# Patient Record
Sex: MALE | Race: WHITE | NOT HISPANIC OR LATINO | Employment: FULL TIME | URBAN - METROPOLITAN AREA
[De-identification: names, ages, dates, MRNs, and addresses within clinical notes are randomized per-mention and may not be internally consistent; named-entity substitution may affect disease eponyms.]

---

## 2017-03-28 ENCOUNTER — TELEPHONE (OUTPATIENT)
Dept: GASTROENTEROLOGY | Facility: CLINIC | Age: 47
End: 2017-03-28

## 2017-03-28 NOTE — TELEPHONE ENCOUNTER
----- Message from Dilma Espinoza sent at 3/28/2017 12:26 PM CDT -----  Contact: Wife- [phone]  Malissa- pt called to reschedule his upcoming appt with Dr. Leonardo originally for 6/22/17- pt will be working that day- please call pt back at [phone]

## 2017-05-17 ENCOUNTER — DOCUMENTATION ONLY (OUTPATIENT)
Dept: FAMILY MEDICINE | Facility: CLINIC | Age: 47
End: 2017-05-17

## 2017-05-17 NOTE — PROGRESS NOTES
Pre-Visit Chart Review  For Appointment Scheduled on 05/18/2017    Health Maintenance Due   Topic Date Due    Pneumococcal PCV13 (High Risk) (1 - PCV13 Required) 05/05/1972    Pneumococcal PPSV23 (High Risk) (1) 05/05/1989

## 2017-05-18 ENCOUNTER — OFFICE VISIT (OUTPATIENT)
Dept: FAMILY MEDICINE | Facility: CLINIC | Age: 47
End: 2017-05-18
Payer: COMMERCIAL

## 2017-05-18 ENCOUNTER — PATIENT MESSAGE (OUTPATIENT)
Dept: HEMATOLOGY/ONCOLOGY | Facility: CLINIC | Age: 47
End: 2017-05-18

## 2017-05-18 VITALS
BODY MASS INDEX: 34.23 KG/M2 | SYSTOLIC BLOOD PRESSURE: 124 MMHG | WEIGHT: 266.75 LBS | HEIGHT: 74 IN | TEMPERATURE: 98 F | HEART RATE: 76 BPM | DIASTOLIC BLOOD PRESSURE: 74 MMHG

## 2017-05-18 DIAGNOSIS — E46 UNSPECIFIED PROTEIN-CALORIE MALNUTRITION: ICD-10-CM

## 2017-05-18 DIAGNOSIS — M43.16 SPONDYLOLISTHESIS OF LUMBAR REGION: Primary | ICD-10-CM

## 2017-05-18 DIAGNOSIS — M51.36 DDD (DEGENERATIVE DISC DISEASE), LUMBAR: ICD-10-CM

## 2017-05-18 DIAGNOSIS — R63.5 ABNORMAL WEIGHT GAIN: ICD-10-CM

## 2017-05-18 DIAGNOSIS — K76.0 NONALCOHOLIC FATTY LIVER DISEASE: ICD-10-CM

## 2017-05-18 DIAGNOSIS — Z23 NEED FOR HEPATITIS A AND B VACCINATION: ICD-10-CM

## 2017-05-18 DIAGNOSIS — R16.1 SPLENOMEGALY: Chronic | ICD-10-CM

## 2017-05-18 DIAGNOSIS — R73.9 HYPERGLYCEMIA: ICD-10-CM

## 2017-05-18 PROCEDURE — 90471 IMMUNIZATION ADMIN: CPT | Mod: S$GLB,,, | Performed by: FAMILY MEDICINE

## 2017-05-18 PROCEDURE — 99999 PR PBB SHADOW E&M-EST. PATIENT-LVL III: CPT | Mod: PBBFAC,,, | Performed by: FAMILY MEDICINE

## 2017-05-18 PROCEDURE — 90636 HEP A/HEP B VACC ADULT IM: CPT | Mod: S$GLB,,, | Performed by: FAMILY MEDICINE

## 2017-05-18 PROCEDURE — 1160F RVW MEDS BY RX/DR IN RCRD: CPT | Mod: S$GLB,,, | Performed by: FAMILY MEDICINE

## 2017-05-18 PROCEDURE — 99214 OFFICE O/P EST MOD 30 MIN: CPT | Mod: 25,S$GLB,, | Performed by: FAMILY MEDICINE

## 2017-05-18 RX ORDER — PHENTERMINE HYDROCHLORIDE 37.5 MG/1
37.5 TABLET ORAL DAILY
Qty: 30 TABLET | Refills: 4 | Status: SHIPPED | OUTPATIENT
Start: 2017-05-18 | End: 2017-06-17

## 2017-05-18 RX ORDER — TOPIRAMATE 25 MG/1
25 TABLET ORAL 2 TIMES DAILY
Qty: 60 TABLET | Refills: 11 | Status: SHIPPED | OUTPATIENT
Start: 2017-05-18 | End: 2017-10-04

## 2017-05-18 NOTE — PROGRESS NOTES
Subjective:       Patient ID: Lyndon Lion Jr. is a 47 y.o. male.    Chief Complaint: Establish Care    HPI Comments: Well Adult Physical: Patient here for a comprehensive physical exam.The patient reports problems - Patient Active Problem List:     Radiculopathy, lumbosacral region     Herniated lumbar intervertebral disc     Lumbar spondylosis     DDD (degenerative disc disease), lumbosacral     Greater trochanteric bursitis of left hip     Acute medial meniscal tear     Obesity, unspecified     Hx of colon cancer, stage I     Parada syndrome     Thoracic or lumbosacral neuritis or radiculitis, unspecified     Arthritis of shoulder region, right     Neural foraminal stenosis of lumbar spine     MSH2-related Parada syndrome (HNPCC1)     LFT elevation     Family hx of prostate cancer     Spondylosis of lumbar region without myelopathy or radiculopathy     Spondylolisthesis of lumbar region     Nonalcoholic fatty liver disease     Splenomegaly      Do you take any herbs or supplements that were not prescribed by a doctor? no Are you taking calcium supplements? no Are you taking aspirin daily? no   History:  Patient receives prostate care outside our clinic  Date last prostate exam: Tuesday  Date last PSA: Tuesday  Any STD's in the past? none      Review of Systems   Constitutional: Negative.  Negative for activity change, appetite change, chills, diaphoresis, fatigue, fever and unexpected weight change.   HENT: Negative.  Negative for congestion, drooling, ear discharge, ear pain, hearing loss, mouth sores, nosebleeds, postnasal drip, rhinorrhea, sinus pressure, sore throat, tinnitus, trouble swallowing and voice change.    Eyes: Negative.  Negative for pain, discharge, redness, itching and visual disturbance.   Respiratory: Negative.  Negative for apnea, cough, choking, chest tightness and shortness of breath.    Cardiovascular: Negative.  Negative for chest pain, palpitations and leg swelling.    Gastrointestinal: Negative.  Negative for abdominal distention, abdominal pain and anal bleeding.        Loose bowels   Endocrine: Negative.  Negative for cold intolerance, heat intolerance, polydipsia, polyphagia and polyuria.   Genitourinary: Negative.  Negative for difficulty urinating, dysuria, enuresis, flank pain, frequency, hematuria, scrotal swelling, testicular pain and urgency.   Musculoskeletal: Positive for arthralgias and back pain. Negative for gait problem, neck pain and neck stiffness.   Skin: Negative.  Negative for color change, pallor and rash.   Allergic/Immunologic: Negative.  Negative for environmental allergies and food allergies.   Neurological: Negative.  Negative for dizziness, tremors, syncope, facial asymmetry, speech difficulty, light-headedness, numbness and headaches.   Hematological: Negative for adenopathy. Does not bruise/bleed easily.   Psychiatric/Behavioral: Negative.  Negative for agitation, behavioral problems, confusion, decreased concentration, dysphoric mood and hallucinations. The patient is not hyperactive.        Objective:      Physical Exam   Constitutional: He is oriented to person, place, and time. He appears well-developed and well-nourished. No distress.   HENT:   Head: Normocephalic and atraumatic.   Right Ear: External ear normal.   Left Ear: External ear normal.   Nose: Nose normal.   Mouth/Throat: Oropharynx is clear and moist. No oropharyngeal exudate.   Eyes: Conjunctivae and EOM are normal. Pupils are equal, round, and reactive to light. Right eye exhibits no discharge. Left eye exhibits no discharge. No scleral icterus.   Neck: Normal range of motion. Neck supple. No JVD present. No tracheal deviation present. No thyromegaly present.   Cardiovascular: Normal rate, normal heart sounds and intact distal pulses.    No murmur heard.  Pulmonary/Chest: Effort normal and breath sounds normal. No respiratory distress. He has no wheezes. He has no rales.    Abdominal: Soft. Bowel sounds are normal. He exhibits no distension and no mass. There is no tenderness. There is no rebound and no guarding.   Musculoskeletal: He exhibits no edema or tenderness.          Lymphadenopathy:     He has no cervical adenopathy.   Neurological: He is alert and oriented to person, place, and time. No cranial nerve deficit. Coordination normal.   Skin: Skin is warm and dry. No rash noted. He is not diaphoretic. No erythema. No pallor.   Psychiatric: He has a normal mood and affect. His behavior is normal. Judgment and thought content normal.   Vitals reviewed.      Assessment:       1. Spondylolisthesis of lumbar region    2. Nonalcoholic fatty liver disease    3. Splenomegaly    4. Need for hepatitis A and B vaccination    5. Hyperglycemia    6. Unspecified protein-calorie malnutrition    7. Abnormal weight gain    8. DDD (degenerative disc disease), lumbar        Plan:       Spondylolisthesis of lumbar region    Nonalcoholic fatty liver disease    Splenomegaly    Need for hepatitis A and B vaccination  -     Hepatitis A / Hepatitis B Combined Vaccine (IM)  -     HEPATITIS B SURFACE ANTIBODY; Future; Expected date: 5/18/17    Hyperglycemia  -     Basic metabolic panel; Future; Expected date: 5/18/17  -     Hemoglobin A1c; Future; Expected date: 5/18/17    Unspecified protein-calorie malnutrition  -     Vitamin D; Future; Expected date: 5/18/17    Abnormal weight gain  -     phentermine (ADIPEX-P) 37.5 mg tablet; Take 1 tablet (37.5 mg total) by mouth once daily.  Dispense: 30 tablet; Refill: 4    DDD (degenerative disc disease), lumbar  -     topiramate (TOPAMAX) 25 MG tablet; Take 1 tablet (25 mg total) by mouth 2 (two) times daily.  Dispense: 60 tablet; Refill: 11      Patient readiness: acceptance and barriers:readiness and social stressors    During the course of the visit the patient was educated and counseled about the following:     Obesity:   General weight loss/lifestyle  modification strategies discussed (elicit support from others; identify saboteurs; non-food rewards, etc).    Goals: Obesity: Reduce calorie intake and BMI    Did patient meet goals/outcomes: No    The following self management tools provided: excercise log    Patient Instructions (the written plan) was given to the patient/family.     Time spent with patient: 45 minutes

## 2017-05-18 NOTE — PROGRESS NOTES
2 patient identifiers used (name and ). Administered Hep A/B (TwinRx) vaccine IM. Patient tolerated well, no bleeding at insertion site noted. Pain scale 1/10. Aseptic technique maintained. Immunization information given to patient.

## 2017-05-18 NOTE — MR AVS SNAPSHOT
[facility]  [address]  [place] [address]  Phone: [phone]  Fax: [phone]                  Lyndon Lion Jr.   2017 3:40 PM   Office Visit    Description:  Male : 1970   Provider:  Evan Judd MD   Department:  [facility] Medicine           Reason for Visit     Establish Care           Diagnoses this Visit        Comments    Spondylolisthesis of lumbar region    -  Primary     Nonalcoholic fatty liver disease         Splenomegaly         Need for hepatitis A and B vaccination         Hyperglycemia         Unspecified protein-calorie malnutrition         Abnormal weight gain         DDD (degenerative disc disease), lumbar                To Do List           Future Appointments        Provider Department Dept Phone    2017 9:30 AM Dom Leonardo MD [facility] - Gastroenterology [phone]    2017 10:15 AM [facility] - Lab [phone]    2017 4:20 PM Evan Judd MD [facility] [phone]    8/15/2017 9:40 AM LAB, [facility] [phone]    8/15/2017 10:00 AM [facility] XRFL1 [facility] [phone]      Goals (5 Years of Data)     None      Follow-Up and Disposition     Return in about 3 months (around 2017).       These Medications        Disp Refills Start End    phentermine (ADIPEX-P) 37.5 mg tablet 30 tablet 4 2017    Take 1 tablet (37.5 mg total) by mouth once daily. - Oral    Pharmacy: [facility] [address] [place] [address] & [facility] Ph #: [phone]       topiramate (TOPAMAX) 25 MG tablet 60 tablet 11 2017    Take 1 tablet (25 mg total) by mouth 2 (two) times daily. - Oral    Pharmacy: [facility] [address] [place] [address] [address] [facility] & [facility] Ph #: [phone]         [facility]     [facility] -   Assistance  Unless otherwise directed by your provider, please contact [facility], our nurse care line that is available for 24/7 assistance.     Registered nurses in the [facility] provide: appointment scheduling, clinical advisement, health education, and other advisory services.  Call: [phone] (toll free)               Medications           Message regarding Medications     Verify the changes and/or additions to your medication regime listed below are the same as discussed with your clinician today.  If any of these changes or additions are incorrect, please notify your healthcare provider.        START taking these NEW medications        Refills    phentermine (ADIPEX-P) 37.5 mg tablet 4    Sig: Take 1 tablet (37.5 mg total) by mouth once daily.    Class: Normal    Route: Oral    topiramate (TOPAMAX) 25 MG tablet 11    Sig: Take 1 tablet (25 mg total) by mouth 2 (two) times daily.    Class: Normal    Route: Oral      STOP taking these medications     ciclopirox (PENLAC) 8 % Soln APPLY EXTERNALLY TO THE AFFECTED AREA EVERY DAY           Verify that the below list of medications is an accurate representation of the medications you are currently taking.  If none reported, the list may be blank. If incorrect, please contact your healthcare provider. Carry this list with you in case of emergency.           Current Medications     cyclobenzaprine (FLEXERIL) 10 MG tablet Take 1 tablet (10 mg total) by mouth 2 (two) times daily as needed for Muscle spasms.    diclofenac sodium 1 % Gel Apply 2 g topically 4 (four) times daily.    oxycodone-acetaminophen (PERCOCET)  mg per tablet Take 1 tablet by mouth every 6 to 8 hours as needed for Pain.    phentermine (ADIPEX-P) 37.5 mg tablet Take 1 tablet (37.5 mg total) by mouth once daily.    topiramate (TOPAMAX) 25 MG tablet Take 1 tablet (25 mg total) by mouth 2 (two) times daily.           Clinical Reference Information           Your Vitals Were   "   BP Pulse Temp Height Weight BMI    124/74 (BP Location: Right arm, Patient Position: Sitting, BP Method: Automatic) 76 98.4 °F (36.9 °C) (Oral) 6' 1.5" (1.867 m) 121 kg (266 lb 12.1 oz) 34.72 kg/m2      Blood Pressure          Most Recent Value    BP  124/74      Allergies as of 5/18/2017     No Known Allergies      Immunizations Administered on Date of Encounter - 5/18/2017     Name Date Dose VIS Date Route    Hepatitis A / Hepatitis B 5/18/2017 1 mL 7/20/2016 Intramuscular      Orders Placed During Today's Visit      Normal Orders This Visit    Hepatitis A / Hepatitis B Combined Vaccine (IM)     Future Labs/Procedures Expected by Expires    Basic metabolic panel  5/18/2017 7/17/2018    Hemoglobin A1c  5/18/2017 7/17/2018    HEPATITIS B SURFACE ANTIBODY  5/18/2017 7/17/2018    Vitamin D  5/18/2017 7/17/2018      Language Assistance Services     ATTENTION: Language assistance services are available, free of charge. Please call [phone].      ATENCIÓN: Si habla español, tiene a lopez disposición servicios gratuitos de asistencia lingüística. Llame al [phone].     ELFEGO Ý: N?u b?n nói Ti?ng Vi?t, có các d?ch v? h? tr? ngôn ng? mi?n phí dành cho b?n. G?i s? [phone].         [facility] complies with applicable Federal civil rights laws and does not discriminate on the basis of race, color, national origin, age, disability, or sex.        "

## 2017-05-19 ENCOUNTER — TELEPHONE (OUTPATIENT)
Dept: HEMATOLOGY/ONCOLOGY | Facility: CLINIC | Age: 47
End: 2017-05-19

## 2017-05-19 NOTE — TELEPHONE ENCOUNTER
----- Message from Tara Lozano sent at 5/19/2017  3:25 PM CDT -----  Contact:  call [phone]//// hugh    Calling to  reschedule  Jessica // please call  For  details

## 2017-06-15 ENCOUNTER — ANESTHESIA EVENT (OUTPATIENT)
Dept: SURGERY | Facility: AMBULARY SURGERY CENTER | Age: 47
End: 2017-06-15
Payer: COMMERCIAL

## 2017-06-16 ENCOUNTER — HOSPITAL ENCOUNTER (OUTPATIENT)
Facility: AMBULARY SURGERY CENTER | Age: 47
Discharge: HOME | End: 2017-06-16
Attending: SPECIALIST | Admitting: SPECIALIST
Payer: COMMERCIAL

## 2017-06-16 ENCOUNTER — ANESTHESIA (OUTPATIENT)
Dept: SURGERY | Facility: AMBULARY SURGERY CENTER | Age: 47
End: 2017-06-16
Payer: COMMERCIAL

## 2017-06-16 ENCOUNTER — SURGERY (OUTPATIENT)
Age: 47
End: 2017-06-16

## 2017-06-16 DIAGNOSIS — M43.16 SPONDYLOLISTHESIS OF LUMBAR REGION: ICD-10-CM

## 2017-06-16 DIAGNOSIS — M54.17 RADICULOPATHY, LUMBOSACRAL REGION: Primary | ICD-10-CM

## 2017-06-16 DIAGNOSIS — M51.37 DDD (DEGENERATIVE DISC DISEASE), LUMBOSACRAL: ICD-10-CM

## 2017-06-16 DIAGNOSIS — M51.26 HERNIATED LUMBAR INTERVERTEBRAL DISC: ICD-10-CM

## 2017-06-16 DIAGNOSIS — M47.26 OSTEOARTHRITIS OF SPINE WITH RADICULOPATHY, LUMBAR REGION: ICD-10-CM

## 2017-06-16 DIAGNOSIS — M47.816 SPONDYLOSIS OF LUMBAR REGION WITHOUT MYELOPATHY OR RADICULOPATHY: ICD-10-CM

## 2017-06-16 PROCEDURE — D9220A PRA ANESTHESIA: Mod: CRNA,,, | Performed by: NURSE ANESTHETIST, CERTIFIED REGISTERED

## 2017-06-16 PROCEDURE — 64493 INJ PARAVERT F JNT L/S 1 LEV: CPT | Mod: LT | Performed by: SPECIALIST

## 2017-06-16 PROCEDURE — D9220A PRA ANESTHESIA: Mod: ANES,,, | Performed by: ANESTHESIOLOGY

## 2017-06-16 PROCEDURE — 64495 INJ PARAVERT F JNT L/S 3 LEV: CPT | Mod: RT | Performed by: SPECIALIST

## 2017-06-16 PROCEDURE — 64494 INJ PARAVERT F JNT L/S 2 LEV: CPT | Mod: RT | Performed by: SPECIALIST

## 2017-06-16 RX ORDER — BUPIVACAINE HYDROCHLORIDE 2.5 MG/ML
INJECTION, SOLUTION EPIDURAL; INFILTRATION; INTRACAUDAL
Status: DISPENSED
Start: 2017-06-16 | End: 2017-06-17

## 2017-06-16 RX ORDER — PROPOFOL 10 MG/ML
INJECTION, EMULSION INTRAVENOUS
Status: DISCONTINUED
Start: 2017-06-16 | End: 2017-06-16 | Stop reason: HOSPADM

## 2017-06-16 RX ORDER — PROPOFOL 10 MG/ML
VIAL (ML) INTRAVENOUS
Status: DISCONTINUED | OUTPATIENT
Start: 2017-06-16 | End: 2017-06-16

## 2017-06-16 RX ORDER — LIDOCAINE HCL/PF 100 MG/5ML
SYRINGE (ML) INTRAVENOUS
Status: DISCONTINUED | OUTPATIENT
Start: 2017-06-16 | End: 2017-06-16

## 2017-06-16 RX ORDER — SODIUM CHLORIDE, SODIUM LACTATE, POTASSIUM CHLORIDE, CALCIUM CHLORIDE 600; 310; 30; 20 MG/100ML; MG/100ML; MG/100ML; MG/100ML
INJECTION, SOLUTION INTRAVENOUS CONTINUOUS
Status: DISCONTINUED | OUTPATIENT
Start: 2017-06-16 | End: 2017-06-16 | Stop reason: HOSPADM

## 2017-06-16 RX ORDER — DEXAMETHASONE SODIUM PHOSPHATE 10 MG/ML
INJECTION INTRAMUSCULAR; INTRAVENOUS
Status: DISCONTINUED | OUTPATIENT
Start: 2017-06-16 | End: 2017-06-16 | Stop reason: HOSPADM

## 2017-06-16 RX ORDER — LIDOCAINE HYDROCHLORIDE 20 MG/ML
INJECTION, SOLUTION EPIDURAL; INFILTRATION; INTRACAUDAL; PERINEURAL
Status: DISCONTINUED
Start: 2017-06-16 | End: 2017-06-16 | Stop reason: HOSPADM

## 2017-06-16 RX ORDER — BUPIVACAINE HYDROCHLORIDE 2.5 MG/ML
INJECTION, SOLUTION EPIDURAL; INFILTRATION; INTRACAUDAL
Status: DISCONTINUED | OUTPATIENT
Start: 2017-06-16 | End: 2017-06-16 | Stop reason: HOSPADM

## 2017-06-16 RX ORDER — LIDOCAINE HYDROCHLORIDE 10 MG/ML
1 INJECTION, SOLUTION EPIDURAL; INFILTRATION; INTRACAUDAL; PERINEURAL ONCE
Status: COMPLETED | OUTPATIENT
Start: 2017-06-16 | End: 2017-06-16

## 2017-06-16 RX ORDER — DEXAMETHASONE SODIUM PHOSPHATE 10 MG/ML
INJECTION INTRAMUSCULAR; INTRAVENOUS
Status: DISPENSED
Start: 2017-06-16 | End: 2017-06-17

## 2017-06-16 RX ADMIN — BUPIVACAINE HYDROCHLORIDE 9 ML: 2.5 INJECTION, SOLUTION EPIDURAL; INFILTRATION; INTRACAUDAL at 10:06

## 2017-06-16 RX ADMIN — SODIUM CHLORIDE, SODIUM LACTATE, POTASSIUM CHLORIDE, CALCIUM CHLORIDE: 600; 310; 30; 20 INJECTION, SOLUTION INTRAVENOUS at 09:06

## 2017-06-16 RX ADMIN — Medication 100 MG: at 10:06

## 2017-06-16 RX ADMIN — DEXAMETHASONE SODIUM PHOSPHATE 10 MG: 10 INJECTION INTRAMUSCULAR; INTRAVENOUS at 10:06

## 2017-06-16 RX ADMIN — BUPIVACAINE HYDROCHLORIDE 10 ML: 2.5 INJECTION, SOLUTION EPIDURAL; INFILTRATION; INTRACAUDAL at 10:06

## 2017-06-16 RX ADMIN — LIDOCAINE HYDROCHLORIDE 10 MG: 10 INJECTION, SOLUTION EPIDURAL; INFILTRATION; INTRACAUDAL; PERINEURAL at 09:06

## 2017-06-16 RX ADMIN — Medication 50 MG: at 10:06

## 2017-06-16 NOTE — OP NOTE
DATE OF PROCEDURE:  06/16/2017    SURGEON NAME: DR. RENETTA DAVIS    PREOPERATIVE DIAGNOSIS  Lumbar spondylosis    POSTOPERATIVE DIAGNOSIS  Lumbar spondylosis    PROCEDURE  Left Lumbar Steroid and Marcaine facet injections at levels L3/4, L4/5, L5/S1  Right Lumbar Steroid and Marcaine facet injections at levels L3/4, L4/5, L5/S1  Intraoperative fluoroscopy.    ANESTHESIA  Monitored anesthesia care.    ESTIMATED BLOOD LOSS  Less than 1 mL.    INDICATION FOR SURGERY    Patient presents with complaints of back mechanical pain. The patients pain continued to persist, interfering with their quality of life and activities of daily living. A surgical procedure was planned with steroid and Marcaine facet injections. The patient was informed that the surgical procedure provides no guarantee of improvement or worsening of present condition but only an attempt to improve overall condition and function. The patient expressed understanding, and all questions were answered and still desired to proceed with operative procedure.      PROCEDURE IN DETAIL    The patient was taken to the operating room. Anesthesia achieved with monitored anesthesia care.  After adequate anesthesia was achieved, the patient was placed in prone position. All bony prominences were well padded and protected. The lower back prepped and draped in the usual sterile fashion. Approximately 2ml per level 0.25% Marcaine utilized local into skin and subcutaneous tissue at each level. With assistance of intraoperative fluoroscopy, a 22-gauge spinal needle was advanced into the facet joints. A mixture of ml of 0.25% Marcaine, and 1ml of 10mg Decadron was made where approximately 1 to 1.5 ml advanced into each facet joint. At the completion of steroid injections, the needle was slowly withdrawn. The surgical sites were cleaned with normal saline with application of sterile occlusive Band-Aids. Anesthesia reversed. The patient transferred to recovery room in stable  condition without immediate apparent surgical complication.

## 2017-06-16 NOTE — ANESTHESIA POSTPROCEDURE EVALUATION
"Anesthesia Post Evaluation    Patient: Lyndon Lion Jr.    Procedure(s) Performed: Procedure(s) (LRB):  BLOCK-FACET-LUMBAR- Ye L3/4 4/5 5/S1 (Bilateral)    Final Anesthesia Type: general  Patient location during evaluation: PACU  Patient participation: Yes- Able to Participate  Level of consciousness: awake and alert  Post-procedure vital signs: reviewed and stable  Pain management: adequate  Airway patency: patent  PONV status at discharge: No PONV  Anesthetic complications: no      Cardiovascular status: blood pressure returned to baseline and hemodynamically stable  Respiratory status: unassisted  Hydration status: euvolemic  Follow-up not needed.        Visit Vitals  /73   Pulse (!) 57   Temp 36.8 °C (98.2 °F) (Oral)   Resp 17   Ht 6' 1" (1.854 m)   Wt 120.7 kg (266 lb)   SpO2 97%   BMI 35.09 kg/m²       Pain/Karthikeyan Score: Pain Assessment Performed: Yes (6/16/2017 11:10 AM)  Presence of Pain: denies (6/16/2017 11:10 AM)  Karthikeyan Score: 5 (6/16/2017 11:06 AM)      "

## 2017-06-16 NOTE — DISCHARGE SUMMARY
[facility]  Discharge Note  Short Stay    Admit Date: 6/16/2017    Discharge Date and Time: No discharge date for patient encounter.     Attending Physician: Lyndon Brooke Jr., MD     Discharge Provider: Lyndon Brooke Jr    Diagnoses:  Active Hospital Problems    Diagnosis  POA    Spondylosis of lumbar region without myelopathy or radiculopathy [M47.816]  Yes      Resolved Hospital Problems    Diagnosis Date Resolved POA   No resolved problems to display.       Discharged Condition: good    Hospital Course: Patient was admitted for an outpatient procedure and tolerated the procedure well with no complications.    Final Diagnoses: Same as principal problem.    Disposition: Home or Self Care    Follow up/Patient Instructions:    Medications:  Reconciled Home Medications:   Current Discharge Medication List      CONTINUE these medications which have NOT CHANGED    Details   cyclobenzaprine (FLEXERIL) 10 MG tablet Take 1 tablet (10 mg total) by mouth 2 (two) times daily as needed for Muscle spasms.  Qty: 60 tablet, Refills: 2      diclofenac sodium 1 % Gel Apply 2 g topically 4 (four) times daily.  Qty: 1 Tube, Refills: 2      oxycodone-acetaminophen (PERCOCET)  mg per tablet Take 1 tablet by mouth every 6 to 8 hours as needed for Pain.  Qty: 60 tablet, Refills: 0      phentermine (ADIPEX-P) 37.5 mg tablet Take 1 tablet (37.5 mg total) by mouth once daily.  Qty: 30 tablet, Refills: 4    Associated Diagnoses: Abnormal weight gain      topiramate (TOPAMAX) 25 MG tablet Take 1 tablet (25 mg total) by mouth 2 (two) times daily.  Qty: 60 tablet, Refills: 11    Associated Diagnoses: DDD (degenerative disc disease), lumbar             Discharge Procedure Orders  Diet general     Activity as tolerated     Remove dressing in 24 hours       Follow-up Information     Lyndon Brooke Jr, MD In 3 weeks.    Specialty:  Orthopedic Surgery  Contact information:  [address]  [address]  [place]  [address]  [phone]                   Discharge Procedure Orders (must include Diet, Follow-up, Activity):    Discharge Procedure Orders (must include Diet, Follow-up, Activity)  Diet general     Activity as tolerated     Remove dressing in 24 hours

## 2017-06-16 NOTE — ANESTHESIA PREPROCEDURE EVALUATION
06/16/2017  Lyndon Lion Jr. is a 47 y.o., male.    Anesthesia Evaluation    I have reviewed the Patient Summary Reports.    I have reviewed the Nursing Notes.      Review of Systems  Anesthesia Hx:  No problems with previous Anesthesia    Pulmonary:   Asthma asymptomatic        Physical Exam  General:  Well nourished                 Anesthesia Plan  Type of Anesthesia, risks & benefits discussed:  Anesthesia Type:  MAC  Patient's Preference:   Intra-op Monitoring Plan:   Intra-op Monitoring Plan Comments:   Post Op Pain Control Plan:   Post Op Pain Control Plan Comments:   Induction:   IV  Beta Blocker:  Patient is not currently on a Beta-Blocker (No further documentation required).       Informed Consent: Patient understands risks and agrees with Anesthesia plan.  Questions answered. Anesthesia consent signed with patient.  ASA Score: 2     Day of Surgery Review of History & Physical:    H&P update referred to the surgeon.         Ready For Surgery From Anesthesia Perspective.

## 2017-06-16 NOTE — PLAN OF CARE
Patient awake, alert, and oriented.  No complaints of pain or discomfort at present time. VSS, tolerating fluids without C/O N/V. Stable for discharge home with daughter.

## 2017-06-16 NOTE — TRANSFER OF CARE
"Anesthesia Transfer of Care Note    Patient: Lyndon Lion Jr.    Procedure(s) Performed: Procedure(s) (LRB):  BLOCK-FACET-LUMBAR- Ye L3/4 4/5 5/S1 (Bilateral)    Patient location: PACU    Anesthesia Type: MAC    Transport from OR: Transported from OR on room air with adequate spontaneous ventilation    Post pain: adequate analgesia    Post assessment: no apparent anesthetic complications and tolerated procedure well    Post vital signs: stable    Level of consciousness: awake, alert and oriented    Nausea/Vomiting: no nausea/vomiting    Complications: none    Transfer of care protocol was followed      Last vitals:   Visit Vitals  /75 (BP Location: Right arm, Patient Position: Lying)   Pulse (!) 55   Temp 36.4 °C (97.6 °F) (Oral)   Resp 18   Ht 6' 1" (1.854 m)   Wt 120.7 kg (266 lb)   SpO2 97%   BMI 35.09 kg/m²     "

## 2017-06-20 VITALS
RESPIRATION RATE: 17 BRPM | OXYGEN SATURATION: 97 % | SYSTOLIC BLOOD PRESSURE: 114 MMHG | HEART RATE: 57 BPM | BODY MASS INDEX: 35.25 KG/M2 | TEMPERATURE: 98 F | DIASTOLIC BLOOD PRESSURE: 73 MMHG | HEIGHT: 73 IN | WEIGHT: 266 LBS

## 2017-07-05 DIAGNOSIS — M79.642 LEFT HAND PAIN: Primary | ICD-10-CM

## 2017-07-07 ENCOUNTER — OFFICE VISIT (OUTPATIENT)
Dept: ORTHOPEDICS | Facility: CLINIC | Age: 47
End: 2017-07-07
Payer: COMMERCIAL

## 2017-07-07 ENCOUNTER — HOSPITAL ENCOUNTER (OUTPATIENT)
Dept: RADIOLOGY | Facility: HOSPITAL | Age: 47
Discharge: HOME | End: 2017-07-07
Attending: ORTHOPAEDIC SURGERY
Payer: COMMERCIAL

## 2017-07-07 VITALS
SYSTOLIC BLOOD PRESSURE: 129 MMHG | WEIGHT: 266.13 LBS | BODY MASS INDEX: 35.27 KG/M2 | HEART RATE: 66 BPM | DIASTOLIC BLOOD PRESSURE: 76 MMHG | HEIGHT: 73 IN

## 2017-07-07 DIAGNOSIS — M79.642 LEFT HAND PAIN: ICD-10-CM

## 2017-07-07 DIAGNOSIS — S66.519A: Primary | ICD-10-CM

## 2017-07-07 PROCEDURE — 73130 X-RAY EXAM OF HAND: CPT | Mod: TC,PN,LT

## 2017-07-07 PROCEDURE — 99203 OFFICE O/P NEW LOW 30 MIN: CPT | Mod: 25,S$GLB,, | Performed by: ORTHOPAEDIC SURGERY

## 2017-07-07 PROCEDURE — 20600 DRAIN/INJ JOINT/BURSA W/O US: CPT | Mod: LT,S$GLB,, | Performed by: ORTHOPAEDIC SURGERY

## 2017-07-07 PROCEDURE — 99999 PR PBB SHADOW E&M-EST. PATIENT-LVL II: CPT | Mod: PBBFAC,,, | Performed by: ORTHOPAEDIC SURGERY

## 2017-07-07 PROCEDURE — 73130 X-RAY EXAM OF HAND: CPT | Mod: 26,LT,, | Performed by: RADIOLOGY

## 2017-07-07 RX ADMIN — TRIAMCINOLONE ACETONIDE 40 MG: 40 INJECTION, SUSPENSION INTRA-ARTICULAR; INTRAMUSCULAR at 04:07

## 2017-07-13 RX ORDER — TRIAMCINOLONE ACETONIDE 40 MG/ML
40 INJECTION, SUSPENSION INTRA-ARTICULAR; INTRAMUSCULAR
Status: DISCONTINUED | OUTPATIENT
Start: 2017-07-07 | End: 2017-07-13 | Stop reason: HOSPADM

## 2017-07-13 NOTE — PROCEDURES
Small Joint Aspiration/Injection  Date/Time: 7/7/2017 4:37 PM  Performed by: DEBORAH GUTIERRES  Authorized by: DEBORAH GUTIERRES     Consent Done?:  Yes (Verbal)  Indications:  Pain  Timeout: Prior to procedure the correct patient, procedure, and site was verified      Location:  Index finger  Site:  L index MCP  Prep: Patient was prepped and draped in usual sterile fashion    Approach:  Ulnar  Medications:  40 mg triamcinolone acetonide 40 mg/mL

## 2017-07-13 NOTE — PROGRESS NOTES
Past Medical History:   Diagnosis Date    Arthritis     Asthma     AS A CHILD    Colon cancer     Parada syndrome        Past Surgical History:   Procedure Laterality Date    APPENDECTOMY      COLON SURGERY  2008    PARTIAL COLECTOMY    COLONOSCOPY Bilateral 2012    COLONOSCOPY N/A 8/1/2016    Procedure: COLONOSCOPY;  Surgeon: Blaze Marr MD;  Location: [facility];  Service: Endoscopy;  Laterality: N/A;    Epidural Steroid Injection  10/23/15    Lumbar    GASTRIC BYPASS  2010    SLEEVE    KNEE ARTHROSCOPY Right        Current Outpatient Prescriptions   Medication Sig    cyclobenzaprine (FLEXERIL) 10 MG tablet Take 1 tablet (10 mg total) by mouth 2 (two) times daily as needed for Muscle spasms.    oxycodone-acetaminophen (PERCOCET)  mg per tablet Take 1 tablet by mouth every 6 to 8 hours as needed for Pain.    topiramate (TOPAMAX) 25 MG tablet Take 1 tablet (25 mg total) by mouth 2 (two) times daily.    diclofenac sodium 1 % Gel Apply 2 g topically 4 (four) times daily.     No current facility-administered medications for this visit.        Review of patient's allergies indicates:  No Known Allergies    Family History   Problem Relation Age of Onset    Melanoma Mother     Heart disease Father     Diabetes Father     Cancer Maternal Uncle      colon    Cancer Maternal Grandmother      colon    Psoriasis Neg Hx     Lupus Neg Hx     Eczema Neg Hx        Social History     Social History    Marital status:      Spouse name: N/A    Number of children: N/A    Years of education: N/A     Occupational History     [employer]     Social History Main Topics    Smoking status: Never Smoker    Smokeless tobacco: Not on file    Alcohol use Yes      Comment: RARELY    Drug use: No    Sexual activity: Not on file     Other Topics Concern    Not on file     Social History Narrative    No narrative on file       Chief Complaint:   Chief Complaint   Patient presents with    Left  "Hand - Pain       Consulting Physician: Self, Aaareferral    History of present illness:    This is a 47 y.o. year old male who complains of complains of left index finger pain. Strained at work twisting valves. Pain 4/10 and worse with activities. No specific injury. Pain for months.    Review of Systems:    Constitution: Denies chills, fever, and sweats.  HENT: Denies headaches or blurry vision.  Cardiovascular: Denies chest pain or irregular heart beat.  Respiratory: Denies cough or shortness of breath.  Gastrointestinal: Denies abdominal pain, nausea, or vomiting.  Musculoskeletal:  Denies muscle cramps.  Neurological: Denies dizziness or focal weakness.  Psychiatric/Behavioral: Normal mental status.  Hematologic/Lymphatic: Denies bleeding problem or easy bruising/bleeding.  Skin: Denies rash or suspicious lesions.    Examination:    Vital Signs:    Vitals:    07/07/17 1356   BP: 129/76   Pulse: 66   Weight: 120.7 kg (266 lb 1.5 oz)   Height: 6' 1" (1.854 m)   PainSc:   4   PainLoc: Hand       Body mass index is 35.11 kg/m².    This a well-developed, well nourished patient in no acute distress.    Alert and oriented and cooperative to examination.       Physical Exam: Left Hand Exam    Skin  Scars:   None  Rash:   None    Inspection  Erythema:  None  Bruising:  None  Swelling:  None  Masses:  None  Lymphadenopathy: None    Coordination:  Normal  Instability:  None    Range of Motion  Finger ROM:  Full    Strength:  Normal   Tenderness:  Ulnar side index    Pulse:   2+ radial  Capillary Refill: Normal    Sensation:  Intact          Imaging: X-rays ordered and reviewed today of the left hand are normal        Assessment: Strain intrns musc/fasc/tend unsp finger at wrs/hnd lv, init  -     Small Joint Aspiration/Injection        Plan:  He seems to have strained the intrinsic muscles of the index finger ulnar side. We discussed options and he would like to try an injection. Declined OT. Follow up as " needed.      DISCLAIMER: This note may have been dictated using voice recognition software and may contain grammatical errors.     NOTE: Consult report sent to referring provider via [employer] EMR.

## 2017-08-04 ENCOUNTER — HOSPITAL ENCOUNTER (OUTPATIENT)
Dept: RADIOLOGY | Facility: CLINIC | Age: 47
Discharge: HOME | End: 2017-08-04
Attending: NURSE PRACTITIONER
Payer: COMMERCIAL

## 2017-08-04 DIAGNOSIS — C18.9 COLON CANCER: ICD-10-CM

## 2017-08-04 PROCEDURE — 71020 XR CHEST PA AND LATERAL: CPT | Mod: 26,,, | Performed by: RADIOLOGY

## 2017-08-04 PROCEDURE — 71020 XR CHEST PA AND LATERAL: CPT | Mod: TC,PO

## 2017-08-11 ENCOUNTER — DOCUMENTATION ONLY (OUTPATIENT)
Dept: FAMILY MEDICINE | Facility: CLINIC | Age: 47
End: 2017-08-11

## 2017-08-11 NOTE — PROGRESS NOTES
Pre-Visit Chart Review  For Appointment Scheduled on 08/14/2017    Health Maintenance Due   Topic Date Due    Pneumococcal PCV13 (High Risk) (1 - PCV13 Required) 05/05/1972    Pneumococcal PPSV23 (High Risk) (1) 05/05/1989    Influenza Vaccine  08/01/2017

## 2017-08-14 ENCOUNTER — OFFICE VISIT (OUTPATIENT)
Dept: HEMATOLOGY/ONCOLOGY | Facility: CLINIC | Age: 47
End: 2017-08-14
Payer: COMMERCIAL

## 2017-08-14 ENCOUNTER — OFFICE VISIT (OUTPATIENT)
Dept: FAMILY MEDICINE | Facility: CLINIC | Age: 47
End: 2017-08-14
Payer: COMMERCIAL

## 2017-08-14 VITALS
DIASTOLIC BLOOD PRESSURE: 76 MMHG | WEIGHT: 268.94 LBS | HEART RATE: 76 BPM | BODY MASS INDEX: 35.64 KG/M2 | SYSTOLIC BLOOD PRESSURE: 117 MMHG | HEIGHT: 73 IN | TEMPERATURE: 98 F

## 2017-08-14 VITALS
WEIGHT: 269.38 LBS | SYSTOLIC BLOOD PRESSURE: 115 MMHG | TEMPERATURE: 98 F | BODY MASS INDEX: 34.57 KG/M2 | DIASTOLIC BLOOD PRESSURE: 78 MMHG | RESPIRATION RATE: 16 BRPM | HEART RATE: 61 BPM | HEIGHT: 74 IN

## 2017-08-14 DIAGNOSIS — E66.9 OBESITY, UNSPECIFIED: ICD-10-CM

## 2017-08-14 DIAGNOSIS — E55.9 MILD VITAMIN D DEFICIENCY: Chronic | ICD-10-CM

## 2017-08-14 DIAGNOSIS — E66.9 OBESITY, UNSPECIFIED: Primary | ICD-10-CM

## 2017-08-14 DIAGNOSIS — Z98.84 S/P LAPAROSCOPIC SLEEVE GASTRECTOMY: ICD-10-CM

## 2017-08-14 DIAGNOSIS — Z85.038 HX OF COLON CANCER, STAGE I: Primary | ICD-10-CM

## 2017-08-14 DIAGNOSIS — Z15.09 MSH2-RELATED LYNCH SYNDROME (HNPCC1): ICD-10-CM

## 2017-08-14 PROCEDURE — 99999 PR PBB SHADOW E&M-EST. PATIENT-LVL III: CPT | Mod: PBBFAC,,, | Performed by: FAMILY MEDICINE

## 2017-08-14 PROCEDURE — 99999 PR PBB SHADOW E&M-EST. PATIENT-LVL IV: CPT | Mod: PBBFAC,,, | Performed by: NURSE PRACTITIONER

## 2017-08-14 PROCEDURE — 3008F BODY MASS INDEX DOCD: CPT | Mod: S$GLB,,, | Performed by: FAMILY MEDICINE

## 2017-08-14 PROCEDURE — 99213 OFFICE O/P EST LOW 20 MIN: CPT | Mod: S$GLB,,, | Performed by: NURSE PRACTITIONER

## 2017-08-14 PROCEDURE — 99213 OFFICE O/P EST LOW 20 MIN: CPT | Mod: S$GLB,,, | Performed by: FAMILY MEDICINE

## 2017-08-14 PROCEDURE — 3008F BODY MASS INDEX DOCD: CPT | Mod: S$GLB,,, | Performed by: NURSE PRACTITIONER

## 2017-08-14 RX ORDER — ACETAMINOPHEN 500 MG
5000 TABLET ORAL DAILY
Qty: 90 TABLET | Refills: 3 | Status: SHIPPED | OUTPATIENT
Start: 2017-08-14 | End: 2018-02-07

## 2017-08-14 NOTE — PROGRESS NOTES
HISTORY OF PRESENT ILLNESS:  This is a 47-year-old white gentleman known to   Dr. Cárdenas for stage II adenocarcinoma of the colon for which he is status post   resection (2009) and 12 cycles of adjuvant FOLFOX.  The patient presents to the   clinic today with his wife for his annual evaluation. He denies any discomfort with fevers, chills,   drenching night sweats, changes in the character or caliber of his stool, abdominal discomfort/bloating,   nausea, vomiting, constipation, diarrhea, unexplained weight loss, irregular heartbeat, chest   pain, rectal bleeding, etc.  No other new complaints or pertinent findings on a 14-point review of systems.    PHYSICAL EXAMINATION:  GENERAL:  Well-developed, well-nourished white gentleman in no acute distress.    Alert and oriented x 4.  VITAL SIGNS:  Weight 269.4 pounds (gain of 1.3 pounds/8 months), /78, pulse 61, respirations 16, temperature 98.0.   HEENT:  Normocephalic, atraumatic.  Oral mucosa pink and moist.  Lips without   lesions.  Tongue midline.  Oropharynx clear.  Nonicteric sclerae.   NECK:  Supple, no adenopathy.  No carotid bruits, thyromegaly or thyroid nodule.  HEART:  Regular rate and rhythm without murmur, gallop or rub.                LUNGS:  Clear to auscultation bilaterally.  Normal respiratory effort.       ABDOMEN:  Soft, nontender, nondistended with positive normoactive bowel sounds,   no hepatosplenomegaly.    EXTREMITIES:  No cyanosis, clubbing or edema.  Distal pulses are intact.          AXILLAE AND GROIN:  No palpable pathologic lymphadenopathy is appreciated.        SKIN:  Intact/turgor normal/noted tatoos                                                    NEUROLOGIC:  Cranial nerves II-XII grossly intact.  Motor:  Good muscle bulk and   tone.  Strength/sensory 5/5 throughout.  Gait stable.     LABORATORY:    Lab Results   Component Value Date    WBC 5.58 08/04/2017    HGB 16.1 08/04/2017    HCT 46.1 08/04/2017    MCV 86 08/04/2017    PLT  254 08/04/2017     Unremarkable differential    CMP  Sodium   Date Value Ref Range Status   08/04/2017 138 136 - 145 mmol/L Final   08/04/2017 138 136 - 145 mmol/L Final     Potassium   Date Value Ref Range Status   08/04/2017 4.1 3.5 - 5.1 mmol/L Final   08/04/2017 4.1 3.5 - 5.1 mmol/L Final     Chloride   Date Value Ref Range Status   08/04/2017 107 95 - 110 mmol/L Final   08/04/2017 107 95 - 110 mmol/L Final     CO2   Date Value Ref Range Status   08/04/2017 25 23 - 29 mmol/L Final   08/04/2017 25 23 - 29 mmol/L Final     Glucose   Date Value Ref Range Status   08/04/2017 111 (H) 70 - 110 mg/dL Final   08/04/2017 111 (H) 70 - 110 mg/dL Final     BUN, Bld   Date Value Ref Range Status   08/04/2017 18 6 - 20 mg/dL Final   08/04/2017 18 6 - 20 mg/dL Final     Creatinine   Date Value Ref Range Status   08/04/2017 1.2 0.5 - 1.4 mg/dL Final   08/04/2017 1.2 0.5 - 1.4 mg/dL Final   05/14/2013 1.3 0.5 - 1.4 mg/dL Final     Calcium   Date Value Ref Range Status   08/04/2017 9.1 8.7 - 10.5 mg/dL Final   08/04/2017 9.1 8.7 - 10.5 mg/dL Final   05/14/2013 9.8 8.7 - 10.5 mg/dL Final     Total Protein   Date Value Ref Range Status   08/04/2017 7.0 6.0 - 8.4 g/dL Final     Albumin   Date Value Ref Range Status   08/04/2017 3.7 3.5 - 5.2 g/dL Final     Total Bilirubin   Date Value Ref Range Status   08/04/2017 0.9 0.1 - 1.0 mg/dL Final     Comment:     For infants and newborns, interpretation of results should be based  on gestational age, weight and in agreement with clinical  observations.  Premature Infant recommended reference ranges:  Up to 24 hours.............<8.0 mg/dL  Up to 48 hours............<12.0 mg/dL  3-5 days..................<15.0 mg/dL  6-29 days.................<15.0 mg/dL       Alkaline Phosphatase   Date Value Ref Range Status   08/04/2017 56 55 - 135 U/L Final     AST   Date Value Ref Range Status   08/04/2017 16 10 - 40 U/L Final     ALT   Date Value Ref Range Status   08/04/2017 22 10 - 44 U/L Final      Anion Gap   Date Value Ref Range Status   08/04/2017 6 (L) 8 - 16 mmol/L Final   08/04/2017 6 (L) 8 - 16 mmol/L Final   05/14/2013 10 5 - 15 meq/L Final     eGFR if    Date Value Ref Range Status   08/04/2017 >60.0 >60 mL/min/1.73 m^2 Final   08/04/2017 >60.0 >60 mL/min/1.73 m^2 Final     eGFR if non    Date Value Ref Range Status   08/04/2017 >60.0 >60 mL/min/1.73 m^2 Final     Comment:     Calculation used to obtain the estimated glomerular filtration  rate (eGFR) is the CKD-EPI equation. Since race is unknown   in our information system, the eGFR values for   -American and Non--American patients are given   for each creatinine result.     08/04/2017 >60.0 >60 mL/min/1.73 m^2 Final     Comment:     Calculation used to obtain the estimated glomerular filtration  rate (eGFR) is the CKD-EPI equation. Since race is unknown   in our information system, the eGFR values for   -American and Non--American patients are given   for each creatinine result.           RADIOLOGY:  CXR dated 08/04/2017:  Impression:  Normal radiographic evaluation of the chest.    IMPRESSION:  1.  Stage II adenocarcinoma of the colon - TRINY.  2.  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  3.  Diabetes mellitus.  4.  Status post gastric sleeve.  5.  Shift work and sleep disorder.  6.  Splenomegaly.  7.  Vitamin D deficiency.    PLAN:  1.  Follow up in 1 year with interval CBC, CMP, LDH & CXR.  2.  Colonoscopy tbd in 09/2017.  3.  Follow up with Dr. Judd on 08/14/17 - will address low Vitamin D level.  4.  FMLA papers to be completed in November/2017; patient to drop off.    Assessment/plan reviewed and approved by Dr. Cárdenas.

## 2017-08-15 ENCOUNTER — OFFICE VISIT (OUTPATIENT)
Dept: UROLOGY | Facility: CLINIC | Age: 47
End: 2017-08-15
Payer: COMMERCIAL

## 2017-08-15 VITALS
HEART RATE: 69 BPM | BODY MASS INDEX: 35.65 KG/M2 | DIASTOLIC BLOOD PRESSURE: 78 MMHG | SYSTOLIC BLOOD PRESSURE: 119 MMHG | WEIGHT: 269 LBS | HEIGHT: 73 IN

## 2017-08-15 DIAGNOSIS — Z12.5 PROSTATE CANCER SCREENING: ICD-10-CM

## 2017-08-15 DIAGNOSIS — M51.26 HERNIATED LUMBAR INTERVERTEBRAL DISC: ICD-10-CM

## 2017-08-15 DIAGNOSIS — R35.1 NOCTURIA: ICD-10-CM

## 2017-08-15 DIAGNOSIS — Z15.09 MSH2-RELATED LYNCH SYNDROME (HNPCC1): Primary | ICD-10-CM

## 2017-08-15 DIAGNOSIS — M43.16 SPONDYLOLISTHESIS OF LUMBAR REGION: ICD-10-CM

## 2017-08-15 LAB
BILIRUB SERPL-MCNC: NORMAL MG/DL
BLOOD URINE, POC: NORMAL
COLOR, POC UA: NORMAL
GLUCOSE UR QL STRIP: NORMAL
KETONES UR QL STRIP: NORMAL
LEUKOCYTE ESTERASE URINE, POC: NORMAL
NITRITE, POC UA: NORMAL
PH, POC UA: 7
PROTEIN, POC: NORMAL
SPECIFIC GRAVITY, POC UA: 1.01
UROBILINOGEN, POC UA: NORMAL

## 2017-08-15 PROCEDURE — 99999 PR PBB SHADOW E&M-EST. PATIENT-LVL III: CPT | Mod: PBBFAC,,, | Performed by: UROLOGY

## 2017-08-15 PROCEDURE — 3008F BODY MASS INDEX DOCD: CPT | Mod: S$GLB,,, | Performed by: UROLOGY

## 2017-08-15 PROCEDURE — 81001 URINALYSIS AUTO W/SCOPE: CPT | Mod: S$GLB,,, | Performed by: UROLOGY

## 2017-08-15 PROCEDURE — 99213 OFFICE O/P EST LOW 20 MIN: CPT | Mod: 25,S$GLB,, | Performed by: UROLOGY

## 2017-08-15 PROCEDURE — 51798 US URINE CAPACITY MEASURE: CPT | Mod: S$GLB,,, | Performed by: UROLOGY

## 2017-08-15 NOTE — PROGRESS NOTES
Subjective:       Patient ID: Lyndon Lion Jr. is a 47 y.o. male.    Chief Complaint: Nocturia (Nocturia has increased from 0 to 2. No other urinary symptoms.)    Lyndon Lion Jr. is a 46 y.o. Male here for further evaluation of Parada Syndrome. Patient referred from Dom Leonardo MD    Family hx of prostate cancer.   . No LUTS. Nocturia 1-2 times. Previously 0-1 times. Drinks caffeine.   No lower extremity edema. Snores. No sleep apnea. Not sleeping well b/c of back.   No daytime frequency. No urgency. Good stream.   No gross hematuria. No intermittency. No hesitancy.     H/o colon cancer. Has frequent bowels.   8/2008 was the colon surgery.   He is on a surveillance protocol with Dr. Leonardo.     Mother had breast cancer and skin cancer. Mom is 61.     Has back pain. He got an MRI. He had an ultrasound as well.         Past Surgical History:   Procedure Laterality Date    APPENDECTOMY      COLON SURGERY  2008    PARTIAL COLECTOMY    COLONOSCOPY Bilateral 2012    COLONOSCOPY N/A 8/1/2016    Procedure: COLONOSCOPY;  Surgeon: Blaze Marr MD;  Location: [facility];  Service: Endoscopy;  Laterality: N/A;    Epidural Steroid Injection  10/23/15    Lumbar    GASTRIC BYPASS  2010    SLEEVE    KNEE ARTHROSCOPY Right        Past Medical History:   Diagnosis Date    Arthritis     Asthma     AS A CHILD    Colon cancer     Parada syndrome        Social History     Social History    Marital status:      Spouse name: N/A    Number of children: N/A    Years of education: N/A     Occupational History     [employer]     Social History Main Topics    Smoking status: Never Smoker    Smokeless tobacco: Not on file    Alcohol use Yes      Comment: RARELY    Drug use: No    Sexual activity: Not on file     Other Topics Concern    Not on file     Social History Narrative    No narrative on file       Family History   Problem Relation Age of Onset    Melanoma Mother     Heart disease  Father     Diabetes Father     Cancer Maternal Uncle      colon    Cancer Maternal Grandmother      colon    Psoriasis Neg Hx     Lupus Neg Hx     Eczema Neg Hx        Current Outpatient Prescriptions   Medication Sig Dispense Refill    cholecalciferol, vitamin D3, (VITAMIN D3) 5,000 unit Tab Take 5,000 Units by mouth once daily. 90 tablet 3    cyclobenzaprine (FLEXERIL) 10 MG tablet Take 1 tablet (10 mg total) by mouth 2 (two) times daily as needed for Muscle spasms. 60 tablet 2    oxycodone-acetaminophen (PERCOCET)  mg per tablet Take 1 tablet by mouth every 6 to 8 hours as needed for Pain. 60 tablet 0    topiramate (TOPAMAX) 25 MG tablet Take 1 tablet (25 mg total) by mouth 2 (two) times daily. 60 tablet 11    diclofenac sodium 1 % Gel Apply 2 g topically 4 (four) times daily. 1 Tube 2     No current facility-administered medications for this visit.        No Known Allergies    Review of Systems   Constitutional: Negative for chills, fever and unexpected weight change.   HENT: Negative for hearing loss and nosebleeds.    Eyes: Negative for visual disturbance.   Respiratory: Negative for chest tightness.    Cardiovascular: Negative for chest pain.   Gastrointestinal: Negative for diarrhea.   Genitourinary: Positive for nocturia. Negative for dysuria, frequency, hematuria and urgency.   Musculoskeletal: Negative for joint swelling.   Skin: Negative for rash.   Neurological: Negative for seizures.   Hematological: Does not bruise/bleed easily.   Psychiatric/Behavioral: Negative for behavioral problems.       Objective:      Physical Exam   Constitutional: He is oriented to person, place, and time. He appears well-developed and well-nourished.   HENT:   Head: Normocephalic and atraumatic.   Eyes: No scleral icterus.   Neck: Neck supple.   Cardiovascular: Normal rate and regular rhythm.    Pulmonary/Chest: Effort normal. No respiratory distress.   Abdominal: He exhibits no mass. Hernia confirmed  negative in the right inguinal area and confirmed negative in the left inguinal area.   Genitourinary: Testes normal and penis normal. Circumcised.   Genitourinary Comments: Prostate was smooth without nodularity. No rectal masses.  25 grams.  No external hemorrhoids.   Normal perineum.        Musculoskeletal: He exhibits no tenderness.   Lymphadenopathy:     He has no cervical adenopathy. No inguinal adenopathy noted on the right or left side.   Neurological: He is alert and oriented to person, place, and time.   Skin: Skin is warm. No rash noted.     Psychiatric: He has a normal mood and affect.     urine dip pH 5, urine clear.   A post void residual bladder scan was performed immediately after voiding. The patient's PVR was noted to be 30cc.     Assessment:       1. MSH2-related Parada syndrome (HNPCC1)    2. Herniated lumbar intervertebral disc    3. Spondylolisthesis of lumbar region    4. Nocturia    5. Prostate cancer screening        Plan:   psa to January labs.   F/u 1 year with psa and ua.   Limit fluids 2 hours before bedtime. Limit caffeine after 3.

## 2017-08-16 NOTE — PROGRESS NOTES
Subjective:       Patient ID: Lyndon Lion Jr. is a 47 y.o. male.    Chief Complaint: review labs    Well Adult Physical: Patient here for a comprehensive physical exam.The patient reports problems - Patient Active Problem List:     Radiculopathy, lumbosacral region     Herniated lumbar intervertebral disc     Lumbar spondylosis     DDD (degenerative disc disease), lumbosacral     Greater trochanteric bursitis of left hip     Acute medial meniscal tear     Obesity, unspecified     Hx of colon cancer, stage I     Parada syndrome     Thoracic or lumbosacral neuritis or radiculitis, unspecified     Arthritis of shoulder region, right     Neural foraminal stenosis of lumbar spine     MSH2-related Parada syndrome (HNPCC1)     LFT elevation     Family hx of prostate cancer     Spondylosis of lumbar region without myelopathy or radiculopathy     Spondylolisthesis of lumbar region     Nonalcoholic fatty liver disease     Splenomegaly      Do you take any herbs or supplements that were not prescribed by a doctor? no Are you taking calcium supplements? no Are you taking aspirin daily? no   History:  Patient receives prostate care outside our clinic  Date last prostate exam: Tuesday  Date last PSA: Tuesday  Any STD's in the past? none        Review of Systems   Constitutional: Negative.  Negative for activity change, appetite change, chills, diaphoresis, fatigue, fever and unexpected weight change.   HENT: Negative.  Negative for congestion, drooling, ear discharge, ear pain, hearing loss, mouth sores, nosebleeds, postnasal drip, rhinorrhea, sinus pressure, sore throat, tinnitus, trouble swallowing and voice change.    Eyes: Negative.  Negative for pain, discharge, redness, itching and visual disturbance.   Respiratory: Negative.  Negative for apnea, cough, choking, chest tightness and shortness of breath.    Cardiovascular: Negative.  Negative for chest pain, palpitations and leg swelling.   Gastrointestinal: Negative.   Negative for abdominal distention, abdominal pain and anal bleeding.        Loose bowels   Endocrine: Negative.  Negative for cold intolerance, heat intolerance, polydipsia, polyphagia and polyuria.   Genitourinary: Negative.  Negative for difficulty urinating, dysuria, enuresis, flank pain, frequency, hematuria, scrotal swelling, testicular pain and urgency.   Musculoskeletal: Positive for arthralgias and back pain. Negative for gait problem, neck pain and neck stiffness.   Skin: Negative.  Negative for color change, pallor and rash.   Allergic/Immunologic: Negative.  Negative for environmental allergies and food allergies.   Neurological: Negative.  Negative for dizziness, tremors, syncope, facial asymmetry, speech difficulty, light-headedness, numbness and headaches.   Hematological: Negative for adenopathy. Does not bruise/bleed easily.   Psychiatric/Behavioral: Negative.  Negative for agitation, behavioral problems, confusion, decreased concentration, dysphoric mood and hallucinations. The patient is not hyperactive.        Objective:      Physical Exam   Constitutional: He is oriented to person, place, and time. He appears well-developed and well-nourished. No distress.   HENT:   Head: Normocephalic and atraumatic.   Right Ear: External ear normal.   Left Ear: External ear normal.   Nose: Nose normal.   Mouth/Throat: Oropharynx is clear and moist. No oropharyngeal exudate.   Eyes: Conjunctivae and EOM are normal. Pupils are equal, round, and reactive to light. Right eye exhibits no discharge. Left eye exhibits no discharge. No scleral icterus.   Neck: Normal range of motion. Neck supple. No JVD present. No tracheal deviation present. No thyromegaly present.   Cardiovascular: Normal rate, normal heart sounds and intact distal pulses.    No murmur heard.  Pulmonary/Chest: Effort normal and breath sounds normal. No respiratory distress. He has no wheezes. He has no rales.   Abdominal: Soft. Bowel sounds are  normal. He exhibits no distension and no mass. There is no tenderness. There is no rebound and no guarding.   Musculoskeletal: He exhibits no edema or tenderness.          Lymphadenopathy:     He has no cervical adenopathy.   Neurological: He is alert and oriented to person, place, and time. No cranial nerve deficit. Coordination normal.   Skin: Skin is warm and dry. No rash noted. He is not diaphoretic. No erythema. No pallor.   Psychiatric: He has a normal mood and affect. His behavior is normal. Judgment and thought content normal.   Vitals reviewed.      Assessment:       1. Obesity, unspecified    2. Mild vitamin D deficiency    3. S/P laparoscopic sleeve gastrectomy        Plan:       Obesity, unspecified    Mild vitamin D deficiency    S/P laparoscopic sleeve gastrectomy  -     CBC auto differential; Future; Expected date: 08/14/2017  -     Comprehensive metabolic panel; Future; Expected date: 08/14/2017  -     Vitamin B12; Future; Expected date: 08/14/2017  -     Ferritin; Future; Expected date: 08/14/2017  -     Iron; Future; Expected date: 08/14/2017  -     Iron and TIBC; Future; Expected date: 08/14/2017  -     Lipid panel; Future; Expected date: 08/14/2017  -     Calcitriol; Future; Expected date: 08/14/2017  -     PTH, intact; Future; Expected date: 08/14/2017  -     Vitamin B1; Future; Expected date: 08/14/2017  -     Folate; Future; Expected date: 08/14/2017    Other orders  -     cholecalciferol, vitamin D3, (VITAMIN D3) 5,000 unit Tab; Take 5,000 Units by mouth once daily.  Dispense: 90 tablet; Refill: 3      Patient readiness: acceptance and barriers:readiness and social stressors    During the course of the visit the patient was educated and counseled about the following:     Obesity:   General weight loss/lifestyle modification strategies discussed (elicit support from others; identify saboteurs; non-food rewards, etc).    Goals: Obesity: Reduce calorie intake and BMI    Did patient meet  goals/outcomes: No    The following self management tools provided: excercise log    Patient Instructions (the written plan) was given to the patient/family.     Time spent with patient: 45 minutes

## 2017-08-31 ENCOUNTER — OFFICE VISIT (OUTPATIENT)
Dept: GASTROENTEROLOGY | Facility: CLINIC | Age: 47
End: 2017-08-31
Payer: COMMERCIAL

## 2017-08-31 ENCOUNTER — TELEPHONE (OUTPATIENT)
Dept: ENDOSCOPY | Facility: HOSPITAL | Age: 47
End: 2017-08-31

## 2017-08-31 VITALS
BODY MASS INDEX: 34.72 KG/M2 | DIASTOLIC BLOOD PRESSURE: 86 MMHG | SYSTOLIC BLOOD PRESSURE: 124 MMHG | HEIGHT: 74 IN | HEART RATE: 61 BPM | WEIGHT: 270.5 LBS

## 2017-08-31 DIAGNOSIS — Z12.11 SPECIAL SCREENING FOR MALIGNANT NEOPLASMS, COLON: Primary | ICD-10-CM

## 2017-08-31 DIAGNOSIS — Z15.09 MSH2-RELATED LYNCH SYNDROME (HNPCC1): Primary | ICD-10-CM

## 2017-08-31 PROCEDURE — 99999 PR PBB SHADOW E&M-EST. PATIENT-LVL III: CPT | Mod: PBBFAC,,, | Performed by: INTERNAL MEDICINE

## 2017-08-31 PROCEDURE — 3008F BODY MASS INDEX DOCD: CPT | Mod: S$GLB,,, | Performed by: INTERNAL MEDICINE

## 2017-08-31 PROCEDURE — 99213 OFFICE O/P EST LOW 20 MIN: CPT | Mod: S$GLB,,, | Performed by: INTERNAL MEDICINE

## 2017-08-31 RX ORDER — SODIUM, POTASSIUM,MAG SULFATES 17.5-3.13G
1 SOLUTION, RECONSTITUTED, ORAL ORAL ONCE
Qty: 1 BOTTLE | Refills: 0 | Status: SHIPPED | OUTPATIENT
Start: 2017-08-31 | End: 2017-08-31

## 2017-08-31 NOTE — PROGRESS NOTES
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     HISTORY OF PRESENT ILLNESS:  This is a 47-year-old male who was having some   abdominal pain, underwent colonoscopy.  He was discovered to have colon cancer   and had right hemicolectomy for stage II.  He had no colon cancer on 08/08/2008.    He has been in a surveillance program.  Since then, he is followed by   Heme/Onc.  He ended up getting chemotherapy every two weeks for six months and   he is in remission.  He is followed by Dr. Alfonso Cárdenas in Heme/Onc.  The   patient states his mom did not want to do the genetic screen, but she has got   MSH2 as well.  Likely, mom's dad with colorectal cancer, mom's dad's brother   with colorectal cancer, mom's dad's dad with colorectal cancer and mom's   brothers, two of them with colorectal cancer.  The youngest family member with   colon cancer is at age 37.  The patient was 39 with colon cancer.  He has two   biological children, a daughter 24.  She was screened, she is negative.  Son 20   is positive for MSH2.  He will start screening at age 20, every one   year.  He had an EGD and colonoscopy in August 2016.  It was   complete, no polyps, no adenomatous found.  He has no family history of any   other GI malignancies other than breast cancer in the mom.  No other Parada   cancers.  There is no ovarian or uterine cancer.  There is no kidney or bladder   or ureter cancer.  No renal pelvis cancer.  No small bowel or stomach cancer.    No esophageal cancer.  No brain cancers, no pancreas and no hepatobiliary   cancers in the family.  We went over the NCCN guidelines Parada   guidelines for MSH2.      REVIEW OF SYSTEMS:  CONSTITUTIONAL:  No fever, fatigue or weight loss.  ENT:  No difficulty swallowing or sore throat.  CARDIOVASCULAR:  No chest pain or palpitations.  RESPIRATORY:  No shortness of breath or cough.  GENITOURINARY:  No dysuria, urgency, frequency or hematuria.  MUSCULOSKELETAL:  No  "arthritis.  SKIN:  No itching or rash.  NEUROLOGIC:  No headache, syncope or stroke.  PSYCHIATRIC:  No uncontrolled depression or anxiety.  ENDOCRINE:  No cold or heat intolerance.  LYMPHATICS:  No lymphadenopathy.  ALLERGIES:  No known drug allergies.  GASTROINTESTINAL:  No nausea or vomiting, no heartburn, no abdominal pain, no   diarrhea, no constipation, no blood in his stool.  Averages about three bowel   movements a day since his right hemicolectomy.     RISK FACTORS FOR LIVER DISEASE:  No blood transfusion.  No IV drugs.  No nasal   drug use.  He does have some professional tattoos done after the age of 41.     PAST MEDICAL HISTORY:  Only positive for colon cancer stage II, right colon.     PAST SURGICAL HISTORY:  He has had right hemicolectomy.  He had right knee   scope.  He had a gastric sleeve in 2012.  It gained 100 pounds after   chemotherapy.     SOCIAL HISTORY:  Nonsmoker.  Rarely, rarely drinks alcohol.  He is on his first   marriage, this one for 27 years.  His wife is disabled.  He has two healthy   children.  A daughter who is 24 does not have Parada syndrome and a son 20 who   does have MSH2 Parada syndrome, who has started screening.  He knows that all his   first-degree relatives should be screened for Parada syndrome and undergo   appropriate guidelines screening.  He works in a refinery. He has two sister's one who has  Parada and the other is going to get testing when approved by her insurance.     FAMILY HISTORY:  As above.     PHYSICAL EXAMINATION:  /86   Pulse 61   Ht 6' 1.5" (1.867 m)   Wt 122.7 kg (270 lb 8.1 oz)   BMI 35.20 kg/m²   GENERAL:  He is alert and oriented x4, not in any acute distress.  ABDOMEN:  Soft, no guarding, no rebound.  No tenderness.  No palpable   organomegaly.  No bruits.  No pulsatile masses.  No stigmata of chronic liver   disease.  No appreciative ascites or hernias.  Normoactive bowel sounds.  CARDIOVASCULAR:  S1 and S2 without murmurs, gallops or " rubs.  RESPIRATORY:  Clear to auscultation bilaterally without wheezes, rhonchi or   rales.  SKIN:  No petechiae or rash on exposed skin areas.  NEUROLOGIC:  Alert and oriented x4.  PSYCHIATRIC:  Normal speech, mentation and affect.  LYMPHATICS:  No cervical or supraclavicular lymphadenopathy.  No appreciative   thyromegaly.  EYES:  Conjunctivae are nonicteric.  MUSCULOSKELETAL:  No major joint deformities.     MEDICAL DECISION MAKING:  As above.  Parada guidelines for screening and   surveillance have been reviewed with the patient.      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          IMPRESSION AND PLAN:  1.  Parada syndrome, MSH2.  Recommend colonoscopy every year, next one now in August 2017.  No family history of small bowel or stomach cancers.  No high-risk.  All   first-degree relatives need to be screened for Parada syndrome and undergo   appropriate screening therapy.  The patient's EGD biopsy of the stomach has been   negative for H. pylori.  2.  He is getting his  screening yearly with Dr. Stovall in Urology.    3. Recommend the patient return to GI Clinic once yearly.

## 2017-09-20 ENCOUNTER — HOSPITAL ENCOUNTER (OUTPATIENT)
Facility: HOSPITAL | Age: 47
Discharge: HOME | End: 2017-09-20
Attending: INTERNAL MEDICINE | Admitting: INTERNAL MEDICINE
Payer: COMMERCIAL

## 2017-09-20 ENCOUNTER — ANESTHESIA EVENT (OUTPATIENT)
Dept: ENDOSCOPY | Facility: HOSPITAL | Age: 47
End: 2017-09-20
Payer: COMMERCIAL

## 2017-09-20 ENCOUNTER — ANESTHESIA (OUTPATIENT)
Dept: ENDOSCOPY | Facility: HOSPITAL | Age: 47
End: 2017-09-20
Payer: COMMERCIAL

## 2017-09-20 ENCOUNTER — SURGERY (OUTPATIENT)
Age: 47
End: 2017-09-20

## 2017-09-20 VITALS
BODY MASS INDEX: 32.73 KG/M2 | TEMPERATURE: 98 F | WEIGHT: 255 LBS | HEIGHT: 74 IN | HEART RATE: 60 BPM | DIASTOLIC BLOOD PRESSURE: 78 MMHG | RESPIRATION RATE: 13 BRPM | RESPIRATION RATE: 18 BRPM | OXYGEN SATURATION: 96 % | SYSTOLIC BLOOD PRESSURE: 112 MMHG

## 2017-09-20 DIAGNOSIS — Z15.09 MSH2-RELATED LYNCH SYNDROME (HNPCC1): ICD-10-CM

## 2017-09-20 PROCEDURE — 45385 COLONOSCOPY W/LESION REMOVAL: CPT | Mod: 33,,, | Performed by: INTERNAL MEDICINE

## 2017-09-20 PROCEDURE — 27201089 HC SNARE, DISP (ANY): Performed by: INTERNAL MEDICINE

## 2017-09-20 PROCEDURE — D9220A PRA ANESTHESIA: Mod: ANES,,, | Performed by: ANESTHESIOLOGY

## 2017-09-20 PROCEDURE — D9220A PRA ANESTHESIA: Mod: CRNA,,, | Performed by: NURSE ANESTHETIST, CERTIFIED REGISTERED

## 2017-09-20 PROCEDURE — 45385 COLONOSCOPY W/LESION REMOVAL: CPT | Performed by: INTERNAL MEDICINE

## 2017-09-20 PROCEDURE — 63600175 PHARM REV CODE 636 W HCPCS: Performed by: NURSE ANESTHETIST, CERTIFIED REGISTERED

## 2017-09-20 PROCEDURE — 88305 TISSUE EXAM BY PATHOLOGIST: CPT | Mod: 26,,, | Performed by: PATHOLOGY

## 2017-09-20 PROCEDURE — 25000003 PHARM REV CODE 250: Performed by: INTERNAL MEDICINE

## 2017-09-20 PROCEDURE — 37000009 HC ANESTHESIA EA ADD 15 MINS: Performed by: INTERNAL MEDICINE

## 2017-09-20 PROCEDURE — 88305 TISSUE EXAM BY PATHOLOGIST: CPT | Performed by: PATHOLOGY

## 2017-09-20 PROCEDURE — 45380 COLONOSCOPY AND BIOPSY: CPT | Performed by: INTERNAL MEDICINE

## 2017-09-20 PROCEDURE — 45380 COLONOSCOPY AND BIOPSY: CPT | Mod: 59,,, | Performed by: INTERNAL MEDICINE

## 2017-09-20 PROCEDURE — 27201012 HC FORCEPS, HOT/COLD, DISP: Performed by: INTERNAL MEDICINE

## 2017-09-20 PROCEDURE — 37000008 HC ANESTHESIA 1ST 15 MINUTES: Performed by: INTERNAL MEDICINE

## 2017-09-20 RX ORDER — PROPOFOL 10 MG/ML
VIAL (ML) INTRAVENOUS
Status: DISCONTINUED | OUTPATIENT
Start: 2017-09-20 | End: 2017-09-20

## 2017-09-20 RX ORDER — LIDOCAINE HCL/PF 100 MG/5ML
SYRINGE (ML) INTRAVENOUS
Status: DISCONTINUED | OUTPATIENT
Start: 2017-09-20 | End: 2017-09-20

## 2017-09-20 RX ORDER — PROPOFOL 10 MG/ML
VIAL (ML) INTRAVENOUS CONTINUOUS PRN
Status: DISCONTINUED | OUTPATIENT
Start: 2017-09-20 | End: 2017-09-20

## 2017-09-20 RX ORDER — SODIUM CHLORIDE 9 MG/ML
INJECTION, SOLUTION INTRAVENOUS CONTINUOUS
Status: DISCONTINUED | OUTPATIENT
Start: 2017-09-20 | End: 2017-09-20 | Stop reason: HOSPADM

## 2017-09-20 RX ADMIN — PROPOFOL 175 MCG/KG/MIN: 10 INJECTION, EMULSION INTRAVENOUS at 02:09

## 2017-09-20 RX ADMIN — PROPOFOL 50 MG: 10 INJECTION, EMULSION INTRAVENOUS at 02:09

## 2017-09-20 RX ADMIN — PROPOFOL 100 MG: 10 INJECTION, EMULSION INTRAVENOUS at 02:09

## 2017-09-20 RX ADMIN — LIDOCAINE HYDROCHLORIDE 75 MG: 20 INJECTION, SOLUTION INTRAVENOUS at 02:09

## 2017-09-20 RX ADMIN — SODIUM CHLORIDE: 900 INJECTION, SOLUTION INTRAVENOUS at 01:09

## 2017-09-20 NOTE — ANESTHESIA POSTPROCEDURE EVALUATION
"Anesthesia Post Evaluation    Patient: Lyndon Lion Jr.    Procedure(s) Performed: Procedure(s) (LRB):  COLONOSCOPY (N/A)    Final Anesthesia Type: general  Patient location during evaluation: GI PACU  Patient participation: Yes- Able to Participate  Level of consciousness: awake and alert  Post-procedure vital signs: reviewed and stable  Pain management: adequate  Airway patency: patent  PONV status at discharge: No PONV  Anesthetic complications: no      Cardiovascular status: blood pressure returned to baseline  Respiratory status: unassisted  Hydration status: euvolemic  Follow-up not needed.        Visit Vitals  /78 (BP Location: Left arm, Patient Position: Lying)   Pulse 60   Temp 36.6 °C (97.9 °F) (Temporal)   Resp 18   Ht 6' 2" (1.88 m)   Wt 115.7 kg (255 lb)   SpO2 96%   BMI 32.74 kg/m²       Pain/Karthikeyan Score: Pain Assessment Performed: Yes (9/20/2017  3:28 PM)  Presence of Pain: denies (9/20/2017  3:28 PM)  Karthikeyan Score: 10 (9/20/2017  3:28 PM)      "

## 2017-09-20 NOTE — H&P
[facility]  History & Physical    Subjective:      Chief Complaint/Reason for Admission:      Parada syndrome, MSH2.    Lyndon Lion Jr. is a 47 y.o. male.    Past Medical History:   Diagnosis Date    Arthritis     Asthma     AS A CHILD    Colon cancer     Parada syndrome      Past Surgical History:   Procedure Laterality Date    APPENDECTOMY      COLON SURGERY  2008    PARTIAL COLECTOMY    COLONOSCOPY Bilateral 2012    COLONOSCOPY N/A 8/1/2016    Procedure: COLONOSCOPY;  Surgeon: Blaze Marr MD;  Location: [facility];  Service: Endoscopy;  Laterality: N/A;    Epidural Steroid Injection  10/23/15    Lumbar    GASTRIC BYPASS  2010    SLEEVE    KNEE ARTHROSCOPY Right      Family History   Problem Relation Age of Onset    Melanoma Mother     Heart disease Father     Diabetes Father     Cancer Maternal Uncle      colon    Cancer Maternal Grandmother      colon    Psoriasis Neg Hx     Lupus Neg Hx     Eczema Neg Hx      Social History   Substance Use Topics    Smoking status: Never Smoker    Smokeless tobacco: Never Used    Alcohol use Yes      Comment: RARELY       PTA Medications   Medication Sig    cholecalciferol, vitamin D3, (VITAMIN D3) 5,000 unit Tab Take 5,000 Units by mouth once daily.    oxycodone-acetaminophen (PERCOCET)  mg per tablet Take 1 tablet by mouth every 6 to 8 hours as needed for Pain.    topiramate (TOPAMAX) 25 MG tablet Take 1 tablet (25 mg total) by mouth 2 (two) times daily.    cyclobenzaprine (FLEXERIL) 10 MG tablet Take 1 tablet (10 mg total) by mouth 2 (two) times daily as needed for Muscle spasms.    diclofenac sodium 1 % Gel Apply 2 g topically 4 (four) times daily.     Review of patient's allergies indicates:  No Known Allergies     Review of Systems   Constitutional: Negative for chills, fever and weight loss.   Respiratory: Negative for shortness of breath and wheezing.    Cardiovascular: Negative for chest pain.    Gastrointestinal: Negative for abdominal pain, blood in stool, constipation, diarrhea and melena.       Objective:      Vital Signs (Most Recent)  Temp: 98.2 °F (36.8 °C) (09/20/17 1334)  Pulse: 75 (09/20/17 1334)  Resp: 16 (09/20/17 1334)  BP: 117/74 (09/20/17 1334)  SpO2: 95 % (09/20/17 1334)    Vital Signs Range (Last 24H):  Temp:  [98.2 °F (36.8 °C)]   Pulse:  [75]   Resp:  [16]   BP: (117)/(74)   SpO2:  [95 %]     Physical Exam   Constitutional: He appears well-developed and well-nourished.   Cardiovascular: Normal rate.    Pulmonary/Chest: Effort normal and breath sounds normal.   Abdominal: Soft. Bowel sounds are normal.   Skin: Skin is warm and dry.   Psychiatric: He has a normal mood and affect. His behavior is normal. Judgment and thought content normal.           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       Plan:    Colonoscopy.   Parada syndrome, MSH2.

## 2017-09-20 NOTE — TRANSFER OF CARE
"Anesthesia Transfer of Care Note    Patient: Lyndon Lion Jr.    Procedure(s) Performed: Procedure(s) (LRB):  COLONOSCOPY (N/A)    Patient location: OPS    Anesthesia Type: general    Transport from OR: Transported from OR on room air with adequate spontaneous ventilation    Post pain: adequate analgesia    Post assessment: no apparent anesthetic complications    Post vital signs: stable    Level of consciousness: awake    Nausea/Vomiting: no nausea/vomiting    Complications: none    Transfer of care protocol was followed      Last vitals:   Visit Vitals  /74 (Patient Position: Lying)   Pulse 75   Temp 36.8 °C (98.2 °F) (Oral)   Resp 16   Ht 6' 2" (1.88 m)   Wt 115.7 kg (255 lb)   SpO2 95%   BMI 32.74 kg/m²     "

## 2017-09-20 NOTE — PATIENT INSTRUCTIONS
Discharge Summary/Instructions after an Endoscopic Procedure  Patient Name: Lyndon Lion  Patient MRN: [id number]  Patient YOB: 1970 Wednesday, September 20, 2017  Dom Leonardo MD  RESTRICTIONS:  During your procedure today, you received medications for sedation.  These   medications may affect your judgment, balance and coordination.  Therefore,   for 24 hours, you have the following restrictions:   - DO NOT drive a car, operate machinery, make legal/financial decisions,   sign important papers or drink alcohol.    ACTIVITY:  The following day: return to full activity including work, except no heavy   lifting, straining or running for 3 days if polyps were removed.  DIET:  Eat and drink normally unless instructed otherwise.  TREATMENT FOR COMMON SIDE EFFECTS:  - Mild abdominal pain, belching, bloating or excessive gas: rest, eat   lightly and use a heating pad.  - Sore Throat: treat with throat lozenges and/or gargle with warm salt   water.  SYMPTOMS TO WATCH FOR AND REPORT TO YOUR PHYSICIAN:  1. Abdominal pain or bloating, other than gas cramps.  2. Chest pain.  3. Back pain.  4. Chills or fever occurring within 24 hours after the procedure.  5. Rectal bleeding, which would show as bright red, maroon, or black stools.   (A tablespoon of blood from the rectum is not serious, especially if   hemorrhoids are present.)  6. Vomiting.  7. Weakness or dizziness.  8. Because air was used during the procedure, expelling large amounts of air   from your rectum or belching is normal.  9. If a bowel prep was taken, you may not have a bowel movement for 1-3   days.  This is normal.  GO DIRECTLY TO THE EMERGENCY ROOM IF YOU HAVE ANY OF THE FOLLOWING:   Difficulty breathing   Chills and/or fever over 101 F   Persistent vomiting and/or vomiting blood   Severe abdominal pain   Severe chest pain   Black, tarry stools   Bleeding- more than one tablespoon  Your doctor recommends these additional instructions:  If  any biopsies were taken, your doctors clinic will call you in 1 to 2   weeks with any results.  You are being discharged to home.   We are waiting for your pathology results.   Telephone your endoscopist for pathology results in one week.   Your physician has recommended a repeat colonoscopy in one year for   surveillance based on pathology results.   The findings and recommendations have been discussed with you.   For the next 2 weeks only - Consider avoiding all non-steroidal   anti-inflammatory drugs (aspirin, ibuprofen, naproxen, etc.), unless needed   for cardiovascular protection.  Recommend you discuss with your prescribing   doctor (of your aspirin) to see if cardiovascular benefits of your aspirin   outweigh the risks of GI bleeding.  Return to your GI clinic.  For questions, problems or results please call your physician - Dom Leonardo MD at Work:  [phone].  [facility], EMERGENCY ROOM PHONE NUMBER: [phone]  IF A COMPLICATION OR EMERGENCY SITUATION ARISES AND YOU ARE UNABLE TO REACH   YOUR PHYSICIAN - GO DIRECTLY TO THE EMERGENCY ROOM.  Dom Leonardo MD  9/20/2017 3:23:44 PM  This report has been verified and signed electronically.

## 2017-09-20 NOTE — ANESTHESIA PREPROCEDURE EVALUATION
09/20/2017  Lyndon Lion Jr. is a 47 y.o., male.    Anesthesia Evaluation         Review of Systems  Anesthesia Hx:  No problems with previous Anesthesia   Social:  Non-Smoker    Cardiovascular:   Exercise tolerance: good Denies Hypertension.  Denies CAD.     Denies Angina.  Functional Capacity Can you climb two flights of stairs? ==> Yes    Pulmonary:   Denies Asthma.  Denies Recent URI.  Denies Sleep Apnea.    Renal/:  Renal/ Normal     Hepatic/GI:   Denies PUD. Denies Hiatal Hernia.  Denies GERD. Denies Liver Disease.  Denies Hepatitis.    Neurological:   Denies CVA. Denies Seizures.    Endocrine:   Denies Diabetes. Denies Hypothyroidism.        Physical Exam  General:  Well nourished    Airway/Jaw/Neck:  Airway Findings: Mouth Opening: Normal Tongue: Normal  General Airway Assessment: Adult  Mallampati: I  TM Distance: Normal, at least 6 cm  Jaw/Neck Findings:  Neck ROM: Normal ROM  Neck Findings:     Eyes/Ears/Nose:  EYES/EARS/NOSE FINDINGS: Normal   Dental:  Dental Findings: In tact   Chest/Lungs:  Chest/Lungs Findings: Clear to auscultation     Heart/Vascular:  Heart Findings: Rate: Normal  Rhythm: Regular Rhythm  Sounds: Normal        Mental Status:  Mental Status Findings:  Alert and Oriented         Anesthesia Plan  Type of Anesthesia, risks & benefits discussed:  Anesthesia Type:  general  Patient's Preference: Proceed with anesthesia understanding that the risks are very small but could be serious or life threatening.  Intra-op Monitoring Plan: standard ASA monitors  Intra-op Monitoring Plan Comments:   Post Op Pain Control Plan:   Post Op Pain Control Plan Comments:   Induction:   IV  Beta Blocker:  Patient is not currently on a Beta-Blocker (No further documentation required).       Informed Consent: Patient understands risks and agrees with Anesthesia plan.  Questions answered.  Anesthesia consent signed with patient.  ASA Score: 1     Day of Surgery Review of History & Physical: I have interviewed and examined the patient. I have reviewed the patient's H&P dated:            Ready For Surgery From Anesthesia Perspective.

## 2017-09-27 ENCOUNTER — TELEPHONE (OUTPATIENT)
Dept: ENDOSCOPY | Facility: HOSPITAL | Age: 47
End: 2017-09-27

## 2017-09-27 ENCOUNTER — PATIENT MESSAGE (OUTPATIENT)
Dept: GASTROENTEROLOGY | Facility: CLINIC | Age: 47
End: 2017-09-27

## 2017-09-28 ENCOUNTER — PATIENT MESSAGE (OUTPATIENT)
Dept: HEMATOLOGY/ONCOLOGY | Facility: CLINIC | Age: 47
End: 2017-09-28

## 2017-09-28 DIAGNOSIS — Z15.09 MSH2-RELATED LYNCH SYNDROME (HNPCC1): Primary | ICD-10-CM

## 2017-09-28 DIAGNOSIS — E56.9 VITAMIN DEFICIENCY: Primary | ICD-10-CM

## 2017-09-28 RX ORDER — ERGOCALCIFEROL 1.25 MG/1
50000 CAPSULE ORAL
Qty: 12 CAPSULE | Refills: 3 | Status: SHIPPED | OUTPATIENT
Start: 2017-09-28 | End: 2018-08-24 | Stop reason: SDUPTHER

## 2017-09-29 ENCOUNTER — TELEPHONE (OUTPATIENT)
Dept: GASTROENTEROLOGY | Facility: CLINIC | Age: 47
End: 2017-09-29

## 2017-09-29 NOTE — TELEPHONE ENCOUNTER
----- Message from Dom Leonardo MD sent at 9/28/2017  6:06 PM CDT -----  Tiffanie  - Please refer Lyndon to Dr. Callahan in Colorectal surgery clinic for evaluation -Patient with history of Parada syndrome MHS2 dx with CRC stage II and treated with right hemicolectomy with a dysplastic lesion at the anastomosis site. Endoscopic resection vs surgical resection in patient with Parada syndrome and prior CRC.    SPECIMEN  1) Ileocolonic anastomosis, granular mucosa, rule out worrisome for dysplasia. History of colon cancer.  History of Parada syndrome.    2) Descending colon 8 mm polyp.  3) Mid rectal 2 mm polyp.  4) Distal rectum 2 mm polyp.    FINAL PATHOLOGIC DIAGNOSIS    1. Ileocolonic anastomosis, biopsy:  - Small bowel mucosa with low-grade dysplasia.  - Acute inflammation and reactive change.  - See comment.    2. Colon, descending, 8 mm polyp, biopsy:  - Tubular adenoma.    3. Rectum, mid, 2 mm polyp, biopsy:  - Hyperplastic polyp.    4. Rectum, distal, 2 mm polyp, biopsy:  - Hyperplastic polyp.    COMMENT: The ileocolonic anastomosis biopsy (specimen 1) shows small bowel mucosa with low-grade  dysplasia. Multiple deeper levels are examined. Part 1 this case is reviewed by Dr. XUAN Toscano who agrees with the  above diagnosis.  Diagnosed by: Madeleine Trejo M.D.  (Electronically Signed: 2017-09-28 17:16:50)

## 2017-09-29 NOTE — TELEPHONE ENCOUNTER
----- Message from Anna Abrahamkert sent at 9/29/2017 12:47 PM CDT -----  Contact: hugh shawanda - wife - [phone]  erick - returning your call - please call hugh shawanda - wife - [phone]

## 2017-10-04 ENCOUNTER — OFFICE VISIT (OUTPATIENT)
Dept: SURGERY | Facility: CLINIC | Age: 47
End: 2017-10-04
Payer: COMMERCIAL

## 2017-10-04 ENCOUNTER — TELEPHONE (OUTPATIENT)
Dept: ENDOSCOPY | Facility: HOSPITAL | Age: 47
End: 2017-10-04

## 2017-10-04 VITALS
SYSTOLIC BLOOD PRESSURE: 123 MMHG | WEIGHT: 263.44 LBS | BODY MASS INDEX: 33.81 KG/M2 | HEIGHT: 74 IN | HEART RATE: 74 BPM | DIASTOLIC BLOOD PRESSURE: 84 MMHG

## 2017-10-04 DIAGNOSIS — Z86.010 HX OF ADENOMATOUS COLONIC POLYPS: Primary | ICD-10-CM

## 2017-10-04 DIAGNOSIS — Z85.038 ENCOUNTER FOR FOLLOW-UP SURVEILLANCE OF COLON CANCER: ICD-10-CM

## 2017-10-04 DIAGNOSIS — Z12.11 SPECIAL SCREENING FOR MALIGNANT NEOPLASMS, COLON: Primary | ICD-10-CM

## 2017-10-04 DIAGNOSIS — Z08 ENCOUNTER FOR FOLLOW-UP SURVEILLANCE OF COLON CANCER: ICD-10-CM

## 2017-10-04 DIAGNOSIS — D12.2 ADENOMATOUS POLYP OF ASCENDING COLON: ICD-10-CM

## 2017-10-04 PROCEDURE — 99204 OFFICE O/P NEW MOD 45 MIN: CPT | Mod: S$GLB,,, | Performed by: COLON & RECTAL SURGERY

## 2017-10-04 PROCEDURE — 99999 PR PBB SHADOW E&M-EST. PATIENT-LVL III: CPT | Mod: PBBFAC,,, | Performed by: COLON & RECTAL SURGERY

## 2017-10-04 RX ORDER — POLYETHYLENE GLYCOL 3350, SODIUM SULFATE, SODIUM CHLORIDE, POTASSIUM CHLORIDE, SODIUM ASCORBATE, AND ASCORBIC ACID 7.5-2.691G
1 KIT ORAL ONCE AS NEEDED
Qty: 1 BOTTLE | Refills: 0 | Status: SHIPPED | OUTPATIENT
Start: 2017-10-04 | End: 2017-10-04

## 2017-10-04 NOTE — PROGRESS NOTES
H/o Parada syndrome s/p right colectomy 2008 by Dr Gilmore.  Received postoperative chemoRX.  Doing well had surveillance colonoscopy    Colonoscopy:                  Impression:           - The examined portion of the ileum was normal.                        - Nodular mucosa at the colonic anastomosis. This                         could be prolapsed neoterminal ileum. Multiple                         Jumbo Cold Forceps biopsies taken.                        - One 8 mm polyp in the descending colon, removed                         with a hot snare. Resected and retrieved.                        - One 2 mm polyp in the rectum, removed with a cold                         snare. Resected and retrieved.                        - One 2 mm polyp in the rectum, removed with a cold                         snare. Resected and retrieved.                        - Non-bleeding internal hemorrhoids.                        - Diverticulosis in the sigmoid colon and in the                         descending colon. There was no evidence of                         diverticular bleeding.                        - The examination was otherwise normal.                        - Multiple biopsies were obtained at the                         anastomosis.        FINAL PATHOLOGIC DIAGNOSIS  1. Ileocolonic anastomosis, biopsy:  - Small bowel mucosa with low-grade dysplasia.  - Acute inflammation and reactive change.  - See comment.  2. Colon, descending, 8 mm polyp, biopsy:  - Tubular adenoma.  3. Rectum, mid, 2 mm polyp, biopsy:  - Hyperplastic polyp.  4. Rectum, distal, 2 mm polyp, biopsy:  - Hyperplastic polyp.  COMMENT: The ileocolonic anastomosis biopsy (specimen 1) shows small bowel mucosa with low-grade  dysplasia. Multiple deeper levels are examined. Part 1 this case is reviewed by Dr. XUAN Toscano who agrees with the  above diagnosis.  Diagnosed by: Madeleine Trejo M.D.  (Electronically Signed: 2017-09-28 17:16:50)    Past Medical History:    Diagnosis Date    Arthritis     Asthma     AS A CHILD    Colon cancer     Parada syndrome      Past Surgical History:   Procedure Laterality Date    APPENDECTOMY      COLON SURGERY  2008    PARTIAL COLECTOMY    COLONOSCOPY Bilateral 2012    COLONOSCOPY N/A 8/1/2016    Procedure: COLONOSCOPY;  Surgeon: Blaze Marr MD;  Location: [facility] ENDO;  Service: Endoscopy;  Laterality: N/A;    COLONOSCOPY N/A 9/20/2017    Procedure: COLONOSCOPY;  Surgeon: Dom Leonardo MD;  Location: [facility] ENDO (4TH FLR);  Service: Endoscopy;  Laterality: N/A;  not given PM prep    Epidural Steroid Injection  10/23/15    Lumbar    GASTRIC BYPASS  2010    SLEEVE    KNEE ARTHROSCOPY Right      Review of patient's allergies indicates:  No Known Allergies    Current Outpatient Prescriptions on File Prior to Visit   Medication Sig Dispense Refill    cholecalciferol, vitamin D3, (VITAMIN D3) 5,000 unit Tab Take 5,000 Units by mouth once daily. 90 tablet 3    cyclobenzaprine (FLEXERIL) 10 MG tablet Take 1 tablet (10 mg total) by mouth 2 (two) times daily as needed for Muscle spasms. 60 tablet 2    ergocalciferol (ERGOCALCIFEROL) 50,000 unit Cap Take 1 capsule (50,000 Units total) by mouth every 7 days. 12 capsule 3    oxycodone-acetaminophen (PERCOCET)  mg per tablet Take 1 tablet by mouth every 6 to 8 hours as needed for Pain. 60 tablet 0    diclofenac sodium 1 % Gel Apply 2 g topically 4 (four) times daily. 1 Tube 2    [DISCONTINUED] topiramate (TOPAMAX) 25 MG tablet Take 1 tablet (25 mg total) by mouth 2 (two) times daily. 60 tablet 11     No current facility-administered medications on file prior to visit.      Family History   Problem Relation Age of Onset    Melanoma Mother     Heart disease Father     Diabetes Father     Cancer Maternal Uncle      colon    Cancer Maternal Grandmother      colon    Psoriasis Neg Hx     Lupus Neg Hx     Eczema Neg Hx      Social History     Social History    Marital  "status:      Spouse name: N/A    Number of children: N/A    Years of education: N/A     Occupational History     [employer]     Social History Main Topics    Smoking status: Never Smoker    Smokeless tobacco: Never Used    Alcohol use Yes      Comment: RARELY    Drug use: No    Sexual activity: Not on file     Other Topics Concern    Not on file     Social History Narrative    No narrative on file     ROS:  GENERAL: No fever, chills, fatigability or weight loss.  Integument:  No rashes, redness, icterus  CHEST: Denies MENDEZ, cyanosis, wheezing, cough and sputum production.  CARDIOVASCULAR: Denies chest pain, PND, orthopnea or reduced exercise tolerance.  GI:  Denies abd pain, dysphagia, nausea, vomiting, no hematemesis, no rectal pain  : Denies burning on urination, no hematuria, no bacteriuria  MSK:  No deformities, swelling, joint pain swelling  Neurologic:  No HAs, seizures, weakness, paresthesias, gait problems    PE  Healthy male in NAD  /84 (BP Location: Left arm, Patient Position: Sitting, BP Method: Large (Automatic))   Pulse 74   Ht 6' 1.5" (1.867 m)   Wt 119.5 kg (263 lb 7.2 oz)   BMI 34.29 kg/m²   Skin anicteric  AT NC EOMI  Neck supple  Chest symmetric nl excursions    Assessment  Dysplasia at anastomosis.  Visible lesion seen    Recommend  Repeat colonoscopy with polypectomy, snare of lesions at anastomosis to rule out adenomatous change.    May need resection.    Discussed with pt and wife at length.  He favors aggressive treatment as necessary.    Will review most recent path at MDT conference  "

## 2017-10-04 NOTE — LETTER
October 4, 2017      Dom Leonardo MD  [address]  [facility] [address]           [facility]  [address]  [facility] [address]  Phone: [phone]          Patient: Lyndon Lion Jr.   MR Number: [id number]   YOB: 1970   Date of Visit: 10/4/2017       Dear Dr. Dom Leonardo:    Thank you for referring Lyndon Lion to me for evaluation. Attached you will find relevant portions of my assessment and plan of care.    If you have questions, please do not hesitate to call me. I look forward to following Lyndon Lion along with you.    Sincerely,    RAMON Callahan MD    Enclosure  CC:  No Recipients    If you would like to receive this communication electronically, please contact [email or web address] or [phone] to request more information on [employer] Link access.    For providers and/or their staff who would like to refer a patient to [facility], please contact us through our one-stop-shop provider referral line, [facility] [place], at [phone].    If you feel you have received this communication in error or would no longer like to receive these types of communications, please e-mail [email or web address]

## 2017-10-09 ENCOUNTER — TELEPHONE (OUTPATIENT)
Dept: HEMATOLOGY/ONCOLOGY | Facility: CLINIC | Age: 47
End: 2017-10-09

## 2017-10-09 ENCOUNTER — PATIENT MESSAGE (OUTPATIENT)
Dept: HEMATOLOGY/ONCOLOGY | Facility: CLINIC | Age: 47
End: 2017-10-09

## 2017-10-09 ENCOUNTER — ANESTHESIA EVENT (OUTPATIENT)
Dept: ENDOSCOPY | Facility: HOSPITAL | Age: 47
End: 2017-10-09
Payer: COMMERCIAL

## 2017-10-09 ENCOUNTER — HOSPITAL ENCOUNTER (OUTPATIENT)
Facility: HOSPITAL | Age: 47
Discharge: HOME | End: 2017-10-09
Attending: COLON & RECTAL SURGERY | Admitting: COLON & RECTAL SURGERY
Payer: COMMERCIAL

## 2017-10-09 ENCOUNTER — ANESTHESIA (OUTPATIENT)
Dept: ENDOSCOPY | Facility: HOSPITAL | Age: 47
End: 2017-10-09
Payer: COMMERCIAL

## 2017-10-09 VITALS
HEART RATE: 55 BPM | TEMPERATURE: 98 F | WEIGHT: 255 LBS | DIASTOLIC BLOOD PRESSURE: 78 MMHG | RESPIRATION RATE: 14 BRPM | SYSTOLIC BLOOD PRESSURE: 131 MMHG | BODY MASS INDEX: 32.73 KG/M2 | OXYGEN SATURATION: 97 % | HEIGHT: 74 IN

## 2017-10-09 VITALS — RESPIRATION RATE: 13 BRPM

## 2017-10-09 DIAGNOSIS — Z15.09 LYNCH SYNDROME: ICD-10-CM

## 2017-10-09 DIAGNOSIS — D12.6 COLON DYSPLASIA: Primary | ICD-10-CM

## 2017-10-09 DIAGNOSIS — Z85.038 HX OF COLON CANCER, STAGE I: ICD-10-CM

## 2017-10-09 PROCEDURE — 37000008 HC ANESTHESIA 1ST 15 MINUTES: Performed by: COLON & RECTAL SURGERY

## 2017-10-09 PROCEDURE — 27201012 HC FORCEPS, HOT/COLD, DISP: Performed by: COLON & RECTAL SURGERY

## 2017-10-09 PROCEDURE — 88305 TISSUE EXAM BY PATHOLOGIST: CPT | Mod: 26,,, | Performed by: PATHOLOGY

## 2017-10-09 PROCEDURE — 45380 COLONOSCOPY AND BIOPSY: CPT | Performed by: COLON & RECTAL SURGERY

## 2017-10-09 PROCEDURE — 45380 COLONOSCOPY AND BIOPSY: CPT | Mod: ,,, | Performed by: COLON & RECTAL SURGERY

## 2017-10-09 PROCEDURE — D9220A PRA ANESTHESIA: Mod: CRNA,,, | Performed by: NURSE ANESTHETIST, CERTIFIED REGISTERED

## 2017-10-09 PROCEDURE — 88305 TISSUE EXAM BY PATHOLOGIST: CPT | Performed by: PATHOLOGY

## 2017-10-09 PROCEDURE — 63600175 PHARM REV CODE 636 W HCPCS: Performed by: NURSE ANESTHETIST, CERTIFIED REGISTERED

## 2017-10-09 PROCEDURE — D9220A PRA ANESTHESIA: Mod: ANES,,, | Performed by: ANESTHESIOLOGY

## 2017-10-09 PROCEDURE — 37000009 HC ANESTHESIA EA ADD 15 MINS: Performed by: COLON & RECTAL SURGERY

## 2017-10-09 PROCEDURE — 25000003 PHARM REV CODE 250: Performed by: COLON & RECTAL SURGERY

## 2017-10-09 RX ORDER — LIDOCAINE HCL/PF 100 MG/5ML
SYRINGE (ML) INTRAVENOUS
Status: DISCONTINUED | OUTPATIENT
Start: 2017-10-09 | End: 2017-10-09

## 2017-10-09 RX ORDER — PROPOFOL 10 MG/ML
INJECTION, EMULSION INTRAVENOUS CONTINUOUS PRN
Status: DISCONTINUED | OUTPATIENT
Start: 2017-10-09 | End: 2017-10-09

## 2017-10-09 RX ORDER — SODIUM CHLORIDE 9 MG/ML
INJECTION, SOLUTION INTRAVENOUS CONTINUOUS
Status: DISCONTINUED | OUTPATIENT
Start: 2017-10-09 | End: 2017-10-09 | Stop reason: HOSPADM

## 2017-10-09 RX ORDER — PROPOFOL 10 MG/ML
INJECTION, EMULSION INTRAVENOUS
Status: DISCONTINUED | OUTPATIENT
Start: 2017-10-09 | End: 2017-10-09

## 2017-10-09 RX ADMIN — PROPOFOL 100 MG: 10 INJECTION, EMULSION INTRAVENOUS at 02:10

## 2017-10-09 RX ADMIN — PROPOFOL 200 MCG/KG/MIN: 10 INJECTION, EMULSION INTRAVENOUS at 02:10

## 2017-10-09 RX ADMIN — LIDOCAINE HYDROCHLORIDE 100 MG: 20 INJECTION, SOLUTION INTRAVENOUS at 02:10

## 2017-10-09 RX ADMIN — SODIUM CHLORIDE: 0.9 INJECTION, SOLUTION INTRAVENOUS at 01:10

## 2017-10-09 RX ADMIN — PROPOFOL 50 MG: 10 INJECTION, EMULSION INTRAVENOUS at 02:10

## 2017-10-09 NOTE — H&P
Endoscopy H&P    Procedure : Colonoscopy    History of colon cancer, reese syndrome, s/p colo on 9/20/17 with some dysplasia and nodularity at the prev ileocolonic anastomosis.      Past Medical History:   Diagnosis Date    Arthritis     Asthma     AS A CHILD    Colon cancer     Reese syndrome      Review of Systems -ROS:  GENERAL: No fever, chills, fatigability or weight loss.  CHEST: Denies MENDZE, cyanosis, wheezing, cough and sputum production.  CARDIOVASCULAR: Denies chest pain, PND, orthopnea or reduced exercise tolerance.   Musculoskeletal ROS: negative for - gait disturbance or joint pain  Neurological ROS: negative for - confusion or memory loss        Physical Exam:  General: well developed, well nourished, no distress  Head: normocephalic  Neck: supple, symmetrical, trachea midline  Lungs:  clear to auscultation bilaterally and normal respiratory effort  Heart: regular rate and rhythm, S1, S2 normal, no murmur, rub or gallop and regular rate and rhythm  Abdomen: soft, non-tender non-distented; bowel sounds normal; no masses,  no organomegaly  Extremities: no cyanosis or edema, or clubbing       Moderate Sedation (choice): Mallampati Score 1    ASA : II    IMP: As above    Plan: Colonoscopy with Moderate sedation.  I have explained the procedure including indications, alternatives, expected outcomes and potential complications. The patient appears to understand and gives informed consent. The patient is medically ready for surgery.

## 2017-10-09 NOTE — DISCHARGE INSTRUCTIONS
Colonoscopy     A camera attached to a flexible tube with a viewing lens is used to take video pictures.     Colonoscopy is a test to view the inside of your lower digestive tract (colon and rectum). Sometimes it can show the last part of the small intestine (ileum). During the test, small pieces of tissue may be removed for testing. This is called a biopsy. Small growths, such as polyps, may also be removed.   Why is colonoscopy done?  The test is done to help look for colon cancer. And it can help find the source of abdominal pain, bleeding, and changes in bowel habits. It may be needed once a year, depending on factors such as your:  · Age  · Health history  · Family health history  · Symptoms  · Results from any prior colonoscopy  Risks and possible complications  These include:  · Bleeding               · A puncture or tear in the colon   · Risks of anesthesia  · A cancer lesion not being seen  Getting ready   To prepare for the test:  · Talk with your healthcare provider about the risks of the test (see below). Also ask your healthcare provider about alternatives to the test.  · Tell your healthcare provider about any medicines you take. Also tell him or her about any health conditions you may have.  · Make sure your rectum and colon are empty for the test. Follow the diet and bowel prep instructions exactly. If you dont, the test may need to be rescheduled.  · Plan for a friend or family member to drive you home after the test.     Colonoscopy provides an inside view of the entire colon.     You may discuss the results with your doctor right away or at a future visit.  During the test   The test is usually done in the hospital on an outpatient basis. This means you go home the same day. The procedure takes about 30 minutes. During that time:  · You are given relaxing (sedating) medicine through an IV line. You may be drowsy, or fully asleep.  · The healthcare provider will first give you a physical exam to  check for anal and rectal problems.  · Then the anus is lubricated and the scope inserted.  · If you are awake, you may have a feeling similar to needing to have a bowel movement. You may also feel pressure as air is pumped into the colon. Its OK to pass gas during the procedure.  · Biopsy, polyp removal, or other treatments may be done during the test.  After the test   You may have gas right after the test. It can help to try to pass it to help prevent later bloating. Your healthcare provider may discuss the results with you right away. Or you may need to schedule a follow-up visit to talk about the results. After the test, you can go back to your normal eating and other activities. You may be tired from the sedation and need to rest for a few hours.  Date Last Reviewed: 11/1/2016 © 2000-2017 [employer]. [address] [place] [address], [place] [address]. All rights reserved. This information is not intended as a substitute for professional medical care. Always follow your healthcare professional's instructions.

## 2017-10-09 NOTE — ANESTHESIA POSTPROCEDURE EVALUATION
"Anesthesia Post Evaluation    Patient: Lyndon Lion Jr.    Procedure(s) Performed: Procedure(s) (LRB):  COLONOSCOPY (N/A)    Final Anesthesia Type: general  Patient location during evaluation: PACU  Patient participation: Yes- Able to Participate  Level of consciousness: awake and alert and oriented  Post-procedure vital signs: reviewed and stable  Pain management: adequate  Airway patency: patent  PONV status at discharge: No PONV  Anesthetic complications: no      Cardiovascular status: blood pressure returned to baseline  Respiratory status: unassisted and room air  Hydration status: euvolemic  Follow-up not needed.        Visit Vitals  BP (!) 124/55 (BP Location: Left arm, Patient Position: Lying)   Pulse 60   Temp 36.6 °C (97.9 °F) (Temporal)   Resp 18   Ht 6' 2" (1.88 m)   Wt 115.7 kg (255 lb)   SpO2 96%   BMI 32.74 kg/m²       Pain/Karthikeyan Score: Pain Assessment Performed: Yes (10/9/2017  2:49 PM)  Presence of Pain: denies (10/9/2017  2:49 PM)  Karthikeyan Score: 9 (10/9/2017  2:49 PM)      "

## 2017-10-09 NOTE — TRANSFER OF CARE
"Anesthesia Transfer of Care Note    Patient: Lyndon Lion Jr.    Procedure(s) Performed: Procedure(s) (LRB):  COLONOSCOPY (N/A)    Patient location: GI    Anesthesia Type: general    Transport from OR: Transported from OR on room air with adequate spontaneous ventilation    Post pain: adequate analgesia    Post assessment: no apparent anesthetic complications and tolerated procedure well    Post vital signs: stable    Level of consciousness: awake and alert    Nausea/Vomiting: no nausea/vomiting    Complications: none    Transfer of care protocol was followed      Last vitals:   Visit Vitals  BP (!) 124/55 (BP Location: Left arm, Patient Position: Lying)   Pulse 60   Temp 36.6 °C (97.9 °F) (Temporal)   Resp 18   Ht 6' 2" (1.88 m)   Wt 115.7 kg (255 lb)   SpO2 96%   BMI 32.74 kg/m²     "

## 2017-10-09 NOTE — ANESTHESIA PREPROCEDURE EVALUATION
10/09/2017  Lyndon Lion Jr. is a 47 y.o., male.    Anesthesia Evaluation    I have reviewed the Patient Summary Reports.    I have reviewed the Nursing Notes.   I have reviewed the Medications.     Review of Systems  Anesthesia Hx:  No problems with previous Anesthesia  History of prior surgery of interest to airway management or planning: Denies Family Hx of Anesthesia complications.   Denies Personal Hx of Anesthesia complications.   Hematology/Oncology:  Hematology Normal   Oncology Normal     EENT/Dental:EENT/Dental Normal   Cardiovascular:  Cardiovascular Normal Exercise tolerance: good     Pulmonary:   Asthma mild    Renal/:  Renal/ Normal     Hepatic/GI:  Hepatic/GI Normal    Musculoskeletal:   Arthritis     Neurological:   Peripheral Neuropathy    Endocrine:  Endocrine Normal    Dermatological:  Skin Normal    Psych:  Psychiatric Normal           Physical Exam  General:  Well nourished    Airway/Jaw/Neck:  Airway Findings: Mouth Opening: Normal Tongue: Normal  General Airway Assessment: Adult  Mallampati: II  TM Distance: Normal, at least 6 cm        Eyes/Ears/Nose:  EYES/EARS/NOSE FINDINGS: Normal   Dental:  DENTAL FINDINGS: Normal   Chest/Lungs:  Chest/Lungs Clear    Heart/Vascular:  Heart Findings: Normal Heart murmur: negative Vascular Findings: Normal    Abdomen:  Abdomen Findings: Normal    Musculoskeletal:  Musculoskeletal Findings: Normal   Skin:  Skin Findings: Normal    Mental Status:  Mental Status Findings: Normal        Anesthesia Plan  Type of Anesthesia, risks & benefits discussed:  Anesthesia Type:  general  Patient's Preference:   Intra-op Monitoring Plan: standard ASA monitors  Intra-op Monitoring Plan Comments:   Post Op Pain Control Plan:   Post Op Pain Control Plan Comments:   Induction:   IV  Beta Blocker:  Patient is not currently on a Beta-Blocker (No further  documentation required).       Informed Consent: Patient understands risks and agrees with Anesthesia plan.  Questions answered.   ASA Score: 2     Day of Surgery Review of History & Physical:    H&P update referred to the provider.         Ready For Surgery From Anesthesia Perspective.

## 2017-10-16 ENCOUNTER — TELEPHONE (OUTPATIENT)
Dept: ENDOSCOPY | Facility: HOSPITAL | Age: 47
End: 2017-10-16

## 2017-10-23 ENCOUNTER — TELEPHONE (OUTPATIENT)
Dept: GASTROENTEROLOGY | Facility: CLINIC | Age: 47
End: 2017-10-23

## 2017-10-23 ENCOUNTER — OFFICE VISIT (OUTPATIENT)
Dept: DERMATOLOGY | Facility: CLINIC | Age: 47
End: 2017-10-23
Payer: COMMERCIAL

## 2017-10-23 VITALS — WEIGHT: 255 LBS | HEIGHT: 74 IN | BODY MASS INDEX: 32.73 KG/M2

## 2017-10-23 DIAGNOSIS — D48.9 NEOPLASM OF UNCERTAIN BEHAVIOR: Primary | ICD-10-CM

## 2017-10-23 DIAGNOSIS — Z15.09 LYNCH SYNDROME: ICD-10-CM

## 2017-10-23 DIAGNOSIS — L81.4 SOLAR LENTIGO: ICD-10-CM

## 2017-10-23 DIAGNOSIS — L73.8 SEBACEOUS HYPERPLASIA: ICD-10-CM

## 2017-10-23 DIAGNOSIS — D22.9 MULTIPLE BENIGN NEVI: ICD-10-CM

## 2017-10-23 DIAGNOSIS — Z15.09 LYNCH SYNDROME: Primary | ICD-10-CM

## 2017-10-23 DIAGNOSIS — L71.9 ROSACEA: ICD-10-CM

## 2017-10-23 PROCEDURE — 11100 PR BIOPSY OF SKIN LESION: CPT | Mod: S$GLB,,, | Performed by: DERMATOLOGY

## 2017-10-23 PROCEDURE — 99999 PR PBB SHADOW E&M-EST. PATIENT-LVL III: CPT | Mod: PBBFAC,,, | Performed by: DERMATOLOGY

## 2017-10-23 PROCEDURE — 88305 TISSUE EXAM BY PATHOLOGIST: CPT | Performed by: PATHOLOGY

## 2017-10-23 PROCEDURE — 99213 OFFICE O/P EST LOW 20 MIN: CPT | Mod: 25,S$GLB,, | Performed by: DERMATOLOGY

## 2017-10-23 PROCEDURE — 88312 SPECIAL STAINS GROUP 1: CPT | Mod: 26,,, | Performed by: PATHOLOGY

## 2017-10-23 RX ORDER — METRONIDAZOLE 7.5 MG/G
GEL TOPICAL
Qty: 45 G | Refills: 1 | Status: SHIPPED | OUTPATIENT
Start: 2017-10-23 | End: 2018-08-24 | Stop reason: SDUPTHER

## 2017-10-23 RX ORDER — DICLOFENAC SODIUM 10 MG/G
GEL TOPICAL
COMMUNITY
Start: 2017-08-22 | End: 2018-03-24 | Stop reason: SDUPTHER

## 2017-10-23 RX ORDER — DOXYCYCLINE 100 MG/1
100 CAPSULE ORAL DAILY
Qty: 30 CAPSULE | Refills: 1 | Status: SHIPPED | OUTPATIENT
Start: 2017-10-23 | End: 2017-12-08 | Stop reason: ALTCHOICE

## 2017-10-23 NOTE — PROGRESS NOTES
Subjective:       Patient ID:  Lyndon Lion Jr. is a 47 y.o. male who presents for   Chief Complaint   Patient presents with    Skin Check     TBSE    Spot     L hand     Patient last seen 10/2016 with multiple nevi, no concerning lesion   in [employer], shift work,   Patient with Parada syndrome; MSH2 mutation, dx 2013  H/o colon cancer s/p partial colectomy; annual coloscopy and EGD  Advised to have annual skin checks for cancer screening;    Mother with h/o melanoma (dx age 45) and breast CA        Spot  - Initial  Affected locations: left hand  Signs / symptoms: redness and dryness  Severity: mild  Timing: constant  Aggravated by: nothing  Treatments tried: Silver gel.        Review of Systems   Constitutional: Negative for fever, chills, weight loss, weight gain and night sweats.   Skin: Positive for activity-related sunscreen use and wears hat. Negative for daily sunscreen use.   Hematologic/Lymphatic: Does not bruise/bleed easily.        Objective:    Physical Exam   Constitutional: He appears well-developed and well-nourished. No distress.   Neurological: He is alert and oriented to person, place, and time. He is not disoriented.   Psychiatric: He has a normal mood and affect.   Skin:   Areas Examined (abnormalities noted in diagram):   Scalp / Hair Palpated and Inspected  Head / Face Inspection Performed  Neck Inspection Performed  Chest / Axilla Inspection Performed  Abdomen Inspection Performed  Genitals / Buttocks / Groin Inspection Performed  Back Inspection Performed  RUE Inspected  LUE Inspection Performed  RLE Inspected  LLE Inspection Performed  Nails and Digits Inspection Performed                           Diagram Legend     Erythematous scaling macule/papule c/w actinic keratosis       Vascular papule c/w angioma      Pigmented verrucoid papule/plaque c/w seborrheic keratosis      Yellow umbilicated papule c/w sebaceous hyperplasia      Irregularly shaped tan  macule c/w lentigo     1-2 mm smooth white papules consistent with Milia      Movable subcutaneous cyst with punctum c/w epidermal inclusion cyst      Subcutaneous movable cyst c/w pilar cyst      Firm pink to brown papule c/w dermatofibroma      Pedunculated fleshy papule(s) c/w skin tag(s)      Evenly pigmented macule c/w junctional nevus     Mildly variegated pigmented, slightly irregular-bordered macule c/w mildly atypical nevus      Flesh colored to evenly pigmented papule c/w intradermal nevus       Pink pearly papule/plaque c/w basal cell carcinoma      Erythematous hyperkeratotic cursted plaque c/w SCC      Surgical scar with no sign of skin cancer recurrence      Open and closed comedones      Inflammatory papules and pustules      Verrucoid papule consistent consistent with wart     Erythematous eczematous patches and plaques     Dystrophic onycholytic nail with subungual debris c/w onychomycosis     Umbilicated papule    Erythematous-base heme-crusted tan verrucoid plaque consistent with inflamed seborrheic keratosis     Erythematous Silvery Scaling Plaque c/w Psoriasis     See annotation              Assessment / Plan:      Pathology Orders:      Normal Orders This Visit    Tissue Specimen To Pathology, Dermatology     Questions:    Directional Terms:  Other(comment)    Clinical information:  Pink scaly hyperkeratotic plaque, SCC vs inflammatory    Specific Site:  left dorsal hand        Neoplasm of uncertain behavior  -     Tissue Specimen To Pathology, Dermatology  Shave biopsy procedure note:    Shave biopsy performed after verbal consent including risk of infection, scar, recurrence, need for additional treatment of site. Area prepped with alcohol, anesthetized with approximately 1.0cc of 1% lidocaine with epinephrine. Lesional tissue shaved with razor blade. Hemostasis achieved with application of aluminum chloride followed by hyfrecation. No complications. Dressing applied. Wound care  explained.    Rosacea, erythemato telangiectatic, pustular component on nose  Gentle skin care with unscented hypoallergenic products and strict sun precautions  Avoid mechanical trauma,  Daily SPF    -     doxycycline (VIBRAMYCIN) 100 MG Cap; Take 1 capsule (100 mg total) by mouth once daily.  Dispense: 30 capsule; Refill: 1  -     metronidazole (METROGEL) 0.75 % gel; AAA nose BID  Dispense: 45 g; Refill: 1    Multiple benign nevi  Monitor for new mole or moles that are becoming bigger, darker, irritated, or developing irregular borders. Brochure provided.    Sebaceous hyperplasia  This is a common condition representing benign enlargement of the sebaceous lobule. It typically occurs in adulthood. Reassurance given to patient.     Solar lentigo  This is a benign hyperpigmented sun induced lesion. Daily sun protection will reduce the number of new lesions. Treatment of these benign lesions are considered cosmetic.    Parada syndrome  MSH 2 mutation, increased risk of skin cancer (sebaceous neoplasms), regular skin checks  Closely monitored    Patient instructed in importance in daily sun protection of at least spf 30. Sun avoidance and topical protection discussed.   Recommend Elta MD (physician office) COTZ sensitive (available online) for daily use on face and neck.  Patient encouraged to wear hat for all outdoor exposure.   Also discussed sun protective clothing.           No Follow-up on file.

## 2017-10-24 ENCOUNTER — TELEPHONE (OUTPATIENT)
Dept: GASTROENTEROLOGY | Facility: CLINIC | Age: 47
End: 2017-10-24

## 2017-10-24 DIAGNOSIS — K63.5 POLYP OF COLON, UNSPECIFIED PART OF COLON, UNSPECIFIED TYPE: Primary | ICD-10-CM

## 2017-10-25 NOTE — TELEPHONE ENCOUNTER
Message   Received: Today   Message Contents   MD Dom Rodriguez MD; Marcela Puckett MA   Caller: Unspecified (Yesterday,  6:11 PM)             Fabricio I spoke to Dr. Leonardo. Please go ahead an schedule pt for colonoscopy with EMR with me. Not urgent, but pt would like to do it this year.      Please sign order

## 2017-10-25 NOTE — PATIENT INSTRUCTIONS
Shave Biopsy Wound Care    Your doctor has performed a shave biopsy today.  A band aid and vaseline ointment has been placed over the site.  This should remain in place for 24 hours.  It is recommended that you keep the area dry for the first 24 hours.  After 24 hours, you may remove the band aid and wash the area with warm soap and water and apply Vaseline jelly.  Many patients prefer to use Neosporin or Bacitracin ointment.  This is acceptable; however, know that you can develop an allergy to this medication even if you have used it safely for years.  It is important to keep the area moist.  Letting it dry out and get air slows healing time, and will worsen the scar.  Band aid is optional after first 24 hours.      If you notice increasing redness, tenderness, pain, or yellow drainage at the biopsy site, please notify your doctor.  These are signs of an infection.    If your biopsy site is bleeding, apply firm pressure for 15 minutes straight.  Repeat for another 15 minutes, if it is still bleeding.   If the surgical site continues to bleed, then please contact your doctor.       [facility]  [facility]  [address] [place] [id number]  Dept: [phone]

## 2017-11-01 ENCOUNTER — PATIENT MESSAGE (OUTPATIENT)
Dept: GASTROENTEROLOGY | Facility: CLINIC | Age: 47
End: 2017-11-01

## 2017-11-01 ENCOUNTER — TELEPHONE (OUTPATIENT)
Dept: GASTROENTEROLOGY | Facility: CLINIC | Age: 47
End: 2017-11-01

## 2017-11-02 ENCOUNTER — TELEPHONE (OUTPATIENT)
Dept: GASTROENTEROLOGY | Facility: CLINIC | Age: 47
End: 2017-11-02

## 2017-11-02 ENCOUNTER — TELEPHONE (OUTPATIENT)
Dept: ENDOSCOPY | Facility: HOSPITAL | Age: 47
End: 2017-11-02

## 2017-11-02 NOTE — TELEPHONE ENCOUNTER
Spoke with patient's wife about instructions for Colonoscopy/LEMR scheduled 11/20/17 at 1300.  Also mailed instructions.

## 2017-11-06 DIAGNOSIS — C18.9 MALIGNANT NEOPLASM OF COLON, UNSPECIFIED PART OF COLON: Primary | ICD-10-CM

## 2017-11-07 ENCOUNTER — HOSPITAL ENCOUNTER (OUTPATIENT)
Dept: RADIOLOGY | Facility: HOSPITAL | Age: 47
Discharge: HOME | End: 2017-11-07
Attending: COLON & RECTAL SURGERY
Payer: COMMERCIAL

## 2017-11-07 DIAGNOSIS — D12.6 COLON DYSPLASIA: ICD-10-CM

## 2017-11-07 DIAGNOSIS — Z15.09 LYNCH SYNDROME: ICD-10-CM

## 2017-11-07 PROCEDURE — 74177 CT ABD & PELVIS W/CONTRAST: CPT | Mod: 26,,, | Performed by: RADIOLOGY

## 2017-11-07 PROCEDURE — 25500020 PHARM REV CODE 255

## 2017-11-07 PROCEDURE — 71260 CT THORAX DX C+: CPT | Mod: 26,,, | Performed by: RADIOLOGY

## 2017-11-07 PROCEDURE — 71260 CT THORAX DX C+: CPT | Mod: TC

## 2017-11-07 PROCEDURE — 74177 CT ABD & PELVIS W/CONTRAST: CPT | Mod: TC

## 2017-11-07 RX ADMIN — IOHEXOL 100 ML: 350 INJECTION, SOLUTION INTRAVENOUS at 05:11

## 2017-11-07 RX ADMIN — IOHEXOL 30 ML: 350 INJECTION, SOLUTION INTRAVENOUS at 05:11

## 2017-11-20 ENCOUNTER — HOSPITAL ENCOUNTER (OUTPATIENT)
Facility: HOSPITAL | Age: 47
Discharge: HOME | End: 2017-11-20
Attending: INTERNAL MEDICINE | Admitting: INTERNAL MEDICINE
Payer: COMMERCIAL

## 2017-11-20 ENCOUNTER — ANESTHESIA EVENT (OUTPATIENT)
Dept: ENDOSCOPY | Facility: HOSPITAL | Age: 47
End: 2017-11-20
Payer: COMMERCIAL

## 2017-11-20 ENCOUNTER — ANESTHESIA (OUTPATIENT)
Dept: ENDOSCOPY | Facility: HOSPITAL | Age: 47
End: 2017-11-20
Payer: COMMERCIAL

## 2017-11-20 ENCOUNTER — HOSPITAL ENCOUNTER (EMERGENCY)
Facility: HOSPITAL | Age: 47
Discharge: OTHER INSTITUTION | End: 2017-11-21
Attending: EMERGENCY MEDICINE
Payer: COMMERCIAL

## 2017-11-20 ENCOUNTER — SURGERY (OUTPATIENT)
Age: 47
End: 2017-11-20

## 2017-11-20 VITALS
HEIGHT: 74 IN | OXYGEN SATURATION: 100 % | BODY MASS INDEX: 33.37 KG/M2 | TEMPERATURE: 98 F | WEIGHT: 260 LBS | DIASTOLIC BLOOD PRESSURE: 63 MMHG | HEART RATE: 68 BPM | SYSTOLIC BLOOD PRESSURE: 110 MMHG | RESPIRATION RATE: 18 BRPM

## 2017-11-20 DIAGNOSIS — K66.8 PNEUMOPERITONEUM: Primary | ICD-10-CM

## 2017-11-20 DIAGNOSIS — D12.6 COLON DYSPLASIA: Primary | ICD-10-CM

## 2017-11-20 DIAGNOSIS — D12.6 COLON ADENOMA: ICD-10-CM

## 2017-11-20 LAB
ALBUMIN SERPL BCP-MCNC: 3.8 G/DL
ALP SERPL-CCNC: 48 U/L
ALT SERPL W/O P-5'-P-CCNC: 22 U/L
ANION GAP SERPL CALC-SCNC: 10 MMOL/L
AST SERPL-CCNC: 16 U/L
BASOPHILS # BLD AUTO: 0.2 K/UL
BASOPHILS NFR BLD: 1.5 %
BILIRUB SERPL-MCNC: 1 MG/DL
BUN SERPL-MCNC: 13 MG/DL
CALCIUM SERPL-MCNC: 8.8 MG/DL
CHLORIDE SERPL-SCNC: 105 MMOL/L
CO2 SERPL-SCNC: 22 MMOL/L
CREAT SERPL-MCNC: 1.1 MG/DL
DIFFERENTIAL METHOD: ABNORMAL
EOSINOPHIL # BLD AUTO: 0 K/UL
EOSINOPHIL NFR BLD: 0 %
ERYTHROCYTE [DISTWIDTH] IN BLOOD BY AUTOMATED COUNT: 12.6 %
EST. GFR  (AFRICAN AMERICAN): >60 ML/MIN/1.73 M^2
EST. GFR  (NON AFRICAN AMERICAN): >60 ML/MIN/1.73 M^2
GLUCOSE SERPL-MCNC: 156 MG/DL
HCT VFR BLD AUTO: 47 %
HGB BLD-MCNC: 16.2 G/DL
LIPASE SERPL-CCNC: 12 U/L
LYMPHOCYTES # BLD AUTO: 0.5 K/UL
LYMPHOCYTES NFR BLD: 4.4 %
MCH RBC QN AUTO: 30.2 PG
MCHC RBC AUTO-ENTMCNC: 34.3 G/DL
MCV RBC AUTO: 88 FL
MONOCYTES # BLD AUTO: 0.5 K/UL
MONOCYTES NFR BLD: 5 %
NEUTROPHILS # BLD AUTO: 9.6 K/UL
NEUTROPHILS NFR BLD: 89.1 %
PLATELET # BLD AUTO: 269 K/UL
PMV BLD AUTO: 7.2 FL
POTASSIUM SERPL-SCNC: 4.2 MMOL/L
PROT SERPL-MCNC: 6.7 G/DL
RBC # BLD AUTO: 5.35 M/UL
SODIUM SERPL-SCNC: 137 MMOL/L
WBC # BLD AUTO: 10.7 K/UL

## 2017-11-20 PROCEDURE — 25500020 PHARM REV CODE 255: Performed by: EMERGENCY MEDICINE

## 2017-11-20 PROCEDURE — 63600175 PHARM REV CODE 636 W HCPCS: Performed by: EMERGENCY MEDICINE

## 2017-11-20 PROCEDURE — 83690 ASSAY OF LIPASE: CPT

## 2017-11-20 PROCEDURE — D9220A PRA ANESTHESIA: Mod: ANES,,, | Performed by: ANESTHESIOLOGY

## 2017-11-20 PROCEDURE — 45390 COLONOSCOPY W/RESECTION: CPT | Performed by: INTERNAL MEDICINE

## 2017-11-20 PROCEDURE — 25000003 PHARM REV CODE 250: Performed by: NURSE ANESTHETIST, CERTIFIED REGISTERED

## 2017-11-20 PROCEDURE — 85025 COMPLETE CBC W/AUTO DIFF WBC: CPT

## 2017-11-20 PROCEDURE — 96361 HYDRATE IV INFUSION ADD-ON: CPT

## 2017-11-20 PROCEDURE — 37000008 HC ANESTHESIA 1ST 15 MINUTES: Performed by: INTERNAL MEDICINE

## 2017-11-20 PROCEDURE — D9220A PRA ANESTHESIA: Mod: CRNA,,, | Performed by: NURSE ANESTHETIST, CERTIFIED REGISTERED

## 2017-11-20 PROCEDURE — 27201089 HC SNARE, DISP (ANY): Performed by: INTERNAL MEDICINE

## 2017-11-20 PROCEDURE — 63600175 PHARM REV CODE 636 W HCPCS: Performed by: NURSE ANESTHETIST, CERTIFIED REGISTERED

## 2017-11-20 PROCEDURE — 36415 COLL VENOUS BLD VENIPUNCTURE: CPT

## 2017-11-20 PROCEDURE — 88305 TISSUE EXAM BY PATHOLOGIST: CPT

## 2017-11-20 PROCEDURE — 80053 COMPREHEN METABOLIC PANEL: CPT

## 2017-11-20 PROCEDURE — 99285 EMERGENCY DEPT VISIT HI MDM: CPT | Mod: 25

## 2017-11-20 PROCEDURE — 25000003 PHARM REV CODE 250: Performed by: INTERNAL MEDICINE

## 2017-11-20 PROCEDURE — 88305 TISSUE EXAM BY PATHOLOGIST: CPT | Mod: 26,,,

## 2017-11-20 PROCEDURE — 96375 TX/PRO/DX INJ NEW DRUG ADDON: CPT

## 2017-11-20 PROCEDURE — 37000009 HC ANESTHESIA EA ADD 15 MINS: Performed by: INTERNAL MEDICINE

## 2017-11-20 PROCEDURE — 45385 COLONOSCOPY W/LESION REMOVAL: CPT

## 2017-11-20 PROCEDURE — 45390 COLONOSCOPY W/RESECTION: CPT | Mod: ,,, | Performed by: INTERNAL MEDICINE

## 2017-11-20 PROCEDURE — 45381 COLONOSCOPY SUBMUCOUS NJX: CPT | Performed by: INTERNAL MEDICINE

## 2017-11-20 PROCEDURE — 25000003 PHARM REV CODE 250: Performed by: EMERGENCY MEDICINE

## 2017-11-20 RX ORDER — FENTANYL CITRATE 50 UG/ML
INJECTION, SOLUTION INTRAMUSCULAR; INTRAVENOUS
Status: DISCONTINUED | OUTPATIENT
Start: 2017-11-20 | End: 2017-11-20

## 2017-11-20 RX ORDER — SODIUM CHLORIDE 9 MG/ML
INJECTION, SOLUTION INTRAVENOUS CONTINUOUS
Status: DISCONTINUED | OUTPATIENT
Start: 2017-11-20 | End: 2017-11-20 | Stop reason: HOSPADM

## 2017-11-20 RX ORDER — PROPOFOL 10 MG/ML
VIAL (ML) INTRAVENOUS
Status: DISCONTINUED | OUTPATIENT
Start: 2017-11-20 | End: 2017-11-20

## 2017-11-20 RX ORDER — PROPOFOL 10 MG/ML
VIAL (ML) INTRAVENOUS CONTINUOUS PRN
Status: DISCONTINUED | OUTPATIENT
Start: 2017-11-20 | End: 2017-11-20

## 2017-11-20 RX ORDER — SODIUM CHLORIDE 9 MG/ML
INJECTION, SOLUTION INTRAVENOUS CONTINUOUS PRN
Status: DISCONTINUED | OUTPATIENT
Start: 2017-11-20 | End: 2017-11-20

## 2017-11-20 RX ORDER — ONDANSETRON 2 MG/ML
8 INJECTION INTRAMUSCULAR; INTRAVENOUS
Status: COMPLETED | OUTPATIENT
Start: 2017-11-20 | End: 2017-11-20

## 2017-11-20 RX ORDER — SODIUM CHLORIDE 0.9 % (FLUSH) 0.9 %
3 SYRINGE (ML) INJECTION
Status: DISCONTINUED | OUTPATIENT
Start: 2017-11-20 | End: 2017-11-20 | Stop reason: HOSPADM

## 2017-11-20 RX ORDER — LIDOCAINE HCL/PF 100 MG/5ML
SYRINGE (ML) INTRAVENOUS
Status: DISCONTINUED | OUTPATIENT
Start: 2017-11-20 | End: 2017-11-20

## 2017-11-20 RX ORDER — HYDROMORPHONE HYDROCHLORIDE 1 MG/ML
1 INJECTION, SOLUTION INTRAMUSCULAR; INTRAVENOUS; SUBCUTANEOUS
Status: COMPLETED | OUTPATIENT
Start: 2017-11-20 | End: 2017-11-20

## 2017-11-20 RX ADMIN — PROPOFOL 150 MCG/KG/MIN: 10 INJECTION, EMULSION INTRAVENOUS at 12:11

## 2017-11-20 RX ADMIN — HYDROMORPHONE HYDROCHLORIDE 1 MG: 1 INJECTION, SOLUTION INTRAMUSCULAR; INTRAVENOUS; SUBCUTANEOUS at 09:11

## 2017-11-20 RX ADMIN — LIDOCAINE HYDROCHLORIDE 50 MG: 20 INJECTION, SOLUTION INTRAVENOUS at 12:11

## 2017-11-20 RX ADMIN — EPHEDRINE SULFATE 10 MG: 50 INJECTION, SOLUTION INTRAMUSCULAR; INTRAVENOUS; SUBCUTANEOUS at 01:11

## 2017-11-20 RX ADMIN — SODIUM CHLORIDE 1000 ML: 0.9 INJECTION, SOLUTION INTRAVENOUS at 09:11

## 2017-11-20 RX ADMIN — PROPOFOL 100 MG: 10 INJECTION, EMULSION INTRAVENOUS at 12:11

## 2017-11-20 RX ADMIN — FENTANYL CITRATE 50 MCG: 50 INJECTION, SOLUTION INTRAMUSCULAR; INTRAVENOUS at 12:11

## 2017-11-20 RX ADMIN — IOHEXOL 100 ML: 350 INJECTION, SOLUTION INTRAVENOUS at 10:11

## 2017-11-20 RX ADMIN — EPHEDRINE SULFATE 5 MG: 50 INJECTION, SOLUTION INTRAMUSCULAR; INTRAVENOUS; SUBCUTANEOUS at 01:11

## 2017-11-20 RX ADMIN — SODIUM CHLORIDE: 0.9 INJECTION, SOLUTION INTRAVENOUS at 12:11

## 2017-11-20 RX ADMIN — ONDANSETRON 8 MG: 2 INJECTION INTRAMUSCULAR; INTRAVENOUS at 09:11

## 2017-11-20 NOTE — ANESTHESIA PREPROCEDURE EVALUATION
11/20/2017  Lyndon Lion Jr. is a 47 y.o., male.    Anesthesia Evaluation    I have reviewed the Patient Summary Reports.     I have reviewed the Medications.     Review of Systems  Anesthesia Hx:  History of prior surgery of interest to airway management or planning:  Denies Personal Hx of Anesthesia complications.   Social:  Non-Smoker    Pulmonary:   Asthma    Hepatic/GI:   Liver Disease, S/P gastric bypass   Musculoskeletal:   Arthritis   Spine Disorders: lumbar    Endocrine:   Obesity       Physical Exam  General:  Well nourished    Airway/Jaw/Neck:  Airway Findings: Mouth Opening: Normal Tongue: Normal  General Airway Assessment: Adult  Mallampati: III  Improves to II with phonation.  TM Distance: Normal, at least 6 cm  Jaw/Neck Findings:  Neck ROM: Normal ROM       Chest/Lungs:  Chest/Lungs Findings: Normal Respiratory Rate     Heart/Vascular:  Heart Findings: Rate: Normal        Mental Status:  Mental Status Findings:  Alert and Oriented         Anesthesia Plan  Type of Anesthesia, risks & benefits discussed:  Anesthesia Type:  general  Patient's Preference: General   Intra-op Monitoring Plan: standard ASA monitors  Intra-op Monitoring Plan Comments:   Post Op Pain Control Plan: IV/PO Opioids PRN  Post Op Pain Control Plan Comments:   Induction:   IV  Beta Blocker:  Patient is not currently on a Beta-Blocker (No further documentation required).       Informed Consent: Patient understands risks and agrees with Anesthesia plan.  Questions answered. Anesthesia consent signed with patient.  ASA Score: 3     Day of Surgery Review of History & Physical:    H&P update referred to the surgeon.     Anesthesia Plan Notes: NPO confirmed (drank prep)  S/p gastric bypass noted.  Back/spine pain noted.          Ready For Surgery From Anesthesia Perspective.

## 2017-11-20 NOTE — PLAN OF CARE
Problem: Patient Care Overview  Goal: Plan of Care Review  Outcome: Outcome(s) achieved Date Met: 11/20/17  Discharge instructions given and explained to patient and family with verbalization of understanding all instructions.. Patients v/s stable, denies n/v and tolerating po, rates pain level tolerable, IV removed, and family at bedside for patient discharge home.

## 2017-11-20 NOTE — PATIENT INSTRUCTIONS
Discharge Summary/Instructions after an Endoscopic Procedure  Patient Name: Lyndon Lion  Patient MRN: [id number]  Patient YOB: 1970 Monday, November 20, 2017  Joseluis Choe MD  RESTRICTIONS:  During your procedure today, you received medications for sedation.  These   medications may affect your judgment, balance and coordination.  Therefore,   for 24 hours, you have the following restrictions:   - DO NOT drive a car, operate machinery, make legal/financial decisions,   sign important papers or drink alcohol.    ACTIVITY:  The following day: return to full activity including work, except no heavy   lifting, straining or running for 3 days if polyps were removed.  DIET:  Eat and drink normally unless instructed otherwise.     TREATMENT FOR COMMON SIDE EFFECTS:  - Mild abdominal pain, belching, bloating or excessive gas: rest, eat   lightly and use a heating pad.  - Sore Throat: treat with throat lozenges and/or gargle with warm salt   water.  SYMPTOMS TO WATCH FOR AND REPORT TO YOUR PHYSICIAN:  1. Abdominal pain or bloating, other than gas cramps.  2. Chest pain.  3. Back pain.  4. Chills or fever occurring within 24 hours after the procedure.  5. Rectal bleeding, which would show as bright red, maroon, or black stools.   (A tablespoon of blood from the rectum is not serious, especially if   hemorrhoids are present.)  6. Vomiting.  7. Weakness or dizziness.  8. Because air was used during the procedure, expelling large amounts of air   from your rectum or belching is normal.  9. If a bowel prep was taken, you may not have a bowel movement for 1-3   days.  This is normal.  GO DIRECTLY TO THE NEAREST EMERGENCY ROOM IF YOU HAVE ANY OF THE FOLLOWING:      Difficulty breathing  Chills and/or fever over 101 F   Persistent vomiting and/or vomiting blood   Severe abdominal pain   Severe chest pain   Black, tarry stools   Bleeding- more than one tablespoon   Any other symptom or condition that you may feel  needs urgent attention  Your doctor recommends these additional instructions:  If any biopsies were taken, your doctor s clinic will contact you in 1 to 2   weeks with any results.  None  For questions, problems or results please call your physician - Joseluis Choe MD at Work:  [phone].  [facility], EMERGENCY ROOM PHONE NUMBER: [phone]  IF A COMPLICATION OR EMERGENCY SITUATION ARISES AND YOU ARE UNABLE TO REACH   YOUR PHYSICIAN - GO DIRECTLY TO THE EMERGENCY ROOM.  Joseluis Choe MD  11/20/2017 1:46:44 PM  This report has been verified and signed electronically.

## 2017-11-20 NOTE — PLAN OF CARE
Plan of care discussed with pt and his wife. Understanding verbalized. Pt states can discuss results with his wife

## 2017-11-20 NOTE — ANESTHESIA POSTPROCEDURE EVALUATION
"Anesthesia Post Evaluation    Patient: Lyndon Lion Jr.    Procedure(s) Performed: Procedure(s) (LRB):  COLONOSCOPY/EMR (N/A)    Final Anesthesia Type: general  Patient location during evaluation: PACU  Patient participation: Yes- Able to Participate  Level of consciousness: awake and alert  Post-procedure vital signs: reviewed and stable  Pain management: adequate  Airway patency: patent  PONV status at discharge: No PONV  Anesthetic complications: no      Cardiovascular status: blood pressure returned to baseline  Respiratory status: unassisted  Hydration status: euvolemic  Follow-up not needed.        Visit Vitals  BP (P) 110/63 (BP Location: Left arm, Patient Position: Lying)   Pulse 68   Temp (P) 36.5 °C (97.7 °F) (Temporal)   Resp (P) 16   Ht 6' 1.5" (1.867 m)   Wt 117.9 kg (260 lb)   SpO2 100%   BMI 33.84 kg/m²       Pain/Karthikeyan Score: Pain Assessment Performed: Yes (11/20/2017  2:20 PM)  Presence of Pain: denies (11/20/2017  2:20 PM)  Karthikeyan Score: 10 (11/20/2017  2:20 PM)  Modified Karthikeyan Score: 19 (11/20/2017  2:20 PM)      "

## 2017-11-20 NOTE — DISCHARGE INSTRUCTIONS
Colonoscopy     A camera attached to a flexible tube with a viewing lens is used to take video pictures.     Colonoscopy is a test to view the inside of your lower digestive tract (colon and rectum). Sometimes it can show the last part of the small intestine (ileum). During the test, small pieces of tissue may be removed for testing. This is called a biopsy. Small growths, such as polyps, may also be removed.   Why is colonoscopy done?  The test is done to help look for colon cancer. And it can help find the source of abdominal pain, bleeding, and changes in bowel habits. It may be needed once a year, depending on factors such as your:  · Age  · Health history  · Family health history  · Symptoms  · Results from any prior colonoscopy  Risks and possible complications  These include:  · Bleeding               · A puncture or tear in the colon   · Risks of anesthesia  · A cancer lesion not being seen  Getting ready   To prepare for the test:  · Talk with your healthcare provider about the risks of the test (see below). Also ask your healthcare provider about alternatives to the test.  · Tell your healthcare provider about any medicines you take. Also tell him or her about any health conditions you may have.  · Make sure your rectum and colon are empty for the test. Follow the diet and bowel prep instructions exactly. If you dont, the test may need to be rescheduled.  · Plan for a friend or family member to drive you home after the test.     Colonoscopy provides an inside view of the entire colon.     You may discuss the results with your doctor right away or at a future visit.  During the test   The test is usually done in the hospital on an outpatient basis. This means you go home the same day. The procedure takes about 30 minutes. During that time:  · You are given relaxing (sedating) medicine through an IV line. You may be drowsy, or fully asleep.  · The healthcare provider will first give you a physical exam to  check for anal and rectal problems.  · Then the anus is lubricated and the scope inserted.  · If you are awake, you may have a feeling similar to needing to have a bowel movement. You may also feel pressure as air is pumped into the colon. Its OK to pass gas during the procedure.  · Biopsy, polyp removal, or other treatments may be done during the test.  After the test   You may have gas right after the test. It can help to try to pass it to help prevent later bloating. Your healthcare provider may discuss the results with you right away. Or you may need to schedule a follow-up visit to talk about the results. After the test, you can go back to your normal eating and other activities. You may be tired from the sedation and need to rest for a few hours.  Date Last Reviewed: 11/1/2016 © 2000-2017 [employer]. [address] [place] [address], [place] [address]. All rights reserved. This information is not intended as a substitute for professional medical care. Always follow your healthcare professional's instructions.

## 2017-11-20 NOTE — H&P
Short Stay Endoscopy History and Physical    PCP - Evan Judd MD  Referring Physician - Dom Leonardo MD  [address] [place] [address]    Procedure - colonoscopy with EMR  ASA - per anesthesia  Mallampati - per anesthesia  History of Anesthesia problems - no  Family history Anesthesia problems -  no   Plan of anesthesia - General    HPI:  This is a 47 y.o. male here for evaluation of: dysplastic lesion at IC anastomosis    Reflux - no  Dysphagia - no  Abdominal pain - no  Diarrhea - no    ROS:  Constitutional: No fevers, chills, No weight loss  CV: No chest pain  Pulm: No cough, No shortness of breath  Ophtho: No vision changes  GI: see HPI  Derm: No rash    Medical History:  has a past medical history of Arthritis; Asthma; Colon cancer; Parada syndrome; and Vitamin D deficiency.    Surgical History:  has a past surgical history that includes Gastric bypass (2010); Colon surgery (2008); Knee arthroscopy (Right); Appendectomy; Colonoscopy (Bilateral, 2012); Epidural Steroid Injection (10/23/15); Colonoscopy (N/A, 8/1/2016); Colonoscopy (N/A, 9/20/2017); Colonoscopy (N/A, 10/9/2017); and Esophagogastroduodenoscopy.    Family History: family history includes Cancer in his maternal grandfather and maternal uncle; Diabetes in his father; Heart disease in his father; Melanoma in his mother..    Social History:  reports that he has never smoked. He has never used smokeless tobacco. He reports that he drinks alcohol. He reports that he does not use drugs.    Review of patient's allergies indicates:  No Known Allergies    Medications:   Prescriptions Prior to Admission   Medication Sig Dispense Refill Last Dose    cholecalciferol, vitamin D3, (VITAMIN D3) 5,000 unit Tab Take 5,000 Units by mouth once daily. 90 tablet 3 Past Week at Unknown time    doxycycline (VIBRAMYCIN) 100 MG Cap Take 1 capsule (100 mg total) by mouth once daily. 30 capsule 1 Past Week at Unknown time    ergocalciferol  (ERGOCALCIFEROL) 50,000 unit Cap Take 1 capsule (50,000 Units total) by mouth every 7 days. 12 capsule 3 Past Week at Unknown time    metronidazole (METROGEL) 0.75 % gel AAA nose BID 45 g 1 Past Week at Unknown time    oxycodone-acetaminophen (PERCOCET)  mg per tablet Take 1 tablet by mouth every 6 to 8 hours as needed for Pain. 60 tablet 0 Past Week at Unknown time    cyclobenzaprine (FLEXERIL) 10 MG tablet Take 1 tablet (10 mg total) by mouth 2 (two) times daily as needed for Muscle spasms. 60 tablet 2 More than a month at Unknown time    diclofenac sodium 1 % Gel    More than a month at Unknown time    diclofenac sodium 1 % Gel APPLY 2 GRAMS EXTERNALLY TO THE AFFECTED AREA FOUR TIMES DAILY 100 g 0 More than a month at Unknown time       Physical Exam:    Vital Signs:   Vitals:    11/20/17 1200   BP: 125/68   Pulse:    Resp:    Temp:        General Appearance: Well appearing in no acute distress  Eyes:    No scleral icterus  ENT: Neck supple  Lungs: CTA anteriorly  Heart:  Regular rate  Abdomen: Soft, non tender, non distended with normal bowel sounds.  Extremities: No edema  Skin: No rash    Labs:  Lab Results   Component Value Date    WBC 5.58 08/04/2017    HGB 16.1 08/04/2017    HCT 46.1 08/04/2017     08/04/2017    CHOL 177 11/16/2016    TRIG 107 11/16/2016    HDL 40 11/16/2016    ALT 22 08/04/2017    AST 16 08/04/2017     08/04/2017     08/04/2017    K 4.1 08/04/2017    K 4.1 08/04/2017     08/04/2017     08/04/2017    CREATININE 1.2 08/04/2017    CREATININE 1.2 08/04/2017    BUN 18 08/04/2017    BUN 18 08/04/2017    CO2 25 08/04/2017    CO2 25 08/04/2017    TSH 1.012 11/16/2016    PSA 0.49 11/22/2016    INR 1.03 05/14/2013    HGBA1C 5.8 (H) 08/04/2017       I have explained the risks and benefits of this endoscopic procedure to the patient including but not limited to bleeding, inflammation, infection, perforation, and death.      Joseluis Choe MD

## 2017-11-20 NOTE — TRANSFER OF CARE
"Anesthesia Transfer of Care Note    Patient: Lyndon Lion Jr.    Procedure(s) Performed: Procedure(s) (LRB):  COLONOSCOPY/EMR (N/A)    Patient location: PACU    Anesthesia Type: general    Transport from OR: Transported from OR on room air with adequate spontaneous ventilation    Post pain: adequate analgesia    Post assessment: no apparent anesthetic complications    Post vital signs: stable    Level of consciousness: awake    Nausea/Vomiting: no nausea/vomiting    Complications: none    Transfer of care protocol was followed      Last vitals:   Visit Vitals  /68   Pulse 67   Temp 36.7 °C (98.1 °F) (Temporal)   Resp 18   Ht 6' 1.5" (1.867 m)   Wt 117.9 kg (260 lb)   SpO2 96%   BMI 33.84 kg/m²     "

## 2017-11-21 ENCOUNTER — HOSPITAL ENCOUNTER (INPATIENT)
Facility: HOSPITAL | Age: 47
LOS: 3 days | Discharge: HOME | DRG: 395 | End: 2017-11-24
Attending: COLON & RECTAL SURGERY | Admitting: COLON & RECTAL SURGERY
Payer: COMMERCIAL

## 2017-11-21 VITALS
HEIGHT: 73 IN | HEART RATE: 72 BPM | SYSTOLIC BLOOD PRESSURE: 121 MMHG | OXYGEN SATURATION: 98 % | RESPIRATION RATE: 18 BRPM | BODY MASS INDEX: 33.92 KG/M2 | WEIGHT: 255.94 LBS | DIASTOLIC BLOOD PRESSURE: 64 MMHG | TEMPERATURE: 99 F

## 2017-11-21 DIAGNOSIS — K66.8 PNEUMOPERITONEUM: Primary | ICD-10-CM

## 2017-11-21 PROBLEM — K91.89: Status: ACTIVE | Noted: 2017-11-21

## 2017-11-21 LAB
ALBUMIN SERPL BCP-MCNC: 3 G/DL
ALBUMIN SERPL BCP-MCNC: 3.4 G/DL
ALP SERPL-CCNC: 42 U/L
ALP SERPL-CCNC: 46 U/L
ALT SERPL W/O P-5'-P-CCNC: 13 U/L
ALT SERPL W/O P-5'-P-CCNC: 15 U/L
ANION GAP SERPL CALC-SCNC: 6 MMOL/L
ANION GAP SERPL CALC-SCNC: 7 MMOL/L
APTT BLDCRRT: 25.3 SEC
AST SERPL-CCNC: 11 U/L
AST SERPL-CCNC: 12 U/L
BASOPHILS # BLD AUTO: 0 K/UL
BASOPHILS # BLD AUTO: 0.02 K/UL
BASOPHILS NFR BLD: 0 %
BASOPHILS NFR BLD: 0.2 %
BILIRUB SERPL-MCNC: 1.5 MG/DL
BILIRUB SERPL-MCNC: 1.9 MG/DL
BUN SERPL-MCNC: 13 MG/DL
BUN SERPL-MCNC: 15 MG/DL
CALCIUM SERPL-MCNC: 8.5 MG/DL
CALCIUM SERPL-MCNC: 8.5 MG/DL
CHLORIDE SERPL-SCNC: 105 MMOL/L
CHLORIDE SERPL-SCNC: 106 MMOL/L
CO2 SERPL-SCNC: 23 MMOL/L
CO2 SERPL-SCNC: 25 MMOL/L
CREAT SERPL-MCNC: 1.3 MG/DL
CREAT SERPL-MCNC: 1.3 MG/DL
DIFFERENTIAL METHOD: ABNORMAL
DIFFERENTIAL METHOD: ABNORMAL
EOSINOPHIL # BLD AUTO: 0 K/UL
EOSINOPHIL # BLD AUTO: 0 K/UL
EOSINOPHIL NFR BLD: 0 %
EOSINOPHIL NFR BLD: 0 %
ERYTHROCYTE [DISTWIDTH] IN BLOOD BY AUTOMATED COUNT: 12.3 %
ERYTHROCYTE [DISTWIDTH] IN BLOOD BY AUTOMATED COUNT: 12.4 %
EST. GFR  (AFRICAN AMERICAN): >60 ML/MIN/1.73 M^2
EST. GFR  (AFRICAN AMERICAN): >60 ML/MIN/1.73 M^2
EST. GFR  (NON AFRICAN AMERICAN): >60 ML/MIN/1.73 M^2
EST. GFR  (NON AFRICAN AMERICAN): >60 ML/MIN/1.73 M^2
GLUCOSE SERPL-MCNC: 122 MG/DL
GLUCOSE SERPL-MCNC: 152 MG/DL
HCT VFR BLD AUTO: 40.2 %
HCT VFR BLD AUTO: 42.7 %
HGB BLD-MCNC: 14 G/DL
HGB BLD-MCNC: 14.8 G/DL
IMM GRANULOCYTES # BLD AUTO: 0.05 K/UL
IMM GRANULOCYTES # BLD AUTO: 0.06 K/UL
IMM GRANULOCYTES NFR BLD AUTO: 0.4 %
IMM GRANULOCYTES NFR BLD AUTO: 0.5 %
INR PPP: 1.1
LYMPHOCYTES # BLD AUTO: 0.5 K/UL
LYMPHOCYTES # BLD AUTO: 0.8 K/UL
LYMPHOCYTES NFR BLD: 4.1 %
LYMPHOCYTES NFR BLD: 6.9 %
MCH RBC QN AUTO: 29.6 PG
MCH RBC QN AUTO: 29.7 PG
MCHC RBC AUTO-ENTMCNC: 34.7 G/DL
MCHC RBC AUTO-ENTMCNC: 34.8 G/DL
MCV RBC AUTO: 85 FL
MCV RBC AUTO: 85 FL
MONOCYTES # BLD AUTO: 0.6 K/UL
MONOCYTES # BLD AUTO: 0.8 K/UL
MONOCYTES NFR BLD: 5.3 %
MONOCYTES NFR BLD: 6.7 %
NEUTROPHILS # BLD AUTO: 10.3 K/UL
NEUTROPHILS # BLD AUTO: 10.8 K/UL
NEUTROPHILS NFR BLD: 85.8 %
NEUTROPHILS NFR BLD: 90.1 %
NRBC BLD-RTO: 0 /100 WBC
NRBC BLD-RTO: 0 /100 WBC
PLATELET # BLD AUTO: 204 K/UL
PLATELET # BLD AUTO: 216 K/UL
PMV BLD AUTO: 9 FL
PMV BLD AUTO: 9.1 FL
POTASSIUM SERPL-SCNC: 4.3 MMOL/L
POTASSIUM SERPL-SCNC: 4.5 MMOL/L
PROT SERPL-MCNC: 6 G/DL
PROT SERPL-MCNC: 6.3 G/DL
PROTHROMBIN TIME: 11.3 SEC
RBC # BLD AUTO: 4.71 M/UL
RBC # BLD AUTO: 5 M/UL
SODIUM SERPL-SCNC: 135 MMOL/L
SODIUM SERPL-SCNC: 137 MMOL/L
WBC # BLD AUTO: 11.97 K/UL
WBC # BLD AUTO: 12 K/UL

## 2017-11-21 PROCEDURE — 85610 PROTHROMBIN TIME: CPT

## 2017-11-21 PROCEDURE — 80053 COMPREHEN METABOLIC PANEL: CPT

## 2017-11-21 PROCEDURE — 99223 1ST HOSP IP/OBS HIGH 75: CPT | Mod: AI,,, | Performed by: COLON & RECTAL SURGERY

## 2017-11-21 PROCEDURE — 85025 COMPLETE CBC W/AUTO DIFF WBC: CPT

## 2017-11-21 PROCEDURE — S0030 INJECTION, METRONIDAZOLE: HCPCS | Performed by: STUDENT IN AN ORGANIZED HEALTH CARE EDUCATION/TRAINING PROGRAM

## 2017-11-21 PROCEDURE — 63600175 PHARM REV CODE 636 W HCPCS: Performed by: EMERGENCY MEDICINE

## 2017-11-21 PROCEDURE — 11000001 HC ACUTE MED/SURG PRIVATE ROOM

## 2017-11-21 PROCEDURE — 36415 COLL VENOUS BLD VENIPUNCTURE: CPT

## 2017-11-21 PROCEDURE — 63600175 PHARM REV CODE 636 W HCPCS: Performed by: STUDENT IN AN ORGANIZED HEALTH CARE EDUCATION/TRAINING PROGRAM

## 2017-11-21 PROCEDURE — 96365 THER/PROPH/DIAG IV INF INIT: CPT

## 2017-11-21 PROCEDURE — 80053 COMPREHEN METABOLIC PANEL: CPT | Mod: 91

## 2017-11-21 PROCEDURE — 85730 THROMBOPLASTIN TIME PARTIAL: CPT

## 2017-11-21 PROCEDURE — 25000003 PHARM REV CODE 250: Performed by: COLON & RECTAL SURGERY

## 2017-11-21 PROCEDURE — 25000003 PHARM REV CODE 250: Performed by: EMERGENCY MEDICINE

## 2017-11-21 PROCEDURE — 25000003 PHARM REV CODE 250: Performed by: STUDENT IN AN ORGANIZED HEALTH CARE EDUCATION/TRAINING PROGRAM

## 2017-11-21 PROCEDURE — 99223 1ST HOSP IP/OBS HIGH 75: CPT | Mod: ,,, | Performed by: INTERNAL MEDICINE

## 2017-11-21 RX ORDER — HYDROMORPHONE HYDROCHLORIDE 1 MG/ML
1 INJECTION, SOLUTION INTRAMUSCULAR; INTRAVENOUS; SUBCUTANEOUS EVERY 4 HOURS PRN
Status: DISCONTINUED | OUTPATIENT
Start: 2017-11-21 | End: 2017-11-24 | Stop reason: HOSPADM

## 2017-11-21 RX ORDER — ONDANSETRON 2 MG/ML
4 INJECTION INTRAMUSCULAR; INTRAVENOUS EVERY 6 HOURS PRN
Status: DISCONTINUED | OUTPATIENT
Start: 2017-11-21 | End: 2017-11-24 | Stop reason: HOSPADM

## 2017-11-21 RX ORDER — METRONIDAZOLE 500 MG/100ML
500 INJECTION, SOLUTION INTRAVENOUS
Status: DISCONTINUED | OUTPATIENT
Start: 2017-11-21 | End: 2017-11-23

## 2017-11-21 RX ORDER — HYDROMORPHONE HYDROCHLORIDE 1 MG/ML
0.5 INJECTION, SOLUTION INTRAMUSCULAR; INTRAVENOUS; SUBCUTANEOUS EVERY 4 HOURS PRN
Status: DISCONTINUED | OUTPATIENT
Start: 2017-11-21 | End: 2017-11-24 | Stop reason: HOSPADM

## 2017-11-21 RX ORDER — SODIUM CHLORIDE 0.9 % (FLUSH) 0.9 %
3 SYRINGE (ML) INJECTION
Status: DISCONTINUED | OUTPATIENT
Start: 2017-11-21 | End: 2017-11-24 | Stop reason: HOSPADM

## 2017-11-21 RX ORDER — DIPHENHYDRAMINE HYDROCHLORIDE 50 MG/ML
25 INJECTION INTRAMUSCULAR; INTRAVENOUS EVERY 4 HOURS PRN
Status: DISCONTINUED | OUTPATIENT
Start: 2017-11-21 | End: 2017-11-24 | Stop reason: HOSPADM

## 2017-11-21 RX ORDER — SODIUM CHLORIDE, SODIUM LACTATE, POTASSIUM CHLORIDE, CALCIUM CHLORIDE 600; 310; 30; 20 MG/100ML; MG/100ML; MG/100ML; MG/100ML
INJECTION, SOLUTION INTRAVENOUS CONTINUOUS
Status: DISCONTINUED | OUTPATIENT
Start: 2017-11-21 | End: 2017-11-24

## 2017-11-21 RX ORDER — CIPROFLOXACIN 2 MG/ML
400 INJECTION, SOLUTION INTRAVENOUS
Status: DISCONTINUED | OUTPATIENT
Start: 2017-11-21 | End: 2017-11-23

## 2017-11-21 RX ADMIN — HYDROMORPHONE HYDROCHLORIDE 1 MG: 1 INJECTION, SOLUTION INTRAMUSCULAR; INTRAVENOUS; SUBCUTANEOUS at 08:11

## 2017-11-21 RX ADMIN — PIPERACILLIN AND TAZOBACTAM 4.5 G: 4; .5 INJECTION, POWDER, LYOPHILIZED, FOR SOLUTION INTRAVENOUS; PARENTERAL at 12:11

## 2017-11-21 RX ADMIN — METRONIDAZOLE 500 MG: 500 INJECTION, SOLUTION INTRAVENOUS at 05:11

## 2017-11-21 RX ADMIN — METRONIDAZOLE 500 MG: 500 INJECTION, SOLUTION INTRAVENOUS at 06:11

## 2017-11-21 RX ADMIN — HYDROMORPHONE HYDROCHLORIDE 1 MG: 1 INJECTION, SOLUTION INTRAMUSCULAR; INTRAVENOUS; SUBCUTANEOUS at 03:11

## 2017-11-21 RX ADMIN — HYDROMORPHONE HYDROCHLORIDE 1 MG: 1 INJECTION, SOLUTION INTRAMUSCULAR; INTRAVENOUS; SUBCUTANEOUS at 01:11

## 2017-11-21 RX ADMIN — SODIUM CHLORIDE, SODIUM LACTATE, POTASSIUM CHLORIDE, AND CALCIUM CHLORIDE 1000 ML: .6; .31; .03; .02 INJECTION, SOLUTION INTRAVENOUS at 05:11

## 2017-11-21 RX ADMIN — CIPROFLOXACIN 400 MG: 2 INJECTION, SOLUTION INTRAVENOUS at 03:11

## 2017-11-21 RX ADMIN — HYDROMORPHONE HYDROCHLORIDE 1 MG: 1 INJECTION, SOLUTION INTRAMUSCULAR; INTRAVENOUS; SUBCUTANEOUS at 06:11

## 2017-11-21 RX ADMIN — SODIUM CHLORIDE, SODIUM LACTATE, POTASSIUM CHLORIDE, AND CALCIUM CHLORIDE: .6; .31; .03; .02 INJECTION, SOLUTION INTRAVENOUS at 03:11

## 2017-11-21 RX ADMIN — CIPROFLOXACIN 400 MG: 2 INJECTION, SOLUTION INTRAVENOUS at 05:11

## 2017-11-21 RX ADMIN — METRONIDAZOLE 500 MG: 500 INJECTION, SOLUTION INTRAVENOUS at 11:11

## 2017-11-21 NOTE — PLAN OF CARE
Problem: Patient Care Overview  Goal: Plan of Care Review  Outcome: Ongoing (interventions implemented as appropriate)  AAO x 4, VSS, no falls noted, fall precautions remain, skin intact, pain assessed, no pain noted, call light within reach, bed wheels locked, bed in lowest position, side rails x 2, safety maintained, NADN, will continue to monitor.

## 2017-11-21 NOTE — HPI
Mr. Lion is a 47 year old male with Parada syndrome and colon cancer s/p right hemicolectomy with ileocolonic anastomosis in 2008 who was transferred to [facility] after presenting to his local ED with abdominal pain following a colonoscopy.    The patient underwent a recent colonoscopy on 9/20/17 showed an area of nodular mucosa at the ileocolonic anastomosis. Biopsies revealed low-grade dysplasia. The patient underwent a repeat colonoscopy yesterday with EMR of a 15mm section at the ileocolonic anastomosis.    The patient reports having increasing abdominal pain since he got home yesterday at 6pm. The pain prompted him to go to his local ED in [place] where CT abdomen was obtained with concerns for pneumoperitoneum.     His abdominal pain is now improved. No nausea or vomiting. No fevers, chills, rigors.

## 2017-11-21 NOTE — PLAN OF CARE
Problem: Fall Risk (Adult)  Intervention: Patient Rounds  Pt resting in bed, up ad victor manuel. NPO. Patient states adequate pain control with current pain med reg. States understanding of plan of care. Denies needs at present. Safety and fall precautions maintained. Will monitor.

## 2017-11-21 NOTE — H&P
[facility]  General Surgery  History & Physical    Patient Name: Lyndon Lion Jr.  MRN: [id number]  Admission Date: 11/21/2017  Attending Physician: Arvin Sofia MD   Primary Care Provider: Evan Judd MD    Patient information was obtained from patient, spouse/SO and ER records.     Subjective:     Chief Complaint/Reason for Admission: Abdominal pain    History of Present Illness:  Mr. 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 He has been receiving surveillance colonoscopies. Recent colonoscopy on 9/20/17 showed an area of nodular mucosa at the ileocolonic anastomosis. Biopsies revealed low-grade dysplasia. On 11/20/17, he had a colonoscopy and snare mucosal resection of the lesion. The patient reports having increasing abdominal pain since he got home at 6pm. The pain prompted him to go to his local ED in [place] where CT abdomen was obtained with concerns for pneumoperitoneum.   His abdominal pain is now improved. No nausea or vomiting.     Current Facility-Administered Medications on File Prior to Encounter   Medication    [COMPLETED] HYDROmorphone injection 1 mg    [COMPLETED] HYDROmorphone injection 1 mg    [COMPLETED] omnipaque 350 iohexol 100 mL    [COMPLETED] ondansetron injection 8 mg    [COMPLETED] piperacillin-tazobactam 4.5 g in dextrose 5 % 100 mL IVPB (ready to mix system)    [COMPLETED] sodium chloride 0.9% bolus 1,000 mL    [DISCONTINUED] 0.9%  NaCl infusion    [DISCONTINUED] 0.9%  NaCl infusion    [DISCONTINUED] 0.9%  NaCl infusion    [DISCONTINUED] ephedrine injection    [DISCONTINUED] fentaNYL injection    [DISCONTINUED] lidocaine (cardiac) injection    [DISCONTINUED] omnipaque 350 iohexol 350 mg iodine/mL    [DISCONTINUED] propofol (DIPRIVAN) 10 mg/mL infusion    [DISCONTINUED] propofol (DIPRIVAN) 10 mg/mL infusion    [DISCONTINUED] sodium chloride 0.9% flush 3 mL      Current Outpatient Prescriptions on File Prior to Encounter   Medication Sig    cholecalciferol, vitamin D3, (VITAMIN D3) 5,000 unit Tab Take 5,000 Units by mouth once daily.    cyclobenzaprine (FLEXERIL) 10 MG tablet Take 1 tablet (10 mg total) by mouth 2 (two) times daily as needed for Muscle spasms.    diclofenac sodium 1 % Gel     diclofenac sodium 1 % Gel APPLY 2 GRAMS EXTERNALLY TO THE AFFECTED AREA FOUR TIMES DAILY    doxycycline (VIBRAMYCIN) 100 MG Cap Take 1 capsule (100 mg total) by mouth once daily.    ergocalciferol (ERGOCALCIFEROL) 50,000 unit Cap Take 1 capsule (50,000 Units total) by mouth every 7 days.    metronidazole (METROGEL) 0.75 % gel AAA nose BID    oxycodone-acetaminophen (PERCOCET)  mg per tablet Take 1 tablet by mouth every 6 to 8 hours as needed for Pain.       Review of patient's allergies indicates:  No Known Allergies    Past Medical History:   Diagnosis Date    Arthritis     Asthma     AS A CHILD    Colon cancer     Parada syndrome     Vitamin D deficiency      Past Surgical History:   Procedure Laterality Date    APPENDECTOMY      COLON SURGERY  2008    PARTIAL COLECTOMY    COLONOSCOPY Bilateral 2012    COLONOSCOPY N/A 8/1/2016    Procedure: COLONOSCOPY;  Surgeon: Blaze Marr MD;  Location: [facility];  Service: Endoscopy;  Laterality: N/A;    COLONOSCOPY N/A 9/20/2017    Procedure: COLONOSCOPY;  Surgeon: Dom Leonardo MD;  Location: [address]);  Service: Endoscopy;  Laterality: N/A;  not given PM prep    COLONOSCOPY N/A 10/9/2017    Procedure: COLONOSCOPY;  Surgeon: RAMON Callahan MD;  Location: [address]);  Service: Endoscopy;  Laterality: N/A;  ok for Prepopik per Dr Callahan    Epidural Steroid Injection  10/23/15    Lumbar    ESOPHAGOGASTRODUODENOSCOPY      GASTRIC BYPASS  2010    SLEEVE    KNEE ARTHROSCOPY Right      Family History     Problem Relation (Age of Onset)    Cancer Maternal Uncle, Maternal Grandfather     Diabetes Father    Heart disease Father    Melanoma Mother        Social History Main Topics    Smoking status: Never Smoker    Smokeless tobacco: Never Used    Alcohol use Yes      Comment: RARELY    Drug use: No    Sexual activity: Not on file     Review of Systems  Objective:     Vital Signs (Most Recent):  Temp: 99.6 °F (37.6 °C) (11/21/17 0208)  Pulse: 88 (11/21/17 0208)  Resp: 18 (11/21/17 0208)  BP: 132/66 (11/21/17 0208)  SpO2: (!) 93 % (11/21/17 0208) Vital Signs (24h Range):  Temp:  [97.7 °F (36.5 °C)-99.6 °F (37.6 °C)] 99.6 °F (37.6 °C)  Pulse:  [61-88] 88  Resp:  [16-19] 18  SpO2:  [93 %-100 %] 93 %  BP: (109-132)/(55-73) 132/66     Weight: 98 kg (216 lb)  Body mass index is 28.5 kg/m².    Physical Exam    Significant Labs:  CBC:   Recent Labs  Lab 11/20/17 2125   WBC 10.70   RBC 5.35   HGB 16.2   HCT 47.0      MCV 88   MCH 30.2   MCHC 34.3     BMP:   Recent Labs  Lab 11/20/17 2125   *      K 4.2      CO2 22*   BUN 13   CREATININE 1.1   CALCIUM 8.8     LFTs:   Recent Labs  Lab 11/20/17 2125   ALT 22   AST 16   ALKPHOS 48*   BILITOT 1.0   PROT 6.7   ALBUMIN 3.8       Significant Diagnostics:  I have reviewed all pertinent imaging results/findings within the past 24 hours.    Assessment/Plan:     47 year old male with Parada syndrome s/p right hemicolectomy in 2008 and colonoscopy with mucosal resection for mucosal dysplasia at the ileocolonic anastomosis on 11/20/17    - CT abd/pelvis with small amount of pneumoperitoneum concerning for perforation. Clinically, no peritonitis on exam at this time  - Admit to CRS  - Will obtain labs now  - NPO  - LR @ 125  - Cipro, Flagyl   - Discussed with CRS Fellow    Pineda Chowdary MD  General Surgery  [facility]    CRS Fellow Addendum  Agree with note as above with exceptions as noted below:    Lyndon Lion Jr. is a 47 y.o. male with LS s/p right colectomy in 2008. Now presented for snare mucosal resection for  dysplasia at anastomosis. Noted pain after arriving home and eating; started in back and localized to RLQ. Took a percocet with minimal relief and came to ED. Denies fevers/chills or blood in stool. CT at [facility] revealed localized free air and some fat stranding and patient was transferred to [facility] for escalation of care. Pain now controlled with dilaudid.    Admit to CRS  No plans for emergent operation at this time  Pain control as needed  Serial exams  NPO/IVF  Antibiotics    Discussed with Dr. Kimberlyn Sweet

## 2017-11-21 NOTE — PLAN OF CARE
CM met with patient and his wife, obtained discharge planning assessment information.    Pt receiving IVF's and IV antibiotics.  NPO.    CM will continue to follow    PCP  - Evan Judd MD     [facility] [address] [place] [address] AT [facility] [address] & [facility]  [address] [facility] [address]  Phone: [phone] Fax: [phone]    Payor - Payor: [employer]ET[employer] / Plan: [employer] CHOICE POS / Product Type: Commercial /         11/21/17 0846   Discharge Assessment   Assessment Type Discharge Planning Assessment   Confirmed/corrected address and phone number on facesheet? Yes   Assessment information obtained from? Patient;Caregiver;Medical Record   Prior to hospitilization cognitive status: Alert/Oriented   Prior to hospitalization functional status: Independent   Current cognitive status: Alert/Oriented   Current Functional Status: Independent   Lives With spouse   Able to Return to Prior Arrangements yes   Is patient able to care for self after discharge? Yes   Who are your caregiver(s) and their phone number(s)? Wife, Katheryn Lion,  [phone]     Patient's perception of discharge disposition home or selfcare   Readmission Within The Last 30 Days no previous admission in last 30 days   Patient currently being followed by outpatient case management? No   Patient currently receives any other outside agency services? No   Equipment Currently Used at Home none   Do you have any problems affording any of your prescribed medications? No   Is the patient taking medications as prescribed? yes   Does the patient have transportation home? Yes   Transportation Available family or friend will provide   Does the patient receive services at the [facility]mad[facility] Clinic? No   Discharge Plan A Home with family   Discharge Plan B Home Health;Home with family   Patient/Family In Agreement With Plan yes

## 2017-11-21 NOTE — CONSULTS
[facility]  Advanced Endoscopy Service  Consult Note    Patient Name: Lyndon Lion Jr.  MRN: [id number]  Admission Date: 11/21/2017  Hospital Length of Stay: 0 days  Code Status: Full Code   Attending Provider: Arvin Sofia MD   Consulting Provider: Charles Salgado MD  Primary Care Physician: Evan Judd MD  Principal Problem:<principal problem not specified>    Inpatient consult to Advanced Endoscopy Service ([facility])  Consult performed by: CHARLSE SALGADO  Consult ordered by: CHARLES SALGADO        Subjective:     HPI:  Mr. Lion is a 47 year old male with Parada syndrome and colon cancer s/p right hemicolectomy with ileocolonic anastomosis in 2008 who was transferred to [facility] after presenting to his local ED with abdominal pain following a colonoscopy.    The patient underwent a recent colonoscopy on 9/20/17 showed an area of nodular mucosa at the ileocolonic anastomosis. Biopsies revealed low-grade dysplasia. The patient underwent a repeat colonoscopy yesterday with EMR of a 15mm section at the ileocolonic anastomosis.    The patient reports having increasing abdominal pain since he got home yesterday at 6pm. The pain prompted him to go to his local ED in [place] where CT abdomen was obtained with concerns for pneumoperitoneum.     His abdominal pain is now improved. No nausea or vomiting. No fevers, chills, rigors.     Past Medical History:   Diagnosis Date    Arthritis     Asthma     AS A CHILD    Colon cancer     Parada syndrome     Vitamin D deficiency        Past Surgical History:   Procedure Laterality Date    APPENDECTOMY      COLON SURGERY  2008    PARTIAL COLECTOMY    COLONOSCOPY Bilateral 2012    COLONOSCOPY N/A 8/1/2016    Procedure: COLONOSCOPY;  Surgeon: Blaze Marr MD;  Location: [facility];  Service: Endoscopy;  Laterality: N/A;    COLONOSCOPY N/A 9/20/2017    Procedure: COLONOSCOPY;  Surgeon: Dom Leonardo MD;  Location: [facility] ([address] [place] [address]);  Service:  Endoscopy;  Laterality: N/A;  not given PM prep    COLONOSCOPY N/A 10/9/2017    Procedure: COLONOSCOPY;  Surgeon: RAMON Callahan MD;  Location: [facility] ([address] [place] [address]);  Service: Endoscopy;  Laterality: N/A;  ok for Prepopik per Dr Callahan    Epidural Steroid Injection  10/23/15    Lumbar    ESOPHAGOGASTRODUODENOSCOPY      GASTRIC BYPASS  2010    SLEEVE    KNEE ARTHROSCOPY Right        Review of patient's allergies indicates:  No Known Allergies  Family History     Problem Relation (Age of Onset)    Cancer Maternal Uncle, Maternal Grandfather    Diabetes Father    Heart disease Father    Melanoma Mother        Social History Main Topics    Smoking status: Never Smoker    Smokeless tobacco: Never Used    Alcohol use Yes      Comment: RARELY    Drug use: No    Sexual activity: Not on file     Review of Systems   Constitutional: Negative for chills, diaphoresis, fatigue, fever and unexpected weight change.   HENT: Negative for trouble swallowing and voice change.    Respiratory: Negative for cough, choking and shortness of breath.    Cardiovascular: Negative for chest pain and leg swelling.   Gastrointestinal: Positive for abdominal pain. Negative for abdominal distention, anal bleeding, blood in stool, constipation, diarrhea, nausea, rectal pain and vomiting.   Genitourinary: Negative for dysuria and flank pain.   Musculoskeletal: Negative.    Skin: Negative for color change and pallor.   Neurological: Negative for dizziness and tremors.   Psychiatric/Behavioral: Negative for confusion and hallucinations.     Objective:     Vital Signs (Most Recent):  Temp: 99.6 °F (37.6 °C) (11/21/17 0208)  Pulse: 88 (11/21/17 0208)  Resp: 18 (11/21/17 0208)  BP: 132/66 (11/21/17 0208)  SpO2: (!) 93 % (11/21/17 0208) Vital Signs (24h Range):  Temp:  [97.7 °F (36.5 °C)-99.6 °F (37.6 °C)] 99.6 °F (37.6 °C)  Pulse:  [61-88] 88  Resp:  [16-19] 18  SpO2:  [93 %-100 %] 93 %  BP: (109-132)/(55-73) 132/66     Weight: 98 kg (216 lb)  (11/21/17 0157)  Body mass index is 28.5 kg/m².    No intake or output data in the 24 hours ending 11/21/17 0839    Lines/Drains/Airways     Peripheral Intravenous Line                 Peripheral IV - Single Lumen 11/20/17 2127 Right Hand less than 1 day                Physical Exam   Constitutional: He is oriented to person, place, and time. He appears well-developed and well-nourished. No distress.   HENT:   Mouth/Throat: Oropharynx is clear and moist. No oropharyngeal exudate.   Eyes: Conjunctivae are normal. No scleral icterus.   Neck: Neck supple. No tracheal deviation present.   Cardiovascular: Normal rate, regular rhythm and normal heart sounds.  Exam reveals no gallop and no friction rub.    No murmur heard.  Pulmonary/Chest: Effort normal and breath sounds normal. No respiratory distress. He has no wheezes. He has no rales.   Abdominal: Soft. Bowel sounds are normal. He exhibits no distension and no mass. There is no tenderness. There is no rebound and no guarding. No hernia.   Neurological: He is alert and oriented to person, place, and time.   Skin: Skin is warm and dry. He is not diaphoretic.       Significant Labs:  CBC:   Recent Labs  Lab 11/20/17 2125 11/21/17 0333   WBC 10.70 12.00   HGB 16.2 14.8   HCT 47.0 42.7    216     CMP:   Recent Labs  Lab 11/21/17 0333   *   CALCIUM 8.5*   ALBUMIN 3.4*   PROT 6.3   *   K 4.5   CO2 23      BUN 15   CREATININE 1.3   ALKPHOS 46*   ALT 15   AST 12   BILITOT 1.5*     Coagulation:   Recent Labs  Lab 11/21/17 0333   INR 1.1   APTT 25.3       Significant Imaging:  Imaging results within the past 24 hours have been reviewed.    Assessment/Plan:     Postpolypectomy electrocoagulation syndrome    Clinical scenario most likely consistent with postpolypectomy syndrome.  Unlikely to have a large underlying mucosal defect given relative clinical stability.    Continue supportive care  Continue Abx  Continue IVF  Continue analgesia                 Thank you for your consult. I will follow-up with patient. Please contact us if you have any additional questions.    Gucci Malagon   Gastroenterology Fellow PGY VI  [phone]

## 2017-11-21 NOTE — NURSING
Report received ,care assumed, patient arrived via stretcher AAO x4. Pt lying supine in bed, Pt denies pain or any other concerns at this time. See assessment. Patient oriented to room. Bed in lowest position, side rails up x 2, bed wheels locked and call light within reach, NADK, will continue to monitor.

## 2017-11-21 NOTE — ASSESSMENT & PLAN NOTE
Clinical scenario most likely consistent with postpolypectomy syndrome.  Unlikely to have a large underlying mucosal defect given relative clinical stability.    Continue supportive care  Continue Abx  Continue IVF  Continue analgesia

## 2017-11-21 NOTE — SUBJECTIVE & OBJECTIVE
Past Medical History:   Diagnosis Date    Arthritis     Asthma     AS A CHILD    Colon cancer     Parada syndrome     Vitamin D deficiency        Past Surgical History:   Procedure Laterality Date    APPENDECTOMY      COLON SURGERY  2008    PARTIAL COLECTOMY    COLONOSCOPY Bilateral 2012    COLONOSCOPY N/A 8/1/2016    Procedure: COLONOSCOPY;  Surgeon: Blaze Marr MD;  Location: [facility] ENDO;  Service: Endoscopy;  Laterality: N/A;    COLONOSCOPY N/A 9/20/2017    Procedure: COLONOSCOPY;  Surgeon: Dom Leonardo MD;  Location: [facility] ENDO (4TH FLR);  Service: Endoscopy;  Laterality: N/A;  not given PM prep    COLONOSCOPY N/A 10/9/2017    Procedure: COLONOSCOPY;  Surgeon: RAMON Callahan MD;  Location: [facility] ENDO (4TH FLR);  Service: Endoscopy;  Laterality: N/A;  ok for Prepopik per Dr Callahan    Epidural Steroid Injection  10/23/15    Lumbar    ESOPHAGOGASTRODUODENOSCOPY      GASTRIC BYPASS  2010    SLEEVE    KNEE ARTHROSCOPY Right        Review of patient's allergies indicates:  No Known Allergies  Family History     Problem Relation (Age of Onset)    Cancer Maternal Uncle, Maternal Grandfather    Diabetes Father    Heart disease Father    Melanoma Mother        Social History Main Topics    Smoking status: Never Smoker    Smokeless tobacco: Never Used    Alcohol use Yes      Comment: RARELY    Drug use: No    Sexual activity: Not on file     Review of Systems   Constitutional: Negative for chills, diaphoresis, fatigue, fever and unexpected weight change.   HENT: Negative for trouble swallowing and voice change.    Respiratory: Negative for cough, choking and shortness of breath.    Cardiovascular: Negative for chest pain and leg swelling.   Gastrointestinal: Positive for abdominal pain. Negative for abdominal distention, anal bleeding, blood in stool, constipation, diarrhea, nausea, rectal pain and vomiting.   Genitourinary: Negative for dysuria and flank pain.   Musculoskeletal: Negative.     Skin: Negative for color change and pallor.   Neurological: Negative for dizziness and tremors.   Psychiatric/Behavioral: Negative for confusion and hallucinations.     Objective:     Vital Signs (Most Recent):  Temp: 99.6 °F (37.6 °C) (11/21/17 0208)  Pulse: 88 (11/21/17 0208)  Resp: 18 (11/21/17 0208)  BP: 132/66 (11/21/17 0208)  SpO2: (!) 93 % (11/21/17 0208) Vital Signs (24h Range):  Temp:  [97.7 °F (36.5 °C)-99.6 °F (37.6 °C)] 99.6 °F (37.6 °C)  Pulse:  [61-88] 88  Resp:  [16-19] 18  SpO2:  [93 %-100 %] 93 %  BP: (109-132)/(55-73) 132/66     Weight: 98 kg (216 lb) (11/21/17 0157)  Body mass index is 28.5 kg/m².    No intake or output data in the 24 hours ending 11/21/17 0839    Lines/Drains/Airways     Peripheral Intravenous Line                 Peripheral IV - Single Lumen 11/20/17 2127 Right Hand less than 1 day                Physical Exam   Constitutional: He is oriented to person, place, and time. He appears well-developed and well-nourished. No distress.   HENT:   Mouth/Throat: Oropharynx is clear and moist. No oropharyngeal exudate.   Eyes: Conjunctivae are normal. No scleral icterus.   Neck: Neck supple. No tracheal deviation present.   Cardiovascular: Normal rate, regular rhythm and normal heart sounds.  Exam reveals no gallop and no friction rub.    No murmur heard.  Pulmonary/Chest: Effort normal and breath sounds normal. No respiratory distress. He has no wheezes. He has no rales.   Abdominal: Soft. Bowel sounds are normal. He exhibits no distension and no mass. There is no tenderness. There is no rebound and no guarding. No hernia.   Neurological: He is alert and oriented to person, place, and time.   Skin: Skin is warm and dry. He is not diaphoretic.       Significant Labs:  CBC:   Recent Labs  Lab 11/20/17 2125 11/21/17  0333   WBC 10.70 12.00   HGB 16.2 14.8   HCT 47.0 42.7    216     CMP:   Recent Labs  Lab 11/21/17  0333   *   CALCIUM 8.5*   ALBUMIN 3.4*   PROT 6.3   *    K 4.5   CO2 23      BUN 15   CREATININE 1.3   ALKPHOS 46*   ALT 15   AST 12   BILITOT 1.5*     Coagulation:   Recent Labs  Lab 11/21/17  0333   INR 1.1   APTT 25.3       Significant Imaging:  Imaging results within the past 24 hours have been reviewed.

## 2017-11-21 NOTE — LETTER
November 24, 2017    Lyndon Lion Jr.  [address] [facility] [address]            [address] [facility] [address]  Phone: [phone]  Fax: [phone] November 24, 2017     Patient: Lyndon Lion Jr.   YOB: 1970   Date of Visit: 11/20/2017       To Whom It May Concern:    It is my medical opinion that Lyndon Lion should remain out of work until ______.    If you have any questions or concerns, please don't hesitate to call.    Sincerely,        Reji Epstein MD

## 2017-11-21 NOTE — ED PROVIDER NOTES
"Encounter Date: 11/20/2017    SCRIBE #1 NOTE: I, Stefani Fernandes , am scribing for, and in the presence of,  Dr. Pretty . I have scribed the entire note.       History     Chief Complaint   Patient presents with    Abdominal Pain     Colonoscopy with mucosal resection done and pt now having pain at site that wraps around to the back     11/20/2017  9:13 PM     Chief Complaint: Abdominal pain       The patient is a 47 y.o. male who is presenting with the acute onset of R sided abdominal pain that began x 6 hours ago s/p colonoscopy with mucosal resection. The pt reports that the pain has worsened since its onset, is constant, sever, and "radiates around to his back". Exacerbating factors including palpation and movement. He denies any mitigating factors or relief with Percocet 10 mg. The pt denies recent fevers, emesis, diarrhea, or urinary sx. Pertinent PMHx includes colon CA, Pardaa syndrome. Pertinent past surgical hx includes gastric bypass, colon surgery, appendectomy.             The history is provided by the patient and medical records.     Review of patient's allergies indicates:  No Known Allergies  Past Medical History:   Diagnosis Date    Arthritis     Asthma     AS A CHILD    Colon cancer     Parada syndrome     Vitamin D deficiency      Past Surgical History:   Procedure Laterality Date    APPENDECTOMY      COLON SURGERY  2008    PARTIAL COLECTOMY    COLONOSCOPY Bilateral 2012    COLONOSCOPY N/A 8/1/2016    Procedure: COLONOSCOPY;  Surgeon: Blaze Marr MD;  [facility];  Service: Endoscopy;  Laterality: N/A;    COLONOSCOPY N/A 9/20/2017    Procedure: COLONOSCOPY;  Surgeon: Dom Leonardo MD;  Location: [address]);  Service: Endoscopy;  Laterality: N/A;  not given PM prep    COLONOSCOPY N/A 10/9/2017    Procedure: COLONOSCOPY;  Surgeon: RAMON Callahan MD;  [facility] ([address] [place] [address]);  Service: Endoscopy;  Laterality: N/A;  ok for Prepopik per Dr Callahan    " Epidural Steroid Injection  10/23/15    Lumbar    ESOPHAGOGASTRODUODENOSCOPY      GASTRIC BYPASS  2010    SLEEVE    KNEE ARTHROSCOPY Right      Family History   Problem Relation Age of Onset    Melanoma Mother     Heart disease Father     Diabetes Father     Cancer Maternal Uncle      colon    Cancer Maternal Grandfather      colon ca    Psoriasis Neg Hx     Lupus Neg Hx     Eczema Neg Hx      Social History   Substance Use Topics    Smoking status: Never Smoker    Smokeless tobacco: Never Used    Alcohol use Yes      Comment: RARELY     Review of Systems   Constitutional: Positive for appetite change. Negative for fever.   Respiratory: Negative for shortness of breath.    Cardiovascular: Negative for chest pain.   Gastrointestinal: Positive for abdominal pain.   Musculoskeletal: Positive for back pain.   All other systems reviewed and are negative.      Physical Exam     Initial Vitals [11/20/17 2039]   BP Pulse Resp Temp SpO2   (!) 115/56 68 16 98.7 °F (37.1 °C) 97 %      MAP       75.67         Physical Exam    Nursing note and vitals reviewed.  Constitutional: He appears well-developed and well-nourished. He is not diaphoretic.  Non-toxic appearance. He does not have a sickly appearance. He does not appear ill. No distress.   HENT:   Head: Normocephalic and atraumatic.   Eyes: EOM are normal.   Neck: Normal range of motion. Neck supple. Normal range of motion present. No neck rigidity.   Cardiovascular: Normal rate, regular rhythm and normal heart sounds. Exam reveals no gallop and no friction rub.    No murmur heard.  Pulmonary/Chest: Breath sounds normal. No respiratory distress. He has no wheezes. He has no rhonchi. He has no rales.   Abdominal: Soft. There is tenderness. There is rebound. There is no guarding.   Significant R sided abdominal TTP   Musculoskeletal: Normal range of motion.   Neurological: He is alert and oriented to person, place, and time.   Skin: Skin is warm and dry. No rash  noted.   Psychiatric: He has a normal mood and affect. His behavior is normal. Judgment and thought content normal.         ED Course   Critical Care  Date/Time: 11/22/2017 12:50 PM  Performed by: NAVARRO ROWLAND  Authorized by: NAVARRO ROWLAND   Direct patient critical care time: 13 minutes  Additional history critical care time: 12 minutes  Ordering / reviewing critical care time: 8 minutes  Documentation critical care time: 8 minutes  Consulting other physicians critical care time: 5 minutes  Consult with family critical care time: 5 minutes  Total critical care time (exclusive of procedural time) : 51 minutes  Critical care was necessary to treat or prevent imminent or life-threatening deterioration of the following conditions: pneumoperitoneum.  Critical care was time spent personally by me on the following activities: review of old charts, re-evaluation of patient's condition, pulse oximetry, ordering and review of radiographic studies, ordering and review of laboratory studies, ordering and performing treatments and interventions, obtaining history from patient or surrogate, examination of patient and evaluation of patient's response to treatment.        Labs Reviewed   CBC W/ AUTO DIFFERENTIAL - Abnormal; Notable for the following:        Result Value    MPV 7.2 (*)     Gran # 9.6 (*)     Lymph # 0.5 (*)     Gran% 89.1 (*)     Lymph% 4.4 (*)     All other components within normal limits   COMPREHENSIVE METABOLIC PANEL - Abnormal; Notable for the following:     CO2 22 (*)     Glucose 156 (*)     Alkaline Phosphatase 48 (*)     All other components within normal limits             Medical Decision Making:   History:   I obtained history from: someone other than patient.  Old Medical Records: I decided to obtain old medical records.  Clinical Tests:   Lab Tests: Reviewed and Ordered  Radiological Study: Reviewed and Ordered            Scribe Attestation:   Scribe #1: I performed the above scribed service  and the documentation accurately describes the services I performed. I attest to the accuracy of the note.    I, Dr. Pretty, personally performed the services described in this documentation. All medical record entries made by the scribe were at my direction and in my presence.  I have reviewed the chart and agree that the record reflects my personal performance and is accurate and complete.9:45 PM 11/20/2017            ED Course as of Nov 20 2320   Mon Nov 20, 2017 2221 [facility] called to speak with dr olson, no answer yet, will order CT with contrast to r/o surgical complication given amount of pain patient is having.  [EF]   2229 Case d/w dr aric he, agrees with abdominal ct  [EF]   2234 Patient reports improvement in abdominal pain at this time  [EF]   2317 Radiology calls with preliminary read of free air and likely perforation, dictation should be out soon.  [EF]   2318 [facility] called to speak with GI  [EF]   2318 IMPRESSION:  Small amount of pneumoperitoneum near the enterocolic anastomosis suggesting intestinal  perforation. Mild mesenteric fat stranding is seen in this region.  Jose Pretty was informed of exam results at 11/20/2017 11:16 PM CST.  Thank you for allowing us to participate in the care of your patient.  Dictated and Authenticated by: Jr. Carrillo, MD Ismael  11/20/2017 11:17 PM Central Time ([place] & [place])  [EF]      ED Course User Index  [EF] Jose Pretty MD     Clinical Impression:   There were no encounter diagnoses.          47-year-old male several hours out from a colonoscopy presents to the ER for severe right-sided abdominal pain.  CT demonstrates pneumoperitoneum.  Transferred to [facility], IV antibiotics given in the ER.  Significant improvement in pain on transfer.  No sign of sepsis.  No distress on transfer. Aric he accepts                 Jose Pretty MD  11/22/17 5850

## 2017-11-22 LAB
ALBUMIN SERPL BCP-MCNC: 3 G/DL
ALP SERPL-CCNC: 41 U/L
ALT SERPL W/O P-5'-P-CCNC: 11 U/L
ANION GAP SERPL CALC-SCNC: 6 MMOL/L
AST SERPL-CCNC: 10 U/L
BASOPHILS # BLD AUTO: 0.02 K/UL
BASOPHILS NFR BLD: 0.2 %
BILIRUB SERPL-MCNC: 1.9 MG/DL
BUN SERPL-MCNC: 12 MG/DL
CALCIUM SERPL-MCNC: 8.7 MG/DL
CHLORIDE SERPL-SCNC: 107 MMOL/L
CO2 SERPL-SCNC: 22 MMOL/L
CREAT SERPL-MCNC: 1.1 MG/DL
DIFFERENTIAL METHOD: ABNORMAL
EOSINOPHIL # BLD AUTO: 0 K/UL
EOSINOPHIL NFR BLD: 0.3 %
ERYTHROCYTE [DISTWIDTH] IN BLOOD BY AUTOMATED COUNT: 12.1 %
EST. GFR  (AFRICAN AMERICAN): >60 ML/MIN/1.73 M^2
EST. GFR  (NON AFRICAN AMERICAN): >60 ML/MIN/1.73 M^2
GLUCOSE SERPL-MCNC: 126 MG/DL
HCT VFR BLD AUTO: 38.8 %
HGB BLD-MCNC: 13.9 G/DL
IMM GRANULOCYTES # BLD AUTO: 0.05 K/UL
IMM GRANULOCYTES NFR BLD AUTO: 0.5 %
LYMPHOCYTES # BLD AUTO: 0.9 K/UL
LYMPHOCYTES NFR BLD: 9 %
MCH RBC QN AUTO: 31 PG
MCHC RBC AUTO-ENTMCNC: 35.8 G/DL
MCV RBC AUTO: 86 FL
MONOCYTES # BLD AUTO: 0.6 K/UL
MONOCYTES NFR BLD: 5.6 %
NEUTROPHILS # BLD AUTO: 8.7 K/UL
NEUTROPHILS NFR BLD: 84.4 %
NRBC BLD-RTO: 0 /100 WBC
PLATELET # BLD AUTO: 189 K/UL
PMV BLD AUTO: 9.5 FL
POTASSIUM SERPL-SCNC: 3.9 MMOL/L
PROT SERPL-MCNC: 6.2 G/DL
RBC # BLD AUTO: 4.49 M/UL
SODIUM SERPL-SCNC: 135 MMOL/L
WBC # BLD AUTO: 10.3 K/UL

## 2017-11-22 PROCEDURE — S0030 INJECTION, METRONIDAZOLE: HCPCS | Performed by: STUDENT IN AN ORGANIZED HEALTH CARE EDUCATION/TRAINING PROGRAM

## 2017-11-22 PROCEDURE — 63600175 PHARM REV CODE 636 W HCPCS: Performed by: STUDENT IN AN ORGANIZED HEALTH CARE EDUCATION/TRAINING PROGRAM

## 2017-11-22 PROCEDURE — 85025 COMPLETE CBC W/AUTO DIFF WBC: CPT

## 2017-11-22 PROCEDURE — 11000001 HC ACUTE MED/SURG PRIVATE ROOM

## 2017-11-22 PROCEDURE — 36415 COLL VENOUS BLD VENIPUNCTURE: CPT

## 2017-11-22 PROCEDURE — 25000003 PHARM REV CODE 250: Performed by: STUDENT IN AN ORGANIZED HEALTH CARE EDUCATION/TRAINING PROGRAM

## 2017-11-22 PROCEDURE — 80053 COMPREHEN METABOLIC PANEL: CPT

## 2017-11-22 RX ADMIN — METRONIDAZOLE 500 MG: 500 INJECTION, SOLUTION INTRAVENOUS at 02:11

## 2017-11-22 RX ADMIN — CIPROFLOXACIN 400 MG: 2 INJECTION, SOLUTION INTRAVENOUS at 04:11

## 2017-11-22 RX ADMIN — METRONIDAZOLE 500 MG: 500 INJECTION, SOLUTION INTRAVENOUS at 11:11

## 2017-11-22 RX ADMIN — HYDROMORPHONE HYDROCHLORIDE 1 MG: 1 INJECTION, SOLUTION INTRAMUSCULAR; INTRAVENOUS; SUBCUTANEOUS at 07:11

## 2017-11-22 RX ADMIN — HYDROMORPHONE HYDROCHLORIDE 1 MG: 1 INJECTION, SOLUTION INTRAMUSCULAR; INTRAVENOUS; SUBCUTANEOUS at 11:11

## 2017-11-22 RX ADMIN — HYDROMORPHONE HYDROCHLORIDE 1 MG: 1 INJECTION, SOLUTION INTRAMUSCULAR; INTRAVENOUS; SUBCUTANEOUS at 03:11

## 2017-11-22 RX ADMIN — SODIUM CHLORIDE, SODIUM LACTATE, POTASSIUM CHLORIDE, AND CALCIUM CHLORIDE: .6; .31; .03; .02 INJECTION, SOLUTION INTRAVENOUS at 04:11

## 2017-11-22 RX ADMIN — METRONIDAZOLE 500 MG: 500 INJECTION, SOLUTION INTRAVENOUS at 06:11

## 2017-11-22 NOTE — NURSING
"Pt and wife concerned about decreased urine output and color of urine. Pt has urinated appx 600ml orf melinda urine from 1391-5150. Pt wife concerned stating he "should be pumped with more fluids". Notified fellow and Dr. Sofia. Per MD give 1L bolus of LR.   "

## 2017-11-23 PROCEDURE — 11000001 HC ACUTE MED/SURG PRIVATE ROOM

## 2017-11-23 PROCEDURE — S0030 INJECTION, METRONIDAZOLE: HCPCS | Performed by: STUDENT IN AN ORGANIZED HEALTH CARE EDUCATION/TRAINING PROGRAM

## 2017-11-23 PROCEDURE — 25000003 PHARM REV CODE 250: Performed by: STUDENT IN AN ORGANIZED HEALTH CARE EDUCATION/TRAINING PROGRAM

## 2017-11-23 PROCEDURE — 63600175 PHARM REV CODE 636 W HCPCS: Performed by: STUDENT IN AN ORGANIZED HEALTH CARE EDUCATION/TRAINING PROGRAM

## 2017-11-23 RX ORDER — METRONIDAZOLE 500 MG/1
500 TABLET ORAL EVERY 8 HOURS
Status: DISCONTINUED | OUTPATIENT
Start: 2017-11-23 | End: 2017-11-24 | Stop reason: HOSPADM

## 2017-11-23 RX ORDER — CIPROFLOXACIN 500 MG/1
500 TABLET ORAL EVERY 12 HOURS
Status: DISCONTINUED | OUTPATIENT
Start: 2017-11-23 | End: 2017-11-24 | Stop reason: HOSPADM

## 2017-11-23 RX ADMIN — HYDROMORPHONE HYDROCHLORIDE 1 MG: 1 INJECTION, SOLUTION INTRAMUSCULAR; INTRAVENOUS; SUBCUTANEOUS at 09:11

## 2017-11-23 RX ADMIN — METRONIDAZOLE 500 MG: 500 INJECTION, SOLUTION INTRAVENOUS at 03:11

## 2017-11-23 RX ADMIN — METRONIDAZOLE 500 MG: 500 TABLET ORAL at 09:11

## 2017-11-23 RX ADMIN — METRONIDAZOLE 500 MG: 500 INJECTION, SOLUTION INTRAVENOUS at 11:11

## 2017-11-23 RX ADMIN — CIPROFLOXACIN HYDROCHLORIDE 500 MG: 500 TABLET, FILM COATED ORAL at 09:11

## 2017-11-23 RX ADMIN — SODIUM CHLORIDE, SODIUM LACTATE, POTASSIUM CHLORIDE, AND CALCIUM CHLORIDE: .6; .31; .03; .02 INJECTION, SOLUTION INTRAVENOUS at 03:11

## 2017-11-23 RX ADMIN — HYDROMORPHONE HYDROCHLORIDE 1 MG: 1 INJECTION, SOLUTION INTRAMUSCULAR; INTRAVENOUS; SUBCUTANEOUS at 08:11

## 2017-11-23 RX ADMIN — CIPROFLOXACIN 400 MG: 2 INJECTION, SOLUTION INTRAVENOUS at 05:11

## 2017-11-23 RX ADMIN — CIPROFLOXACIN 400 MG: 2 INJECTION, SOLUTION INTRAVENOUS at 04:11

## 2017-11-23 NOTE — ASSESSMENT & PLAN NOTE
S/p colonoscopic resection of lesion at ileocolic anastomosis (done for R colon cancer and known Parada Syndrome). Appears to be improving with conservative management.     - Continue NPO  - Continue IVF, IV antibiotics  - Continue to trend CBC     If pain and WBC improved tomorrow, will trial diet

## 2017-11-23 NOTE — PLAN OF CARE
Problem: Pain, Acute (Adult)  Intervention: Mutually Develop/Implement Acute Pain Management Plan   11/22/17 8443   Pain/Comfort/Sleep Interventions   Pain Management Interventions pain management plan reviewed with patient/caregiver;breathing exercises;relaxation promoted   Cognitive Interventions   Sensory Stimulation Regulation quiet environment promoted;care clustered

## 2017-11-23 NOTE — SUBJECTIVE & OBJECTIVE
Subjective:     Interval History: BAKARI overnight. Pain improved. Not requiring pain medication. WBC continues to trend down.      Post-Op Info:  * No surgery found *          Medications:  Continuous Infusions:   lactated ringers 125 mL/hr at 11/23/17 0338     Scheduled Meds:   ciprofloxacin  400 mg Intravenous Q12H    metronidazole  500 mg Intravenous Q8H     PRN Meds:   diphenhydrAMINE    HYDROmorphone    HYDROmorphone    ondansetron    promethazine (PHENERGAN) IVPB    sodium chloride 0.9%        Objective:     Vital Signs (Most Recent):  Temp: 97.1 °F (36.2 °C) (11/23/17 0741)  Pulse: 65 (11/23/17 0741)  Resp: 18 (11/23/17 0741)  BP: 128/75 (11/23/17 0741)  SpO2: (!) 94 % (11/23/17 0741) Vital Signs (24h Range):  Temp:  [96.3 °F (35.7 °C)-98.9 °F (37.2 °C)] 97.1 °F (36.2 °C)  Pulse:  [65-75] 65  Resp:  [16-20] 18  SpO2:  [93 %-97 %] 94 %  BP: (120-150)/(58-82) 128/75     Intake/Output - Last 3 Shifts       11/21 0700 - 11/22 0659 11/22 0700 - 11/23 0659 11/23 0700 - 11/24 0659    P.O. 0 0     I.V. (mL/kg) 3804 (38.8) 1200 (12.2)     IV Piggyback 300      Total Intake(mL/kg) 4104 (41.9) 1200 (12.2)     Urine (mL/kg/hr) 1500 (0.6)      Stool 0 (0)      Total Output 1500        Net +2604 +1200             Urine Occurrence 1 x 5 x     Stool Occurrence 0 x 0 x     Emesis Occurrence  0 x           Physical Exam   Constitutional: He is oriented to person, place, and time. He appears well-developed and well-nourished.   HENT:   Head: Normocephalic and atraumatic.   Eyes: EOM are normal.   Cardiovascular: Normal rate.    Pulmonary/Chest: Effort normal.   Abdominal: Soft. He exhibits no distension and no mass. There is tenderness (focally in RUQ- much improved). There is no rebound and no guarding. No hernia.   Previous transverse incision in RUQ   Musculoskeletal: Normal range of motion.   Neurological: He is alert and oriented to person, place, and time.   Skin: Skin is warm. Capillary refill takes less than 2  seconds.   Psychiatric: He has a normal mood and affect. His behavior is normal.   Nursing note and vitals reviewed.    Significant Labs:  BMP (Last 3 Results):     Recent Labs  Lab 11/21/17 0333 11/21/17  1314 11/22/17  0705   * 122* 126*   * 137 135*   K 4.5 4.3 3.9    106 107   CO2 23 25 22*   BUN 15 13 12   CREATININE 1.3 1.3 1.1   CALCIUM 8.5* 8.5* 8.7     CBC (Last 3 Results):     Recent Labs  Lab 11/21/17 0333 11/21/17  1314 11/22/17  0705   WBC 12.00 11.97 10.30   RBC 5.00 4.71 4.49*   HGB 14.8 14.0 13.9*   HCT 42.7 40.2 38.8*    204 189   MCV 85 85 86   MCH 29.6 29.7 31.0   MCHC 34.7 34.8 35.8       Significant Diagnostics:  None

## 2017-11-23 NOTE — PROGRESS NOTES
[facility]  Colorectal Surgery  Progress Note    Patient Name: Lyndon Lion Jr.  MRN: [id number]  Admission Date: 11/21/2017  Hospital Length of Stay: 2 days  Attending Physician: Arvin Sofia MD    Subjective:     Interval History: BAKARI overnight. Pain improved. Not requiring pain medication. WBC continues to trend down.      Post-Op Info:  * No surgery found *          Medications:  Continuous Infusions:   lactated ringers 125 mL/hr at 11/23/17 0338     Scheduled Meds:   ciprofloxacin  400 mg Intravenous Q12H    metronidazole  500 mg Intravenous Q8H     PRN Meds:   diphenhydrAMINE    HYDROmorphone    HYDROmorphone    ondansetron    promethazine (PHENERGAN) IVPB    sodium chloride 0.9%        Objective:     Vital Signs (Most Recent):  Temp: 97.1 °F (36.2 °C) (11/23/17 0741)  Pulse: 65 (11/23/17 0741)  Resp: 18 (11/23/17 0741)  BP: 128/75 (11/23/17 0741)  SpO2: (!) 94 % (11/23/17 0741) Vital Signs (24h Range):  Temp:  [96.3 °F (35.7 °C)-98.9 °F (37.2 °C)] 97.1 °F (36.2 °C)  Pulse:  [65-75] 65  Resp:  [16-20] 18  SpO2:  [93 %-97 %] 94 %  BP: (120-150)/(58-82) 128/75     Intake/Output - Last 3 Shifts       11/21 0700 - 11/22 0659 11/22 0700 - 11/23 0659 11/23 0700 - 11/24 0659    P.O. 0 0     I.V. (mL/kg) 3804 (38.8) 1200 (12.2)     IV Piggyback 300      Total Intake(mL/kg) 4104 (41.9) 1200 (12.2)     Urine (mL/kg/hr) 1500 (0.6)      Stool 0 (0)      Total Output 1500        Net +2604 +1200             Urine Occurrence 1 x 5 x     Stool Occurrence 0 x 0 x     Emesis Occurrence  0 x           Physical Exam   Constitutional: He is oriented to person, place, and time. He appears well-developed and well-nourished.   HENT:   Head: Normocephalic and atraumatic.   Eyes: EOM are normal.   Cardiovascular: Normal rate.    Pulmonary/Chest: Effort normal.   Abdominal: Soft. He exhibits no distension and no mass. There is tenderness (focally in RUQ- much improved). There is no rebound and no  guarding. No hernia.   Previous transverse incision in RUQ   Musculoskeletal: Normal range of motion.   Neurological: He is alert and oriented to person, place, and time.   Skin: Skin is warm. Capillary refill takes less than 2 seconds.   Psychiatric: He has a normal mood and affect. His behavior is normal.   Nursing note and vitals reviewed.    Significant Labs:  BMP (Last 3 Results):     Recent Labs  Lab 11/21/17 0333 11/21/17  1314 11/22/17  0705   * 122* 126*   * 137 135*   K 4.5 4.3 3.9    106 107   CO2 23 25 22*   BUN 15 13 12   CREATININE 1.3 1.3 1.1   CALCIUM 8.5* 8.5* 8.7     CBC (Last 3 Results):     Recent Labs  Lab 11/21/17 0333 11/21/17  1314 11/22/17  0705   WBC 12.00 11.97 10.30   RBC 5.00 4.71 4.49*   HGB 14.8 14.0 13.9*   HCT 42.7 40.2 38.8*    204 189   MCV 85 85 86   MCH 29.6 29.7 31.0   MCHC 34.7 34.8 35.8       Significant Diagnostics:  None    Assessment/Plan:     Postpolypectomy electrocoagulation syndrome    S/p colonoscopic resection of lesion at ileocolic anastomosis (done for R colon cancer and known Parada Syndrome). Appears to be improving with conservative management.     - Clear liquid diet this a.m.  - Continue antibiotics  - CBC tomorrow                   Di Jay MD  Colorectal Surgery  [facility]

## 2017-11-23 NOTE — ASSESSMENT & PLAN NOTE
S/p colonoscopic resection of lesion at ileocolic anastomosis (done for R colon cancer and known Parada Syndrome). Appears to be improving with conservative management.     - Clear liquid diet this a.m.  - Continue antibiotics  - CBC tomorrow        Spoke with patient in regards to scheduling surgical consult. He is going to follow up with his referring MD for a referral to a General Surgeon closer to home.

## 2017-11-23 NOTE — PROGRESS NOTES
[facility]  Colorectal Surgery  Progress Note    Patient Name: Lyndon Lion Jr.  MRN: [id number]  Admission Date: 11/21/2017  Hospital Length of Stay: 1 days  Attending Physician: Arvin Sofia MD    Subjective:     Interval History: BAKARI overnight. Reports pain is improved - requiring less pain medications. No nausea or vomiting. Afebrile. WBC trending down today.     Post-Op Info:  * No surgery found *          Medications:  Continuous Infusions:   lactated ringers 125 mL/hr at 11/22/17 1640     Scheduled Meds:   ciprofloxacin  400 mg Intravenous Q12H    metronidazole  500 mg Intravenous Q8H     PRN Meds:   diphenhydrAMINE    HYDROmorphone    HYDROmorphone    ondansetron    promethazine (PHENERGAN) IVPB    sodium chloride 0.9%        Objective:     Vital Signs (Most Recent):  Temp: 98.9 °F (37.2 °C) (11/22/17 1603)  Pulse: 75 (11/22/17 1603)  Resp: 18 (11/22/17 1603)  BP: (!) 142/70 (11/22/17 1603)  SpO2: (!) 93 % (11/22/17 1603) Vital Signs (24h Range):  Temp:  [98 °F (36.7 °C)-98.9 °F (37.2 °C)] 98.9 °F (37.2 °C)  Pulse:  [71-78] 75  Resp:  [16-18] 18  SpO2:  [93 %-97 %] 93 %  BP: (111-142)/(56-70) 142/70     Intake/Output - Last 3 Shifts       11/20 0700 - 11/21 0659 11/21 0700 - 11/22 0659 11/22 0700 - 11/23 0659    P.O.  0     I.V. (mL/kg)  3804 (38.8)     IV Piggyback  300     Total Intake(mL/kg)  4104 (41.9)     Urine (mL/kg/hr)  1500 (0.6)     Stool  0 (0)     Total Output   1500      Net   +2604             Urine Occurrence  1 x     Stool Occurrence  0 x           Physical Exam   Constitutional: He is oriented to person, place, and time. He appears well-developed and well-nourished.   HENT:   Head: Normocephalic and atraumatic.   Eyes: EOM are normal.   Cardiovascular: Normal rate.    Pulmonary/Chest: Effort normal.   Abdominal: Soft. He exhibits no distension and no mass. There is tenderness (focally in RUQ). There is no rebound and no guarding. No hernia.   Previous  transverse incision in RUQ   Musculoskeletal: Normal range of motion.   Neurological: He is alert and oriented to person, place, and time.   Skin: Skin is warm. Capillary refill takes less than 2 seconds.   Psychiatric: He has a normal mood and affect. His behavior is normal.   Nursing note and vitals reviewed.    Significant Labs:  BMP (Last 3 Results):   Recent Labs  Lab 11/21/17 0333 11/21/17  1314 11/22/17  0705   * 122* 126*   * 137 135*   K 4.5 4.3 3.9    106 107   CO2 23 25 22*   BUN 15 13 12   CREATININE 1.3 1.3 1.1   CALCIUM 8.5* 8.5* 8.7     CBC (Last 3 Results):   Recent Labs  Lab 11/21/17 0333 11/21/17  1314 11/22/17  0705   WBC 12.00 11.97 10.30   RBC 5.00 4.71 4.49*   HGB 14.8 14.0 13.9*   HCT 42.7 40.2 38.8*    204 189   MCV 85 85 86   MCH 29.6 29.7 31.0   MCHC 34.7 34.8 35.8       Significant Diagnostics:  None    Assessment/Plan:     Postpolypectomy electrocoagulation syndrome    S/p colonoscopic resection of lesion at ileocolic anastomosis (done for R colon cancer and known Parada Syndrome). Appears to be improving with conservative management.     - Continue NPO  - Continue IVF, IV antibiotics  - Continue to trend CBC     If pain and WBC improved tomorrow, will trial diet               Di Jay MD  Colorectal Surgery  [facility]

## 2017-11-23 NOTE — SUBJECTIVE & OBJECTIVE
Subjective:     Interval History: BAKARI overnight. Reports pain is improved - requiring less pain medications. No nausea or vomiting. Afebrile. WBC trending down today.     Post-Op Info:  * No surgery found *          Medications:  Continuous Infusions:   lactated ringers 125 mL/hr at 11/22/17 1640     Scheduled Meds:   ciprofloxacin  400 mg Intravenous Q12H    metronidazole  500 mg Intravenous Q8H     PRN Meds:   diphenhydrAMINE    HYDROmorphone    HYDROmorphone    ondansetron    promethazine (PHENERGAN) IVPB    sodium chloride 0.9%        Objective:     Vital Signs (Most Recent):  Temp: 98.9 °F (37.2 °C) (11/22/17 1603)  Pulse: 75 (11/22/17 1603)  Resp: 18 (11/22/17 1603)  BP: (!) 142/70 (11/22/17 1603)  SpO2: (!) 93 % (11/22/17 1603) Vital Signs (24h Range):  Temp:  [98 °F (36.7 °C)-98.9 °F (37.2 °C)] 98.9 °F (37.2 °C)  Pulse:  [71-78] 75  Resp:  [16-18] 18  SpO2:  [93 %-97 %] 93 %  BP: (111-142)/(56-70) 142/70     Intake/Output - Last 3 Shifts       11/20 0700 - 11/21 0659 11/21 0700 - 11/22 0659 11/22 0700 - 11/23 0659    P.O.  0     I.V. (mL/kg)  3804 (38.8)     IV Piggyback  300     Total Intake(mL/kg)  4104 (41.9)     Urine (mL/kg/hr)  1500 (0.6)     Stool  0 (0)     Total Output   1500      Net   +2604             Urine Occurrence  1 x     Stool Occurrence  0 x           Physical Exam   Constitutional: He is oriented to person, place, and time. He appears well-developed and well-nourished.   HENT:   Head: Normocephalic and atraumatic.   Eyes: EOM are normal.   Cardiovascular: Normal rate.    Pulmonary/Chest: Effort normal.   Abdominal: Soft. He exhibits no distension and no mass. There is tenderness (focally in RUQ). There is no rebound and no guarding. No hernia.   Previous transverse incision in RUQ   Musculoskeletal: Normal range of motion.   Neurological: He is alert and oriented to person, place, and time.   Skin: Skin is warm. Capillary refill takes less than 2 seconds.   Psychiatric: He has  a normal mood and affect. His behavior is normal.   Nursing note and vitals reviewed.    Significant Labs:  BMP (Last 3 Results):   Recent Labs  Lab 11/21/17  0333 11/21/17  1314 11/22/17  0705   * 122* 126*   * 137 135*   K 4.5 4.3 3.9    106 107   CO2 23 25 22*   BUN 15 13 12   CREATININE 1.3 1.3 1.1   CALCIUM 8.5* 8.5* 8.7     CBC (Last 3 Results):   Recent Labs  Lab 11/21/17  0333 11/21/17  1314 11/22/17  0705   WBC 12.00 11.97 10.30   RBC 5.00 4.71 4.49*   HGB 14.8 14.0 13.9*   HCT 42.7 40.2 38.8*    204 189   MCV 85 85 86   MCH 29.6 29.7 31.0   MCHC 34.7 34.8 35.8       Significant Diagnostics:  None

## 2017-11-23 NOTE — PLAN OF CARE
Problem: Patient Care Overview  Goal: Plan of Care Review  Outcome: Ongoing (interventions implemented as appropriate)  Patient progressing with POC. Pain denied throughout the shift. Vital signs stable. Afebrile. Safety and fall precautions active. Call light in reach. Will continue to monitor per frequent rounding.

## 2017-11-24 VITALS
TEMPERATURE: 97 F | DIASTOLIC BLOOD PRESSURE: 74 MMHG | HEART RATE: 74 BPM | SYSTOLIC BLOOD PRESSURE: 127 MMHG | OXYGEN SATURATION: 95 % | RESPIRATION RATE: 15 BRPM | BODY MASS INDEX: 28.63 KG/M2 | WEIGHT: 216 LBS | HEIGHT: 73 IN

## 2017-11-24 LAB
ALBUMIN SERPL BCP-MCNC: 2.7 G/DL
ALP SERPL-CCNC: 52 U/L
ALT SERPL W/O P-5'-P-CCNC: 6 U/L
ANION GAP SERPL CALC-SCNC: 6 MMOL/L
AST SERPL-CCNC: 11 U/L
BASOPHILS # BLD AUTO: 0.03 K/UL
BASOPHILS NFR BLD: 0.5 %
BILIRUB SERPL-MCNC: 0.9 MG/DL
BUN SERPL-MCNC: 11 MG/DL
CALCIUM SERPL-MCNC: 8.7 MG/DL
CHLORIDE SERPL-SCNC: 107 MMOL/L
CO2 SERPL-SCNC: 24 MMOL/L
CREAT SERPL-MCNC: 1 MG/DL
DIFFERENTIAL METHOD: ABNORMAL
EOSINOPHIL # BLD AUTO: 0.1 K/UL
EOSINOPHIL NFR BLD: 2.4 %
ERYTHROCYTE [DISTWIDTH] IN BLOOD BY AUTOMATED COUNT: 11.9 %
EST. GFR  (AFRICAN AMERICAN): >60 ML/MIN/1.73 M^2
EST. GFR  (NON AFRICAN AMERICAN): >60 ML/MIN/1.73 M^2
GLUCOSE SERPL-MCNC: 91 MG/DL
HCT VFR BLD AUTO: 38.5 %
HGB BLD-MCNC: 13.9 G/DL
IMM GRANULOCYTES # BLD AUTO: 0.02 K/UL
IMM GRANULOCYTES NFR BLD AUTO: 0.4 %
LYMPHOCYTES # BLD AUTO: 1 K/UL
LYMPHOCYTES NFR BLD: 18 %
MCH RBC QN AUTO: 30.5 PG
MCHC RBC AUTO-ENTMCNC: 36.1 G/DL
MCV RBC AUTO: 84 FL
MONOCYTES # BLD AUTO: 0.6 K/UL
MONOCYTES NFR BLD: 10.2 %
NEUTROPHILS # BLD AUTO: 3.8 K/UL
NEUTROPHILS NFR BLD: 68.5 %
NRBC BLD-RTO: 0 /100 WBC
PLATELET # BLD AUTO: 232 K/UL
PMV BLD AUTO: 9.4 FL
POTASSIUM SERPL-SCNC: 3.9 MMOL/L
PROT SERPL-MCNC: 6 G/DL
RBC # BLD AUTO: 4.56 M/UL
SODIUM SERPL-SCNC: 137 MMOL/L
WBC # BLD AUTO: 5.49 K/UL

## 2017-11-24 PROCEDURE — 36415 COLL VENOUS BLD VENIPUNCTURE: CPT

## 2017-11-24 PROCEDURE — 80053 COMPREHEN METABOLIC PANEL: CPT

## 2017-11-24 PROCEDURE — 25000003 PHARM REV CODE 250: Performed by: STUDENT IN AN ORGANIZED HEALTH CARE EDUCATION/TRAINING PROGRAM

## 2017-11-24 PROCEDURE — 63600175 PHARM REV CODE 636 W HCPCS: Performed by: STUDENT IN AN ORGANIZED HEALTH CARE EDUCATION/TRAINING PROGRAM

## 2017-11-24 PROCEDURE — 85025 COMPLETE CBC W/AUTO DIFF WBC: CPT

## 2017-11-24 RX ORDER — CIPROFLOXACIN 500 MG/1
500 TABLET ORAL EVERY 12 HOURS
Qty: 14 TABLET | Refills: 0 | Status: SHIPPED | OUTPATIENT
Start: 2017-11-24 | End: 2017-12-01

## 2017-11-24 RX ORDER — OXYCODONE AND ACETAMINOPHEN 10; 325 MG/1; MG/1
1 TABLET ORAL
Qty: 21 TABLET | Refills: 0 | Status: SHIPPED | OUTPATIENT
Start: 2017-11-24 | End: 2018-06-28 | Stop reason: SDUPTHER

## 2017-11-24 RX ORDER — METRONIDAZOLE 500 MG/1
500 TABLET ORAL EVERY 8 HOURS
Qty: 21 TABLET | Refills: 0 | Status: SHIPPED | OUTPATIENT
Start: 2017-11-24 | End: 2017-12-01

## 2017-11-24 RX ADMIN — CIPROFLOXACIN HYDROCHLORIDE 500 MG: 500 TABLET, FILM COATED ORAL at 08:11

## 2017-11-24 RX ADMIN — HYDROMORPHONE HYDROCHLORIDE 1 MG: 1 INJECTION, SOLUTION INTRAMUSCULAR; INTRAVENOUS; SUBCUTANEOUS at 05:11

## 2017-11-24 RX ADMIN — METRONIDAZOLE 500 MG: 500 TABLET ORAL at 05:11

## 2017-11-24 NOTE — PLAN OF CARE
Future Appointments  Date Time Provider Department Center   12/4/2017 10:30 AM Arvin Sofia MD [facility]          11/24/17 0907   Final Note   Assessment Type Final Discharge Note   Discharge Disposition Home   Hospital Follow Up  Appt(s) scheduled? Yes   Discharge plans and expectations educations in teach back method with documentation complete? Yes

## 2017-11-24 NOTE — PLAN OF CARE
Problem: Patient Care Overview  Goal: Plan of Care Review  Outcome: Ongoing (interventions implemented as appropriate)  No acute events throughout night, VS and assessment per flow sheet, patient progressing towards goals as tolerated, plan of care reviewed with Lyndon Lion Jr., all concerns addressed, will continue to monitor.

## 2017-11-24 NOTE — HOSPITAL COURSE
Patient was treated conservatively: given IV antibiotics, IVF, and kept NPO. His WBC count continued to trend down and his pain continually improved. His diet was advanced and he was tolerating PO antibiotics. Deemed stable for discharge on 11/24/2017

## 2017-11-24 NOTE — DISCHARGE SUMMARY
[facility]  Colorectal Surgery  Discharge Summary      Patient Name: Lyndon Lion Jr.  MRN: [id number]  Admission Date: 11/21/2017  Hospital Length of Stay: 3 days  Discharge Date and Time:  11/24/2017 8:06 AM  Attending Physician: Arvin Sofia MD   Discharging Provider: Reji Epstein MD  Primary Care Provider: Evan Judd MD     HPI:  Mr. 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 He has been receiving surveillance colonoscopies. Recent colonoscopy on 9/20/17 showed an area of nodular mucosa at the ileocolonic anastomosis. Biopsies revealed low-grade dysplasia. On 11/20/17, he had a colonoscopy and snare mucosal resection of the lesion. The patient reports having increasing abdominal pain since he got home at 6pm. The pain prompted him to go to his local ED in [place] where CT abdomen was obtained with concerns for pneumoperitoneum.   His abdominal pain is now improved. No nausea or vomiting.     * No surgery found *     Hospital Course:  Patient was treated conservatively: given IV antibiotics, IVF, and kept NPO. His WBC count continued to trend down and his pain continually improved. His diet was advanced and he was tolerating PO antibiotics. Deemed stable for discharge on 11/24/2017    Physical Exam  Constitutional: He is oriented to person, place, and time. He appears well-developed and well-nourished.   HENT:   Head: Normocephalic and atraumatic.   Eyes: EOM are normal.   Cardiovascular: Normal rate.    Pulmonary/Chest: Effort normal.   Abdominal: Soft. He exhibits no distension and no mass. There is tenderness (much improved). There is no rebound and no guarding. No hernia.   Previous transverse incision in RUQ   Musculoskeletal: Normal range of motion.   Neurological: He is alert and oriented to person, place, and time.   Skin: Skin is warm. Capillary refill takes less than 2 seconds.    Psychiatric: He has a normal mood and affect. His behavior is normal.   Nursing note and vitals reviewed.    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    Specialty:  Colon and Rectal Surgery  Why:  Dr. Sofia's office will call to schedule an appointment  Contact information:  [address]  [facility] [address]  [phone]                 Patient Instructions:     Diet general   Order Comments: Low fiber diet for 2 weeks.     Activity as tolerated     Call MD for:  temperature >100.4     Call MD for:  persistent nausea and vomiting or diarrhea     Call MD for:  severe uncontrolled pain     Call MD for:  difficulty breathing or increased cough     Call MD for:  severe persistent headache     Call MD for:  worsening rash     Call MD for:  persistent dizziness, light-headedness, or visual disturbances     Call MD for:  increased confusion or weakness       Medications:  Reconciled Home Medications:   Current Discharge Medication List      START taking these medications    Details   ciprofloxacin HCl (CIPRO) 500 MG tablet Take 1 tablet (500 mg total) by mouth every 12 (twelve) hours.  Qty: 14 tablet, Refills: 0      metroNIDAZOLE (FLAGYL) 500 MG tablet Take 1 tablet (500 mg total) by mouth every 8 (eight) hours.  Qty: 21 tablet, Refills: 0          CONTINUE these medications which have CHANGED    Details   oxyCODONE-acetaminophen (PERCOCET)  mg per tablet Take 1 tablet by mouth every 6 to 8 hours as needed for Pain.  Qty: 21 tablet, Refills: 0         CONTINUE these medications which have NOT CHANGED    Details   cholecalciferol, vitamin D3, (VITAMIN D3) 5,000 unit Tab Take 5,000 Units by mouth once daily.  Qty: 90 tablet, Refills: 3      cyclobenzaprine (FLEXERIL) 10 MG tablet Take 1 tablet (10 mg total) by mouth 2 (two) times daily as needed for Muscle spasms.  Qty: 60 tablet, Refills: 2      !! diclofenac sodium 1 % Gel       !! diclofenac sodium 1 % Gel APPLY 2 GRAMS EXTERNALLY TO THE AFFECTED AREA FOUR TIMES DAILY  Qty: 100 g, Refills: 0      doxycycline (VIBRAMYCIN) 100 MG Cap Take 1 capsule (100 mg total) by mouth once daily.  Qty: 30 capsule, Refills: 1    Associated Diagnoses: Rosacea      ergocalciferol (ERGOCALCIFEROL) 50,000 unit Cap Take 1 capsule (50,000 Units total) by mouth every 7 days.  Qty: 12 capsule, Refills: 3    Associated Diagnoses: Vitamin deficiency      metronidazole (METROGEL) 0.75 % gel AAA nose BID  Qty: 45 g, Refills: 1    Associated Diagnoses: Rosacea       !! - Potential duplicate medications found. Please discuss with provider.          Reji Epstein MD  Colorectal Surgery  [facility]

## 2017-11-28 ENCOUNTER — PATIENT OUTREACH (OUTPATIENT)
Dept: ADMINISTRATIVE | Facility: CLINIC | Age: 47
End: 2017-11-28

## 2017-11-28 ENCOUNTER — TELEPHONE (OUTPATIENT)
Dept: FAMILY MEDICINE | Facility: CLINIC | Age: 47
End: 2017-11-28

## 2017-11-28 NOTE — PATIENT INSTRUCTIONS
Abdominal Pain    Abdominal pain is pain in the stomach or belly area. Everyone has this pain from time to time. In many cases it goes away on its own. But abdominal pain can sometimes be due to a serious problem, such as appendicitis. So its important to know when to seek help.  Causes of abdominal pain  There are many possible causes of abdominal pain. Common causes in adults include:  · Constipation, diarrhea, or gas  · Stomach acid flowing back up into the esophagus (acid reflux or heartburn)  · Severe acid reflux, called GERD (gastroesophageal reflux disease)  · A sore in the lining of the stomach or small intestine (peptic ulcer)  · Inflammation of the gallbladder, liver, or pancreas  · Gallstones or kidney stones  · Appendicitis   · Intestinal blockage   · An internal organ pushing through a muscle or other tissue (hernia)  · Urinary tract infections  · In women, menstrual cramps, fibroids, or endometriosis  · Inflammation or infection of the intestines  Diagnosing the cause of abdominal pain  Your healthcare provider will do a physical exam help find the cause of your pain. If needed, tests will be ordered. Belly pain has many possible causes. So it can be hard to find the reason for your pain. Giving details about your pain can help. Tell your provider where and when you feel the pain, and what makes it better or worse. Also let your provider know if you have other symptoms such as:  · Fever  · Tiredness  · Upset stomach (nausea)  · Vomiting  · Changes in bathroom habits  Treating abdominal pain  Some causes of pain need emergency medical treatment right away. These include appendicitis or a bowel blockage. Other problems can be treated with rest, fluids, or medicines. Your healthcare provider can give you specific instructions for treatment or self-care based on what is causing your pain.  If you have vomiting or diarrhea, sip water or other clear fluids. When you are ready to eat solid foods again,  start with small amounts of easy-to-digest, low-fat foods. These include apple sauce, toast, or crackers.   When to seek medical care  Call 911 or go to the hospital right away if you:  · Cant pass stool and are vomiting  · Are vomiting blood or have bloody diarrhea or black, tarry diarrhea  · Have chest, neck, or shoulder pain  · Feel like you might pass out  · Have pain in your shoulder blades with nausea  · Have sudden, severe belly pain  · Have new, severe pain unlike any you have felt before  · Have a belly that is rigid, hard, and tender to touch  Call your healthcare provider if you have:  · Pain for more than 5 days  · Bloating for more than 2 days  · Diarrhea for more than 5 days  · A fever of 100.4°F (38.0°C) or higher, or as directed by your provider  · Pain that gets worse  · Weight loss for no reason  · Continued lack of appetite  · Blood in your stool  How to prevent abdominal pain  Here are some tips to help prevent abdominal pain:  · Eat smaller amounts of food at one time.  · Avoid greasy, fried, or other high-fat foods.  · Avoid foods that give you gas.  · Exercise regularly.  · Drink plenty of fluids.  To help prevent GERD symptoms:  · Quit smoking.  · Reduce alcohol and certain foods that increase stomach acid.  · Avoid aspirin and over-the-counter pain and fever medicines (NSAIDS or nonsteroidal anti-inflammatory drugs), if possible  · Lose extra weight.  · Finish eating at least 2 hours before you go to bed or lie down.  · Raise the head of your bed.  Date Last Reviewed: 7/1/2016  © 9925-2121 [employer]. [address] [place] [address], [place] [address]. All rights reserved. This information is not intended as a substitute for professional medical care. Always follow your healthcare professional's instructions.

## 2017-11-28 NOTE — TELEPHONE ENCOUNTER
Patient needs to be scheduled for [facility] hosp follow up appt; he is currently scheduled for a routine follow up appt. Please reschedule for appropriate time frame.

## 2017-11-29 NOTE — PATIENT INSTRUCTIONS
Discharge Summary/Instructions after an Endoscopic Procedure  Patient Name: Lyndon Lion  Patient MRN: [id number]  Patient YOB: 1970 Monday, October 09, 2017  Bryce Callahan MD  RESTRICTIONS:  During your procedure today, you received medications for sedation.  These   medications may affect your judgment, balance and coordination.  Therefore,   for 24 hours, you have the following restrictions:   - DO NOT drive a car, operate machinery, make legal/financial decisions,   sign important papers or drink alcohol.    ACTIVITY:  The following day: return to full activity including work, except no heavy   lifting, straining or running for 3 days if polyps were removed.  DIET:  Eat and drink normally unless instructed otherwise.  TREATMENT FOR COMMON SIDE EFFECTS:  - Mild abdominal pain, belching, bloating or excessive gas: rest, eat   lightly and use a heating pad.  - Sore Throat: treat with throat lozenges and/or gargle with warm salt   water.  SYMPTOMS TO WATCH FOR AND REPORT TO YOUR PHYSICIAN:  1. Abdominal pain or bloating, other than gas cramps.  2. Chest pain.  3. Back pain.  4. Chills or fever occurring within 24 hours after the procedure.  5. Rectal bleeding, which would show as bright red, maroon, or black stools.   (A tablespoon of blood from the rectum is not serious, especially if   hemorrhoids are present.)  6. Vomiting.  7. Weakness or dizziness.  8. Because air was used during the procedure, expelling large amounts of air   from your rectum or belching is normal.  9. If a bowel prep was taken, you may not have a bowel movement for 1-3   days.  This is normal.  GO DIRECTLY TO THE EMERGENCY ROOM IF YOU HAVE ANY OF THE FOLLOWING:   Difficulty breathing   Chills and/or fever over 101 F   Persistent vomiting and/or vomiting blood   Severe abdominal pain   Severe chest pain   Black, tarry stools   Bleeding- more than one tablespoon  Your doctor recommends these additional instructions:  If any  biopsies were taken, your doctors clinic will call you in 1 to 2   weeks with any results.  You are being discharged to home.   You have a contact number available for emergencies.  The signs and symptoms   of potential delayed complications were discussed with you.  You may return   to normal activities tomorrow.  Written discharge instructions were   provided to you.   Eat a high fiber diet for the rest of your life.   Continue your present medications.   We are waiting for your pathology results.   Your physician has recommended a repeat colonoscopy in one year for   surveillance based on pathology results.   Return to my office at appointment to be scheduled.  For questions, problems or results please call your physician - Bryce Callahan MD at Work:  [phone].  [facility], EMERGENCY ROOM PHONE NUMBER: [phone]  IF A COMPLICATION OR EMERGENCY SITUATION ARISES AND YOU ARE UNABLE TO REACH   YOUR PHYSICIAN - GO DIRECTLY TO THE EMERGENCY ROOM.  Bryce Callahan MD  10/9/2017 2:51:48 PM  This report has been verified and signed electronically.   No

## 2017-11-30 ENCOUNTER — OFFICE VISIT (OUTPATIENT)
Dept: DERMATOLOGY | Facility: CLINIC | Age: 47
End: 2017-11-30
Payer: COMMERCIAL

## 2017-11-30 VITALS — HEIGHT: 73 IN | WEIGHT: 216 LBS | BODY MASS INDEX: 28.63 KG/M2

## 2017-11-30 DIAGNOSIS — L71.9 ROSACEA: ICD-10-CM

## 2017-11-30 DIAGNOSIS — L98.9 DISEASE OF SKIN AND SUBCUTANEOUS TISSUE: Primary | ICD-10-CM

## 2017-11-30 PROCEDURE — 87101 SKIN FUNGI CULTURE: CPT

## 2017-11-30 PROCEDURE — 99999 PR PBB SHADOW E&M-EST. PATIENT-LVL III: CPT | Mod: PBBFAC,,, | Performed by: DERMATOLOGY

## 2017-11-30 PROCEDURE — 99212 OFFICE O/P EST SF 10 MIN: CPT | Mod: 25,S$GLB,, | Performed by: DERMATOLOGY

## 2017-11-30 PROCEDURE — 87116 MYCOBACTERIA CULTURE: CPT

## 2017-11-30 PROCEDURE — 87070 CULTURE OTHR SPECIMN AEROBIC: CPT

## 2017-11-30 PROCEDURE — 11100 PR BIOPSY OF SKIN LESION: CPT | Mod: S$GLB,,, | Performed by: DERMATOLOGY

## 2017-11-30 NOTE — PATIENT INSTRUCTIONS
Punch Biopsy Wound Care    Your doctor has performed a punch biopsy today.  A band aid and antibiotic ointment has been placed over the site.  This should remain in place for 24 hours.  It is recommended that you keep the area dry for the first 24 hours.  After 24 hours, you may remove the band aid and wash the area with warm soap and water and apply Vaseline jelly.  Many patients prefer to use Neosporin or Bacitracin ointment.  This is acceptable; however know that you can develop an allergy to this medication even if you have used it safely for years.  It is important to keep the area moist.  Letting it dry out and get air slows healing time, will worsen the scar, and make it more difficult to remove the stitches if they were placed.  Band aid is optional after first 24 hours.      If you notice increasing redness, tenderness, pain, or yellow drainage at the biopsy or surgical site, please notify your doctor.  These are signs of an infection.    If your biopsy/surgical site is bleeding, apply firm pressure for 15 minutes straight.  Repeat for another 15 minutes, if it is still bleeding.   If the surgical site continues to bleed, then please contact your doctor.     [facility]  [facility] DERMATOLOGY  [address] [place] [id number]  Dept: [phone]

## 2017-11-30 NOTE — PROGRESS NOTES
Subjective:       Patient ID:  Lyndon Lion Jr. is a 47 y.o. male who presents for   Chief Complaint   Patient presents with    Follow-up     2 week with results     patient last seen 10/23/2017 with rosacea and concerning lesion on hand, biopsied below  Interval history: hostpitalized for possible colon perforation, on flagyl and cipro at this time  Holding doxy and metro  Had noted improvement of nose with doxy/metro therapy  Strict sun protection    Recent CT   FINDINGS:    CHEST: The heart size is normal. There is no pericardial effusion. There is no hilar or mediastinal lymphadenopathy. Mild bilateral gynecomastia is present. The lungs demonstrate no suspicious nodules or masses. No consolidation or pleural effusion.    ABDOMEN and PELVIS: The liver, gallbladder, pancreas, spleen, adrenal glands, and kidneys are unremarkable.    There are postoperative changes of gastric sleeve. A small hernia is present. The small bowel is normal in caliber. There has been a partial right hemicolectomy with ileocolic reanastomosis.    There is no abdominopelvic lymphadenopathy.    BONES: There are no suspicious osseous lesions.    Surprisingly, hand lesion was read as granulomatous, possibly sarcoidosis. In this setting, the changes on your nose could represent sarcoidosis as well (versus granulomatous rosacea). Sarcoidosis can involve any organ, including the lung (common), I see that a Chest CT has been ordered by Dr Hopson and the results will tell us whether your lungs are involved. Occasionally, granulomas can be seen under the skin in response to an atypical infectious agent (mycobaterium or fungus), none of which were seen under the microscope. I would like to see you again in clinic after your CT is done for reexamination of your skin.  My nurse will call you on Thursday.  Dr Rubio.    FINAL PATHOLOGIC DIAGNOSIS  1. Skin, left dorsal hand, shave biopsy:  - GRANULOMATOUS DERMATITIS (SEE  COMMENT).  MICROSCOPIC DESCRIPTION: Sections show a shave biopsy of skin extending to the level of the mid-reticular  dermis. The epidermis exhibits atypia (and overlying hyper-and parakeratosis. Within the underlying superficial to  mid dermis, there is multifocal granulomatous inflammation, occasionally naked granulomata, and some with a  more significant peripheral lymphocytic infiltrate. Multiple foreign body type giant cells are noted. GMS, AFB stains  are negative for the presence of fungi and mycobacteria, respectively. Appropriately reactive controls were  reviewed.  COMMENT: Differential diagnosis includes sarcoidosis versus a foreign body reaction . An infectious process  cannot be excluded, despite the negative special stains. If clinical concern for an infectious process exists,  correlation with tissue culture is advised.  Diagnosed by: John Shook M.D.     in [place], shift work,   Patient with Parada syndrome; MSH2 mutation, dx 2013  H/o colon cancer s/p partial colectomy; annual coloscopy and EGD  Advised to have annual skin checks for cancer screening;    Mother with h/o melanoma (dx age 45) and breast CA            Review of Systems   Constitutional: Negative for fever, chills, weight loss, weight gain, fatigue, night sweats and malaise.   Respiratory: Negative for cough and shortness of breath.    Skin: Positive for activity-related sunscreen use and wears hat. Negative for itching and daily sunscreen use.   Hematologic/Lymphatic: Does not bruise/bleed easily.        Objective:    Physical Exam   Constitutional: He appears well-developed and well-nourished. No distress.   Neurological: He is alert and oriented to person, place, and time. He is not disoriented.   Psychiatric: He has a normal mood and affect.   Skin:   Areas Examined (abnormalities noted in diagram):   Head / Face Inspection Performed  Nails and Digits Inspection Performed                  Diagram  Legend     Erythematous scaling macule/papule c/w actinic keratosis       Vascular papule c/w angioma      Pigmented verrucoid papule/plaque c/w seborrheic keratosis      Yellow umbilicated papule c/w sebaceous hyperplasia      Irregularly shaped tan macule c/w lentigo     1-2 mm smooth white papules consistent with Milia      Movable subcutaneous cyst with punctum c/w epidermal inclusion cyst      Subcutaneous movable cyst c/w pilar cyst      Firm pink to brown papule c/w dermatofibroma      Pedunculated fleshy papule(s) c/w skin tag(s)      Evenly pigmented macule c/w junctional nevus     Mildly variegated pigmented, slightly irregular-bordered macule c/w mildly atypical nevus      Flesh colored to evenly pigmented papule c/w intradermal nevus       Pink pearly papule/plaque c/w basal cell carcinoma      Erythematous hyperkeratotic cursted plaque c/w SCC      Surgical scar with no sign of skin cancer recurrence      Open and closed comedones      Inflammatory papules and pustules      Verrucoid papule consistent consistent with wart     Erythematous eczematous patches and plaques     Dystrophic onycholytic nail with subungual debris c/w onychomycosis     Umbilicated papule    Erythematous-base heme-crusted tan verrucoid plaque consistent with inflamed seborrheic keratosis     Erythematous Silvery Scaling Plaque c/w Psoriasis     See annotation      Assessment / Plan:        Disease of skin and subcutaneous tissue  -     Aerobic culture  -     Fungal culture , skin, hair, or nails  -     AFB Culture & Smear  Punch biopsy procedure note: for tissue culture  Punch biopsy performed after verbal consent obtained. Area marked and prepped with alcohol. Approximately 1cc of 1% lidocaine with epinephrine injected. 3 mm disposable punch used to remove lesion. Hemostasis obtained and biopsy site closed with 1 - 2 Prolene sutures. Wound care instructions reviewed with patient and handout given.    Rosacea    granulomatous  dermatitis, sarcoidosis vs foreign body vs infectious  Punch biopsy for tissue culture  CT chest with no LAD  ROS reassuring  Discussed nose biopsy, defers for now  Lacrimal glands, nasal mucosa wnl    Rosacea, nose  Not clinically suggestive of llupus pernio, discussed biopsy, defers at this time  Improved with doxy and metro, hodling while on cipro/flagyl for GI issue             Return in about 3 months (around 2/28/2018).

## 2017-12-02 ENCOUNTER — DOCUMENTATION ONLY (OUTPATIENT)
Dept: ENDOSCOPY | Facility: HOSPITAL | Age: 47
End: 2017-12-02

## 2017-12-02 ENCOUNTER — PATIENT MESSAGE (OUTPATIENT)
Dept: ENDOSCOPY | Facility: HOSPITAL | Age: 47
End: 2017-12-02

## 2017-12-02 DIAGNOSIS — Z15.09 LYNCH SYNDROME: Primary | ICD-10-CM

## 2017-12-02 NOTE — PROGRESS NOTES
Thanks - orders placed for 5/2018.  ===View-only below this line===    ----- Message -----  From: Joseluis Choe MD  Sent: 12/1/2017  12:50 PM  To: Dom Leonardo MD, Yolande Plascencia MD    Thanks Yolande. Definitely possible. I suspect Dom has a very sharp eye for this.    Dom- It may be best to repeat a colonoscopy in 6 months.    ----- Message -----  From: Yolande Plascencia MD  Sent: 12/1/2017   9:50 AM  To: Dom Leonardo MD, Joseluis Choe MD    Ileocolonic mucosa sample dated 09/20/2017  - does indeed  have Low grade dysplasia (tubular adenoma)    The EMR dated 11/20/2017 - does not have dysplasia . I leveled the tissue in addition to the initial slides    Is it possible that it was taken out the first time?     gg  ----- Message -----  From: Joseluis Choe MD  Sent: 11/27/2017   2:02 PM  To: Dom Leonardo MD, Yolande Plascencia MD    Thanks Yolande!    ----- Message -----  From: Yolande Plascencia MD  Sent: 11/27/2017   1:09 PM  To: Dom Leonardo MD, Joseluis Choe MD    Sure no problem     I dont know why this went to ndall      may take a few days if I get extra studies on it       ----- Message -----  From: Joseluis Choe MD  Sent: 11/27/2017   8:52 AM  To: Dom Leonardo MD, MD Dom Patterson- I spoke to pt and let him know of the results. He's home now (was DC'd) over the weekend- had postpolypectomy syndrome. I did EMR of the same nodular area that you had bx'd. No dysplasia on EMR specimen. I'm going to have Yolande review.    Yolande- Can you review this case? Dom Leonardo took some forceps bx which came back as dysplasia (read by Christelle). I did EMR of that area, and there was no dysplasia. Just want to make sure a GI pathologist has reviewed both path specimens.

## 2017-12-02 NOTE — PROGRESS NOTES
Message   Received: Yesterday   Message Contents   MD Joseluis Patterson MD; Dom Leonardo MD             Ileocolonic mucosa sample dated 09/20/2017  - does indeed  have Low grade dysplasia (tubular adenoma)     The EMR dated 11/20/2017 - does not have dysplasia . I leveled the tissue in addition to the initial slides     Is it possible that it was taken out the first time?     gg

## 2017-12-04 LAB — BACTERIA SPEC AEROBE CULT: NO GROWTH

## 2017-12-05 ENCOUNTER — OFFICE VISIT (OUTPATIENT)
Dept: SURGERY | Facility: CLINIC | Age: 47
End: 2017-12-05
Payer: COMMERCIAL

## 2017-12-05 VITALS
HEIGHT: 73 IN | WEIGHT: 248.88 LBS | BODY MASS INDEX: 32.98 KG/M2 | SYSTOLIC BLOOD PRESSURE: 158 MMHG | HEART RATE: 79 BPM | DIASTOLIC BLOOD PRESSURE: 98 MMHG

## 2017-12-05 DIAGNOSIS — K63.1 PERFORATION OF COLON AS COLONOSCOPY COMPLICATION: Primary | ICD-10-CM

## 2017-12-05 DIAGNOSIS — Z15.09 LYNCH SYNDROME: ICD-10-CM

## 2017-12-05 DIAGNOSIS — K91.71 PERFORATION OF COLON AS COLONOSCOPY COMPLICATION: Primary | ICD-10-CM

## 2017-12-05 PROCEDURE — 99213 OFFICE O/P EST LOW 20 MIN: CPT | Mod: S$GLB,,, | Performed by: COLON & RECTAL SURGERY

## 2017-12-05 PROCEDURE — 99999 PR PBB SHADOW E&M-EST. PATIENT-LVL III: CPT | Mod: PBBFAC,,, | Performed by: COLON & RECTAL SURGERY

## 2017-12-05 NOTE — LETTER
December 7, 2017        Joseluis Choe MD  [address] [facility] [address]             [facility]  [address]  [facility] [address]  Phone: [phone]   Patient: Lyndon Lion Jr.   MR Number: [id number]   YOB: 1970   Date of Visit: 12/5/2017       Dear Dr. Choe:    Thank you for referring Lyndon Lion to me for evaluation. Attached you will find relevant portions of my assessment and plan of care.    If you have questions, please do not hesitate to call me. I look forward to following Lyndon Lion along with you.    Sincerely,      Arvin Sofia MD            CC  MD Evan Hopkins MD    Enclosure

## 2017-12-05 NOTE — PROGRESS NOTES
Subjective:       Patient ID: Lyndon Lion Jr. is a 47 y.o. male.    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 He has been receiving surveillance colonoscopies. Recent colonoscopy on 9/20/17 showed an area of nodular mucosa at the ileocolonic anastomosis. Biopsies revealed low-grade dysplasia. On 11/20/17, he had a colonoscopy and snare mucosal resection of the lesion. The patient had worsening abdominal pain post procedures - went to ED at [facility] in [place].    CT A/P 11/20/17:  1.  Small volume of pneumoperitoneum at the ileocolic anastomosis in the right upper quadrant in this patient with findings of right hemicolectomy. Small volume of adjacent fluid and stranding is noted along with mild reactive ileus in the terminal ileum.  2. Additional findings:  -Sleeve gastrostomy and small hiatal hernia, similar to previous.    He was transferred to [facility] - managed conservatively with bowel rest and abx and pain resolved.    PATHOLOGY:  BENIGN INTESTINAL MUCOSA WITH REACTIVE HYPERPLASIA.  NO DYSPLASIA IDENTIFIED.    He returns today for post-discharge follow-up.  Doing well, no pain, no N/V, no F/C, BM's normal.    Review of patient's allergies indicates:  No Known Allergies    Past Medical History:   Diagnosis Date    Arthritis     Asthma     AS A CHILD    Colon cancer     Parada syndrome     Vitamin D deficiency        Past Surgical History:   Procedure Laterality Date    APPENDECTOMY      COLON SURGERY  2008    PARTIAL COLECTOMY    COLONOSCOPY Bilateral 2012    COLONOSCOPY N/A 8/1/2016    Procedure: COLONOSCOPY;  Surgeon: Blaze Marr MD;  Location: [facility];  Service: Endoscopy;  Laterality: N/A;    COLONOSCOPY N/A 9/20/2017    Procedure: COLONOSCOPY;  Surgeon: Dom Leonardo MD;  Location: [facility] ([address] [place] [address]);  Service: Endoscopy;  Laterality: N/A;  not given PM prep     COLONOSCOPY N/A 10/9/2017    Procedure: COLONOSCOPY;  Surgeon: RAMON Callahan MD;  Location: [facility] ENDO (4TH FLR);  Service: Endoscopy;  Laterality: N/A;  ok for Prepopik per Dr Callahan    COLONOSCOPY N/A 11/20/2017    Procedure: COLONOSCOPY/EMR;  Surgeon: Joseluis Choe MD;  Location: [facility] ENDO (2ND FLR);  Service: Endoscopy;  Laterality: N/A;  EMR    no pm prep    Epidural Steroid Injection  10/23/15    Lumbar    ESOPHAGOGASTRODUODENOSCOPY      GASTRIC BYPASS  2010    SLEEVE    KNEE ARTHROSCOPY Right        Current Outpatient Prescriptions   Medication Sig Dispense Refill    cholecalciferol, vitamin D3, (VITAMIN D3) 5,000 unit Tab Take 5,000 Units by mouth once daily. 90 tablet 3    cyclobenzaprine (FLEXERIL) 10 MG tablet Take 1 tablet (10 mg total) by mouth 2 (two) times daily as needed for Muscle spasms. 60 tablet 2    diclofenac sodium 1 % Gel       diclofenac sodium 1 % Gel APPLY 2 GRAMS EXTERNALLY TO THE AFFECTED AREA FOUR TIMES DAILY 100 g 0    doxycycline (VIBRAMYCIN) 100 MG Cap Take 1 capsule (100 mg total) by mouth once daily. 30 capsule 1    ergocalciferol (ERGOCALCIFEROL) 50,000 unit Cap Take 1 capsule (50,000 Units total) by mouth every 7 days. 12 capsule 3    metronidazole (METROGEL) 0.75 % gel AAA nose BID 45 g 1    oxyCODONE-acetaminophen (PERCOCET)  mg per tablet Take 1 tablet by mouth every 6 to 8 hours as needed for Pain. 21 tablet 0     No current facility-administered medications for this visit.        Family History   Problem Relation Age of Onset    Melanoma Mother     Heart disease Father     Diabetes Father     Cancer Maternal Uncle      colon    Cancer Maternal Grandfather      colon ca    Psoriasis Neg Hx     Lupus Neg Hx     Eczema Neg Hx        Social History     Social History    Marital status:      Spouse name: N/A    Number of children: N/A    Years of education: N/A     Occupational History     [employer]     Social History Main Topics     Smoking status: Never Smoker    Smokeless tobacco: Never Used    Alcohol use Yes      Comment: RARELY    Drug use: No    Sexual activity: Not Asked     Other Topics Concern    None     Social History Narrative    None       Review of Systems   Constitutional: Negative for chills and fever.   HENT: Negative for congestion and sinus pressure.    Eyes: Negative for visual disturbance.   Respiratory: Negative for cough and shortness of breath.    Cardiovascular: Negative for chest pain and palpitations.   Gastrointestinal: Negative for abdominal distention, abdominal pain, anal bleeding, blood in stool, constipation, diarrhea, nausea, rectal pain and vomiting.   Endocrine: Negative for cold intolerance and heat intolerance.   Genitourinary: Negative for dysuria and frequency.   Musculoskeletal: Negative for arthralgias and back pain.   Skin: Negative for rash.   Allergic/Immunologic: Negative for immunocompromised state.   Neurological: Negative for dizziness, light-headedness and headaches.   Psychiatric/Behavioral: Negative for confusion. The patient is not nervous/anxious.        Objective:      Physical Exam   Constitutional: He is oriented to person, place, and time. He appears well-developed and well-nourished.   HENT:   Head: Normocephalic and atraumatic.   Eyes: Conjunctivae are normal.   Pulmonary/Chest: Effort normal. No respiratory distress.   Abdominal: Soft. Bowel sounds are normal. He exhibits no distension and no mass. There is no tenderness. There is no rebound and no guarding.   Genitourinary:   Genitourinary Comments:      Neurological: He is alert and oriented to person, place, and time.   Skin: Skin is warm and dry. No erythema.         Lab Results   Component Value Date    WBC 5.49 11/24/2017    HGB 13.9 (L) 11/24/2017    HCT 38.5 (L) 11/24/2017    MCV 84 11/24/2017     11/24/2017     BMP  Lab Results   Component Value Date     11/24/2017    K 3.9 11/24/2017     11/24/2017     CO2 24 11/24/2017    BUN 11 11/24/2017    CREATININE 1.0 11/24/2017    CALCIUM 8.7 11/24/2017    ANIONGAP 6 (L) 11/24/2017    ESTGFRAFRICA >60.0 11/24/2017    EGFRNONAA >60.0 11/24/2017     CMP  Sodium   Date Value Ref Range Status   11/24/2017 137 136 - 145 mmol/L Final     Potassium   Date Value Ref Range Status   11/24/2017 3.9 3.5 - 5.1 mmol/L Final     Chloride   Date Value Ref Range Status   11/24/2017 107 95 - 110 mmol/L Final     CO2   Date Value Ref Range Status   11/24/2017 24 23 - 29 mmol/L Final     Glucose   Date Value Ref Range Status   11/24/2017 91 70 - 110 mg/dL Final     BUN, Bld   Date Value Ref Range Status   11/24/2017 11 6 - 20 mg/dL Final     Creatinine   Date Value Ref Range Status   11/24/2017 1.0 0.5 - 1.4 mg/dL Final   05/14/2013 1.3 0.5 - 1.4 mg/dL Final     Calcium   Date Value Ref Range Status   11/24/2017 8.7 8.7 - 10.5 mg/dL Final   05/14/2013 9.8 8.7 - 10.5 mg/dL Final     Total Protein   Date Value Ref Range Status   11/24/2017 6.0 6.0 - 8.4 g/dL Final     Albumin   Date Value Ref Range Status   11/24/2017 2.7 (L) 3.5 - 5.2 g/dL Final     Total Bilirubin   Date Value Ref Range Status   11/24/2017 0.9 0.1 - 1.0 mg/dL Final     Comment:     For infants and newborns, interpretation of results should be based  on gestational age, weight and in agreement with clinical  observations.  Premature Infant recommended reference ranges:  Up to 24 hours.............<8.0 mg/dL  Up to 48 hours............<12.0 mg/dL  3-5 days..................<15.0 mg/dL  6-29 days.................<15.0 mg/dL       Alkaline Phosphatase   Date Value Ref Range Status   11/24/2017 52 (L) 55 - 135 U/L Final     AST   Date Value Ref Range Status   11/24/2017 11 10 - 40 U/L Final     ALT   Date Value Ref Range Status   11/24/2017 6 (L) 10 - 44 U/L Final     Anion Gap   Date Value Ref Range Status   11/24/2017 6 (L) 8 - 16 mmol/L Final   05/14/2013 10 5 - 15 meq/L Final     eGFR if    Date Value Ref  Range Status   11/24/2017 >60.0 >60 mL/min/1.73 m^2 Final     eGFR if non    Date Value Ref Range Status   11/24/2017 >60.0 >60 mL/min/1.73 m^2 Final     Comment:     Calculation used to obtain the estimated glomerular filtration  rate (eGFR) is the CKD-EPI equation.        Lab Results   Component Value Date    CEA 4.0 08/01/2008       Assessment:       1. Perforation of colon as colonoscopy complication    2. Parada syndrome      Contained perforation after EMR - resolved with non-operative measures    Plan:   Diet & activity as tolerated  Surveillance colonoscopy per GI  RTO prn    Arvin Sofia MD, FACS, FASCRS  Staff Surgeon  Department of Colon & Rectal Surgery

## 2017-12-06 ENCOUNTER — DOCUMENTATION ONLY (OUTPATIENT)
Dept: FAMILY MEDICINE | Facility: CLINIC | Age: 47
End: 2017-12-06

## 2017-12-06 NOTE — PROGRESS NOTES
Pre-Visit Chart Review  For Appointment Scheduled on 12/8/17    Health Maintenance Due   Topic Date Due    Pneumococcal PCV13 (High Risk) (1 - PCV13 Required) 05/05/1972    Pneumococcal PPSV23 (High Risk) (1) 05/05/1989    Influenza Vaccine  08/01/2017

## 2017-12-08 ENCOUNTER — OFFICE VISIT (OUTPATIENT)
Dept: FAMILY MEDICINE | Facility: CLINIC | Age: 47
End: 2017-12-08
Payer: COMMERCIAL

## 2017-12-08 ENCOUNTER — LAB VISIT (OUTPATIENT)
Dept: LAB | Facility: HOSPITAL | Age: 47
End: 2017-12-08
Attending: PHYSICIAN ASSISTANT
Payer: COMMERCIAL

## 2017-12-08 VITALS
RESPIRATION RATE: 14 BRPM | HEIGHT: 72 IN | WEIGHT: 252.63 LBS | TEMPERATURE: 98 F | HEART RATE: 71 BPM | BODY MASS INDEX: 34.22 KG/M2 | DIASTOLIC BLOOD PRESSURE: 83 MMHG | SYSTOLIC BLOOD PRESSURE: 128 MMHG

## 2017-12-08 DIAGNOSIS — Z15.09 LYNCH SYNDROME: ICD-10-CM

## 2017-12-08 DIAGNOSIS — Z23 NEED FOR INFLUENZA VACCINATION: ICD-10-CM

## 2017-12-08 DIAGNOSIS — N62 GYNECOMASTIA: ICD-10-CM

## 2017-12-08 DIAGNOSIS — K66.8 PNEUMOPERITONEUM: ICD-10-CM

## 2017-12-08 DIAGNOSIS — K66.8 PNEUMOPERITONEUM: Primary | ICD-10-CM

## 2017-12-08 DIAGNOSIS — Z48.02 VISIT FOR SUTURE REMOVAL: ICD-10-CM

## 2017-12-08 LAB
ALBUMIN SERPL BCP-MCNC: 3.5 G/DL
ALP SERPL-CCNC: 50 U/L
ALT SERPL W/O P-5'-P-CCNC: 19 U/L
ANION GAP SERPL CALC-SCNC: 5 MMOL/L
AST SERPL-CCNC: 15 U/L
BASOPHILS # BLD AUTO: 0.04 K/UL
BASOPHILS NFR BLD: 1 %
BILIRUB SERPL-MCNC: 0.9 MG/DL
BUN SERPL-MCNC: 13 MG/DL
CALCIUM SERPL-MCNC: 9.2 MG/DL
CHLORIDE SERPL-SCNC: 108 MMOL/L
CO2 SERPL-SCNC: 28 MMOL/L
CREAT SERPL-MCNC: 1.1 MG/DL
DIFFERENTIAL METHOD: ABNORMAL
EOSINOPHIL # BLD AUTO: 0.1 K/UL
EOSINOPHIL NFR BLD: 1.5 %
ERYTHROCYTE [DISTWIDTH] IN BLOOD BY AUTOMATED COUNT: 12.1 %
EST. GFR  (AFRICAN AMERICAN): >60 ML/MIN/1.73 M^2
EST. GFR  (NON AFRICAN AMERICAN): >60 ML/MIN/1.73 M^2
GLUCOSE SERPL-MCNC: 103 MG/DL
HCT VFR BLD AUTO: 44.4 %
HGB BLD-MCNC: 15.2 G/DL
IMM GRANULOCYTES # BLD AUTO: 0.01 K/UL
IMM GRANULOCYTES NFR BLD AUTO: 0.2 %
LYMPHOCYTES # BLD AUTO: 1.1 K/UL
LYMPHOCYTES NFR BLD: 27.5 %
MCH RBC QN AUTO: 29.4 PG
MCHC RBC AUTO-ENTMCNC: 34.2 G/DL
MCV RBC AUTO: 86 FL
MONOCYTES # BLD AUTO: 0.5 K/UL
MONOCYTES NFR BLD: 10.9 %
NEUTROPHILS # BLD AUTO: 2.4 K/UL
NEUTROPHILS NFR BLD: 58.9 %
NRBC BLD-RTO: 0 /100 WBC
PLATELET # BLD AUTO: 378 K/UL
PMV BLD AUTO: 9.4 FL
POTASSIUM SERPL-SCNC: 4.4 MMOL/L
PROLACTIN SERPL IA-MCNC: 6.3 NG/ML
PROT SERPL-MCNC: 6.9 G/DL
RBC # BLD AUTO: 5.17 M/UL
SODIUM SERPL-SCNC: 141 MMOL/L
WBC # BLD AUTO: 4.11 K/UL

## 2017-12-08 PROCEDURE — 82672 ASSAY OF ESTROGEN: CPT

## 2017-12-08 PROCEDURE — 90471 IMMUNIZATION ADMIN: CPT | Mod: S$GLB,,, | Performed by: FAMILY MEDICINE

## 2017-12-08 PROCEDURE — 80053 COMPREHEN METABOLIC PANEL: CPT

## 2017-12-08 PROCEDURE — 36415 COLL VENOUS BLD VENIPUNCTURE: CPT | Mod: PO

## 2017-12-08 PROCEDURE — 99999 PR PBB SHADOW E&M-EST. PATIENT-LVL IV: CPT | Mod: PBBFAC,,, | Performed by: PHYSICIAN ASSISTANT

## 2017-12-08 PROCEDURE — 99495 TRANSJ CARE MGMT MOD F2F 14D: CPT | Mod: S$GLB,,, | Performed by: PHYSICIAN ASSISTANT

## 2017-12-08 PROCEDURE — 90686 IIV4 VACC NO PRSV 0.5 ML IM: CPT | Mod: S$GLB,,, | Performed by: FAMILY MEDICINE

## 2017-12-08 PROCEDURE — 84146 ASSAY OF PROLACTIN: CPT

## 2017-12-08 PROCEDURE — 85025 COMPLETE CBC W/AUTO DIFF WBC: CPT

## 2017-12-08 PROCEDURE — 84402 ASSAY OF FREE TESTOSTERONE: CPT

## 2017-12-08 NOTE — PROGRESS NOTES
Subjective:       Patient ID: Lyndon Lion Jr. is a 47 y.o. male.    Chief Complaint: Hospital F/U [facility]    Mr. Lion comes to clinic today for follow up. He was recently hospitalized for suspected pneumoperitoneum. The patient had a colonoscopy and snare mucosal resection of the lesion. The paient had increased abdominal pain after this procedure. He went to the ED and the CT revealed pneumoperitoneum. The patient was admitted to [facility] on 11/21/17 The patient was treated with IV antibiotics, IVF, and was kept NPO. The patient's diet was advanced and he tolerated the oral antibiotics. The patient was discharged on 11/24/17. He has completed outpatient antibiotics and he is feeling well at this time.  The patient is concerned about the incidental finding of gynecomastia seen on CT. The patient does have a suture in the dorsum of his left hand since biopsy with dermatology. Patient requests for us to remove this today.    Transitional Care Note  Admit date: 11/21/17  Discharge date: 11/24/17  Date of interactive contact (2 business days post D/C): 11/28/17  Hospitalized at: [facility]  Discharge diagnoses: pneumoperitoneum    Family and/or Caretaker present at visit?  No.  Diagnostic tests reviewed/disposition: I have reviewed all completed as well as pending diagnostic tests at the time of discharge.  Disease/illness education: continue follow up with colorectal specialist  Medication changes: the patient was prescribed oral antibiotics  Home health/community services discussion/referrals: Patient does not have home health established from hospital visit.  They do not need home health.  If needed, we will set up home health for the patient.   Establishment or re-establishment of referral orders for community resources: No other necessary community resources.   Discussion with other health care providers: No discussion with other health care providers necessary.           Review of  Systems   Constitutional: Negative for activity change, appetite change and fever.   HENT: Negative for postnasal drip, rhinorrhea and sinus pressure.    Eyes: Negative for visual disturbance.   Respiratory: Negative for cough and shortness of breath.    Cardiovascular: Negative for chest pain.   Gastrointestinal: Negative for abdominal distention and abdominal pain.   Genitourinary: Negative for difficulty urinating and dysuria.   Musculoskeletal: Positive for arthralgias and back pain. Negative for myalgias.   Neurological: Negative for headaches.   Hematological: Negative for adenopathy.   Psychiatric/Behavioral: The patient is not nervous/anxious.        Objective:      Physical Exam   Constitutional: He is oriented to person, place, and time.   HENT:   Mouth/Throat: Oropharynx is clear and moist. No oropharyngeal exudate.   Eyes: Conjunctivae are normal. Pupils are equal, round, and reactive to light.   Cardiovascular: Normal rate and regular rhythm.    Pulmonary/Chest: Effort normal and breath sounds normal. He has no wheezes.   Abdominal: Soft. Bowel sounds are normal. He exhibits no distension. There is no tenderness.   Musculoskeletal: He exhibits no edema.   Lymphadenopathy:     He has no cervical adenopathy.   Neurological: He is alert and oriented to person, place, and time.   Skin: No erythema.   Single suture present on dorsum if the left near fifth digit.    Psychiatric: His behavior is normal.       Assessment:       1. Pneumoperitoneum    2. Gynecomastia    3. Need for influenza vaccination    4. Parada syndrome    5. Visit for suture removal        Plan:       Lyndon was seen today for [facility] f/u [facility].    Diagnoses and all orders for this visit:    Pneumoperitoneum  -     Comprehensive metabolic panel; Future  -     CBC auto differential; Future    Gynecomastia  -     Prolactin; Future  -     ESTROGENS, TOTAL; Future  -     TESTOSTERONE, FREE, TOTAL; Future    Need for influenza  vaccination  Flu shot given  Parada syndrome  Follow up with colorectal specialist.   Follow up with Gi  Visit for suture removal  Single suture remove. Patient tolerated well.   Other orders  -     Influenza - Quadrivalent (3 years & older) (PF)

## 2017-12-11 LAB — ESTROGEN SERPL-MCNC: 165 PG/ML

## 2017-12-12 LAB — TESTOST FREE SERPL-MCNC: 13.9 PG/ML

## 2017-12-13 DIAGNOSIS — D75.839 THROMBOCYTOSIS: Primary | ICD-10-CM

## 2017-12-19 ENCOUNTER — TELEPHONE (OUTPATIENT)
Dept: HEMATOLOGY/ONCOLOGY | Facility: CLINIC | Age: 47
End: 2017-12-19

## 2017-12-19 NOTE — TELEPHONE ENCOUNTER
----- Message from Cici Hill sent at 12/19/2017 10:17 AM CST -----  Wife (Katheryn)is calling back to confirm if [facility] paperwork for patient is ready/please call back at [phone] to advise.

## 2017-12-20 NOTE — TELEPHONE ENCOUNTER
Spoke with wife in regards to [facility] paperwork and she is to  in [place] office today per her request.

## 2017-12-28 ENCOUNTER — PATIENT MESSAGE (OUTPATIENT)
Dept: FAMILY MEDICINE | Facility: CLINIC | Age: 47
End: 2017-12-28

## 2018-01-02 ENCOUNTER — PATIENT MESSAGE (OUTPATIENT)
Dept: FAMILY MEDICINE | Facility: CLINIC | Age: 48
End: 2018-01-02

## 2018-01-04 LAB — FUNGUS BLD CULT: NORMAL

## 2018-01-06 ENCOUNTER — LAB VISIT (OUTPATIENT)
Dept: LAB | Facility: HOSPITAL | Age: 48
End: 2018-01-06
Attending: FAMILY MEDICINE
Payer: COMMERCIAL

## 2018-01-06 DIAGNOSIS — Z98.84 S/P LAPAROSCOPIC SLEEVE GASTRECTOMY: ICD-10-CM

## 2018-01-06 LAB
ALBUMIN SERPL BCP-MCNC: 3.6 G/DL
ALP SERPL-CCNC: 52 U/L
ALT SERPL W/O P-5'-P-CCNC: 65 U/L
ANION GAP SERPL CALC-SCNC: 6 MMOL/L
AST SERPL-CCNC: 43 U/L
BASOPHILS # BLD AUTO: 0.03 K/UL
BASOPHILS NFR BLD: 0.6 %
BILIRUB SERPL-MCNC: 1.2 MG/DL
BUN SERPL-MCNC: 15 MG/DL
CALCIUM SERPL-MCNC: 9.2 MG/DL
CHLORIDE SERPL-SCNC: 107 MMOL/L
CHOLEST SERPL-MCNC: 164 MG/DL
CHOLEST/HDLC SERPL: 3.8 {RATIO}
CO2 SERPL-SCNC: 27 MMOL/L
CREAT SERPL-MCNC: 1.2 MG/DL
DIFFERENTIAL METHOD: NORMAL
EOSINOPHIL # BLD AUTO: 0.1 K/UL
EOSINOPHIL NFR BLD: 3 %
ERYTHROCYTE [DISTWIDTH] IN BLOOD BY AUTOMATED COUNT: 13.1 %
EST. GFR  (AFRICAN AMERICAN): >60 ML/MIN/1.73 M^2
EST. GFR  (NON AFRICAN AMERICAN): >60 ML/MIN/1.73 M^2
FERRITIN SERPL-MCNC: 130 NG/ML
FOLATE SERPL-MCNC: 13.1 NG/ML
GLUCOSE SERPL-MCNC: 94 MG/DL
HCT VFR BLD AUTO: 43.4 %
HDLC SERPL-MCNC: 43 MG/DL
HDLC SERPL: 26.2 %
HGB BLD-MCNC: 14.9 G/DL
IMM GRANULOCYTES # BLD AUTO: 0.02 K/UL
IMM GRANULOCYTES NFR BLD AUTO: 0.4 %
IRON SERPL-MCNC: 151 UG/DL
IRON SERPL-MCNC: 151 UG/DL
LDLC SERPL CALC-MCNC: 106 MG/DL
LYMPHOCYTES # BLD AUTO: 1.6 K/UL
LYMPHOCYTES NFR BLD: 33.5 %
MCH RBC QN AUTO: 29.6 PG
MCHC RBC AUTO-ENTMCNC: 34.3 G/DL
MCV RBC AUTO: 86 FL
MONOCYTES # BLD AUTO: 0.4 K/UL
MONOCYTES NFR BLD: 9.1 %
NEUTROPHILS # BLD AUTO: 2.5 K/UL
NEUTROPHILS NFR BLD: 53.4 %
NONHDLC SERPL-MCNC: 121 MG/DL
NRBC BLD-RTO: 0 /100 WBC
PLATELET # BLD AUTO: 253 K/UL
PMV BLD AUTO: 9.8 FL
POTASSIUM SERPL-SCNC: 4 MMOL/L
PROT SERPL-MCNC: 6.6 G/DL
PTH-INTACT SERPL-MCNC: 75 PG/ML
RBC # BLD AUTO: 5.03 M/UL
SATURATED IRON: 41 %
SODIUM SERPL-SCNC: 140 MMOL/L
TOTAL IRON BINDING CAPACITY: 369 UG/DL
TRANSFERRIN SERPL-MCNC: 249 MG/DL
TRIGL SERPL-MCNC: 75 MG/DL
VIT B12 SERPL-MCNC: 354 PG/ML
WBC # BLD AUTO: 4.63 K/UL

## 2018-01-06 PROCEDURE — 82728 ASSAY OF FERRITIN: CPT

## 2018-01-06 PROCEDURE — 83970 ASSAY OF PARATHORMONE: CPT

## 2018-01-06 PROCEDURE — 80053 COMPREHEN METABOLIC PANEL: CPT

## 2018-01-06 PROCEDURE — 84425 ASSAY OF VITAMIN B-1: CPT

## 2018-01-06 PROCEDURE — 83540 ASSAY OF IRON: CPT

## 2018-01-06 PROCEDURE — 82652 VIT D 1 25-DIHYDROXY: CPT

## 2018-01-06 PROCEDURE — 80061 LIPID PANEL: CPT

## 2018-01-06 PROCEDURE — 82607 VITAMIN B-12: CPT

## 2018-01-06 PROCEDURE — 85025 COMPLETE CBC W/AUTO DIFF WBC: CPT

## 2018-01-06 PROCEDURE — 82746 ASSAY OF FOLIC ACID SERUM: CPT

## 2018-01-10 LAB
1,25(OH)2D3 SERPL-MCNC: 64 PG/ML
VIT B1 SERPL-MCNC: 58 UG/L (ref 38–122)

## 2018-01-11 ENCOUNTER — PATIENT MESSAGE (OUTPATIENT)
Dept: FAMILY MEDICINE | Facility: CLINIC | Age: 48
End: 2018-01-11

## 2018-01-11 ENCOUNTER — TELEPHONE (OUTPATIENT)
Dept: FAMILY MEDICINE | Facility: CLINIC | Age: 48
End: 2018-01-11

## 2018-01-11 RX ORDER — OSELTAMIVIR PHOSPHATE 75 MG/1
75 CAPSULE ORAL DAILY
Qty: 5 CAPSULE | Refills: 0 | Status: SHIPPED | OUTPATIENT
Start: 2018-01-11 | End: 2018-01-16

## 2018-02-02 LAB
ACID FAST MOD KINY STN SPEC: NORMAL
MYCOBACTERIUM SPEC QL CULT: NORMAL

## 2018-02-07 ENCOUNTER — OFFICE VISIT (OUTPATIENT)
Dept: UROLOGY | Facility: CLINIC | Age: 48
End: 2018-02-07
Payer: COMMERCIAL

## 2018-02-07 VITALS
WEIGHT: 262.38 LBS | HEART RATE: 59 BPM | BODY MASS INDEX: 33.67 KG/M2 | DIASTOLIC BLOOD PRESSURE: 84 MMHG | SYSTOLIC BLOOD PRESSURE: 130 MMHG | HEIGHT: 74 IN

## 2018-02-07 DIAGNOSIS — N62 GYNECOMASTIA: Primary | ICD-10-CM

## 2018-02-07 PROCEDURE — 3008F BODY MASS INDEX DOCD: CPT | Mod: S$GLB,,, | Performed by: UROLOGY

## 2018-02-07 PROCEDURE — 99999 PR PBB SHADOW E&M-EST. PATIENT-LVL III: CPT | Mod: PBBFAC,,, | Performed by: UROLOGY

## 2018-02-07 PROCEDURE — 99214 OFFICE O/P EST MOD 30 MIN: CPT | Mod: S$GLB,,, | Performed by: UROLOGY

## 2018-02-07 NOTE — PROGRESS NOTES
CHIEF COMPLAINT:    Mr. Lion is a 47 y.o. male presenting with gynecomastia.    PRESENTING ILLNESS:    Lyndon Lion Jr. is a 47 y.o. male who c/o gynecomastia.  No breast mass.  It is not painful.  He had a total estrogens level measured and a free T checked.  He denies ED.  He has a good libido.  No feelings of fatigue.      He has nocturia x 1.  Good FOS.  No hematuria.  No dysuria.  He's pleased with how he voids.    REVIEW OF SYSTEMS:    Lyndon Lion Jr. denies headache, blurred vision, fever, nausea, vomiting, chills, abdominal pain, bleeding per rectum, cough, SOB, recent loss of consciousness, recent mental status changes, seizures, dizziness, or upper or lower extremity weakness.    KATHY  1. 3  2. 3  3. 3  4. 3  5. 4      PATIENT HISTORY:    Past Medical History:   Diagnosis Date    Arthritis     Asthma     AS A CHILD    Colon cancer     Family hx of prostate cancer 11/22/2016    Hx of colon cancer, stage I 7/3/2014    Parada syndrome     Vitamin D deficiency        Past Surgical History:   Procedure Laterality Date    APPENDECTOMY      COLON SURGERY  2008    PARTIAL COLECTOMY    COLONOSCOPY Bilateral 2012    COLONOSCOPY N/A 8/1/2016    Procedure: COLONOSCOPY;  Surgeon: Blaze Marr MD;  Location[facility];  Service: Endoscopy;  Laterality: N/A;    COLONOSCOPY N/A 9/20/2017    Procedure: COLONOSCOPY;  Surgeon: Dom Leonardo MD;  [facility]  Service: Endoscopy;  Laterality: N/A;  not given PM prep    COLONOSCOPY N/A 10/9/2017    Procedure: COLONOSCOPY;  Surgeon: RAMON Callahan MD;  Location: [facility] (4TH FLR);  Service: Endoscopy;  Laterality: N/A;  ok for Prepopik per Dr Callahan    COLONOSCOPY N/A 11/20/2017    Procedure: COLONOSCOPY/EMR;  Surgeon: Joseluis Choe MD;  Location: [facility] (2ND FLR);  Service: Endoscopy;  Laterality: N/A;  EMR    no pm prep    Epidural Steroid Injection  10/23/15    Lumbar    ESOPHAGOGASTRODUODENOSCOPY       GASTRIC BYPASS  2010    SLEEVE    KNEE ARTHROSCOPY Right        Family History   Problem Relation Age of Onset    Melanoma Mother     Heart disease Father     Diabetes Father     Cancer Maternal Uncle      colon    Cancer Maternal Grandfather      colon ca    Psoriasis Neg Hx     Lupus Neg Hx     Eczema Neg Hx        Social History     Social History    Marital status:      Spouse name: N/A    Number of children: N/A    Years of education: N/A     Occupational History     [employer]     Social History Main Topics    Smoking status: Never Smoker    Smokeless tobacco: Never Used    Alcohol use Yes      Comment: RARELY    Drug use: No    Sexual activity: Not on file     Other Topics Concern    Not on file     Social History Narrative    No narrative on file       Allergies:  Patient has no known allergies.    Medications:    Current Outpatient Prescriptions:     cyclobenzaprine (FLEXERIL) 10 MG tablet, Take 1 tablet (10 mg total) by mouth 2 (two) times daily as needed for Muscle spasms., Disp: 60 tablet, Rfl: 2    diclofenac sodium 1 % Gel, , Disp: , Rfl:     diclofenac sodium 1 % Gel, APPLY 2 GRAMS EXTERNALLY TO THE AFFECTED AREA FOUR TIMES DAILY, Disp: 100 g, Rfl: 0    ergocalciferol (ERGOCALCIFEROL) 50,000 unit Cap, Take 1 capsule (50,000 Units total) by mouth every 7 days., Disp: 12 capsule, Rfl: 3    metronidazole (METROGEL) 0.75 % gel, AAA nose BID, Disp: 45 g, Rfl: 1    oxyCODONE-acetaminophen (PERCOCET)  mg per tablet, Take 1 tablet by mouth every 6 to 8 hours as needed for Pain., Disp: 21 tablet, Rfl: 0    PHYSICAL EXAMINATION:    The patient generally appears in good health, is appropriately interactive, and is in no apparent distress.     Eyes: anicteric sclerae, moist conjunctivae; no lid-lag; PERRLA     HENT: Atraumatic; oropharynx clear with moist mucous membranes and no mucosal ulcerations;normal hard and soft palate.  No evidence of lymphadenopathy.    Neck:  Trachea midline.  No thyromegaly.    Musculoskeletal: No abnormal gait.    Skin: No lesions.    Mental: Cooperative with normal affect.  Is oriented to time, place, and person.    Neuro: Grossly intact.    Chest: Normal inspiratory effort.   No accessory muscles.  No audible wheezes.  Respirations symmetric on inspiration and expiration.    Heart: Regular rhythm.      Abdomen:  Soft, non-tender. No masses or organomegaly. Bladder is not palpable. No evidence of flank discomfort. No evidence of inguinal hernia.    Genitourinary: The penis is circumcised with no evidence of plaques or induration. The urethral meatus is normal. The testes, epididymides, and cord structures are normal in size and contour bilaterally. The scrotum is normal in size and contour.    Extremities: No clubbing, cyanosis, or edema      LABS:      Lab Results   Component Value Date    PSA 0.49 11/22/2016       IMPRESSION:    Encounter Diagnoses   Name Primary?    Gynecomastia Yes         PLAN:    1. Will check T and E to establish T:E ratio.  2. Will base his follow up off the above.    Copy to:

## 2018-02-08 ENCOUNTER — LAB VISIT (OUTPATIENT)
Dept: LAB | Facility: HOSPITAL | Age: 48
End: 2018-02-08
Attending: UROLOGY
Payer: COMMERCIAL

## 2018-02-08 DIAGNOSIS — N62 GYNECOMASTIA: ICD-10-CM

## 2018-02-08 LAB
ESTRADIOL SERPL-MCNC: 33 PG/ML
TESTOST SERPL-MCNC: 769 NG/DL

## 2018-02-08 PROCEDURE — 84403 ASSAY OF TOTAL TESTOSTERONE: CPT

## 2018-02-08 PROCEDURE — 36415 COLL VENOUS BLD VENIPUNCTURE: CPT | Mod: PO

## 2018-02-08 PROCEDURE — 82670 ASSAY OF TOTAL ESTRADIOL: CPT

## 2018-02-12 ENCOUNTER — TELEPHONE (OUTPATIENT)
Dept: UROLOGY | Facility: CLINIC | Age: 48
End: 2018-02-12

## 2018-02-12 ENCOUNTER — OFFICE VISIT (OUTPATIENT)
Dept: FAMILY MEDICINE | Facility: CLINIC | Age: 48
End: 2018-02-12
Payer: COMMERCIAL

## 2018-02-12 VITALS
HEIGHT: 74 IN | BODY MASS INDEX: 32.82 KG/M2 | WEIGHT: 255.75 LBS | HEART RATE: 62 BPM | DIASTOLIC BLOOD PRESSURE: 82 MMHG | SYSTOLIC BLOOD PRESSURE: 128 MMHG | TEMPERATURE: 98 F

## 2018-02-12 DIAGNOSIS — R74.01 ELEVATED TRANSAMINASE LEVEL: Primary | ICD-10-CM

## 2018-02-12 DIAGNOSIS — R79.89 LFT ELEVATION: ICD-10-CM

## 2018-02-12 DIAGNOSIS — K76.0 NONALCOHOLIC FATTY LIVER DISEASE: ICD-10-CM

## 2018-02-12 PROCEDURE — 99214 OFFICE O/P EST MOD 30 MIN: CPT | Mod: S$GLB,,, | Performed by: FAMILY MEDICINE

## 2018-02-12 PROCEDURE — 3008F BODY MASS INDEX DOCD: CPT | Mod: S$GLB,,, | Performed by: FAMILY MEDICINE

## 2018-02-12 PROCEDURE — 99999 PR PBB SHADOW E&M-EST. PATIENT-LVL III: CPT | Mod: PBBFAC,,, | Performed by: FAMILY MEDICINE

## 2018-02-12 NOTE — TELEPHONE ENCOUNTER
Please notify that his T is normal as is his estradiol.  I don't think it's contributing to any gynecomastia.  RTC prn.

## 2018-02-12 NOTE — TELEPHONE ENCOUNTER
Spoke with pts wife, informed of normal labs, not causing pts symptoms. rtc as needed. pts wife verbalized understanding.

## 2018-02-19 NOTE — PROGRESS NOTES
Subjective:       Patient ID: Lyndon Lion Jr. is a 47 y.o. male.    Chief Complaint: Lab results    Well Adult Physical: Patient here for a comprehensive physical exam.The patient reports problems - Patient Active Problem List:     Radiculopathy, lumbosacral region     Herniated lumbar intervertebral disc     Lumbar spondylosis     DDD (degenerative disc disease), lumbosacral     Greater trochanteric bursitis of left hip     Acute medial meniscal tear     Obesity, unspecified     Hx of colon cancer, stage I     Parada syndrome     Thoracic or lumbosacral neuritis or radiculitis, unspecified     Arthritis of shoulder region, right     Neural foraminal stenosis of lumbar spine     MSH2-related Parada syndrome (HNPCC1)     LFT elevation     Family hx of prostate cancer     Spondylosis of lumbar region without myelopathy or radiculopathy     Spondylolisthesis of lumbar region     Nonalcoholic fatty liver disease     Splenomegaly      Do you take any herbs or supplements that were not prescribed by a doctor? no Are you taking calcium supplements? no Are you taking aspirin daily? no   History:  Patient elevated liver enzymes Patient complains of a positive Hepatitis posible Patient tested positive 20 weeks. Test was performed as part of an evaluation of abnormal liver function test:(elevated ALT, elevated AST). Hepatitis C risk factors present are history of blood transfusion (). Patient denies accidental needle stick, acupuncture, history of clotting factor transfusion. Patient does not have had other studies performed. Results: none. Patient does not have had prior treatment for Hepatitis C. Patient does not have a past history of liver disease. Patient does not have a family history of liver disease.  Patient's current symptoms include fatigue. Patient denies abdominal pain, anorexia, dark urine and diarrhea. Symptoms have been present for approximately 2 months. The symptoms are unchanged.        Review of  Systems   Constitutional: Negative.  Negative for activity change, appetite change, chills, diaphoresis, fatigue, fever and unexpected weight change.   HENT: Negative.  Negative for congestion, drooling, ear discharge, ear pain, hearing loss, mouth sores, nosebleeds, postnasal drip, rhinorrhea, sinus pressure, sore throat, tinnitus, trouble swallowing and voice change.    Eyes: Negative.  Negative for pain, discharge, redness, itching and visual disturbance.   Respiratory: Negative.  Negative for apnea, cough, choking, chest tightness and shortness of breath.    Cardiovascular: Negative.  Negative for chest pain, palpitations and leg swelling.   Gastrointestinal: Negative.  Negative for abdominal distention, abdominal pain and anal bleeding.        Loose bowels   Endocrine: Negative.  Negative for cold intolerance, heat intolerance, polydipsia, polyphagia and polyuria.   Genitourinary: Negative.  Negative for difficulty urinating, dysuria, enuresis, flank pain, frequency, hematuria, scrotal swelling, testicular pain and urgency.   Musculoskeletal: Positive for arthralgias and back pain. Negative for gait problem, neck pain and neck stiffness.   Skin: Negative.  Negative for color change, pallor and rash.   Allergic/Immunologic: Negative.  Negative for environmental allergies and food allergies.   Neurological: Negative.  Negative for dizziness, tremors, syncope, facial asymmetry, speech difficulty, light-headedness, numbness and headaches.   Hematological: Negative for adenopathy. Does not bruise/bleed easily.   Psychiatric/Behavioral: Negative.  Negative for agitation, behavioral problems, confusion, decreased concentration, dysphoric mood and hallucinations. The patient is not hyperactive.        Objective:      Physical Exam   Constitutional: He is oriented to person, place, and time. He appears well-developed and well-nourished. No distress.   HENT:   Head: Normocephalic and atraumatic.   Right Ear: External ear  normal.   Left Ear: External ear normal.   Nose: Nose normal.   Mouth/Throat: Oropharynx is clear and moist. No oropharyngeal exudate.   Eyes: Conjunctivae and EOM are normal. Pupils are equal, round, and reactive to light. Right eye exhibits no discharge. Left eye exhibits no discharge. No scleral icterus.   Neck: Normal range of motion. Neck supple. No JVD present. No tracheal deviation present. No thyromegaly present.   Cardiovascular: Normal rate, normal heart sounds and intact distal pulses.    No murmur heard.  Pulmonary/Chest: Effort normal and breath sounds normal. No respiratory distress. He has no wheezes. He has no rales.   Abdominal: Soft. Bowel sounds are normal. He exhibits no distension and no mass. There is no tenderness. There is no rebound and no guarding.   Musculoskeletal: He exhibits no edema or tenderness.          Lymphadenopathy:     He has no cervical adenopathy.   Neurological: He is alert and oriented to person, place, and time. No cranial nerve deficit. Coordination normal.   Skin: Skin is warm and dry. No rash noted. He is not diaphoretic. No erythema. No pallor.   Psychiatric: He has a normal mood and affect. His behavior is normal. Judgment and thought content normal.   Vitals reviewed.      Assessment:       1. Elevated transaminase level    2. Nonalcoholic fatty liver disease    3. LFT elevation        Plan:       Elevated transaminase level  -     CK; Future; Expected date: 02/12/2018  -     CBC auto differential; Future; Expected date: 02/12/2018  -     Comprehensive metabolic panel; Future; Expected date: 02/12/2018  -     Bilirubin, direct; Future; Expected date: 02/12/2018  -     Hepatitis panel, acute; Future; Expected date: 02/12/2018  -     Lipid panel; Future; Expected date: 02/12/2018  -     AFP tumor marker; Future; Expected date: 02/12/2018  -     Parvovirus B19 antibody, IgG and IgM; Future; Expected date: 02/12/2018  -     FANTA; Future; Expected date: 02/12/2018  -      IgA; Future; Expected date: 02/12/2018  -     Amylase; Future; Expected date: 02/12/2018  -     Lipase; Future; Expected date: 02/12/2018  -     Acetaminophen level; Future; Expected date: 02/12/2018  -     C-reactive protein; Future; Expected date: 02/12/2018  -     TSH; Future; Expected date: 02/12/2018  -     US Abdomen Complete; Future; Expected date: 02/12/2018    Nonalcoholic fatty liver disease  -     US Abdomen Complete; Future; Expected date: 02/12/2018    LFT elevation  -     US Abdomen Complete; Future; Expected date: 02/12/2018      Patient readiness: acceptance and barriers:readiness and social stressors    During the course of the visit the patient was educated and counseled about the following:     Obesity:   General weight loss/lifestyle modification strategies discussed (elicit support from others; identify saboteurs; non-food rewards, etc).    Goals: Obesity: Reduce calorie intake and BMI    Did patient meet goals/outcomes: No    The following self management tools provided: excercise log    Patient Instructions (the written plan) was given to the patient/family.     Time spent with patient: 45 minutes

## 2018-02-20 ENCOUNTER — HOSPITAL ENCOUNTER (OUTPATIENT)
Dept: RADIOLOGY | Facility: CLINIC | Age: 48
Discharge: HOME | End: 2018-02-20
Attending: FAMILY MEDICINE
Payer: COMMERCIAL

## 2018-02-20 DIAGNOSIS — K76.0 NONALCOHOLIC FATTY LIVER DISEASE: ICD-10-CM

## 2018-02-20 DIAGNOSIS — R79.89 LFT ELEVATION: ICD-10-CM

## 2018-02-20 DIAGNOSIS — R74.01 ELEVATED TRANSAMINASE LEVEL: ICD-10-CM

## 2018-02-20 PROCEDURE — 76700 US EXAM ABDOM COMPLETE: CPT | Mod: 26,,, | Performed by: RADIOLOGY

## 2018-02-20 PROCEDURE — 76700 US EXAM ABDOM COMPLETE: CPT | Mod: TC,PO

## 2018-02-21 ENCOUNTER — PATIENT MESSAGE (OUTPATIENT)
Dept: FAMILY MEDICINE | Facility: CLINIC | Age: 48
End: 2018-02-21

## 2018-03-09 DIAGNOSIS — Z15.09 LYNCH SYNDROME: Primary | ICD-10-CM

## 2018-05-30 ENCOUNTER — SURGERY (OUTPATIENT)
Age: 48
End: 2018-05-30

## 2018-05-30 ENCOUNTER — ANESTHESIA EVENT (OUTPATIENT)
Dept: ENDOSCOPY | Facility: HOSPITAL | Age: 48
End: 2018-05-30
Payer: COMMERCIAL

## 2018-05-30 ENCOUNTER — HOSPITAL ENCOUNTER (OUTPATIENT)
Facility: HOSPITAL | Age: 48
Discharge: HOME | End: 2018-05-30
Attending: INTERNAL MEDICINE | Admitting: INTERNAL MEDICINE
Payer: COMMERCIAL

## 2018-05-30 ENCOUNTER — ANESTHESIA (OUTPATIENT)
Dept: ENDOSCOPY | Facility: HOSPITAL | Age: 48
End: 2018-05-30
Payer: COMMERCIAL

## 2018-05-30 VITALS
WEIGHT: 250 LBS | TEMPERATURE: 98 F | DIASTOLIC BLOOD PRESSURE: 75 MMHG | BODY MASS INDEX: 32.08 KG/M2 | HEART RATE: 51 BPM | HEIGHT: 74 IN | RESPIRATION RATE: 16 BRPM | SYSTOLIC BLOOD PRESSURE: 124 MMHG | OXYGEN SATURATION: 97 %

## 2018-05-30 DIAGNOSIS — Z15.09 LYNCH SYNDROME: ICD-10-CM

## 2018-05-30 PROCEDURE — 37000008 HC ANESTHESIA 1ST 15 MINUTES: Performed by: INTERNAL MEDICINE

## 2018-05-30 PROCEDURE — E9220 PRA ENDO ANESTHESIA: HCPCS | Mod: 33,,, | Performed by: NURSE ANESTHETIST, CERTIFIED REGISTERED

## 2018-05-30 PROCEDURE — 27201012 HC FORCEPS, HOT/COLD, DISP: Performed by: INTERNAL MEDICINE

## 2018-05-30 PROCEDURE — 37000009 HC ANESTHESIA EA ADD 15 MINS: Performed by: INTERNAL MEDICINE

## 2018-05-30 PROCEDURE — 88305 TISSUE EXAM BY PATHOLOGIST: CPT | Mod: 26,,, | Performed by: PATHOLOGY

## 2018-05-30 PROCEDURE — 63600175 PHARM REV CODE 636 W HCPCS: Performed by: NURSE ANESTHETIST, CERTIFIED REGISTERED

## 2018-05-30 PROCEDURE — 88305 TISSUE EXAM BY PATHOLOGIST: CPT | Performed by: PATHOLOGY

## 2018-05-30 PROCEDURE — 45380 COLONOSCOPY AND BIOPSY: CPT | Performed by: INTERNAL MEDICINE

## 2018-05-30 PROCEDURE — 25000003 PHARM REV CODE 250: Performed by: INTERNAL MEDICINE

## 2018-05-30 PROCEDURE — 45380 COLONOSCOPY AND BIOPSY: CPT | Mod: 33,,, | Performed by: INTERNAL MEDICINE

## 2018-05-30 RX ORDER — LIDOCAINE HCL/PF 100 MG/5ML
SYRINGE (ML) INTRAVENOUS
Status: DISCONTINUED | OUTPATIENT
Start: 2018-05-30 | End: 2018-05-30

## 2018-05-30 RX ORDER — PROPOFOL 10 MG/ML
INJECTION, EMULSION INTRAVENOUS
Status: DISCONTINUED | OUTPATIENT
Start: 2018-05-30 | End: 2018-05-30

## 2018-05-30 RX ORDER — SODIUM CHLORIDE 9 MG/ML
INJECTION, SOLUTION INTRAVENOUS CONTINUOUS
Status: DISCONTINUED | OUTPATIENT
Start: 2018-05-30 | End: 2018-05-30 | Stop reason: HOSPADM

## 2018-05-30 RX ORDER — PROPOFOL 10 MG/ML
INJECTION, EMULSION INTRAVENOUS CONTINUOUS PRN
Status: DISCONTINUED | OUTPATIENT
Start: 2018-05-30 | End: 2018-05-30

## 2018-05-30 RX ORDER — LIDOCAINE HYDROCHLORIDE 10 MG/ML
1 INJECTION INFILTRATION; PERINEURAL ONCE
Status: DISCONTINUED | OUTPATIENT
Start: 2018-05-30 | End: 2018-05-30 | Stop reason: HOSPADM

## 2018-05-30 RX ADMIN — LIDOCAINE HYDROCHLORIDE 100 MG: 20 INJECTION, SOLUTION INTRAVENOUS at 07:05

## 2018-05-30 RX ADMIN — PROPOFOL 100 MG: 10 INJECTION, EMULSION INTRAVENOUS at 07:05

## 2018-05-30 RX ADMIN — PROPOFOL 200 MCG/KG/MIN: 10 INJECTION, EMULSION INTRAVENOUS at 07:05

## 2018-05-30 RX ADMIN — SODIUM CHLORIDE: 9 INJECTION, SOLUTION INTRAVENOUS at 07:05

## 2018-05-30 NOTE — H&P
[facility]  History & Physical    Subjective:      Chief Complaint/Reason for Admission:    colonoscopy    Lyndon Lion Jr. is a 48 y.o. male.    Past Medical History:   Diagnosis Date    Arthritis     Asthma     AS A CHILD    Colon cancer     Family hx of prostate cancer 11/22/2016    Hx of colon cancer, stage I 7/3/2014    Parada syndrome     Vitamin D deficiency      Past Surgical History:   Procedure Laterality Date    APPENDECTOMY      COLON SURGERY  2008    PARTIAL COLECTOMY    COLONOSCOPY Bilateral 2012    COLONOSCOPY N/A 8/1/2016    Procedure: COLONOSCOPY;  Surgeon: Blaze Marr MD;  Location: [facility] ENDO;  Service: Endoscopy;  Laterality: N/A;    COLONOSCOPY N/A 9/20/2017    Procedure: COLONOSCOPY;  Surgeon: Dom Leonardo MD;  [facility]  Service: Endoscopy;  Laterality: N/A;  not given PM prep    COLONOSCOPY N/A 10/9/2017    Procedure: COLONOSCOPY;  Surgeon: RAMON Callahan MD;  [facility]  Service: Endoscopy;  Laterality: N/A;  ok for Prepopik per Dr Callahan    COLONOSCOPY N/A 11/20/2017    Procedure: COLONOSCOPY/EMR;  Surgeon: Joseluis Choe MD;  [facility]  Service: Endoscopy;  Laterality: N/A;  EMR    no pm prep    Epidural Steroid Injection  10/23/15    Lumbar    ESOPHAGOGASTRODUODENOSCOPY      GASTRIC BYPASS  2010    SLEEVE    KNEE ARTHROSCOPY Right      Family History   Problem Relation Age of Onset    Melanoma Mother     Heart disease Father     Diabetes Father     Cancer Maternal Uncle         colon    Cancer Maternal Grandfather         colon ca    Psoriasis Neg Hx     Lupus Neg Hx     Eczema Neg Hx      Social History   Substance Use Topics    Smoking status: Never Smoker    Smokeless tobacco: Never Used    Alcohol use Yes      Comment: RARELY       PTA Medications   Medication Sig    cyclobenzaprine (FLEXERIL) 10 MG tablet TAKE 1 TABLET(10 MG) BY MOUTH TWICE DAILY AS NEEDED  FOR MUSCLE SPASMS    oxyCODONE-acetaminophen (PERCOCET)  mg per tablet Take 1 tablet by mouth every 6 to 8 hours as needed for Pain.    diclofenac sodium 1 % Gel APPLY 2 GRAMS TO AFFECTED AREA FOUR TIMES DAILY    ergocalciferol (ERGOCALCIFEROL) 50,000 unit Cap Take 1 capsule (50,000 Units total) by mouth every 7 days.    metronidazole (METROGEL) 0.75 % gel AAA nose BID     Review of patient's allergies indicates:  No Known Allergies     Review of Systems   Constitutional: Negative for chills and fever.   Respiratory: Negative for shortness of breath and wheezing.    Cardiovascular: Negative for chest pain.   Gastrointestinal: Negative for abdominal pain.       Objective:      Vital Signs (Most Recent)  Temp: 98.7 °F (37.1 °C) (05/30/18 0729)  Pulse: (!) 58 (05/30/18 0729)  Resp: 17 (05/30/18 0729)  BP: 139/80 (05/30/18 0729)  SpO2: 96 % (05/30/18 0729)    Vital Signs Range (Last 24H):  Temp:  [98.7 °F (37.1 °C)]   Pulse:  [58]   Resp:  [17]   BP: (139)/(80)   SpO2:  [96 %]     Physical Exam   Constitutional: He appears well-developed and well-nourished.   Cardiovascular: Normal rate.    Pulmonary/Chest: Effort normal.   Abdominal: Soft. Bowel sounds are normal.   Skin: Skin is dry.   Psychiatric: He has a normal mood and affect. His behavior is normal. Judgment and thought content normal.       .     Assessment:      Active Hospital Problems    Diagnosis  POA    Parada syndrome [Z15.09]  Yes      Resolved Hospital Problems    Diagnosis Date Resolved POA   No resolved problems to display.       Plan:    Surveillance colonoscopy parada

## 2018-05-30 NOTE — TRANSFER OF CARE
"Anesthesia Transfer of Care Note    Patient: Lyndon Lion Jr.    Procedure(s) Performed: Procedure(s) (LRB):  COLONOSCOPY (N/A)    Patient location: GI    Anesthesia Type: general    Transport from OR: Transported from OR on room air with adequate spontaneous ventilation    Post pain: adequate analgesia    Post assessment: no apparent anesthetic complications and tolerated procedure well    Post vital signs: stable    Level of consciousness: awake    Nausea/Vomiting: no nausea/vomiting    Complications: none    Transfer of care protocol was followed      Last vitals:   Visit Vitals  BP (!) 125/59 (BP Location: Left arm, Patient Position: Lying)   Pulse 65   Temp 36.7 °C (98.1 °F) (Temporal)   Resp 16   Ht 6' 2" (1.88 m)   Wt 113.4 kg (250 lb)   SpO2 96%   BMI 32.10 kg/m²     "

## 2018-05-30 NOTE — ANESTHESIA POSTPROCEDURE EVALUATION
"Anesthesia Post Evaluation    Patient: Lyndon Lion Jr.    Procedure(s) Performed: Procedure(s) (LRB):  COLONOSCOPY (N/A)    Final Anesthesia Type: general  Patient location during evaluation: PACU  Patient participation: Yes- Able to Participate  Level of consciousness: awake and alert  Post-procedure vital signs: reviewed and stable  Pain management: adequate  Airway patency: patent  PONV status at discharge: No PONV  Anesthetic complications: no      Cardiovascular status: hemodynamically stable  Respiratory status: unassisted  Hydration status: euvolemic  Follow-up not needed.        Visit Vitals  BP (!) 117/59 (BP Location: Left arm)   Pulse (!) 56   Temp 36.7 °C (98.1 °F) (Temporal)   Resp 18   Ht 6' 2" (1.88 m)   Wt 113.4 kg (250 lb)   SpO2 97%   BMI 32.10 kg/m²       Pain/Karthikeyan Score: Pain Assessment Performed: Yes (5/30/2018  7:27 AM)  Presence of Pain: denies (5/30/2018  8:33 AM)  Pain Rating Prior to Med Admin: 0 (5/30/2018  7:27 AM)  Karthikeyan Score: 10 (5/30/2018  8:33 AM)      "

## 2018-05-30 NOTE — ANESTHESIA PREPROCEDURE EVALUATION
05/30/2018  Lyndon Lion Jr. is a 48 y.o., male.    Anesthesia Evaluation    I have reviewed the Patient Summary Reports.    I have reviewed the Nursing Notes.   I have reviewed the Medications.     Review of Systems  Anesthesia Hx:  No problems with previous Anesthesia   Denies Personal Hx of Anesthesia complications.   Hematology/Oncology:  Hematology Normal      Current/Recent Cancer. Other (see Oncology comments) Oncology Comments: Colon cancer    EENT/Dental:EENT/Dental Normal   Cardiovascular:  Cardiovascular Normal Exercise tolerance: good     Pulmonary:   Asthma    Renal/:  Renal/ Normal     Hepatic/GI:   Liver Disease, ([place]) Parada Syndrome  H/o gastric bypass   Musculoskeletal:   Arthritis   Spine Disorders: lumbar Disc disease    Endocrine:  Endocrine Normal    Dermatological:  Skin Normal    Psych:  Psychiatric Normal           Physical Exam  General:  Well nourished    Airway/Jaw/Neck:  Airway Findings: Mouth Opening: Normal Mallampati: I  TM Distance: Normal, at least 6 cm  Jaw/Neck Findings:  Neck ROM: Normal ROM      Dental:  Dental Findings: In tact   Chest/Lungs:  Chest/Lungs Findings: Clear to auscultation, Normal Respiratory Rate     Heart/Vascular:  Heart Findings: Rate: Normal  Rhythm: Regular Rhythm  Sounds: Normal     Abdomen:  Abdomen Findings:  Normal, Soft, Nontender       Mental Status:  Mental Status Findings:  Alert and Oriented, Cooperative         Anesthesia Plan  Type of Anesthesia, risks & benefits discussed:  Anesthesia Type:  general  Patient's Preference:   Intra-op Monitoring Plan: standard ASA monitors  Intra-op Monitoring Plan Comments:   Post Op Pain Control Plan: per primary service following discharge from PACU  Post Op Pain Control Plan Comments:   Induction:   IV  Beta Blocker:  Patient is not currently on a Beta-Blocker (No further documentation  required).       Informed Consent: Patient understands risks and agrees with Anesthesia plan.  Questions answered. Anesthesia consent signed with patient.  ASA Score: 2     Day of Surgery Review of History & Physical: I have interviewed and examined the patient. I have reviewed the patient's H&P dated: 05/30/18. There are no significant changes.          Ready For Surgery From Anesthesia Perspective.

## 2018-05-30 NOTE — DISCHARGE INSTRUCTIONS

## 2018-05-30 NOTE — [employer] PATIENT INSTRUCTIONS
Discharge Summary/Instructions after an Endoscopic Procedure  Patient Name: Lyndon Lion  Patient MRN: [id number]  Patient YOB: 1970  Wednesday, May 30, 2018  Dom Leonardo MD  RESTRICTIONS:  During your procedure today, you received medications for sedation.  These   medications may affect your judgment, balance and coordination.  Therefore,   for 24 hours, you have the following restrictions:   - DO NOT drive a car, operate machinery, make legal/financial decisions,   sign important papers or drink alcohol.    ACTIVITY:  The following day: return to full activity including work, except no heavy   lifting, straining or running for 3 days if polyps were removed.  DIET:  Eat and drink normally unless instructed otherwise.     TREATMENT FOR COMMON SIDE EFFECTS:  - Mild abdominal pain, nausea, belching, bloating or excessive gas:  rest,   eat lightly and use a heating pad.  - Sore Throat: treat with throat lozenges and/or gargle with warm salt   water.  - Because air was used during the procedure, expelling large amounts of air   from your rectum or belching is normal.  - If a bowel prep was taken, you may not have a bowel movement for 1-3 days.    This is normal.  SYMPTOMS TO WATCH FOR AND REPORT TO YOUR PHYSICIAN:  1. Abdominal pain or bloating, other than gas cramps.  2. Chest pain.  3. Back pain.  4. Signs of infection such as: chills or fever occurring within 24 hours   after the procedure.  5. Rectal bleeding, which would show as bright red, maroon, or black stools.   (A tablespoon of blood from the rectum is not serious, especially if   hemorrhoids are present.)  6. Vomiting.  7. Weakness or dizziness.  GO DIRECTLY TO THE NEAREST EMERGENCY ROOM IF YOU HAVE ANY OF THE FOLLOWING:      Difficulty breathing              Chills and/or fever over 101 F   Persistent vomiting and/or vomiting blood   Severe abdominal pain   Severe chest pain   Black, tarry stools   Bleeding- more than one tablespoon   Any  other symptom or condition that you feel may need urgent attention  Your doctor recommends these additional instructions:  If any biopsies were taken, your doctors clinic will contact you in 1 to 2   weeks with any results.  - Discharge patient to home.   - Await pathology results.   - Telephone endoscopist for pathology results in 2 weeks.   - Repeat colonoscopy in 1 year for surveillance.   - Sooner for any dysplasia.  If any dysplasia recommend referral to CR[facility].  - The findings and recommendations were discussed with the patient.   - Return to GI clinic at the next available appointment.   - For the next 2 weeks only - Consider avoiding all non-steroidal   anti-inflammatory drugs (aspirin, ibuprofen, naproxen, etc.), unless needed   for cardiovascular protection.  Recommend you discuss with your prescribing   doctor (of your aspirin) to see if cardiovascular benefits of your aspirin   outweigh the risks of GI bleeding.  For questions, problems or results please call your physician - Dom Leonardo MD at Work:  [phone].  [facility], EMERGENCY ROOM PHONE NUMBER: [phone]  IF A COMPLICATION OR EMERGENCY SITUATION ARISES AND YOU ARE UNABLE TO REACH   YOUR PHYSICIAN - GO DIRECTLY TO THE EMERGENCY ROOM.  Dom Leonardo MD  5/30/2018 8:21:10 AM  This report has been verified and signed electronically.  [employer]

## 2018-06-06 ENCOUNTER — TELEPHONE (OUTPATIENT)
Dept: ENDOSCOPY | Facility: HOSPITAL | Age: 48
End: 2018-06-06

## 2018-07-26 ENCOUNTER — ANESTHESIA EVENT (OUTPATIENT)
Dept: SURGERY | Facility: AMBULARY SURGERY CENTER | Age: 48
End: 2018-07-26
Payer: COMMERCIAL

## 2018-07-27 ENCOUNTER — SURGERY (OUTPATIENT)
Age: 48
End: 2018-07-27

## 2018-07-27 ENCOUNTER — HOSPITAL ENCOUNTER (OUTPATIENT)
Facility: AMBULARY SURGERY CENTER | Age: 48
Discharge: HOME | End: 2018-07-27
Attending: SPECIALIST | Admitting: SPECIALIST
Payer: COMMERCIAL

## 2018-07-27 ENCOUNTER — ANESTHESIA (OUTPATIENT)
Dept: SURGERY | Facility: AMBULARY SURGERY CENTER | Age: 48
End: 2018-07-27
Payer: COMMERCIAL

## 2018-07-27 VITALS
HEIGHT: 74 IN | SYSTOLIC BLOOD PRESSURE: 133 MMHG | WEIGHT: 250 LBS | RESPIRATION RATE: 20 BRPM | HEART RATE: 64 BPM | BODY MASS INDEX: 32.08 KG/M2 | OXYGEN SATURATION: 95 % | DIASTOLIC BLOOD PRESSURE: 78 MMHG | TEMPERATURE: 98 F

## 2018-07-27 DIAGNOSIS — M47.816 SPONDYLOSIS OF LUMBAR REGION WITHOUT MYELOPATHY OR RADICULOPATHY: ICD-10-CM

## 2018-07-27 DIAGNOSIS — M43.16 SPONDYLOLISTHESIS OF LUMBAR REGION: ICD-10-CM

## 2018-07-27 DIAGNOSIS — M47.816 LUMBAR SPONDYLOSIS: ICD-10-CM

## 2018-07-27 DIAGNOSIS — M51.26 HERNIATED LUMBAR INTERVERTEBRAL DISC: Primary | ICD-10-CM

## 2018-07-27 DIAGNOSIS — M51.37 DDD (DEGENERATIVE DISC DISEASE), LUMBOSACRAL: ICD-10-CM

## 2018-07-27 DIAGNOSIS — M54.16 LUMBAR RADICULOPATHY: ICD-10-CM

## 2018-07-27 PROCEDURE — D9220A PRA ANESTHESIA: Mod: ANES,,, | Performed by: ANESTHESIOLOGY

## 2018-07-27 PROCEDURE — D9220A PRA ANESTHESIA: Mod: CRNA,,, | Performed by: NURSE ANESTHETIST, CERTIFIED REGISTERED

## 2018-07-27 PROCEDURE — 62323 NJX INTERLAMINAR LMBR/SAC: CPT | Performed by: SPECIALIST

## 2018-07-27 RX ORDER — BUPIVACAINE HYDROCHLORIDE 2.5 MG/ML
INJECTION, SOLUTION EPIDURAL; INFILTRATION; INTRACAUDAL
Status: DISPENSED
Start: 2018-07-27 | End: 2018-07-28

## 2018-07-27 RX ORDER — PROPOFOL 10 MG/ML
INJECTION, EMULSION INTRAVENOUS
Status: COMPLETED
Start: 2018-07-27 | End: 2018-07-27

## 2018-07-27 RX ORDER — SODIUM CHLORIDE 9 MG/ML
INJECTION, SOLUTION INTRAMUSCULAR; INTRAVENOUS; SUBCUTANEOUS
Status: DISCONTINUED | OUTPATIENT
Start: 2018-07-27 | End: 2018-07-27 | Stop reason: HOSPADM

## 2018-07-27 RX ORDER — PROPOFOL 10 MG/ML
VIAL (ML) INTRAVENOUS
Status: DISCONTINUED | OUTPATIENT
Start: 2018-07-27 | End: 2018-07-27

## 2018-07-27 RX ORDER — LIDOCAINE HYDROCHLORIDE 10 MG/ML
0.5 INJECTION, SOLUTION EPIDURAL; INFILTRATION; INTRACAUDAL; PERINEURAL ONCE
Status: DISCONTINUED | OUTPATIENT
Start: 2018-07-27 | End: 2018-07-27 | Stop reason: HOSPADM

## 2018-07-27 RX ORDER — DEXAMETHASONE SODIUM PHOSPHATE 100 MG/10ML
INJECTION INTRAMUSCULAR; INTRAVENOUS
Status: DISCONTINUED | OUTPATIENT
Start: 2018-07-27 | End: 2018-07-27 | Stop reason: HOSPADM

## 2018-07-27 RX ORDER — BUPIVACAINE HYDROCHLORIDE 2.5 MG/ML
INJECTION, SOLUTION EPIDURAL; INFILTRATION; INTRACAUDAL
Status: DISCONTINUED | OUTPATIENT
Start: 2018-07-27 | End: 2018-07-27 | Stop reason: HOSPADM

## 2018-07-27 RX ORDER — DEXAMETHASONE SODIUM PHOSPHATE 10 MG/ML
INJECTION INTRAMUSCULAR; INTRAVENOUS
Status: DISPENSED
Start: 2018-07-27 | End: 2018-07-28

## 2018-07-27 RX ORDER — SODIUM CHLORIDE, SODIUM LACTATE, POTASSIUM CHLORIDE, CALCIUM CHLORIDE 600; 310; 30; 20 MG/100ML; MG/100ML; MG/100ML; MG/100ML
INJECTION, SOLUTION INTRAVENOUS CONTINUOUS
Status: DISCONTINUED | OUTPATIENT
Start: 2018-07-27 | End: 2018-07-27 | Stop reason: HOSPADM

## 2018-07-27 RX ORDER — LIDOCAINE HCL/PF 100 MG/5ML
SYRINGE (ML) INTRAVENOUS
Status: DISCONTINUED | OUTPATIENT
Start: 2018-07-27 | End: 2018-07-27

## 2018-07-27 RX ADMIN — Medication 50 MG: at 10:07

## 2018-07-27 RX ADMIN — SODIUM CHLORIDE 4.5 ML: 9 INJECTION, SOLUTION INTRAMUSCULAR; INTRAVENOUS; SUBCUTANEOUS at 10:07

## 2018-07-27 RX ADMIN — Medication 150 MG: at 10:07

## 2018-07-27 RX ADMIN — DEXAMETHASONE SODIUM PHOSPHATE 20 MG: 100 INJECTION INTRAMUSCULAR; INTRAVENOUS at 10:07

## 2018-07-27 RX ADMIN — SODIUM CHLORIDE, SODIUM LACTATE, POTASSIUM CHLORIDE, CALCIUM CHLORIDE: 600; 310; 30; 20 INJECTION, SOLUTION INTRAVENOUS at 09:07

## 2018-07-27 RX ADMIN — BUPIVACAINE HYDROCHLORIDE 4.5 ML: 2.5 INJECTION, SOLUTION EPIDURAL; INFILTRATION; INTRACAUDAL at 10:07

## 2018-07-27 RX ADMIN — BUPIVACAINE HYDROCHLORIDE 5 ML: 2.5 INJECTION, SOLUTION EPIDURAL; INFILTRATION; INTRACAUDAL at 10:07

## 2018-07-27 NOTE — ANESTHESIA PREPROCEDURE EVALUATION
07/27/2018  Lyndon Lion Jr. is a 48 y.o., male.    Pre-op Assessment    I have reviewed the Patient Summary Reports.     I have reviewed the Nursing Notes.   I have reviewed the Medications.     Review of Systems  Anesthesia Hx:  No problems with previous Anesthesia Denies Hx of Anesthetic complications  Denies Family Hx of Anesthesia complications.   Denies Personal Hx of Anesthesia complications.   Pulmonary:   Asthma mild    Hepatic/GI:   Liver Disease,    Musculoskeletal:   Arthritis   Spine Disorders: lumbar    Neurological:   Chronic Pain Syndrome       Physical Exam  General:  Well nourished    Airway/Jaw/Neck:  Airway Findings: Mouth Opening: Normal Tongue: Normal  General Airway Assessment: Adult       Chest/Lungs:  Chest/Lungs Clear    Heart/Vascular:  Heart Findings: Normal            Anesthesia Plan  Type of Anesthesia, risks & benefits discussed:  Anesthesia Type:  MAC  Patient's Preference:   Intra-op Monitoring Plan:   Intra-op Monitoring Plan Comments:   Post Op Pain Control Plan:   Post Op Pain Control Plan Comments:   Induction:   IV  Beta Blocker:  Patient is not currently on a Beta-Blocker (No further documentation required).       Informed Consent: Patient understands risks and agrees with Anesthesia plan.  Questions answered. Anesthesia consent signed with patient.  ASA Score: 2     Day of Surgery Review of History & Physical:    H&P update referred to the provider.         Ready For Surgery From Anesthesia Perspective.

## 2018-07-27 NOTE — DISCHARGE INSTRUCTIONS
Recovery After Procedural Sedation (Adult)  You have been given medicine by vein to make you sleep during your surgery. This may have included both a pain medicine and sleeping medicine. Most of the effects have worn off. But you may still have some drowsiness for the next 6 to 8 hours.  Home care  Follow these guidelines when you get home:  · For the next 8 hours, you should be watched by a responsible adult. This person should make sure your condition is not getting worse.  · Don't drink any alcohol for the next 24 hours.  · Don't drive, operate dangerous machinery, or make important business or personal decisions during the next 24 hours.  Note: Your healthcare provider may tell you not to take any medicine by mouth for pain or sleep in the next 4 hours. These medicines may react with the medicines you were given in the hospital. This could cause a much stronger response than usual.  Follow-up care  Follow up with your healthcare provider if you are not alert and back to your usual level of activity within 12 hours.  When to seek medical advice  Call your healthcare provider right away if any of these occur:  · Drowsiness gets worse  · Weakness or dizziness gets worse  · Repeated vomiting  · You can't be awakened   Date Last Reviewed: 10/18/2016  © 7110[employer]643 [employer]. [address] [place] [address], [place] [address]. All rights reserved. This information is not intended as a substitute for professional medical care. Always follow your healthcare professional's instructions.      Pain injection instructions:     Steroids take about a 2 weeks to relieve pain.  Initially you may get pain relief from the local anesthetic but this may wear off before the steroid works.    No driving for 24 hrs.   Activity as tolerated- gradually increase activities.  Dont lift over 10 lbs for 24 hrs   No heat at injection sites x 2 days. No heating pads, hot tubs, saunas, or swimming in any body of water or pool for 2  days.  Use ice pack for mild swelling and for comfort , apply for 20 minutes, remove for 20 minute intervals. No direct contact of ice itself  to skin.  May shower today. No tub baths for two days.      Resume Aspirin, Plavix, or Coumadin the day after the procedure unless otherwise instructed.   If diabetic,monitor your glucose carefully as steroids can increase your glucose level    Seek immediate medical help for:   Severe increase in your usual pain or appearance of new pain.  Prolonged (mor than 8 hours) or increasing weakness or numbness in the legs or arms.    - Numbing medicine was injected that affects nerves that carry information from       muscles to the  brain and the brain to the muscles.  This numbness can last 6-8 hrs so be very careful and get assistance when standing or walking.    Fever above 101 ,Drainage,redness,active bleeding, or increased swelling at the injection site.  Headache, shortness of breath, chest pain, or breathing problems.

## 2018-07-27 NOTE — DISCHARGE SUMMARY
[facility]  Discharge Note  Short Stay    Admit Date: 7/27/2018    Discharge Date and Time: 7/27/2018       Attending Physician: Lyndon Brooke Jr., MD     Discharge Provider: Lyndon Brooke Jr    Diagnoses:  Active Hospital Problems    Diagnosis  POA    *Lumbar radiculopathy [M54.16]  Yes      Resolved Hospital Problems    Diagnosis Date Resolved POA   No resolved problems to display.       Discharged Condition: good    Hospital Course: Patient was admitted for an outpatient procedure and tolerated the procedure well with no complications.    Final Diagnoses: Same as principal problem.    Disposition: Home or Self Care    Follow up/Patient Instructions:    Medications:  Reconciled Home Medications:      Medication List      CONTINUE taking these medications    cyclobenzaprine 10 MG tablet  Commonly known as:  FLEXERIL  TAKE 1 TABLET(10 MG) BY MOUTH TWICE DAILY AS NEEDED FOR MUSCLE SPASMS     diclofenac sodium 1 % Gel  APPLY 2 GRAMS TO AFFECTED AREA FOUR TIMES DAILY     ergocalciferol 50,000 unit Cap  Commonly known as:  ERGOCALCIFEROL  Take 1 capsule (50,000 Units total) by mouth every 7 days.     metroNIDAZOLE 0.75 % gel  Commonly known as:  METROGEL  AAA nose BID     oxyCODONE-acetaminophen  mg per tablet  Commonly known as:  PERCOCET  Take 1 tablet by mouth every 6 to 8 hours as needed for Pain.          No discharge procedures on file.  Follow-up Information     Lyndon Brooke Jr, MD In 2 weeks.    Specialty:  Orthopedic Surgery  Contact information:  [address]  [address]  [facility] [address]  [phone]                   Discharge Procedure Orders (must include Diet, Follow-up, Activity):  No discharge procedures on file.

## 2018-07-27 NOTE — TRANSFER OF CARE
"Anesthesia Transfer of Care Note    Patient: Lyndon Lion Jr.    Procedure(s) Performed: Procedure(s) (LRB):  Injection-steroid-epidural-lumbar (N/A)    Anesthesia Type: MAC    Transport from OR: Transported from OR on room air with adequate spontaneous ventilation    Post pain: adequate analgesia    Post assessment: no apparent anesthetic complications    Post vital signs: stable    Level of consciousness: awake    Nausea/Vomiting: no nausea/vomiting    Complications: none    Transfer of care protocol was followed      Last vitals:   Visit Vitals  /64 (BP Location: Right arm, Patient Position: Sitting)   Pulse 65   Temp 37.1 °C (98.8 °F) (Skin)   Resp 18   Ht 6' 2" (1.88 m)   Wt 113.4 kg (250 lb)   SpO2 96%   BMI 32.10 kg/m²     "

## 2018-07-27 NOTE — OP NOTE
DATE OF PROCEDURE: 7/27/2018     PREOPERATIVE DIAGNOSIS  Lumbar Radiculopathy    POSTOPERATIVE DIAGNOSIS  Lumbar Radiculopathy    PROCEDURE  Caudal sacrolumbar epidural steroid Marcaine injection  Intraoperative fluoroscopy  Epidurogram      SURGEON  Lyndon Brooke MD    ANESTHESIA  Monitored anesthesia care    ESTIMATED BLOOD LOSS  Less than 1 mL    INDICATION FOR SURGERY    Patient presents with complaints of back and associated leg radicular pain. The patients pain continues to persist, interfering in the patients quality of life and activities of daily living. Surgical procedure planned with steroid and Marcaine injection into the sacrolumbar epidural region in an attempt to improve leg radicular pain. The patient was informed that the surgical procedure provides no guarantee of improvement or worsening of present condition, but only an attempt to improve overall condition and function. The patient expressed understanding, and all questions were answered and still desired to proceed with operative procedure.      PROCEDURE IN DETAIL    The patient was taken to the operating room, and placed on operating table in prone position. All bony prominences were well padded and protected. Anesthesia achieved with monitored anesthesia care.  After adequate anesthesia was achieved, the lower back and sacral region was prepped and draped in the usual sterile fashion.  Approximately 3 ml of 0.25% Marcaine was utilized locally within skin and subcutaneous tissue. With the assistance of intraoperative fluoroscopy, an 18-gauge spinal needle was introduced and advanced into the epidural space via the sacral hiatus.  Confirmation was obtained with Omnipaque placed into the epidural space.  Mixture of approximately 4.5ml of 0.25% Marcaine preservative free, 4.5 ml of normal saline preservative free, and 2ml of 20mg Decadron was made and advanced into the epidural space via the sacral hiatus. At the completion of epidural  injection, the needle was slowly withdrawn. The surgical site was cleaned with normal saline with application of sterile occlusive Band-Aid. Anesthesia was reversed and the patient was transferred to recovery room in stable condition without immediate apparent surgical complications.

## 2018-07-27 NOTE — PLAN OF CARE
Stable, states ready  To go home, sue po fluids, able to hold legs off bed and stand, denies pain, ambulated to car with RN and wife

## 2018-07-27 NOTE — ANESTHESIA POSTPROCEDURE EVALUATION
"Anesthesia Post Evaluation    Patient: Lyndon Lion Jr.    Procedure(s) Performed: Procedure(s) (LRB):  Injection-steroid-epidural-lumbar (N/A)    Final Anesthesia Type: MAC  Patient location during evaluation: PACU  Patient participation: Yes- Able to Participate  Level of consciousness: awake and alert  Post-procedure vital signs: reviewed and stable  Pain management: adequate  Airway patency: patent  PONV status at discharge: No PONV  Anesthetic complications: no      Cardiovascular status: blood pressure returned to baseline  Respiratory status: unassisted  Hydration status: euvolemic  Follow-up not needed.        Visit Vitals  /72   Pulse 73   Temp 37.1 °C (98.8 °F) (Skin)   Resp 18   Ht 6' 2" (1.88 m)   Wt 113.4 kg (250 lb)   SpO2 (!) 94%   BMI 32.10 kg/m²       Pain/Karthikeyan Score: Pain Assessment Performed: Yes (7/27/2018  9:13 AM)  Presence of Pain: complains of pain/discomfort (7/27/2018  9:13 AM)  Pain Rating Prior to Med Admin: 4 (7/27/2018  9:13 AM)      "

## 2018-08-09 ENCOUNTER — HOSPITAL ENCOUNTER (OUTPATIENT)
Dept: RADIOLOGY | Facility: CLINIC | Age: 48
Discharge: HOME | End: 2018-08-09
Attending: NURSE PRACTITIONER
Payer: COMMERCIAL

## 2018-08-09 DIAGNOSIS — Z85.038 HX OF COLON CANCER, STAGE I: ICD-10-CM

## 2018-08-09 PROCEDURE — 71046 X-RAY EXAM CHEST 2 VIEWS: CPT | Mod: TC,FY,PO

## 2018-08-09 PROCEDURE — 71046 X-RAY EXAM CHEST 2 VIEWS: CPT | Mod: 26,,, | Performed by: RADIOLOGY

## 2018-08-24 DIAGNOSIS — E56.9 VITAMIN DEFICIENCY: ICD-10-CM

## 2018-08-24 DIAGNOSIS — L71.9 ROSACEA: ICD-10-CM

## 2018-08-24 RX ORDER — ERGOCALCIFEROL 1.25 MG/1
CAPSULE ORAL
Qty: 12 CAPSULE | Refills: 11 | Status: SHIPPED | OUTPATIENT
Start: 2018-08-24 | End: 2019-10-03 | Stop reason: SDUPTHER

## 2018-08-27 RX ORDER — METRONIDAZOLE 7.5 MG/G
GEL TOPICAL
Qty: 45 G | Refills: 0 | Status: ON HOLD | OUTPATIENT
Start: 2018-08-27 | End: 2019-02-13

## 2018-09-10 ENCOUNTER — OFFICE VISIT (OUTPATIENT)
Dept: HEMATOLOGY/ONCOLOGY | Facility: CLINIC | Age: 48
End: 2018-09-10
Payer: COMMERCIAL

## 2018-09-10 VITALS
DIASTOLIC BLOOD PRESSURE: 88 MMHG | TEMPERATURE: 98 F | SYSTOLIC BLOOD PRESSURE: 145 MMHG | WEIGHT: 268.94 LBS | HEART RATE: 75 BPM | HEIGHT: 74 IN | RESPIRATION RATE: 18 BRPM | BODY MASS INDEX: 34.52 KG/M2

## 2018-09-10 DIAGNOSIS — Z15.09 LYNCH SYNDROME: ICD-10-CM

## 2018-09-10 DIAGNOSIS — Z15.09 MSH2-RELATED LYNCH SYNDROME (HNPCC1): ICD-10-CM

## 2018-09-10 DIAGNOSIS — Z85.038 HISTORY OF COLON CANCER, STAGE II: Primary | ICD-10-CM

## 2018-09-10 PROCEDURE — 99213 OFFICE O/P EST LOW 20 MIN: CPT | Mod: S$GLB,,, | Performed by: NURSE PRACTITIONER

## 2018-09-10 PROCEDURE — 99999 PR PBB SHADOW E&M-EST. PATIENT-LVL III: CPT | Mod: PBBFAC,,, | Performed by: NURSE PRACTITIONER

## 2018-09-10 NOTE — PROGRESS NOTES
HISTORY OF PRESENT ILLNESS:  This is a 48-year-old white gentleman known to   Dr. Cárdenas for stage II adenocarcinoma of the colon for which he is status post   resection (2008) and 12 cycles of adjuvant FOLFOX.  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  He presents to the clinic today with his wife for   his annual evaluation. He denies any discomfort with fevers, chills, drenching night   sweats, changes in the character or caliber of his stool, abdominal discomfort/bloating,   nausea, vomiting, constipation, diarrhea, unexplained weight loss, irregular heartbeat,   chest pain, rectal bleeding, etc.  He reports difficulties with a Colonoscopy in 10/2017   that had fluid leaking into his peritoneum and necessitated a repeat colonoscopy in   November/2017 and 05/18.  No other new complaints or pertinent findings on a 14-point   review of systems.    PHYSICAL EXAMINATION:  GENERAL:  Well-developed, well-nourished white gentleman in no acute distress.    Alert and oriented x 4.  VITAL SIGNS:  Weight:  Loss of 1/2 pound in 1 year  Wt Readings from Last 3 Encounters:   09/10/18 122 kg (268 lb 15.4 oz)   07/24/18 113.4 kg (250 lb)   07/09/18 113.4 kg (250 lb)     Temp Readings from Last 3 Encounters:   09/10/18 98 °F (36.7 °C)   07/27/18 98 °F (36.7 °C) (Skin)   05/30/18 98.1 °F (36.7 °C) (Temporal)     BP Readings from Last 3 Encounters:   09/10/18 (!) 145/88   07/27/18 133/78   07/09/18 (!) 142/89     Pulse Readings from Last 3 Encounters:   09/10/18 75   07/27/18 64   07/09/18 (!) 59     HEENT:  Normocephalic, atraumatic.  Oral mucosa pink and moist.  Lips without   lesions.  Tongue midline.  Oropharynx clear.  Nonicteric sclerae.   NECK:  Supple, no adenopathy.  No carotid bruits, thyromegaly or thyroid nodule.  HEART:  Regular rate and rhythm without murmur, gallop or rub.                LUNGS:  Clear to auscultation bilaterally.  Normal respiratory effort.       ABDOMEN:  Soft, nontender,  nondistended with positive normoactive bowel sounds,   no hepatosplenomegaly.    EXTREMITIES:  No cyanosis, clubbing or edema.  Distal pulses are intact.          AXILLAE AND GROIN:  No palpable pathologic lymphadenopathy is appreciated.        SKIN:  Intact/turgor normal/noted tatoos                                                    NEUROLOGIC:  Cranial nerves II-XII grossly intact.  Motor:  Good muscle bulk and   tone.  Strength/sensory 5/5 throughout.  Gait stable.     LABORATORY:    Lab Results   Component Value Date    WBC 4.91 08/09/2018    HGB 16.4 08/09/2018    HCT 46.1 08/09/2018    MCV 85 08/09/2018     08/09/2018     Unremarkable differential    CMP  Sodium   Date Value Ref Range Status   08/09/2018 136 136 - 145 mmol/L Final     Potassium   Date Value Ref Range Status   08/09/2018 3.9 3.5 - 5.1 mmol/L Final     Chloride   Date Value Ref Range Status   08/09/2018 105 95 - 110 mmol/L Final     CO2   Date Value Ref Range Status   08/09/2018 23 23 - 29 mmol/L Final     Glucose   Date Value Ref Range Status   08/09/2018 177 (H) 70 - 110 mg/dL Final     BUN, Bld   Date Value Ref Range Status   08/09/2018 17 6 - 20 mg/dL Final     Creatinine   Date Value Ref Range Status   08/09/2018 1.2 0.5 - 1.4 mg/dL Final   05/14/2013 1.3 0.5 - 1.4 mg/dL Final     Calcium   Date Value Ref Range Status   08/09/2018 9.3 8.7 - 10.5 mg/dL Final   05/14/2013 9.8 8.7 - 10.5 mg/dL Final     Total Protein   Date Value Ref Range Status   08/09/2018 7.0 6.0 - 8.4 g/dL Final     Albumin   Date Value Ref Range Status   08/09/2018 3.8 3.5 - 5.2 g/dL Final     Total Bilirubin   Date Value Ref Range Status   08/09/2018 1.6 (H) 0.1 - 1.0 mg/dL Final     Comment:     For infants and newborns, interpretation of results should be based  on gestational age, weight and in agreement with clinical  observations.  Premature Infant recommended reference ranges:  Up to 24 hours.............<8.0 mg/dL  Up to 48 hours............<12.0  mg/dL  3-5 days..................<15.0 mg/dL  6-29 days.................<15.0 mg/dL       Alkaline Phosphatase   Date Value Ref Range Status   08/09/2018 56 55 - 135 U/L Final     AST   Date Value Ref Range Status   08/09/2018 17 10 - 40 U/L Final     ALT   Date Value Ref Range Status   08/09/2018 23 10 - 44 U/L Final     Anion Gap   Date Value Ref Range Status   08/09/2018 8 8 - 16 mmol/L Final   05/14/2013 10 5 - 15 meq/L Final     eGFR if    Date Value Ref Range Status   08/09/2018 >60.0 >60 mL/min/1.73 m^2 Final     eGFR if non    Date Value Ref Range Status   08/09/2018 >60.0 >60 mL/min/1.73 m^2 Final     Comment:     Calculation used to obtain the estimated glomerular filtration  rate (eGFR) is the CKD-EPI equation.            RADIOLOGY:  CXR dated 08/09/18:  Impression:  No evidence of active chest disease.    Colonoscopy dated 05/30/18:  Impression:   The examined portion of the ileum was normal.                        - Granularity at the colonic anastomosis. Biopsied.                        - Diverticulosis in the recto-sigmoid colon, in the                         sigmoid colon and in the descending colon.                        - The examination was otherwise normal.  Recommendation:       - Discharge patient to home.                        - Await pathology results.                        - Telephone endoscopist for pathology results in 2                         weeks.                        - Repeat colonoscopy in 1 year for surveillance.                        - Sooner for any dysplasia. If any dysplasia                         recommend referral to [facility].         PATHOLOGIC DIAGNOSIS ANASTOMOSIS (BIOPSY):   No dysplasia, no malignancy   Small intestinal mucosa with focal active inflammation, reactive changes and prominent lymphoid aggregates   Colonic type mucosa with no significant histopathologic changes     IMPRESSION:  1.  Stage II adenocarcinoma of the colon  - TRINY; continue surveillance.  2.  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  3.  Diabetes mellitus.  4.  Status post gastric sleeve.  5.  Shift work and sleep disorder.  6.  Splenomegaly.  7.  Vitamin D deficiency - followed by Dr. Judd    PLAN:  1.  Follow up in 1 year with interval CBC, CMP, LDH & CXR.  2.  Colonoscopy tbd in 05/2019  3.  Follow up with Dr. Judd on 08/14/17 - will address low Vitamin D level.  4.  FMLA papers to be completed in November/2018; patient to drop off.    Assessment/plan reviewed and approved by Dr. Cárdenas.

## 2018-11-06 ENCOUNTER — ANESTHESIA (OUTPATIENT)
Dept: SURGERY | Facility: AMBULARY SURGERY CENTER | Age: 48
End: 2018-11-06
Payer: COMMERCIAL

## 2018-11-06 ENCOUNTER — HOSPITAL ENCOUNTER (OUTPATIENT)
Facility: AMBULARY SURGERY CENTER | Age: 48
Discharge: HOME | End: 2018-11-06
Attending: SPECIALIST | Admitting: SPECIALIST
Payer: COMMERCIAL

## 2018-11-06 ENCOUNTER — ANESTHESIA EVENT (OUTPATIENT)
Dept: SURGERY | Facility: AMBULARY SURGERY CENTER | Age: 48
End: 2018-11-06
Payer: COMMERCIAL

## 2018-11-06 VITALS
BODY MASS INDEX: 33.37 KG/M2 | RESPIRATION RATE: 20 BRPM | HEIGHT: 74 IN | SYSTOLIC BLOOD PRESSURE: 112 MMHG | WEIGHT: 260 LBS | TEMPERATURE: 98 F | DIASTOLIC BLOOD PRESSURE: 71 MMHG | HEART RATE: 55 BPM | OXYGEN SATURATION: 95 %

## 2018-11-06 DIAGNOSIS — M54.17 RADICULOPATHY, LUMBOSACRAL REGION: Primary | ICD-10-CM

## 2018-11-06 DIAGNOSIS — M54.16 LUMBAR RADICULOPATHY: ICD-10-CM

## 2018-11-06 PROCEDURE — D9220A PRA ANESTHESIA: Mod: CRNA,,, | Performed by: NURSE ANESTHETIST, CERTIFIED REGISTERED

## 2018-11-06 PROCEDURE — D9220A PRA ANESTHESIA: Mod: ANES,,, | Performed by: ANESTHESIOLOGY

## 2018-11-06 PROCEDURE — 62323 NJX INTERLAMINAR LMBR/SAC: CPT | Performed by: SPECIALIST

## 2018-11-06 RX ORDER — HYDROMORPHONE HYDROCHLORIDE 2 MG/ML
0.2 INJECTION, SOLUTION INTRAMUSCULAR; INTRAVENOUS; SUBCUTANEOUS EVERY 5 MIN PRN
Status: DISCONTINUED | OUTPATIENT
Start: 2018-11-06 | End: 2018-11-06 | Stop reason: HOSPADM

## 2018-11-06 RX ORDER — SODIUM CHLORIDE 0.9 % (FLUSH) 0.9 %
3 SYRINGE (ML) INJECTION
Status: DISCONTINUED | OUTPATIENT
Start: 2018-11-06 | End: 2018-11-06 | Stop reason: HOSPADM

## 2018-11-06 RX ORDER — BUPIVACAINE HYDROCHLORIDE 2.5 MG/ML
INJECTION, SOLUTION EPIDURAL; INFILTRATION; INTRACAUDAL
Status: DISCONTINUED | OUTPATIENT
Start: 2018-11-06 | End: 2018-11-06 | Stop reason: HOSPADM

## 2018-11-06 RX ORDER — PROPOFOL 10 MG/ML
INJECTION, EMULSION INTRAVENOUS
Status: COMPLETED
Start: 2018-11-06 | End: 2018-11-06

## 2018-11-06 RX ORDER — OXYCODONE HYDROCHLORIDE 5 MG/1
5 TABLET ORAL
Status: DISCONTINUED | OUTPATIENT
Start: 2018-11-06 | End: 2018-11-06 | Stop reason: HOSPADM

## 2018-11-06 RX ORDER — SODIUM CHLORIDE, SODIUM LACTATE, POTASSIUM CHLORIDE, CALCIUM CHLORIDE 600; 310; 30; 20 MG/100ML; MG/100ML; MG/100ML; MG/100ML
75 INJECTION, SOLUTION INTRAVENOUS CONTINUOUS
Status: DISCONTINUED | OUTPATIENT
Start: 2018-11-06 | End: 2018-11-06 | Stop reason: HOSPADM

## 2018-11-06 RX ORDER — MEPERIDINE HYDROCHLORIDE 25 MG/ML
12.5 INJECTION INTRAMUSCULAR; INTRAVENOUS; SUBCUTANEOUS ONCE AS NEEDED
Status: DISCONTINUED | OUTPATIENT
Start: 2018-11-06 | End: 2018-11-06 | Stop reason: HOSPADM

## 2018-11-06 RX ORDER — LIDOCAINE HYDROCHLORIDE 20 MG/ML
INJECTION, SOLUTION EPIDURAL; INFILTRATION; INTRACAUDAL; PERINEURAL
Status: DISCONTINUED
Start: 2018-11-06 | End: 2018-11-06 | Stop reason: HOSPADM

## 2018-11-06 RX ORDER — PROPOFOL 10 MG/ML
VIAL (ML) INTRAVENOUS
Status: DISCONTINUED | OUTPATIENT
Start: 2018-11-06 | End: 2018-11-06

## 2018-11-06 RX ORDER — BUPIVACAINE HYDROCHLORIDE 2.5 MG/ML
INJECTION, SOLUTION EPIDURAL; INFILTRATION; INTRACAUDAL
Status: DISCONTINUED
Start: 2018-11-06 | End: 2018-11-06 | Stop reason: HOSPADM

## 2018-11-06 RX ORDER — FENTANYL CITRATE 50 UG/ML
25 INJECTION, SOLUTION INTRAMUSCULAR; INTRAVENOUS EVERY 5 MIN PRN
Status: DISCONTINUED | OUTPATIENT
Start: 2018-11-06 | End: 2018-11-06 | Stop reason: HOSPADM

## 2018-11-06 RX ORDER — ONDANSETRON 2 MG/ML
4 INJECTION INTRAMUSCULAR; INTRAVENOUS ONCE
Status: DISCONTINUED | OUTPATIENT
Start: 2018-11-06 | End: 2018-11-06 | Stop reason: HOSPADM

## 2018-11-06 RX ORDER — SODIUM CHLORIDE, SODIUM LACTATE, POTASSIUM CHLORIDE, CALCIUM CHLORIDE 600; 310; 30; 20 MG/100ML; MG/100ML; MG/100ML; MG/100ML
INJECTION, SOLUTION INTRAVENOUS CONTINUOUS
Status: DISCONTINUED | OUTPATIENT
Start: 2018-11-06 | End: 2018-11-06 | Stop reason: HOSPADM

## 2018-11-06 RX ORDER — DIPHENHYDRAMINE HYDROCHLORIDE 50 MG/ML
25 INJECTION INTRAMUSCULAR; INTRAVENOUS EVERY 6 HOURS PRN
Status: DISCONTINUED | OUTPATIENT
Start: 2018-11-06 | End: 2018-11-06 | Stop reason: HOSPADM

## 2018-11-06 RX ORDER — DEXAMETHASONE SODIUM PHOSPHATE 10 MG/ML
INJECTION INTRAMUSCULAR; INTRAVENOUS
Status: DISCONTINUED
Start: 2018-11-06 | End: 2018-11-06 | Stop reason: HOSPADM

## 2018-11-06 RX ORDER — LIDOCAINE HCL/PF 100 MG/5ML
SYRINGE (ML) INTRAVENOUS
Status: DISCONTINUED | OUTPATIENT
Start: 2018-11-06 | End: 2018-11-06

## 2018-11-06 RX ADMIN — Medication 50 MG: at 09:11

## 2018-11-06 RX ADMIN — Medication 150 MG: at 09:11

## 2018-11-06 RX ADMIN — SODIUM CHLORIDE, SODIUM LACTATE, POTASSIUM CHLORIDE, CALCIUM CHLORIDE: 600; 310; 30; 20 INJECTION, SOLUTION INTRAVENOUS at 09:11

## 2018-11-06 RX ADMIN — SODIUM CHLORIDE, SODIUM LACTATE, POTASSIUM CHLORIDE, CALCIUM CHLORIDE: 600; 310; 30; 20 INJECTION, SOLUTION INTRAVENOUS at 06:11

## 2018-11-06 NOTE — DISCHARGE INSTRUCTIONS
Anesthesia information    Anesthesia Safety      You have been given medicine  to sedate you during your procedure today. This may have included both a pain medicine and sleeping medicine. Most of the effects have worn off; however, you may continue to have some drowsiness for the next  24 hours. Anesthesia and pain medicines can cause nausea, sleepiness, dizziness and  constipation.    HOME CARE:  1) For the next EIGHT HOURS, you should be watched by a responsible adult to look for any worsening of your condition.  2) DO NOT DRINK any ALCOHOL for the next 24 HOURS.  3) DO NOT DRIVE or operate dangerous machinery during the next 24 HOURS.  FOLLOW UP with your doctor or this facility if you are not alert and back to your usual level of activity within 24 hrs.  GET PROMPT MEDICAL ATTENTION if any of the following occur:  -- Increased drowsiness  -- Increased weakness or dizziness  -- Repeated vomiting  -- If you cannot be awakened    Gradually increase activity as tolerated; Do not drive, operate hazardous machinery, or make legal decisions for 24 hours. If diabetic, monitor blood sugar closely. Followup with Dr. Brooke in 3-4 weeks.    Pain injection instructions:     Steroids take about a 2 weeks to relieve pain.  Initially you may get pain relief from the local anesthetic but this may wear off before the steroid works.    No driving for 24 hrs.   Activity as tolerated- gradually increase activities.  Dont lift over 10 lbs for 24 hrs   No heat at injection sites x 2 days. No heating pads, hot tubs, saunas, or swimming in any body of water or pool for 2 days.  Use ice pack for mild swelling and for comfort , apply for 20 minutes, remove for 20 minute intervals. No direct contact of ice itself  to skin.  May shower today. No tub baths for two days.      Resume Aspirin, Plavix, or Coumadin the day after the procedure unless otherwise instructed.   If diabetic,monitor your glucose carefully as steroids can increase  your glucose level    Seek immediate medical help for:   Severe increase in your usual pain or appearance of new pain.  Prolonged (mor than 8 hours) or increasing weakness or numbness in the legs or arms.    - Numbing medicine was injected that affects nerves that carry information from       muscles to the  brain and the brain to the muscles.  This numbness can last 6-8 hrs so be very careful and get assistance when standing or walking.    Fever above 101 ,Drainage,redness,active bleeding, or increased swelling at the injection site.  Headache, shortness of breath, chest pain, or breathing problems.

## 2018-11-06 NOTE — TRANSFER OF CARE
"Anesthesia Transfer of Care Note    Patient: Lyndon Lion Jr.    Procedure(s) Performed: Procedure(s) (LRB):  Injection-steroid-epidural-caudal (N/A)    Patient location: PACU    Anesthesia Type: MAC    Transport from OR: Transported from OR on 2-3 L/min O2 by NC with adequate spontaneous ventilation    Post pain: adequate analgesia    Post assessment: no apparent anesthetic complications and tolerated procedure well    Post vital signs: stable    Level of consciousness: awake, alert and oriented    Nausea/Vomiting: no nausea/vomiting    Complications: none    Transfer of care protocol was followed      Last vitals:   Visit Vitals  /78   Pulse (!) 50   Temp 36.6 °C (97.9 °F) (Skin)   Resp 18   Ht 6' 2" (1.88 m)   Wt 117.9 kg (260 lb)   SpO2 (!) 93%   BMI 33.38 kg/m²     "

## 2018-11-06 NOTE — BRIEF OP NOTE
DATE OF PROCEDURE:      PREOPERATIVE DIAGNOSIS  Lumbar Radiculopathy    POSTOPERATIVE DIAGNOSIS  Lumbar Radiculopathy    PROCEDURE  Caudal sacrolumbar epidural steroid Marcaine injection  Intraoperative fluoroscopy  Epidurogram      SURGEON  Lyndon Brooke MD    ANESTHESIA  Monitored anesthesia care    ESTIMATED BLOOD LOSS  Less than 1 mL    INDICATION FOR SURGERY    Patient presents with complaints of back and associated leg radicular pain. The patients pain continues to persist, interfering in the patients quality of life and activities of daily living. Surgical procedure planned with steroid and Marcaine injection into the sacrolumbar epidural region in an attempt to improve leg radicular pain. The patient was informed that the surgical procedure provides no guarantee of improvement or worsening of present condition, but only an attempt to improve overall condition and function. The patient expressed understanding, and all questions were answered and still desired to proceed with operative procedure.      PROCEDURE IN DETAIL    The patient was taken to the operating room, and placed on operating table in prone position. All bony prominences were well padded and protected. Anesthesia achieved with monitored anesthesia care.  After adequate anesthesia was achieved, the lower back and sacral region was prepped and draped in the usual sterile fashion.  Approximately 3 ml of 0.25% Marcaine was utilized locally within skin and subcutaneous tissue. With the assistance of intraoperative fluoroscopy, an 18-gauge spinal needle was introduced and advanced into the epidural space via the sacral hiatus.  Confirmation was obtained with Omnipaque placed into the epidural space.  Mixture of approximately 4.5ml of 0.25% Marcaine preservative free, 4.5 ml of normal saline preservative free, and 2ml of 20mg Decadron was made and advanced into the epidural space via the sacral hiatus. At the completion of epidural injection, the  needle was slowly withdrawn. The surgical site was cleaned with normal saline with application of sterile occlusive Band-Aid. Anesthesia was reversed and the patient was transferred to recovery room in stable condition without immediate apparent surgical complications.

## 2018-11-06 NOTE — ANESTHESIA POSTPROCEDURE EVALUATION
"Anesthesia Post Evaluation    Patient: Lyndon Lion Jr.    Procedure(s) Performed: Procedure(s) (LRB):  Injection-steroid-epidural-caudal (N/A)    Final Anesthesia Type: MAC  Patient location during evaluation: PACU  Patient participation: Yes- Able to Participate  Level of consciousness: awake and alert and oriented  Post-procedure vital signs: reviewed and stable  Pain management: adequate  Airway patency: patent  PONV status at discharge: No PONV  Anesthetic complications: no      Cardiovascular status: blood pressure returned to baseline  Respiratory status: unassisted, spontaneous ventilation and room air  Hydration status: euvolemic  Follow-up not needed.        Visit Vitals  /63   Pulse 62   Temp 36.8 °C (98.2 °F) (Skin)   Resp 18   Ht 6' 2" (1.88 m)   Wt 117.9 kg (260 lb)   SpO2 (!) 94%   BMI 33.38 kg/m²       Pain/Karthikeyan Score: Pain Assessment Performed: Yes (11/6/2018  9:45 AM)  Presence of Pain: non-verbal indicators absent (11/6/2018  9:45 AM)  Karthikeyan Score: 9 (11/6/2018  9:45 AM)        "

## 2018-11-06 NOTE — ANESTHESIA PREPROCEDURE EVALUATION
11/06/2018  Lyndon Lion Jr. is a 48 y.o., male.    Pre-op Assessment    I have reviewed the Patient Summary Reports.     I have reviewed the Nursing Notes.   I have reviewed the Medications.     Review of Systems  Anesthesia Hx:  No problems with previous Anesthesia Denies Hx of Anesthetic complications  Denies Family Hx of Anesthesia complications.   Denies Personal Hx of Anesthesia complications.   Pulmonary:   Asthma mild    Hepatic/GI:   Liver Disease,    Musculoskeletal:   Arthritis   Spine Disorders: lumbar    Neurological:   Neuromuscular Disease,   Chronic Pain Syndrome       Physical Exam  General:  Obesity, Well nourished    Airway/Jaw/Neck:  Airway Findings: Mouth Opening: Normal Tongue: Normal  General Airway Assessment: Adult       Chest/Lungs:  Chest/Lungs Clear    Heart/Vascular:  Heart Findings: Normal            Anesthesia Plan  Type of Anesthesia, risks & benefits discussed:  Anesthesia Type:  MAC  Patient's Preference:   Intra-op Monitoring Plan: standard ASA monitors  Intra-op Monitoring Plan Comments:   Post Op Pain Control Plan:   Post Op Pain Control Plan Comments:   Induction:   IV  Beta Blocker:  Patient is not currently on a Beta-Blocker (No further documentation required).       Informed Consent: Patient understands risks and agrees with Anesthesia plan.  Questions answered. Anesthesia consent signed with patient.  ASA Score: 2     Day of Surgery Review of History & Physical: I have interviewed and examined the patient. I have reviewed the patient's H&P dated:  There are no significant changes.  H&P update referred to the provider.         Ready For Surgery From Anesthesia Perspective.

## 2018-11-14 ENCOUNTER — HOSPITAL ENCOUNTER (OUTPATIENT)
Dept: RADIOLOGY | Facility: HOSPITAL | Age: 48
Discharge: HOME | End: 2018-11-14
Attending: SPECIALIST
Payer: COMMERCIAL

## 2018-11-14 DIAGNOSIS — M47.816 LUMBAR SPONDYLOSIS: ICD-10-CM

## 2018-11-14 DIAGNOSIS — M48.9 SPONDYLOPATHY: ICD-10-CM

## 2018-11-14 PROCEDURE — 72148 MRI LUMBAR SPINE W/O DYE: CPT | Mod: 26,,, | Performed by: RADIOLOGY

## 2018-11-14 PROCEDURE — 72148 MRI LUMBAR SPINE W/O DYE: CPT | Mod: TC

## 2018-11-29 NOTE — DISCHARGE SUMMARY
[facility]  Discharge Note  Short Stay    Admit Date: 11/6/2018    Discharge Date and Time: 11/6/2018 10:20 AM     Attending Physician: No att. providers found     Discharge Provider: Lyndon Brooke Jr    Diagnoses:  Active Hospital Problems    Diagnosis  POA    Lumbar radiculopathy [M54.16]  Yes      Resolved Hospital Problems   No resolved problems to display.       Discharged Condition: good    Hospital Course: Patient was admitted for an outpatient procedure and tolerated the procedure well with no complications.    Final Diagnoses: Same as principal problem.    Disposition: Home or Self Care    Follow up/Patient Instructions:    Medications:  Reconciled Home Medications:      Medication List      CONTINUE taking these medications    cyclobenzaprine 10 MG tablet  Commonly known as:  FLEXERIL  TAKE 1 TABLET(10 MG) BY MOUTH TWICE DAILY AS NEEDED FOR MUSCLE SPASMS     diclofenac sodium 1 % Gel  Commonly known as:  VOLTAREN  APPLY 2 GRAMS TO AFFECTED AREA FOUR TIMES DAILY     ergocalciferol 50,000 unit Cap  Commonly known as:  ERGOCALCIFEROL  TAKE 1 CAPSULE BY MOUTH EVERY 7 DAYS     metroNIDAZOLE 0.75 % gel  Commonly known as:  METROGEL  APPLY TO NOSE TWICE DAILY     oxyCODONE-acetaminophen  mg per tablet  Commonly known as:  PERCOCET  Take 1 tablet by mouth every 6 to 8 hours as needed for Pain.          Discharge Procedure Orders   Remove dressing in 24 hours     Follow-up Information     Follow up In 2 weeks.                 Discharge Procedure Orders (must include Diet, Follow-up, Activity):   Discharge Procedure Orders (must include Diet, Follow-up, Activity)   Remove dressing in 24 hours

## 2019-01-07 ENCOUNTER — PATIENT MESSAGE (OUTPATIENT)
Dept: HEMATOLOGY/ONCOLOGY | Facility: CLINIC | Age: 49
End: 2019-01-07

## 2019-01-07 ENCOUNTER — TELEPHONE (OUTPATIENT)
Dept: HEMATOLOGY/ONCOLOGY | Facility: CLINIC | Age: 49
End: 2019-01-07

## 2019-01-07 NOTE — TELEPHONE ENCOUNTER
----- Message from Tiana Grace sent at 1/7/2019 10:00 AM CST -----  Contact: wife, Katheryn Lion  Patient will be dropping off [facility] paperwork either today or tomorrow. Please call patient's wife, Katheryn at [phone]. Thanks!

## 2019-01-07 NOTE — TELEPHONE ENCOUNTER
Returned call to patient's wife, no answer, left detailed message on voice mail that the [facility] paperwork was received today and given to Donna Wilder RN, NN to fill out, Donna will contact her when ready to

## 2019-02-04 RX ORDER — CEFAZOLIN SODIUM 2 G/50ML
2 SOLUTION INTRAVENOUS
Status: CANCELLED | OUTPATIENT
Start: 2019-02-04 | End: 2019-02-04

## 2019-02-06 ENCOUNTER — HOSPITAL ENCOUNTER (OUTPATIENT)
Dept: RADIOLOGY | Facility: HOSPITAL | Age: 49
Discharge: HOME | End: 2019-02-06
Attending: SPECIALIST
Payer: COMMERCIAL

## 2019-02-06 ENCOUNTER — HOSPITAL ENCOUNTER (OUTPATIENT)
Dept: PREADMISSION TESTING | Facility: HOSPITAL | Age: 49
Discharge: HOME | End: 2019-02-06
Attending: SPECIALIST
Payer: COMMERCIAL

## 2019-02-06 VITALS — BODY MASS INDEX: 33.37 KG/M2 | HEIGHT: 74 IN | WEIGHT: 260 LBS

## 2019-02-06 DIAGNOSIS — Z01.818 PRE-OP EXAM: ICD-10-CM

## 2019-02-06 DIAGNOSIS — M54.16 LUMBAR RADICULOPATHY: ICD-10-CM

## 2019-02-06 DIAGNOSIS — M51.26 HERNIATED LUMBAR INTERVERTEBRAL DISC: ICD-10-CM

## 2019-02-06 DIAGNOSIS — M51.37 DDD (DEGENERATIVE DISC DISEASE), LUMBOSACRAL: Primary | ICD-10-CM

## 2019-02-06 DIAGNOSIS — M43.16 SPONDYLOLISTHESIS OF LUMBAR REGION: ICD-10-CM

## 2019-02-06 DIAGNOSIS — M47.816 LUMBAR SPONDYLOSIS: ICD-10-CM

## 2019-02-06 DIAGNOSIS — M48.061 NEURAL FORAMINAL STENOSIS OF LUMBAR SPINE: ICD-10-CM

## 2019-02-06 LAB
ABO + RH BLD: NORMAL
ANION GAP SERPL CALC-SCNC: 8 MMOL/L
APTT BLDCRRT: 29.2 SEC
BACTERIA #/AREA URNS HPF: ABNORMAL /HPF
BASOPHILS # BLD AUTO: 0 K/UL
BASOPHILS NFR BLD: 0.5 %
BILIRUB UR QL STRIP: NEGATIVE
BLD GP AB SCN CELLS X3 SERPL QL: NORMAL
BUN SERPL-MCNC: 17 MG/DL
CALCIUM SERPL-MCNC: 9.2 MG/DL
CHLORIDE SERPL-SCNC: 106 MMOL/L
CLARITY UR: CLEAR
CO2 SERPL-SCNC: 23 MMOL/L
COLOR UR: YELLOW
CREAT SERPL-MCNC: 1.3 MG/DL
DIFFERENTIAL METHOD: ABNORMAL
EOSINOPHIL # BLD AUTO: 0.2 K/UL
EOSINOPHIL NFR BLD: 3.6 %
ERYTHROCYTE [DISTWIDTH] IN BLOOD BY AUTOMATED COUNT: 13.2 %
EST. GFR  (AFRICAN AMERICAN): >60 ML/MIN/1.73 M^2
EST. GFR  (NON AFRICAN AMERICAN): >60 ML/MIN/1.73 M^2
GLUCOSE SERPL-MCNC: 119 MG/DL
GLUCOSE UR QL STRIP: NEGATIVE
HCT VFR BLD AUTO: 48.2 %
HGB BLD-MCNC: 16.2 G/DL
HGB UR QL STRIP: ABNORMAL
HYALINE CASTS #/AREA URNS LPF: 0 /LPF
INR PPP: 1
KETONES UR QL STRIP: ABNORMAL
LEUKOCYTE ESTERASE UR QL STRIP: NEGATIVE
LYMPHOCYTES # BLD AUTO: 1.4 K/UL
LYMPHOCYTES NFR BLD: 27.2 %
MCH RBC QN AUTO: 29.6 PG
MCHC RBC AUTO-ENTMCNC: 33.6 G/DL
MCV RBC AUTO: 88 FL
MICROSCOPIC COMMENT: ABNORMAL
MONOCYTES # BLD AUTO: 0.4 K/UL
MONOCYTES NFR BLD: 8.4 %
NEUTROPHILS # BLD AUTO: 3.1 K/UL
NEUTROPHILS NFR BLD: 60.3 %
NITRITE UR QL STRIP: NEGATIVE
OTHER ELEMENTS URNS MICRO: ABNORMAL
PH UR STRIP: 6 [PH] (ref 5–8)
PLATELET # BLD AUTO: 305 K/UL
PMV BLD AUTO: 7.5 FL
POTASSIUM SERPL-SCNC: 3.8 MMOL/L
PROT UR QL STRIP: ABNORMAL
PROTHROMBIN TIME: 10 SEC
RBC # BLD AUTO: 5.47 M/UL
RBC #/AREA URNS HPF: 4 /HPF (ref 0–4)
SODIUM SERPL-SCNC: 137 MMOL/L
SP GR UR STRIP: >=1.03 (ref 1–1.03)
SQUAMOUS #/AREA URNS HPF: 1 /HPF
URN SPEC COLLECT METH UR: ABNORMAL
UROBILINOGEN UR STRIP-ACNC: NEGATIVE EU/DL
WBC # BLD AUTO: 5.2 K/UL
WBC #/AREA URNS HPF: 2 /HPF (ref 0–5)

## 2019-02-06 PROCEDURE — 85025 COMPLETE CBC W/AUTO DIFF WBC: CPT

## 2019-02-06 PROCEDURE — 80048 BASIC METABOLIC PNL TOTAL CA: CPT

## 2019-02-06 PROCEDURE — 87081 CULTURE SCREEN ONLY: CPT

## 2019-02-06 PROCEDURE — 86901 BLOOD TYPING SEROLOGIC RH(D): CPT

## 2019-02-06 PROCEDURE — 36415 COLL VENOUS BLD VENIPUNCTURE: CPT

## 2019-02-06 PROCEDURE — 71046 XR CHEST PA AND LATERAL: ICD-10-PCS | Mod: 26,,, | Performed by: RADIOLOGY

## 2019-02-06 PROCEDURE — 99900104 DSU ONLY-NO CHARGE-EA ADD'L HR (STAT)

## 2019-02-06 PROCEDURE — 93005 ELECTROCARDIOGRAM TRACING: CPT

## 2019-02-06 PROCEDURE — 85610 PROTHROMBIN TIME: CPT

## 2019-02-06 PROCEDURE — 93010 ELECTROCARDIOGRAM REPORT: CPT | Mod: ,,, | Performed by: INTERNAL MEDICINE

## 2019-02-06 PROCEDURE — 99900103 DSU ONLY-NO CHARGE-INITIAL HR (STAT)

## 2019-02-06 PROCEDURE — 85730 THROMBOPLASTIN TIME PARTIAL: CPT

## 2019-02-06 PROCEDURE — 71046 X-RAY EXAM CHEST 2 VIEWS: CPT | Mod: 26,,, | Performed by: RADIOLOGY

## 2019-02-06 PROCEDURE — 81000 URINALYSIS NONAUTO W/SCOPE: CPT

## 2019-02-06 PROCEDURE — 93010 EKG 12-LEAD: ICD-10-PCS | Mod: ,,, | Performed by: INTERNAL MEDICINE

## 2019-02-06 PROCEDURE — 71046 X-RAY EXAM CHEST 2 VIEWS: CPT | Mod: TC,FY

## 2019-02-06 RX ORDER — IBUPROFEN 800 MG/1
800 TABLET ORAL
Status: ON HOLD | COMMUNITY
End: 2019-02-13

## 2019-02-06 RX ORDER — ASPIRIN 81 MG/1
81 TABLET ORAL DAILY
Status: ON HOLD | COMMUNITY
End: 2019-02-13

## 2019-02-06 NOTE — DISCHARGE INSTRUCTIONS
To confirm, Your doctor has instructed you that surgery is scheduled for:     Please report to [facility], Registration the morning of surgery. You must check-in and receive a wristband before going to your procedure.    Pre-Op will call the afternoon prior to surgery between 1:00 and 6:00 PM with the final arrival time.  Phone number: [phone]    PLEASE NOTE:  The surgery schedule has many variables which may affect the time of your surgery case.  Family members should be available if your surgery time changes.  Plan to be here the day of your procedure between 4-6 hours.    MEDICATIONS:  TAKE ONLY THESE MEDICATIONS WITH A SMALL SIP OF WATER THE MORNING OF YOUR PROCEDURE:  OXYCODONE IF NEEDED    DO NOT TAKE THESE MEDICATIONS 5-7 DAYS PRIOR to your procedure or per your surgeon's request: ASPIRIN, ALEVE, ADVIL, IBUPROFEN, FISH OIL VITAMIN E, HERBALS  (May take Tylenol)  ASPIRIN, IBUPROFEN    ONLY if you are prescribed any types of blood thinners such as:  Aspirin, Coumadin, Plavix, Pradaxa, Xarelto, Aggrenox, Effient, Eliquis, Savasya, Brilinta, or any other, ask your surgeon whether you should stop taking them and how long before surgery you should stop.  You may also need to verify with the prescribing physician if it is ok to stop your medication.      INSTRUCTIONS IMPORTANT!!  · Do not eat or drink anything between midnight and the time of your procedure- this includes gum, mints, and candy.  · Do not smoke or drink alcoholic beverages 24 hours prior to your procedure.  · Shower the night before AND the morning of your procedure with a Chlorhexidine wash such as Hibiclens or Dial antibacterial soap from the neck down.  Do not get it on your face or in your eyes.  You may use your own shampoo and face wash. This helps your skin to be as bacteria free as possible.    · If you wear contact lenses, dentures, hearing aids or glasses, bring a container to put them in during surgery and give  to a family member for safe keeping.  Please leave all jewelry, piercing's and valuables at home.   · DO NOT remove hair from the surgery site.  Do not shave the incision site unless you are given specific instructions to do so.    · ONLY if you have been diagnosed with sleep apnea please bring your C-PAP machine.  · ONLY if you wear home oxygen please bring your portable oxygen tank the day of your procedure.  · ONLY if you have a history of OPEN HEART SURGERY you will need a clearance from your Cardiologist per Anesthesia.      · ONLY for patients requiring bowel prep, written instructions will be given by your doctor's office.  · ONLY if you have a neuro stimulator, please bring the controller with you the morning of surgery  · ONLY if a type and screen test is needed before surgery, please return:  · If your doctor has scheduled you for an overnight stay, bring a small overnight bag with any personal items you need.  · Make arrangements in advance for transportation home by a responsible adult.  It is not safe to drive a vehicle during the 24 hours after anesthesia.      · Visiting hours are 10:00AM to 8:30PM.  For the safety of all patients, visitors under the age of 12 are not allowed above the first floor of the hospital.    · All [facility] facilities and properties are tobacco free.  Smoking is NOT allowed.       If you have any questions about these instructions, call Pre-Op Admit  Nursing at [phone] or the Pre-Op Day Surgery Unit at [phone].

## 2019-02-08 LAB — MRSA SPEC QL CULT: NORMAL

## 2019-02-12 ENCOUNTER — ANESTHESIA EVENT (OUTPATIENT)
Dept: SURGERY | Facility: HOSPITAL | Age: 49
DRG: 520 | End: 2019-02-12
Payer: COMMERCIAL

## 2019-02-13 ENCOUNTER — ANESTHESIA (OUTPATIENT)
Dept: SURGERY | Facility: HOSPITAL | Age: 49
DRG: 520 | End: 2019-02-13
Payer: COMMERCIAL

## 2019-02-13 ENCOUNTER — HOSPITAL ENCOUNTER (OUTPATIENT)
Facility: HOSPITAL | Age: 49
Discharge: HOME HEALTH | DRG: 520 | End: 2019-02-15
Attending: SPECIALIST | Admitting: SPECIALIST
Payer: COMMERCIAL

## 2019-02-13 DIAGNOSIS — M47.816 LUMBAR SPONDYLOSIS: ICD-10-CM

## 2019-02-13 DIAGNOSIS — M43.16 SPONDYLOLISTHESIS OF LUMBAR REGION: ICD-10-CM

## 2019-02-13 DIAGNOSIS — M48.061 NEURAL FORAMINAL STENOSIS OF LUMBAR SPINE: ICD-10-CM

## 2019-02-13 DIAGNOSIS — M51.37 DDD (DEGENERATIVE DISC DISEASE), LUMBOSACRAL: ICD-10-CM

## 2019-02-13 DIAGNOSIS — M54.16 LUMBAR RADICULOPATHY: ICD-10-CM

## 2019-02-13 DIAGNOSIS — M47.816 SPONDYLOSIS OF LUMBAR REGION WITHOUT MYELOPATHY OR RADICULOPATHY: ICD-10-CM

## 2019-02-13 DIAGNOSIS — M54.17 RADICULOPATHY, LUMBOSACRAL REGION: Primary | ICD-10-CM

## 2019-02-13 DIAGNOSIS — M51.26 HERNIATED LUMBAR INTERVERTEBRAL DISC: ICD-10-CM

## 2019-02-13 DIAGNOSIS — Z15.09 LYNCH SYNDROME: ICD-10-CM

## 2019-02-13 LAB
ANION GAP SERPL CALC-SCNC: 10 MMOL/L
BUN SERPL-MCNC: 18 MG/DL
CALCIUM SERPL-MCNC: 8.7 MG/DL
CHLORIDE SERPL-SCNC: 106 MMOL/L
CO2 SERPL-SCNC: 22 MMOL/L
CREAT SERPL-MCNC: 1.3 MG/DL
ERYTHROCYTE [DISTWIDTH] IN BLOOD BY AUTOMATED COUNT: 12.7 %
EST. GFR  (AFRICAN AMERICAN): >60 ML/MIN/1.73 M^2
EST. GFR  (NON AFRICAN AMERICAN): >60 ML/MIN/1.73 M^2
GLUCOSE SERPL-MCNC: 216 MG/DL
HCT VFR BLD AUTO: 42.2 %
HGB BLD-MCNC: 14.4 G/DL
MCH RBC QN AUTO: 29.9 PG
MCHC RBC AUTO-ENTMCNC: 34.2 G/DL
MCV RBC AUTO: 88 FL
PLATELET # BLD AUTO: 269 K/UL
PMV BLD AUTO: 7 FL
POTASSIUM SERPL-SCNC: 4.3 MMOL/L
RBC # BLD AUTO: 4.83 M/UL
SODIUM SERPL-SCNC: 138 MMOL/L
WBC # BLD AUTO: 5.7 K/UL

## 2019-02-13 PROCEDURE — 63600175 PHARM REV CODE 636 W HCPCS: Performed by: ANESTHESIOLOGY

## 2019-02-13 PROCEDURE — 99900035 HC TECH TIME PER 15 MIN (STAT)

## 2019-02-13 PROCEDURE — 80048 BASIC METABOLIC PNL TOTAL CA: CPT

## 2019-02-13 PROCEDURE — 63600175 PHARM REV CODE 636 W HCPCS

## 2019-02-13 PROCEDURE — 96361 HYDRATE IV INFUSION ADD-ON: CPT | Performed by: SPECIALIST

## 2019-02-13 PROCEDURE — 25000003 PHARM REV CODE 250: Performed by: INTERNAL MEDICINE

## 2019-02-13 PROCEDURE — 85027 COMPLETE CBC AUTOMATED: CPT

## 2019-02-13 PROCEDURE — 37000008 HC ANESTHESIA 1ST 15 MINUTES: Performed by: SPECIALIST

## 2019-02-13 PROCEDURE — 63600175 PHARM REV CODE 636 W HCPCS: Performed by: NURSE ANESTHETIST, CERTIFIED REGISTERED

## 2019-02-13 PROCEDURE — G0378 HOSPITAL OBSERVATION PER HR: HCPCS

## 2019-02-13 PROCEDURE — 94760 N-INVAS EAR/PLS OXIMETRY 1: CPT

## 2019-02-13 PROCEDURE — 25000003 PHARM REV CODE 250: Performed by: ANESTHESIOLOGY

## 2019-02-13 PROCEDURE — 27000221 HC OXYGEN, UP TO 24 HOURS

## 2019-02-13 PROCEDURE — 36000710: Performed by: SPECIALIST

## 2019-02-13 PROCEDURE — 99900103 DSU ONLY-NO CHARGE-INITIAL HR (STAT): Performed by: SPECIALIST

## 2019-02-13 PROCEDURE — 63600175 PHARM REV CODE 636 W HCPCS: Performed by: SPECIALIST

## 2019-02-13 PROCEDURE — 96365 THER/PROPH/DIAG IV INF INIT: CPT | Performed by: SPECIALIST

## 2019-02-13 PROCEDURE — 25000003 PHARM REV CODE 250: Performed by: NURSE ANESTHETIST, CERTIFIED REGISTERED

## 2019-02-13 PROCEDURE — D9220A PRA ANESTHESIA: ICD-10-PCS | Mod: CRNA,,, | Performed by: NURSE ANESTHETIST, CERTIFIED REGISTERED

## 2019-02-13 PROCEDURE — D9220A PRA ANESTHESIA: Mod: CRNA,,, | Performed by: NURSE ANESTHETIST, CERTIFIED REGISTERED

## 2019-02-13 PROCEDURE — 12000002 HC ACUTE/MED SURGE SEMI-PRIVATE ROOM

## 2019-02-13 PROCEDURE — 36000711: Performed by: SPECIALIST

## 2019-02-13 PROCEDURE — 99900104 DSU ONLY-NO CHARGE-EA ADD'L HR (STAT): Performed by: SPECIALIST

## 2019-02-13 PROCEDURE — 88304 TISSUE EXAM BY PATHOLOGIST: CPT | Performed by: PATHOLOGY

## 2019-02-13 PROCEDURE — 71000039 HC RECOVERY, EACH ADD'L HOUR: Performed by: SPECIALIST

## 2019-02-13 PROCEDURE — 88304 TISSUE SPECIMEN TO PATHOLOGY - SURGERY: ICD-10-PCS | Mod: 26,,, | Performed by: PATHOLOGY

## 2019-02-13 PROCEDURE — 25000003 PHARM REV CODE 250: Performed by: SPECIALIST

## 2019-02-13 PROCEDURE — D9220A PRA ANESTHESIA: ICD-10-PCS | Mod: ANES,,, | Performed by: ANESTHESIOLOGY

## 2019-02-13 PROCEDURE — 71000033 HC RECOVERY, INTIAL HOUR: Performed by: SPECIALIST

## 2019-02-13 PROCEDURE — 94770 HC EXHALED C02 TEST: CPT

## 2019-02-13 PROCEDURE — 36415 COLL VENOUS BLD VENIPUNCTURE: CPT

## 2019-02-13 PROCEDURE — C1729 CATH, DRAINAGE: HCPCS | Performed by: SPECIALIST

## 2019-02-13 PROCEDURE — 88311 DECALCIFY TISSUE: CPT | Mod: 26,,, | Performed by: PATHOLOGY

## 2019-02-13 PROCEDURE — 94799 UNLISTED PULMONARY SVC/PX: CPT

## 2019-02-13 PROCEDURE — 27201423 OPTIME MED/SURG SUP & DEVICES STERILE SUPPLY: Performed by: SPECIALIST

## 2019-02-13 PROCEDURE — 37000009 HC ANESTHESIA EA ADD 15 MINS: Performed by: SPECIALIST

## 2019-02-13 PROCEDURE — D9220A PRA ANESTHESIA: Mod: ANES,,, | Performed by: ANESTHESIOLOGY

## 2019-02-13 PROCEDURE — 96375 TX/PRO/DX INJ NEW DRUG ADDON: CPT | Mod: 59 | Performed by: SPECIALIST

## 2019-02-13 PROCEDURE — 96366 THER/PROPH/DIAG IV INF ADDON: CPT | Mod: 59 | Performed by: SPECIALIST

## 2019-02-13 PROCEDURE — 88311 TISSUE SPECIMEN TO PATHOLOGY - SURGERY: ICD-10-PCS | Mod: 26,,, | Performed by: PATHOLOGY

## 2019-02-13 RX ORDER — FENTANYL CITRATE 50 UG/ML
25 INJECTION, SOLUTION INTRAMUSCULAR; INTRAVENOUS EVERY 5 MIN PRN
Status: DISCONTINUED | OUTPATIENT
Start: 2019-02-13 | End: 2019-02-13 | Stop reason: HOSPADM

## 2019-02-13 RX ORDER — SODIUM CHLORIDE 0.9 % (FLUSH) 0.9 %
3 SYRINGE (ML) INJECTION
Status: DISCONTINUED | OUTPATIENT
Start: 2019-02-13 | End: 2019-02-13 | Stop reason: HOSPADM

## 2019-02-13 RX ORDER — MUPIROCIN 20 MG/G
OINTMENT TOPICAL
Status: DISCONTINUED | OUTPATIENT
Start: 2019-02-13 | End: 2019-02-13 | Stop reason: HOSPADM

## 2019-02-13 RX ORDER — NALOXONE HCL 0.4 MG/ML
0.02 VIAL (ML) INJECTION
Status: DISCONTINUED | OUTPATIENT
Start: 2019-02-13 | End: 2019-02-15 | Stop reason: HOSPADM

## 2019-02-13 RX ORDER — KETAMINE HYDROCHLORIDE 100 MG/ML
INJECTION, SOLUTION INTRAMUSCULAR; INTRAVENOUS
Status: DISCONTINUED | OUTPATIENT
Start: 2019-02-13 | End: 2019-02-13

## 2019-02-13 RX ORDER — HYDROMORPHONE HYDROCHLORIDE 2 MG/ML
INJECTION, SOLUTION INTRAMUSCULAR; INTRAVENOUS; SUBCUTANEOUS
Status: DISCONTINUED | OUTPATIENT
Start: 2019-02-13 | End: 2019-02-13

## 2019-02-13 RX ORDER — HYDROMORPHONE HCL IN 0.9% NACL 6 MG/30 ML
PATIENT CONTROLLED ANALGESIA SYRINGE INTRAVENOUS CONTINUOUS
Status: DISCONTINUED | OUTPATIENT
Start: 2019-02-13 | End: 2019-02-14

## 2019-02-13 RX ORDER — VECURONIUM BROMIDE FOR INJECTION 1 MG/ML
INJECTION, POWDER, LYOPHILIZED, FOR SOLUTION INTRAVENOUS
Status: DISCONTINUED | OUTPATIENT
Start: 2019-02-13 | End: 2019-02-13

## 2019-02-13 RX ORDER — AMOXICILLIN 250 MG
2 CAPSULE ORAL NIGHTLY PRN
Status: DISCONTINUED | OUTPATIENT
Start: 2019-02-13 | End: 2019-02-15 | Stop reason: HOSPADM

## 2019-02-13 RX ORDER — KETOROLAC TROMETHAMINE 30 MG/ML
INJECTION, SOLUTION INTRAMUSCULAR; INTRAVENOUS
Status: DISCONTINUED | OUTPATIENT
Start: 2019-02-13 | End: 2019-02-13

## 2019-02-13 RX ORDER — HYDROMORPHONE HYDROCHLORIDE 2 MG/ML
0.2 INJECTION, SOLUTION INTRAMUSCULAR; INTRAVENOUS; SUBCUTANEOUS EVERY 5 MIN PRN
Status: DISCONTINUED | OUTPATIENT
Start: 2019-02-13 | End: 2019-02-13 | Stop reason: HOSPADM

## 2019-02-13 RX ORDER — PHENYLEPHRINE HYDROCHLORIDE 10 MG/ML
INJECTION INTRAVENOUS
Status: DISCONTINUED | OUTPATIENT
Start: 2019-02-13 | End: 2019-02-13

## 2019-02-13 RX ORDER — CEFAZOLIN SODIUM 2 G/50ML
2 SOLUTION INTRAVENOUS
Status: COMPLETED | OUTPATIENT
Start: 2019-02-13 | End: 2019-02-14

## 2019-02-13 RX ORDER — DEXAMETHASONE SODIUM PHOSPHATE 100 MG/10ML
INJECTION INTRAMUSCULAR; INTRAVENOUS
Status: DISCONTINUED | OUTPATIENT
Start: 2019-02-13 | End: 2019-02-13 | Stop reason: HOSPADM

## 2019-02-13 RX ORDER — SODIUM CHLORIDE, SODIUM LACTATE, POTASSIUM CHLORIDE, CALCIUM CHLORIDE 600; 310; 30; 20 MG/100ML; MG/100ML; MG/100ML; MG/100ML
INJECTION, SOLUTION INTRAVENOUS CONTINUOUS
Status: DISCONTINUED | OUTPATIENT
Start: 2019-02-13 | End: 2019-02-13

## 2019-02-13 RX ORDER — ACETAMINOPHEN 10 MG/ML
INJECTION, SOLUTION INTRAVENOUS
Status: DISCONTINUED | OUTPATIENT
Start: 2019-02-13 | End: 2019-02-13

## 2019-02-13 RX ORDER — PROPOFOL 10 MG/ML
VIAL (ML) INTRAVENOUS
Status: DISCONTINUED | OUTPATIENT
Start: 2019-02-13 | End: 2019-02-13

## 2019-02-13 RX ORDER — DIPHENHYDRAMINE HCL 25 MG
25 CAPSULE ORAL EVERY 6 HOURS PRN
Status: DISCONTINUED | OUTPATIENT
Start: 2019-02-13 | End: 2019-02-15 | Stop reason: HOSPADM

## 2019-02-13 RX ORDER — DEXAMETHASONE SODIUM PHOSPHATE 4 MG/ML
INJECTION, SOLUTION INTRA-ARTICULAR; INTRALESIONAL; INTRAMUSCULAR; INTRAVENOUS; SOFT TISSUE
Status: DISCONTINUED | OUTPATIENT
Start: 2019-02-13 | End: 2019-02-13

## 2019-02-13 RX ORDER — SUCCINYLCHOLINE CHLORIDE 20 MG/ML
INJECTION INTRAMUSCULAR; INTRAVENOUS
Status: DISCONTINUED | OUTPATIENT
Start: 2019-02-13 | End: 2019-02-13

## 2019-02-13 RX ORDER — PANTOPRAZOLE SODIUM 40 MG/1
40 TABLET, DELAYED RELEASE ORAL DAILY
Status: DISCONTINUED | OUTPATIENT
Start: 2019-02-14 | End: 2019-02-15 | Stop reason: HOSPADM

## 2019-02-13 RX ORDER — OXYCODONE HYDROCHLORIDE 5 MG/1
5 TABLET ORAL ONCE AS NEEDED
Status: COMPLETED | OUTPATIENT
Start: 2019-02-13 | End: 2019-02-13

## 2019-02-13 RX ORDER — EPHEDRINE SULFATE 50 MG/ML
INJECTION, SOLUTION INTRAVENOUS
Status: DISCONTINUED | OUTPATIENT
Start: 2019-02-13 | End: 2019-02-13

## 2019-02-13 RX ORDER — FENTANYL CITRATE 50 UG/ML
INJECTION, SOLUTION INTRAMUSCULAR; INTRAVENOUS
Status: DISCONTINUED | OUTPATIENT
Start: 2019-02-13 | End: 2019-02-13

## 2019-02-13 RX ORDER — LIDOCAINE HYDROCHLORIDE 10 MG/ML
1 INJECTION, SOLUTION EPIDURAL; INFILTRATION; INTRACAUDAL; PERINEURAL ONCE
Status: COMPLETED | OUTPATIENT
Start: 2019-02-13 | End: 2019-02-13

## 2019-02-13 RX ORDER — BUPIVACAINE HYDROCHLORIDE AND EPINEPHRINE 2.5; 5 MG/ML; UG/ML
INJECTION, SOLUTION EPIDURAL; INFILTRATION; INTRACAUDAL; PERINEURAL
Status: DISCONTINUED | OUTPATIENT
Start: 2019-02-13 | End: 2019-02-13 | Stop reason: HOSPADM

## 2019-02-13 RX ORDER — ROCURONIUM BROMIDE 10 MG/ML
INJECTION, SOLUTION INTRAVENOUS
Status: DISCONTINUED | OUTPATIENT
Start: 2019-02-13 | End: 2019-02-13

## 2019-02-13 RX ORDER — LIDOCAINE HCL/PF 100 MG/5ML
SYRINGE (ML) INTRAVENOUS
Status: DISCONTINUED | OUTPATIENT
Start: 2019-02-13 | End: 2019-02-13

## 2019-02-13 RX ORDER — CEFAZOLIN SODIUM 2 G/50ML
2 SOLUTION INTRAVENOUS
Status: COMPLETED | OUTPATIENT
Start: 2019-02-13 | End: 2019-02-13

## 2019-02-13 RX ORDER — BACITRACIN 50000 [IU]/1
INJECTION, POWDER, FOR SOLUTION INTRAMUSCULAR
Status: DISCONTINUED | OUTPATIENT
Start: 2019-02-13 | End: 2019-02-13 | Stop reason: HOSPADM

## 2019-02-13 RX ORDER — ONDANSETRON 2 MG/ML
4 INJECTION INTRAMUSCULAR; INTRAVENOUS EVERY 12 HOURS PRN
Status: DISCONTINUED | OUTPATIENT
Start: 2019-02-13 | End: 2019-02-15 | Stop reason: HOSPADM

## 2019-02-13 RX ORDER — SODIUM CHLORIDE 9 MG/ML
INJECTION, SOLUTION INTRAVENOUS CONTINUOUS
Status: DISCONTINUED | OUTPATIENT
Start: 2019-02-13 | End: 2019-02-15 | Stop reason: HOSPADM

## 2019-02-13 RX ORDER — MIDAZOLAM HYDROCHLORIDE 1 MG/ML
INJECTION, SOLUTION INTRAMUSCULAR; INTRAVENOUS
Status: DISCONTINUED | OUTPATIENT
Start: 2019-02-13 | End: 2019-02-13

## 2019-02-13 RX ORDER — ONDANSETRON HYDROCHLORIDE 2 MG/ML
INJECTION, SOLUTION INTRAMUSCULAR; INTRAVENOUS
Status: DISCONTINUED | OUTPATIENT
Start: 2019-02-13 | End: 2019-02-13

## 2019-02-13 RX ADMIN — HYDROMORPHONE HYDROCHLORIDE 0.2 MG: 2 INJECTION INTRAMUSCULAR; INTRAVENOUS; SUBCUTANEOUS at 03:02

## 2019-02-13 RX ADMIN — SODIUM CHLORIDE, SODIUM LACTATE, POTASSIUM CHLORIDE, AND CALCIUM CHLORIDE: .6; .31; .03; .02 INJECTION, SOLUTION INTRAVENOUS at 10:02

## 2019-02-13 RX ADMIN — SODIUM CHLORIDE: 0.9 INJECTION, SOLUTION INTRAVENOUS at 04:02

## 2019-02-13 RX ADMIN — ONDANSETRON 4 MG: 2 INJECTION, SOLUTION INTRAMUSCULAR; INTRAVENOUS at 09:02

## 2019-02-13 RX ADMIN — ROCURONIUM BROMIDE 5 MG: 10 INJECTION, SOLUTION INTRAVENOUS at 09:02

## 2019-02-13 RX ADMIN — KETAMINE HYDROCHLORIDE 30 MG: 100 INJECTION, SOLUTION, CONCENTRATE INTRAMUSCULAR; INTRAVENOUS at 09:02

## 2019-02-13 RX ADMIN — PROPOFOL 150 MG: 10 INJECTION, EMULSION INTRAVENOUS at 09:02

## 2019-02-13 RX ADMIN — LIDOCAINE HYDROCHLORIDE 75 MG: 20 INJECTION, SOLUTION INTRAVENOUS at 09:02

## 2019-02-13 RX ADMIN — HYDROMORPHONE HYDROCHLORIDE 0.5 MG: 2 INJECTION INTRAMUSCULAR; INTRAVENOUS; SUBCUTANEOUS at 01:02

## 2019-02-13 RX ADMIN — HYDROMORPHONE HYDROCHLORIDE 0.5 MG: 2 INJECTION INTRAMUSCULAR; INTRAVENOUS; SUBCUTANEOUS at 02:02

## 2019-02-13 RX ADMIN — Medication: at 04:02

## 2019-02-13 RX ADMIN — OXYCODONE HYDROCHLORIDE 5 MG: 5 TABLET ORAL at 03:02

## 2019-02-13 RX ADMIN — SODIUM CHLORIDE, SODIUM LACTATE, POTASSIUM CHLORIDE, AND CALCIUM CHLORIDE: .6; .31; .03; .02 INJECTION, SOLUTION INTRAVENOUS at 08:02

## 2019-02-13 RX ADMIN — MIDAZOLAM 2 MG: 1 INJECTION INTRAMUSCULAR; INTRAVENOUS at 09:02

## 2019-02-13 RX ADMIN — VECURONIUM BROMIDE FOR INJECTION 2 MG: 1 INJECTION, POWDER, LYOPHILIZED, FOR SOLUTION INTRAVENOUS at 10:02

## 2019-02-13 RX ADMIN — KETOROLAC TROMETHAMINE 30 MG: 30 INJECTION, SOLUTION INTRAMUSCULAR; INTRAVENOUS at 02:02

## 2019-02-13 RX ADMIN — ACETAMINOPHEN 1000 MG: 10 INJECTION, SOLUTION INTRAVENOUS at 09:02

## 2019-02-13 RX ADMIN — HYDROMORPHONE HYDROCHLORIDE 0.2 MG: 2 INJECTION INTRAMUSCULAR; INTRAVENOUS; SUBCUTANEOUS at 04:02

## 2019-02-13 RX ADMIN — DIPHENHYDRAMINE HYDROCHLORIDE 25 MG: 25 CAPSULE ORAL at 06:02

## 2019-02-13 RX ADMIN — EPHEDRINE SULFATE 10 MG: 50 INJECTION, SOLUTION INTRAMUSCULAR; INTRAVENOUS; SUBCUTANEOUS at 10:02

## 2019-02-13 RX ADMIN — Medication: at 08:02

## 2019-02-13 RX ADMIN — PHENYLEPHRINE HYDROCHLORIDE 100 MCG: 10 INJECTION INTRAVENOUS at 10:02

## 2019-02-13 RX ADMIN — SUCCINYLCHOLINE CHLORIDE 140 MG: 20 INJECTION, SOLUTION INTRAMUSCULAR; INTRAVENOUS at 09:02

## 2019-02-13 RX ADMIN — LIDOCAINE HYDROCHLORIDE: 10 INJECTION, SOLUTION EPIDURAL; INFILTRATION; INTRACAUDAL; PERINEURAL at 08:02

## 2019-02-13 RX ADMIN — DEXAMETHASONE SODIUM PHOSPHATE 4 MG: 4 INJECTION, SOLUTION INTRAMUSCULAR; INTRAVENOUS at 09:02

## 2019-02-13 RX ADMIN — HYDROMORPHONE HYDROCHLORIDE 0.5 MG: 2 INJECTION INTRAMUSCULAR; INTRAVENOUS; SUBCUTANEOUS at 11:02

## 2019-02-13 RX ADMIN — CEFAZOLIN SODIUM 2 G: 2 SOLUTION INTRAVENOUS at 10:02

## 2019-02-13 RX ADMIN — CEFAZOLIN SODIUM 2 G: 2 SOLUTION INTRAVENOUS at 06:02

## 2019-02-13 RX ADMIN — FENTANYL CITRATE 100 MCG: 50 INJECTION, SOLUTION INTRAMUSCULAR; INTRAVENOUS at 09:02

## 2019-02-13 NOTE — PLAN OF CARE
Problem: Adult Inpatient Plan of Care  Goal: Plan of Care Review  Outcome: Ongoing (interventions implemented as appropriate)  Nc 2 lpm and ETCO2 monitor in use and IS instructed to use Q1 hrs

## 2019-02-13 NOTE — OP NOTE
[facility]  Brief Operative Note    SUMMARY     Surgery Date: 2/13/2019     Surgeon(s) and Role:     * Lyndon Brooke Jr., MD - Primary    Assisting Surgeon: RADHA Lagunas    Pre-op Diagnosis:  Lumbar Extruded Disc Herniation at L5-S1, Lumbar Radiculopathy, Lumbar Spinal Stenosis, Lumbar spondylosis, Lumbar DDD    Post-op Diagnosis:  Post-Op Diagnosis Codes:     * Same as above with osteophyte disc complex L5-S1    Procedure(s) (LRB):  LAMINECTOMY, SPINE, LUMBAR, FOR DECOMPRESSION L5-S1 (N/A)  DISCECTOMY, SPINE, LUMBAR L5-S1  MEDIAL FACETECTOMY L5-S1    Anesthesia: General Endotracheal     Description of Procedure: Left L5-S1 extruded disc osteophyte complex with lateral recess stenosis    Description of the findings of the procedure: As above     Estimated Blood Loss: 50 mL    Drain: TAMMY drain at surgical site L5-S1         Specimens:   Specimen (12h ago, onward)    Start     Ordered    02/13/19 1252  Specimen to Pathology - Surgery  Once     Comments:  Pre-op Diagnosis: Degeneration of lumbar intervertebral disc [M51.36]Post-op Diagnosis: sameProcedure(s):LAMINECTOMY, SPINE, LUMBAR, FOR DECOMPRESSION Number of specimens: 1Name of specimens: 1. disc     Start Status   02/13/19 1252 Collected (02/13/19 1345)       02/13/19 1348

## 2019-02-13 NOTE — PLAN OF CARE
Pt AAO x 4, vss, pain tolerable 3-4/10, dressing to back cdi, drain intact to back, PCA in use, tolerated clear liquids without N/V, IV fluids infusing, bonilla intact and flowing freely, emanuel SCDs on. Ok per Dr. Ashley to transfer the pt to the floor. Pt transported via bed to room 417. Pt's wife at the bedside. Call light within reach. Report given to RONNELL Carvalho.

## 2019-02-13 NOTE — TRANSFER OF CARE
"Anesthesia Transfer of Care Note    Patient: Lyndon Lion Jr.    Procedure(s) Performed: Procedure(s) (LRB):  LAMINECTOMY, SPINE, LUMBAR, FOR DECOMPRESSION L5-S1 (N/A)  DISCECTOMY, SPINE, LUMBAR L5-S1  MEDIAL FACETECTOMY L5-S1    Patient location: PACU    Anesthesia Type: general    Transport from OR: Transported from OR on 2-3 L/min O2 by NC with adequate spontaneous ventilation    Post pain: adequate analgesia    Post assessment: no apparent anesthetic complications and tolerated procedure well    Post vital signs: stable    Level of consciousness: awake, alert and oriented    Nausea/Vomiting: no nausea/vomiting    Complications: none    Transfer of care protocol was followed      Last vitals:   Visit Vitals  /80 (BP Location: Left arm, Patient Position: Sitting)   Pulse 66   Temp 36.9 °C (98.5 °F) (Oral)   Resp 18   Ht 6' 1.5" (1.867 m)   Wt 117.9 kg (260 lb)   SpO2 97%   BMI 33.84 kg/m²     "

## 2019-02-13 NOTE — NURSING
Pt was transferred from PACU via bed by nurse, pt is aaox3, pt IV fluids infusing, bonilla catheter to gravity, TAMMY drain intact, PCA Pump for pain, SCDS intact, 2L-o2 PER NC, wife at bedside, VSS and noted will cont to monitor

## 2019-02-13 NOTE — H&P
PCP: Evan Judd MD    History & Physical    Chief Complaint: s/p L5-S1 lumbar laminectomy    History of Present Illness:  Patient is a 48 y.o. male admitted to Hospitalist Service from Operation Room s/p L5-S1 lumbar laminectomy performed by Dr. Brooke. 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 Post-operatively, patient denied chest pain, shortness of breath, abdominal pain, nausea, vomiting, headache, vision changes, focal neuro-deficits, cough or fever.    Past Medical History:   Diagnosis Date    Arthritis     Asthma     AS A CHILD    Colon cancer     Family hx of prostate cancer 11/22/2016    Hx of colon cancer, stage I 7/3/2014    Parada syndrome     Vitamin D deficiency     Wears glasses      Past Surgical History:   Procedure Laterality Date    APPENDECTOMY      ARTHROSCOPY, KNEE Right 5/21/2013    [facility]    BLOCK-FACET-LUMBAR  RT L3/4,L4/5,L5,S1 Right 3/18/2016    Per[facility] OR    BLOCK-FACET-LUMBAR- Ye L3/4 4/5 5/S1 Bilateral 6/16/2017    Per[facility] by [facility] Jr[facility] at [facility] OR    BLOCK-FACET-LUMBAR- L3/4, 4/5, 5/S1 Left 12/16/2016    Per[facility] by [facility] Jr[facility] at [facility] OR    COLON SURGERY  2008    PARTIAL COLECTOMY    COLONOSCOPY Bilateral 2012    COLONOSCOPY N/A 5/30/2018    Performed by Dom Leonardo [facility] at [facility] ENDO (4TH FLR)    COLONOSCOPY N/A 10/9/2017    Performed by RAMON Callahan MD at [facility] ENDO (4TH FLR)    COLONOSCOPY N/A 9/20/2017    Performed by Dom [facility]ardo [facility] at [facility] ENDO (4TH FLR)    COLONOSCOPY N/A 8/1/2016    Performed by Blaze Marr MD at [facility] ENDO    COLONOSCOPY N/A 9/4/2015    Performed by Blaze [facility]r [facility] at [facility] ENDO    COLONOSCOPY N/A 7/3/2014    [facility]    COLONOSCOPY/EMR N/A 11/20/2017    Performed by Joseluis JAIMES  MD Дмитрий at [facility] ENDO (2ND FLR)    Epidural Steroid Injection  10/23/15    Lumbar    ESOPHAGOGASTRODUODENOSCOPY      ESOPHAGOGASTRODUODENOSCOPY (EGD) N/A 7/19/2016    Performed by Bl[facility]r [facility] at [facility] ENDO    ESOPHAGOGASTRODUODENOSCOPY (EGD) N/A 9/4/2015    Performed by Blaze Marr MD at [facility] ENDO    GASTRIC BYPASS  2010    SLEEVE    INJECTION, STEROID, SPINE, LUMBAR, EPIDURAL N/A 3/19/2013    Performed by [facility]don [facility]oke Jr., MD at [facility] OR    Injection-steroid-epidural-caudal N/A 11/6/2018    Performed by [facility]don Brooke Jr., MD at [facility] OR    Injection-steroid-epidural-lumbar N/A 7/27/2018    Performed by [facility]don Brooke Jr., MD at [facility] OR    INJECTION-STEROID-EPIDURAL-LUMBAR N/A 9/19/2014    Performed by Lyndon Boroke Jr., MD at [facility] OR    KNEE ARTHROSCOPY Right      Family History   Problem Relation Age of Onset    Melanoma Mother     Heart disease Father     Diabetes Father     Cancer Maternal Uncle         colon    Cancer Maternal Grandfather         colon ca    Psoriasis Neg Hx     Lupus Neg Hx     Eczema Neg Hx      Social History     Tobacco Use    Smoking status: Never Smoker    Smokeless tobacco: Never Used   Substance Use Topics    Alcohol use: Yes     Comment: RARELY    Drug use: No      Review of patient's allergies indicates:  No Known Allergies  PTA Medications   Medication Sig    ergocalciferol (ERGOCALCIFEROL) 50,000 unit Cap TAKE 1 CAPSULE BY MOUTH EVERY 7 DAYS    oxyCODONE-acetaminophen (PERCOCET)  mg per tablet Take 1 tablet by mouth every 6 to 8 hours as needed for Pain.    [DISCONTINUED] diclofenac sodium 1 % Gel APPLY 2 GRAMS TO AFFECTED AREA FOUR TIMES DAILY    [DISCONTINUED] ibuprofen (ADVIL,MOTRIN) 800 MG tablet Take 800 mg by mouth as needed for Pain.    cyclobenzaprine (FLEXERIL) 10 MG tablet TAKE 1 TABLET(10 MG) BY MOUTH TWICE DAILY AS NEEDED FOR MUSCLE SPASMS    [DISCONTINUED] aspirin (ECOTRIN) 81 MG EC tablet Take 81 mg by  mouth once daily.    [DISCONTINUED] metroNIDAZOLE (METROGEL) 0.75 % gel APPLY TO NOSE TWICE DAILY     Review of Systems:  Constitutional: no fever or chills  Eyes: no visual changes  Ears, nose, mouth, throat, and face: no nasal congestion or sore throat  Respiratory: no cough or shorness of breath  Cardiovascular: no chest pain or palpitations  Gastrointestinal: no nausea or vomiting, no abdominal pain or change in bowel habits  Genitourinary: no hematuria or dysuria  Integument/breast: no rash. + Itching related to IV narcotic infusion  Hematologic/lymphatic: no easy bruising or lymphadenopathy  Musculoskeletal: see HPI  Neurological: no seizures or tremors.  Behavioral/Psych: no auditory or visual hallucinations  Endocrine: no heat or cold intolerance     OBJECTIVE:     Vital Signs (Most Recent)  Temp: 98.6 °F (37 °C) (02/13/19 1630)  Pulse: 84 (02/13/19 1653)  Resp: 18 (02/13/19 1653)  BP: 127/63 (02/13/19 1630)  SpO2: 95 % (02/13/19 1653)    Physical Exam:  General appearance: well developed, appears stated age  Head: normocephalic, atraumatic  Eyes:  conjunctivae/corneas clear. PERRL.  Nose: Nares normal. Septum midline.  Throat: lips, mucosa, and tongue normal; teeth and gums normal, no throat erythema.  Neck: supple, symmetrical, trachea midline, no JVD and thyroid not enlarged, symmetric, no tenderness/mass/nodules  Lungs:  clear to auscultation bilaterally and normal respiratory effort  Chest wall: no tenderness  Heart: regular rate and rhythm, S1, S2 normal, no murmur, click, rub or gallop  Abdomen: soft, non-tender non-distented; bowel sounds normal; no masses,  no organomegaly  Extremities: no cyanosis, clubbing or edema.   Pulses: 2+ and symmetric  Skin: Skin color, texture, turgor normal. No rashes or lesions.  Lymph nodes: Cervical, supraclavicular, and axillary nodes normal.  Neurologic: Normal strength and tone. No focal numbness or weakness. CNII-XII intact.      Laboratory:   CBC:   Recent Labs    Lab 02/13/19  1536   WBC 5.70   RBC 4.83   HGB 14.4   HCT 42.2      MCV 88   MCH 29.9   MCHC 34.2     CMP:   Recent Labs   Lab 02/13/19  1536   *   CALCIUM 8.7      K 4.3   CO2 22*      BUN 18   CREATININE 1.3       Hemoglobin A1C   Date Value Ref Range Status   08/04/2017 5.8 (H) 4.0 - 5.6 % Final     Comment:     According to ADA guidelines, hemoglobin A1c <7.0% represents  optimal control in non-pregnant diabetic patients. Different  metrics may apply to specific patient populations.   Standards of Medical Care in Diabetes-2016.  For the purpose of screening for the presence of diabetes:  <5.7%     Consistent with the absence of diabetes  5.7-6.4%  Consistent with increasing risk for diabetes   (prediabetes)  >or=6.5%  Consistent with diabetes  Currently, no consensus exists for use of hemoglobin A1c  for diagnosis of diabetes for children.  This Hemoglobin A1c assay has significant interference with fetal   hemoglobin   (HbF). The results are invalid for patients with abnormal amounts of   HbF,   including those with known Hereditary Persistence   of Fetal Hemoglobin. Heterozygous hemoglobin variants (HbAS, HbAC,   HbAD, HbAE, HbA2) do not significantly interfere with this assay;   however, presence of multiple variants in a sample may impact the %   interference.     11/16/2016 5.9 4.5 - 6.2 % Final     Comment:     According to ADA guidelines, hemoglobin A1C <7.0% represents  optimal control in non-pregnant diabetic patients.  Different  metrics may apply to specific populations.   Standards of Medical Care in Diabetes - 2016.  For the purpose of screening for the presence of diabetes:  <5.7%     Consistent with the absence of diabetes  5.7-6.4%  Consistent with increasing risk for diabetes   (prediabetes)  >or=6.5%  Consistent with diabetes  Currently no consensus exists for use of hemoglobin A1C  for diagnosis of diabetes for children.     02/19/2014 5.6 4.5 - 6.2 % Final        Diagnostic Results: None    Assessment/Plan:     Active Hospital Problems    Diagnosis  POA    *Herniated lumbar intervertebral disc [M51.26] s/p L5-S1 lumbar laminectomy  DDD (degenerative disc disease), lumbosacral [M51.37]  Continue to follow Orthopedic recommendations.  Needs aggressive incentive spirometry.  Follow hemoglobin and hematocrit closely.  Pain control with IV narcotics and antiemetics as needed.  Physical therapy as per Orthopedics protocol with fall precautions.    Yes    Mild vitamin D deficiency [E55.9]  Yes     Continue Vit. D supplementation.     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    Yes         DVT prophylaxis: Use SCD and TEDs. No anticoagulation as per Dr. Brooke.     Discussed with patient's wife.    Joel Bailey MD  Department of Hospital Medicine   [facility]

## 2019-02-13 NOTE — ANESTHESIA POSTPROCEDURE EVALUATION
"Anesthesia Post Evaluation    Patient: Lyndon Lion Jr.    Procedure(s) Performed: Procedure(s) (LRB):  LAMINECTOMY, SPINE, LUMBAR, FOR DECOMPRESSION L5-S1 (N/A)  DISCECTOMY, SPINE, LUMBAR L5-S1  MEDIAL FACETECTOMY L5-S1    Final Anesthesia Type: general  Patient location during evaluation: PACU  Patient participation: Yes- Able to Participate  Level of consciousness: awake and alert  Post-procedure vital signs: reviewed and stable  Pain management: adequate  Airway patency: patent  PONV status at discharge: No PONV  Anesthetic complications: no      Cardiovascular status: hemodynamically stable and blood pressure returned to baseline  Respiratory status: unassisted, spontaneous ventilation and nasal cannula  Hydration status: euvolemic  Follow-up not needed.        Visit Vitals  /63 (BP Location: Right arm)   Pulse 87   Temp 37 °C (98.6 °F) (Oral)   Resp 14   Ht 6' 1.5" (1.867 m)   Wt 117.9 kg (260 lb)   SpO2 95%   BMI 33.84 kg/m²       Pain/Karthikeyan Score: Pain Rating Prior to Med Admin: 3 (2/13/2019  4:39 PM)  Karthikeyan Score: 9 (2/13/2019  3:45 PM)        "

## 2019-02-14 LAB
ANION GAP SERPL CALC-SCNC: 8 MMOL/L
BASOPHILS # BLD AUTO: 0 K/UL
BASOPHILS NFR BLD: 0.2 %
BUN SERPL-MCNC: 17 MG/DL
CALCIUM SERPL-MCNC: 8.4 MG/DL
CHLORIDE SERPL-SCNC: 103 MMOL/L
CO2 SERPL-SCNC: 26 MMOL/L
CREAT SERPL-MCNC: 1 MG/DL
DIFFERENTIAL METHOD: ABNORMAL
EOSINOPHIL # BLD AUTO: 0.1 K/UL
EOSINOPHIL NFR BLD: 0.9 %
ERYTHROCYTE [DISTWIDTH] IN BLOOD BY AUTOMATED COUNT: 12.8 %
EST. GFR  (AFRICAN AMERICAN): >60 ML/MIN/1.73 M^2
EST. GFR  (NON AFRICAN AMERICAN): >60 ML/MIN/1.73 M^2
GLUCOSE SERPL-MCNC: 118 MG/DL
HCT VFR BLD AUTO: 37.4 %
HGB BLD-MCNC: 12.9 G/DL
LYMPHOCYTES # BLD AUTO: 1.6 K/UL
LYMPHOCYTES NFR BLD: 22.2 %
MCH RBC QN AUTO: 30.1 PG
MCHC RBC AUTO-ENTMCNC: 34.5 G/DL
MCV RBC AUTO: 87 FL
MONOCYTES # BLD AUTO: 0.6 K/UL
MONOCYTES NFR BLD: 8.9 %
NEUTROPHILS # BLD AUTO: 4.8 K/UL
NEUTROPHILS NFR BLD: 67.8 %
PLATELET # BLD AUTO: 240 K/UL
PMV BLD AUTO: 6.9 FL
POTASSIUM SERPL-SCNC: 3.6 MMOL/L
RBC # BLD AUTO: 4.29 M/UL
SODIUM SERPL-SCNC: 137 MMOL/L
WBC # BLD AUTO: 7.1 K/UL

## 2019-02-14 PROCEDURE — 25000003 PHARM REV CODE 250: Performed by: INTERNAL MEDICINE

## 2019-02-14 PROCEDURE — 94799 UNLISTED PULMONARY SVC/PX: CPT

## 2019-02-14 PROCEDURE — 96366 THER/PROPH/DIAG IV INF ADDON: CPT | Performed by: SPECIALIST

## 2019-02-14 PROCEDURE — 99900035 HC TECH TIME PER 15 MIN (STAT)

## 2019-02-14 PROCEDURE — 63600175 PHARM REV CODE 636 W HCPCS: Performed by: SPECIALIST

## 2019-02-14 PROCEDURE — 94761 N-INVAS EAR/PLS OXIMETRY MLT: CPT

## 2019-02-14 PROCEDURE — 96361 HYDRATE IV INFUSION ADD-ON: CPT | Performed by: SPECIALIST

## 2019-02-14 PROCEDURE — 96376 TX/PRO/DX INJ SAME DRUG ADON: CPT | Performed by: SPECIALIST

## 2019-02-14 PROCEDURE — 85025 COMPLETE CBC W/AUTO DIFF WBC: CPT

## 2019-02-14 PROCEDURE — 97161 PT EVAL LOW COMPLEX 20 MIN: CPT | Performed by: PHYSICAL THERAPIST

## 2019-02-14 PROCEDURE — 36415 COLL VENOUS BLD VENIPUNCTURE: CPT

## 2019-02-14 PROCEDURE — 80048 BASIC METABOLIC PNL TOTAL CA: CPT

## 2019-02-14 PROCEDURE — 25000003 PHARM REV CODE 250: Performed by: SPECIALIST

## 2019-02-14 PROCEDURE — G0378 HOSPITAL OBSERVATION PER HR: HCPCS

## 2019-02-14 PROCEDURE — 94770 HC EXHALED C02 TEST: CPT

## 2019-02-14 PROCEDURE — 12000002 HC ACUTE/MED SURGE SEMI-PRIVATE ROOM

## 2019-02-14 PROCEDURE — G8979 MOBILITY GOAL STATUS: HCPCS | Mod: CH | Performed by: PHYSICAL THERAPIST

## 2019-02-14 PROCEDURE — 25000003 PHARM REV CODE 250: Performed by: ANESTHESIOLOGY

## 2019-02-14 PROCEDURE — G8978 MOBILITY CURRENT STATUS: HCPCS | Mod: CH | Performed by: PHYSICAL THERAPIST

## 2019-02-14 RX ORDER — OXYCODONE AND ACETAMINOPHEN 10; 325 MG/1; MG/1
1 TABLET ORAL EVERY 4 HOURS PRN
Status: DISCONTINUED | OUTPATIENT
Start: 2019-02-14 | End: 2019-02-15 | Stop reason: HOSPADM

## 2019-02-14 RX ORDER — MEPERIDINE HYDROCHLORIDE 50 MG/ML
50 INJECTION INTRAMUSCULAR; INTRAVENOUS; SUBCUTANEOUS EVERY 6 HOURS PRN
Status: DISCONTINUED | OUTPATIENT
Start: 2019-02-14 | End: 2019-02-15 | Stop reason: HOSPADM

## 2019-02-14 RX ORDER — PROMETHAZINE HYDROCHLORIDE 25 MG/ML
12.5 INJECTION, SOLUTION INTRAMUSCULAR; INTRAVENOUS EVERY 6 HOURS PRN
Status: DISCONTINUED | OUTPATIENT
Start: 2019-02-14 | End: 2019-02-15 | Stop reason: HOSPADM

## 2019-02-14 RX ORDER — OXYCODONE AND ACETAMINOPHEN 10; 325 MG/1; MG/1
1 TABLET ORAL EVERY 6 HOURS PRN
Status: DISCONTINUED | OUTPATIENT
Start: 2019-02-14 | End: 2019-02-14

## 2019-02-14 RX ORDER — OXYCODONE AND ACETAMINOPHEN 10; 325 MG/1; MG/1
2 TABLET ORAL EVERY 4 HOURS PRN
Status: DISCONTINUED | OUTPATIENT
Start: 2019-02-14 | End: 2019-02-15 | Stop reason: HOSPADM

## 2019-02-14 RX ADMIN — CEFAZOLIN SODIUM 2 G: 2 SOLUTION INTRAVENOUS at 12:02

## 2019-02-14 RX ADMIN — OXYCODONE AND ACETAMINOPHEN 2 TABLET: 10; 325 TABLET ORAL at 10:02

## 2019-02-14 RX ADMIN — DIPHENHYDRAMINE HYDROCHLORIDE 25 MG: 25 CAPSULE ORAL at 06:02

## 2019-02-14 RX ADMIN — SODIUM CHLORIDE: 0.9 INJECTION, SOLUTION INTRAVENOUS at 02:02

## 2019-02-14 RX ADMIN — STANDARDIZED SENNA CONCENTRATE AND DOCUSATE SODIUM 2 TABLET: 8.6; 5 TABLET, FILM COATED ORAL at 08:02

## 2019-02-14 RX ADMIN — OXYCODONE AND ACETAMINOPHEN 2 TABLET: 10; 325 TABLET ORAL at 06:02

## 2019-02-14 RX ADMIN — DIPHENHYDRAMINE HYDROCHLORIDE 25 MG: 25 CAPSULE ORAL at 12:02

## 2019-02-14 RX ADMIN — DIPHENHYDRAMINE HYDROCHLORIDE 25 MG: 25 CAPSULE ORAL at 08:02

## 2019-02-14 RX ADMIN — CEFAZOLIN SODIUM 2 G: 2 SOLUTION INTRAVENOUS at 02:02

## 2019-02-14 RX ADMIN — OXYCODONE AND ACETAMINOPHEN 1 TABLET: 10; 325 TABLET ORAL at 03:02

## 2019-02-14 RX ADMIN — SODIUM CHLORIDE: 0.9 INJECTION, SOLUTION INTRAVENOUS at 03:02

## 2019-02-14 RX ADMIN — PANTOPRAZOLE SODIUM 40 MG: 40 TABLET, DELAYED RELEASE ORAL at 08:02

## 2019-02-14 RX ADMIN — OXYCODONE AND ACETAMINOPHEN 1 TABLET: 10; 325 TABLET ORAL at 09:02

## 2019-02-14 NOTE — PROGRESS NOTES
Progress Note  Hospital Medicine  Patient Name:Lyndon Lion Jr.  MRN:  [id number]  Patient Class: IP- Inpatient  Admit Date: 2/13/2019  Length of Stay: 1 days  Expected Discharge Date:   Attending Physician: Joel Bailey MD  Primary Care Provider:  Evan Judd MD    SUBJECTIVE:     Principal Problem: Herniated lumbar intervertebral disc  Initial history of present illness: Patient is a 48 y.o. male admitted to Hospitalist Service from Operation Room s/p L5-S1 lumbar laminectomy performed by Dr. Brooke. 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 Post-operatively, patient denied chest pain, shortness of breath, abdominal pain, nausea, vomiting, headache, vision changes, focal neuro-deficits, cough or fever.    PMH/PSH/SH/FH/Meds: reviewed.    Symptoms/Review of Systems: Looking and feeling better. Itching controlled with PO Benadryl while on IV narcotic PCA infusion. RLE radiculopathy symptoms are better.  No shortness of breath, cough, chest pain or headache, fever or abdominal pain.     Diet:  Adequate intake.    Activity level: Normal.    Pain:  Patient reports no pain.       OBJECTIVE:   Vital Signs (Most Recent):      Temp: 98.5 °F (36.9 °C) (02/14/19 0801)  Pulse: 74 (02/14/19 0801)  Resp: 16 (02/14/19 0801)  BP: (!) 120/59 (02/14/19 0801)  SpO2: 98 % (02/14/19 0801)       Vital Signs Range (Last 24H):  Temp:  [98.2 °F (36.8 °C)-99 °F (37.2 °C)]   Pulse:  [72-87]   Resp:  [12-20]   BP: (112-145)/(57-74)   SpO2:  [87 %-98 %]     Weight: 117.9 kg (260 lb)  Body mass index is 33.84 kg/m².    Intake/Output Summary (Last 24 hours) at 2/14/2019 0914  Last data filed at 2/14/2019 0700  Gross per 24 hour   Intake 3940.17 ml   Output 1120 ml   Net 2820.17 ml     Physical Examination:  General appearance: well developed, appears stated age  Head: normocephalic, atraumatic  Eyes:  conjunctivae/corneas clear. PERRL.  Nose: Nares normal.  Septum midline.  Throat: lips, mucosa, and tongue normal; teeth and gums normal, no throat erythema Neck: supple, symmetrical, trachea midline, no JVD and thyroid not enlarged, symmetric, no tenderness/mass/nodules  Lungs:  clear to auscultation bilaterally and normal respiratory effort  Chest wall: no tenderness  Heart: regular rate and rhythm, S1, S2 normal, no murmur, click, rub or gallop  Abdomen: soft, non-tender non-distented; bowel sounds normal; no masses,  no organomegaly  Extremities: no cyanosis or edema, or clubbing  Pulses: 2+ and symmetric  Skin: Skin color, texture, turgor normal. No rashes or lesions.  Lymph nodes: Cervical, supraclavicular, and axillary nodes normal.  Neurologic: Normal strength and tone. No focal numbness or weakness. CNII-XII intact.     CBC:  Recent Labs   Lab 02/13/19  1536 02/14/19  0656   WBC 5.70 7.10   RBC 4.83 4.29*   HGB 14.4 12.9*   HCT 42.2 37.4*    240   MCV 88 87   MCH 29.9 30.1   MCHC 34.2 34.5   BMP  Recent Labs   Lab 02/13/19  1536 02/14/19  0656   * 118*    137   K 4.3 3.6    103   CO2 22* 26   BUN 18 17   CREATININE 1.3 1.0   CALCIUM 8.7 8.4*      Diagnostic Results:  Microbiology Results (last 7 days)     ** No results found for the last 168 hours. **         Assessment/Plan:      *Herniated lumbar intervertebral disc [M51.26] s/p L5-S1 lumbar laminectomy  DDD (degenerative disc disease), lumbosacral [M51.37]  Continue to follow Orthopedic recommendations.  Needs aggressive incentive spirometry.  Follow hemoglobin and hematocrit closely.  Pain control with PO narcotics and antiemetics as needed.  Physical therapy as per Orthopedics protocol with fall precautions.      Yes    Mild vitamin D deficiency [E55.9]   Yes       Continue Vit. D supplementation.      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      Yes             DVT prophylaxis: Use SCD and TEDs. No anticoagulation as per Dr. Brooke.     Joel Bailey,  MD  Department of Hospital Medicine   [facility]

## 2019-02-14 NOTE — PLAN OF CARE
Problem: Adult Inpatient Plan of Care  Goal: Plan of Care Review  Alert, oriented, appetite is good. Moves all extremeities well. Pain management in progress. Wife at bedside. Med teaching ongoing. Ambulates with therapy. Dressing to lumbar area dry and intact. bowen drain intact draining mod amount of bloody drainage.

## 2019-02-14 NOTE — PROGRESS NOTES
Patient laying in bed comfortable with minimal pain to incision site. Patient reports he is without leg radicular pain. Patient reports right upper arm numbness and tingling, more pronounced at to ring, and pinky fingers since after surgery. Patient is without neck pain.     Dressing clean, dry, and intact. TAMMY drain intacted with minimal bloody drainage. Active flexion and extension bilateral hip, knees, and ankles. Sensory is intact to light touch bilateral lower extremities. Sensory is diminished in light touch right C6, C7, C8 dermatome region. Motor strength right wrist 4/5 strength with flexion and extension, and 4/5 strength right elbow extension and flexion. All other muscle groups in the upper extremity 5/5 strength. Absent tinel's right wrist median nerve distrubution, Absent tinel's elbow ulnar never distrubution. 2+ radial pulse bilateral, 2+ pedal pulse bilateral. Negative calf tenderness, negative bernabe's sign bilateral lower extremity.    POD #1 S/P Lumbar Decompression at L5/S1    Plan: Maintain TAMMY drain           Perioperative IV antibiotics           Pain management as directed by anesthesia           Medical management per hospitalist           Will continue to monitor right upper extremity numbness and tingling           Physical therapy for progressive mobilization           DVT prophylaxes with SCDS and ISHA hose

## 2019-02-14 NOTE — PLAN OF CARE
Problem: Adult Inpatient Plan of Care  Goal: Plan of Care Review  Outcome: Ongoing (interventions implemented as appropriate)  Patient averaging 3500ml on IS.  02 saturation 97% on room air.

## 2019-02-14 NOTE — PT/OT/SLP EVAL
"Physical Therapy Evaluation    Patient Name:  Lyndon Lion Jr.   MRN:  [id number]    Recommendations:     Discharge Recommendations:  other (see comments)(OPPT)   Discharge Equipment Recommendations:     Barriers to discharge: None    Assessment:     Lyndon Lion Jr. is a 48 y.o. male admitted with a medical diagnosis of Herniated lumbar intervertebral disc.  He presents with the following impairments/functional limitations:  gait instability, impaired endurance, pain.    Rehab Prognosis: Good; patient would benefit from acute skilled PT services to address these deficits and reach maximum level of function.    Recent Surgery: Procedure(s) (LRB):  LAMINECTOMY, SPINE, LUMBAR, FOR DECOMPRESSION L5-S1 (N/A)  DISCECTOMY, SPINE, LUMBAR L5-S1  MEDIAL FACETECTOMY L5-S1 1 Day Post-Op    Plan:     During this hospitalization, patient to be seen 6 x/week to address the identified rehab impairments via gait training, therapeutic activities, therapeutic exercises and progress toward the following goals:    · Plan of Care Expires:  02/28/19    Subjective     Chief Complaint:  Patient/Family Comments/goals:   Pain/Comfort:  · Pain Rating 1: 2/10  · Location - Orientation 1: midline  · Location 1: back  · Pain Addressed 1: Reposition, Distraction    Patients cultural, spiritual, Jehovah's witness conflicts given the current situation: no    Living Environment:  Pt lives in one story home with wife that has one step/threshhold to enter the home.  Prior to admission, patients level of function was independent.  Equipment used at home: bedside commode, cane, quad, shower chair, walker, rolling, wheelchair.  DME owned (not currently used): rolling walker, bedside commode, shower chair, wheelchair and quad cane.  Upon discharge, patient will have assistance from wife.    Pt stated that he has felt the medial side of his hand start to go numb since the surgery. "It feels like I fell asleep on it"    Objective:     Communicated with " nurse Madden prior to session.  Patient found all lines intact, call button in reach and supine in bed TAMMY drain, peripheral IV  upon PT entry to room.    General Precautions: Standard, fall   Orthopedic Precautions:N/A   Braces: N/A     Exams:  · Gross UE and LE Motor Function screening, all WFL  · Cognitive Exam:  Patient is oriented to Person, Place and Situation    Functional Mobility:  · Bed Mobility:     · Rolling Left:  supervision  · Supine to Sit: supervision  · Transfers:     · Sit to Stand:  supervision with no AD  · Bed to Chair: supervision with  no AD  using  Step Transfer  · Gait: Pt able to ambulate 500 ft using step through pattern with supervision      AM-PAC 6 CLICK MOBILITY  Total Score:24     Patient left up in chair with all lines intact and call button in reach.    GOALS:   Multidisciplinary Problems     Physical Therapy Goals        Problem: Physical Therapy Goal    Goal Priority Disciplines Outcome Goal Variances Interventions   Physical Therapy Goal     PT, PT/OT      Description:  Goals to be met by: 19     Patient will increase functional independence with mobility by performin. Sit to stand transfer with [place]  2. Bed to chair transfer with [place]  3. Gait  x 500 feet with [place]  4. Ascend/descend 1 step with [place]                      History:     Past Medical History:   Diagnosis Date    Arthritis     Asthma     AS A CHILD    Colon cancer     Family hx of prostate cancer 2016    Hx of colon cancer, stage I 7/3/2014    Parada syndrome     Vitamin D deficiency     Wears glasses        Past Surgical History:   Procedure Laterality Date    APPENDECTOMY      ARTHROSCOPY, KNEE Right 2013    [facility]    BLOCK-FACET-LUMBAR  RT L3/4,L4/5,L5,S1 Right 3/18/2016    Per[facility] by [facility]don [facility]oke Jr[facility] at [facility] OR    BLOCK-FACET-LUMBAR- Ye L3/4 4/5 5/S1 Bilateral 2017    Performed by Lyndon MARTELL  Edwardo Moscoso MD at [facility] OR    BLOCK-FACET-LUMBAR- L3/4, 4/5, 5/S1 Left 12/16/2016    Per[facility] by [facility] Jr., [facility] at [facility] OR    COLON SURGERY  2008    PARTIAL COLECTOMY    COLONOSCOPY Bilateral 2012    COLONOSCOPY N/A 5/30/2018    Per[facility] by [facility] at [facility] ENDO (4TH FLR)    COLONOSCOPY N/A 10/9/2017    Performed by RAMON Callahan MD at [facility] ENDO (4TH FLR)    COLONOSCOPY N/A 9/20/2017    Performed by Dom [facility]ardo [facility] at [facility] ENDO (4TH FLR)    COLONOSCOPY N/A 8/1/2016    Performed by Blaze Marr [facility] at [facility] ENDO    COLONOSCOPY N/A 9/4/2015    Performed by Blaze Marr MD at [facility] ENDO    COLONOSCOPY N/A 7/3/2014    [facility]    COLONOSCOPY/EMR N/A 11/20/2017    Performed by Joseluis Choe MD at [facility] ENDO (2ND FLR)    Epidural Steroid Injection  10/23/15    Lumbar    ESOPHAGOGASTRODUODENOSCOPY      ESOPHAGOGASTRODUODENOSCOPY (EGD) N/A 7/19/2016    Performed by Blaze Marr MD at [facility] ENDO    ESOPHAGOGASTRODUODENOSCOPY (EGD) N/A 9/4/2015    [facility] at [facility]    GASTRIC BYPASS  2010    SLEEVE    INJECTION, STEROID, SPINE, LUMBAR, EPIDURAL N/A 3/19/2013    Performed by Lyn[facility] Jr., [facility] at [facility] OR    Injection-steroid-epidural-caudal N/A 11/6/2018    Performed by [facility] Jr[facility] at [facility] OR    Injection-steroid-epidural-lumbar N/A 7/27/2018    Performed by Lyn[facility] Bro[facility] Jr., MD at [facility] OR    INJECTION-STEROID-EPIDURAL-LUMBAR N/A 9/19/2014    Performed by [facility] Jr[facility] at [facility] OR    KNEE ARTHROSCOPY Right        Time Tracking:     PT Received On: 02/14/19  PT Start Time: 0942     PT Stop Time: 0954  PT Total Time (min): 12 min     Billable Minutes: Evaluation 12      Arvin Dobbins, SPT  02/14/2019

## 2019-02-14 NOTE — NURSING
Dr Sasson called for orders to stop pca, for patient complaining of med making him itch. Patient requested benadryl and his home med percocet 10/325 mg po every 6 hours ordered. Orders received and carried out

## 2019-02-14 NOTE — PLAN OF CARE
Problem: Adult Inpatient Plan of Care  Goal: Plan of Care Review  Outcome: Ongoing (interventions implemented as appropriate)  Patient AAO, VSS. IVF and abx infusing as ordered. Pain well managed with PCA.  Pt slept well through shift.  No complaints of nausea this shift.  Diet advanced this am.  Montalvo removed at 0615.  Due to void @ 1215.  Dressing to back CDI.  TAMMY drain with serosanguineous outputPt verbalized understanding of POC. Purposeful hourly/q2hr rounding done during shift to promote patient safety. Patient free from falls and injury during shift.  Bed in lowest position, brakes locked, and call light within reach.  Will continue to monitor.

## 2019-02-14 NOTE — PLAN OF CARE
02/13/19 2028   PRE-TX-O2   O2 Device (Oxygen Therapy) nasal cannula   Flow (L/min) 2   SpO2 97 %   Pulse Oximetry Type Intermittent   ETCO2   $ ETCO2 Charge Exhaled CO2 Monitoring   $ ETCO2 Usage Currently wearing   ETCO2 (mmHg) 35 mmHg   ETCO2 Device Type Nasal Cannula   Incentive Spirometer   $ Incentive Spirometer Charges done with encouragement   Incentive Spirometer Predicted Level (mL) 3500   Administration (IS) instruction provided, follow-up   Number of Repetitions (IS) 5   Level Incentive Spirometer (mL) 3750   Patient Tolerance (IS) good

## 2019-02-15 VITALS
SYSTOLIC BLOOD PRESSURE: 135 MMHG | HEIGHT: 74 IN | HEART RATE: 71 BPM | DIASTOLIC BLOOD PRESSURE: 71 MMHG | RESPIRATION RATE: 18 BRPM | TEMPERATURE: 98 F | WEIGHT: 260 LBS | OXYGEN SATURATION: 96 % | BODY MASS INDEX: 33.37 KG/M2

## 2019-02-15 LAB
ANION GAP SERPL CALC-SCNC: 7 MMOL/L
BASOPHILS # BLD AUTO: 0 K/UL
BASOPHILS NFR BLD: 0.2 %
BUN SERPL-MCNC: 10 MG/DL
CALCIUM SERPL-MCNC: 8.3 MG/DL
CHLORIDE SERPL-SCNC: 106 MMOL/L
CO2 SERPL-SCNC: 26 MMOL/L
CREAT SERPL-MCNC: 1.1 MG/DL
DIFFERENTIAL METHOD: ABNORMAL
EOSINOPHIL # BLD AUTO: 0.1 K/UL
EOSINOPHIL NFR BLD: 2.2 %
ERYTHROCYTE [DISTWIDTH] IN BLOOD BY AUTOMATED COUNT: 13.1 %
EST. GFR  (AFRICAN AMERICAN): >60 ML/MIN/1.73 M^2
EST. GFR  (NON AFRICAN AMERICAN): >60 ML/MIN/1.73 M^2
GLUCOSE SERPL-MCNC: 110 MG/DL
HCT VFR BLD AUTO: 36.2 %
HGB BLD-MCNC: 12.5 G/DL
LYMPHOCYTES # BLD AUTO: 1.1 K/UL
LYMPHOCYTES NFR BLD: 19 %
MCH RBC QN AUTO: 30.5 PG
MCHC RBC AUTO-ENTMCNC: 34.6 G/DL
MCV RBC AUTO: 88 FL
MONOCYTES # BLD AUTO: 0.6 K/UL
MONOCYTES NFR BLD: 10.7 %
NEUTROPHILS # BLD AUTO: 3.8 K/UL
NEUTROPHILS NFR BLD: 67.9 %
PLATELET # BLD AUTO: 208 K/UL
PMV BLD AUTO: 7 FL
POTASSIUM SERPL-SCNC: 4 MMOL/L
RBC # BLD AUTO: 4.11 M/UL
SODIUM SERPL-SCNC: 139 MMOL/L
WBC # BLD AUTO: 5.6 K/UL

## 2019-02-15 PROCEDURE — 99900035 HC TECH TIME PER 15 MIN (STAT)

## 2019-02-15 PROCEDURE — 63600175 PHARM REV CODE 636 W HCPCS: Performed by: SPECIALIST

## 2019-02-15 PROCEDURE — G0378 HOSPITAL OBSERVATION PER HR: HCPCS

## 2019-02-15 PROCEDURE — 25000003 PHARM REV CODE 250: Performed by: INTERNAL MEDICINE

## 2019-02-15 PROCEDURE — 85025 COMPLETE CBC W/AUTO DIFF WBC: CPT

## 2019-02-15 PROCEDURE — 36415 COLL VENOUS BLD VENIPUNCTURE: CPT

## 2019-02-15 PROCEDURE — 25000003 PHARM REV CODE 250: Performed by: SPECIALIST

## 2019-02-15 PROCEDURE — 80048 BASIC METABOLIC PNL TOTAL CA: CPT

## 2019-02-15 PROCEDURE — 96375 TX/PRO/DX INJ NEW DRUG ADDON: CPT | Performed by: SPECIALIST

## 2019-02-15 PROCEDURE — 94760 N-INVAS EAR/PLS OXIMETRY 1: CPT

## 2019-02-15 PROCEDURE — 94761 N-INVAS EAR/PLS OXIMETRY MLT: CPT

## 2019-02-15 RX ADMIN — OXYCODONE AND ACETAMINOPHEN 2 TABLET: 10; 325 TABLET ORAL at 10:02

## 2019-02-15 RX ADMIN — OXYCODONE AND ACETAMINOPHEN 2 TABLET: 10; 325 TABLET ORAL at 06:02

## 2019-02-15 RX ADMIN — MEPERIDINE HYDROCHLORIDE 50 MG: 50 INJECTION, SOLUTION INTRAMUSCULAR; INTRAVENOUS; SUBCUTANEOUS at 02:02

## 2019-02-15 RX ADMIN — PANTOPRAZOLE SODIUM 40 MG: 40 TABLET, DELAYED RELEASE ORAL at 08:02

## 2019-02-15 RX ADMIN — PROMETHAZINE HYDROCHLORIDE 12.5 MG: 25 INJECTION INTRAMUSCULAR; INTRAVENOUS at 02:02

## 2019-02-15 NOTE — PROGRESS NOTES
Patient laying in bed comfortable with minimal pain to incision site. Leg radicular pain remains resolved.  Patient reports right upper arm numbness and tingling with significant improvement although unresolved.      Dressing and TAMMY drain removed. Incision clean, dry, and intact with staples. The neurovascular exam unchanged. Negative calf tenderness, negative bernabe's sign bilateral lower extremity.     POD #2 S/P Lumbar Decompression at L5/S1     Plan: Local wound care performed           Continue with progressive mobilization            Pain management           Patient discharge home today with home health for daily local wound care           Patient to follow up with Dr. Brooke in 2 weeks

## 2019-02-15 NOTE — PLAN OF CARE
Problem: Adult Inpatient Plan of Care  Goal: Plan of Care Review  Outcome: Ongoing (interventions implemented as appropriate)  Vitals stable. Slightly febrile-see flow sheet. Wife at bedside throughout shift. Turns/reposistions self. Surgical dressing still intact with scant drainage noted. x1 TAMMY drain noted. States has slight tingling in R hand-states MD is aware and feels its from positioning in sx. PRN oxycodone given x1, demerol and phenergan x1 as well for complaints of pain. Urinal within reach. IV fluids infusing per order. SCDs on. Hourly/Q2hr rounding performed, safety maintained. Bed in lowest position, wheels locked, SR up x2, call light in easy reach. Will continue to monitor

## 2019-02-15 NOTE — PLAN OF CARE
CM met patient at bedside to complete discharge assessment. Patient reports living with spouse, denies POA, living will or home health services. PCP is Dr. Judd, [facility] Patient reports prior independence, drives, and does not use any assist devices.      02/14/19 1175   Discharge Assessment   Assessment Type Discharge Planning Assessment   Confirmed/corrected address and phone number on facesheet? Yes   Assessment information obtained from? Patient   Communicated expected length of stay with patient/caregiver yes   Prior to hospitilization cognitive status: Alert/Oriented   Prior to hospitalization functional status: Independent   Current cognitive status: Alert/Oriented   Current Functional Status: Independent   Lives With spouse   Able to Return to Prior Arrangements yes   Is patient able to care for self after discharge? Yes   Readmission Within the Last 30 Days no previous admission in last 30 days   Patient currently being followed by outpatient case management? No   Patient currently receives any other outside agency services? No   Equipment Currently Used at Home none   Do you have any problems affording any of your prescribed medications? No   Is the patient taking medications as prescribed? yes   Does the patient have transportation home? Yes   Transportation Anticipated family or friend will provide   Does the patient receive services at the [facility]? No   Discharge Plan A Home   DME Needed Upon Discharge  none   Patient/Family in Agreement with Plan yes

## 2019-02-15 NOTE — DISCHARGE SUMMARY
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 male admitted to Hospitalist Service from Operation Room s/p L5-S1 lumbar laminectomy performed by Dr. Brooke. Patient reportedly has past medical history significant for chronic lower back pain, Parada syndrome - history of colon CA. Post-operatively, patient denied chest pain, shortness of breath, abdominal pain, nausea, vomiting, headache, vision changes, focal neuro-deficits, cough or fever. Patient was admitted to Hospitalist medicine service. Patient was followed by Dr. Brooke. Post-operative, patient did well. Pain adequately controlled. Patient participated with physical therapy. Home health and home physical therapy has been arranged. Fall precautions discussed with the patient. Patient to follow up with primary care physician next week and orthopedic doctor in 2 weeks. In case of chest pain, shortness of breath, stroke or stroke like symptoms, high grade fever or any signs or symptoms of surgical site wound infection symptoms, patient to return to nearest emergency room as soon as possible. Patient was discharged home in stable condition with following discharge plan of care.     Consults: Dr. Brooke    Significant Diagnostic Studies:     Microbiology Results (last 7 days)     ** No results found for the last 168 hours. **        Special Treatments/Procedures: as above  Disposition: Home or Self Care    Medications:  Reconciled Home Medications:    Current Discharge Medication List      CONTINUE these medications which have NOT CHANGED    Details   ergocalciferol (ERGOCALCIFEROL) 50,000 unit Cap TAKE 1 CAPSULE BY MOUTH EVERY 7 DAYS  Qty: 12 capsule, Refills: 11    Associated Diagnoses: Vitamin deficiency      oxyCODONE-acetaminophen (PERCOCET)  mg per tablet Take 1 tablet by mouth every 6 to 8 hours as needed for Pain.  Qty: 80 tablet, Refills: 0      cyclobenzaprine (FLEXERIL) 10 MG tablet TAKE 1 TABLET(10 MG) BY MOUTH TWICE DAILY AS NEEDED FOR MUSCLE SPASMS  Qty: 60 tablet, Refills: 0         STOP taking these medications       diclofenac sodium 1 % Gel Comments:   Reason for Stopping:         ibuprofen (ADVIL,MOTRIN) 800 MG tablet Comments:   Reason for Stopping:         aspirin (ECOTRIN) 81 MG EC tablet Comments:   Reason for Stopping:         metroNIDAZOLE (METROGEL) 0.75 % gel Comments:   Reason for Stopping:             Discharge Procedure Orders   Ambulatory referral to Home Health   Referral Priority: Routine Referral Type: Home Health   Referral Reason: Specialty Services Required   Requested Specialty: Home Health Services   Number of Visits Requested: 1     Diet Adult Regular     Other restrictions (specify):   Order Comments: PLEASE OBSERVE FALL PRECAUTIONS     Call MD for:   Order Comments: For worsening symptoms, chest pain, shortness of breath, increased abdominal pain, high grade fever, stroke or stroke like symptoms, immediately go to the nearest Emergency Room or call 911 as soon as possible.     Follow-up Information     Lyndon Brooke Jr, MD On 2/28/2019.    Specialty:  Orthopedic Surgery  Why:  @9:40am   Contact information:  [address]  [address]  [place] [address]  [phone]             Evan Jdud MD.    Specialty:  Family Medicine  Contact information:  [address] [facility] [address]  [phone]

## 2019-02-15 NOTE — PLAN OF CARE
02/15/19 0919   Patient Assessment/Suction   Level of Consciousness (AVPU) alert   Respiratory Effort Normal;Unlabored   PRE-TX-O2   O2 Device (Oxygen Therapy) room air   SpO2 96 %   Pulse Oximetry Type Intermittent   $ Pulse Oximetry - Multiple Charge Pulse Oximetry - Multiple   Incentive Spirometer   $ Incentive Spirometer Charges done independently per patient

## 2019-02-15 NOTE — PROGRESS NOTES
Discharge instructions given. Pt verbalized understanding. New scripts given. Peripheral IV removed. Afebrile and VS stable. Pt transferred to vehicle via wheelchair by staff. All belongings sent.

## 2019-02-15 NOTE — PROGRESS NOTES
Progress Note  Hospital Medicine  Patient Name:Lyndon Lion Jr.  MRN:  [id number]  Patient Class: IP- Inpatient  Admit Date: 2/13/2019  Length of Stay: 2 days  Expected Discharge Date:   Attending Physician: Joel Bailey MD  Primary Care Provider:  Evan Judd MD    SUBJECTIVE:     Principal Problem: Herniated lumbar intervertebral disc  Initial history of present illness: Patient is a 48 y.o. male admitted to Hospitalist Service from Operation Room s/p L5-S1 lumbar laminectomy performed by Dr. Brooke. 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 Post-operatively, patient denied chest pain, shortness of breath, abdominal pain, nausea, vomiting, headache, vision changes, focal neuro-deficits, cough or fever.    PMH/PSH/SH/FH/Meds: reviewed.    Symptoms/Review of Systems: Looking and feeling better. RLE radiculopathy symptoms are better.  No shortness of breath, cough, chest pain or headache, fever or abdominal pain.     Diet:  Adequate intake.    Activity level: Normal.    Pain:  Patient reports no pain.       OBJECTIVE:   Vital Signs (Most Recent):      Temp: 99.6 °F (37.6 °C) (02/15/19 0210)  Pulse: 75 (02/15/19 0210)  Resp: 16 (02/15/19 0210)  BP: (!) 130/58 (02/15/19 0210)  SpO2: 98 % (02/15/19 0210)       Vital Signs Range (Last 24H):  Temp:  [98.9 °F (37.2 °C)-99.6 °F (37.6 °C)]   Pulse:  [61-75]   Resp:  [16-18]   BP: ()/(52-58)   SpO2:  [93 %-98 %]     Weight: 117.9 kg (260 lb)  Body mass index is 33.84 kg/m².    Intake/Output Summary (Last 24 hours) at 2/15/2019 0820  Last data filed at 2/15/2019 0600  Gross per 24 hour   Intake 1101 ml   Output 715 ml   Net 386 ml     Physical Examination:  General appearance: well developed, appears stated age  Head: normocephalic, atraumatic  Eyes:  conjunctivae/corneas clear. PERRL.  Nose: Nares normal. Septum midline.  Throat: lips, mucosa, and tongue normal; teeth and gums normal,  no throat erythema Neck: supple, symmetrical, trachea midline, no JVD and thyroid not enlarged, symmetric, no tenderness/mass/nodules  Lungs:  clear to auscultation bilaterally and normal respiratory effort  Chest wall: no tenderness  Heart: regular rate and rhythm, S1, S2 normal, no murmur, click, rub or gallop  Abdomen: soft, non-tender non-distented; bowel sounds normal; no masses,  no organomegaly  Extremities: no cyanosis or edema, or clubbing  Pulses: 2+ and symmetric  Skin: Skin color, texture, turgor normal. No rashes or lesions.  Lymph nodes: Cervical, supraclavicular, and axillary nodes normal.  Neurologic: Normal strength and tone. No focal numbness or weakness. CNII-XII intact.     CBC:  Recent Labs   Lab 02/13/19  1536 02/14/19  0656 02/15/19  0733   WBC 5.70 7.10 5.60   RBC 4.83 4.29* 4.11*   HGB 14.4 12.9* 12.5*   HCT 42.2 37.4* 36.2*    240 208   MCV 88 87 88   MCH 29.9 30.1 30.5   MCHC 34.2 34.5 34.6   BMP  Recent Labs   Lab 02/13/19  1536 02/14/19  0656   * 118*    137   K 4.3 3.6    103   CO2 22* 26   BUN 18 17   CREATININE 1.3 1.0   CALCIUM 8.7 8.4*      Diagnostic Results:  Microbiology Results (last 7 days)     ** No results found for the last 168 hours. **         Assessment/Plan:      *Herniated lumbar intervertebral disc [M51.26] s/p L5-S1 lumbar laminectomy  DDD (degenerative disc disease), lumbosacral [M51.37]  Continue to follow Orthopedic recommendations.  Needs aggressive incentive spirometry.  Follow hemoglobin and hematocrit closely.  Pain control with PO narcotics and antiemetics as needed.  Physical therapy as per Orthopedics protocol with fall precautions.      Yes    Mild vitamin D deficiency [E55.9]   Yes       Continue Vit. D supplementation.      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      Yes             DVT prophylaxis: Use SCD and TEDs. No anticoagulation as per Dr. Brooke.     Joel Bailey MD  Department of Hospital  Medicine   [facility]

## 2019-02-16 NOTE — PLAN OF CARE
02/16/19 1516   Final Note   Assessment Type Final Discharge Note   Anticipated Discharge Disposition Home

## 2019-02-20 NOTE — OP NOTE
DATE OF PROCEDURE:  02/13/2019.    PREOPERATIVE DIAGNOSES:  1. Lumbar extruded disk herniation, L5-S1.  2. Lumbar radiculopathy.  3. Lumbar spinal stenosis.  4. Lumbar spondylosis.  5. Lumbar degenerative disk disease.    POSTOPERATIVE DIAGNOSES:  1. Lumbar extruded disk herniation, L5-S1.  2. Lumbar radiculopathy.  3. Lumbar spinal stenosis.  4. Lumbar spondylosis.  5. Lumbar degenerative disk disease.  6. Osteophyte disk complex L5-S1.    PROCEDURES:  L5-S1 laminectomy with decompression of nerve roots including   partial facetectomy, foraminotomy and excision of intervertebral disks, one   interspace level lumbar spine at L5-S1, code 25244.    Fluoroscopy, intraoperative time with physician assisting a non-radiographic   physician, code 01101.    ANESTHESIA:  General endotracheal.    ESTIMATED BLOOD LOSS:  50 mL    DRAINS:  One Adis-López drain at operative site at L5-S1.  Deep left side at   L5-S1 decompression.    SPECIMENS:  Disk L5-S1.    OPERATING SURGEON:  Lyndon Brooke M.D.    ASSISTANT:  Rolando Mathew, certified first assistant.    DESCRIPTION OF PROCEDURE:  Left L5-S1 extruded disk osteophyte complex with   lateral recess stenosis.    INDICATIONS FOR SURGERY:  A 48-year-old pleasant male with complaints of   intractable left leg radicular pain with associated back pain.  The pain   continues to persist despite conservative efforts and modalities.  The patient   exhausted a reasonable course and trial of conservative efforts and modalities   and desired to proceed with surgery.  Surgery recommendations made for   intractable left leg radicular pain with lumbar decompression, medial   facetectomy and diskectomy.  The patient was informed that the goal of surgery   with his wife is an attempt to improve left leg radicular pain.  The patient   also informed of persistent back pain, which may warrant further operative   intervention at a later time with fusion stabilization surgery.  The patient    expressed understanding with his wife and desired to proceed with lumbar   decompression and diskectomy in an attempt to improve left leg radicular pain,   understanding that back pain will probably persist, may require further   operative intervention.  Risks and complications of surgery were discussed at   length with the patient and the patient's wife during multiple office encounters   and prior to proceeding with surgery in the preoperative area at the hospital.    The patient and the patient's wife expressed understanding and all questions   were answered and still desired to proceed with operative intervention.  The   patient and the patient's wife were informed that surgery provides no guarantee,   but only an attempt to improve overall condition and function.    OPERATION:  The patient was taken to the Operating Room and anesthesia achieved   successfully with endotracheal intubation.  After adequate anesthesia was   achieved, the patient was placed on the operating room table with Shahab frame   attached in prone position.  All bony prominences were well padded and   protected.  The lower back was prepped and draped in the usual sterile fashion.    Appropriate landmarks were identified and the incision clearly marked with all   anatomic clues visible.  An incision was made extending across the appropriate   level at L5-S1 down through subcutaneous tissue.  The fascia was exposed with   periosteal elevators.  Electrocoagulation was used on all bleeding vessels.  The   fascia was incised off the spinous process down to muscle on the left side.  A   periosteal elevator was used to develop a plane along the spinous process and   over the lamina to the facet joint on the left side at L5-S1.  Deep retractors   were placed lateral to the facet joint on the left side at L5-S1.  The lamina   and intralaminar space were debrided of soft tissue with a large rongeur and   curette.  After anatomically and  radiographically confirming the appropriate   level on the left with intraoperative fluoroscopy at L5-S1, a small curet was   used  the ligamentum flavum from the anterior inferior surface of the   cephalad lamina at L5-S1.  Kerrison punch was used to remove the inferior aspect   of the lamina to expose the superior edge of the ligamentum flavum at the   appropriate interspace on the left at L5-S1.  A Penfield dissector was then used   to separate the ligamentum flavum and tease it distally off the lamina and   underlying dura.  A Alexandre elevator was able to then pass freely from superior   to inferior between the ligamentum flavum and dura, protecting the dura during   the removal of the ligamentum flavum with Kerrison punch.  Ligamentum flavum was   removed from superior to inferior, beginning medially and then extending   laterally to the edge of the inner laminar space.  Ligamentum flavum was removed   with a Kerrison punch.  Epidural fat was visualized in the depths of the wound   overlying the cauda equina.  The lateral edge of the laminotomy was widened with   a Kerrison punch at L5-S1 on the left.  Enough ligament and bone was removed to   freely visualize the lateral edge of the nerve root on the left side at L5-S1.    Medially, the cauda equina and laterally the nerve root to its lateral edge was   clearly visible.  The lateral edge of the nerve root was clearly free of soft   tissue and mobilized on the left at L5-S1, cottonoid was placed along the   superior edge of the nerve root.  Another cottonoid was placed distally along   the lateral edge of the nerve root.  This helped to maintain hemostasis and   medially deviated the nerve root providing protection.  Nerve root retractor was   then placed along the lateral edge of the nerve root on the left at L5-S1.    Bipolar electrocautery was used on all bleeding vessels within the epidural   space.  The prominent underlying extruded disk  herniation was visible on the   left at L5-S1.  A small extruded disk fragment was removed from the open rent   within the extruded fragment with a small pituitary rongeur incompletely further   disk material present extending from the disk.  A sharp knife was unable to   successfully incise the posterior longitudinal ligament and annulus from the   open extruded rent within the disk at L5-S1 on the left secondary to osteophyte disc complex of the   extruded protruded disk herniation on the left at L5-S1. (1/4-inch) 0.25 straight   osteotome was then used to penetrate the posterior longitudinal ligament and   annulus surrounding the extruded protruded disk herniation at the disk space   level on the left at L5-S1.  A Penfield dissector was then used to tease and   separate fragment present within the disk space level at L5-S1 on the left.    Disk fragments were teased from the opening created in the posterior   longitudinal ligament and annulus at L5-S1 on the left side.  A pituitary   rongeur was used to further remove fragments intermittently throughout the   diskectomy freed with the Penfield dissector.  The anterior aspects of the nerve   root and cauda equina were palpated with a Alexandre elevator during the   procedure, assuring no retained disk fragments.  After the herniation was   removed, further exploration was performed with the nerve root was free in   tension and compression on the left at L5-S1.  A Alexandre elevator was easily   able to pass out of the nerve foramen, anterior and posterior to the nerve root   on the left side at L5-S1.  Additionally, Alexandre elevator was able to freely   pass anterior to the cauda equina centrally and superior to inferior ensuring no   remaining fragments or residual compression on the left at L5-S1.  After   copious irrigation with Betadine with normal saline followed by irrigation with   just normal saline, all bleeding was checked and controlled with bipolar    electrocautery.  A deep drain was placed at the operative site, the   decompression on the left at L5-S1 and brought out through a separate stab wound   incision.  Surgical sites were closed in a layered fashion with fascia,   subcutaneous tissue and skin.  Surgical sites were cleaned with normal saline   with application of sterile occlusive dressing.  Anesthesia was reversed and the   patient transferred to the Recovery Room in stable condition without immediate   apparent surgical complications.      /los  [id number]  blank (s)        DES/LAZARO  dd: 02/20/2019 11:24:45 (CST)  td: 02/20/2019 13:08:30 (CST)  Doc ID   #[id number]  Job ID #[id number]    CC:

## 2019-03-01 ENCOUNTER — OFFICE VISIT (OUTPATIENT)
Dept: DERMATOLOGY | Facility: CLINIC | Age: 49
End: 2019-03-01
Payer: COMMERCIAL

## 2019-03-01 VITALS — WEIGHT: 260 LBS | BODY MASS INDEX: 34.46 KG/M2 | HEIGHT: 73 IN

## 2019-03-01 DIAGNOSIS — L82.1 SEBORRHEIC KERATOSES: ICD-10-CM

## 2019-03-01 DIAGNOSIS — L73.8 SEBACEOUS HYPERPLASIA: ICD-10-CM

## 2019-03-01 DIAGNOSIS — L81.4 SOLAR LENTIGO: ICD-10-CM

## 2019-03-01 DIAGNOSIS — L71.9 ROSACEA: Primary | ICD-10-CM

## 2019-03-01 DIAGNOSIS — Z15.09 LYNCH SYNDROME: ICD-10-CM

## 2019-03-01 PROCEDURE — 99214 OFFICE O/P EST MOD 30 MIN: CPT | Mod: S$GLB,,, | Performed by: DERMATOLOGY

## 2019-03-01 PROCEDURE — 99999 PR PBB SHADOW E&M-EST. PATIENT-LVL II: CPT | Mod: PBBFAC,,, | Performed by: DERMATOLOGY

## 2019-03-01 PROCEDURE — 99214 PR OFFICE/OUTPT VISIT, EST, LEVL IV, 30-39 MIN: ICD-10-PCS | Mod: S$GLB,,, | Performed by: DERMATOLOGY

## 2019-03-01 PROCEDURE — 99999 PR PBB SHADOW E&M-EST. PATIENT-LVL II: ICD-10-PCS | Mod: PBBFAC,,, | Performed by: DERMATOLOGY

## 2019-03-01 RX ORDER — METRONIDAZOLE 7.5 MG/G
CREAM TOPICAL
Qty: 45 G | Refills: 2 | Status: SHIPPED | OUTPATIENT
Start: 2019-03-01 | End: 2019-05-28 | Stop reason: SDUPTHER

## 2019-03-01 NOTE — PROGRESS NOTES
Subjective:       Patient ID:  Lyndon Lion Jr. is a 48 y.o. male who presents for   Chief Complaint   Patient presents with    Skin Check     TBSE     Patient last seen 11/2017 with granulomatous dermatitis, all cultures negative     in [place], shift work,   Patient with Parada syndrome; MSH2 mutation, dx 2013  H/o colon cancer s/p partial colectomy; annual coloscopy and EGD  Advised to have annual skin checks for cancer screening;    Mother with h/o melanoma (dx age 45) and breast CA            Review of Systems   Constitutional: Negative for fever, chills and fatigue.   Skin: Positive for daily sunscreen use, activity-related sunscreen use and wears hat.   Hematologic/Lymphatic: Does not bruise/bleed easily.        Objective:    Physical Exam   Constitutional: He appears well-developed and well-nourished. No distress.   Neurological: He is alert and oriented to person, place, and time. He is not disoriented.   Psychiatric: He has a normal mood and affect.   Skin:   Areas Examined (abnormalities noted in diagram):   Scalp / Hair Palpated and Inspected  Head / Face Inspection Performed  Neck Inspection Performed  Chest / Axilla Inspection Performed  Abdomen Inspection Performed  Genitals / Buttocks / Groin Inspection Performed  Back Inspection Performed  RUE Inspected  LUE Inspection Performed  RLE Inspected  LLE Inspection Performed  Nails and Digits Inspection Performed                       Diagram Legend     Erythematous scaling macule/papule c/w actinic keratosis       Vascular papule c/w angioma      Pigmented verrucoid papule/plaque c/w seborrheic keratosis      Yellow umbilicated papule c/w sebaceous hyperplasia      Irregularly shaped tan macule c/w lentigo     1-2 mm smooth white papules consistent with Milia      Movable subcutaneous cyst with punctum c/w epidermal inclusion cyst      Subcutaneous movable cyst c/w pilar cyst      Firm pink to brown papule c/w  dermatofibroma      Pedunculated fleshy papule(s) c/w skin tag(s)      Evenly pigmented macule c/w junctional nevus     Mildly variegated pigmented, slightly irregular-bordered macule c/w mildly atypical nevus      Flesh colored to evenly pigmented papule c/w intradermal nevus       Pink pearly papule/plaque c/w basal cell carcinoma      Erythematous hyperkeratotic cursted plaque c/w SCC      Surgical scar with no sign of skin cancer recurrence      Open and closed comedones      Inflammatory papules and pustules      Verrucoid papule consistent consistent with wart     Erythematous eczematous patches and plaques     Dystrophic onycholytic nail with subungual debris c/w onychomycosis     Umbilicated papule    Erythematous-base heme-crusted tan verrucoid plaque consistent with inflamed seborrheic keratosis     Erythematous Silvery Scaling Plaque c/w Psoriasis     See annotation      Assessment / Plan:        Rosacea, erythemato telangiectatic, pustular component on nose  -     metronidazole 0.75% (METROCREAM) 0.75 % Crea; AAA central face BID  Dispense: 45 g; Refill: 2  Gentle skin care with unscented hypoallergenic products and strict sun precautions  Avoid mechanical trauma,  Daily SPF    Parada syndrome  MSH 2 mutation, increased risk of skin cancer (sebaceous neoplasms), regular skin checks  Closely monitored by PCP    Seborrheic keratoses  These are benign inherited growths without a malignant potential. Reassurance given to patient. No treatment is necessary.     Sebaceous hyperplasia  This is a common condition representing benign enlargement of the sebaceous lobule. It typically occurs in adulthood. Reassurance given to patient.     Solar lentigo  This is a benign hyperpigmented sun induced lesion. Daily sun protection will reduce the number of new lesions. Treatment of these benign lesions are considered cosmetic.    .Patient instructed in importance in daily sun protection of at least spf 30. Mineral  sunscreen ingredients preferred (Zinc +/- Titanium).   Recommend Elta MD for daily use on face and neck.  Patient encouraged to wear hat for all outdoor exposure.   Also discussed sun avoidance and use of protective clothing.           Follow-up in about 1 year (around 3/1/2020).

## 2019-03-19 ENCOUNTER — PATIENT MESSAGE (OUTPATIENT)
Dept: FAMILY MEDICINE | Facility: CLINIC | Age: 49
End: 2019-03-19

## 2019-03-19 ENCOUNTER — OFFICE VISIT (OUTPATIENT)
Dept: UROLOGY | Facility: CLINIC | Age: 49
End: 2019-03-19
Payer: COMMERCIAL

## 2019-03-19 ENCOUNTER — PATIENT MESSAGE (OUTPATIENT)
Dept: UROLOGY | Facility: CLINIC | Age: 49
End: 2019-03-19

## 2019-03-19 VITALS
DIASTOLIC BLOOD PRESSURE: 87 MMHG | HEIGHT: 73 IN | SYSTOLIC BLOOD PRESSURE: 133 MMHG | WEIGHT: 274.25 LBS | BODY MASS INDEX: 36.35 KG/M2 | HEART RATE: 95 BPM

## 2019-03-19 DIAGNOSIS — E66.9 OBESITY, UNSPECIFIED CLASSIFICATION, UNSPECIFIED OBESITY TYPE, UNSPECIFIED WHETHER SERIOUS COMORBIDITY PRESENT: Primary | ICD-10-CM

## 2019-03-19 DIAGNOSIS — Z15.09 MSH2-RELATED LYNCH SYNDROME (HNPCC1): ICD-10-CM

## 2019-03-19 DIAGNOSIS — R41.9 SLEEP DISORDER WITH COGNITIVE COMPLAINTS: ICD-10-CM

## 2019-03-19 DIAGNOSIS — Z80.42 FH: PROSTATE CANCER: ICD-10-CM

## 2019-03-19 DIAGNOSIS — G47.9 SLEEP DISORDER WITH COGNITIVE COMPLAINTS: ICD-10-CM

## 2019-03-19 DIAGNOSIS — Z15.09 LYNCH SYNDROME: Primary | ICD-10-CM

## 2019-03-19 PROCEDURE — 99214 OFFICE O/P EST MOD 30 MIN: CPT | Mod: S$GLB,,, | Performed by: UROLOGY

## 2019-03-19 PROCEDURE — 99999 PR PBB SHADOW E&M-EST. PATIENT-LVL III: CPT | Mod: PBBFAC,,, | Performed by: UROLOGY

## 2019-03-19 PROCEDURE — 99214 PR OFFICE/OUTPT VISIT, EST, LEVL IV, 30-39 MIN: ICD-10-PCS | Mod: S$GLB,,, | Performed by: UROLOGY

## 2019-03-19 PROCEDURE — 99999 PR PBB SHADOW E&M-EST. PATIENT-LVL III: ICD-10-PCS | Mod: PBBFAC,,, | Performed by: UROLOGY

## 2019-03-19 NOTE — PROGRESS NOTES
Subjective:       Patient ID: Lyndon Lion Jr. is a 48 y.o. male.    Chief Complaint: msh2-related parada syndrome (pt also reports sx of possible urinary infection)    Lyndon Lion Jr. is a 46 y.o. Male here for further evaluation of Parada Syndrome. Patient referred from Dom Leonardo MD    Family hx of prostate cancer.   Nocturia 2-3 times. Still has caffeine late in the day.     Recent back surgery.   He does snore. Doesn't stop breathing.     No LUTS. Nocturia 1-2 times. Previously 0-1 times. Drinks caffeine.   No lower extremity edema. Snores. No sleep apnea.   No daytime frequency. No urgency. Good stream.   No gross hematuria. No intermittency. No hesitancy.     H/o colon cancer. Has frequent bowels.   8/2008 was the colon surgery.   He is on a surveillance protocol with Dr. Leonardo.   Last colonoscopy 2018.      Mother had breast cancer and skin cancer. Mom is 61.           Past Surgical History:   Procedure Laterality Date    APPENDECTOMY      ARTHROSCOPY, KNEE Right 5/21/2013    Performed [facility] at [facility]    BLOCK-FACET-LUMBAR  RT L3/4,L4/5,L5,S1 Right 3/18/2016    [facility]oke Jr[facility]    BLOCK-FACET-LUMBAR- Ye L3/4 4/5 5/S1 Bilateral 6/16/2017    Performed by [facility] Jr[facility] at [facility] OR    BLOCK-FACET-LUMBAR- L3/4, 4/5, 5/S1 Left 12/16/2016    [facility] Jr[facility] at [facility] OR    COLON SURGERY  2008    PARTIAL COLECTOMY    COLONOSCOPY Bilateral 2012    COLONOSCOPY N/A 5/30/2018    [facility] ([facility]    COLONOSCOPY N/A 10/9/2017    Performed by RAMON Callahan MD at [facility] ENDO (4TH FLR)    COLONOSCOPY N/A 9/20/2017    Per[facility] by [facility] at [facility] (4[facility] FLR)    COLONOSCOPY N/A 8/1/2016    Performed by Blaze Marr MD at [facility] ENDO    COLONOSCOPY N/A 9/4/2015    Performed by Blaze Marr [facility] at [facility] ENDO    COLONOSCOPY N/A 7/3/2014     Performed [facility] Bl[facility]r [facility] at [facility] ENDO    COLONOSCOPY/EMR N/A 11/20/2017    Per[facility]oe [facility] at [facility]O (2ND FLR)    DISCECTOMY, SPINE, LUMBAR L5-S1  2/13/2019    Performed by Lyndon Brooke Jr., [facility] at [facility] OR    Epidural Steroid Injection  10/23/15    Lumbar    ESOPHAGOGASTRODUODENOSCOPY      ESOPHAGOGASTRODUODENOSCOPY (EGD) N/A 7/19/2016    Performed by Blaze Marr [facility] at [facility] ENDO    ESOPHAGOGASTRODUODENOSCOPY (EGD) N/A 9/4/2015    Performed by Blaze Marr MD at [facility] ENDO    GASTRIC BYPASS  2010    SLEEVE    INJECTION, STEROID, SPINE, LUMBAR, EPIDURAL N/A 3/19/2013    Per[facility] at [facility] OR    Injection-steroid-epidural-caudal N/A 11/6/2018    Per[facility] Jr[facility] at [facility] OR    Injection-steroid-epidural-lumbar N/A 7/27/2018    Per[facility] at [facility] OR    INJECTION-STEROID-EPIDURAL-LUMBAR N/A 9/19/2014    [facility] Jr[facility] at [facility] OR    KNEE ARTHROSCOPY Right     LAMINECTOMY, SPINE, LUMBAR, FOR DECOMPRESSION L5-S1 N/A 2/13/2019    Performed by Lyndon [facility]oke Jr., [facility] at [facility] OR    MEDIAL FACETECTOMY L5-S1  2/13/2019    Performed by Lyndon Brooke Jr., [facility] at [facility] OR       Past Medical History:   Diagnosis Date    Arthritis     Asthma     AS A CHILD    Colon cancer     Family hx of prostate cancer 11/22/2016    Hx of colon cancer, stage I 7/3/2014    Parada syndrome     Vitamin D deficiency     Wears glasses        Social History     Socioeconomic History    Marital status:      Spouse name: Not on file    Number of children: Not on file    Years of education: Not on file    Highest education level: Not on file   Social Needs    Financial resource strain: Not on file    Food insecurity - worry: Not on file    Food insecurity - inability: Not on file    Transportation needs - medical: Not on file    Transportation needs - non-medical: Not on  file   Occupational History     Employer: [employer]   Tobacco Use    Smoking status: Never Smoker    Smokeless tobacco: Never Used   Substance and Sexual Activity    Alcohol use: Yes     Comment: RARELY    Drug use: No    Sexual activity: Not on file   Other Topics Concern    Not on file   Social History Narrative    Not on file       Family History   Problem Relation Age of Onset    Melanoma Mother     Heart disease Father     Diabetes Father     Cancer Maternal Uncle         colon    Cancer Maternal Grandfather         colon ca    Psoriasis Neg Hx     Lupus Neg Hx     Eczema Neg Hx        Current Outpatient Medications   Medication Sig Dispense Refill    cyclobenzaprine (FLEXERIL) 10 MG tablet TAKE 1 TABLET(10 MG) BY MOUTH TWICE DAILY AS NEEDED FOR MUSCLE SPASMS 60 tablet 0    ergocalciferol (ERGOCALCIFEROL) 50,000 unit Cap TAKE 1 CAPSULE BY MOUTH EVERY 7 DAYS 12 capsule 11    meloxicam (MOBIC) 15 MG tablet Take 1 tablet (15 mg total) by mouth once daily. 60 tablet 0    metronidazole 0.75% (METROCREAM) 0.75 % Crea AAA central face BID 45 g 2    oxyCODONE-acetaminophen (PERCOCET)  mg per tablet Take 1 tablet by mouth every 6 to 8 hours as needed for Pain. 80 tablet 0     No current facility-administered medications for this visit.        No Known Allergies    Review of Systems   Constitutional: Negative for chills, fever and unexpected weight change.   HENT: Negative for hearing loss and nosebleeds.    Eyes: Negative for visual disturbance.   Respiratory: Negative for chest tightness.    Cardiovascular: Negative for chest pain.   Gastrointestinal: Negative for diarrhea.   Genitourinary: Positive for nocturia. Negative for dysuria, frequency, hematuria and urgency.   Musculoskeletal: Negative for joint swelling.   Skin: Negative for rash.   Neurological: Negative for seizures.   Hematological: Does not bruise/bleed easily.   Psychiatric/Behavioral: Negative for behavioral problems.        Objective:      Physical Exam   Constitutional: He is oriented to person, place, and time. He appears well-developed and well-nourished.   HENT:   Head: Normocephalic and atraumatic.   Eyes: No scleral icterus.   Neck: Neck supple.   Cardiovascular: Normal rate and regular rhythm.    Pulmonary/Chest: Effort normal. No respiratory distress.   Abdominal: He exhibits no mass. Hernia confirmed negative in the right inguinal area and confirmed negative in the left inguinal area.   Genitourinary: Testes normal and penis normal. Circumcised.   Genitourinary Comments: Prostate was smooth without nodularity. No rectal masses.  25 grams.  No external hemorrhoids.   Normal perineum.        Musculoskeletal: He exhibits no tenderness.   Lymphadenopathy:     He has no cervical adenopathy. No inguinal adenopathy noted on the right or left side.   Neurological: He is alert and oriented to person, place, and time.   Skin: Skin is warm. No rash noted.     Psychiatric: He has a normal mood and affect.     urine dip pH 5, urine clear.   Assessment:       1. Parada syndrome    2. MSH2-related Parada syndrome (HNPCC1)    3. FH: prostate cancer        Plan:   Psa next lab draw.   Limit fluids 2 hours before bedtime.  Elevated legs 2 hours before bedtime.  No caffeine, cokes, teas after 3 pm in the afternoon.  (Patient drinks coke overnight)

## 2019-03-20 ENCOUNTER — TELEPHONE (OUTPATIENT)
Dept: GASTROENTEROLOGY | Facility: CLINIC | Age: 49
End: 2019-03-20

## 2019-03-20 DIAGNOSIS — Z15.09 MSH2-RELATED LYNCH SYNDROME (HNPCC1): Primary | ICD-10-CM

## 2019-03-20 NOTE — TELEPHONE ENCOUNTER
----- Message from Dom Leonardo MD sent at 3/20/2019 12:22 PM CDT -----  Contact: pt wife Katheryn arreola  ----- Message -----  From: Tiffanie Valentin MA  Sent: 3/20/2019  12:03 PM  To: MD Dr Malissa Hopkins,  Please put in order for follow up colonoscopy.  Tiffanei   ----- Message -----  From: Daniela Jones  Sent: 3/20/2019  11:39 AM  To: Malissa ALY Staff    Type:  Needs Medical Advice    Who Called: Wife  Symptoms (please be specific):na  How long has patient had these symptoms: na  Pharmacy name and phone #: [facility]  Would the patient rather a call back or a response via [facility]?  Call back  Best Call Back Number:[phone]  Additional Information:  Would like a call back to schedule colonoscopy

## 2019-03-20 NOTE — TELEPHONE ENCOUNTER
----- Message from Daniela Jones sent at 3/20/2019 11:39 AM CDT -----  Contact: pt wife Katheryn  Type:  Needs Medical Advice    Who Called: Wife  Symptoms (please be specific):na  How long has patient had these symptoms: na  Pharmacy name and phone #: [facility]  Would the patient rather a call back or a response via [facility]?  Call back  Best Call Back Number:[phone]  Additional Information:  Would like a call back to schedule colonoscopy

## 2019-03-28 ENCOUNTER — TELEPHONE (OUTPATIENT)
Dept: DERMATOLOGY | Facility: CLINIC | Age: 49
End: 2019-03-28

## 2019-03-28 ENCOUNTER — PATIENT MESSAGE (OUTPATIENT)
Dept: FAMILY MEDICINE | Facility: CLINIC | Age: 49
End: 2019-03-28

## 2019-03-28 ENCOUNTER — PATIENT MESSAGE (OUTPATIENT)
Dept: DERMATOLOGY | Facility: CLINIC | Age: 49
End: 2019-03-28

## 2019-03-28 DIAGNOSIS — L71.9 ROSACEA: Primary | ICD-10-CM

## 2019-03-28 NOTE — TELEPHONE ENCOUNTER
Last OV 3-1-19      Can I please get a low dose prescription for Doxycycline for the Rosacea on my nose.     Please advise

## 2019-04-01 RX ORDER — DOXYCYCLINE 40 MG/1
CAPSULE ORAL
Qty: 30 CAPSULE | Refills: 5 | Status: SHIPPED | OUTPATIENT
Start: 2019-04-01 | End: 2019-12-05 | Stop reason: SDUPTHER

## 2019-04-05 DIAGNOSIS — Z12.11 SPECIAL SCREENING FOR MALIGNANT NEOPLASMS, COLON: Primary | ICD-10-CM

## 2019-05-28 DIAGNOSIS — L71.9 ROSACEA: ICD-10-CM

## 2019-05-29 ENCOUNTER — LAB VISIT (OUTPATIENT)
Dept: LAB | Facility: HOSPITAL | Age: 49
End: 2019-05-29
Attending: FAMILY MEDICINE
Payer: COMMERCIAL

## 2019-05-29 DIAGNOSIS — G47.9 SLEEP DISORDER WITH COGNITIVE COMPLAINTS: ICD-10-CM

## 2019-05-29 DIAGNOSIS — R41.9 SLEEP DISORDER WITH COGNITIVE COMPLAINTS: ICD-10-CM

## 2019-05-29 DIAGNOSIS — E66.9 OBESITY, UNSPECIFIED CLASSIFICATION, UNSPECIFIED OBESITY TYPE, UNSPECIFIED WHETHER SERIOUS COMORBIDITY PRESENT: ICD-10-CM

## 2019-05-29 LAB — TSH SERPL DL<=0.005 MIU/L-ACNC: 0.77 UIU/ML (ref 0.4–4)

## 2019-05-29 PROCEDURE — 84443 ASSAY THYROID STIM HORMONE: CPT

## 2019-05-29 PROCEDURE — 36415 COLL VENOUS BLD VENIPUNCTURE: CPT | Mod: PO

## 2019-05-30 RX ORDER — METRONIDAZOLE 7.5 MG/G
CREAM TOPICAL
Qty: 45 G | Refills: 2 | Status: SHIPPED | OUTPATIENT
Start: 2019-05-30 | End: 2019-09-19

## 2019-06-03 ENCOUNTER — PATIENT OUTREACH (OUTPATIENT)
Dept: ADMINISTRATIVE | Facility: HOSPITAL | Age: 49
End: 2019-06-03

## 2019-06-10 ENCOUNTER — ANESTHESIA (OUTPATIENT)
Dept: ENDOSCOPY | Facility: HOSPITAL | Age: 49
End: 2019-06-10
Payer: COMMERCIAL

## 2019-06-10 ENCOUNTER — HOSPITAL ENCOUNTER (OUTPATIENT)
Facility: HOSPITAL | Age: 49
Discharge: HOME | End: 2019-06-10
Attending: INTERNAL MEDICINE | Admitting: INTERNAL MEDICINE
Payer: COMMERCIAL

## 2019-06-10 ENCOUNTER — ANESTHESIA EVENT (OUTPATIENT)
Dept: ENDOSCOPY | Facility: HOSPITAL | Age: 49
End: 2019-06-10
Payer: COMMERCIAL

## 2019-06-10 VITALS
RESPIRATION RATE: 16 BRPM | OXYGEN SATURATION: 95 % | BODY MASS INDEX: 33.37 KG/M2 | HEART RATE: 57 BPM | DIASTOLIC BLOOD PRESSURE: 74 MMHG | HEIGHT: 74 IN | SYSTOLIC BLOOD PRESSURE: 124 MMHG | WEIGHT: 260 LBS | TEMPERATURE: 98 F

## 2019-06-10 DIAGNOSIS — Z15.09 MSH2-RELATED LYNCH SYNDROME (HNPCC1): ICD-10-CM

## 2019-06-10 PROCEDURE — 45380 COLONOSCOPY AND BIOPSY: CPT | Performed by: INTERNAL MEDICINE

## 2019-06-10 PROCEDURE — 63600175 PHARM REV CODE 636 W HCPCS: Performed by: NURSE ANESTHETIST, CERTIFIED REGISTERED

## 2019-06-10 PROCEDURE — 88305 TISSUE SPECIMEN TO PATHOLOGY - SURGERY: ICD-10-PCS | Mod: 26,,, | Performed by: PATHOLOGY

## 2019-06-10 PROCEDURE — 88305 TISSUE EXAM BY PATHOLOGIST: CPT | Performed by: PATHOLOGY

## 2019-06-10 PROCEDURE — 45385 COLONOSCOPY W/LESION REMOVAL: CPT | Mod: 33,,, | Performed by: INTERNAL MEDICINE

## 2019-06-10 PROCEDURE — 25000003 PHARM REV CODE 250: Performed by: INTERNAL MEDICINE

## 2019-06-10 PROCEDURE — 27201089 HC SNARE, DISP (ANY): Performed by: INTERNAL MEDICINE

## 2019-06-10 PROCEDURE — 45380 COLONOSCOPY AND BIOPSY: CPT | Mod: 59,,, | Performed by: INTERNAL MEDICINE

## 2019-06-10 PROCEDURE — 45385 COLONOSCOPY W/LESION REMOVAL: CPT | Performed by: INTERNAL MEDICINE

## 2019-06-10 PROCEDURE — 45380 PR COLONOSCOPY,BIOPSY: ICD-10-PCS | Mod: 59,,, | Performed by: INTERNAL MEDICINE

## 2019-06-10 PROCEDURE — 37000009 HC ANESTHESIA EA ADD 15 MINS: Performed by: INTERNAL MEDICINE

## 2019-06-10 PROCEDURE — 37000008 HC ANESTHESIA 1ST 15 MINUTES: Performed by: INTERNAL MEDICINE

## 2019-06-10 PROCEDURE — 88305 TISSUE EXAM BY PATHOLOGIST: CPT | Mod: 26,,, | Performed by: PATHOLOGY

## 2019-06-10 PROCEDURE — E9220 PRA ENDO ANESTHESIA: HCPCS | Mod: 33,,, | Performed by: NURSE ANESTHETIST, CERTIFIED REGISTERED

## 2019-06-10 PROCEDURE — 27201012 HC FORCEPS, HOT/COLD, DISP: Performed by: INTERNAL MEDICINE

## 2019-06-10 PROCEDURE — E9220 PRA ENDO ANESTHESIA: ICD-10-PCS | Mod: 33,,, | Performed by: NURSE ANESTHETIST, CERTIFIED REGISTERED

## 2019-06-10 PROCEDURE — 45385 PR COLONOSCOPY,REMV LESN,SNARE: ICD-10-PCS | Mod: 33,,, | Performed by: INTERNAL MEDICINE

## 2019-06-10 RX ORDER — SODIUM CHLORIDE 0.9 % (FLUSH) 0.9 %
10 SYRINGE (ML) INJECTION
Status: DISCONTINUED | OUTPATIENT
Start: 2019-06-10 | End: 2019-06-10 | Stop reason: HOSPADM

## 2019-06-10 RX ORDER — SODIUM CHLORIDE 9 MG/ML
INJECTION, SOLUTION INTRAVENOUS CONTINUOUS
Status: DISCONTINUED | OUTPATIENT
Start: 2019-06-10 | End: 2019-06-10 | Stop reason: HOSPADM

## 2019-06-10 RX ORDER — PROPOFOL 10 MG/ML
VIAL (ML) INTRAVENOUS CONTINUOUS PRN
Status: DISCONTINUED | OUTPATIENT
Start: 2019-06-10 | End: 2019-06-10

## 2019-06-10 RX ORDER — PROPOFOL 10 MG/ML
VIAL (ML) INTRAVENOUS
Status: DISCONTINUED | OUTPATIENT
Start: 2019-06-10 | End: 2019-06-10

## 2019-06-10 RX ADMIN — PROPOFOL 150 MCG/KG/MIN: 10 INJECTION, EMULSION INTRAVENOUS at 08:06

## 2019-06-10 RX ADMIN — SODIUM CHLORIDE: 0.9 INJECTION, SOLUTION INTRAVENOUS at 08:06

## 2019-06-10 RX ADMIN — PROPOFOL 100 MG: 10 INJECTION, EMULSION INTRAVENOUS at 08:06

## 2019-06-10 RX ADMIN — PROPOFOL 50 MG: 10 INJECTION, EMULSION INTRAVENOUS at 08:06

## 2019-06-10 NOTE — DISCHARGE INSTRUCTIONS
Colonoscopy     A camera attached to a flexible tube with a viewing lens is used to take video pictures.     Colonoscopy is a test to view the inside of your lower digestive tract (colon and rectum). Sometimes it can show the last part of the small intestine (ileum). During the test, small pieces of tissue may be removed for testing. This is called a biopsy. Small growths, such as polyps, may also be removed.   Why is colonoscopy done?  The test is done to help look for colon cancer. And it can help find the source of abdominal pain, bleeding, and changes in bowel habits. It may be needed once a year, depending on factors such as your:  · Age  · Health history  · Family health history  · Symptoms  · Results from any prior colonoscopy  Risks and possible complications  These include:  · Bleeding               · A puncture or tear in the colon   · Risks of anesthesia  · A cancer lesion not being seen  Getting ready   To prepare for the test:  · Talk with your healthcare provider about the risks of the test (see below). Also ask your healthcare provider about alternatives to the test.  · Tell your healthcare provider about any medicines you take. Also tell him or her about any health conditions you may have.  · Make sure your rectum and colon are empty for the test. Follow the diet and bowel prep instructions exactly. If you dont, the test may need to be rescheduled.  · Plan for a friend or family member to drive you home after the test.     Colonoscopy provides an inside view of the entire colon.     You may discuss the results with your doctor right away or at a future visit.  During the test   The test is usually done in the hospital on an outpatient basis. This means you go home the same day. The procedure takes about 30 minutes. During that time:  · You are given relaxing (sedating) medicine through an IV line. You may be drowsy, or fully asleep.  · The healthcare provider will first give you a physical exam to  check for anal and rectal problems.  · Then the anus is lubricated and the scope inserted.  · If you are awake, you may have a feeling similar to needing to have a bowel movement. You may also feel pressure as air is pumped into the colon. Its OK to pass gas during the procedure.  · Biopsy, polyp removal, or other treatments may be done during the test.  After the test   You may have gas right after the test. It can help to try to pass it to help prevent later bloating. Your healthcare provider may discuss the results with you right away. Or you may need to schedule a follow-up visit to talk about the results. After the test, you can go back to your normal eating and other activities. You may be tired from the sedation and need to rest for a few hours.  Date Last Reviewed: 11/1/2016 © 2000-2017 [employer]. [address] [place] [address], [place] [address]. All rights reserved. This information is not intended as a substitute for professional medical care. Always follow your healthcare professional's instructions.

## 2019-06-10 NOTE — TRANSFER OF CARE
"Anesthesia Transfer of Care Note    Patient: Lyndon Lion Jr.    Procedure(s) Performed: Procedure(s) (LRB):  COLONOSCOPY (N/A)    Patient location: PACU    Anesthesia Type: general    Transport from OR: Transported from OR on room air with adequate spontaneous ventilation    Post pain: adequate analgesia    Post assessment: no apparent anesthetic complications and tolerated procedure well    Post vital signs: stable    Level of consciousness: awake    Nausea/Vomiting: no nausea/vomiting    Complications: none    Transfer of care protocol was followed      Last vitals:   Visit Vitals  BP (!) 110/59 (BP Location: Left arm, Patient Position: Lying)   Pulse 65   Temp 36.7 °C (98.1 °F) (Temporal)   Resp 16   Ht 6' 2" (1.88 m)   Wt 117.9 kg (260 lb)   SpO2 95%   BMI 33.38 kg/m²     "

## 2019-06-10 NOTE — [employer] PATIENT INSTRUCTIONS
Discharge Summary/Instructions after an Endoscopic Procedure  Patient Name: Lyndon Lion  Patient MRN: [id number]  Patient YOB: 1970  Monday, Cindy 10, 2019  Dom Leonardo MD  RESTRICTIONS:  During your procedure today, you received medications for sedation.  These   medications may affect your judgment, balance and coordination.  Therefore,   for 24 hours, you have the following restrictions:   - DO NOT drive a car, operate machinery, make legal/financial decisions,   sign important papers or drink alcohol.    ACTIVITY:  Today: no heavy lifting, straining or running due to procedural   sedation/anesthesia.  The following day: return to full activity including work.  DIET:  Eat and drink normally unless instructed otherwise.     TREATMENT FOR COMMON SIDE EFFECTS:  - Mild abdominal pain, nausea, belching, bloating or excessive gas:  rest,   eat lightly and use a heating pad.  - Sore Throat: treat with throat lozenges and/or gargle with warm salt   water.  - Because air was used during the procedure, expelling large amounts of air   from your rectum or belching is normal.  - If a bowel prep was taken, you may not have a bowel movement for 1-3 days.    This is normal.  SYMPTOMS TO WATCH FOR AND REPORT TO YOUR PHYSICIAN:  1. Abdominal pain or bloating, other than gas cramps.  2. Chest pain.  3. Back pain.  4. Signs of infection such as: chills or fever occurring within 24 hours   after the procedure.  5. Rectal bleeding, which would show as bright red, maroon, or black stools.   (A tablespoon of blood from the rectum is not serious, especially if   hemorrhoids are present.)  6. Vomiting.  7. Weakness or dizziness.  GO DIRECTLY TO THE NEAREST EMERGENCY ROOM IF YOU HAVE ANY OF THE FOLLOWING:      Difficulty breathing              Chills and/or fever over 101 F   Persistent vomiting and/or vomiting blood   Severe abdominal pain   Severe chest pain   Black, tarry stools   Bleeding- more than one  tablespoon   Any other symptom or condition that you feel may need urgent attention  Your doctor recommends these additional instructions:  If any biopsies were taken, your doctors clinic will contact you in 1 to 2   weeks with any results.  - Discharge patient to home.   - Await pathology results.   - Telephone endoscopist for pathology results in 2 weeks.   - Repeat colonoscopy in 1 year for surveillance based on pathology results -   SOONER IF ANY DYSPLASIA AT THE ILEOCOLONIC ANASTOMOSIS.  - The findings and recommendations were discussed with the patient.  For questions, problems or results please call your physician - Dom Leonardo MD at Work:  [phone].  [facility], EMERGENCY ROOM PHONE NUMBER: [phone]  IF A COMPLICATION OR EMERGENCY SITUATION ARISES AND YOU ARE UNABLE TO REACH   YOUR PHYSICIAN - GO DIRECTLY TO THE EMERGENCY ROOM.  Dom Leonardo MD  6/10/2019 9:10:43 AM  This report has been verified and signed electronically.  [employer]

## 2019-06-10 NOTE — ANESTHESIA PREPROCEDURE EVALUATION
06/10/2019  Lyndon BIN Lion Jr. is a 49 y.o., male   Past Medical History:   Diagnosis Date    Arthritis     Asthma     AS A CHILD    Colon cancer     Family hx of prostate cancer 11/22/2016    Hx of colon cancer, stage I 7/3/2014    Parada syndrome     Vitamin D deficiency     Wears glasses      Past Surgical History:   Procedure Laterality Date    APPENDECTOMY      ARTHROSCOPY, KNEE Right 5/21/2013    Performed by [facility] at [facility] OR    BLOCK-FACET-LUMBAR  RT L3/4,L4/5,L5,S1 Right 3/18/2016    [facility] by [facility]don [facility]oke [facility] at [facility] OR    BLOCK-FACET-LUMBAR- Ye L3/4 4/5 5/S1 Bilateral 6/16/2017    Per[facility] by [facility] at [facility] OR    BLOCK-FACET-LUMBAR- L3/4, 4/5, 5/S1 Left 12/16/2016    [facility] by [facility] at [facility] OR    COLON SURGERY  2008    PARTIAL COLECTOMY    COLONOSCOPY Bilateral 2012    COLONOSCOPY N/A 5/30/2018    Performed by Dom [facility]ardo MD at [facility] ENDO (4TH FLR)    COLONOSCOPY N/A 10/9/2017    Performed by RAMON Callahan MD at [facility] ENDO (4TH FLR)    COLONOSCOPY N/A 9/20/2017    Performed by Dom Leonardo MD at [facility] ENDO (4TH FLR)    COLONOSCOPY N/A 8/1/2016    Performed by Blaze Marr MD at [facility] ENDO    COLONOSCOPY N/A 9/4/2015    Performed by Blaze Marr MD at [facility] ENDO    COLONOSCOPY N/A 7/3/2014    Performed by Blaze Marr MD at [facility] ENDO    COLONOSCOPY/EMR N/A 11/20/2017    Performed by Joseluis Choe MD at [facility] ENDO (2ND FLR)    DISCECTOMY, SPINE, LUMBAR L5-S1  2/13/2019    Performed by Lyndon Brooke Jr., MD at [facility] OR    Epidural Steroid Injection  10/23/15    Lumbar    ESOPHAGOGASTRODUODENOSCOPY      ESOPHAGOGASTRODUODENOSCOPY (EGD) N/A 7/19/2016    Performed by Blaze Marr MD at [facility] ENDO    ESOPHAGOGASTRODUODENOSCOPY (EGD) N/A 9/4/2015    Performed by  Blaze [facility]r MD at [facility] ENDO    GASTRIC BYPASS  2010    SLEEVE    INJECTION, STEROID, SPINE, LUMBAR, EPIDURAL N/A 3/19/2013    [facility] by [facility] at [facility] OR    Injection-steroid-epidural-caudal N/A 11/6/2018    Performed by [facility] Jr[facility] at [facility] OR    Injection-steroid-epidural-lumbar N/A 7/27/2018    Performed by [facility] at [facility] OR    INJECTION-STEROID-EPIDURAL-LUMBAR N/A 9/19/2014    Performed by [facility] at [facility] OR    KNEE ARTHROSCOPY Right     LAMINECTOMY, SPINE, LUMBAR, FOR DECOMPRESSION L5-S1 N/A 2/13/2019    Performed by Lyndon [facility] Jr., [facility] at [facility] OR    MEDIAL FACETECTOMY L5-S1  2/13/2019    Performed by Lyndon Brooke Jr., MD at [facility] OR     .    Anesthesia Evaluation    I have reviewed the Patient Summary Reports.    I have reviewed the Nursing Notes.   I have reviewed the Medications.     Review of Systems      Physical Exam  General:  Well nourished    Airway/Jaw/Neck:  Airway Findings: Mouth Opening: Normal Mallampati: I  TM Distance: Normal, at least 6 cm                 Anesthesia Plan  Type of Anesthesia, risks & benefits discussed:  Anesthesia Type:  general  Patient's Preference:   Intra-op Monitoring Plan:   Intra-op Monitoring Plan Comments:   Post Op Pain Control Plan:   Post Op Pain Control Plan Comments:   Induction:   IV  Beta Blocker:  Patient is not currently on a Beta-Blocker (No further documentation required).       Informed Consent: Patient understands risks and agrees with Anesthesia plan.  Questions answered. Anesthesia consent signed with patient.  ASA Score: 3     Day of Surgery Review of History & Physical: I have interviewed and examined the patient. I have reviewed the patient's H&P dated:  There are no significant changes.  H&P update referred to the surgeon.         Ready For Surgery From Anesthesia Perspective.

## 2019-06-10 NOTE — ANESTHESIA POSTPROCEDURE EVALUATION
Anesthesia Post Evaluation    Patient: Lyndon Lion Jr.    Procedure(s) Performed: Procedure(s) (LRB):  COLONOSCOPY (N/A)    Final Anesthesia Type: general  Patient location during evaluation: PACU  Patient participation: Yes- Able to Participate  Level of consciousness: awake and alert  Post-procedure vital signs: reviewed and stable  Pain management: adequate  Airway patency: patent  PONV status at discharge: No PONV  Anesthetic complications: no      Cardiovascular status: hemodynamically stable  Respiratory status: unassisted  Hydration status: euvolemic  Follow-up not needed.          Vitals Value Taken Time   /74 6/10/2019  9:35 AM   Temp 36.7 °C (98.1 °F) 6/10/2019  9:05 AM   Pulse 57 6/10/2019  9:35 AM   Resp 16 6/10/2019  9:35 AM   SpO2 95 % 6/10/2019  9:35 AM         Event Time     Out of Recovery 09:40:41          Pain/Karthikeyan Score: Karthikeyan Score: 10 (6/10/2019  9:35 AM)

## 2019-06-10 NOTE — H&P
[facility]  History & Physical    Subjective:      Chief Complaint/Reason for Admission:    Colonoscopy    Lyndon Lion Jr. is a 49 y.o. male.    Past Medical History:   Diagnosis Date    Arthritis     Asthma     AS A CHILD    Colon cancer     Family hx of prostate cancer 11/22/2016    Hx of colon cancer, stage I 7/3/2014    Parada syndrome     Vitamin D deficiency     Wears glasses      Past Surgical History:   Procedure Laterality Date    APPENDECTOMY      ARTHROSCOPY, KNEE Right 5/21/2013    [facility]    BLOCK-FACET-LUMBAR  RT L3/4,L4/5,L5,S1 Right 3/18/2016    [facility] by [facility] at [facility] OR    BLOCK-FACET-LUMBAR- Ye L3/4 4/5 5/S1 Bilateral 6/16/2017    Performed by Lyndon Brooke Jr., MD at [facility] OR    BLOCK-FACET-LUMBAR- L3/4, 4/5, 5/S1 Left 12/16/2016    Performed by [facility]don [facility] Jr., MD at [facility] OR    COLON SURGERY  2008    PARTIAL COLECTOMY    COLONOSCOPY Bilateral 2012    COLONOSCOPY N/A 5/30/2018    Performed by Dom [facility]ardo MD at [facility] ENDO (4TH FLR)    COLONOSCOPY N/A 10/9/2017    Performed by RAMON Callahan MD at [facility] ENDO (4TH FLR)    COLONOSCOPY N/A 9/20/2017    Performed by Dom [facility]ardo MD at [facility] ENDO (4TH FLR)    COLONOSCOPY N/A 8/1/2016    Performed by Blaze Marr MD at [facility] ENDO    COLONOSCOPY N/A 9/4/2015    Performed by Blaze Marr MD at [facility] ENDO    COLONOSCOPY N/A 7/3/2014    Performed by Blaze Marr MD at [facility] ENDO    COLONOSCOPY/EMR N/A 11/20/2017    Performed by Joseluis Choe MD at [facility] ENDO (2ND FLR)    DISCECTOMY, SPINE, LUMBAR L5-S1  2/13/2019    Performed by Lyndon Brooke Jr., MD at [facility] OR    Epidural Steroid Injection  10/23/15    Lumbar    ESOPHAGOGASTRODUODENOSCOPY      ESOPHAGOGASTRODUODENOSCOPY (EGD) N/A 7/19/2016    Performed by Blaze [facility]r MD at [facility] ENDO    ESOPHAGOGASTRODUODENOSCOPY (EGD) N/A 9/4/2015    Performed  by Blaze [facility]r MD at [facility] ENDO    GASTRIC BYPASS  2010    SLEEVE    INJECTION, STEROID, SPINE, LUMBAR, EPIDURAL N/A 3/19/2013    Performed by Lyndon Bro[facility] Jr., MD at [facility] OR    Injection-steroid-epidural-caudal N/A 11/6/2018    Performed by Lyndon Brooke Jr., MD at [facility] OR    Injection-steroid-epidural-lumbar N/A 7/27/2018    Performed by Lyndon Brooke Jr., MD at [facility] OR    INJECTION-STEROID-EPIDURAL-LUMBAR N/A 9/19/2014    Performed by Lyndon Brooke Jr., MD at [facility] OR    KNEE ARTHROSCOPY Right     LAMINECTOMY, SPINE, LUMBAR, FOR DECOMPRESSION L5-S1 N/A 2/13/2019    Performed by Lyndon [facility] Jr., MD at [facility] OR    MEDIAL FACETECTOMY L5-S1  2/13/2019    Performed by Lyndon [facility] Jr., MD at [facility] OR     Family History   Problem Relation Age of Onset    Melanoma Mother     Heart disease Father     Diabetes Father     Cancer Maternal Uncle         colon    Cancer Maternal Grandfather         colon ca    Psoriasis Neg Hx     Lupus Neg Hx     Eczema Neg Hx      Social History     Tobacco Use    Smoking status: Never Smoker    Smokeless tobacco: Never Used   Substance Use Topics    Alcohol use: Yes     Comment: RARELY    Drug use: No       PTA Medications   Medication Sig    doxycycline (ORACEA) 40 mg capsule Take 1 po qday    meloxicam (MOBIC) 15 MG tablet Take 1 tablet (15 mg total) by mouth once daily.    cyclobenzaprine (FLEXERIL) 10 MG tablet TAKE 1 TABLET(10 MG) BY MOUTH TWICE DAILY AS NEEDED FOR MUSCLE SPASMS    ergocalciferol (ERGOCALCIFEROL) 50,000 unit Cap TAKE 1 CAPSULE BY MOUTH EVERY 7 DAYS    metronidazole 0.75% (METROCREAM) 0.75 % Crea APPLY TO CENTRAL FACE TWICE DAILY    oxyCODONE-acetaminophen (PERCOCET)  mg per tablet Take 1 tablet by mouth every 6 to 8 hours as needed for Pain.    oxyCODONE-acetaminophen (PERCOCET)  mg per tablet Take 1 tablet by mouth every 8 (eight) hours as needed for Pain.     Review of patient's allergies  indicates:  No Known Allergies     Review of Systems   Constitutional: Negative for chills, fever and weight loss.   Respiratory: Negative for shortness of breath.    Cardiovascular: Negative for chest pain.   Gastrointestinal: Negative for abdominal pain.       Objective:      Vital Signs (Most Recent)  Temp: 98.1 °F (36.7 °C) (06/10/19 0830)  Pulse: 70 (06/10/19 0830)  Resp: 14 (06/10/19 0830)  BP: (!) 147/82 (06/10/19 0830)  SpO2: 98 % (06/10/19 0830)    Vital Signs Range (Last 24H):  Temp:  [98.1 °F (36.7 °C)]   Pulse:  [70]   Resp:  [14]   BP: (147)/(82)   SpO2:  [98 %]     Physical Exam   Constitutional: He is oriented to person, place, and time. He appears well-developed and well-nourished.   Cardiovascular: Normal rate.   Pulmonary/Chest: Effort normal.   Abdominal: Soft.   Neurological: He is alert and oriented to person, place, and time.   Skin: Skin is warm and dry.   Psychiatric: He has a normal mood and affect. His behavior is normal. Judgment and thought content normal.     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       Plan:    Colonoscopy for parada surveillance. He was discovered to have colon cancer and had right hemicolectomy        for stage II on 08/08/2008. MSH2 Parada syndrome.

## 2019-06-17 ENCOUNTER — TELEPHONE (OUTPATIENT)
Dept: ENDOSCOPY | Facility: HOSPITAL | Age: 49
End: 2019-06-17

## 2019-06-18 NOTE — HPI
Mr. 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 He has been receiving surveillance colonoscopies. Recent colonoscopy on 9/20/17 showed an area of nodular mucosa at the ileocolonic anastomosis. Biopsies revealed low-grade dysplasia. On 11/20/17, he had a colonoscopy and snare mucosal resection of the lesion. The patient reports having increasing abdominal pain since he got home at 6pm. The pain prompted him to go to his local ED in [place] where CT abdomen was obtained with concerns for pneumoperitoneum.   His abdominal pain is now improved. No nausea or vomiting.    Referred to [facility]) for Coumadin management by Dr. Sophie Ospina for afib, goal INR 2.0-3.0, therapy duration indefinite, initial referral 6/18/2019. Pt hasn't started Coumadin yet. Pt is on Eliquis, it is too expensive. Has approx 1 week left. States that Dr. Brian Aguirre gave her instructions to overlap Coumadin and Eliquis for 1.5 days, and then make appt to be seen in clinic. Pt is not at home, not exactly sure how many Eliquis tablets she has left. Asked pt to call us when able to set up 1st appt in clinic. Pt voiced understanding.

## 2019-07-21 ENCOUNTER — HOSPITAL ENCOUNTER (EMERGENCY)
Facility: HOSPITAL | Age: 49
Discharge: HOME | End: 2019-07-21
Attending: EMERGENCY MEDICINE
Payer: COMMERCIAL

## 2019-07-21 VITALS
DIASTOLIC BLOOD PRESSURE: 92 MMHG | OXYGEN SATURATION: 97 % | RESPIRATION RATE: 18 BRPM | SYSTOLIC BLOOD PRESSURE: 157 MMHG | WEIGHT: 265 LBS | TEMPERATURE: 98 F | HEART RATE: 65 BPM | HEIGHT: 74 IN | BODY MASS INDEX: 34.01 KG/M2

## 2019-07-21 DIAGNOSIS — H10.211 CHEMICAL CONJUNCTIVITIS OF RIGHT EYE: Primary | ICD-10-CM

## 2019-07-21 PROCEDURE — 99282 EMERGENCY DEPT VISIT SF MDM: CPT

## 2019-07-21 NOTE — DISCHARGE INSTRUCTIONS
You may use artificial tears as needed. Follow up with ophthalmology as soon as possible. Return to ED for new or worsening symptoms.

## 2019-09-06 ENCOUNTER — HOSPITAL ENCOUNTER (OUTPATIENT)
Dept: RADIOLOGY | Facility: CLINIC | Age: 49
Discharge: HOME | End: 2019-09-06
Attending: NURSE PRACTITIONER
Payer: COMMERCIAL

## 2019-09-06 DIAGNOSIS — Z85.038 HISTORY OF COLON CANCER, STAGE II: ICD-10-CM

## 2019-09-06 PROCEDURE — 71046 XR CHEST PA AND LATERAL: ICD-10-PCS | Mod: 26,,, | Performed by: RADIOLOGY

## 2019-09-06 PROCEDURE — 71046 X-RAY EXAM CHEST 2 VIEWS: CPT | Mod: TC,FY,PO

## 2019-09-06 PROCEDURE — 71046 X-RAY EXAM CHEST 2 VIEWS: CPT | Mod: 26,,, | Performed by: RADIOLOGY

## 2019-09-07 ENCOUNTER — PATIENT OUTREACH (OUTPATIENT)
Dept: ADMINISTRATIVE | Facility: HOSPITAL | Age: 49
End: 2019-09-07

## 2019-09-10 ENCOUNTER — OFFICE VISIT (OUTPATIENT)
Dept: HEMATOLOGY/ONCOLOGY | Facility: CLINIC | Age: 49
End: 2019-09-10
Payer: COMMERCIAL

## 2019-09-10 VITALS
HEIGHT: 74 IN | OXYGEN SATURATION: 96 % | RESPIRATION RATE: 18 BRPM | BODY MASS INDEX: 34.91 KG/M2 | HEART RATE: 62 BPM | WEIGHT: 272.06 LBS | TEMPERATURE: 98 F | DIASTOLIC BLOOD PRESSURE: 78 MMHG | SYSTOLIC BLOOD PRESSURE: 120 MMHG

## 2019-09-10 DIAGNOSIS — Z85.038 HISTORY OF COLON CANCER, STAGE II: Primary | ICD-10-CM

## 2019-09-10 DIAGNOSIS — E55.9 VITAMIN D DEFICIENCY: ICD-10-CM

## 2019-09-10 DIAGNOSIS — Z15.09 MSH2-RELATED LYNCH SYNDROME (HNPCC1): ICD-10-CM

## 2019-09-10 DIAGNOSIS — Z15.09 LYNCH SYNDROME: ICD-10-CM

## 2019-09-10 PROCEDURE — 99213 PR OFFICE/OUTPT VISIT, EST, LEVL III, 20-29 MIN: ICD-10-PCS | Mod: S$GLB,,, | Performed by: NURSE PRACTITIONER

## 2019-09-10 PROCEDURE — 99213 OFFICE O/P EST LOW 20 MIN: CPT | Mod: S$GLB,,, | Performed by: NURSE PRACTITIONER

## 2019-09-10 PROCEDURE — 99999 PR PBB SHADOW E&M-EST. PATIENT-LVL IV: ICD-10-PCS | Mod: PBBFAC,,, | Performed by: NURSE PRACTITIONER

## 2019-09-10 PROCEDURE — 99999 PR PBB SHADOW E&M-EST. PATIENT-LVL IV: CPT | Mod: PBBFAC,,, | Performed by: NURSE PRACTITIONER

## 2019-09-10 RX ORDER — IVERMECTIN 10 MG/G
CREAM TOPICAL
Refills: 2 | COMMUNITY
Start: 2019-08-08 | End: 2020-11-17 | Stop reason: ALTCHOICE

## 2019-09-10 NOTE — PROGRESS NOTES
Subjective:       Patient ID: Lyndon Lion Jr. is a 49 y.o. male.    Chief Complaint: Follow-up  Annual surveillance for colon cancer  HPI This is a 49-year-old white gentleman known to Dr. Cárdenas for stage II adenocarcinoma of the colon for which he is status post resection (2008) and 12 cycles of adjuvant FOLFOX.  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      He presents to the clinic today with his wife for his annual evaluation. He reports a recent colonoscopy in June.  He is only complaint is that of painful Rosacea.  He denies any discomfort with fevers, chills, drenching night sweats, changes in the character or caliber of his stool, abdominal discomfort/bloating, nausea, vomiting, constipation, diarrhea, unexplained weight loss, irregular heartbeat, chest pain, rectal bleeding, etc.  No other new complaints or pertinent findings on a 14-point review of systems.     Review of Systems   Constitutional: Negative.    HENT: Negative.    Eyes: Negative.    Respiratory: Negative.    Cardiovascular: Negative.    Gastrointestinal: Negative.    Endocrine: Negative.    Genitourinary: Negative.    Musculoskeletal: Negative.    Skin: Positive for color change.        Rosacea to nose   Allergic/Immunologic: Negative.    Neurological: Negative.    Hematological: Negative.    Psychiatric/Behavioral: Negative.        Objective:     Weight:  Gain of 3 pounds in 1 year  Wt Readings from Last 3 Encounters:   09/10/19 123.4 kg (272 lb 0.8 oz)   07/21/19 120.2 kg (265 lb)   06/10/19 117.9 kg (260 lb)     Temp Readings from Last 3 Encounters:   09/10/19 98.1 °F (36.7 °C) (Oral)   07/21/19 98.3 °F (36.8 °C) (Oral)   06/10/19 98.1 °F (36.7 °C) (Temporal)     BP Readings from Last 3 Encounters:   09/10/19 120/78   07/21/19 (!) 157/92   06/10/19 124/74     Pulse Readings from Last 3 Encounters:   09/10/19 62   07/21/19 65   06/10/19 (!) 57     Physical Exam   Constitutional: He is oriented to person,  place, and time. He appears well-developed and well-nourished.   HENT:   Head: Normocephalic and atraumatic.   Eyes: Pupils are equal, round, and reactive to light. Conjunctivae and EOM are normal.   Neck: Normal range of motion. Neck supple.   Cardiovascular: Normal rate, regular rhythm and normal heart sounds.   Pulmonary/Chest: Effort normal and breath sounds normal.   Abdominal: Soft. Bowel sounds are normal.   Musculoskeletal: Normal range of motion.   Neurological: He is alert and oriented to person, place, and time.   Skin: Skin is warm and dry. Capillary refill takes less than 2 seconds.   Erythema to nose   Psychiatric: He has a normal mood and affect. His speech is normal and behavior is normal. Judgment and thought content normal. Cognition and memory are normal.       LABORATORY:    Lab Results   Component Value Date    WBC 5.38 09/06/2019    RBC 5.38 09/06/2019    HGB 16.3 09/06/2019    HCT 46.0 09/06/2019    MCV 86 09/06/2019    MCH 30.3 09/06/2019    MCHC 35.4 09/06/2019    RDW 12.4 09/06/2019     09/06/2019    MPV 8.9 (L) 09/06/2019    GRAN 2.9 09/06/2019    GRAN 54.3 09/06/2019    LYMPH 1.8 09/06/2019    LYMPH 33.8 09/06/2019    MONO 0.5 09/06/2019    MONO 9.3 09/06/2019    EOS 0.1 09/06/2019    BASO 0.02 09/06/2019    EOSINOPHIL 2.2 09/06/2019    BASOPHIL 0.4 09/06/2019     CMP  Sodium   Date Value Ref Range Status   09/06/2019 139 136 - 145 mmol/L Final     Potassium   Date Value Ref Range Status   09/06/2019 4.4 3.5 - 5.1 mmol/L Final     Chloride   Date Value Ref Range Status   09/06/2019 107 95 - 110 mmol/L Final     CO2   Date Value Ref Range Status   09/06/2019 22 (L) 23 - 29 mmol/L Final     Glucose   Date Value Ref Range Status   09/06/2019 102 70 - 110 mg/dL Final     BUN, Bld   Date Value Ref Range Status   09/06/2019 22 (H) 6 - 20 mg/dL Final     Creatinine   Date Value Ref Range Status   09/06/2019 1.2 0.5 - 1.4 mg/dL Final   05/14/2013 1.3 0.5 - 1.4 mg/dL Final     Calcium    Date Value Ref Range Status   09/06/2019 9.5 8.7 - 10.5 mg/dL Final   05/14/2013 9.8 8.7 - 10.5 mg/dL Final     Total Protein   Date Value Ref Range Status   09/06/2019 7.6 6.0 - 8.4 g/dL Final     Albumin   Date Value Ref Range Status   09/06/2019 4.4 3.5 - 5.2 g/dL Final     Total Bilirubin   Date Value Ref Range Status   09/06/2019 0.7 0.1 - 1.0 mg/dL Final     Comment:     For infants and newborns, interpretation of results should be based  on gestational age, weight and in agreement with clinical  observations.  Premature Infant recommended reference ranges:  Up to 24 hours.............<8.0 mg/dL  Up to 48 hours............<12.0 mg/dL  3-5 days..................<15.0 mg/dL  6-29 days.................<15.0 mg/dL       Alkaline Phosphatase   Date Value Ref Range Status   09/06/2019 57 55 - 135 U/L Final     AST   Date Value Ref Range Status   09/06/2019 24 10 - 40 U/L Final     ALT   Date Value Ref Range Status   09/06/2019 35 10 - 44 U/L Final     Anion Gap   Date Value Ref Range Status   09/06/2019 10 8 - 16 mmol/L Final   05/14/2013 10 5 - 15 meq/L Final     eGFR if    Date Value Ref Range Status   09/06/2019 >60.0 >60 mL/min/1.73 m^2 Final     eGFR if non    Date Value Ref Range Status   09/06/2019 >60.0 >60 mL/min/1.73 m^2 Final     Comment:     Calculation used to obtain the estimated glomerular filtration  rate (eGFR) is the CKD-EPI equation.        LDH:  161    RADIOLOGY:  CXR dated 09/06/2019:  Impression:  No acute cardiopulmonary disease.  No findings suggesting metastatic disease.  No significant change compared the prior exam.    Colonoscopy:  Dated 06/10/2019:    Impression:           - Non-bleeding internal hemorrhoids.                        - Granularity ileum, 0 cm from the ileocecal                         anastomosis. Biopsied.                        - One 3 mm polyp in the rectum, removed with a cold                         snare. Resected and retrieved.                         - Diverticulosis in the recto-sigmoid colon, in the                         sigmoid colon and in the descending colon.                        - The examination was otherwise normal.  Recommendation:       - Discharge patient to home.                        - Await pathology results.                        - Telephone endoscopist for pathology results in 2                         weeks.                        - Repeat colonoscopy in 1 year for surveillance                         based on pathology results - SOONER IF ANY                         DYSPLASIA AT THE ILEOCOLONIC ANASTOMOSIS.                        - The findings and recommendations were discussed                         with the patient.    Assessment:       1. History of colon cancer, stage II    2. Parada syndrome    3. MSH2-related Parada syndrome (HNPCC1)      4.  Rosacea - followed in Dermatology  5.  Hx of Vitamin D deficiency  Plan:       1.  Follow up in 1 year with interval CBC, CMP, LDH. Vitamin D & CXR.  2.  Colonoscopy tbd in 06/2020  3.  FMLA papers to be completed in November/2019; patient to drop off.     Assessment/plan reviewed and approved by Dr. Cárdenas.

## 2019-09-19 ENCOUNTER — OFFICE VISIT (OUTPATIENT)
Dept: FAMILY MEDICINE | Facility: CLINIC | Age: 49
End: 2019-09-19
Payer: COMMERCIAL

## 2019-09-19 VITALS
HEART RATE: 68 BPM | SYSTOLIC BLOOD PRESSURE: 130 MMHG | TEMPERATURE: 98 F | DIASTOLIC BLOOD PRESSURE: 80 MMHG | HEIGHT: 74 IN | WEIGHT: 266.75 LBS | OXYGEN SATURATION: 95 % | BODY MASS INDEX: 34.23 KG/M2

## 2019-09-19 DIAGNOSIS — R73.9 HYPERGLYCEMIA: ICD-10-CM

## 2019-09-19 DIAGNOSIS — M54.16 LUMBAR RADICULOPATHY: ICD-10-CM

## 2019-09-19 DIAGNOSIS — Z15.09 LYNCH SYNDROME: Primary | ICD-10-CM

## 2019-09-19 DIAGNOSIS — E55.9 VITAMIN D DEFICIENCY: ICD-10-CM

## 2019-09-19 PROCEDURE — 99999 PR PBB SHADOW E&M-EST. PATIENT-LVL IV: ICD-10-PCS | Mod: PBBFAC,,, | Performed by: FAMILY MEDICINE

## 2019-09-19 PROCEDURE — 99214 PR OFFICE/OUTPT VISIT, EST, LEVL IV, 30-39 MIN: ICD-10-PCS | Mod: S$GLB,,, | Performed by: FAMILY MEDICINE

## 2019-09-19 PROCEDURE — 99214 OFFICE O/P EST MOD 30 MIN: CPT | Mod: S$GLB,,, | Performed by: FAMILY MEDICINE

## 2019-09-19 PROCEDURE — 99999 PR PBB SHADOW E&M-EST. PATIENT-LVL IV: CPT | Mod: PBBFAC,,, | Performed by: FAMILY MEDICINE

## 2019-09-19 NOTE — PATIENT INSTRUCTIONS

## 2019-09-20 ENCOUNTER — LAB VISIT (OUTPATIENT)
Dept: LAB | Facility: HOSPITAL | Age: 49
End: 2019-09-20
Attending: FAMILY MEDICINE
Payer: COMMERCIAL

## 2019-09-20 DIAGNOSIS — R73.9 HYPERGLYCEMIA: ICD-10-CM

## 2019-09-20 DIAGNOSIS — E55.9 VITAMIN D DEFICIENCY: ICD-10-CM

## 2019-09-20 LAB
ANION GAP SERPL CALC-SCNC: 10 MMOL/L (ref 8–16)
BUN SERPL-MCNC: 22 MG/DL (ref 6–20)
CALCIUM SERPL-MCNC: 9 MG/DL (ref 8.7–10.5)
CHLORIDE SERPL-SCNC: 106 MMOL/L (ref 95–110)
CHOLEST SERPL-MCNC: 153 MG/DL (ref 120–199)
CHOLEST/HDLC SERPL: 4 {RATIO} (ref 2–5)
CO2 SERPL-SCNC: 25 MMOL/L (ref 23–29)
CREAT SERPL-MCNC: 1 MG/DL (ref 0.5–1.4)
EST. GFR  (AFRICAN AMERICAN): >60 ML/MIN/1.73 M^2
EST. GFR  (NON AFRICAN AMERICAN): >60 ML/MIN/1.73 M^2
ESTIMATED AVG GLUCOSE: 120 MG/DL (ref 68–131)
GLUCOSE SERPL-MCNC: 82 MG/DL (ref 70–110)
HBA1C MFR BLD HPLC: 5.8 % (ref 4–5.6)
HDLC SERPL-MCNC: 38 MG/DL (ref 40–75)
HDLC SERPL: 24.8 % (ref 20–50)
LDLC SERPL CALC-MCNC: 99.4 MG/DL (ref 63–159)
NONHDLC SERPL-MCNC: 115 MG/DL
POTASSIUM SERPL-SCNC: 4.1 MMOL/L (ref 3.5–5.1)
SODIUM SERPL-SCNC: 141 MMOL/L (ref 136–145)
TRIGL SERPL-MCNC: 78 MG/DL (ref 30–150)
TSH SERPL DL<=0.005 MIU/L-ACNC: 0.86 UIU/ML (ref 0.4–4)

## 2019-09-20 PROCEDURE — 80048 BASIC METABOLIC PNL TOTAL CA: CPT

## 2019-09-20 PROCEDURE — 82306 VITAMIN D 25 HYDROXY: CPT

## 2019-09-20 PROCEDURE — 36415 COLL VENOUS BLD VENIPUNCTURE: CPT | Mod: PO

## 2019-09-20 PROCEDURE — 84443 ASSAY THYROID STIM HORMONE: CPT

## 2019-09-20 PROCEDURE — 83036 HEMOGLOBIN GLYCOSYLATED A1C: CPT

## 2019-09-20 PROCEDURE — 80061 LIPID PANEL: CPT

## 2019-09-21 LAB — 25(OH)D3+25(OH)D2 SERPL-MCNC: 24 NG/ML (ref 30–96)

## 2019-09-21 NOTE — PROGRESS NOTES
Subjective:       Patient ID: Lyndon Lion Jr. is a 49 y.o. male.    Chief Complaint: Annual Exam    Well Adult Physical: Patient here for a comprehensive physical exam.The patient reports problems - Patient Active Problem List:     Radiculopathy, lumbosacral region     Herniated lumbar intervertebral disc     Lumbar spondylosis     DDD (degenerative disc disease), lumbosacral     Greater trochanteric bursitis of left hip     Acute medial meniscal tear     Obesity, unspecified     Hx of colon cancer, stage I     Parada syndrome     Thoracic or lumbosacral neuritis or radiculitis, unspecified     Arthritis of shoulder region, right     Neural foraminal stenosis of lumbar spine     MSH2-related Parada syndrome (HNPCC1)     LFT elevation     Family hx of prostate cancer     Spondylosis of lumbar region without myelopathy or radiculopathy     Spondylolisthesis of lumbar region     Nonalcoholic fatty liver disease     Splenomegaly  Night shift every 2 weeks. Poor sleep hygiene.    Do you take any herbs or supplements that were not prescribed by a doctor? no Are you taking calcium supplements? no Are you taking aspirin daily? no   History:  Patient elevated liver enzymes Patient complains of a positive Hepatitis posible Patient tested positive 20 weeks. Test was performed as part of an evaluation of abnormal liver function test:(elevated ALT, elevated AST). Hepatitis C risk factors present are history of blood transfusion (). Patient denies accidental needle stick, acupuncture, history of clotting factor transfusion. Patient does not have had other studies performed. Results: none. Patient does not have had prior treatment for Hepatitis C. Patient does not have a past history of liver disease. Patient does not have a family history of liver disease.  Patient's current symptoms include fatigue. Patient denies abdominal pain, anorexia, dark urine and diarrhea. Symptoms have been present for approximately 2 months. The  symptoms are unchanged.      Review of Systems   Constitutional: Positive for appetite change and unexpected weight change. Negative for activity change, chills, diaphoresis, fatigue and fever.   HENT: Negative.  Negative for congestion, drooling, ear discharge, ear pain, hearing loss, mouth sores, nosebleeds, postnasal drip, rhinorrhea, sinus pressure, sore throat, tinnitus, trouble swallowing and voice change.    Eyes: Negative.  Negative for pain, discharge, redness, itching and visual disturbance.   Respiratory: Negative.  Negative for apnea, cough, choking, chest tightness and shortness of breath.    Cardiovascular: Negative.  Negative for chest pain, palpitations and leg swelling.   Gastrointestinal: Negative.  Negative for abdominal distention, abdominal pain and anal bleeding.        Loose bowels   Endocrine: Negative.  Negative for cold intolerance, heat intolerance, polydipsia, polyphagia and polyuria.   Genitourinary: Negative.  Negative for difficulty urinating, dysuria, enuresis, flank pain, frequency, hematuria, scrotal swelling, testicular pain and urgency.   Musculoskeletal: Positive for arthralgias and back pain. Negative for gait problem, neck pain and neck stiffness.   Skin: Negative.  Negative for color change, pallor and rash.   Allergic/Immunologic: Negative.  Negative for environmental allergies and food allergies.   Neurological: Negative.  Negative for dizziness, tremors, syncope, facial asymmetry, speech difficulty, light-headedness, numbness and headaches.   Hematological: Negative for adenopathy. Does not bruise/bleed easily.   Psychiatric/Behavioral: Negative.  Negative for agitation, behavioral problems, confusion, decreased concentration, dysphoric mood and hallucinations. The patient is not hyperactive.        Objective:      Physical Exam   Constitutional: He is oriented to person, place, and time. He appears well-developed. No distress.   HENT:   Head: Normocephalic and atraumatic.    Right Ear: External ear normal.   Left Ear: External ear normal.   Nose: Nose normal.   Mouth/Throat: Oropharynx is clear and moist. No oropharyngeal exudate.   Eyes: Pupils are equal, round, and reactive to light. Conjunctivae and EOM are normal. Right eye exhibits no discharge. Left eye exhibits no discharge. No scleral icterus.   Neck: Normal range of motion. Neck supple. No JVD present. No tracheal deviation present. No thyromegaly present.   Cardiovascular: Normal rate, normal heart sounds and intact distal pulses.   No murmur heard.  Pulmonary/Chest: Effort normal and breath sounds normal. No respiratory distress. He has no wheezes. He has no rales.   Abdominal: Soft. Bowel sounds are normal. He exhibits no distension and no mass. There is no tenderness. There is no rebound and no guarding.   Musculoskeletal: He exhibits no edema or tenderness.          Lymphadenopathy:     He has no cervical adenopathy.   Neurological: He is alert and oriented to person, place, and time. No cranial nerve deficit. Coordination normal.   Skin: Skin is warm and dry. No rash noted. He is not diaphoretic. No erythema. No pallor.   Psychiatric: He has a normal mood and affect. His behavior is normal. Judgment and thought content normal.   Vitals reviewed.      Assessment:       1. Parada syndrome    2. Lumbar radiculopathy    3. Hyperglycemia    4. Vitamin D deficiency        Plan:       Parada syndrome    Lumbar radiculopathy    Hyperglycemia  -     URINALYSIS; Future; Expected date: 09/19/2019  -     MICROALBUMIN / CREATININE RATIO URINE; Future; Expected date: 09/19/2019  -     Basic metabolic panel; Future; Expected date: 09/19/2019  -     Lipid panel; Future; Expected date: 09/19/2019  -     Hemoglobin A1c; Future; Expected date: 09/19/2019  -     TSH; Future; Expected date: 09/19/2019  -     Ambulatory consult to Diabetic Education; Future; Expected date: 09/19/2019    Vitamin D deficiency  -     Vitamin D; Future; Expected  date: 09/19/2019    Other orders  -     psyllium husk, with sugar, (METAMUCIL, WITH SUGAR,) 3.4 gram/12 gram Powd; Take 1 Scoop by mouth once daily.    Risk for diabetes, 3 BS over 180. Previous HGa1c over 6.5.no symptoms.  Patient readiness: acceptance and barriers:readiness and social stressors    During the course of the visit the patient was educated and counseled about the following:     Obesity:   General weight loss/lifestyle modification strategies discussed (elicit support from others; identify saboteurs; non-food rewards, etc).    Goals: Obesity: Reduce calorie intake and BMI    Did patient meet goals/outcomes: No    The following self management tools provided: excercise log    Patient Instructions (the written plan) was given to the patient/family.     Time spent with patient: 45 minutes

## 2019-09-30 ENCOUNTER — CLINICAL SUPPORT (OUTPATIENT)
Dept: DIABETES | Facility: CLINIC | Age: 49
End: 2019-09-30
Payer: COMMERCIAL

## 2019-09-30 DIAGNOSIS — R73.03 PREDIABETES: ICD-10-CM

## 2019-09-30 DIAGNOSIS — R73.9 HYPERGLYCEMIA: ICD-10-CM

## 2019-09-30 PROCEDURE — 99999 PR PBB SHADOW E&M-EST. PATIENT-LVL III: ICD-10-PCS | Mod: PBBFAC,,,

## 2019-09-30 PROCEDURE — G0108 DIAB MANAGE TRN  PER INDIV: HCPCS | Mod: S$GLB,,, | Performed by: DIETITIAN, REGISTERED

## 2019-09-30 PROCEDURE — 99999 PR PBB SHADOW E&M-EST. PATIENT-LVL III: CPT | Mod: PBBFAC,,,

## 2019-09-30 PROCEDURE — G0108 PR DIAB MANAGE TRN  PER INDIV: ICD-10-PCS | Mod: S$GLB,,, | Performed by: DIETITIAN, REGISTERED

## 2019-10-02 VITALS — HEIGHT: 74 IN | BODY MASS INDEX: 34.23 KG/M2 | WEIGHT: 266.75 LBS

## 2019-10-02 NOTE — PROGRESS NOTES
Diabetes Education  Author: Sandhya Castro RD, CDE  Date: 10/2/2019    Diabetes Care Management Summary  Diabetes Education Record Assessment/Progress: Initial  Current Diabetes Risk Level: Low     Diabetes Type  Diabetes Type : Pre-Diabetes    Diabetes History  Diabetes Diagnosis: 0-1 year  Current Treatment: Diet, Exercise  Reviewed Problem List with Patient: Yes    Health Maintenance was reviewed today with patient. Discussed with patient importance of routine eye exams, foot exams/foot care, blood work (i.e.: A1c, microalbumin, and lipid), dental visits, yearly flu vaccine, and pneumonia vaccine as indicated by PCP. Patient verbalized understanding.     Health Maintenance Topics with due status: Not Due       Topic Last Completion Date    TETANUS VACCINE 11/11/2016    Lipid Panel 09/20/2019     Health Maintenance Due   Topic Date Due    Pneumococcal Vaccine (Highest Risk) (1 of 3 - PCV13) 05/05/1990       Nutrition  Meal Planning: skipping meals, artificial sweeteners, diet drinks, water, 3 meals per day(Patient works 4Am-4PM and 4PM-4AM)  What type of sweetener do you use?: Splenda  What type of beverages do you drink?: diet soda/tea, water  Meal Plan 24 Hour Recall - Breakfast: (this am 10:30am had tow chicken legs with diet coke.  )  Meal Plan 24 Hour Recall - Lunch: (At 4pm had Chicken legs with brocolli and cheese and tea sweetened with splenda)  Meal Plan 24 Hour Recall - Dinner: (Chicken stew over very small portion of noodles)    Patients wife who was present during visit reports she follows Keto diet and she is getting him to follow Keto diet too.  Per intake recall he is eating very low carb.  Educated both patient and wife on importance of eating carb for energy.    Monitoring   Monitoring: (Not monitoring at present)    Exercise   Exercise Type: walking  Intensity: Moderate  Frequency: Daily  Duration: > 1 hour    Current Diabetes Treatment   Current Treatment: Diet, Exercise    Social  History  Preferred Learning Method: Face to Face  Primary Support: Self, Spouse(Wife present during visit)  Educational Level: College Graduate  Occupation: ([employer])  Smoking Status: Never a Smoker  Alcohol Use: Rarely    Barriers to Change  Barriers to Change: None  Learning Challenges : None    Readiness to Learn   Readiness to Learn : Acceptance    Cultural Influences  Cultural Influences: None    Diabetes Education Assessment/Progress  Diabetes Disease Process (diabetes disease process and treatment options): Discussion  Nutrition (Incorporating nutritional management into one's lifestyle): Discussion, Instructed, Demonstrates Understanding/Competency (verbalizes/demonstrates), Comprehends Key Points, Written Materials Provided  Physical Activity (incorporating physical activity into one's lifestyle): Discussion, Instructed, Demonstrates Understanding/Competency (verbalizes/demonstrates), Comprehends Key Points, Written Materials Provided  Medications (states correct name, dose, onset, peak, duration, side effects & timing of meds): Discussion  Monitoring (monitoring blood glucose/other parameters & using results): Discussion  Acute Complications (preventing, detecting, and treating acute complications): Discussion  Chronic Complications (preventing, detecting, and treating chronic complications): Discussion  Clinical (diabetes, other pertinent medical history, and relevant comorbidities reviewed during visit): Discussion  Cognitive (knowledge of self-management skills, functional health literacy): Discussion  Psychosocial (emotional response to diabetes): Discussion  Diabetes Distress and Support Systems: Discussion  Behavioral (readiness for change, lifestyle practices, self-care behaviors): Discussion    Goals  Patient has selected/evaluated goals during today's session: No    Diabetes Care Plan/Intervention  Education Plan/Intervention: Individual Follow-Up DSMT    Diabetes Meal  Plan  Restrictions: Restricted Carbohydrate, Low Fat, Low Sodium  Calories: 1600  Carbohydrate Per Meal: 30-45g  Carbohydrate Per Snack : 7-15g  Fat: (Reduce intake of saturated fats)  Protein: (Lean protein with meals and snacks)    Today's Self-Management Care Plan was developed with the patient's input and is based on barriers identified during today's assessment.    The long and short-term goals in the care plan were written with the patient/caregiver's input. The patient has agreed to work toward these goals to improve his overall diabetes control.      The patient received a copy of today's self-management plan and verbalized understanding of the care plan, goals, and all of today's instructions.      The patient was encouraged to communicate with his physician and care team regarding his condition(s) and treatment.  I provided the patient with my contact information today and encouraged him to contact me via phone or patient portal as needed.     Education Units of Time   Time Spent: 60 min

## 2019-10-03 DIAGNOSIS — E56.9 VITAMIN DEFICIENCY: ICD-10-CM

## 2019-10-06 RX ORDER — ERGOCALCIFEROL 1.25 MG/1
CAPSULE ORAL
Qty: 12 CAPSULE | Refills: 10 | Status: SHIPPED | OUTPATIENT
Start: 2019-10-06 | End: 2020-10-05

## 2019-11-14 ENCOUNTER — PATIENT MESSAGE (OUTPATIENT)
Dept: FAMILY MEDICINE | Facility: CLINIC | Age: 49
End: 2019-11-14

## 2019-11-14 NOTE — TELEPHONE ENCOUNTER
Please refer to attached message from patient. Appointment for evaluation needed/advised to ensure proper diagnosis and treatment. Patient notified. States he scheduled an appointment for today's date with Orthopedics. No further actions required.

## 2019-11-19 ENCOUNTER — PATIENT MESSAGE (OUTPATIENT)
Dept: FAMILY MEDICINE | Facility: CLINIC | Age: 49
End: 2019-11-19

## 2019-11-20 RX ORDER — OSELTAMIVIR PHOSPHATE 75 MG/1
75 CAPSULE ORAL DAILY
Qty: 5 CAPSULE | Refills: 0 | Status: SHIPPED | OUTPATIENT
Start: 2019-11-20 | End: 2019-11-25

## 2019-11-27 ENCOUNTER — PATIENT MESSAGE (OUTPATIENT)
Dept: GASTROENTEROLOGY | Facility: CLINIC | Age: 49
End: 2019-11-27

## 2019-12-05 DIAGNOSIS — L71.9 ROSACEA: ICD-10-CM

## 2019-12-05 RX ORDER — DOXYCYCLINE 40 MG/1
CAPSULE ORAL
Qty: 30 CAPSULE | Refills: 5 | Status: SHIPPED | OUTPATIENT
Start: 2019-12-05 | End: 2020-10-05

## 2019-12-31 ENCOUNTER — TELEPHONE (OUTPATIENT)
Dept: HEMATOLOGY/ONCOLOGY | Facility: CLINIC | Age: 49
End: 2019-12-31

## 2019-12-31 NOTE — TELEPHONE ENCOUNTER
Informed wife that papers are at the .  She will be by after the new year to .    ----- Message from Genia Oviedo LPN sent at 12/31/2019 11:23 AM CST -----  Contact: patient      ----- Message -----  From: Daniela Atkinson  Sent: 12/31/2019  10:57 AM CST  To: Shahab Obando Staff (Hem/Onc)    Type: Needs Medical Advice    Who Called:  Patient's spouse  Best Call Back Number: [phone]  Additional Information: wants to know if [facility] paperwork is ready for pickup. Please give call back

## 2020-03-18 ENCOUNTER — OFFICE VISIT (OUTPATIENT)
Dept: FAMILY MEDICINE | Facility: CLINIC | Age: 50
End: 2020-03-18
Payer: COMMERCIAL

## 2020-03-18 DIAGNOSIS — Z12.5 PROSTATE CANCER SCREENING: ICD-10-CM

## 2020-03-18 DIAGNOSIS — R73.9 HYPERGLYCEMIA: Primary | ICD-10-CM

## 2020-03-18 DIAGNOSIS — K76.0 NONALCOHOLIC FATTY LIVER DISEASE: ICD-10-CM

## 2020-03-18 PROCEDURE — 99213 OFFICE O/P EST LOW 20 MIN: CPT | Mod: 95,,, | Performed by: FAMILY MEDICINE

## 2020-03-18 PROCEDURE — 99213 PR OFFICE/OUTPT VISIT, EST, LEVL III, 20-29 MIN: ICD-10-PCS | Mod: 95,,, | Performed by: FAMILY MEDICINE

## 2020-03-18 NOTE — PROGRESS NOTES
Subjective:       Patient ID: Lyndon Lion Jr. is a 49 y.o. male.    Chief Complaint: No chief complaint on file.    The patient location is: home  The chief complaint leading to consultation is: annual exam  Visit type: Virtual visit with synchronous audio and video  Total time spent with patient: 35 min  Each patient to whom he or she provides medical services by telemedicine is:  (1) informed of the relationship between the physician and patient and the respective role of any other health care provider with respect to management of the patient; and (2) notified that he or she may decline to receive medical services by telemedicine and may withdraw from such care at any time.    Notes:       Review of Systems   Constitutional: Negative for fatigue and unexpected weight change.   Respiratory: Negative for chest tightness and shortness of breath.    Cardiovascular: Negative for chest pain, palpitations and leg swelling.   Gastrointestinal: Negative for abdominal pain.   Musculoskeletal: Negative for arthralgias.   Neurological: Negative for dizziness, syncope, light-headedness and headaches.       Patient Active Problem List   Diagnosis    Radiculopathy, lumbosacral region    Herniated lumbar intervertebral disc    Lumbar spondylosis    DDD (degenerative disc disease), lumbosacral    Greater trochanteric bursitis of left hip    Acute medial meniscal tear    Obesity, unspecified    Parada syndrome    Thoracic or lumbosacral neuritis or radiculitis, unspecified    Arthritis of shoulder region, right    Neural foraminal stenosis of lumbar spine    MSH2-related Parada syndrome (HNPCC1)    LFT elevation    Spondylosis of lumbar region without myelopathy or radiculopathy    Spondylolisthesis of lumbar region    Nonalcoholic fatty liver disease    Splenomegaly    Mild vitamin D deficiency    Colon dysplasia    Colon adenoma    Pneumoperitoneum    Postpolypectomy electrocoagulation syndrome     Lumbar radiculopathy    History of colon cancer, stage II       Objective:      Physical Exam    Lab Results   Component Value Date    WBC 5.38 09/06/2019    HGB 16.3 09/06/2019    HCT 46.0 09/06/2019     09/06/2019    CHOL 153 09/20/2019    TRIG 78 09/20/2019    HDL 38 (L) 09/20/2019    ALT 35 09/06/2019    AST 24 09/06/2019     09/20/2019    K 4.1 09/20/2019     09/20/2019    CREATININE 1.0 09/20/2019    BUN 22 (H) 09/20/2019    CO2 25 09/20/2019    TSH 0.858 09/20/2019    PSA 0.45 03/19/2019    INR 1.0 02/06/2019    HGBA1C 5.8 (H) 09/20/2019     The 10-year ASCVD risk score (Jose ERVIN Jr., et al., 2013) is: 2.6%    Values used to calculate the score:      Age: 49 years      Sex: Male      Is Non- : No      Diabetic: No      Tobacco smoker: No      Systolic Blood Pressure: 130 mmHg      Is BP treated: No      HDL Cholesterol: 38 mg/dL      Total Cholesterol: 153 mg/dL    Assessment:       1. Hyperglycemia    2. Prostate cancer screening    3. Nonalcoholic fatty liver disease        Plan:       Hyperglycemia  -     Lipid panel; Future; Expected date: 03/18/2020  -     Comprehensive metabolic panel; Future; Expected date: 03/18/2020  -     CBC auto differential; Future; Expected date: 03/18/2020    Prostate cancer screening  -     PROSTATE SPECIFIC ANTIGEN, DIAGNOSTIC; Future; Expected date: 03/18/2020    Nonalcoholic fatty liver disease     Normal prostate test the past

## 2020-03-18 NOTE — PATIENT INSTRUCTIONS

## 2020-04-19 ENCOUNTER — PATIENT MESSAGE (OUTPATIENT)
Dept: GASTROENTEROLOGY | Facility: CLINIC | Age: 50
End: 2020-04-19

## 2020-04-20 ENCOUNTER — PATIENT MESSAGE (OUTPATIENT)
Dept: GASTROENTEROLOGY | Facility: CLINIC | Age: 50
End: 2020-04-20

## 2020-04-20 ENCOUNTER — TELEPHONE (OUTPATIENT)
Dept: GASTROENTEROLOGY | Facility: CLINIC | Age: 50
End: 2020-04-20

## 2020-04-20 DIAGNOSIS — Z15.09 MSH2-RELATED LYNCH SYNDROME (HNPCC1): Primary | ICD-10-CM

## 2020-04-20 DIAGNOSIS — D64.9 LOW HEMOGLOBIN: ICD-10-CM

## 2020-04-22 ENCOUNTER — PATIENT MESSAGE (OUTPATIENT)
Dept: GASTROENTEROLOGY | Facility: CLINIC | Age: 50
End: 2020-04-22

## 2020-04-24 ENCOUNTER — TELEPHONE (OUTPATIENT)
Dept: GASTROENTEROLOGY | Facility: CLINIC | Age: 50
End: 2020-04-24

## 2020-04-24 ENCOUNTER — TELEPHONE (OUTPATIENT)
Dept: FAMILY MEDICINE | Facility: CLINIC | Age: 50
End: 2020-04-24

## 2020-04-24 ENCOUNTER — LAB VISIT (OUTPATIENT)
Dept: LAB | Facility: HOSPITAL | Age: 50
End: 2020-04-24
Attending: INTERNAL MEDICINE
Payer: COMMERCIAL

## 2020-04-24 DIAGNOSIS — E55.9 VITAMIN D DEFICIENCY: ICD-10-CM

## 2020-04-24 DIAGNOSIS — R73.9 HYPERGLYCEMIA: Primary | ICD-10-CM

## 2020-04-24 DIAGNOSIS — R79.89 LOW SERUM CALCIUM: Primary | ICD-10-CM

## 2020-04-24 DIAGNOSIS — E83.51 HYPOCALCEMIA: ICD-10-CM

## 2020-04-24 DIAGNOSIS — Z15.09 MSH2-RELATED LYNCH SYNDROME (HNPCC1): ICD-10-CM

## 2020-04-24 DIAGNOSIS — D64.9 LOW HEMOGLOBIN: ICD-10-CM

## 2020-04-24 LAB
ALBUMIN SERPL BCP-MCNC: 3.7 G/DL (ref 3.5–5.2)
ALP SERPL-CCNC: 51 U/L (ref 55–135)
ALT SERPL W/O P-5'-P-CCNC: 23 U/L (ref 10–44)
ANION GAP SERPL CALC-SCNC: 6 MMOL/L (ref 8–16)
AST SERPL-CCNC: 19 U/L (ref 10–40)
BASOPHILS # BLD AUTO: 0.02 K/UL (ref 0–0.2)
BASOPHILS NFR BLD: 0.5 % (ref 0–1.9)
BILIRUB SERPL-MCNC: 1.1 MG/DL (ref 0.1–1)
BUN SERPL-MCNC: 16 MG/DL (ref 6–20)
CALCIUM SERPL-MCNC: 8.4 MG/DL (ref 8.7–10.5)
CHLORIDE SERPL-SCNC: 110 MMOL/L (ref 95–110)
CO2 SERPL-SCNC: 25 MMOL/L (ref 23–29)
CREAT SERPL-MCNC: 1.2 MG/DL (ref 0.5–1.4)
DIFFERENTIAL METHOD: NORMAL
EOSINOPHIL # BLD AUTO: 0.1 K/UL (ref 0–0.5)
EOSINOPHIL NFR BLD: 2.9 % (ref 0–8)
ERYTHROCYTE [DISTWIDTH] IN BLOOD BY AUTOMATED COUNT: 12.2 % (ref 11.5–14.5)
EST. GFR  (AFRICAN AMERICAN): >60 ML/MIN/1.73 M^2
EST. GFR  (NON AFRICAN AMERICAN): >60 ML/MIN/1.73 M^2
FERRITIN SERPL-MCNC: 103 NG/ML (ref 20–300)
GLUCOSE SERPL-MCNC: 121 MG/DL (ref 70–110)
HCT VFR BLD AUTO: 46.1 % (ref 40–54)
HGB BLD-MCNC: 15.3 G/DL (ref 14–18)
IGA SERPL-MCNC: 247 MG/DL (ref 40–350)
IMM GRANULOCYTES # BLD AUTO: 0 K/UL (ref 0–0.04)
IMM GRANULOCYTES NFR BLD AUTO: 0 % (ref 0–0.5)
IRON SERPL-MCNC: 156 UG/DL (ref 45–160)
LYMPHOCYTES # BLD AUTO: 1.3 K/UL (ref 1–4.8)
LYMPHOCYTES NFR BLD: 31.9 % (ref 18–48)
MCH RBC QN AUTO: 30.1 PG (ref 27–31)
MCHC RBC AUTO-ENTMCNC: 33.2 G/DL (ref 32–36)
MCV RBC AUTO: 91 FL (ref 82–98)
MONOCYTES # BLD AUTO: 0.4 K/UL (ref 0.3–1)
MONOCYTES NFR BLD: 9.6 % (ref 4–15)
NEUTROPHILS # BLD AUTO: 2.2 K/UL (ref 1.8–7.7)
NEUTROPHILS NFR BLD: 55.1 % (ref 38–73)
NRBC BLD-RTO: 0 /100 WBC
PLATELET # BLD AUTO: 253 K/UL (ref 150–350)
PMV BLD AUTO: 9.6 FL (ref 9.2–12.9)
POTASSIUM SERPL-SCNC: 4.6 MMOL/L (ref 3.5–5.1)
PROT SERPL-MCNC: 6.6 G/DL (ref 6–8.4)
RBC # BLD AUTO: 5.08 M/UL (ref 4.6–6.2)
SATURATED IRON: 41 % (ref 20–50)
SODIUM SERPL-SCNC: 141 MMOL/L (ref 136–145)
TOTAL IRON BINDING CAPACITY: 379 UG/DL (ref 250–450)
TRANSFERRIN SERPL-MCNC: 256 MG/DL (ref 200–375)
WBC # BLD AUTO: 4.07 K/UL (ref 3.9–12.7)

## 2020-04-24 PROCEDURE — 82728 ASSAY OF FERRITIN: CPT

## 2020-04-24 PROCEDURE — 80053 COMPREHEN METABOLIC PANEL: CPT

## 2020-04-24 PROCEDURE — 82784 ASSAY IGA/IGD/IGG/IGM EACH: CPT

## 2020-04-24 PROCEDURE — 85025 COMPLETE CBC W/AUTO DIFF WBC: CPT

## 2020-04-24 PROCEDURE — 36415 COLL VENOUS BLD VENIPUNCTURE: CPT | Mod: PO

## 2020-04-24 PROCEDURE — 83540 ASSAY OF IRON: CPT

## 2020-04-24 PROCEDURE — 83516 IMMUNOASSAY NONANTIBODY: CPT

## 2020-04-24 NOTE — TELEPHONE ENCOUNTER
Advised pt's wife message from provider. Understanding verbalized and will relay message to patient.    Pt is scheduled to come in on 06/18/20 for   LIPID PANEL   COMPREHENSIVE METABOLIC PANEL   CBC W/ AUTO DIFFERENTIAL   PROSTATE SPECIFIC ANTIGEN, DIAGNOSTIC    I have linked A1c to that visit. BMP ordered today but I see that the patient is already getting CMP. Is that okay ?

## 2020-04-24 NOTE — TELEPHONE ENCOUNTER
Spoke with patient and reviewed lab results.  Also scheduled ionizied calcium for April 29 at 8:15 a.m.  Pt is aware of location and that this is a fasting lab.   ----- Message from Dom Leonardo MD sent at 4/24/2020  2:57 PM CDT -----  Please tell Lyndon that his lab work looks good except for his serum glucose slightly elevated at 121 mg/dL.  If this was a true fasting blood work fasting for 10-12 hours then recommend he follow-up his primary care doctor for repeat testing at next appointment for possible diabetes screening.

## 2020-04-24 NOTE — TELEPHONE ENCOUNTER
Labs by Dr. Gudino   Patient has a abnormal results.  Please request the patient return to clinic for further Labs to be drawn in 2 months . Please ask him to RTC after 2 months further care.

## 2020-04-28 ENCOUNTER — PATIENT OUTREACH (OUTPATIENT)
Dept: ADMINISTRATIVE | Facility: OTHER | Age: 50
End: 2020-04-28

## 2020-04-29 ENCOUNTER — LAB VISIT (OUTPATIENT)
Dept: LAB | Facility: HOSPITAL | Age: 50
End: 2020-04-29
Attending: INTERNAL MEDICINE
Payer: COMMERCIAL

## 2020-04-29 DIAGNOSIS — R79.89 LOW SERUM CALCIUM: ICD-10-CM

## 2020-04-29 LAB
CA-I BLDV-SCNC: 1.26 MMOL/L (ref 1.06–1.42)
TTG IGA SER-ACNC: 6 UNITS

## 2020-04-29 PROCEDURE — 36415 COLL VENOUS BLD VENIPUNCTURE: CPT | Mod: PO

## 2020-04-29 PROCEDURE — 82330 ASSAY OF CALCIUM: CPT

## 2020-04-30 ENCOUNTER — OFFICE VISIT (OUTPATIENT)
Dept: GASTROENTEROLOGY | Facility: CLINIC | Age: 50
End: 2020-04-30
Payer: COMMERCIAL

## 2020-04-30 ENCOUNTER — TELEPHONE (OUTPATIENT)
Dept: GASTROENTEROLOGY | Facility: CLINIC | Age: 50
End: 2020-04-30

## 2020-04-30 VITALS — WEIGHT: 266 LBS | HEIGHT: 74 IN | BODY MASS INDEX: 34.14 KG/M2

## 2020-04-30 DIAGNOSIS — Z15.09 MSH2-RELATED LYNCH SYNDROME (HNPCC1): Primary | ICD-10-CM

## 2020-04-30 DIAGNOSIS — E66.9 OBESITY, CLASS I, BMI 30-34.9: ICD-10-CM

## 2020-04-30 DIAGNOSIS — R79.89 LOW VITAMIN D LEVEL: ICD-10-CM

## 2020-04-30 PROCEDURE — 99214 PR OFFICE/OUTPT VISIT, EST, LEVL IV, 30-39 MIN: ICD-10-PCS | Mod: 95,,, | Performed by: INTERNAL MEDICINE

## 2020-04-30 PROCEDURE — 99214 OFFICE O/P EST MOD 30 MIN: CPT | Mod: 95,,, | Performed by: INTERNAL MEDICINE

## 2020-04-30 NOTE — Clinical Note
Jenna please order blood work nonfasting on 05/05/2020 in [place] for Lyndon please call him to confirm the time and date he would like it around 1:00 p.m. please

## 2020-04-30 NOTE — PROGRESS NOTES
The patient location is:  At his home  The chief complaint leading to consultation is:  Parada syndrome elevated BMI family history Parada syndrome  Visit type:  Telemedicine  Total time spent with patient:  25 min  Each patient to whom he or she provides medical services by telemedicine is:  (1) informed of the relationship between the physician and patient and the respective role of any other health care provider with respect to management of the patient; and (2) notified that he or she may decline to receive medical services by telemedicine and may withdraw from such care at any time.    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 Diagnoses of Obesity, Class I, BMI 30-34.9 and Low vitamin D level were also pertinent to this visit.    PCP:   Evan Judd       Referring MD:  No referring provider defined for this encounter.    Initial History of Present Illness (HPI):  This is a 49 y.o. male telemedicine visit for Parada syndrome. Soon to be 50-year-old male who was having some   abdominal pain, underwent colonoscopy.  He was discovered to have colon cancer   and had right hemicolectomy for stage II.  He had no colon cancer on 08/08/2008.    He has been in a surveillance program.  Since then, he is followed by   Heme/Onc.  He ended up getting chemotherapy every two weeks for six months and   he is in remission.  He is followed by Dr. Alfonso Cárdenas in Heme/Onc.  The   patient states his mom did not want to do the genetic screen, but she has got   MSH2 as well.  Likely, mom's dad with colorectal cancer, mom's dad's brother   with colorectal cancer, mom's dad's dad with colorectal cancer and mom's   brothers, two of them with colorectal cancer.  The youngest family member with   colon cancer is at age 37.  The patient was 39 with colon cancer.  He has two   biological children, a daughter 27.  She  was screened, she is negative.  Son 23   is positive for MSH2.  He started screening at age 21, every one   year.  He had an EGD and colonoscopy in August 2016.  It was   complete, no polyps, no adenomatous found.  He has no family history of any   other GI malignancies other than breast cancer in the mom.  No other Parada   cancers.  There is no ovarian or uterine cancer.  There is no kidney or bladder   or ureter cancer.  No renal pelvis cancer.  No small bowel or stomach cancer.    No esophageal cancer.  No brain cancers, no pancreas and no hepatobiliary   cancers in the family.  We went over the NCCN guidelines Parada   guidelines for MSH2.  His last colonoscopy was 06/10/2019 some benign polyp  And no adenomas.  He does have a history of low vitamin-D in he is on a patch for followed by his primary care.    Abdominal pain - no  Reflux - no  Dysphagia - no   Bowel habits - normal  GI bleeding - none  NSAID usage - none    Interval HPI 04/30/2020:  The patient's last visit with me was on 8/31/2017.      ROS:  Constitutional: No fevers, chills, No weight loss  ENT:  No heartburn no dysphagia no odynophagia no hoarseness  CV: No chest pain, no palpitation  Pulm: No cough, No shortness of breath, no wheezing  GI: see HPI  Derm: No rash, no itching  Heme: No lymphadenopathy, No easy bruising  MSK: No significant arthritis  : No dysuria, No hematuria  Endo: No hot or cold intolerance  Neuro: No syncope, No seizure, no strokes  Psych: No uncontrolled anxiety, No uncontrolled depression    Medical History:  has a past medical history of Arthritis, Asthma, Colon cancer, Family hx of prostate cancer (11/22/2016), colon cancer, stage I (7/3/2014), Parada syndrome, Vitamin D deficiency, and Wears glasses.    Surgical History:  has a past surgical history that includes Gastric bypass (2010); Colon surgery (2008); Knee arthroscopy (Right); Appendectomy; Colonoscopy (Bilateral, 2012); Epidural Steroid Injection (10/23/15);  "Colonoscopy (N/A, 8/1/2016); Colonoscopy (N/A, 9/20/2017); Colonoscopy (N/A, 10/9/2017); Esophagogastroduodenoscopy; Colonoscopy (N/A, 11/20/2017); Colonoscopy (N/A, 5/30/2018); Epidural steroid injection into lumbar spine (N/A, 7/27/2018); Caudal epidural steroid injection (N/A, 11/6/2018); Lumbar discectomy (2/13/2019); Facetectomy of vertebra (2/13/2019); and Colonoscopy (N/A, 6/10/2019).    Family History: family history includes Cancer in his maternal grandfather and maternal uncle; Diabetes in his father; Heart disease in his father; Melanoma in his mother..     Social History:  reports that he has never smoked. He has never used smokeless tobacco. He reports that he drinks alcohol. He reports that he does not use drugs.    Review of patient's allergies indicates:  No Known Allergies    Medication List with Changes/Refills   Current Medications    CYCLOBENZAPRINE (FLEXERIL) 10 MG TABLET    TAKE 1 TABLET(10 MG) BY MOUTH TWICE DAILY AS NEEDED FOR MUSCLE SPASMS    DOXYCYCLINE (ORACEA) 40 MG CAPSULE    TAKE 1 CAPSULE BY MOUTH EVERY DAY    ERGOCALCIFEROL (ERGOCALCIFEROL) 50,000 UNIT CAP    TAKE 1 CAPSULE BY MOUTH EVERY 7 DAYS    PSYLLIUM HUSK, WITH SUGAR, (METAMUCIL, WITH SUGAR,) 3.4 GRAM/12 GRAM POWD    Take 1 Scoop by mouth once daily.    SOOLANTRA 1 % CREA    BID         Objective Findings:    Vital Signs:  Ht 6' 2" (1.88 m)   Wt 120.7 kg (266 lb)   BMI 34.15 kg/m²   Body mass index is 34.15 kg/m².    Physical Exam:  Telemedicine video visit per [facility] and was an apartment health COVID-19 pandemic mandates.  General Appearance: Well appearing in no acute distress  Neurologic:  Alert and oriented x4  Psychiatric:  Normal speech mentation and affect    Labs:  Lab Results   Component Value Date    WBC 4.07 04/24/2020    HGB 15.3 04/24/2020    HCT 46.1 04/24/2020     04/24/2020    CHOL 153 09/20/2019    TRIG 78 09/20/2019    HDL 38 (L) 09/20/2019    ALT 23 04/24/2020    AST 19 04/24/2020     04/24/2020 "    K 4.6 04/24/2020     04/24/2020    CREATININE 1.2 04/24/2020    BUN 16 04/24/2020    CO2 25 04/24/2020    TSH 0.858 09/20/2019    PSA 0.45 03/19/2019    INR 1.0 02/06/2019    HGBA1C 5.8 (H) 09/20/2019             Medical Decision Making:  Prior EGD and colonoscopy and path and images personally reviewed by myself  Parada talk given.  Importance of his sister getting tested.  She has a child.  Autosomal dominant inherited ends.  National Comprehensive Cancer Network guidelines Parada syndrome discussed with patient for MSH2  Weight loss talk given elevated BMI talk given fatty liver talk given  Vaccinations talk given 1 time hep C screen was negative  Hep a and B were negative he has been vaccinated will check immunity.  Lab work reviewed      Assessment:  1. MSH2-related Parada syndrome (HNPCC1)    2. Obesity, Class I, BMI 30-34.9    3. Low vitamin D level         Recommendations:   1.  MS H2 Parada syndrome in her surveillance program EGD and colonoscopy ordered.  2.  Obesity BMI 34.15 recommend gradual weight loss about 10% or 26 lb gradually over a year.  With exercise  If cleared by his primary care doctor.  3.  Slightly elevated glucose recommend follow-up with his primary care doctor fasting with gradual weight loss.  4.  Low vitamin-D level being treated by primary care doctor recheck vitamin-D level.  5.  Return GI clinic once yearly for Parada syndrome    Follow up in about 1 year (around 4/30/2021).      Order summary:  Orders Placed This Encounter    Hepatitis B Surface Antibody, Qual/Quant    HEPATITIS A ANTIBODY, IGG    Vitamin D         Thank you so much for allowing me to participate in the care of Lyndon Leonardo MD

## 2020-04-30 NOTE — Clinical Note
Jenna please schedule patient with Dr. Guerita Stovall in Urology for his yearly  screening for Parada syndrome.  Orders placed

## 2020-05-08 ENCOUNTER — LAB VISIT (OUTPATIENT)
Dept: LAB | Facility: HOSPITAL | Age: 50
End: 2020-05-08
Attending: INTERNAL MEDICINE
Payer: COMMERCIAL

## 2020-05-08 DIAGNOSIS — R73.9 HYPERGLYCEMIA: ICD-10-CM

## 2020-05-08 DIAGNOSIS — E55.9 VITAMIN D DEFICIENCY: ICD-10-CM

## 2020-05-08 DIAGNOSIS — E83.51 HYPOCALCEMIA: ICD-10-CM

## 2020-05-08 LAB
ANION GAP SERPL CALC-SCNC: 8 MMOL/L (ref 8–16)
BUN SERPL-MCNC: 15 MG/DL (ref 6–20)
CALCIUM SERPL-MCNC: 9.5 MG/DL (ref 8.7–10.5)
CHLORIDE SERPL-SCNC: 108 MMOL/L (ref 95–110)
CO2 SERPL-SCNC: 24 MMOL/L (ref 23–29)
CREAT SERPL-MCNC: 1.2 MG/DL (ref 0.5–1.4)
EST. GFR  (AFRICAN AMERICAN): >60 ML/MIN/1.73 M^2
EST. GFR  (NON AFRICAN AMERICAN): >60 ML/MIN/1.73 M^2
GLUCOSE SERPL-MCNC: 98 MG/DL (ref 70–110)
POTASSIUM SERPL-SCNC: 4.3 MMOL/L (ref 3.5–5.1)
SODIUM SERPL-SCNC: 140 MMOL/L (ref 136–145)

## 2020-05-08 PROCEDURE — 80048 BASIC METABOLIC PNL TOTAL CA: CPT

## 2020-05-08 PROCEDURE — 36415 COLL VENOUS BLD VENIPUNCTURE: CPT | Mod: PO

## 2020-05-13 ENCOUNTER — LAB VISIT (OUTPATIENT)
Dept: LAB | Facility: HOSPITAL | Age: 50
End: 2020-05-13
Attending: INTERNAL MEDICINE
Payer: COMMERCIAL

## 2020-05-13 DIAGNOSIS — Z15.09 MSH2-RELATED LYNCH SYNDROME (HNPCC1): ICD-10-CM

## 2020-05-13 PROCEDURE — 82306 VITAMIN D 25 HYDROXY: CPT

## 2020-05-13 PROCEDURE — 36415 COLL VENOUS BLD VENIPUNCTURE: CPT | Mod: PO

## 2020-05-14 DIAGNOSIS — E55.9 VITAMIN D INSUFFICIENCY: Primary | ICD-10-CM

## 2020-05-14 LAB — 25(OH)D3+25(OH)D2 SERPL-MCNC: 20 NG/ML (ref 30–96)

## 2020-05-14 RX ORDER — ACETAMINOPHEN 500 MG
1 TABLET ORAL DAILY
Qty: 90 CAPSULE | Refills: 3 | Status: SHIPPED | OUTPATIENT
Start: 2020-05-14 | End: 2020-11-17 | Stop reason: SDUPTHER

## 2020-05-14 RX ORDER — ERGOCALCIFEROL 1.25 MG/1
50000 CAPSULE ORAL
Qty: 12 CAPSULE | Refills: 0 | Status: SHIPPED | OUTPATIENT
Start: 2020-05-14 | End: 2020-07-31

## 2020-05-15 ENCOUNTER — TELEPHONE (OUTPATIENT)
Dept: GASTROENTEROLOGY | Facility: CLINIC | Age: 50
End: 2020-05-15

## 2020-05-15 NOTE — TELEPHONE ENCOUNTER
----- Message from Dom Leonardo MD sent at 5/14/2020  8:13 AM CDT -----  Jenna - Please tell patient that their Vitamin D level is low and recommend that they take Vitamin D 50,000 units once a week for 12 weeks and then start Vitamin D3 (2,000 units) over-the-counter once daily.     Jenna- please recheck their vitamin D level in 6 months - Orders placed.

## 2020-06-01 ENCOUNTER — PATIENT MESSAGE (OUTPATIENT)
Dept: GASTROENTEROLOGY | Facility: CLINIC | Age: 50
End: 2020-06-01

## 2020-06-10 DIAGNOSIS — Z12.11 SPECIAL SCREENING FOR MALIGNANT NEOPLASMS, COLON: Primary | ICD-10-CM

## 2020-06-20 ENCOUNTER — LAB VISIT (OUTPATIENT)
Dept: LAB | Facility: HOSPITAL | Age: 50
End: 2020-06-20
Attending: FAMILY MEDICINE
Payer: COMMERCIAL

## 2020-06-20 DIAGNOSIS — Z12.5 PROSTATE CANCER SCREENING: ICD-10-CM

## 2020-06-20 DIAGNOSIS — R73.9 HYPERGLYCEMIA: ICD-10-CM

## 2020-06-20 LAB
ALBUMIN SERPL BCP-MCNC: 3.8 G/DL (ref 3.5–5.2)
ALP SERPL-CCNC: 58 U/L (ref 55–135)
ALT SERPL W/O P-5'-P-CCNC: 25 U/L (ref 10–44)
ANION GAP SERPL CALC-SCNC: 8 MMOL/L (ref 8–16)
AST SERPL-CCNC: 19 U/L (ref 10–40)
BASOPHILS # BLD AUTO: 0.04 K/UL (ref 0–0.2)
BASOPHILS NFR BLD: 0.8 % (ref 0–1.9)
BILIRUB SERPL-MCNC: 0.8 MG/DL (ref 0.1–1)
BUN SERPL-MCNC: 18 MG/DL (ref 6–20)
CALCIUM SERPL-MCNC: 9.3 MG/DL (ref 8.7–10.5)
CHLORIDE SERPL-SCNC: 107 MMOL/L (ref 95–110)
CHOLEST SERPL-MCNC: 173 MG/DL (ref 120–199)
CHOLEST/HDLC SERPL: 3.9 {RATIO} (ref 2–5)
CO2 SERPL-SCNC: 25 MMOL/L (ref 23–29)
COMPLEXED PSA SERPL-MCNC: 0.42 NG/ML (ref 0–4)
CREAT SERPL-MCNC: 1.4 MG/DL (ref 0.5–1.4)
DIFFERENTIAL METHOD: ABNORMAL
EOSINOPHIL # BLD AUTO: 0.2 K/UL (ref 0–0.5)
EOSINOPHIL NFR BLD: 3 % (ref 0–8)
ERYTHROCYTE [DISTWIDTH] IN BLOOD BY AUTOMATED COUNT: 12 % (ref 11.5–14.5)
EST. GFR  (AFRICAN AMERICAN): >60 ML/MIN/1.73 M^2
EST. GFR  (NON AFRICAN AMERICAN): 58.6 ML/MIN/1.73 M^2
ESTIMATED AVG GLUCOSE: 148 MG/DL (ref 68–131)
GLUCOSE SERPL-MCNC: 129 MG/DL (ref 70–110)
HBA1C MFR BLD HPLC: 6.8 % (ref 4–5.6)
HCT VFR BLD AUTO: 49 % (ref 40–54)
HDLC SERPL-MCNC: 44 MG/DL (ref 40–75)
HDLC SERPL: 25.4 % (ref 20–50)
HGB BLD-MCNC: 16.1 G/DL (ref 14–18)
IMM GRANULOCYTES # BLD AUTO: 0.01 K/UL (ref 0–0.04)
IMM GRANULOCYTES NFR BLD AUTO: 0.2 % (ref 0–0.5)
LDLC SERPL CALC-MCNC: 113 MG/DL (ref 63–159)
LYMPHOCYTES # BLD AUTO: 1.8 K/UL (ref 1–4.8)
LYMPHOCYTES NFR BLD: 37 % (ref 18–48)
MCH RBC QN AUTO: 29.7 PG (ref 27–31)
MCHC RBC AUTO-ENTMCNC: 32.9 G/DL (ref 32–36)
MCV RBC AUTO: 90 FL (ref 82–98)
MONOCYTES # BLD AUTO: 0.5 K/UL (ref 0.3–1)
MONOCYTES NFR BLD: 9.9 % (ref 4–15)
NEUTROPHILS # BLD AUTO: 2.4 K/UL (ref 1.8–7.7)
NEUTROPHILS NFR BLD: 49.1 % (ref 38–73)
NONHDLC SERPL-MCNC: 129 MG/DL
NRBC BLD-RTO: 0 /100 WBC
PLATELET # BLD AUTO: 253 K/UL (ref 150–350)
PMV BLD AUTO: 9 FL (ref 9.2–12.9)
POTASSIUM SERPL-SCNC: 4.6 MMOL/L (ref 3.5–5.1)
PROT SERPL-MCNC: 6.9 G/DL (ref 6–8.4)
RBC # BLD AUTO: 5.43 M/UL (ref 4.6–6.2)
SODIUM SERPL-SCNC: 140 MMOL/L (ref 136–145)
TRIGL SERPL-MCNC: 80 MG/DL (ref 30–150)
WBC # BLD AUTO: 4.94 K/UL (ref 3.9–12.7)

## 2020-06-20 PROCEDURE — 80061 LIPID PANEL: CPT

## 2020-06-20 PROCEDURE — 80053 COMPREHEN METABOLIC PANEL: CPT

## 2020-06-20 PROCEDURE — 83036 HEMOGLOBIN GLYCOSYLATED A1C: CPT

## 2020-06-20 PROCEDURE — 85025 COMPLETE CBC W/AUTO DIFF WBC: CPT

## 2020-06-20 PROCEDURE — 84153 ASSAY OF PSA TOTAL: CPT

## 2020-06-20 PROCEDURE — 36415 COLL VENOUS BLD VENIPUNCTURE: CPT | Mod: PO

## 2020-07-06 DIAGNOSIS — Z12.11 SPECIAL SCREENING FOR MALIGNANT NEOPLASMS, COLON: Primary | ICD-10-CM

## 2020-07-07 ENCOUNTER — PATIENT OUTREACH (OUTPATIENT)
Dept: ADMINISTRATIVE | Facility: OTHER | Age: 50
End: 2020-07-07

## 2020-07-08 ENCOUNTER — OFFICE VISIT (OUTPATIENT)
Dept: UROLOGY | Facility: CLINIC | Age: 50
End: 2020-07-08
Payer: COMMERCIAL

## 2020-07-08 VITALS
WEIGHT: 271.81 LBS | DIASTOLIC BLOOD PRESSURE: 82 MMHG | HEIGHT: 74 IN | BODY MASS INDEX: 34.88 KG/M2 | HEART RATE: 61 BPM | SYSTOLIC BLOOD PRESSURE: 126 MMHG

## 2020-07-08 DIAGNOSIS — R35.1 NOCTURIA: ICD-10-CM

## 2020-07-08 DIAGNOSIS — Z15.09 LYNCH SYNDROME: ICD-10-CM

## 2020-07-08 DIAGNOSIS — Z15.09 MSH2-RELATED LYNCH SYNDROME (HNPCC1): ICD-10-CM

## 2020-07-08 DIAGNOSIS — Z85.038 HISTORY OF COLON CANCER, STAGE II: ICD-10-CM

## 2020-07-08 DIAGNOSIS — Z12.5 PROSTATE CANCER SCREENING: Primary | ICD-10-CM

## 2020-07-08 PROCEDURE — 99999 PR PBB SHADOW E&M-EST. PATIENT-LVL IV: ICD-10-PCS | Mod: PBBFAC,,, | Performed by: UROLOGY

## 2020-07-08 PROCEDURE — 99214 OFFICE O/P EST MOD 30 MIN: CPT | Mod: S$GLB,,, | Performed by: UROLOGY

## 2020-07-08 PROCEDURE — 99214 PR OFFICE/OUTPT VISIT, EST, LEVL IV, 30-39 MIN: ICD-10-PCS | Mod: S$GLB,,, | Performed by: UROLOGY

## 2020-07-08 PROCEDURE — 99999 PR PBB SHADOW E&M-EST. PATIENT-LVL IV: CPT | Mod: PBBFAC,,, | Performed by: UROLOGY

## 2020-07-08 PROCEDURE — 81001 URINALYSIS AUTO W/SCOPE: CPT

## 2020-07-08 NOTE — PROGRESS NOTES
Subjective:       Patient ID: Lyndon Lion Jr. is a 50 y.o. male.    Chief Complaint: Follow-up (annual followup)    Lyndon Lion Jr. is a 50 y.o. male here for further evaluation of Parada Syndrome. Patient referred from Dom Leonardo MD    Family hx of prostate cancer - grandfather  christi had BPH  Nocturia 2-3 times. Still has caffeine late in the day.   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   He isn't limiting fluids before bed. He has soda late in the day.     Recent back surgery.   He does snore. Doesn't stop breathing.     No LUTS. Drinks caffeine.   No lower extremity edema. Snores. No sleep apnea.   No daytime frequency. No urgency. Good stream.   No gross hematuria. No intermittency. No hesitancy.     H/o colon cancer. Has frequent bowels.   8/2008 was the colon surgery.   He is on a surveillance protocol with Dr. Leonardo.   Last colonoscopy 2018.      Mother had breast cancer and skin cancer. Mom is 61.     Lab Results       Component                Value               Date                       PSA                      0.45                03/19/2019                 PSA                      0.49                11/22/2016                 PSADIAG                  0.42                06/20/2020                  Follow-up  Pertinent negatives include no chest pain, chills, fever, joint swelling or rash.       Past Surgical History:   Procedure Laterality Date    APPENDECTOMY      CAUDAL EPIDURAL STEROID INJECTION N/A 11/6/2018    Procedure: Injection-steroid-epidural-caudal;  Surgeon: Lyndon Brooke Jr., MD;  Location: [facility] OR;  Service: Pain Management;  Laterality: N/A;    COLON SURGERY  2008    PARTIAL COLECTOMY    COLONOSCOPY Bilateral 2012    COLONOSCOPY N/A 8/1/2016    Procedure: COLONOSCOPY;  Surgeon: Blaze Marr MD;  [facility];  Service: Endoscopy;  Laterality: N/A;    COLONOSCOPY N/A 9/20/2017    Procedure: COLONOSCOPY;  Surgeon: Dom Leonardo,  MD;  Location: [facility] (4TH FLR);  Service: Endoscopy;  Laterality: N/A;  not given PM prep    COLONOSCOPY N/A 10/9/2017    Procedure: COLONOSCOPY;  Surgeon: RAMON Callahan MD;  Location: [facility] ENDO (4TH FLR);  Service: Endoscopy;  Laterality: N/A;  ok for Prepopik per Dr Callahan    COLONOSCOPY N/A 11/20/2017    Procedure: COLONOSCOPY/EMR;  Surgeon: Joseluis Choe MD;  Location: [facility] ENDO (2ND FLR);  Service: Endoscopy;  Laterality: N/A;  EMR    no pm prep    COLONOSCOPY N/A 5/30/2018    Procedure: COLONOSCOPY;  Surgeon: Dom Leonardo MD;  Location: [facility] ENDO (4TH FLR);  Service: Endoscopy;  Laterality: N/A;  follow up colonoscopy in 5/2018 for Parada Syndrome         COLONOSCOPY N/A 6/10/2019    Procedure: COLONOSCOPY;  Surgeon: Dom Leonardo MD;  Location: [facility] (4TH FLR);  Service: Endoscopy;  Laterality: N/A;  Prepopik ordered per Dr. Leonardo    Epidural Steroid Injection  10/23/15    Lumbar    EPIDURAL STEROID INJECTION INTO LUMBAR SPINE N/A 7/27/2018    Procedure: Injection-steroid-epidural-lumbar;  Surgeon: Lyndon Brooke Jr., MD;  Location[facility] OR;  Service: Pain Management;  Laterality: N/A;    ESOPHAGOGASTRODUODENOSCOPY      FACETECTOMY OF VERTEBRA  2/13/2019    Procedure: MEDIAL FACETECTOMY L5-S1;  Surgeon: Lyndon Brooke Jr., MD;  Location: [facility] OR;  Service: Orthopedics;;    GASTRIC BYPASS  2010    SLEEVE    KNEE ARTHROSCOPY Right     LUMBAR DISCECTOMY  2/13/2019    Procedure: DISCECTOMY, SPINE, LUMBAR L5-S1;  Surgeon: Lyndon Brooke Jr., MD;  Location: [facility] OR;  Service: Orthopedics;;       Past Medical History:   Diagnosis Date    Arthritis     Asthma     AS A CHILD    Colon cancer     Family hx of prostate cancer 11/22/2016    Hx of colon cancer, stage I 7/3/2014    Parada syndrome     Vitamin D deficiency     Wears glasses        Social History     Socioeconomic History    Marital status:      Spouse name: Not on file    Number of children: Not on  file    Years of education: Not on file    Highest education level: Not on file   Occupational History     Employer: [employer]   Social Needs    Financial resource strain: Not on file    Food insecurity     Worry: Not on file     Inability: Not on file    Transportation needs     Medical: Not on file     Non-medical: Not on file   Tobacco Use    Smoking status: Never Smoker    Smokeless tobacco: Never Used   Substance and Sexual Activity    Alcohol use: Yes     Comment: RARELY    Drug use: No    Sexual activity: Not on file   Lifestyle    Physical activity     Days per week: Not on file     Minutes per session: Not on file    Stress: Not on file   Relationships    Social connections     Talks on phone: Not on file     Gets together: Not on file     Attends [employer] service: Not on file     Active member of club or organization: Not on file     Attends meetings of clubs or organizations: Not on file     Relationship status: Not on file   Other Topics Concern    Not on file   Social History Narrative    Not on file       Family History   Problem Relation Age of Onset    Melanoma Mother     Heart disease Father     Diabetes Father     Cancer Maternal Uncle         colon    Cancer Maternal Grandfather         colon ca    Psoriasis Neg Hx     Lupus Neg Hx     Eczema Neg Hx        Current Outpatient Medications   Medication Sig Dispense Refill    cholecalciferol, vitamin D3, (VITAMIN D3) 50 mcg (2,000 unit) Cap Take 1 capsule (2,000 Units total) by mouth once daily. 90 capsule 3    cyclobenzaprine (FLEXERIL) 10 MG tablet TAKE 1 TABLET(10 MG) BY MOUTH TWICE DAILY AS NEEDED FOR MUSCLE SPASMS 60 tablet 0    ergocalciferol (ERGOCALCIFEROL) 50,000 unit Cap TAKE 1 CAPSULE BY MOUTH EVERY 7 DAYS 12 capsule 10    psyllium husk, with sugar, (METAMUCIL, WITH SUGAR,) 3.4 gram/12 gram Powd Take 1 Scoop by mouth once daily.      sod picosulf-mag ox-citric ac (PREPOPIK) 10 mg-3.5 gram-12 gram PwPk Use as  directed.  Dispense 1 kit. 1 each 0    SOOLANTRA 1 % Crea BID  2    doxycycline (ORACEA) 40 mg capsule TAKE 1 CAPSULE BY MOUTH EVERY DAY 30 capsule 5    ergocalciferol (ERGOCALCIFEROL) 50,000 unit Cap Take 1 capsule (50,000 Units total) by mouth every 7 days. for 12 doses (Patient not taking: Reported on 7/8/2020) 12 capsule 0    sod picosulf-mag ox-citric ac (PREPOPIK) 10 mg-3.5 gram-12 gram PwPk As directed.  Dispense 1 kit (Patient not taking: Reported on 7/8/2020) 1 each 0     No current facility-administered medications for this visit.        No Known Allergies    Review of Systems   Constitutional: Negative for chills, fever and unexpected weight change.   HENT: Negative for hearing loss and nosebleeds.    Eyes: Negative for visual disturbance.   Respiratory: Negative for chest tightness.    Cardiovascular: Negative for chest pain.   Gastrointestinal: Negative for diarrhea.   Genitourinary: Positive for nocturia. Negative for dysuria, frequency, hematuria and urgency.   Musculoskeletal: Negative for joint swelling.   Skin: Negative for rash.   Neurological: Negative for seizures.   Hematological: Does not bruise/bleed easily.   Psychiatric/Behavioral: Negative for behavioral problems.       Objective:      Physical Exam   Constitutional: He is oriented to person, place, and time. He appears well-developed.   HENT:   Head: Normocephalic and atraumatic.   Eyes: No scleral icterus.   Neck: Neck supple.   Cardiovascular: Normal rate and regular rhythm.    Pulmonary/Chest: Effort normal. No respiratory distress.   Abdominal: He exhibits no mass. Hernia confirmed negative in the right inguinal area and confirmed negative in the left inguinal area.   Genitourinary:    Testes and penis normal.   Circumcised.    Genitourinary Comments: Prostate was smooth without nodularity. No rectal masses.  25 grams.  No external hemorrhoids.   Normal perineum.          Musculoskeletal: No tenderness.   Lymphadenopathy:     He  has no cervical adenopathy. No inguinal adenopathy noted on the right or left side.   Neurological: He is alert and oriented to person, place, and time.   Skin: Skin is warm. No rash noted.       urine dip pH 5, urine clear.   A post void residual bladder scan was performed immediately after voiding. The patient's PVR was noted to be 0cc.     Assessment:       1. Prostate cancer screening    2. MSH2-related Parada syndrome (HNPCC1)    3. Parada syndrome    4. History of colon cancer, stage II        Plan:   Lyndon was seen today for follow-up.    Diagnoses and all orders for this visit:    Prostate cancer screening  -     PSA, Screening; Future  -     Urinalysis Microscopic    MSH2-related Parada syndrome (HNPCC1)  -     Ambulatory referral/consult to Urology  -     PSA, Screening; Future  -     Urinalysis Microscopic    Parada syndrome    History of colon cancer, stage II    Nocturia  -     Ambulatory referral/consult to Sleep Disorders; Future      Psa 1 year  Limit fluids 2 hours before bedtime.  Elevated legs 2 hours before bedtime.  No caffeine, cokes, teas after 3 pm in the afternoon.  (Patient drinks coke overnight)

## 2020-07-08 NOTE — LETTER
July 8, 2020      Dom Leonardo MD  [address]  [place] [address]           [facility] - Urology 4th Floor  [address]  [facility] [address]  Phone: [phone]          Patient: Lyndon Lion Jr.   MR Number: [id number]   YOB: 1970   Date of Visit: 7/8/2020       Dear Dr. Dom Leonardo:    Thank you for referring Lyndon Lion to me for evaluation. Attached you will find relevant portions of my assessment and plan of care.    If you have questions, please do not hesitate to call me. I look forward to following Lyndon Lion along with you.    Sincerely,    Guerita Stovall MD    Enclosure  CC:  No Recipients    If you would like to receive this communication electronically, please contact [email or web address] or [phone] to request more information on [employer] Link access.    For providers and/or their staff who would like to refer a patient to [facility], please contact us through our one-stop-shop provider referral line, [facility], at [phone].    If you feel you have received this communication in error or would no longer like to receive these types of communications, please e-mail [email or web address]

## 2020-07-09 LAB
BACTERIA #/AREA URNS AUTO: NORMAL /HPF
MICROSCOPIC COMMENT: NORMAL
WBC #/AREA URNS AUTO: 1 /HPF (ref 0–5)

## 2020-07-13 ENCOUNTER — TELEPHONE (OUTPATIENT)
Dept: UROLOGY | Facility: CLINIC | Age: 50
End: 2020-07-13

## 2020-07-13 NOTE — TELEPHONE ENCOUNTER
VM was left for the patient to call the office back regarding his test results.    Kary    ----- Message from Guerita Stovall MD sent at 7/8/2020 11:38 PM CDT -----  Urine was ok

## 2020-07-20 ENCOUNTER — LAB VISIT (OUTPATIENT)
Dept: PRIMARY CARE CLINIC | Facility: CLINIC | Age: 50
End: 2020-07-20
Payer: COMMERCIAL

## 2020-07-20 PROCEDURE — U0003 INFECTIOUS AGENT DETECTION BY NUCLEIC ACID (DNA OR RNA); SEVERE ACUTE RESPIRATORY SYNDROME CORONAVIRUS 2 (SARS-COV-2) (CORONAVIRUS DISEASE [COVID-19]), AMPLIFIED PROBE TECHNIQUE, MAKING USE OF HIGH THROUGHPUT TECHNOLOGIES AS DESCRIBED BY CMS-2020-01-R: HCPCS

## 2020-07-21 ENCOUNTER — TELEPHONE (OUTPATIENT)
Dept: GASTROENTEROLOGY | Facility: CLINIC | Age: 50
End: 2020-07-21

## 2020-07-21 LAB — SARS-COV-2 RNA RESP QL NAA+PROBE: NOT DETECTED

## 2020-07-21 NOTE — TELEPHONE ENCOUNTER
----- Message from Chanel Gasca sent at 7/21/2020  4:56 PM CDT -----  Regarding: clovid results      Pt called to get results of his Covid test for procedure tomorrow. Pt can be reached [phone]

## 2020-07-22 ENCOUNTER — ANESTHESIA (OUTPATIENT)
Dept: ENDOSCOPY | Facility: HOSPITAL | Age: 50
End: 2020-07-22
Payer: COMMERCIAL

## 2020-07-22 ENCOUNTER — ANESTHESIA EVENT (OUTPATIENT)
Dept: ENDOSCOPY | Facility: HOSPITAL | Age: 50
End: 2020-07-22
Payer: COMMERCIAL

## 2020-07-22 ENCOUNTER — HOSPITAL ENCOUNTER (OUTPATIENT)
Facility: HOSPITAL | Age: 50
Discharge: HOME | End: 2020-07-22
Attending: INTERNAL MEDICINE | Admitting: INTERNAL MEDICINE
Payer: COMMERCIAL

## 2020-07-22 VITALS
RESPIRATION RATE: 14 BRPM | HEART RATE: 51 BPM | OXYGEN SATURATION: 98 % | DIASTOLIC BLOOD PRESSURE: 87 MMHG | TEMPERATURE: 98 F | WEIGHT: 265 LBS | BODY MASS INDEX: 34.01 KG/M2 | SYSTOLIC BLOOD PRESSURE: 131 MMHG | HEIGHT: 74 IN

## 2020-07-22 DIAGNOSIS — Z15.09 MSH2-RELATED LYNCH SYNDROME (HNPCC1): ICD-10-CM

## 2020-07-22 PROCEDURE — 43239 PR EGD, FLEX, W/BIOPSY, SGL/MULTI: ICD-10-PCS | Mod: 51,,, | Performed by: INTERNAL MEDICINE

## 2020-07-22 PROCEDURE — 88305 TISSUE EXAM BY PATHOLOGIST: ICD-10-PCS | Mod: 26,,, | Performed by: PATHOLOGY

## 2020-07-22 PROCEDURE — 27201089 HC SNARE, DISP (ANY): Performed by: INTERNAL MEDICINE

## 2020-07-22 PROCEDURE — 27200997: Performed by: INTERNAL MEDICINE

## 2020-07-22 PROCEDURE — 63600175 PHARM REV CODE 636 W HCPCS: Performed by: NURSE ANESTHETIST, CERTIFIED REGISTERED

## 2020-07-22 PROCEDURE — 25000003 PHARM REV CODE 250: Performed by: INTERNAL MEDICINE

## 2020-07-22 PROCEDURE — 43239 EGD BIOPSY SINGLE/MULTIPLE: CPT | Performed by: INTERNAL MEDICINE

## 2020-07-22 PROCEDURE — E9220 PRA ENDO ANESTHESIA: ICD-10-PCS | Mod: 33,,, | Performed by: NURSE ANESTHETIST, CERTIFIED REGISTERED

## 2020-07-22 PROCEDURE — 43239 EGD BIOPSY SINGLE/MULTIPLE: CPT | Mod: 51,,, | Performed by: INTERNAL MEDICINE

## 2020-07-22 PROCEDURE — 37000009 HC ANESTHESIA EA ADD 15 MINS: Performed by: INTERNAL MEDICINE

## 2020-07-22 PROCEDURE — 88305 TISSUE EXAM BY PATHOLOGIST: CPT | Mod: 26,,, | Performed by: PATHOLOGY

## 2020-07-22 PROCEDURE — 88305 TISSUE EXAM BY PATHOLOGIST: CPT | Mod: 59 | Performed by: PATHOLOGY

## 2020-07-22 PROCEDURE — E9220 PRA ENDO ANESTHESIA: HCPCS | Mod: 33,,, | Performed by: NURSE ANESTHETIST, CERTIFIED REGISTERED

## 2020-07-22 PROCEDURE — 27201012 HC FORCEPS, HOT/COLD, DISP: Performed by: INTERNAL MEDICINE

## 2020-07-22 PROCEDURE — 45385 COLONOSCOPY W/LESION REMOVAL: CPT | Performed by: INTERNAL MEDICINE

## 2020-07-22 PROCEDURE — 45385 PR COLONOSCOPY,REMV LESN,SNARE: ICD-10-PCS | Mod: 33,,, | Performed by: INTERNAL MEDICINE

## 2020-07-22 PROCEDURE — 45385 COLONOSCOPY W/LESION REMOVAL: CPT | Mod: 33,,, | Performed by: INTERNAL MEDICINE

## 2020-07-22 PROCEDURE — 37000008 HC ANESTHESIA 1ST 15 MINUTES: Performed by: INTERNAL MEDICINE

## 2020-07-22 PROCEDURE — 25000003 PHARM REV CODE 250: Performed by: NURSE ANESTHETIST, CERTIFIED REGISTERED

## 2020-07-22 RX ORDER — FENTANYL CITRATE 50 UG/ML
INJECTION, SOLUTION INTRAMUSCULAR; INTRAVENOUS
Status: DISCONTINUED | OUTPATIENT
Start: 2020-07-22 | End: 2020-07-22

## 2020-07-22 RX ORDER — LIDOCAINE HYDROCHLORIDE 20 MG/ML
INJECTION INTRAVENOUS
Status: DISCONTINUED | OUTPATIENT
Start: 2020-07-22 | End: 2020-07-22

## 2020-07-22 RX ORDER — PROPOFOL 10 MG/ML
VIAL (ML) INTRAVENOUS CONTINUOUS PRN
Status: DISCONTINUED | OUTPATIENT
Start: 2020-07-22 | End: 2020-07-22

## 2020-07-22 RX ORDER — SODIUM CHLORIDE 9 MG/ML
INJECTION, SOLUTION INTRAVENOUS CONTINUOUS
Status: DISCONTINUED | OUTPATIENT
Start: 2020-07-22 | End: 2020-07-22 | Stop reason: HOSPADM

## 2020-07-22 RX ORDER — GLYCOPYRROLATE 0.2 MG/ML
INJECTION INTRAMUSCULAR; INTRAVENOUS
Status: DISCONTINUED | OUTPATIENT
Start: 2020-07-22 | End: 2020-07-22

## 2020-07-22 RX ORDER — PROPOFOL 10 MG/ML
VIAL (ML) INTRAVENOUS
Status: DISCONTINUED | OUTPATIENT
Start: 2020-07-22 | End: 2020-07-22

## 2020-07-22 RX ADMIN — LIDOCAINE HYDROCHLORIDE 80 MG: 20 INJECTION, SOLUTION INTRAVENOUS at 12:07

## 2020-07-22 RX ADMIN — PROPOFOL 50 MG: 10 INJECTION, EMULSION INTRAVENOUS at 01:07

## 2020-07-22 RX ADMIN — SODIUM CHLORIDE: 0.9 INJECTION, SOLUTION INTRAVENOUS at 12:07

## 2020-07-22 RX ADMIN — PROPOFOL 125 MCG/KG/MIN: 10 INJECTION, EMULSION INTRAVENOUS at 01:07

## 2020-07-22 RX ADMIN — PROPOFOL 140 MG: 10 INJECTION, EMULSION INTRAVENOUS at 12:07

## 2020-07-22 RX ADMIN — GLYCOPYRROLATE 0.2 MG: 0.2 INJECTION, SOLUTION INTRAMUSCULAR; INTRAVENOUS at 12:07

## 2020-07-22 RX ADMIN — FENTANYL CITRATE 50 MCG: 50 INJECTION, SOLUTION INTRAMUSCULAR; INTRAVENOUS at 12:07

## 2020-07-22 RX ADMIN — SODIUM CHLORIDE: 0.9 INJECTION, SOLUTION INTRAVENOUS at 01:07

## 2020-07-22 NOTE — ANESTHESIA POSTPROCEDURE EVALUATION
Anesthesia Post Evaluation    Patient: Lyndon Lion Jr.    Procedure(s) Performed: Procedure(s) (LRB):  EGD (ESOPHAGOGASTRODUODENOSCOPY) (N/A)  COLONOSCOPY (N/A)    Final Anesthesia Type: general    Patient location during evaluation: GI PACU  Patient participation: Yes- Able to Participate  Level of consciousness: awake and alert and oriented  Post-procedure vital signs: reviewed and stable  Pain management: adequate  Airway patency: patent    PONV status at discharge: No PONV  Anesthetic complications: no      Cardiovascular status: hemodynamically stable and blood pressure returned to baseline  Respiratory status: room air, spontaneous ventilation and unassisted  Hydration status: euvolemic  Follow-up not needed.          Vitals Value Taken Time   /88 07/22/20 1411   Temp 36.8 °C (98.2 °F) 07/22/20 1356   Pulse 52 07/22/20 1411   Resp 14 07/22/20 1411   SpO2 97 % 07/22/20 1411         No case tracking events are documented in the log.      Pain/Karthikeyan Score: Karthikeyan Score: 10 (7/22/2020  2:11 PM)

## 2020-07-22 NOTE — H&P
[facility]  History & Physical    Subjective:      Chief Complaint/Reason for Admission:     EGD and colonoscopy for parada syndrome    Lyndon Lion Jr. is a 50 y.o. male.    Past Medical History:   Diagnosis Date    Arthritis     Asthma     AS A CHILD    Colon cancer     Family hx of prostate cancer 11/22/2016    Hx of colon cancer, stage I 7/3/2014    Parada syndrome     Vitamin D deficiency     Wears glasses      Past Surgical History:   Procedure Laterality Date    APPENDECTOMY      CAUDAL EPIDURAL STEROID INJECTION N/A 11/6/2018    Procedure: Injection-steroid-epidural-caudal;  Surgeon: Lyndon Brooke Jr., MD;  Location: [facility] OR;  Service: Pain Management;  Laterality: N/A;    COLON SURGERY  2008    PARTIAL COLECTOMY    COLONOSCOPY Bilateral 2012    COLONOSCOPY N/A 8/1/2016    Procedure: COLONOSCOPY;  Surgeon: Blaze Marr MD;  Location: [facility];  Service: Endoscopy;  Laterality: N/A;    COLONOSCOPY N/A 9/20/2017    Procedure: COLONOSCOPY;  Surgeon: Dom Leonardo MD;  Location: [facility] (4TH FLR);  Service: Endoscopy;  Laterality: N/A;  not given PM prep    COLONOSCOPY N/A 10/9/2017    Procedure: COLONOSCOPY;  Surgeon: RAMON Callahan MD;  Location: [facility] (4TH FLR);  Service: Endoscopy;  Laterality: N/A;  ok for Prepopik per Dr Callahan    COLONOSCOPY N/A 11/20/2017    Procedure: COLONOSCOPY/EMR;  Surgeon: Joseluis Choe MD;  Location: [facility] (2ND FLR);  Service: Endoscopy;  Laterality: N/A;  EMR    no pm prep    COLONOSCOPY N/A 5/30/2018    Procedure: COLONOSCOPY;  Surgeon: Dom Leonardo MD;  Location: [facility] (4TH FLR);  Service: Endoscopy;  Laterality: N/A;  follow up colonoscopy in 5/2018 for Parada Syndrome         COLONOSCOPY N/A 6/10/2019    Procedure: COLONOSCOPY;  Surgeon: Dom Leonardo MD;  Location: [facility] (4TH FLR);  Service: Endoscopy;  Laterality: N/A;  Prepopik ordered per Dr. Leonardo    Epidural Steroid Injection   10/23/15    Lumbar    EPIDURAL STEROID INJECTION INTO LUMBAR SPINE N/A 7/27/2018    Procedure: Injection-steroid-epidural-lumbar;  Surgeon: Lyndon Brooke Jr., MD;  Location: [facility] OR;  Service: Pain Management;  Laterality: N/A;    ESOPHAGOGASTRODUODENOSCOPY      FACETECTOMY OF VERTEBRA  2/13/2019    Procedure: MEDIAL FACETECTOMY L5-S1;  Surgeon: Lyndon Brooke Jr., MD;  Location: [facility] OR;  Service: Orthopedics;;    GASTRIC BYPASS  2010    SLEEVE    KNEE ARTHROSCOPY Right     LUMBAR DISCECTOMY  2/13/2019    Procedure: DISCECTOMY, SPINE, LUMBAR L5-S1;  Surgeon: Lyndon Brooke Jr., MD;  Location: [facility] OR;  Service: Orthopedics;;     Family History   Problem Relation Age of Onset    Melanoma Mother     Heart disease Father     Diabetes Father     Cancer Maternal Uncle         colon    Cancer Maternal Grandfather         colon ca    Psoriasis Neg Hx     Lupus Neg Hx     Eczema Neg Hx      Social History     Tobacco Use    Smoking status: Never Smoker    Smokeless tobacco: Never Used   Substance Use Topics    Alcohol use: Yes     Comment: RARELY    Drug use: No       PTA Medications   Medication Sig    cholecalciferol, vitamin D3, (VITAMIN D3) 50 mcg (2,000 unit) Cap Take 1 capsule (2,000 Units total) by mouth once daily.    ergocalciferol (ERGOCALCIFEROL) 50,000 unit Cap Take 1 capsule (50,000 Units total) by mouth every 7 days. for 12 doses    cyclobenzaprine (FLEXERIL) 10 MG tablet TAKE 1 TABLET(10 MG) BY MOUTH TWICE DAILY AS NEEDED FOR MUSCLE SPASMS    doxycycline (ORACEA) 40 mg capsule TAKE 1 CAPSULE BY MOUTH EVERY DAY    ergocalciferol (ERGOCALCIFEROL) 50,000 unit Cap TAKE 1 CAPSULE BY MOUTH EVERY 7 DAYS    psyllium husk, with sugar, (METAMUCIL, WITH SUGAR,) 3.4 gram/12 gram Powd Take 1 Scoop by mouth once daily.    sod picosulf-mag ox-citric ac (PREPOPIK) 10 mg-3.5 gram-12 gram PwPk Use as directed.  Dispense 1 kit.    sod picosulf-mag ox-citric ac (PREPOPIK) 10 mg-3.5 gram-12  gram PwPk As directed.  Dispense 1 kit (Patient not taking: Reported on 7/8/2020)    SOOLANTRA 1 % Crea BID     Review of patient's allergies indicates:  No Known Allergies     Review of Systems   Constitutional: Negative for chills, fever and weight loss.   Respiratory: Negative for shortness of breath and wheezing.    Cardiovascular: Negative for chest pain.   Gastrointestinal: Negative for abdominal pain, blood in stool, constipation, diarrhea and melena.       Objective:      Vital Signs (Most Recent)  Temp: 98.4 °F (36.9 °C) (07/22/20 1226)  Pulse: 60 (07/22/20 1226)  Resp: 18 (07/22/20 1226)  BP: 124/80 (07/22/20 1226)  SpO2: 98 % (07/22/20 1226)    Vital Signs Range (Last 24H):  Temp:  [98.4 °F (36.9 °C)]   Pulse:  [60]   Resp:  [18]   BP: (124)/(80)   SpO2:  [98 %]     Physical Exam  Cardiovascular:      Heart sounds: Normal heart sounds.   Abdominal:      Palpations: Abdomen is soft.   Neurological:      Mental Status: He is alert.   Psychiatric:         Mood and Affect: Mood normal.         Behavior: Behavior normal.         Thought Content: Thought content normal.         Judgment: Judgment normal.             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       Plan:    EGD and colonoscopy for parada

## 2020-07-22 NOTE — PLAN OF CARE
Discharge instructions given. Pt verbalized understanding. No c/o. No distress noted. Pt ambulated off unit with staff. Steady gait

## 2020-07-22 NOTE — TRANSFER OF CARE
"Anesthesia Transfer of Care Note    Patient: Lyndon Lion Jr.    Procedure(s) Performed: Procedure(s) (LRB):  EGD (ESOPHAGOGASTRODUODENOSCOPY) (N/A)  COLONOSCOPY (N/A)    Patient location: PACU    Anesthesia Type: general    Transport from OR: Transported from OR on 2-3 L/min O2 by NC with adequate spontaneous ventilation    Post pain: adequate analgesia    Post assessment: no apparent anesthetic complications and tolerated procedure well    Level of consciousness: sedated    Nausea/Vomiting: no nausea/vomiting    Complications: none    Transfer of care protocol was followed      Last vitals:   Visit Vitals  /69 (BP Location: Left arm, Patient Position: Lying)   Pulse 60   Temp 36.8 °C (98.2 °F) (Temporal)   Resp 14   Ht 6' 2" (1.88 m)   Wt 120.2 kg (265 lb)   SpO2 98%   BMI 34.02 kg/m²     "

## 2020-07-22 NOTE — [employer] PATIENT INSTRUCTIONS
Discharge Summary/Instructions after an Endoscopic Procedure  Patient Name: Lyndon Lion  Patient MRN: [id number]  Patient YOB: 1970 Wednesday, July 22, 2020  Dom Leonardo MD  RESTRICTIONS:  During your procedure today, you received medications for sedation.  These   medications may affect your judgment, balance and coordination.  Therefore,   for 24 hours, you have the following restrictions:   - DO NOT drive a car, operate machinery, make legal/financial decisions,   sign important papers or drink alcohol.    ACTIVITY:  Today: no heavy lifting, straining or running due to procedural   sedation/anesthesia.  The following day: return to full activity including work.  DIET:  Eat and drink normally unless instructed otherwise.     TREATMENT FOR COMMON SIDE EFFECTS:  - Mild abdominal pain, nausea, belching, bloating or excessive gas:  rest,   eat lightly and use a heating pad.  - Sore Throat: treat with throat lozenges and/or gargle with warm salt   water.  - Because air was used during the procedure, expelling large amounts of air   from your rectum or belching is normal.  - If a bowel prep was taken, you may not have a bowel movement for 1-3 days.    This is normal.  SYMPTOMS TO WATCH FOR AND REPORT TO YOUR PHYSICIAN:  1. Abdominal pain or bloating, other than gas cramps.  2. Chest pain.  3. Back pain.  4. Signs of infection such as: chills or fever occurring within 24 hours   after the procedure.  5. Rectal bleeding, which would show as bright red, maroon, or black stools.   (A tablespoon of blood from the rectum is not serious, especially if   hemorrhoids are present.)  6. Vomiting.  7. Weakness or dizziness.  GO DIRECTLY TO THE NEAREST EMERGENCY ROOM IF YOU HAVE ANY OF THE FOLLOWING:      Difficulty breathing              Chills and/or fever over 101 F   Persistent vomiting and/or vomiting blood   Severe abdominal pain   Severe chest pain   Black, tarry stools   Bleeding- more than one  tablespoon   Any other symptom or condition that you feel may need urgent attention  Your doctor recommends these additional instructions:  If any biopsies were taken, your doctors clinic will contact you in 1 to 2   weeks with any results.  - Discharge patient to home.   - Await pathology results.   - Repeat upper endoscopy in 3 years for screening purposes.   - Return to GI clinic.   - Telephone endoscopist for pathology results in 2 weeks.   - The findings and recommendations were discussed with the patient.  For questions, problems or results please call your physician - Dom Leonardo MD at Work:  [phone].  [facility], EMERGENCY ROOM PHONE NUMBER: [phone]  IF A COMPLICATION OR EMERGENCY SITUATION ARISES AND YOU ARE UNABLE TO REACH   YOUR PHYSICIAN - GO DIRECTLY TO THE EMERGENCY ROOM.  Dom Leonardo MD  7/22/2020 1:14:40 PM  This report has been verified and signed electronically.  [employer]

## 2020-07-22 NOTE — PROVATION PATIENT INSTRUCTIONS
Discharge Summary/Instructions after an Endoscopic Procedure  Patient Name: Lyndon Lion  Patient MRN: [id number]  Patient YOB: 1970 Wednesday, July 22, 2020  Dom Leonardo MD  RESTRICTIONS:  During your procedure today, you received medications for sedation.  These   medications may affect your judgment, balance and coordination.  Therefore,   for 24 hours, you have the following restrictions:   - DO NOT drive a car, operate machinery, make legal/financial decisions,   sign important papers or drink alcohol.    ACTIVITY:  Today: no heavy lifting, straining or running due to procedural   sedation/anesthesia.  The following day: return to full activity including work.  DIET:  Eat and drink normally unless instructed otherwise.     TREATMENT FOR COMMON SIDE EFFECTS:  - Mild abdominal pain, nausea, belching, bloating or excessive gas:  rest,   eat lightly and use a heating pad.  - Sore Throat: treat with throat lozenges and/or gargle with warm salt   water.  - Because air was used during the procedure, expelling large amounts of air   from your rectum or belching is normal.  - If a bowel prep was taken, you may not have a bowel movement for 1-3 days.    This is normal.  SYMPTOMS TO WATCH FOR AND REPORT TO YOUR PHYSICIAN:  1. Abdominal pain or bloating, other than gas cramps.  2. Chest pain.  3. Back pain.  4. Signs of infection such as: chills or fever occurring within 24 hours   after the procedure.  5. Rectal bleeding, which would show as bright red, maroon, or black stools.   (A tablespoon of blood from the rectum is not serious, especially if   hemorrhoids are present.)  6. Vomiting.  7. Weakness or dizziness.  GO DIRECTLY TO THE NEAREST EMERGENCY ROOM IF YOU HAVE ANY OF THE FOLLOWING:      Difficulty breathing              Chills and/or fever over 101 F   Persistent vomiting and/or vomiting blood   Severe abdominal pain   Severe chest pain   Black, tarry stools   Bleeding- more than one  tablespoon   Any other symptom or condition that you feel may need urgent attention  Your doctor recommends these additional instructions:  If any biopsies were taken, your doctors clinic will contact you in 1 to 2   weeks with any results.  - Discharge patient to home.   - Await pathology results.   - Telephone endoscopist for pathology results in 2 weeks.   - Repeat colonoscopy in 1 year for surveillance.   - Return to GI clinic at the next available appointment.   - The findings and recommendations were discussed with the patient.  For questions, problems or results please call your physician - Dom Leonardo MD at Work:  [phone].  [facility], EMERGENCY ROOM PHONE NUMBER: [phone]  IF A COMPLICATION OR EMERGENCY SITUATION ARISES AND YOU ARE UNABLE TO REACH   YOUR PHYSICIAN - GO DIRECTLY TO THE EMERGENCY ROOM.  Dom Leonardo MD  7/22/2020 1:54:10 PM  This report has been verified and signed electronically.  [employer]

## 2020-07-22 NOTE — DISCHARGE INSTRUCTIONS
Upper GI Endoscopy     During endoscopy, a long, flexible tube is used to view the inside of your upper GI tract.      Upper GI endoscopy allows your healthcare provider to look directly into the beginning of your gastrointestinal (GI) tract. The esophagus, stomach, and duodenum (the first part of the small intestine) make up the upper GI tract.   Before the exam  Follow these and any other instructions you are given before your endoscopy. If you dont follow the healthcare providers instructions carefully, the test may need to be canceled or done over:  · Don't eat or drink anything after midnight the night before your exam. If your exam is in the afternoon, drink only clear liquids in the morning. Don't eat or drink anything for 8 hours before the exam. In some cases, you may be able to take medicines with sips of water until 2 hours before the procedure. Speak with your healthcare provider about this.   · Bring your X-rays and any other test results you have.  · Because you will be sedated, arrange for an adult to drive you home after the exam.  · Tell your healthcare provider before the exam if you are taking any medicines or have any medical problems.  The procedure  Here is what to expect:  · You will lie on the endoscopy table. Usually patients lie on the left side.  · You will be monitored and given oxygen.  · Your throat may be numbed with a spray or gargle. You are given medicine through an intravenous (IV) line that will help you relax and remain comfortable. You may be awake or asleep during the procedure.  · The healthcare provider will put the endoscope in your mouth and down your esophagus. It is thinner than most pieces of food that you swallow. It will not affect your breathing. The medicine helps keep you from gagging.  · Air is put into your GI tract to expand it. It can make you burp.  · During the procedure, the healthcare provider can take biopsies (tissue samples), remove abnormalities,  such as polyps, or treat abnormalities through a variety of devices placed through the endoscope. You will not feel this.   · The endoscope carries images of your upper GI tract to a video screen. If you are awake, you may be able to look at the images.  · After the procedure is done, you will rest for a time. An adult must drive you home.  When to call your healthcare provider  Contact your healthcare provider if you have:  · Black or tarry stools, or blood in your stool  · Fever  · Pain in your belly that does not go away  · Nausea and vomiting, or vomiting blood   Date Last Reviewed: 7/1/2016 © 2000-2017 [employer]. [address] [place] [address], [place] [address]. All rights reserved. This information is not intended as a substitute for professional medical care. Always follow your healthcare professional's instructions.        Colonoscopy     A camera attached to a flexible tube with a viewing lens is used to take video pictures.     Colonoscopy is a test to view the inside of your lower digestive tract (colon and rectum). Sometimes it can show the last part of the small intestine (ileum). During the test, small pieces of tissue may be removed for testing. This is called a biopsy. Small growths, such as polyps, may also be removed.   Why is colonoscopy done?  The test is done to help look for colon cancer. And it can help find the source of abdominal pain, bleeding, and changes in bowel habits. It may be needed once a year, depending on factors such as your:  · Age  · Health history  · Family health history  · Symptoms  · Results from any prior colonoscopy  Risks and possible complications  These include:  · Bleeding               · A puncture or tear in the colon   · Risks of anesthesia  · A cancer lesion not being seen  Getting ready   To prepare for the test:  · Talk with your healthcare provider about the risks of the test (see below). Also ask your healthcare provider about alternatives to the  test.  · Tell your healthcare provider about any medicines you take. Also tell him or her about any health conditions you may have.  · Make sure your rectum and colon are empty for the test. Follow the diet and bowel prep instructions exactly. If you dont, the test may need to be rescheduled.  · Plan for a friend or family member to drive you home after the test.     Colonoscopy provides an inside view of the entire colon.     You may discuss the results with your doctor right away or at a future visit.  During the test   The test is usually done in the hospital on an outpatient basis. This means you go home the same day. The procedure takes about 30 minutes. During that time:  · You are given relaxing (sedating) medicine through an IV line. You may be drowsy, or fully asleep.  · The healthcare provider will first give you a physical exam to check for anal and rectal problems.  · Then the anus is lubricated and the scope inserted.  · If you are awake, you may have a feeling similar to needing to have a bowel movement. You may also feel pressure as air is pumped into the colon. Its OK to pass gas during the procedure.  · Biopsy, polyp removal, or other treatments may be done during the test.  After the test   You may have gas right after the test. It can help to try to pass it to help prevent later bloating. Your healthcare provider may discuss the results with you right away. Or you may need to schedule a follow-up visit to talk about the results. After the test, you can go back to your normal eating and other activities. You may be tired from the sedation and need to rest for a few hours.  Date Last Reviewed: 11/1/2016 © 2000-2017 [employer]. [address] [place] [id number]. All rights reserved. This information is not intended as a substitute for professional medical care. Always follow your healthcare professional's instructions.

## 2020-07-22 NOTE — ANESTHESIA PREPROCEDURE EVALUATION
07/22/2020  Lyndon Lion Jr. is a 50 y.o., male.    Anesthesia Evaluation    I have reviewed the Patient Summary Reports.    I have reviewed the Nursing Notes. I have reviewed the NPO Status.   I have reviewed the Medications.     Review of Systems  Anesthesia Hx:  No problems with previous Anesthesia   Denies Personal Hx of Anesthesia complications.   Social:  Non-Smoker  Denies Tobacco Use.   Cardiovascular:   Denies Hypertension.  Denies MI.  Denies CAD.    Denies CABG/stent.  Denies Dysrhythmias.  no hyperlipidemia  Denies Coronary Artery Disease.   Denies Hypertension.    Pulmonary:   Denies COPD.  Denies Asthma.  Denies Shortness of breath.  Denies Recent URI.  Asthma:  last episode was Hx of Childhood Asthma.  Denies Chronic Obstructive Pulmonary Disease (COPD). Current breathing status is optimal, free of wheezing.    Renal/:  Denies Kidney Function/Disease    Hepatic/GI:   Bowel Prep. Denies GERD. Liver Disease,  Denies Esophageal / Stomach Disorders  Bowel Conditions:  Bowel Neoplasm: (h/o Parada Syndrome) past history Liver Disease, Fatty Liver    Neurological:   Denies TIA. Denies CVA. Denies Seizures.  Denies Seizure Disorder  Denies CVA - Cerebrovasular Accident  Denies TIA - Transient Ischemic Attack    Endocrine:   Denies Diabetes. Denies Hypothyroidism.  Denies Diabetes  Denies Thyroid Disease  Denies Metabolic Disorders      Physical Exam  General:  Well nourished    Airway/Jaw/Neck:  Airway Findings: Mouth Opening: Normal Tongue: Normal  General Airway Assessment: Adult  Mallampati: II  Improves to I with phonation.  TM Distance: Normal, at least 6 cm      Dental:  Dental Findings: In tact    Chest/Lungs:  Chest/Lungs Findings: Clear to auscultation, Normal Respiratory Rate     Heart/Vascular:  Heart Findings: Rate: Normal  Rhythm: Regular Rhythm  Sounds: Normal  Heart murmur:  negative       Mental Status:  Mental Status Findings:  Cooperative, Alert and Oriented         Anesthesia Plan  Type of Anesthesia, risks & benefits discussed:  Anesthesia Type:  MAC  Patient's Preference:   Intra-op Monitoring Plan: standard ASA monitors  Intra-op Monitoring Plan Comments:   Post Op Pain Control Plan: per primary service following discharge from PACU, IV/PO Opioids PRN and multimodal analgesia  Post Op Pain Control Plan Comments:   Induction:   IV  Beta Blocker:  Patient is not currently on a Beta-Blocker (No further documentation required).       Informed Consent: Patient understands risks and agrees with Anesthesia plan.  Questions answered. Anesthesia consent signed with patient.  ASA Score: 2     Day of Surgery Review of History & Physical:    H&P update referred to the provider.         Ready For Surgery From Anesthesia Perspective.

## 2020-07-25 LAB
FINAL PATHOLOGIC DIAGNOSIS: NORMAL
GROSS: NORMAL

## 2020-07-27 DIAGNOSIS — G47.33 OBSTRUCTIVE SLEEP APNEA: Primary | ICD-10-CM

## 2020-07-27 DIAGNOSIS — R06.83 SNORING: ICD-10-CM

## 2020-07-27 DIAGNOSIS — Z01.818 OTHER SPECIFIED PRE-OPERATIVE EXAMINATION: ICD-10-CM

## 2020-07-27 DIAGNOSIS — R53.83 FATIGUE: ICD-10-CM

## 2020-07-27 DIAGNOSIS — G47.19 EXCESSIVE DAYTIME SLEEPINESS: ICD-10-CM

## 2020-08-11 ENCOUNTER — HOSPITAL ENCOUNTER (OUTPATIENT)
Dept: PREADMISSION TESTING | Facility: HOSPITAL | Age: 50
Discharge: HOME | End: 2020-08-11
Attending: INTERNAL MEDICINE
Payer: COMMERCIAL

## 2020-08-11 DIAGNOSIS — Z01.818 OTHER SPECIFIED PRE-OPERATIVE EXAMINATION: ICD-10-CM

## 2020-08-11 PROCEDURE — U0003 INFECTIOUS AGENT DETECTION BY NUCLEIC ACID (DNA OR RNA); SEVERE ACUTE RESPIRATORY SYNDROME CORONAVIRUS 2 (SARS-COV-2) (CORONAVIRUS DISEASE [COVID-19]), AMPLIFIED PROBE TECHNIQUE, MAKING USE OF HIGH THROUGHPUT TECHNOLOGIES AS DESCRIBED BY CMS-2020-01-R: HCPCS

## 2020-08-12 LAB — SARS-COV-2 RNA RESP QL NAA+PROBE: NOT DETECTED

## 2020-08-14 ENCOUNTER — PROCEDURE VISIT (OUTPATIENT)
Dept: SLEEP MEDICINE | Facility: HOSPITAL | Age: 50
End: 2020-08-14
Attending: INTERNAL MEDICINE
Payer: COMMERCIAL

## 2020-08-14 DIAGNOSIS — R53.83 FATIGUE: ICD-10-CM

## 2020-08-14 DIAGNOSIS — G47.33 OBSTRUCTIVE SLEEP APNEA: ICD-10-CM

## 2020-08-14 DIAGNOSIS — R06.83 SNORING: ICD-10-CM

## 2020-08-14 DIAGNOSIS — G47.19 EXCESSIVE DAYTIME SLEEPINESS: ICD-10-CM

## 2020-08-14 PROCEDURE — 95810 POLYSOM 6/> YRS 4/> PARAM: CPT

## 2020-08-18 ENCOUNTER — PATIENT OUTREACH (OUTPATIENT)
Dept: ADMINISTRATIVE | Facility: OTHER | Age: 50
End: 2020-08-18

## 2020-08-18 DIAGNOSIS — G47.19 EXCESSIVE DAYTIME SLEEPINESS: ICD-10-CM

## 2020-08-18 DIAGNOSIS — R06.83 SNORING: ICD-10-CM

## 2020-08-18 DIAGNOSIS — R53.83 FATIGUE DUE TO SLEEP PATTERN DISTURBANCE: ICD-10-CM

## 2020-08-18 DIAGNOSIS — G47.9 FATIGUE DUE TO SLEEP PATTERN DISTURBANCE: ICD-10-CM

## 2020-08-18 DIAGNOSIS — G47.33 OSA (OBSTRUCTIVE SLEEP APNEA): Primary | ICD-10-CM

## 2020-08-18 NOTE — PROGRESS NOTES
Chart was reviewed for overdue [facility]active [facility] Encounters ([facility])  topics  Updates were requested from care everywhere  Health Maintenance has been updated  [place] immunization registry triggered

## 2020-08-19 ENCOUNTER — PROCEDURE VISIT (OUTPATIENT)
Dept: SLEEP MEDICINE | Facility: HOSPITAL | Age: 50
End: 2020-08-19
Attending: INTERNAL MEDICINE
Payer: COMMERCIAL

## 2020-08-19 DIAGNOSIS — G47.19 EXCESSIVE DAYTIME SLEEPINESS: ICD-10-CM

## 2020-08-19 DIAGNOSIS — R53.83 FATIGUE DUE TO SLEEP PATTERN DISTURBANCE: ICD-10-CM

## 2020-08-19 DIAGNOSIS — G47.9 FATIGUE DUE TO SLEEP PATTERN DISTURBANCE: ICD-10-CM

## 2020-08-19 DIAGNOSIS — G47.33 OSA (OBSTRUCTIVE SLEEP APNEA): ICD-10-CM

## 2020-08-19 DIAGNOSIS — R06.83 SNORING: ICD-10-CM

## 2020-08-19 PROCEDURE — 95806 SLEEP STUDY UNATT&RESP EFFT: CPT

## 2020-09-03 ENCOUNTER — HOSPITAL ENCOUNTER (OUTPATIENT)
Dept: RADIOLOGY | Facility: CLINIC | Age: 50
Discharge: HOME | End: 2020-09-03
Attending: NURSE PRACTITIONER
Payer: COMMERCIAL

## 2020-09-03 DIAGNOSIS — Z85.038 HISTORY OF COLON CANCER, STAGE II: ICD-10-CM

## 2020-09-03 PROCEDURE — 71046 X-RAY EXAM CHEST 2 VIEWS: CPT | Mod: 26,,, | Performed by: RADIOLOGY

## 2020-09-03 PROCEDURE — 71046 X-RAY EXAM CHEST 2 VIEWS: CPT | Mod: TC,FY,PO

## 2020-09-03 PROCEDURE — 71046 XR CHEST PA AND LATERAL: ICD-10-PCS | Mod: 26,,, | Performed by: RADIOLOGY

## 2020-09-30 ENCOUNTER — PATIENT OUTREACH (OUTPATIENT)
Dept: ADMINISTRATIVE | Facility: OTHER | Age: 50
End: 2020-09-30

## 2020-09-30 NOTE — PROGRESS NOTES
Chart was reviewed for overdue [facility]active [facility] Encounters ([facility])  topics  Updates were requested from care everywhere  Health Maintenance was unable to be updated  [facility] immunization registry triggered

## 2020-10-05 ENCOUNTER — OFFICE VISIT (OUTPATIENT)
Dept: DERMATOLOGY | Facility: CLINIC | Age: 50
End: 2020-10-05
Payer: COMMERCIAL

## 2020-10-05 DIAGNOSIS — Z15.09 LYNCH SYNDROME: ICD-10-CM

## 2020-10-05 DIAGNOSIS — L73.8 SEBACEOUS HYPERPLASIA: ICD-10-CM

## 2020-10-05 DIAGNOSIS — L82.1 SEBORRHEIC KERATOSES: ICD-10-CM

## 2020-10-05 DIAGNOSIS — L71.9 ROSACEA: Primary | ICD-10-CM

## 2020-10-05 DIAGNOSIS — L81.4 SOLAR LENTIGO: ICD-10-CM

## 2020-10-05 PROCEDURE — 99213 PR OFFICE/OUTPT VISIT, EST, LEVL III, 20-29 MIN: ICD-10-PCS | Mod: 25,S$GLB,, | Performed by: DERMATOLOGY

## 2020-10-05 PROCEDURE — 99999 PR PBB SHADOW E&M-EST. PATIENT-LVL III: CPT | Mod: PBBFAC,,, | Performed by: DERMATOLOGY

## 2020-10-05 PROCEDURE — 17000 PR DESTRUCTION(LASER SURGERY,CRYOSURGERY,CHEMOSURGERY),PREMALIGNANT LESIONS,FIRST LESION: ICD-10-PCS | Mod: S$GLB,,, | Performed by: DERMATOLOGY

## 2020-10-05 PROCEDURE — 17000 DESTRUCT PREMALG LESION: CPT | Mod: S$GLB,,, | Performed by: DERMATOLOGY

## 2020-10-05 PROCEDURE — 99213 OFFICE O/P EST LOW 20 MIN: CPT | Mod: 25,S$GLB,, | Performed by: DERMATOLOGY

## 2020-10-05 PROCEDURE — 99999 PR PBB SHADOW E&M-EST. PATIENT-LVL III: ICD-10-PCS | Mod: PBBFAC,,, | Performed by: DERMATOLOGY

## 2020-10-05 RX ORDER — SULFACETAMIDE SODIUM, SULFUR 100; 50 MG/G; MG/G
EMULSION TOPICAL
Qty: 170 G | Refills: 5 | Status: SHIPPED | OUTPATIENT
Start: 2020-10-05

## 2020-10-05 RX ORDER — DOXYCYCLINE 100 MG/1
TABLET ORAL
Qty: 35 TABLET | Refills: 1 | Status: SHIPPED | OUTPATIENT
Start: 2020-10-05 | End: 2020-12-21

## 2020-10-05 NOTE — PATIENT INSTRUCTIONS

## 2020-10-05 NOTE — PROGRESS NOTES
Subjective:       Patient ID:  Lyndon Lion Jr. is a 50 y.o. male who presents for   Chief Complaint   Patient presents with    Skin Check     Last OV 3-1-19  Rosacea - metrocream, pt used until empty, needs refill. Also uses Soolantra cream   Past low dose doxy  Parada syndrome   SK     Here today for TBSE & further treatment of Rosacea     refinery worker    Derm hx:   in [employer], shift work,   Patient with Parada syndrome; MSH2 mutation, dx 2013  H/o colon cancer s/p partial colectomy; annual coloscopy and EGD  Mother with h/o melanoma (dx age 45) and breast CA  Wear SPF as needed, wears hats regularly.      Review of Systems   Constitutional: Negative for fever, chills and fatigue.   Skin: Positive for daily sunscreen use, activity-related sunscreen use and wears hat.   Hematologic/Lymphatic: Does not bruise/bleed easily.        Objective:    Physical Exam   Constitutional: He appears well-developed and well-nourished. No distress.   Neurological: He is alert and oriented to person, place, and time. He is not disoriented.   Psychiatric: He has a normal mood and affect.   Skin:   Areas Examined (abnormalities noted in diagram):   Scalp / Hair Palpated and Inspected  Head / Face Inspection Performed  Neck Inspection Performed  Chest / Axilla Inspection Performed  Abdomen Inspection Performed  Back Inspection Performed  RUE Inspected  LUE Inspection Performed  RLE Inspected  LLE Inspection Performed  Nails and Digits Inspection Performed                       Diagram Legend     Erythematous scaling macule/papule c/w actinic keratosis       Vascular papule c/w angioma      Pigmented verrucoid papule/plaque c/w seborrheic keratosis      Yellow umbilicated papule c/w sebaceous hyperplasia      Irregularly shaped tan macule c/w lentigo     1-2 mm smooth white papules consistent with Milia      Movable subcutaneous cyst with punctum c/w epidermal inclusion cyst      Subcutaneous movable cyst c/w  pilar cyst      Firm pink to brown papule c/w dermatofibroma      Pedunculated fleshy papule(s) c/w skin tag(s)      Evenly pigmented macule c/w junctional nevus     Mildly variegated pigmented, slightly irregular-bordered macule c/w mildly atypical nevus      Flesh colored to evenly pigmented papule c/w intradermal nevus       Pink pearly papule/plaque c/w basal cell carcinoma      Erythematous hyperkeratotic cursted plaque c/w SCC      Surgical scar with no sign of skin cancer recurrence      Open and closed comedones      Inflammatory papules and pustules      Verrucoid papule consistent consistent with wart     Erythematous eczematous patches and plaques     Dystrophic onycholytic nail with subungual debris c/w onychomycosis     Umbilicated papule    Erythematous-base heme-crusted tan verrucoid plaque consistent with inflamed seborrheic keratosis     Erythematous Silvery Scaling Plaque c/w Psoriasis     See annotation              Assessment / Plan:        Rosacea  -     sulfacetamide sodium-sulfur 10-5 % (w/w) Clsr; Use to wash face daily  Dispense: 170 g; Refill: 5  -     doxycycline monohydrate 100 mg Tab; Take 1 po BID x 1 week then daily  Dispense: 35 tablet; Refill: 1  Rosacea triple cream    Consider low dose isotretinoin    AK, nasal dorsum  Cryosurgery Procedure Note    Verbal consent from the patient is obtained and the patient is aware of the precancerous quality and need for treatment of these lesions. Liquid nitrogen cryosurgery is applied to the 1 actinic keratoses, as detailed in the physical exam, to produce a freeze injury. The patient is aware that blisters may form and is instructed on wound care with gentle cleansing and use of vaseline ointment to keep moist until healed. The patient is supplied a handout on cryosurgery and is instructed to call if lesions do not completely resolve.    Sebaceous hyperplasia  This is a common condition representing benign enlargement of the sebaceous lobule.  It typically occurs in adulthood. Reassurance given to patient.     Parada syndrome  MSH 2 mutation, increased risk of skin cancer (sebaceous neoplasms), regular skin checks  Closely monitored by PCP    Solar lentigo  This is a benign hyperpigmented sun induced lesion. Daily sun protection will reduce the number of new lesions. Treatment of these benign lesions are considered cosmetic.    Seborrheic keratoses  These are benign inherited growths without a malignant potential. Reassurance given to patient. No treatment is necessary.     Patient instructed in importance in daily sun protection of at least spf 30. Mineral sunscreen ingredients preferred (Zinc +/- Titanium).   Recommend Elta MD for daily use on face and neck.  Patient encouraged to wear hat for all outdoor exposure.   Also discussed sun avoidance and use of protective clothing.           Follow up in about 6 months (around 4/5/2021).

## 2020-10-06 ENCOUNTER — PATIENT MESSAGE (OUTPATIENT)
Dept: DERMATOLOGY | Facility: CLINIC | Age: 50
End: 2020-10-06

## 2020-10-06 ENCOUNTER — OFFICE VISIT (OUTPATIENT)
Dept: HEMATOLOGY/ONCOLOGY | Facility: CLINIC | Age: 50
End: 2020-10-06
Payer: COMMERCIAL

## 2020-10-06 VITALS
OXYGEN SATURATION: 97 % | SYSTOLIC BLOOD PRESSURE: 124 MMHG | BODY MASS INDEX: 35.77 KG/M2 | RESPIRATION RATE: 18 BRPM | HEIGHT: 74 IN | HEART RATE: 76 BPM | TEMPERATURE: 97 F | WEIGHT: 278.69 LBS | DIASTOLIC BLOOD PRESSURE: 77 MMHG

## 2020-10-06 DIAGNOSIS — E55.9 VITAMIN D DEFICIENCY: ICD-10-CM

## 2020-10-06 DIAGNOSIS — Z15.09 LYNCH SYNDROME: ICD-10-CM

## 2020-10-06 DIAGNOSIS — Z15.09 MSH2-RELATED LYNCH SYNDROME (HNPCC1): ICD-10-CM

## 2020-10-06 DIAGNOSIS — Z85.038 HISTORY OF COLON CANCER, STAGE II: Primary | ICD-10-CM

## 2020-10-06 PROCEDURE — 99999 PR PBB SHADOW E&M-EST. PATIENT-LVL IV: ICD-10-PCS | Mod: PBBFAC,,, | Performed by: NURSE PRACTITIONER

## 2020-10-06 PROCEDURE — 99213 OFFICE O/P EST LOW 20 MIN: CPT | Mod: S$GLB,,, | Performed by: NURSE PRACTITIONER

## 2020-10-06 PROCEDURE — 99213 PR OFFICE/OUTPT VISIT, EST, LEVL III, 20-29 MIN: ICD-10-PCS | Mod: S$GLB,,, | Performed by: NURSE PRACTITIONER

## 2020-10-06 PROCEDURE — 99999 PR PBB SHADOW E&M-EST. PATIENT-LVL IV: CPT | Mod: PBBFAC,,, | Performed by: NURSE PRACTITIONER

## 2020-10-06 NOTE — PROGRESS NOTES
Subjective:       Patient ID: Lyndon Lion Jr. is a 50 y.o. male.    Chief Complaint: Follow-up  Annual surveillance for colon cancer  HPI This is a 50-year-old white gentleman known to Dr. Cárdenas for Stage II adenocarcinoma of the colon for which he is status post resection (2008) and 12 cycles of adjuvant FOLFOX.  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    He presents to the clinic today for his annual evaluation. He reports a recent colonoscopy in July/2020.  He reports increased fatigue, but able to perform throughout the day. He denies any discomfort with fevers, chills, drenching night sweats, changes in the character or caliber of his stool, abdominal discomfort/bloating, nausea, vomiting, constipation, diarrhea, unexplained weight loss, irregular heartbeat, chest pain, rectal bleeding, etc.  No other new complaints or pertinent findings on a 14-point review of systems.     Review of Systems   Constitutional: Negative.    HENT: Negative.    Eyes: Negative.    Respiratory: Negative.    Cardiovascular: Negative.    Gastrointestinal: Negative.    Endocrine: Negative.    Genitourinary: Negative.    Musculoskeletal: Negative.    Skin: Positive for color change.        Rosacea to nose   Allergic/Immunologic: Negative.    Neurological: Negative.    Hematological: Negative.    Psychiatric/Behavioral: Negative.        Objective:     Weight:  Gain of 6 1/2 pounds in 1 year  Wt Readings from Last 3 Encounters:   10/06/20 126.4 kg (278 lb 10.6 oz)   07/22/20 120.2 kg (265 lb)   07/08/20 123.3 kg (271 lb 13.2 oz)     Temp Readings from Last 3 Encounters:   10/06/20 97.1 °F (36.2 °C) (Temporal)   07/22/20 98.2 °F (36.8 °C) (Temporal)   09/19/19 98.1 °F (36.7 °C) (Oral)     BP Readings from Last 3 Encounters:   10/06/20 124/77   07/22/20 131/87   07/08/20 126/82     Pulse Readings from Last 3 Encounters:   10/06/20 76   07/22/20 (!) 51   07/08/20 61     Physical Exam  Constitutional:        Appearance: He is well-developed.   HENT:      Head: Normocephalic and atraumatic.   Eyes:      Conjunctiva/sclera: Conjunctivae normal.      Pupils: Pupils are equal, round, and reactive to light.   Neck:      Musculoskeletal: Normal range of motion and neck supple.   Cardiovascular:      Rate and Rhythm: Normal rate and regular rhythm.      Heart sounds: Normal heart sounds.   Pulmonary:      Effort: Pulmonary effort is normal.      Breath sounds: Normal breath sounds.   Abdominal:      General: Bowel sounds are normal.      Palpations: Abdomen is soft.   Musculoskeletal: Normal range of motion.   Skin:     General: Skin is warm and dry.      Capillary Refill: Capillary refill takes less than 2 seconds.      Comments: Erythema to nose   Neurological:      Mental Status: He is alert and oriented to person, place, and time.   Psychiatric:         Speech: Speech normal.         Behavior: Behavior normal.         Thought Content: Thought content normal.         Judgment: Judgment normal.         LABORATORY:    Lab Results   Component Value Date    WBC 5.36 09/03/2020    RBC 5.21 09/03/2020    HGB 15.8 09/03/2020    HCT 44.3 09/03/2020    MCV 85 09/03/2020    MCH 30.3 09/03/2020    MCHC 35.7 09/03/2020    RDW 12.9 09/03/2020     09/03/2020    MPV 9.1 (L) 09/03/2020    GRAN 2.6 09/03/2020    GRAN 47.6 09/03/2020    LYMPH 2.1 09/03/2020    LYMPH 38.4 09/03/2020    MONO 0.6 09/03/2020    MONO 10.4 09/03/2020    EOS 0.2 09/03/2020    BASO 0.03 09/03/2020    EOSINOPHIL 3.0 09/03/2020    BASOPHIL 0.6 09/03/2020     CMP  Sodium   Date Value Ref Range Status   09/03/2020 137 136 - 145 mmol/L Final     Potassium   Date Value Ref Range Status   09/03/2020 3.7 3.5 - 5.1 mmol/L Final     Chloride   Date Value Ref Range Status   09/03/2020 107 95 - 110 mmol/L Final     CO2   Date Value Ref Range Status   09/03/2020 23 23 - 29 mmol/L Final     Glucose   Date Value Ref Range Status   09/03/2020 183 (H) 70 - 110 mg/dL Final      BUN, Bld   Date Value Ref Range Status   09/03/2020 22 (H) 6 - 20 mg/dL Final     Creatinine   Date Value Ref Range Status   09/03/2020 1.2 0.5 - 1.4 mg/dL Final   05/14/2013 1.3 0.5 - 1.4 mg/dL Final     Calcium   Date Value Ref Range Status   09/03/2020 8.9 8.7 - 10.5 mg/dL Final   05/14/2013 9.8 8.7 - 10.5 mg/dL Final     Total Protein   Date Value Ref Range Status   09/03/2020 6.6 6.0 - 8.4 g/dL Final     Albumin   Date Value Ref Range Status   09/03/2020 3.7 3.5 - 5.2 g/dL Final     Total Bilirubin   Date Value Ref Range Status   09/03/2020 0.9 0.1 - 1.0 mg/dL Final     Comment:     For infants and newborns, interpretation of results should be based  on gestational age, weight and in agreement with clinical  observations.  Premature Infant recommended reference ranges:  Up to 24 hours.............<8.0 mg/dL  Up to 48 hours............<12.0 mg/dL  3-5 days..................<15.0 mg/dL  6-29 days.................<15.0 mg/dL       Alkaline Phosphatase   Date Value Ref Range Status   09/03/2020 64 55 - 135 U/L Final     AST   Date Value Ref Range Status   09/03/2020 20 10 - 40 U/L Final     ALT   Date Value Ref Range Status   09/03/2020 30 10 - 44 U/L Final     Anion Gap   Date Value Ref Range Status   09/03/2020 7 (L) 8 - 16 mmol/L Final   05/14/2013 10 5 - 15 meq/L Final     eGFR if    Date Value Ref Range Status   09/03/2020 >60.0 >60 mL/min/1.73 m^2 Final     eGFR if non    Date Value Ref Range Status   09/03/2020 >60.0 >60 mL/min/1.73 m^2 Final     Comment:     Calculation used to obtain the estimated glomerular filtration  rate (eGFR) is the CKD-EPI equation.        LDH:  144    Vitamin D:  19 (20, 24)    RADIOLOGY:  CXR dated 09/03/2020:  Impression:  No acute abnormality.    Colonoscopy:  Dated 07/22/2020:    Impression:   - The examined portion of the ileum was normal.                         - One 1 mm polyp at the anastomosis, removed with a                          cold snare. Resected and retrieved.                         - One 1 mm polyp at the anastomosis, removed with a                         cold snare. Resected and retrieved.                         - One 2 mm polyp in the rectum, removed with a cold                         snare. Resected and retrieved. Clip was placed.                         - Non-bleeding internal hemorrhoids.                         - Diverticulosis in the recto-sigmoid colon, in the                         sigmoid colon and in the descending colon. There                         was no evidence of diverticular bleeding.   Recommendation:       - Discharge patient to home.                         - Await pathology results.                         - Telephone endoscopist for pathology results in 2                         weeks.                         - Repeat colonoscopy in 1 year for surveillance.       Assessment:       1. History of colon cancer, stage II    2. MSH2-related Parada syndrome (HNPCC1)    3. Parada syndrome        4.  Rosacea - followed in Dermatology  5.  Hx of Vitamin D deficiency - on Ergocalciferol 50,000 weekly along with D3; f/u with Dr. Judd    Plan:       1.  Follow up in 1 year with interval CBC, CMP, LDH. Vitamin D & CXR.  2.  Colonoscopy tbd in 07/2021  3.  FMLA papers to be completed in November/2020; patient/wife Katheryn to drop off.     Assessment/plan reviewed and approved by Dr. Cárdenas.

## 2020-11-16 ENCOUNTER — LAB VISIT (OUTPATIENT)
Dept: LAB | Facility: HOSPITAL | Age: 50
End: 2020-11-16
Attending: INTERNAL MEDICINE
Payer: COMMERCIAL

## 2020-11-16 DIAGNOSIS — E55.9 VITAMIN D INSUFFICIENCY: ICD-10-CM

## 2020-11-16 LAB — 25(OH)D3+25(OH)D2 SERPL-MCNC: 22 NG/ML (ref 30–96)

## 2020-11-16 PROCEDURE — 36415 COLL VENOUS BLD VENIPUNCTURE: CPT | Mod: PO

## 2020-11-16 PROCEDURE — 82306 VITAMIN D 25 HYDROXY: CPT

## 2020-11-17 ENCOUNTER — TELEPHONE (OUTPATIENT)
Dept: GASTROENTEROLOGY | Facility: CLINIC | Age: 50
End: 2020-11-17

## 2020-11-17 ENCOUNTER — OFFICE VISIT (OUTPATIENT)
Dept: FAMILY MEDICINE | Facility: CLINIC | Age: 50
End: 2020-11-17
Payer: COMMERCIAL

## 2020-11-17 VITALS
WEIGHT: 278.44 LBS | SYSTOLIC BLOOD PRESSURE: 138 MMHG | HEART RATE: 59 BPM | TEMPERATURE: 98 F | BODY MASS INDEX: 35.73 KG/M2 | OXYGEN SATURATION: 97 % | RESPIRATION RATE: 16 BRPM | HEIGHT: 74 IN | DIASTOLIC BLOOD PRESSURE: 88 MMHG

## 2020-11-17 DIAGNOSIS — R73.9 HYPERGLYCEMIA: Primary | ICD-10-CM

## 2020-11-17 DIAGNOSIS — E55.9 VITAMIN D INSUFFICIENCY: Primary | ICD-10-CM

## 2020-11-17 PROCEDURE — 99999 PR PBB SHADOW E&M-EST. PATIENT-LVL IV: ICD-10-PCS | Mod: PBBFAC,,, | Performed by: FAMILY MEDICINE

## 2020-11-17 PROCEDURE — 99999 PR PBB SHADOW E&M-EST. PATIENT-LVL IV: CPT | Mod: PBBFAC,,, | Performed by: FAMILY MEDICINE

## 2020-11-17 PROCEDURE — 99396 PR PREVENTIVE VISIT,EST,40-64: ICD-10-PCS | Mod: S$GLB,,, | Performed by: FAMILY MEDICINE

## 2020-11-17 PROCEDURE — 99396 PREV VISIT EST AGE 40-64: CPT | Mod: S$GLB,,, | Performed by: FAMILY MEDICINE

## 2020-11-17 RX ORDER — ERGOCALCIFEROL 1.25 MG/1
50000 CAPSULE ORAL
Qty: 12 CAPSULE | Refills: 0 | Status: SHIPPED | OUTPATIENT
Start: 2020-11-17 | End: 2021-02-03

## 2020-11-17 RX ORDER — ACETAMINOPHEN 500 MG
1 TABLET ORAL DAILY
Qty: 90 CAPSULE | Refills: 3 | Status: SHIPPED | OUTPATIENT
Start: 2020-11-17 | End: 2021-11-17

## 2020-11-17 RX ORDER — OMEPRAZOLE 20 MG/1
20 CAPSULE, DELAYED RELEASE ORAL DAILY
COMMUNITY
Start: 2020-10-26 | End: 2021-12-06 | Stop reason: ALTCHOICE

## 2020-11-17 RX ORDER — METRONIDAZOLE 7.5 MG/G
1 CREAM TOPICAL 2 TIMES DAILY
COMMUNITY
Start: 2020-11-06 | End: 2021-11-08 | Stop reason: SDUPTHER

## 2020-11-17 RX ORDER — MUPIROCIN 20 MG/G
OINTMENT TOPICAL
COMMUNITY
Start: 2020-10-31 | End: 2022-09-19

## 2020-11-17 RX ORDER — FENOPROFEN CALCIUM 200 MG/1
1 CAPSULE ORAL DAILY PRN
Status: ON HOLD | COMMUNITY
Start: 2020-10-26 | End: 2021-05-27 | Stop reason: HOSPADM

## 2020-11-17 NOTE — PATIENT INSTRUCTIONS
Walking for Fitness  Fitness walking has something for everyone, even people who are already fit. Walking is one of the safest ways to condition your body aerobically. It can boost energy, help you lose weight, and reduce stress.    Physical benefits  · Walking strengthens your heart and lungs, and tones your muscles.  · When walking, your feet land with less impact than in other sports. This reduces chances of muscle, bone, and joint injury.  · Regular walking improves your cholesterol levels and lowers your risk of heart disease. And it helps you control your blood sugar if you have diabetes.  · Walking is a weight-bearing activity, which helps maintain bone density. This can help prevent osteoporosis.  Personal rewards  · Taking walks can help you relax and manage stress. And fitness walking may make you feel better about yourself.  · Walking can help you sleep better at night and make you less likely to be depressed.  · Regular walking may help maintain your memory as you get older.  · Walking is a great way to spend extra time with friends and family members. Be sure to invite your dog along!  Q&A about fitness walking  Q: Will walking keep me fit?  A: Yes. Regular walking at the right pace gives you all the benefits of other aerobic activities, such as jogging and swimming.  Q: Will walking help me lose weight and keep it off?  A: Yes. Per mile, walking can burn as many calories as jogging. Your health care provider can help work walking into your weight-loss plan.  Q: Is walking safe for my health?  A: Yes. Walking is safe if you have high blood pressure, diabetes, heart disease, or other conditions. Talk to your healthcare provider before you start.  Date Last Reviewed: 4/1/2017 © 2000-2017 [employer]. [address] [place] [address], [place] [address]. All rights reserved. This information is not intended as a substitute for professional medical care. Always follow your healthcare professional's  instructions.        Weight Management: Getting Started  Healthy bodies come in all shapes and sizes. Not all bodies are made to be thin. For some people, a healthy weight is higher than the average weight listed on weight charts. Your healthcare provider can help you decide on a healthy weight for you.    Reasons to lose weight  Losing weight can help with some health problems, such as high blood pressure, heart disease, diabetes, sleep apnea, and arthritis. You may also feel more energy.  Set your long-term goal  Your goal doesn't even have to be a specific weight. You may decide on a fitness goal (such as being able to walk 10 miles a week), or a health goal (such as lowering your blood pressure). Choose a goal that is measurable and reasonable, so you know when you've reached it. A goal of reaching a BMI of less than 25 is not always reasonable (or possible).   Make an action plan  Habits dont change overnight. Setting your goals too high can leave you feeling discouraged if you cant reach them. Be realistic. Choose one or two small changes you can make now. Set an action plan for how you are going to make these changes. When you can stick to this plan, keep making a few more small changes. Taking small steps will help you stay on the path to success.  Track your progress  Write down your goals. Then, keep a daily record of your progress. Write down what you eat and how active you are. This record lets you look back on how much youve done. It may also help when youre feeling frustrated. Reward yourself for success. Even if you dont reach every goal, give yourself credit for what you do get done.  Get support  Encouragement from others can help make losing weight easier. Ask your family members and friends for support. They may even want to join you. Also look to your healthcare provider, registered dietitian, and  for help. Your local hospital can give you more information about  nutrition, exercise, and weight loss.  Date Last Reviewed: 1/31/2016  © 8240-3692 [employer]. [address] [place] [address], [place] [address]. All rights reserved. This information is not intended as a substitute for professional medical care. Always follow your healthcare professional's instructions.        Controlling High Blood Pressure  High blood pressure (hypertension) is often called the silent killer. This is because many people who have it dont know it. High blood pressure is defined as 140/90 mm Hg or higher. Know your blood pressure and remember to check it regularly. Doing so can save your life. Here are some things you can do to help control your blood pressure.    Choose heart-healthy foods  · Select low-salt, low-fat foods. Limit sodium intake to 2,400 mg per day or the amount suggested by your healthcare provider.  · Limit canned, dried, cured, packaged, and fast foods. These can contain a lot of salt.  · Eat 8 to 10 servings of fruits and vegetables every day.  · Choose lean meats, fish, or chicken.  · Eat whole-grain pasta, brown rice, and beans.  · Eat 2 to 3 servings of low-fat or fat-free dairy products.  · Ask your doctor about the DASH eating plan. This plan helps reduce blood pressure.  · When you go to a restaurant, ask that your meal be prepared with no added salt.  Maintain a healthy weight  · Ask your healthcare provider how many calories to eat a day. Then stick to that number.  · Ask your healthcare provider what weight range is healthiest for you. If you are overweight, a weight loss of only 3% to 5% of your body weight can help lower blood pressure. Generally, a good weight loss goal is to lose 10% of your body weight in a year.  · Limit snacks and sweets.  · Get regular exercise.  Get up and get active  · Choose activities you enjoy. Find ones you can do with friends or family. This includes bicycling, dancing, walking, and jogging.  · Park farther away from building  entrances.  · Use stairs instead of the elevator.  · When you can, walk or bike instead of driving.  · [place] leaves, garden, or do household repairs.  · Be active at a moderate to vigorous level of physical activity for at least 40 minutes for a minimum of 3 to 4 days a week.   Manage stress  · Make time to relax and enjoy life. Find time to laugh.  · Communicate your concerns with your loved ones and your healthcare provider.  · Visit with family and friends, and keep up with hobbies.  Limit alcohol and quit smoking  · Men should have no more than 2 drinks per day.  · Women should have no more than 1 drink per day.  · Talk with your healthcare provider about quitting smoking. Smoking significantly increases your risk for heart disease and stroke. Ask your healthcare provider about community smoking cessation programs and other options.  Medicines  If lifestyle changes arent enough, your healthcare provider may prescribe high blood pressure medicine. Take all medicines as prescribed. If you have any questions about your medicines, ask your healthcare provider before stopping or changing them.   Date Last Reviewed: 4/27/2016 © 2000-2017 [employer]. [address] [place] [address]. All rights reserved. This information is not intended as a substitute for professional medical care. Always follow your healthcare professional's instructions.      The patient is asked to make an attempt to improve diet and exercise patterns to aid in medical management of this problem.  The patient is asked to make an attempt to improve diet and exercise patterns to aid in medical management of this problem.  The patient is asked to make an attempt to improve diet and exercise patterns to aid in medical management of this problem.    4 Steps for Eating Healthier  Changing the way you eat can improve your health. It can lower your cholesterol and blood pressure, and help you stay at a healthy weight. Your diet  doesnt have to be bland and boring to be healthy. Just watch your calories and follow these steps:    1. Eat fewer unhealthy fats  · Choose more fish and lean meats instead of fatty cuts of meat.  · Skip butter and lard, and use less margarine.  · Pass on foods that have palm, coconut, or hydrogenated oils.  · Eat fewer high-fat dairy foods like cheese, ice cream, and whole milk.  · Get a heart-healthy cookbook and try some low-fat recipes.  2. Go light on salt  · Keep the saltshaker off the table.  · Limit high-salt ingredients, such as soy sauce, bouillon, and garlic salt.  · Instead of adding salt when cooking, season your food with herbs and flavorings. Try lemon, garlic, and onion.  · Limit convenience foods, such as boxed or canned foods and restaurant food.  · Read food labels and choose lower-sodium options.  3. Limit sugar  · Pause before you add sugars to pancakes, cereal, coffee, or tea. This includes white and brown table sugar, syrup, honey, and molasses. Cut your usual amount by half.  · Use non-sugar sweeteners. Stevia, aspartame, and sucralose can satisfy a sweet tooth without adding calories.  · Swap out sugar-filled soda and other drinks. Buy sugar-free or low-calorie beverages. Remember water is always the best choice.  · Read labels and choose foods with less added sugar. Keep in mind that dairy foods and foods with fruit will have some natural sugar.  · Cut the sugar in recipes by 1/3 to 1/2. Boost the flavor with extracts like almond, vanilla, or orange. Or add spices such as cinnamon or nutmeg.  4. Eat more fiber  · Eat fresh fruits and vegetables every day.  · Boost your diet with whole grains. Go for oats, whole-grain rice, and bran.  · Add beans and lentils to your meals.  · Drink more water to match your fiber increase. This is to help prevent constipation.  Date Last Reviewed: 5/11/2015  © 8209-2284 [employer]. [address] [place] [address], [place] [address]. All rights  "reserved. This information is not intended as a substitute for professional medical care. Always follow your healthcare professional's instructions.        MyPlate Worksheet: 1,200 Calories  Your calorie needs are about 1,200 calories a day. Below are the U.S. Department of Agriculture (USDA) guidelines for your daily recommended amount of each food group.  Vegetables  1½ cups Fruits  1 cup Grains  4 ounces Dairy  2½ cups Protein  3 ounces   Eat a variety of vegetables each day.  Aim for these amounts each week:  · 1 cup dark green vegetables  · 3 cups red or orange-colored vegetables  · ½ cup dry beans and peas  · 3½ cups starchy vegetables  · 2½ cups other vegetables Eat a variety of fruits each day.  Go easy on fruit juices.  Good choices of fruits include:  · Berries  · Bananas  · Apples  · Melon  · Dried fruit  · Frozen fruit  · Canned fruit Choose whole grains whenever you can.  Aim to eat at least 2 ounces of whole grains each day:  · Bread  · Cereal  · Rice  · Pasta  · Potatoes  · Tortillas Choose low-fat or fat-free milk, yogurt, or cheese each day.  Good choices include:  · Low-fat or fat-free milk or chocolate milk  · Low-fat or fat-free yogurt  · Low-fat or fat-free cottage cheese or other reduced-fat cheeses  · Calcium-fortified milk alternatives Choose low-fat or lean meats, poultry, fish and seafood each day.  Vary your protein. Choose more:  · Fish and other seafood  · Lean low-fat meat and poultry  · Eggs  · Beans, peas  · Tofu  · Unsalted nuts and seeds  Choose less high-fat and red meat.   Source: USDA MyPlate, www.choosemyplate.gov  Know your limits on oils (fats) and sugars:  · Your allowance for oils is 17 grams or about 4 teaspoons a day (oil includes vegetable oil, mayonnaise, soft margarine, salad dressing, nuts, olives, avocados, and some fish).  · Limit the extras (solid fats and sugars, also called "empty calories") to 100 calories a day.  · Cut back on salt (sodium). Stay under 2,300 mg " sodium a day. If you have a health condition such as heart disease or high blood pressure, your doctor will likely tell you to limit sodium to no more than 1,500 mg a day.  Get moving and be active!  Aim for at least 30 minutes of physical activity most days of the week or 150 minutes of exercise a week.  MyPlate Servings Worksheet: 1,200 calories  This worksheet tells you how many servings you should get each day from each food group, and tells you how much food makes a serving. Use this as a guide as you plan your meals throughout the day. Track your progress daily by writing in what you actually ate.  Food Group  Daily MyPlate Goal  What You Ate Today    Vegetables 3 Half-cups or 3 Servings  One serving is:  ½ cup cut-up raw or cooked vegetables  1 cup raw, leafy vegetables  ½ baked sweet potato  ½ cup vegetable juice  Note: At meals, fill half your plate with vegetables and fruit.     Fruits 2 Half-cups or 2 Servings  One serving is:  ½ cup fresh, frozen, or canned fruit  1 medium piece of fruit  1 cup of berries or melon  ½ cup dried fruit  ½ cup 100% fruit juice  Note: Make most choices fruit instead of juice.     Grains 4 Servings or 4 Ounces  One serving is:  1 slice bread  1 cup dry cereal  ½ cup cooked rice, pasta, or cereal  1 5-inch tortilla  Note: Choose whole grains for at least half of your servings each day.     Dairy 2 Servings or 2 Cups  One serving is:  1 cup milk  1½ ounces reduced-fat hard cheese  2 ounces processed cheese  1 cup low-fat yogurt  1/3 cup shredded cheese  Note: Choose low-fat or fat-free most often.     Protein 3 Servings or 3 Ounces  One serving is:  1 ounce cooked lean beef, pork, lamb, or ham  1 ounce cooked chicken or turkey (no skin)  1 ounce cooked fish or shellfish (not fried)  1 egg  ¼ cup egg substitute  ½ ounce nuts or seeds  1 tablespoon peanut or almond butter  ¼ cup cooked dry beans or peas  ½ cup tofu  2 tablespoons hummus     Date Last Reviewed: 6/1/2015  ©  6023-9668 [employer]. [address] [place] [address], [place] [address]. All rights reserved. This information is not intended as a substitute for professional medical care. Always follow your healthcare professional's instructions.

## 2020-11-17 NOTE — PROGRESS NOTES
Subjective:       Patient ID: Lyndon Lion Jr. is a 50 y.o. male.    Chief Complaint: Annual Exam    Annual exam    Review of Systems   Constitutional: Negative for fatigue and unexpected weight change.   Respiratory: Negative for chest tightness and shortness of breath.    Cardiovascular: Negative for chest pain, palpitations and leg swelling.   Gastrointestinal: Negative for abdominal pain.   Genitourinary: Positive for flank pain and urgency.   Musculoskeletal: Positive for back pain and myalgias. Negative for arthralgias.   Neurological: Negative for dizziness, syncope, light-headedness and headaches.       Patient Active Problem List   Diagnosis    Radiculopathy, lumbosacral region    Herniated lumbar intervertebral disc    Lumbar spondylosis    DDD (degenerative disc disease), lumbosacral    Greater trochanteric bursitis of left hip    Acute medial meniscal tear    Obesity, unspecified    Parada syndrome    Thoracic or lumbosacral neuritis or radiculitis, unspecified    Arthritis of shoulder region, right    Neural foraminal stenosis of lumbar spine    MSH2-related Parada syndrome (HNPCC1)    LFT elevation    Spondylosis of lumbar region without myelopathy or radiculopathy    Spondylolisthesis of lumbar region    Nonalcoholic fatty liver disease    Splenomegaly    Mild vitamin D deficiency    Colon dysplasia    Colon adenoma    Pneumoperitoneum    Postpolypectomy electrocoagulation syndrome    Lumbar radiculopathy    History of colon cancer, stage II       Objective:      Physical Exam  Vitals signs reviewed.   Constitutional:       General: He is not in acute distress.     Appearance: He is well-developed. He is not diaphoretic.   HENT:      Head: Normocephalic and atraumatic.      Right Ear: External ear normal.      Left Ear: External ear normal.      Nose: Nose normal.      Mouth/Throat:      Pharynx: No oropharyngeal exudate.   Eyes:      General: No scleral icterus.         Right eye: No discharge.         Left eye: No discharge.      Conjunctiva/sclera: Conjunctivae normal.      Pupils: Pupils are equal, round, and reactive to light.   Neck:      Musculoskeletal: Normal range of motion and neck supple.      Thyroid: No thyromegaly.      Vascular: No JVD.      Trachea: No tracheal deviation.   Cardiovascular:      Rate and Rhythm: Normal rate.      Heart sounds: Normal heart sounds. No murmur.   Pulmonary:      Effort: Pulmonary effort is normal. No respiratory distress.      Breath sounds: Normal breath sounds. No wheezing or rales.   Abdominal:      General: Bowel sounds are normal. There is no distension.      Palpations: Abdomen is soft. There is no mass.      Tenderness: There is no abdominal tenderness. There is no guarding or rebound.   Musculoskeletal:         General: No tenderness.      Comments:        Lymphadenopathy:      Cervical: No cervical adenopathy.   Skin:     General: Skin is warm and dry.      Coloration: Skin is not pale.      Findings: No erythema or rash.   Neurological:      Mental Status: He is alert and oriented to person, place, and time.      Cranial Nerves: No cranial nerve deficit.      Coordination: Coordination normal.   Psychiatric:         Behavior: Behavior normal.         Thought Content: Thought content normal.         Judgment: Judgment normal.         Lab Results   Component Value Date    WBC 5.36 09/03/2020    HGB 15.8 09/03/2020    HCT 44.3 09/03/2020     09/03/2020    CHOL 173 06/20/2020    TRIG 80 06/20/2020    HDL 44 06/20/2020    ALT 30 09/03/2020    AST 20 09/03/2020     11/20/2020    K 3.7 11/20/2020     11/20/2020    CREATININE 1.1 11/20/2020    BUN 14 11/20/2020    CO2 25 11/20/2020    TSH 1.559 11/20/2020    PSA 0.45 03/19/2019    INR 1.0 02/06/2019    HGBA1C 7.0 (H) 11/20/2020     The 10-year ASCVD risk score (Kenner ARCADIO JrGuy, et al., 2013) is: 6%    Values used to calculate the score:      Age: 50 years      Sex:  Male      Is Non- : No      Diabetic: Yes      Tobacco smoker: No      Systolic Blood Pressure: 134 mmHg      Is BP treated: No      HDL Cholesterol: 44 mg/dL      Total Cholesterol: 173 mg/dL    Assessment:       1. Hyperglycemia        Plan:       Hyperglycemia  -     Basic Metabolic Panel; Future; Expected date: 11/17/2020  -     Hemoglobin A1C; Future; Expected date: 11/17/2020  -     Calcium, Ionized; Future; Expected date: 11/17/2020  -     PTH, intact; Future; Expected date: 11/17/2020  -     PHOSPHORUS; Future; Expected date: 11/17/2020  -     TSH; Future; Expected date: 11/17/2020      Patient education given on Diet/exercises and the patient expresses understanding and acceptance of instructions. Evan Judd 12/27/2020 6:46 PM

## 2020-11-20 ENCOUNTER — LAB VISIT (OUTPATIENT)
Dept: LAB | Facility: HOSPITAL | Age: 50
End: 2020-11-20
Attending: FAMILY MEDICINE
Payer: COMMERCIAL

## 2020-11-20 DIAGNOSIS — R73.9 HYPERGLYCEMIA: ICD-10-CM

## 2020-11-20 LAB
ANION GAP SERPL CALC-SCNC: 8 MMOL/L (ref 8–16)
BUN SERPL-MCNC: 14 MG/DL (ref 6–20)
CA-I BLDV-SCNC: 1.18 MMOL/L (ref 1.06–1.42)
CALCIUM SERPL-MCNC: 8.4 MG/DL (ref 8.7–10.5)
CHLORIDE SERPL-SCNC: 106 MMOL/L (ref 95–110)
CO2 SERPL-SCNC: 25 MMOL/L (ref 23–29)
CREAT SERPL-MCNC: 1.1 MG/DL (ref 0.5–1.4)
EST. GFR  (AFRICAN AMERICAN): >60 ML/MIN/1.73 M^2
EST. GFR  (NON AFRICAN AMERICAN): >60 ML/MIN/1.73 M^2
ESTIMATED AVG GLUCOSE: 154 MG/DL (ref 68–131)
GLUCOSE SERPL-MCNC: 128 MG/DL (ref 70–110)
HBA1C MFR BLD HPLC: 7 % (ref 4–5.6)
PHOSPHATE SERPL-MCNC: 2.7 MG/DL (ref 2.7–4.5)
POTASSIUM SERPL-SCNC: 3.7 MMOL/L (ref 3.5–5.1)
PTH-INTACT SERPL-MCNC: 126 PG/ML (ref 9–77)
SODIUM SERPL-SCNC: 139 MMOL/L (ref 136–145)
TSH SERPL DL<=0.005 MIU/L-ACNC: 1.56 UIU/ML (ref 0.4–4)

## 2020-11-20 PROCEDURE — 82330 ASSAY OF CALCIUM: CPT

## 2020-11-20 PROCEDURE — 36415 COLL VENOUS BLD VENIPUNCTURE: CPT

## 2020-11-20 PROCEDURE — 83970 ASSAY OF PARATHORMONE: CPT

## 2020-11-20 PROCEDURE — 83036 HEMOGLOBIN GLYCOSYLATED A1C: CPT

## 2020-11-20 PROCEDURE — 84100 ASSAY OF PHOSPHORUS: CPT

## 2020-11-20 PROCEDURE — 80048 BASIC METABOLIC PNL TOTAL CA: CPT

## 2020-11-20 PROCEDURE — 84443 ASSAY THYROID STIM HORMONE: CPT

## 2020-12-03 DIAGNOSIS — E11.9 TYPE 2 DIABETES MELLITUS WITHOUT COMPLICATION, UNSPECIFIED WHETHER LONG TERM INSULIN USE: ICD-10-CM

## 2020-12-07 ENCOUNTER — OFFICE VISIT (OUTPATIENT)
Dept: URGENT CARE | Facility: CLINIC | Age: 50
End: 2020-12-07
Payer: COMMERCIAL

## 2020-12-07 VITALS
SYSTOLIC BLOOD PRESSURE: 134 MMHG | DIASTOLIC BLOOD PRESSURE: 86 MMHG | RESPIRATION RATE: 18 BRPM | OXYGEN SATURATION: 99 % | HEART RATE: 67 BPM | TEMPERATURE: 98 F

## 2020-12-07 DIAGNOSIS — Z20.822 EXPOSURE TO COVID-19 VIRUS: Primary | ICD-10-CM

## 2020-12-07 LAB
CTP QC/QA: YES
SARS-COV-2 RDRP RESP QL NAA+PROBE: NEGATIVE

## 2020-12-07 PROCEDURE — U0002: ICD-10-PCS | Mod: QW,S$GLB,, | Performed by: EMERGENCY MEDICINE

## 2020-12-07 PROCEDURE — 99203 PR OFFICE/OUTPT VISIT, NEW, LEVL III, 30-44 MIN: ICD-10-PCS | Mod: S$GLB,,, | Performed by: EMERGENCY MEDICINE

## 2020-12-07 PROCEDURE — 99203 OFFICE O/P NEW LOW 30 MIN: CPT | Mod: S$GLB,,, | Performed by: EMERGENCY MEDICINE

## 2020-12-07 PROCEDURE — U0002 COVID-19 LAB TEST NON-CDC: HCPCS | Mod: QW,S$GLB,, | Performed by: EMERGENCY MEDICINE

## 2020-12-10 DIAGNOSIS — E11.9 TYPE 2 DIABETES MELLITUS WITHOUT COMPLICATION: ICD-10-CM

## 2021-01-04 ENCOUNTER — PATIENT MESSAGE (OUTPATIENT)
Dept: ADMINISTRATIVE | Facility: HOSPITAL | Age: 51
End: 2021-01-04

## 2021-01-06 ENCOUNTER — OFFICE VISIT (OUTPATIENT)
Dept: FAMILY MEDICINE | Facility: CLINIC | Age: 51
End: 2021-01-06
Payer: COMMERCIAL

## 2021-01-06 ENCOUNTER — PATIENT MESSAGE (OUTPATIENT)
Dept: BARIATRICS | Facility: CLINIC | Age: 51
End: 2021-01-06

## 2021-01-06 VITALS
HEIGHT: 74 IN | OXYGEN SATURATION: 97 % | SYSTOLIC BLOOD PRESSURE: 120 MMHG | WEIGHT: 276.88 LBS | TEMPERATURE: 98 F | HEART RATE: 70 BPM | DIASTOLIC BLOOD PRESSURE: 84 MMHG | BODY MASS INDEX: 35.53 KG/M2 | RESPIRATION RATE: 18 BRPM

## 2021-01-06 DIAGNOSIS — R73.03 PREDIABETES: ICD-10-CM

## 2021-01-06 DIAGNOSIS — E66.01 SEVERE OBESITY (BMI 35.0-39.9) WITH COMORBIDITY: ICD-10-CM

## 2021-01-06 DIAGNOSIS — Z98.84 HISTORY OF GASTRIC BYPASS: ICD-10-CM

## 2021-01-06 DIAGNOSIS — R73.9 HYPERGLYCEMIA: Primary | ICD-10-CM

## 2021-01-06 LAB — GLUCOSE SERPL-MCNC: 122 MG/DL (ref 70–110)

## 2021-01-06 PROCEDURE — 99214 PR OFFICE/OUTPT VISIT, EST, LEVL IV, 30-39 MIN: ICD-10-PCS | Mod: S$GLB,,, | Performed by: FAMILY MEDICINE

## 2021-01-06 PROCEDURE — 82962 GLUCOSE BLOOD TEST: CPT | Mod: S$GLB,,, | Performed by: FAMILY MEDICINE

## 2021-01-06 PROCEDURE — 99999 PR PBB SHADOW E&M-EST. PATIENT-LVL V: ICD-10-PCS | Mod: PBBFAC,,, | Performed by: FAMILY MEDICINE

## 2021-01-06 PROCEDURE — 99214 OFFICE O/P EST MOD 30 MIN: CPT | Mod: S$GLB,,, | Performed by: FAMILY MEDICINE

## 2021-01-06 PROCEDURE — 82962 POCT GLUCOSE, HAND-HELD DEVICE: ICD-10-PCS | Mod: S$GLB,,, | Performed by: FAMILY MEDICINE

## 2021-01-06 PROCEDURE — 99999 PR PBB SHADOW E&M-EST. PATIENT-LVL V: CPT | Mod: PBBFAC,,, | Performed by: FAMILY MEDICINE

## 2021-01-06 RX ORDER — METFORMIN HYDROCHLORIDE 500 MG/1
500 TABLET, EXTENDED RELEASE ORAL
Qty: 90 TABLET | Refills: 3 | Status: CANCELLED | OUTPATIENT
Start: 2021-01-06 | End: 2022-01-06

## 2021-02-01 ENCOUNTER — PATIENT MESSAGE (OUTPATIENT)
Dept: HEMATOLOGY/ONCOLOGY | Facility: CLINIC | Age: 51
End: 2021-02-01

## 2021-02-18 DIAGNOSIS — L71.9 ROSACEA: ICD-10-CM

## 2021-02-18 RX ORDER — DOXYCYCLINE 100 MG/1
TABLET ORAL
Qty: 30 TABLET | Refills: 2 | Status: SHIPPED | OUTPATIENT
Start: 2021-02-18 | End: 2021-05-23 | Stop reason: SDUPTHER

## 2021-02-19 ENCOUNTER — PATIENT MESSAGE (OUTPATIENT)
Dept: DERMATOLOGY | Facility: CLINIC | Age: 51
End: 2021-02-19

## 2021-03-01 ENCOUNTER — PATIENT MESSAGE (OUTPATIENT)
Dept: HEMATOLOGY/ONCOLOGY | Facility: CLINIC | Age: 51
End: 2021-03-01

## 2021-03-03 ENCOUNTER — PATIENT OUTREACH (OUTPATIENT)
Dept: ADMINISTRATIVE | Facility: OTHER | Age: 51
End: 2021-03-03

## 2021-03-03 ENCOUNTER — LAB VISIT (OUTPATIENT)
Dept: LAB | Facility: HOSPITAL | Age: 51
End: 2021-03-03
Attending: FAMILY MEDICINE
Payer: COMMERCIAL

## 2021-03-03 DIAGNOSIS — R73.9 HYPERGLYCEMIA: ICD-10-CM

## 2021-03-03 DIAGNOSIS — R73.03 PREDIABETES: ICD-10-CM

## 2021-03-03 LAB
ALBUMIN SERPL BCP-MCNC: 3.9 G/DL (ref 3.5–5.2)
ALP SERPL-CCNC: 70 U/L (ref 55–135)
ALT SERPL W/O P-5'-P-CCNC: 41 U/L (ref 10–44)
ANION GAP SERPL CALC-SCNC: 8 MMOL/L (ref 8–16)
AST SERPL-CCNC: 24 U/L (ref 10–40)
BILIRUB SERPL-MCNC: 0.4 MG/DL (ref 0.1–1)
BUN SERPL-MCNC: 16 MG/DL (ref 6–20)
CALCIUM SERPL-MCNC: 8.7 MG/DL (ref 8.7–10.5)
CHLORIDE SERPL-SCNC: 107 MMOL/L (ref 95–110)
CO2 SERPL-SCNC: 24 MMOL/L (ref 23–29)
CREAT SERPL-MCNC: 1.2 MG/DL (ref 0.5–1.4)
EST. GFR  (AFRICAN AMERICAN): >60 ML/MIN/1.73 M^2
EST. GFR  (NON AFRICAN AMERICAN): >60 ML/MIN/1.73 M^2
GLUCOSE SERPL-MCNC: 152 MG/DL (ref 70–110)
POTASSIUM SERPL-SCNC: 3.8 MMOL/L (ref 3.5–5.1)
PROT SERPL-MCNC: 6.9 G/DL (ref 6–8.4)
SODIUM SERPL-SCNC: 139 MMOL/L (ref 136–145)

## 2021-03-03 PROCEDURE — 36415 COLL VENOUS BLD VENIPUNCTURE: CPT | Mod: PO | Performed by: FAMILY MEDICINE

## 2021-03-03 PROCEDURE — 83036 HEMOGLOBIN GLYCOSYLATED A1C: CPT | Performed by: FAMILY MEDICINE

## 2021-03-03 PROCEDURE — 80053 COMPREHEN METABOLIC PANEL: CPT | Performed by: FAMILY MEDICINE

## 2021-03-04 ENCOUNTER — OFFICE VISIT (OUTPATIENT)
Dept: DERMATOLOGY | Facility: CLINIC | Age: 51
End: 2021-03-04
Payer: COMMERCIAL

## 2021-03-04 ENCOUNTER — OFFICE VISIT (OUTPATIENT)
Dept: FAMILY MEDICINE | Facility: CLINIC | Age: 51
End: 2021-03-04
Payer: COMMERCIAL

## 2021-03-04 VITALS
TEMPERATURE: 98 F | OXYGEN SATURATION: 96 % | HEART RATE: 65 BPM | WEIGHT: 278.88 LBS | HEIGHT: 74 IN | BODY MASS INDEX: 35.79 KG/M2 | RESPIRATION RATE: 16 BRPM | DIASTOLIC BLOOD PRESSURE: 84 MMHG | SYSTOLIC BLOOD PRESSURE: 126 MMHG

## 2021-03-04 DIAGNOSIS — L71.9 ROSACEA: ICD-10-CM

## 2021-03-04 DIAGNOSIS — E11.65 UNCONTROLLED TYPE 2 DIABETES MELLITUS WITH HYPERGLYCEMIA: Primary | ICD-10-CM

## 2021-03-04 DIAGNOSIS — L82.1 SEBORRHEIC KERATOSES: Primary | ICD-10-CM

## 2021-03-04 DIAGNOSIS — R73.9 HYPERGLYCEMIA: ICD-10-CM

## 2021-03-04 DIAGNOSIS — Z15.09 LYNCH SYNDROME: ICD-10-CM

## 2021-03-04 DIAGNOSIS — E66.01 SEVERE OBESITY (BMI 35.0-39.9) WITH COMORBIDITY: ICD-10-CM

## 2021-03-04 DIAGNOSIS — L81.4 SOLAR LENTIGO: ICD-10-CM

## 2021-03-04 DIAGNOSIS — L73.8 SEBACEOUS HYPERPLASIA OF FACE: ICD-10-CM

## 2021-03-04 LAB
ESTIMATED AVG GLUCOSE: 154 MG/DL (ref 68–131)
HBA1C MFR BLD: 7 % (ref 4–5.6)

## 2021-03-04 PROCEDURE — 99214 PR OFFICE/OUTPT VISIT, EST, LEVL IV, 30-39 MIN: ICD-10-PCS | Mod: S$GLB,,, | Performed by: DERMATOLOGY

## 2021-03-04 PROCEDURE — 99999 PR PBB SHADOW E&M-EST. PATIENT-LVL IV: ICD-10-PCS | Mod: PBBFAC,,, | Performed by: FAMILY MEDICINE

## 2021-03-04 PROCEDURE — 99214 OFFICE O/P EST MOD 30 MIN: CPT | Mod: S$GLB,,, | Performed by: FAMILY MEDICINE

## 2021-03-04 PROCEDURE — 99214 PR OFFICE/OUTPT VISIT, EST, LEVL IV, 30-39 MIN: ICD-10-PCS | Mod: S$GLB,,, | Performed by: FAMILY MEDICINE

## 2021-03-04 PROCEDURE — 99214 OFFICE O/P EST MOD 30 MIN: CPT | Mod: S$GLB,,, | Performed by: DERMATOLOGY

## 2021-03-04 PROCEDURE — 99999 PR PBB SHADOW E&M-EST. PATIENT-LVL III: ICD-10-PCS | Mod: PBBFAC,,, | Performed by: DERMATOLOGY

## 2021-03-04 PROCEDURE — 99999 PR PBB SHADOW E&M-EST. PATIENT-LVL IV: CPT | Mod: PBBFAC,,, | Performed by: FAMILY MEDICINE

## 2021-03-04 PROCEDURE — 99999 PR PBB SHADOW E&M-EST. PATIENT-LVL III: CPT | Mod: PBBFAC,,, | Performed by: DERMATOLOGY

## 2021-03-04 RX ORDER — METFORMIN HYDROCHLORIDE 500 MG/1
500 TABLET, EXTENDED RELEASE ORAL
Qty: 90 TABLET | Refills: 1 | Status: SHIPPED | OUTPATIENT
Start: 2021-03-04 | End: 2021-04-23

## 2021-03-04 RX ORDER — DOXYCYCLINE HYCLATE 80 MG/1
TABLET, DELAYED RELEASE ORAL
Qty: 45 TABLET | Refills: 1 | Status: SHIPPED | OUTPATIENT
Start: 2021-03-04 | End: 2021-12-19 | Stop reason: ALTCHOICE

## 2021-04-05 ENCOUNTER — PATIENT MESSAGE (OUTPATIENT)
Dept: ADMINISTRATIVE | Facility: HOSPITAL | Age: 51
End: 2021-04-05

## 2021-04-08 ENCOUNTER — IMMUNIZATION (OUTPATIENT)
Dept: PRIMARY CARE CLINIC | Facility: CLINIC | Age: 51
End: 2021-04-08
Payer: COMMERCIAL

## 2021-04-08 DIAGNOSIS — Z23 NEED FOR VACCINATION: Primary | ICD-10-CM

## 2021-04-08 PROCEDURE — 91300 COVID-19, MRNA, LNP-S, PF, 30 MCG/0.3 ML DOSE VACCINE: ICD-10-PCS | Mod: S$GLB,,, | Performed by: FAMILY MEDICINE

## 2021-04-08 PROCEDURE — 0001A COVID-19, MRNA, LNP-S, PF, 30 MCG/0.3 ML DOSE VACCINE: CPT | Mod: CV19,S$GLB,, | Performed by: FAMILY MEDICINE

## 2021-04-08 PROCEDURE — 91300 COVID-19, MRNA, LNP-S, PF, 30 MCG/0.3 ML DOSE VACCINE: CPT | Mod: S$GLB,,, | Performed by: FAMILY MEDICINE

## 2021-04-08 PROCEDURE — 0001A COVID-19, MRNA, LNP-S, PF, 30 MCG/0.3 ML DOSE VACCINE: ICD-10-PCS | Mod: CV19,S$GLB,, | Performed by: FAMILY MEDICINE

## 2021-04-13 ENCOUNTER — PATIENT MESSAGE (OUTPATIENT)
Dept: GASTROENTEROLOGY | Facility: CLINIC | Age: 51
End: 2021-04-13

## 2021-04-13 DIAGNOSIS — Z15.09 PMS2-RELATED LYNCH SYNDROME (HNPCC4): Primary | ICD-10-CM

## 2021-04-13 DIAGNOSIS — Z15.09 MSH2-RELATED LYNCH SYNDROME (HNPCC1): ICD-10-CM

## 2021-04-19 ENCOUNTER — TELEPHONE (OUTPATIENT)
Dept: GASTROENTEROLOGY | Facility: CLINIC | Age: 51
End: 2021-04-19

## 2021-04-19 DIAGNOSIS — Z01.818 PRE-OP TESTING: ICD-10-CM

## 2021-04-19 DIAGNOSIS — Z12.11 SPECIAL SCREENING FOR MALIGNANT NEOPLASMS, COLON: Primary | ICD-10-CM

## 2021-04-20 DIAGNOSIS — Z15.09 LYNCH SYNDROME: Primary | ICD-10-CM

## 2021-04-20 RX ORDER — SOD SULF/POT CHLORIDE/MAG SULF 1.479 G
12 TABLET ORAL DAILY
Qty: 24 TABLET | Refills: 0 | Status: SHIPPED | OUTPATIENT
Start: 2021-04-20 | End: 2021-12-06 | Stop reason: ALTCHOICE

## 2021-04-21 ENCOUNTER — PATIENT MESSAGE (OUTPATIENT)
Dept: ENDOSCOPY | Facility: HOSPITAL | Age: 51
End: 2021-04-21

## 2021-04-22 ENCOUNTER — PATIENT OUTREACH (OUTPATIENT)
Dept: ADMINISTRATIVE | Facility: OTHER | Age: 51
End: 2021-04-22

## 2021-04-23 ENCOUNTER — HOSPITAL ENCOUNTER (OUTPATIENT)
Dept: RADIOLOGY | Facility: CLINIC | Age: 51
Discharge: HOME | End: 2021-04-23
Attending: INTERNAL MEDICINE
Payer: COMMERCIAL

## 2021-04-23 ENCOUNTER — OFFICE VISIT (OUTPATIENT)
Dept: ENDOCRINOLOGY | Facility: CLINIC | Age: 51
End: 2021-04-23
Payer: COMMERCIAL

## 2021-04-23 VITALS
BODY MASS INDEX: 33.86 KG/M2 | SYSTOLIC BLOOD PRESSURE: 118 MMHG | TEMPERATURE: 98 F | DIASTOLIC BLOOD PRESSURE: 70 MMHG | OXYGEN SATURATION: 97 % | WEIGHT: 263.88 LBS | HEART RATE: 70 BPM | HEIGHT: 74 IN

## 2021-04-23 DIAGNOSIS — Z79.4 TYPE 2 DIABETES MELLITUS WITH HYPERGLYCEMIA, WITH LONG-TERM CURRENT USE OF INSULIN: ICD-10-CM

## 2021-04-23 DIAGNOSIS — M85.80 OSTEOPENIA, UNSPECIFIED LOCATION: ICD-10-CM

## 2021-04-23 DIAGNOSIS — E11.65 TYPE 2 DIABETES MELLITUS WITH HYPERGLYCEMIA, WITH LONG-TERM CURRENT USE OF INSULIN: Primary | ICD-10-CM

## 2021-04-23 DIAGNOSIS — E55.9 HYPOVITAMINOSIS D: ICD-10-CM

## 2021-04-23 DIAGNOSIS — Z98.84 BARIATRIC SURGERY STATUS: ICD-10-CM

## 2021-04-23 DIAGNOSIS — E11.65 UNCONTROLLED TYPE 2 DIABETES MELLITUS WITH HYPERGLYCEMIA: ICD-10-CM

## 2021-04-23 DIAGNOSIS — E46 MALNUTRITION, UNSPECIFIED TYPE: ICD-10-CM

## 2021-04-23 DIAGNOSIS — K21.9 GASTROESOPHAGEAL REFLUX DISEASE WITHOUT ESOPHAGITIS: ICD-10-CM

## 2021-04-23 DIAGNOSIS — K76.0 NONALCOHOLIC FATTY LIVER DISEASE: ICD-10-CM

## 2021-04-23 DIAGNOSIS — R94.6 THYROID FUNCTION TEST ABNORMAL: ICD-10-CM

## 2021-04-23 DIAGNOSIS — R16.1 SPLENOMEGALY: Chronic | ICD-10-CM

## 2021-04-23 DIAGNOSIS — Z79.4 TYPE 2 DIABETES MELLITUS WITH HYPERGLYCEMIA, WITH LONG-TERM CURRENT USE OF INSULIN: Primary | ICD-10-CM

## 2021-04-23 DIAGNOSIS — Z15.09 MSH2-RELATED LYNCH SYNDROME (HNPCC1): ICD-10-CM

## 2021-04-23 DIAGNOSIS — N40.0 BENIGN PROSTATIC HYPERPLASIA, UNSPECIFIED WHETHER LOWER URINARY TRACT SYMPTOMS PRESENT: ICD-10-CM

## 2021-04-23 DIAGNOSIS — M47.816 LUMBAR SPONDYLOSIS: ICD-10-CM

## 2021-04-23 DIAGNOSIS — E66.9 OBESITY (BMI 30-39.9): ICD-10-CM

## 2021-04-23 DIAGNOSIS — E11.65 TYPE 2 DIABETES MELLITUS WITH HYPERGLYCEMIA, WITH LONG-TERM CURRENT USE OF INSULIN: ICD-10-CM

## 2021-04-23 DIAGNOSIS — M51.37 DDD (DEGENERATIVE DISC DISEASE), LUMBOSACRAL: ICD-10-CM

## 2021-04-23 DIAGNOSIS — D53.9 NUTRITIONAL ANEMIA: ICD-10-CM

## 2021-04-23 DIAGNOSIS — Z98.84 HISTORY OF GASTRIC BYPASS: ICD-10-CM

## 2021-04-23 DIAGNOSIS — Z98.84 S/P LAPAROSCOPIC SLEEVE GASTRECTOMY: ICD-10-CM

## 2021-04-23 DIAGNOSIS — Z85.038 HX OF COLON CANCER, STAGE I: ICD-10-CM

## 2021-04-23 PROCEDURE — 99999 PR PBB SHADOW E&M-EST. PATIENT-LVL IV: CPT | Mod: PBBFAC,,, | Performed by: INTERNAL MEDICINE

## 2021-04-23 PROCEDURE — 99204 PR OFFICE/OUTPT VISIT, NEW, LEVL IV, 45-59 MIN: ICD-10-PCS | Mod: S$GLB,,, | Performed by: INTERNAL MEDICINE

## 2021-04-23 PROCEDURE — 99204 OFFICE O/P NEW MOD 45 MIN: CPT | Mod: S$GLB,,, | Performed by: INTERNAL MEDICINE

## 2021-04-23 PROCEDURE — 77080 DEXA BONE DENSITY SPINE HIP: ICD-10-PCS | Mod: 26,,, | Performed by: RADIOLOGY

## 2021-04-23 PROCEDURE — 77080 DXA BONE DENSITY AXIAL: CPT | Mod: TC,PO

## 2021-04-23 PROCEDURE — 99999 PR PBB SHADOW E&M-EST. PATIENT-LVL IV: ICD-10-PCS | Mod: PBBFAC,,, | Performed by: INTERNAL MEDICINE

## 2021-04-23 PROCEDURE — 77080 DXA BONE DENSITY AXIAL: CPT | Mod: 26,,, | Performed by: RADIOLOGY

## 2021-04-23 RX ORDER — ASPIRIN 81 MG/1
TABLET ORAL DAILY
COMMUNITY

## 2021-04-23 RX ORDER — ZINC GLUCONATE 50 MG
50 TABLET ORAL DAILY
COMMUNITY
End: 2024-03-05

## 2021-04-23 RX ORDER — METFORMIN HYDROCHLORIDE 500 MG/1
500 TABLET, EXTENDED RELEASE ORAL 2 TIMES DAILY WITH MEALS
Qty: 180 TABLET | Refills: 3 | Status: SHIPPED | OUTPATIENT
Start: 2021-04-23 | End: 2022-01-24 | Stop reason: SDUPTHER

## 2021-04-23 RX ORDER — PEN NEEDLE, DIABETIC 30 GX3/16"
1 NEEDLE, DISPOSABLE MISCELLANEOUS DAILY
Qty: 90 EACH | Refills: 3 | Status: SHIPPED | OUTPATIENT
Start: 2021-04-23 | End: 2021-04-23 | Stop reason: SDUPTHER

## 2021-04-23 RX ORDER — LANCETS
1 EACH MISCELLANEOUS 2 TIMES DAILY
Qty: 200 EACH | Refills: 0 | Status: SHIPPED | OUTPATIENT
Start: 2021-04-23

## 2021-04-23 RX ORDER — LIRAGLUTIDE 6 MG/ML
1.2 INJECTION SUBCUTANEOUS DAILY
Qty: 18 ML | Refills: 0 | Status: SHIPPED | OUTPATIENT
Start: 2021-04-23 | End: 2021-07-16 | Stop reason: SDUPTHER

## 2021-04-23 RX ORDER — PHYTONADIONE 5 MG/1
5 TABLET ORAL ONCE
COMMUNITY
End: 2022-09-19

## 2021-04-23 RX ORDER — PEN NEEDLE, DIABETIC 30 GX3/16"
1 NEEDLE, DISPOSABLE MISCELLANEOUS DAILY
Qty: 90 EACH | Refills: 3 | Status: SHIPPED | OUTPATIENT
Start: 2021-04-23 | End: 2022-11-14 | Stop reason: SDUPTHER

## 2021-04-24 ENCOUNTER — PATIENT MESSAGE (OUTPATIENT)
Dept: ENDOCRINOLOGY | Facility: CLINIC | Age: 51
End: 2021-04-24

## 2021-04-24 DIAGNOSIS — E78.00 HYPERCHOLESTEROLEMIA: Primary | ICD-10-CM

## 2021-04-24 RX ORDER — PRAVASTATIN SODIUM 20 MG/1
20 TABLET ORAL DAILY
Qty: 90 TABLET | Refills: 3 | Status: SHIPPED | OUTPATIENT
Start: 2021-04-24 | End: 2022-03-07 | Stop reason: SDUPTHER

## 2021-04-26 ENCOUNTER — TELEPHONE (OUTPATIENT)
Dept: ENDOCRINOLOGY | Facility: CLINIC | Age: 51
End: 2021-04-26

## 2021-04-26 DIAGNOSIS — Z79.4 TYPE 2 DIABETES MELLITUS WITH HYPERGLYCEMIA, WITH LONG-TERM CURRENT USE OF INSULIN: Primary | ICD-10-CM

## 2021-04-26 DIAGNOSIS — E11.65 TYPE 2 DIABETES MELLITUS WITH HYPERGLYCEMIA, WITH LONG-TERM CURRENT USE OF INSULIN: Primary | ICD-10-CM

## 2021-04-27 ENCOUNTER — PATIENT MESSAGE (OUTPATIENT)
Dept: ENDOCRINOLOGY | Facility: CLINIC | Age: 51
End: 2021-04-27

## 2021-04-28 ENCOUNTER — LAB VISIT (OUTPATIENT)
Dept: LAB | Facility: HOSPITAL | Age: 51
End: 2021-04-28
Attending: INTERNAL MEDICINE
Payer: COMMERCIAL

## 2021-04-28 ENCOUNTER — HOSPITAL ENCOUNTER (OUTPATIENT)
Dept: RADIOLOGY | Facility: HOSPITAL | Age: 51
Discharge: HOME | End: 2021-04-28
Attending: INTERNAL MEDICINE
Payer: COMMERCIAL

## 2021-04-28 DIAGNOSIS — K76.0 NONALCOHOLIC FATTY LIVER DISEASE: ICD-10-CM

## 2021-04-28 DIAGNOSIS — D53.9 NUTRITIONAL ANEMIA: ICD-10-CM

## 2021-04-28 DIAGNOSIS — E55.9 HYPOVITAMINOSIS D: ICD-10-CM

## 2021-04-28 DIAGNOSIS — Z79.4 TYPE 2 DIABETES MELLITUS WITH HYPERGLYCEMIA, WITH LONG-TERM CURRENT USE OF INSULIN: ICD-10-CM

## 2021-04-28 DIAGNOSIS — Z98.84 BARIATRIC SURGERY STATUS: ICD-10-CM

## 2021-04-28 DIAGNOSIS — E46 MALNUTRITION, UNSPECIFIED TYPE: ICD-10-CM

## 2021-04-28 DIAGNOSIS — Z98.84 HISTORY OF GASTRIC BYPASS: ICD-10-CM

## 2021-04-28 DIAGNOSIS — E11.65 TYPE 2 DIABETES MELLITUS WITH HYPERGLYCEMIA, WITH LONG-TERM CURRENT USE OF INSULIN: ICD-10-CM

## 2021-04-28 PROCEDURE — 82607 VITAMIN B-12: CPT | Performed by: INTERNAL MEDICINE

## 2021-04-28 PROCEDURE — 84207 ASSAY OF VITAMIN B-6: CPT | Performed by: INTERNAL MEDICINE

## 2021-04-28 PROCEDURE — 82306 VITAMIN D 25 HYDROXY: CPT | Performed by: INTERNAL MEDICINE

## 2021-04-28 PROCEDURE — 83921 ORGANIC ACID SINGLE QUANT: CPT | Performed by: INTERNAL MEDICINE

## 2021-04-28 PROCEDURE — 84597 ASSAY OF VITAMIN K: CPT | Performed by: INTERNAL MEDICINE

## 2021-04-28 PROCEDURE — 76700 US EXAM ABDOM COMPLETE: CPT | Mod: 26,,, | Performed by: RADIOLOGY

## 2021-04-28 PROCEDURE — 36415 COLL VENOUS BLD VENIPUNCTURE: CPT | Mod: PO | Performed by: INTERNAL MEDICINE

## 2021-04-28 PROCEDURE — 76700 US EXAM ABDOM COMPLETE: CPT | Mod: TC

## 2021-04-28 PROCEDURE — 82180 ASSAY OF ASCORBIC ACID: CPT | Performed by: INTERNAL MEDICINE

## 2021-04-28 PROCEDURE — 76700 US ABDOMEN COMPLETE: ICD-10-PCS | Mod: 26,,, | Performed by: RADIOLOGY

## 2021-04-28 PROCEDURE — 84590 ASSAY OF VITAMIN A: CPT | Performed by: INTERNAL MEDICINE

## 2021-04-28 PROCEDURE — 84591 ASSAY OF NOS VITAMIN: CPT | Mod: 59 | Performed by: INTERNAL MEDICINE

## 2021-04-28 PROCEDURE — 84446 ASSAY OF VITAMIN E: CPT | Performed by: INTERNAL MEDICINE

## 2021-04-28 PROCEDURE — 84425 ASSAY OF VITAMIN B-1: CPT | Performed by: INTERNAL MEDICINE

## 2021-04-28 PROCEDURE — 84591 ASSAY OF NOS VITAMIN: CPT | Mod: 91 | Performed by: INTERNAL MEDICINE

## 2021-04-28 PROCEDURE — 84252 ASSAY OF VITAMIN B-2: CPT | Performed by: INTERNAL MEDICINE

## 2021-04-29 ENCOUNTER — IMMUNIZATION (OUTPATIENT)
Dept: PRIMARY CARE CLINIC | Facility: CLINIC | Age: 51
End: 2021-04-29
Payer: COMMERCIAL

## 2021-04-29 DIAGNOSIS — Z23 NEED FOR VACCINATION: Primary | ICD-10-CM

## 2021-04-29 LAB — 25(OH)D3+25(OH)D2 SERPL-MCNC: 33 NG/ML (ref 30–96)

## 2021-04-29 PROCEDURE — 0002A COVID-19, MRNA, LNP-S, PF, 30 MCG/0.3 ML DOSE VACCINE: ICD-10-PCS | Mod: CV19,S$GLB,, | Performed by: FAMILY MEDICINE

## 2021-04-29 PROCEDURE — 91300 COVID-19, MRNA, LNP-S, PF, 30 MCG/0.3 ML DOSE VACCINE: ICD-10-PCS | Mod: S$GLB,,, | Performed by: FAMILY MEDICINE

## 2021-04-29 PROCEDURE — 91300 COVID-19, MRNA, LNP-S, PF, 30 MCG/0.3 ML DOSE VACCINE: CPT | Mod: S$GLB,,, | Performed by: FAMILY MEDICINE

## 2021-04-29 PROCEDURE — 0002A COVID-19, MRNA, LNP-S, PF, 30 MCG/0.3 ML DOSE VACCINE: CPT | Mod: CV19,S$GLB,, | Performed by: FAMILY MEDICINE

## 2021-04-30 LAB — VIT B12 SERPL-MCNC: 267 NG/L (ref 180–914)

## 2021-05-03 ENCOUNTER — PATIENT MESSAGE (OUTPATIENT)
Dept: DERMATOLOGY | Facility: CLINIC | Age: 51
End: 2021-05-03

## 2021-05-03 DIAGNOSIS — L71.9 ROSACEA: Primary | ICD-10-CM

## 2021-05-03 LAB — VIT C SERPL-MCNC: 2 MG/L (ref 2–19)

## 2021-05-04 DIAGNOSIS — E50.9 VITAMIN A DEFICIENCY: Primary | ICD-10-CM

## 2021-05-04 LAB
A-TOCOPHEROL VIT E SERPL-MCNC: 1331 UG/DL (ref 500–1800)
METHYLMALONATE SERPL-SCNC: 0.12 NMOL/ML
PYRIDOXAL SERPL-MCNC: 8 UG/L (ref 5–50)
VIT A SERPL-MCNC: 35 UG/DL (ref 38–106)
VIT B1 BLD-MCNC: 66 UG/L (ref 38–122)
VIT B2 SERPL-MCNC: 4 MCG/L (ref 1–19)

## 2021-05-04 RX ORDER — PLANT STANOL ESTER 450 MG
8000 TABLET ORAL DAILY
Qty: 100 CAPSULE | Refills: 3 | Status: SHIPPED | OUTPATIENT
Start: 2021-05-04 | End: 2022-03-28 | Stop reason: SDUPTHER

## 2021-05-05 LAB — PHYTONADIONE SERPL-MCNC: 0.73 NMOL/L (ref 0.22–4.88)

## 2021-05-06 LAB
LEFT EYE DM RETINOPATHY: NEGATIVE
RIGHT EYE DM RETINOPATHY: NEGATIVE

## 2021-05-10 LAB — BIOTIN SERPL-SCNC: 1960.9 PG/ML (ref 221–3004)

## 2021-05-12 LAB — PANTOTHENATE SERPLBLD-MCNC: 123.83 UG/L

## 2021-05-13 RX ORDER — DOXYCYCLINE 100 MG/1
100 CAPSULE ORAL DAILY
Qty: 30 CAPSULE | Refills: 1 | Status: SHIPPED | OUTPATIENT
Start: 2021-05-13 | End: 2021-07-06

## 2021-05-18 ENCOUNTER — LAB VISIT (OUTPATIENT)
Dept: LAB | Facility: HOSPITAL | Age: 51
End: 2021-05-18
Attending: INTERNAL MEDICINE
Payer: COMMERCIAL

## 2021-05-18 ENCOUNTER — PATIENT MESSAGE (OUTPATIENT)
Dept: ENDOSCOPY | Facility: HOSPITAL | Age: 51
End: 2021-05-18

## 2021-05-18 DIAGNOSIS — E55.9 VITAMIN D INSUFFICIENCY: ICD-10-CM

## 2021-05-18 PROCEDURE — 36415 COLL VENOUS BLD VENIPUNCTURE: CPT | Performed by: INTERNAL MEDICINE

## 2021-05-18 PROCEDURE — 82306 VITAMIN D 25 HYDROXY: CPT | Performed by: INTERNAL MEDICINE

## 2021-05-19 LAB — 25(OH)D3+25(OH)D2 SERPL-MCNC: 36 NG/ML (ref 30–96)

## 2021-05-24 ENCOUNTER — LAB VISIT (OUTPATIENT)
Dept: PRIMARY CARE CLINIC | Facility: CLINIC | Age: 51
End: 2021-05-24
Payer: COMMERCIAL

## 2021-05-24 DIAGNOSIS — Z01.818 PRE-OP TESTING: ICD-10-CM

## 2021-05-24 PROCEDURE — U0005 INFEC AGEN DETEC AMPLI PROBE: HCPCS | Performed by: FAMILY MEDICINE

## 2021-05-24 PROCEDURE — U0003 INFECTIOUS AGENT DETECTION BY NUCLEIC ACID (DNA OR RNA); SEVERE ACUTE RESPIRATORY SYNDROME CORONAVIRUS 2 (SARS-COV-2) (CORONAVIRUS DISEASE [COVID-19]), AMPLIFIED PROBE TECHNIQUE, MAKING USE OF HIGH THROUGHPUT TECHNOLOGIES AS DESCRIBED BY CMS-2020-01-R: HCPCS | Performed by: FAMILY MEDICINE

## 2021-05-25 LAB — SARS-COV-2 RNA RESP QL NAA+PROBE: NOT DETECTED

## 2021-05-27 ENCOUNTER — ANESTHESIA EVENT (OUTPATIENT)
Dept: ENDOSCOPY | Facility: HOSPITAL | Age: 51
End: 2021-05-27
Payer: COMMERCIAL

## 2021-05-27 ENCOUNTER — HOSPITAL ENCOUNTER (OUTPATIENT)
Facility: HOSPITAL | Age: 51
Discharge: HOME | End: 2021-05-27
Attending: INTERNAL MEDICINE | Admitting: INTERNAL MEDICINE
Payer: COMMERCIAL

## 2021-05-27 ENCOUNTER — ANESTHESIA (OUTPATIENT)
Dept: ENDOSCOPY | Facility: HOSPITAL | Age: 51
End: 2021-05-27
Payer: COMMERCIAL

## 2021-05-27 VITALS
RESPIRATION RATE: 17 BRPM | SYSTOLIC BLOOD PRESSURE: 110 MMHG | HEART RATE: 67 BPM | OXYGEN SATURATION: 98 % | DIASTOLIC BLOOD PRESSURE: 63 MMHG | TEMPERATURE: 98 F | BODY MASS INDEX: 32.08 KG/M2 | WEIGHT: 250 LBS | HEIGHT: 74 IN

## 2021-05-27 DIAGNOSIS — Z15.09 MSH2-RELATED LYNCH SYNDROME (HNPCC1): ICD-10-CM

## 2021-05-27 LAB — POCT GLUCOSE: 90 MG/DL (ref 70–110)

## 2021-05-27 PROCEDURE — 37000009 HC ANESTHESIA EA ADD 15 MINS: Performed by: INTERNAL MEDICINE

## 2021-05-27 PROCEDURE — E9220 PRA ENDO ANESTHESIA: HCPCS | Mod: 33,,, | Performed by: STUDENT IN AN ORGANIZED HEALTH CARE EDUCATION/TRAINING PROGRAM

## 2021-05-27 PROCEDURE — 37000008 HC ANESTHESIA 1ST 15 MINUTES: Performed by: INTERNAL MEDICINE

## 2021-05-27 PROCEDURE — 45385 COLONOSCOPY W/LESION REMOVAL: CPT | Performed by: INTERNAL MEDICINE

## 2021-05-27 PROCEDURE — 88305 TISSUE EXAM BY PATHOLOGIST: ICD-10-PCS | Mod: 26,,, | Performed by: PATHOLOGY

## 2021-05-27 PROCEDURE — 88305 TISSUE EXAM BY PATHOLOGIST: CPT | Mod: 26,,, | Performed by: PATHOLOGY

## 2021-05-27 PROCEDURE — 63600175 PHARM REV CODE 636 W HCPCS: Performed by: STUDENT IN AN ORGANIZED HEALTH CARE EDUCATION/TRAINING PROGRAM

## 2021-05-27 PROCEDURE — 25000003 PHARM REV CODE 250: Performed by: INTERNAL MEDICINE

## 2021-05-27 PROCEDURE — 27201089 HC SNARE, DISP (ANY): Performed by: INTERNAL MEDICINE

## 2021-05-27 PROCEDURE — 82962 GLUCOSE BLOOD TEST: CPT | Performed by: INTERNAL MEDICINE

## 2021-05-27 PROCEDURE — E9220 PRA ENDO ANESTHESIA: ICD-10-PCS | Mod: 33,,, | Performed by: STUDENT IN AN ORGANIZED HEALTH CARE EDUCATION/TRAINING PROGRAM

## 2021-05-27 PROCEDURE — 88305 TISSUE EXAM BY PATHOLOGIST: CPT | Performed by: PATHOLOGY

## 2021-05-27 PROCEDURE — 45385 PR COLONOSCOPY,REMV LESN,SNARE: ICD-10-PCS | Mod: 33,,, | Performed by: INTERNAL MEDICINE

## 2021-05-27 PROCEDURE — 45385 COLONOSCOPY W/LESION REMOVAL: CPT | Mod: 33,,, | Performed by: INTERNAL MEDICINE

## 2021-05-27 RX ORDER — PROPOFOL 10 MG/ML
VIAL (ML) INTRAVENOUS
Status: DISCONTINUED | OUTPATIENT
Start: 2021-05-27 | End: 2021-05-27

## 2021-05-27 RX ORDER — PROPOFOL 10 MG/ML
VIAL (ML) INTRAVENOUS CONTINUOUS PRN
Status: DISCONTINUED | OUTPATIENT
Start: 2021-05-27 | End: 2021-05-27

## 2021-05-27 RX ORDER — LIDOCAINE HCL/PF 100 MG/5ML
SYRINGE (ML) INTRAVENOUS
Status: DISCONTINUED | OUTPATIENT
Start: 2021-05-27 | End: 2021-05-27

## 2021-05-27 RX ORDER — SODIUM CHLORIDE 9 MG/ML
INJECTION, SOLUTION INTRAVENOUS CONTINUOUS
Status: DISCONTINUED | OUTPATIENT
Start: 2021-05-27 | End: 2021-05-27 | Stop reason: HOSPADM

## 2021-05-27 RX ADMIN — PROPOFOL 20 MG: 10 INJECTION, EMULSION INTRAVENOUS at 07:05

## 2021-05-27 RX ADMIN — SODIUM CHLORIDE: 0.9 INJECTION, SOLUTION INTRAVENOUS at 07:05

## 2021-05-27 RX ADMIN — PROPOFOL 150 MCG/KG/MIN: 10 INJECTION, EMULSION INTRAVENOUS at 07:05

## 2021-05-27 RX ADMIN — PROPOFOL 100 MG: 10 INJECTION, EMULSION INTRAVENOUS at 07:05

## 2021-05-27 RX ADMIN — Medication 50 MG: at 07:05

## 2021-05-31 ENCOUNTER — PATIENT OUTREACH (OUTPATIENT)
Dept: ADMINISTRATIVE | Facility: HOSPITAL | Age: 51
End: 2021-05-31

## 2021-05-31 ENCOUNTER — PATIENT MESSAGE (OUTPATIENT)
Dept: ADMINISTRATIVE | Facility: HOSPITAL | Age: 51
End: 2021-05-31

## 2021-06-05 ENCOUNTER — LAB VISIT (OUTPATIENT)
Dept: LAB | Facility: HOSPITAL | Age: 51
End: 2021-06-05
Attending: FAMILY MEDICINE
Payer: COMMERCIAL

## 2021-06-05 DIAGNOSIS — E11.65 UNCONTROLLED TYPE 2 DIABETES MELLITUS WITH HYPERGLYCEMIA: ICD-10-CM

## 2021-06-05 LAB
ESTIMATED AVG GLUCOSE: 111 MG/DL (ref 68–131)
HBA1C MFR BLD: 5.5 % (ref 4–5.6)

## 2021-06-05 PROCEDURE — 36415 COLL VENOUS BLD VENIPUNCTURE: CPT | Mod: PO | Performed by: FAMILY MEDICINE

## 2021-06-05 PROCEDURE — 83036 HEMOGLOBIN GLYCOSYLATED A1C: CPT | Performed by: FAMILY MEDICINE

## 2021-06-07 ENCOUNTER — TELEPHONE (OUTPATIENT)
Dept: ADMINISTRATIVE | Facility: HOSPITAL | Age: 51
End: 2021-06-07

## 2021-06-07 ENCOUNTER — OFFICE VISIT (OUTPATIENT)
Dept: FAMILY MEDICINE | Facility: CLINIC | Age: 51
End: 2021-06-07
Payer: COMMERCIAL

## 2021-06-07 VITALS
DIASTOLIC BLOOD PRESSURE: 84 MMHG | BODY MASS INDEX: 32.77 KG/M2 | WEIGHT: 255.31 LBS | OXYGEN SATURATION: 97 % | HEART RATE: 69 BPM | SYSTOLIC BLOOD PRESSURE: 110 MMHG | HEIGHT: 74 IN | RESPIRATION RATE: 16 BRPM

## 2021-06-07 DIAGNOSIS — Z98.84 S/P LAPAROSCOPIC SLEEVE GASTRECTOMY: ICD-10-CM

## 2021-06-07 DIAGNOSIS — K21.9 GASTROESOPHAGEAL REFLUX DISEASE WITHOUT ESOPHAGITIS: ICD-10-CM

## 2021-06-07 DIAGNOSIS — E11.9 CONTROLLED TYPE 2 DIABETES MELLITUS WITHOUT COMPLICATION, WITHOUT LONG-TERM CURRENT USE OF INSULIN: ICD-10-CM

## 2021-06-07 DIAGNOSIS — E66.9 OBESITY (BMI 30-39.9): Primary | ICD-10-CM

## 2021-06-07 PROCEDURE — 99214 PR OFFICE/OUTPT VISIT, EST, LEVL IV, 30-39 MIN: ICD-10-PCS | Mod: S$GLB,,, | Performed by: FAMILY MEDICINE

## 2021-06-07 PROCEDURE — 99999 PR PBB SHADOW E&M-EST. PATIENT-LVL V: ICD-10-PCS | Mod: PBBFAC,,, | Performed by: FAMILY MEDICINE

## 2021-06-07 PROCEDURE — 99214 OFFICE O/P EST MOD 30 MIN: CPT | Mod: S$GLB,,, | Performed by: FAMILY MEDICINE

## 2021-06-07 PROCEDURE — 99999 PR PBB SHADOW E&M-EST. PATIENT-LVL V: CPT | Mod: PBBFAC,,, | Performed by: FAMILY MEDICINE

## 2021-06-09 LAB
FINAL PATHOLOGIC DIAGNOSIS: NORMAL
GROSS: NORMAL
Lab: NORMAL

## 2021-06-18 ENCOUNTER — TELEPHONE (OUTPATIENT)
Dept: FAMILY MEDICINE | Facility: CLINIC | Age: 51
End: 2021-06-18

## 2021-06-21 ENCOUNTER — TELEPHONE (OUTPATIENT)
Dept: FAMILY MEDICINE | Facility: CLINIC | Age: 51
End: 2021-06-21

## 2021-06-29 ENCOUNTER — OFFICE VISIT (OUTPATIENT)
Dept: UROLOGY | Facility: CLINIC | Age: 51
End: 2021-06-29
Payer: COMMERCIAL

## 2021-06-29 VITALS
SYSTOLIC BLOOD PRESSURE: 122 MMHG | BODY MASS INDEX: 31.97 KG/M2 | HEIGHT: 74 IN | WEIGHT: 249.13 LBS | DIASTOLIC BLOOD PRESSURE: 77 MMHG | HEART RATE: 67 BPM

## 2021-06-29 DIAGNOSIS — R35.1 NOCTURIA: ICD-10-CM

## 2021-06-29 DIAGNOSIS — Z12.5 PROSTATE CANCER SCREENING: ICD-10-CM

## 2021-06-29 DIAGNOSIS — Z15.09 MSH2-RELATED LYNCH SYNDROME (HNPCC1): Primary | ICD-10-CM

## 2021-06-29 LAB
BACTERIA #/AREA URNS AUTO: ABNORMAL /HPF
MICROSCOPIC COMMENT: ABNORMAL
RBC #/AREA URNS AUTO: 5 /HPF (ref 0–4)
WBC #/AREA URNS AUTO: 1 /HPF (ref 0–5)

## 2021-06-29 PROCEDURE — 99213 PR OFFICE/OUTPT VISIT, EST, LEVL III, 20-29 MIN: ICD-10-PCS | Mod: S$GLB,,, | Performed by: UROLOGY

## 2021-06-29 PROCEDURE — 99999 PR PBB SHADOW E&M-EST. PATIENT-LVL IV: ICD-10-PCS | Mod: PBBFAC,,, | Performed by: UROLOGY

## 2021-06-29 PROCEDURE — 99213 OFFICE O/P EST LOW 20 MIN: CPT | Mod: S$GLB,,, | Performed by: UROLOGY

## 2021-06-29 PROCEDURE — 99999 PR PBB SHADOW E&M-EST. PATIENT-LVL IV: CPT | Mod: PBBFAC,,, | Performed by: UROLOGY

## 2021-06-29 PROCEDURE — 81001 URINALYSIS AUTO W/SCOPE: CPT | Performed by: UROLOGY

## 2021-07-06 ENCOUNTER — PATIENT MESSAGE (OUTPATIENT)
Dept: UROLOGY | Facility: CLINIC | Age: 51
End: 2021-07-06

## 2021-07-06 DIAGNOSIS — R31.29 MICROHEMATURIA: Primary | ICD-10-CM

## 2021-07-07 ENCOUNTER — PATIENT MESSAGE (OUTPATIENT)
Dept: UROLOGY | Facility: CLINIC | Age: 51
End: 2021-07-07

## 2021-07-09 ENCOUNTER — HOSPITAL ENCOUNTER (OUTPATIENT)
Dept: RADIOLOGY | Facility: HOSPITAL | Age: 51
Discharge: HOME | End: 2021-07-09
Attending: UROLOGY
Payer: COMMERCIAL

## 2021-07-09 DIAGNOSIS — R31.29 MICROHEMATURIA: ICD-10-CM

## 2021-07-09 PROCEDURE — 25500020 PHARM REV CODE 255

## 2021-07-09 PROCEDURE — 74178 CT UROGRAM ABD PELVIS W WO: ICD-10-PCS | Mod: 26,,, | Performed by: RADIOLOGY

## 2021-07-09 PROCEDURE — 74178 CT ABD&PLV WO CNTR FLWD CNTR: CPT | Mod: TC

## 2021-07-09 PROCEDURE — 74178 CT ABD&PLV WO CNTR FLWD CNTR: CPT | Mod: 26,,, | Performed by: RADIOLOGY

## 2021-07-09 RX ADMIN — IOHEXOL 125 ML: 350 INJECTION, SOLUTION INTRAVENOUS at 05:07

## 2021-07-12 ENCOUNTER — PATIENT MESSAGE (OUTPATIENT)
Dept: UROLOGY | Facility: CLINIC | Age: 51
End: 2021-07-12

## 2021-07-16 ENCOUNTER — PATIENT MESSAGE (OUTPATIENT)
Dept: FAMILY MEDICINE | Facility: CLINIC | Age: 51
End: 2021-07-16

## 2021-07-16 DIAGNOSIS — E11.65 TYPE 2 DIABETES MELLITUS WITH HYPERGLYCEMIA, WITH LONG-TERM CURRENT USE OF INSULIN: ICD-10-CM

## 2021-07-16 DIAGNOSIS — Z79.4 TYPE 2 DIABETES MELLITUS WITH HYPERGLYCEMIA, WITH LONG-TERM CURRENT USE OF INSULIN: ICD-10-CM

## 2021-07-16 RX ORDER — LIRAGLUTIDE 6 MG/ML
1.2 INJECTION SUBCUTANEOUS DAILY
Qty: 18 ML | Refills: 3 | Status: SHIPPED | OUTPATIENT
Start: 2021-07-16 | End: 2022-01-24 | Stop reason: SDUPTHER

## 2021-07-28 ENCOUNTER — PROCEDURE VISIT (OUTPATIENT)
Dept: UROLOGY | Facility: CLINIC | Age: 51
End: 2021-07-28
Payer: COMMERCIAL

## 2021-07-28 VITALS
DIASTOLIC BLOOD PRESSURE: 81 MMHG | BODY MASS INDEX: 31.72 KG/M2 | RESPIRATION RATE: 16 BRPM | WEIGHT: 247.19 LBS | HEIGHT: 74 IN | SYSTOLIC BLOOD PRESSURE: 134 MMHG | HEART RATE: 66 BPM | TEMPERATURE: 98 F

## 2021-07-28 DIAGNOSIS — R31.9 HEMATURIA OF UNKNOWN CAUSE: ICD-10-CM

## 2021-07-28 DIAGNOSIS — R31.29 MICROHEMATURIA: Primary | ICD-10-CM

## 2021-07-28 DIAGNOSIS — Z15.09 MSH2-RELATED LYNCH SYNDROME (HNPCC1): ICD-10-CM

## 2021-07-28 PROCEDURE — 52000 CYSTOSCOPY: ICD-10-PCS | Mod: S$GLB,,, | Performed by: UROLOGY

## 2021-07-28 PROCEDURE — 52000 CYSTOURETHROSCOPY: CPT | Mod: S$GLB,,, | Performed by: UROLOGY

## 2021-07-28 RX ORDER — LIDOCAINE HYDROCHLORIDE 20 MG/ML
JELLY TOPICAL
Status: COMPLETED | OUTPATIENT
Start: 2021-07-28 | End: 2021-07-28

## 2021-07-28 RX ADMIN — LIDOCAINE HYDROCHLORIDE: 20 JELLY TOPICAL at 10:07

## 2021-09-03 ENCOUNTER — OFFICE VISIT (OUTPATIENT)
Dept: ENDOCRINOLOGY | Facility: CLINIC | Age: 51
End: 2021-09-03
Payer: COMMERCIAL

## 2021-09-03 DIAGNOSIS — E11.65 TYPE 2 DIABETES MELLITUS WITH HYPERGLYCEMIA, WITH LONG-TERM CURRENT USE OF INSULIN: Primary | ICD-10-CM

## 2021-09-03 DIAGNOSIS — Z79.4 TYPE 2 DIABETES MELLITUS WITH HYPERGLYCEMIA, WITH LONG-TERM CURRENT USE OF INSULIN: Primary | ICD-10-CM

## 2021-09-03 DIAGNOSIS — Z98.84 S/P LAPAROSCOPIC SLEEVE GASTRECTOMY: ICD-10-CM

## 2021-09-03 DIAGNOSIS — Z98.84 HISTORY OF GASTRIC BYPASS: ICD-10-CM

## 2021-09-03 DIAGNOSIS — M54.16 LUMBAR RADICULOPATHY: ICD-10-CM

## 2021-09-03 DIAGNOSIS — Z98.84 BARIATRIC SURGERY STATUS: ICD-10-CM

## 2021-09-03 DIAGNOSIS — Z15.09 MSH2-RELATED LYNCH SYNDROME (HNPCC1): ICD-10-CM

## 2021-09-03 DIAGNOSIS — R16.1 SPLENOMEGALY: Chronic | ICD-10-CM

## 2021-09-03 DIAGNOSIS — E66.9 OBESITY, UNSPECIFIED CLASSIFICATION, UNSPECIFIED OBESITY TYPE, UNSPECIFIED WHETHER SERIOUS COMORBIDITY PRESENT: ICD-10-CM

## 2021-09-03 DIAGNOSIS — Z85.038 HISTORY OF COLON CANCER, STAGE II: ICD-10-CM

## 2021-09-03 DIAGNOSIS — K76.0 NONALCOHOLIC FATTY LIVER DISEASE: ICD-10-CM

## 2021-09-03 PROCEDURE — 99214 OFFICE O/P EST MOD 30 MIN: CPT | Mod: 95,,, | Performed by: INTERNAL MEDICINE

## 2021-09-03 PROCEDURE — 99214 PR OFFICE/OUTPT VISIT, EST, LEVL IV, 30-39 MIN: ICD-10-PCS | Mod: 95,,, | Performed by: INTERNAL MEDICINE

## 2021-09-07 ENCOUNTER — TELEPHONE (OUTPATIENT)
Dept: ENDOCRINOLOGY | Facility: CLINIC | Age: 51
End: 2021-09-07

## 2021-09-07 ENCOUNTER — LAB VISIT (OUTPATIENT)
Dept: LAB | Facility: HOSPITAL | Age: 51
End: 2021-09-07
Attending: INTERNAL MEDICINE
Payer: COMMERCIAL

## 2021-09-07 ENCOUNTER — CLINICAL SUPPORT (OUTPATIENT)
Dept: FAMILY MEDICINE | Facility: CLINIC | Age: 51
End: 2021-09-07
Attending: FAMILY MEDICINE
Payer: COMMERCIAL

## 2021-09-07 ENCOUNTER — TELEPHONE (OUTPATIENT)
Dept: FAMILY MEDICINE | Facility: CLINIC | Age: 51
End: 2021-09-07

## 2021-09-07 DIAGNOSIS — K76.0 NONALCOHOLIC FATTY LIVER DISEASE: ICD-10-CM

## 2021-09-07 DIAGNOSIS — E11.9 TYPE 2 DIABETES MELLITUS WITHOUT COMPLICATION, UNSPECIFIED WHETHER LONG TERM INSULIN USE: ICD-10-CM

## 2021-09-07 DIAGNOSIS — E11.65 TYPE 2 DIABETES MELLITUS WITH HYPERGLYCEMIA, WITH LONG-TERM CURRENT USE OF INSULIN: ICD-10-CM

## 2021-09-07 DIAGNOSIS — Z79.4 TYPE 2 DIABETES MELLITUS WITH HYPERGLYCEMIA, WITH LONG-TERM CURRENT USE OF INSULIN: ICD-10-CM

## 2021-09-07 LAB
ESTIMATED AVG GLUCOSE: 108 MG/DL (ref 68–131)
HBA1C MFR BLD: 5.4 % (ref 4–5.6)

## 2021-09-07 PROCEDURE — 83036 HEMOGLOBIN GLYCOSYLATED A1C: CPT | Mod: 59 | Performed by: INTERNAL MEDICINE

## 2021-09-07 PROCEDURE — 36415 COLL VENOUS BLD VENIPUNCTURE: CPT | Mod: PO | Performed by: INTERNAL MEDICINE

## 2021-09-07 PROCEDURE — 80061 LIPID PANEL: CPT | Performed by: INTERNAL MEDICINE

## 2021-09-07 PROCEDURE — 84378 SUGARS SINGLE QUANT: CPT | Performed by: INTERNAL MEDICINE

## 2021-09-07 PROCEDURE — 82985 ASSAY OF GLYCATED PROTEIN: CPT | Performed by: INTERNAL MEDICINE

## 2021-09-08 LAB
CHOLEST SERPL-MCNC: 121 MG/DL (ref 120–199)
CHOLEST/HDLC SERPL: 2.9 {RATIO} (ref 2–5)
HDLC SERPL-MCNC: 42 MG/DL (ref 40–75)
HDLC SERPL: 34.7 % (ref 20–50)
LDLC SERPL CALC-MCNC: 70.6 MG/DL (ref 63–159)
NONHDLC SERPL-MCNC: 79 MG/DL
TRIGL SERPL-MCNC: 42 MG/DL (ref 30–150)

## 2021-09-09 ENCOUNTER — PATIENT OUTREACH (OUTPATIENT)
Dept: ADMINISTRATIVE | Facility: HOSPITAL | Age: 51
End: 2021-09-09

## 2021-09-10 LAB — FRUCTOSAMINE SERPL-SCNC: 235 UMOL /L (ref 151–300)

## 2021-09-12 LAB — GLYCOMARK (TM): 5.2 UG/ML

## 2021-10-05 ENCOUNTER — HOSPITAL ENCOUNTER (OUTPATIENT)
Dept: RADIOLOGY | Facility: CLINIC | Age: 51
Discharge: HOME | End: 2021-10-05
Attending: NURSE PRACTITIONER
Payer: COMMERCIAL

## 2021-10-05 DIAGNOSIS — Z85.038 HISTORY OF COLON CANCER, STAGE II: ICD-10-CM

## 2021-10-05 PROCEDURE — 71046 X-RAY EXAM CHEST 2 VIEWS: CPT | Mod: TC,FY,PO

## 2021-10-05 PROCEDURE — 71046 X-RAY EXAM CHEST 2 VIEWS: CPT | Mod: 26,,, | Performed by: RADIOLOGY

## 2021-10-05 PROCEDURE — 71046 XR CHEST PA AND LATERAL: ICD-10-PCS | Mod: 26,,, | Performed by: RADIOLOGY

## 2021-10-06 ENCOUNTER — OFFICE VISIT (OUTPATIENT)
Dept: HEMATOLOGY/ONCOLOGY | Facility: CLINIC | Age: 51
End: 2021-10-06
Payer: COMMERCIAL

## 2021-10-06 ENCOUNTER — PATIENT MESSAGE (OUTPATIENT)
Dept: HEMATOLOGY/ONCOLOGY | Facility: CLINIC | Age: 51
End: 2021-10-06

## 2021-10-06 VITALS
SYSTOLIC BLOOD PRESSURE: 138 MMHG | DIASTOLIC BLOOD PRESSURE: 83 MMHG | WEIGHT: 249.56 LBS | RESPIRATION RATE: 18 BRPM | BODY MASS INDEX: 32.03 KG/M2 | OXYGEN SATURATION: 98 % | TEMPERATURE: 98 F | HEART RATE: 64 BPM | HEIGHT: 74 IN

## 2021-10-06 DIAGNOSIS — E11.65 TYPE 2 DIABETES MELLITUS WITH HYPERGLYCEMIA, WITHOUT LONG-TERM CURRENT USE OF INSULIN: ICD-10-CM

## 2021-10-06 DIAGNOSIS — Z85.038 HISTORY OF COLON CANCER, STAGE II: Primary | ICD-10-CM

## 2021-10-06 PROCEDURE — 99213 PR OFFICE/OUTPT VISIT, EST, LEVL III, 20-29 MIN: ICD-10-PCS | Mod: S$GLB,,, | Performed by: NURSE PRACTITIONER

## 2021-10-06 PROCEDURE — 99999 PR PBB SHADOW E&M-EST. PATIENT-LVL V: CPT | Mod: PBBFAC,,, | Performed by: NURSE PRACTITIONER

## 2021-10-06 PROCEDURE — 99999 PR PBB SHADOW E&M-EST. PATIENT-LVL V: ICD-10-PCS | Mod: PBBFAC,,, | Performed by: NURSE PRACTITIONER

## 2021-10-06 PROCEDURE — 99213 OFFICE O/P EST LOW 20 MIN: CPT | Mod: S$GLB,,, | Performed by: NURSE PRACTITIONER

## 2021-10-25 ENCOUNTER — HOSPITAL ENCOUNTER (OUTPATIENT)
Dept: RADIOLOGY | Facility: HOSPITAL | Age: 51
Discharge: HOME | End: 2021-10-25
Attending: UROLOGY
Payer: COMMERCIAL

## 2021-10-25 ENCOUNTER — LAB VISIT (OUTPATIENT)
Dept: LAB | Facility: HOSPITAL | Age: 51
End: 2021-10-25
Attending: UROLOGY
Payer: COMMERCIAL

## 2021-10-25 DIAGNOSIS — R31.9 HEMATURIA OF UNKNOWN CAUSE: ICD-10-CM

## 2021-10-25 DIAGNOSIS — R31.29 MICROHEMATURIA: ICD-10-CM

## 2021-10-25 DIAGNOSIS — Z15.09 MSH2-RELATED LYNCH SYNDROME (HNPCC1): ICD-10-CM

## 2021-10-25 LAB
ANION GAP SERPL CALC-SCNC: 9 MMOL/L (ref 8–16)
BUN SERPL-MCNC: 16 MG/DL (ref 6–20)
CALCIUM SERPL-MCNC: 9 MG/DL (ref 8.7–10.5)
CHLORIDE SERPL-SCNC: 107 MMOL/L (ref 95–110)
CO2 SERPL-SCNC: 21 MMOL/L (ref 23–29)
CREAT SERPL-MCNC: 1.1 MG/DL (ref 0.5–1.4)
EST. GFR  (AFRICAN AMERICAN): >60 ML/MIN/1.73 M^2
EST. GFR  (NON AFRICAN AMERICAN): >60 ML/MIN/1.73 M^2
GLUCOSE SERPL-MCNC: 117 MG/DL (ref 70–110)
POTASSIUM SERPL-SCNC: 4.1 MMOL/L (ref 3.5–5.1)
SODIUM SERPL-SCNC: 137 MMOL/L (ref 136–145)

## 2021-10-25 PROCEDURE — 74178 CT UROGRAM ABD PELVIS W WO: ICD-10-PCS | Mod: 26,,, | Performed by: RADIOLOGY

## 2021-10-25 PROCEDURE — 74178 CT ABD&PLV WO CNTR FLWD CNTR: CPT | Mod: 26,,, | Performed by: RADIOLOGY

## 2021-10-25 PROCEDURE — 80048 BASIC METABOLIC PNL TOTAL CA: CPT | Performed by: UROLOGY

## 2021-10-25 PROCEDURE — 74178 CT ABD&PLV WO CNTR FLWD CNTR: CPT | Mod: TC

## 2021-10-25 PROCEDURE — 36415 COLL VENOUS BLD VENIPUNCTURE: CPT | Mod: PO | Performed by: UROLOGY

## 2021-10-25 PROCEDURE — 25500020 PHARM REV CODE 255

## 2021-10-25 RX ADMIN — IOHEXOL 125 ML: 350 INJECTION, SOLUTION INTRAVENOUS at 02:10

## 2021-11-08 ENCOUNTER — PATIENT MESSAGE (OUTPATIENT)
Dept: UROLOGY | Facility: CLINIC | Age: 51
End: 2021-11-08
Payer: COMMERCIAL

## 2021-11-08 ENCOUNTER — PATIENT MESSAGE (OUTPATIENT)
Dept: DERMATOLOGY | Facility: CLINIC | Age: 51
End: 2021-11-08
Payer: COMMERCIAL

## 2021-11-09 RX ORDER — METRONIDAZOLE 7.5 MG/G
1 CREAM TOPICAL 2 TIMES DAILY
Qty: 30 G | Refills: 1 | Status: SHIPPED | OUTPATIENT
Start: 2021-11-09 | End: 2023-05-01

## 2021-11-12 ENCOUNTER — TELEPHONE (OUTPATIENT)
Dept: DERMATOLOGY | Facility: CLINIC | Age: 51
End: 2021-11-12
Payer: COMMERCIAL

## 2021-11-15 ENCOUNTER — PATIENT MESSAGE (OUTPATIENT)
Dept: DERMATOLOGY | Facility: CLINIC | Age: 51
End: 2021-11-15
Payer: COMMERCIAL

## 2021-11-16 ENCOUNTER — TELEPHONE (OUTPATIENT)
Dept: FAMILY MEDICINE | Facility: CLINIC | Age: 51
End: 2021-11-16
Payer: COMMERCIAL

## 2021-11-16 ENCOUNTER — PATIENT MESSAGE (OUTPATIENT)
Dept: SURGERY | Facility: CLINIC | Age: 51
End: 2021-11-16
Payer: COMMERCIAL

## 2021-12-02 ENCOUNTER — PATIENT MESSAGE (OUTPATIENT)
Dept: FAMILY MEDICINE | Facility: CLINIC | Age: 51
End: 2021-12-02
Payer: COMMERCIAL

## 2021-12-06 ENCOUNTER — LAB VISIT (OUTPATIENT)
Dept: LAB | Facility: HOSPITAL | Age: 51
End: 2021-12-06
Attending: PHYSICIAN ASSISTANT
Payer: COMMERCIAL

## 2021-12-06 ENCOUNTER — OFFICE VISIT (OUTPATIENT)
Dept: FAMILY MEDICINE | Facility: CLINIC | Age: 51
End: 2021-12-06
Payer: COMMERCIAL

## 2021-12-06 VITALS
SYSTOLIC BLOOD PRESSURE: 120 MMHG | HEART RATE: 79 BPM | WEIGHT: 240.06 LBS | OXYGEN SATURATION: 97 % | DIASTOLIC BLOOD PRESSURE: 78 MMHG | BODY MASS INDEX: 30.81 KG/M2 | TEMPERATURE: 98 F | HEIGHT: 74 IN

## 2021-12-06 DIAGNOSIS — R50.9 FEVER, UNSPECIFIED FEVER CAUSE: Primary | ICD-10-CM

## 2021-12-06 DIAGNOSIS — R50.9 FEVER, UNSPECIFIED FEVER CAUSE: ICD-10-CM

## 2021-12-06 LAB
ALBUMIN SERPL BCP-MCNC: 3.4 G/DL (ref 3.5–5.2)
ALP SERPL-CCNC: 54 U/L (ref 55–135)
ALT SERPL W/O P-5'-P-CCNC: 19 U/L (ref 10–44)
ANION GAP SERPL CALC-SCNC: 11 MMOL/L (ref 8–16)
AST SERPL-CCNC: 17 U/L (ref 10–40)
BASOPHILS # BLD AUTO: 0.03 K/UL (ref 0–0.2)
BASOPHILS NFR BLD: 0.9 % (ref 0–1.9)
BILIRUB SERPL-MCNC: 0.5 MG/DL (ref 0.1–1)
BUN SERPL-MCNC: 16 MG/DL (ref 6–20)
CALCIUM SERPL-MCNC: 9.2 MG/DL (ref 8.7–10.5)
CHLORIDE SERPL-SCNC: 103 MMOL/L (ref 95–110)
CO2 SERPL-SCNC: 25 MMOL/L (ref 23–29)
CREAT SERPL-MCNC: 1.3 MG/DL (ref 0.5–1.4)
DIFFERENTIAL METHOD: ABNORMAL
EOSINOPHIL # BLD AUTO: 0.2 K/UL (ref 0–0.5)
EOSINOPHIL NFR BLD: 5.1 % (ref 0–8)
ERYTHROCYTE [DISTWIDTH] IN BLOOD BY AUTOMATED COUNT: 12 % (ref 11.5–14.5)
EST. GFR  (AFRICAN AMERICAN): >60 ML/MIN/1.73 M^2
EST. GFR  (NON AFRICAN AMERICAN): >60 ML/MIN/1.73 M^2
GLUCOSE SERPL-MCNC: 122 MG/DL (ref 70–110)
HCT VFR BLD AUTO: 44.3 % (ref 40–54)
HGB BLD-MCNC: 15.2 G/DL (ref 14–18)
IMM GRANULOCYTES # BLD AUTO: 0.01 K/UL (ref 0–0.04)
IMM GRANULOCYTES NFR BLD AUTO: 0.3 % (ref 0–0.5)
LYMPHOCYTES # BLD AUTO: 0.8 K/UL (ref 1–4.8)
LYMPHOCYTES NFR BLD: 21.3 % (ref 18–48)
MCH RBC QN AUTO: 29.9 PG (ref 27–31)
MCHC RBC AUTO-ENTMCNC: 34.3 G/DL (ref 32–36)
MCV RBC AUTO: 87 FL (ref 82–98)
MONOCYTES # BLD AUTO: 0.6 K/UL (ref 0.3–1)
MONOCYTES NFR BLD: 16.5 % (ref 4–15)
NEUTROPHILS # BLD AUTO: 2 K/UL (ref 1.8–7.7)
NEUTROPHILS NFR BLD: 55.9 % (ref 38–73)
NRBC BLD-RTO: 0 /100 WBC
PLATELET # BLD AUTO: 231 K/UL (ref 150–450)
PMV BLD AUTO: 8.9 FL (ref 9.2–12.9)
POTASSIUM SERPL-SCNC: 3.9 MMOL/L (ref 3.5–5.1)
PROT SERPL-MCNC: 7 G/DL (ref 6–8.4)
RBC # BLD AUTO: 5.09 M/UL (ref 4.6–6.2)
SODIUM SERPL-SCNC: 139 MMOL/L (ref 136–145)
WBC # BLD AUTO: 3.52 K/UL (ref 3.9–12.7)

## 2021-12-06 PROCEDURE — 85025 COMPLETE CBC W/AUTO DIFF WBC: CPT | Performed by: PHYSICIAN ASSISTANT

## 2021-12-06 PROCEDURE — 99214 OFFICE O/P EST MOD 30 MIN: CPT | Mod: S$GLB,,, | Performed by: PHYSICIAN ASSISTANT

## 2021-12-06 PROCEDURE — 80053 COMPREHEN METABOLIC PANEL: CPT | Performed by: PHYSICIAN ASSISTANT

## 2021-12-06 PROCEDURE — 99999 PR PBB SHADOW E&M-EST. PATIENT-LVL IV: ICD-10-PCS | Mod: PBBFAC,,, | Performed by: PHYSICIAN ASSISTANT

## 2021-12-06 PROCEDURE — U0003 INFECTIOUS AGENT DETECTION BY NUCLEIC ACID (DNA OR RNA); SEVERE ACUTE RESPIRATORY SYNDROME CORONAVIRUS 2 (SARS-COV-2) (CORONAVIRUS DISEASE [COVID-19]), AMPLIFIED PROBE TECHNIQUE, MAKING USE OF HIGH THROUGHPUT TECHNOLOGIES AS DESCRIBED BY CMS-2020-01-R: HCPCS | Performed by: PHYSICIAN ASSISTANT

## 2021-12-06 PROCEDURE — U0005 INFEC AGEN DETEC AMPLI PROBE: HCPCS | Performed by: PHYSICIAN ASSISTANT

## 2021-12-06 PROCEDURE — 36415 COLL VENOUS BLD VENIPUNCTURE: CPT | Performed by: PHYSICIAN ASSISTANT

## 2021-12-06 PROCEDURE — 99999 PR PBB SHADOW E&M-EST. PATIENT-LVL IV: CPT | Mod: PBBFAC,,, | Performed by: PHYSICIAN ASSISTANT

## 2021-12-06 PROCEDURE — 99214 PR OFFICE/OUTPT VISIT, EST, LEVL IV, 30-39 MIN: ICD-10-PCS | Mod: S$GLB,,, | Performed by: PHYSICIAN ASSISTANT

## 2021-12-06 RX ORDER — AMANTADINE HYDROCHLORIDE 100 MG/1
1 TABLET ORAL 2 TIMES DAILY
COMMUNITY
Start: 2021-12-05 | End: 2022-09-19

## 2021-12-06 RX ORDER — NITROFURANTOIN 25; 75 MG/1; MG/1
100 CAPSULE ORAL 2 TIMES DAILY
COMMUNITY
Start: 2021-12-05 | End: 2022-09-19

## 2021-12-06 RX ORDER — TESTOSTERONE CYPIONATE 200 MG/ML
60 INJECTION, SOLUTION INTRAMUSCULAR
COMMUNITY

## 2021-12-07 ENCOUNTER — PATIENT MESSAGE (OUTPATIENT)
Dept: FAMILY MEDICINE | Facility: CLINIC | Age: 51
End: 2021-12-07
Payer: COMMERCIAL

## 2021-12-07 LAB
SARS-COV-2 RNA RESP QL NAA+PROBE: NOT DETECTED
SARS-COV-2- CYCLE NUMBER: NORMAL

## 2021-12-09 ENCOUNTER — OFFICE VISIT (OUTPATIENT)
Dept: FAMILY MEDICINE | Facility: CLINIC | Age: 51
End: 2021-12-09
Payer: COMMERCIAL

## 2021-12-09 DIAGNOSIS — U07.1 LOWER RESPIRATORY TRACT INFECTION DUE TO COVID-19 VIRUS: Primary | ICD-10-CM

## 2021-12-09 DIAGNOSIS — J22 LOWER RESPIRATORY TRACT INFECTION DUE TO COVID-19 VIRUS: Primary | ICD-10-CM

## 2021-12-09 DIAGNOSIS — Z23 NEED FOR PNEUMOCOCCAL VACCINATION: Primary | ICD-10-CM

## 2021-12-09 PROCEDURE — 99213 PR OFFICE/OUTPT VISIT, EST, LEVL III, 20-29 MIN: ICD-10-PCS | Mod: 95,,, | Performed by: FAMILY MEDICINE

## 2021-12-09 PROCEDURE — 99213 OFFICE O/P EST LOW 20 MIN: CPT | Mod: 95,,, | Performed by: FAMILY MEDICINE

## 2022-01-03 ENCOUNTER — IMMUNIZATION (OUTPATIENT)
Dept: PRIMARY CARE CLINIC | Facility: CLINIC | Age: 52
End: 2022-01-03
Payer: COMMERCIAL

## 2022-01-03 DIAGNOSIS — Z23 NEED FOR VACCINATION: Primary | ICD-10-CM

## 2022-01-03 PROCEDURE — 0004A COVID-19, MRNA, LNP-S, PF, 30 MCG/0.3 ML DOSE VACCINE: ICD-10-PCS | Mod: S$GLB,,, | Performed by: FAMILY MEDICINE

## 2022-01-03 PROCEDURE — 91300 COVID-19, MRNA, LNP-S, PF, 30 MCG/0.3 ML DOSE VACCINE: CPT | Mod: S$GLB,,, | Performed by: FAMILY MEDICINE

## 2022-01-03 PROCEDURE — 91300 COVID-19, MRNA, LNP-S, PF, 30 MCG/0.3 ML DOSE VACCINE: ICD-10-PCS | Mod: S$GLB,,, | Performed by: FAMILY MEDICINE

## 2022-01-03 PROCEDURE — 0004A COVID-19, MRNA, LNP-S, PF, 30 MCG/0.3 ML DOSE VACCINE: CPT | Mod: S$GLB,,, | Performed by: FAMILY MEDICINE

## 2022-01-11 ENCOUNTER — PATIENT OUTREACH (OUTPATIENT)
Dept: ADMINISTRATIVE | Facility: OTHER | Age: 52
End: 2022-01-11
Payer: COMMERCIAL

## 2022-01-11 ENCOUNTER — PATIENT MESSAGE (OUTPATIENT)
Dept: DERMATOLOGY | Facility: CLINIC | Age: 52
End: 2022-01-11
Payer: COMMERCIAL

## 2022-01-14 ENCOUNTER — OFFICE VISIT (OUTPATIENT)
Dept: DERMATOLOGY | Facility: CLINIC | Age: 52
End: 2022-01-14
Payer: COMMERCIAL

## 2022-01-14 VITALS — BODY MASS INDEX: 30.8 KG/M2 | HEIGHT: 74 IN | WEIGHT: 240 LBS

## 2022-01-14 DIAGNOSIS — L98.9 DISEASE OF SKIN AND SUBCUTANEOUS TISSUE: Primary | ICD-10-CM

## 2022-01-14 PROCEDURE — 99214 PR OFFICE/OUTPT VISIT, EST, LEVL IV, 30-39 MIN: ICD-10-PCS | Mod: 25,S$GLB,, | Performed by: DERMATOLOGY

## 2022-01-14 PROCEDURE — 88305 TISSUE EXAM BY PATHOLOGIST: CPT | Mod: 26,,, | Performed by: PATHOLOGY

## 2022-01-14 PROCEDURE — 88313 SPECIAL STAINS GROUP 2: CPT | Performed by: PATHOLOGY

## 2022-01-14 PROCEDURE — 88312 PR  SPECIAL STAINS,GROUP I: ICD-10-PCS | Mod: 26,,, | Performed by: PATHOLOGY

## 2022-01-14 PROCEDURE — 88313 SPECIAL STAINS GROUP 2: CPT | Mod: 26,,, | Performed by: PATHOLOGY

## 2022-01-14 PROCEDURE — 99999 PR PBB SHADOW E&M-EST. PATIENT-LVL IV: ICD-10-PCS | Mod: PBBFAC,,, | Performed by: DERMATOLOGY

## 2022-01-14 PROCEDURE — 99999 PR PBB SHADOW E&M-EST. PATIENT-LVL IV: CPT | Mod: PBBFAC,,, | Performed by: DERMATOLOGY

## 2022-01-14 PROCEDURE — 88312 SPECIAL STAINS GROUP 1: CPT | Mod: 26,,, | Performed by: PATHOLOGY

## 2022-01-14 PROCEDURE — 99214 OFFICE O/P EST MOD 30 MIN: CPT | Mod: 25,S$GLB,, | Performed by: DERMATOLOGY

## 2022-01-14 PROCEDURE — 88312 SPECIAL STAINS GROUP 1: CPT | Performed by: PATHOLOGY

## 2022-01-14 PROCEDURE — 88305 TISSUE EXAM BY PATHOLOGIST: ICD-10-PCS | Mod: 26,,, | Performed by: PATHOLOGY

## 2022-01-14 PROCEDURE — 88313 PR  SPECIAL STAINS,GROUP II: ICD-10-PCS | Mod: 26,,, | Performed by: PATHOLOGY

## 2022-01-14 PROCEDURE — 88305 TISSUE EXAM BY PATHOLOGIST: CPT | Performed by: PATHOLOGY

## 2022-01-14 RX ORDER — TRIAMCINOLONE ACETONIDE 1 MG/G
CREAM TOPICAL
Qty: 454 G | Refills: 3 | Status: SHIPPED | OUTPATIENT
Start: 2022-01-14

## 2022-01-14 NOTE — PROGRESS NOTES
Subjective:       Patient ID:  Lyndon Lion Jr. is a 51 y.o. male who presents for   Chief Complaint   Patient presents with    Rash     LOV: 3/4/21- [place], seb hyper of face, lentigo, rosacea, reese syndrome    C/o rash all over the body  Since October 2021 - went on trip to [place] and the rash appeared when he got back home  No tick bite while there, at least none noticed  Bed partner with no issue  Have tried process of elimination at home to try and find a trigger - no change in rash  No treatment    Washes with old spice - several years  Detergent - all free and clear    Derm hx:   in [employer], shift work,   Patient with Reese syndrome; MSH2 mutation, dx 2013  H/o colon cancer s/p partial colectomy; annual coloscopy and EGD  Mother with h/o melanoma (dx age 45) and breast CA  Wear SPF as needed, wears hats regularly.      Current Outpatient Medications:     ACCU-CHEK GUIDE TEST STRIPS Strp, USE TO TEST TWICE A DAY, Disp: 200 strip, Rfl: 3    amantadine  mg Tab, Take 1 tablet by mouth 2 (two) times daily., Disp: , Rfl:     ascorbic acid, vitamin C, (VITAMIN C) 100 MG tablet, Take 100 mg by mouth once daily., Disp: , Rfl:     aspirin (ECOTRIN) 81 MG EC tablet, Take 81 mg by mouth once daily., Disp: , Rfl:     COD LIVER OIL ORAL, Take by mouth., Disp: , Rfl:     doxycycline (VIBRAMYCIN) 100 MG Cap, TAKE 1 CAPSULE BY MOUTH ONCE DAILY., Disp: 30 capsule, Rfl: 1    lancets (ACCU-CHEK SOFTCLIX LANCETS) Misc, 1 Units by Misc.(Non-Drug; Combo Route) route 2 (two) times a day., Disp: 200 each, Rfl: 0    liraglutide 0.6 mg/0.1 mL, 18 mg/3 mL, subq PNIJ (VICTOZA 3-ELIZABETH) 0.6 mg/0.1 mL (18 mg/3 mL) PnIj pen, Inject 1.2 mg into the skin once daily. To begin @ 0.6mg daily for first 2 weeks then uptiitrate as tolerated., Disp: 18 mL, Rfl: 3    metFORMIN (GLUCOPHAGE-XR) 500 MG ER 24hr tablet, Take 1 tablet (500 mg total) by mouth 2 (two) times daily with meals., Disp: 180 tablet, Rfl: 3     "metronidazole 0.75% (METROCREAM) 0.75 % Crea, Apply 1 application topically 2 (two) times daily. Apply to affected area, Disp: 30 g, Rfl: 1    mupirocin (BACTROBAN) 2 % ointment, Apply topically as needed. , Disp: , Rfl:     nitrofurantoin, macrocrystal-monohydrate, (MACROBID) 100 MG capsule, Take 100 mg by mouth 2 (two) times daily., Disp: , Rfl:     pen needle, diabetic (PEN NEEDLE) 32 gauge x 5/32" Ndle, 1 each by Misc.(Non-Drug; Combo Route) route once daily., Disp: 90 each, Rfl: 3    phytonadione, vitamin K1, (MEPHYTON) 5 mg Tab, Take 5 mg by mouth once., Disp: , Rfl:     pravastatin (PRAVACHOL) 20 MG tablet, Take 1 tablet (20 mg total) by mouth once daily., Disp: 90 tablet, Rfl: 3    sulfacetamide sodium-sulfur 10-5 % (w/w) Clsr, Use to wash face daily, Disp: 170 g, Rfl: 5    testosterone cypionate (DEPOTESTOTERONE CYPIONATE) 200 mg/mL injection, Inject 60 mg into the muscle every 14 (fourteen) days. Twice a week, Disp: , Rfl:     vitamin A 8000 UNIT capsule, Take 1 capsule (8,000 Units total) by mouth once daily., Disp: 100 capsule, Rfl: 3    zinc gluconate 50 mg tablet, Take 50 mg by mouth once daily., Disp: , Rfl:         Review of Systems   Constitutional: Negative for fever, chills and fatigue.   Respiratory: Negative for cough and shortness of breath.    Skin: Positive for daily sunscreen use, activity-related sunscreen use and wears hat. Negative for itching and rash.   Hematologic/Lymphatic: Does not bruise/bleed easily.        Objective:    Physical Exam   Constitutional: He appears well-developed and well-nourished. No distress.   Neurological: He is alert and oriented to person, place, and time. He is not disoriented.   Psychiatric: He has a normal mood and affect.   Skin:   Areas Examined (abnormalities noted in diagram):   Scalp / Hair Palpated and Inspected  Head / Face Inspection Performed  Neck Inspection Performed  Chest / Axilla Inspection Performed  Abdomen Inspection " Performed  Back Inspection Performed  RUE Inspected  LUE Inspection Performed  RLE Inspected  LLE Inspection Performed  Nails and Digits Inspection Performed                       Diagram Legend     Erythematous scaling macule/papule c/w actinic keratosis       Vascular papule c/w angioma      Pigmented verrucoid papule/plaque c/w seborrheic keratosis      Yellow umbilicated papule c/w sebaceous hyperplasia      Irregularly shaped tan macule c/w lentigo     1-2 mm smooth white papules consistent with Milia      Movable subcutaneous cyst with punctum c/w epidermal inclusion cyst      Subcutaneous movable cyst c/w pilar cyst      Firm pink to brown papule c/w dermatofibroma      Pedunculated fleshy papule(s) c/w skin tag(s)      Evenly pigmented macule c/w junctional nevus     Mildly variegated pigmented, slightly irregular-bordered macule c/w mildly atypical nevus      Flesh colored to evenly pigmented papule c/w intradermal nevus       Pink pearly papule/plaque c/w basal cell carcinoma      Erythematous hyperkeratotic cursted plaque c/w SCC      Surgical scar with no sign of skin cancer recurrence      Open and closed comedones      Inflammatory papules and pustules      Verrucoid papule consistent consistent with wart     Erythematous eczematous patches and plaques     Dystrophic onycholytic nail with subungual debris c/w onychomycosis     Umbilicated papule    Erythematous-base heme-crusted tan verrucoid plaque consistent with inflamed seborrheic keratosis     Erythematous Silvery Scaling Plaque c/w Psoriasis     See annotation                  Assessment / Plan:      Pathology Orders:     Normal Orders This Visit    Specimen to Pathology, Dermatology     Questions:    Procedure Type: Dermatology and skin neoplasms    Number of Specimens: 2    ------------------------: -------------------------    Spec 1 Procedure: Biopsy    Spec 1 Clinical Impression: Eczematous plaques on left volar forearm, upper back, upper  chest, scattered tiny pustules on back and chest, intense pruritus x 3 months, p follic vs contact derm vs other    Spec 1 Source: left volar forearm (eczematous)    ------------------------: -------------------------    Spec 2 Procedure: Biopsy    Spec 2 Clinical Impression: Eczematous plaques on left volar forearm, upper back, upper chest, scattered tiny pustules on back and chest, intense pruritus x 3 months, p follic vs contact derm vs other    Spec 2 Source: left upper back (pustule)    Release to patient:         Disease of skin and subcutaneous tissue  -     Specimen to Pathology, Dermatology  -     triamcinolone acetonide 0.1% (KENALOG) 0.1 % cream; AAA bid  Dispense: 454 g; Refill: 3    Punch biopsy procedure note:x2  Punch biopsy performed after verbal consent obtained. Area marked and prepped with alcohol. Approximately 1cc of 1% lidocaine with epinephrine injected. 4 mm disposable punch used to remove lesion. Hemostasis obtained and biopsy site closed with 1 - 2 Prolene sutures. Wound care instructions reviewed with patient and handout given.    Declined PO steroid taper  On long term doxy, may promote P crystal  Works in [employer], [employer] clothing, sweating 12 hrs per day, working non stop every day since Christmas and until early Feb    Stop old spice, dove sensitive  cerave anti itch           Follow up in about 10 days (around 1/24/2022) for suture removal.

## 2022-01-14 NOTE — PATIENT INSTRUCTIONS
Punch Biopsy Wound Care    Your doctor has performed a punch biopsy today.  A band aid and antibiotic ointment has been placed over the site.  This should remain in place for 24 hours.  It is recommended that you keep the area dry for the first 24 hours.  After 24 hours, you may remove the band aid and wash the area with warm soap and water and apply Vaseline jelly.  Many patients prefer to use Neosporin or Bacitracin ointment.  This is acceptable; however know that you can develop an allergy to this medication even if you have used it safely for years.  It is important to keep the area moist.  Letting it dry out and get air slows healing time, will worsen the scar, and make it more difficult to remove the stitches if they were placed.  Band aid is optional after first 24 hours.      If you notice increasing redness, tenderness, pain, or yellow drainage at the biopsy or surgical site, please notify your doctor.  These are signs of an infection.    If your biopsy/surgical site is bleeding, apply firm pressure for 15 minutes straight.  Repeat for another 15 minutes, if it is still bleeding.   If the surgical site continues to bleed, then please contact your doctor.     [facility]  [facility]ER[facility]  [address] [place] [address], [address] [id number]  Dept: [phone]

## 2022-01-19 ENCOUNTER — PATIENT MESSAGE (OUTPATIENT)
Dept: HEMATOLOGY/ONCOLOGY | Facility: CLINIC | Age: 52
End: 2022-01-19
Payer: COMMERCIAL

## 2022-01-24 ENCOUNTER — OFFICE VISIT (OUTPATIENT)
Dept: ENDOCRINOLOGY | Facility: CLINIC | Age: 52
End: 2022-01-24
Payer: COMMERCIAL

## 2022-01-24 ENCOUNTER — CLINICAL SUPPORT (OUTPATIENT)
Dept: DERMATOLOGY | Facility: CLINIC | Age: 52
End: 2022-01-24
Payer: COMMERCIAL

## 2022-01-24 ENCOUNTER — LAB VISIT (OUTPATIENT)
Dept: LAB | Facility: HOSPITAL | Age: 52
End: 2022-01-24
Attending: INTERNAL MEDICINE
Payer: COMMERCIAL

## 2022-01-24 VITALS
HEART RATE: 66 BPM | DIASTOLIC BLOOD PRESSURE: 70 MMHG | HEIGHT: 74 IN | SYSTOLIC BLOOD PRESSURE: 116 MMHG | TEMPERATURE: 98 F | BODY MASS INDEX: 32 KG/M2 | OXYGEN SATURATION: 97 % | WEIGHT: 249.31 LBS

## 2022-01-24 DIAGNOSIS — Z98.84 S/P LAPAROSCOPIC SLEEVE GASTRECTOMY: ICD-10-CM

## 2022-01-24 DIAGNOSIS — Z85.038 HX OF COLON CANCER, STAGE I: ICD-10-CM

## 2022-01-24 DIAGNOSIS — Z48.02 VISIT FOR SUTURE REMOVAL: Primary | ICD-10-CM

## 2022-01-24 DIAGNOSIS — E11.65 UNCONTROLLED TYPE 2 DIABETES MELLITUS WITH HYPERGLYCEMIA: ICD-10-CM

## 2022-01-24 DIAGNOSIS — R94.6 THYROID FUNCTION TEST ABNORMAL: ICD-10-CM

## 2022-01-24 DIAGNOSIS — E55.9 HYPOVITAMINOSIS D: ICD-10-CM

## 2022-01-24 DIAGNOSIS — R16.1 SPLENOMEGALY: Chronic | ICD-10-CM

## 2022-01-24 DIAGNOSIS — R79.89 LOW TESTOSTERONE IN MALE: ICD-10-CM

## 2022-01-24 DIAGNOSIS — E66.9 OBESITY (BMI 30-39.9): ICD-10-CM

## 2022-01-24 DIAGNOSIS — Z98.84 HISTORY OF GASTRIC BYPASS: ICD-10-CM

## 2022-01-24 DIAGNOSIS — E11.65 TYPE 2 DIABETES MELLITUS WITH HYPERGLYCEMIA, WITH LONG-TERM CURRENT USE OF INSULIN: ICD-10-CM

## 2022-01-24 DIAGNOSIS — R97.8 ABNORMAL TUMOR MARKERS: ICD-10-CM

## 2022-01-24 DIAGNOSIS — Z98.84 BARIATRIC SURGERY STATUS: Primary | ICD-10-CM

## 2022-01-24 DIAGNOSIS — K76.0 NONALCOHOLIC FATTY LIVER DISEASE: ICD-10-CM

## 2022-01-24 DIAGNOSIS — E63.9 NUTRITIONAL DEFICIENCY: ICD-10-CM

## 2022-01-24 DIAGNOSIS — Z79.4 TYPE 2 DIABETES MELLITUS WITH HYPERGLYCEMIA, WITH LONG-TERM CURRENT USE OF INSULIN: ICD-10-CM

## 2022-01-24 DIAGNOSIS — M51.37 DDD (DEGENERATIVE DISC DISEASE), LUMBOSACRAL: ICD-10-CM

## 2022-01-24 DIAGNOSIS — Z15.09 LYNCH SYNDROME: ICD-10-CM

## 2022-01-24 DIAGNOSIS — E11.65 TYPE 2 DIABETES MELLITUS WITH HYPERGLYCEMIA, WITHOUT LONG-TERM CURRENT USE OF INSULIN: Primary | ICD-10-CM

## 2022-01-24 DIAGNOSIS — D53.9 NUTRITIONAL ANEMIA: ICD-10-CM

## 2022-01-24 DIAGNOSIS — E11.65 TYPE 2 DIABETES MELLITUS WITH HYPERGLYCEMIA, WITHOUT LONG-TERM CURRENT USE OF INSULIN: ICD-10-CM

## 2022-01-24 DIAGNOSIS — E46 MALNUTRITION, UNSPECIFIED TYPE: ICD-10-CM

## 2022-01-24 PROCEDURE — 99024 PR POST-OP FOLLOW-UP VISIT: ICD-10-PCS | Mod: S$GLB,,, | Performed by: DERMATOLOGY

## 2022-01-24 PROCEDURE — 99214 PR OFFICE/OUTPT VISIT, EST, LEVL IV, 30-39 MIN: ICD-10-PCS | Mod: S$GLB,,, | Performed by: INTERNAL MEDICINE

## 2022-01-24 PROCEDURE — 84443 ASSAY THYROID STIM HORMONE: CPT | Performed by: INTERNAL MEDICINE

## 2022-01-24 PROCEDURE — 80053 COMPREHEN METABOLIC PANEL: CPT | Performed by: INTERNAL MEDICINE

## 2022-01-24 PROCEDURE — 83605 ASSAY OF LACTIC ACID: CPT | Performed by: INTERNAL MEDICINE

## 2022-01-24 PROCEDURE — 99999 PR PBB SHADOW E&M-EST. PATIENT-LVL I: CPT | Mod: PBBFAC,,,

## 2022-01-24 PROCEDURE — 84550 ASSAY OF BLOOD/URIC ACID: CPT | Performed by: INTERNAL MEDICINE

## 2022-01-24 PROCEDURE — 99999 PR PBB SHADOW E&M-EST. PATIENT-LVL IV: ICD-10-PCS | Mod: PBBFAC,,, | Performed by: INTERNAL MEDICINE

## 2022-01-24 PROCEDURE — 83036 HEMOGLOBIN GLYCOSYLATED A1C: CPT | Performed by: INTERNAL MEDICINE

## 2022-01-24 PROCEDURE — 82140 ASSAY OF AMMONIA: CPT | Performed by: INTERNAL MEDICINE

## 2022-01-24 PROCEDURE — 99024 POSTOP FOLLOW-UP VISIT: CPT | Mod: S$GLB,,, | Performed by: DERMATOLOGY

## 2022-01-24 PROCEDURE — 82977 ASSAY OF GGT: CPT | Performed by: INTERNAL MEDICINE

## 2022-01-24 PROCEDURE — 82040 ASSAY OF SERUM ALBUMIN: CPT | Performed by: INTERNAL MEDICINE

## 2022-01-24 PROCEDURE — 99999 PR PBB SHADOW E&M-EST. PATIENT-LVL IV: CPT | Mod: PBBFAC,,, | Performed by: INTERNAL MEDICINE

## 2022-01-24 PROCEDURE — 36415 COLL VENOUS BLD VENIPUNCTURE: CPT | Mod: PO | Performed by: INTERNAL MEDICINE

## 2022-01-24 PROCEDURE — 82105 ALPHA-FETOPROTEIN SERUM: CPT | Performed by: INTERNAL MEDICINE

## 2022-01-24 PROCEDURE — 99999 PR PBB SHADOW E&M-EST. PATIENT-LVL I: ICD-10-PCS | Mod: PBBFAC,,,

## 2022-01-24 PROCEDURE — 99214 OFFICE O/P EST MOD 30 MIN: CPT | Mod: S$GLB,,, | Performed by: INTERNAL MEDICINE

## 2022-01-24 PROCEDURE — 85025 COMPLETE CBC W/AUTO DIFF WBC: CPT | Performed by: INTERNAL MEDICINE

## 2022-01-24 RX ORDER — LIRAGLUTIDE 6 MG/ML
1.2 INJECTION SUBCUTANEOUS DAILY
Qty: 18 ML | Refills: 3 | Status: SHIPPED | OUTPATIENT
Start: 2022-01-24 | End: 2022-01-24

## 2022-01-24 RX ORDER — METFORMIN HYDROCHLORIDE 500 MG/1
500 TABLET, EXTENDED RELEASE ORAL 2 TIMES DAILY WITH MEALS
Qty: 180 TABLET | Refills: 3 | Status: SHIPPED | OUTPATIENT
Start: 2022-01-24 | End: 2022-06-03 | Stop reason: SDUPTHER

## 2022-01-24 RX ORDER — LIRAGLUTIDE 6 MG/ML
1.8 INJECTION SUBCUTANEOUS DAILY
Qty: 27 ML | Refills: 3 | Status: SHIPPED | OUTPATIENT
Start: 2022-01-24 | End: 2022-06-03 | Stop reason: SDUPTHER

## 2022-01-24 NOTE — PROGRESS NOTES
Subjective:      Patient ID: Lyndon Lion Jr. is a 51 y.o. male.    Chief Complaint:  Diabetes    Diabetes and Weight Gain     Patient is a 51 yr old gentleman with type 2 diabetes seen in [facility] today     History of Present Illness    The patient, Ms Lion is a 51 yr gentleman with type 2 diabetes seen in [facility] today.     His background comorbidities are detailed below;     1. Type 2 diabetes mellitus with hyperglycemia, with long-term current use of insulin    2. Obesity (BMI 30-39.9)    3. Lumbar spondylosis    4. DDD (degenerative disc disease), lumbosacral    5. MSH2-related Parada syndrome (HNPCC1)    6. Hx of colon cancer, stage I    7. Splenomegaly    8. Nonalcoholic fatty liver disease    9. Gastroesophageal reflux disease without esophagitis       He is presently on metformin monotherapy 500mg QD and his latest HBA1c from 09/21 was 5.4 which is at goal..  The patients baseline [place] score is 9.  Patient has been known to be diabetic in the last ~ 6mths.  Patient father and maternal grandmother also have diabetes.  Patient had a sleeve gastrectomy NOT a gastric bypass.~ 2011 by Dr Christophe younger.   Patient had hemicolectomy In 2008. Patient was found to found colorectal ca stage 2. He received chemotherapy with Dr Cárdenas. He received 12 courses of FOLFOX (leucovorin calcium (folinic acid), fluorouracil, and oxaliplatin).     Patient does not smoke.  Patient drinks beer very rarely presently.     Patient sees Dr Shay for ongoing eye care. Last appt was ~ 05/2020. No retinopathy.     Flu vaccination'; last received 2017  Pneumovax; never received  COVID vaccination ; first immunization received.  Patient is a .  He has no fresh complaints today.        Review of Systems   Constitutional: Negative for activity change, appetite change, diaphoresis, fatigue and unexpected weight change.   HENT: Negative for facial swelling, hearing loss, trouble swallowing and voice change.    Eyes:  "Negative for photophobia and visual disturbance.   Respiratory: Negative for apnea, cough, chest tightness, shortness of breath, wheezing and stridor.    Cardiovascular: Negative for chest pain, palpitations and leg swelling.   Gastrointestinal: Positive for diarrhea (with metformin use). Negative for abdominal distention, abdominal pain, constipation, nausea and vomiting.   Endocrine: Negative for polydipsia, polyphagia and polyuria.   Genitourinary: Negative for dysuria, flank pain, frequency, hematuria and urgency.   Musculoskeletal: Negative for arthralgias, back pain, joint swelling, myalgias and neck pain.   Skin: Negative for color change, pallor and rash.   Neurological: Negative for dizziness, tremors, seizures, syncope, light-headedness, numbness and headaches.   Hematological: Does not bruise/bleed easily.   Psychiatric/Behavioral: Negative for agitation, confusion, dysphoric mood and sleep disturbance. The patient is not nervous/anxious and is not hyperactive.        Objective: /70 (BP Location: Left arm, Patient Position: Sitting, BP Method: Large (Manual))   Pulse 66   Temp 97.5 °F (36.4 °C) (Oral)   Ht 6' 2" (1.88 m)   Wt 113.1 kg (249 lb 5.4 oz)   SpO2 97%   BMI 32.01 kg/m²  Body surface area is 2.43 meters squared.         Physical Exam  Vitals reviewed.   Constitutional:       General: He is not in acute distress.     Appearance: He is well-developed. He is obese. He is not toxic-appearing or diaphoretic.      Comments: Pleasant middle aged gentleman seen today with his wife present. Not pale, anicteric and afebrile. Mesomorphic build. Not in any apparent acute of chronic distress.     HENT:      Head: Normocephalic and atraumatic. No right periorbital erythema or left periorbital erythema. Hair is normal.        Comments: Mild nuchal AN. Mallampati grade 1     Mouth/Throat:      Pharynx: No oropharyngeal exudate.   Eyes:      General: No scleral icterus.     Extraocular Movements: " Extraocular movements intact.      Conjunctiva/sclera: Conjunctivae normal.      Pupils: Pupils are equal, round, and reactive to light.   Neck:      Thyroid: No thyroid mass or thyromegaly.      Vascular: No carotid bruit or JVD.      Trachea: Trachea normal. No tracheal tenderness or tracheal deviation.     Cardiovascular:      Rate and Rhythm: Normal rate and regular rhythm.      Pulses: Normal pulses.      Heart sounds: Normal heart sounds. No murmur heard.  No gallop.    Pulmonary:      Effort: Pulmonary effort is normal. No respiratory distress.      Breath sounds: Normal breath sounds. No decreased breath sounds, wheezing or rales.   Abdominal:      General: Bowel sounds are normal. There is no distension.      Palpations: Abdomen is soft. There is no mass.      Tenderness: There is no abdominal tenderness.   Musculoskeletal:         General: No swelling or tenderness. Normal range of motion.      Cervical back: Normal range of motion and neck supple.      Comments: No pedal edema.   Lymphadenopathy:      Cervical: No cervical adenopathy.   Skin:     General: Skin is warm and dry.      Coloration: Skin is not jaundiced.      Findings: No bruising, ecchymosis, erythema, petechiae or rash.      Nails: There is no clubbing.   Neurological:      General: No focal deficit present.      Mental Status: He is alert and oriented to person, place, and time.      Cranial Nerves: Cranial nerves are intact. No cranial nerve deficit.      Sensory: Sensation is intact.      Motor: Motor function is intact. No tremor or abnormal muscle tone.      Coordination: Coordination is intact.      Gait: Gait is intact. Gait normal.      Deep Tendon Reflexes: Reflexes are normal and symmetric. Reflexes normal.   Psychiatric:         Mood and Affect: Mood normal. Mood is not anxious or depressed.         Speech: Speech normal.         Behavior: Behavior normal. Behavior is cooperative.         Thought Content: Thought content normal.          Cognition and Memory: Cognition normal.         Judgment: Judgment normal.         Lab Review:     Results for RENETTA MEJIA JR. (MRN [id number]) as of 1/24/2022 15:52   Ref. Range 12/6/2021 14:01 12/6/2021 14:11 12/6/2021 16:20 1/14/2022 08:03   WBC Latest Ref Range: 3.90 - 12.70 K/uL  3.52 (L)     RBC Latest Ref Range: 4.60 - 6.20 M/uL  5.09     Hemoglobin Latest Ref Range: 14.0 - 18.0 g/dL  15.2     Hematocrit Latest Ref Range: 40.0 - 54.0 %  44.3     MCV Latest Ref Range: 82 - 98 fL  87     MCH Latest Ref Range: 27.0 - 31.0 pg  29.9     MCHC Latest Ref Range: 32.0 - 36.0 g/dL  34.3     RDW Latest Ref Range: 11.5 - 14.5 %  12.0     Platelets Latest Ref Range: 150 - 450 K/uL  231     MPV Latest Ref Range: 9.2 - 12.9 fL  8.9 (L)     Gran % Latest Ref Range: 38.0 - 73.0 %  55.9     Lymph % Latest Ref Range: 18.0 - 48.0 %  21.3     Mono % Latest Ref Range: 4.0 - 15.0 %  16.5 (H)     Eosinophil % Latest Ref Range: 0.0 - 8.0 %  5.1     Basophil % Latest Ref Range: 0.0 - 1.9 %  0.9     Immature Granulocytes Latest Ref Range: 0.0 - 0.5 %  0.3     Gran # (ANC) Latest Ref Range: 1.8 - 7.7 K/uL  2.0     Lymph # Latest Ref Range: 1.0 - 4.8 K/uL  0.8 (L)     Mono # Latest Ref Range: 0.3 - 1.0 K/uL  0.6     Eos # Latest Ref Range: 0.0 - 0.5 K/uL  0.2     Baso # Latest Ref Range: 0.00 - 0.20 K/uL  0.03     Immature Grans (Abs) Latest Ref Range: 0.00 - 0.04 K/uL  0.01     nRBC Latest Ref Range: 0 /100 WBC  0     Differential Method Unknown  Automated     Sodium Latest Ref Range: 136 - 145 mmol/L  139     Potassium Latest Ref Range: 3.5 - 5.1 mmol/L  3.9     Chloride Latest Ref Range: 95 - 110 mmol/L  103     CO2 Latest Ref Range: 23 - 29 mmol/L  25     Anion Gap Latest Ref Range: 8 - 16 mmol/L  11     BUN Latest Ref Range: 6 - 20 mg/dL  16     Creatinine Latest Ref Range: 0.5 - 1.4 mg/dL  1.3     eGFR if non African American Latest Ref Range: >60 mL/min/1.73 m^2  >60     eGFR if African American Latest Ref Range: >60  mL/min/1.73 m^2  >60     Glucose Latest Ref Range: 70 - 110 mg/dL  122 (H)     Calcium Latest Ref Range: 8.7 - 10.5 mg/dL  9.2     Alkaline Phosphatase Latest Ref Range: 55 - 135 U/L  54 (L)     PROTEIN TOTAL Latest Ref Range: 6.0 - 8.4 g/dL  7.0     Albumin Latest Ref Range: 3.5 - 5.2 g/dL  3.4 (L)     BILIRUBIN TOTAL Latest Ref Range: 0.1 - 1.0 mg/dL  0.5     AST Latest Ref Range: 10 - 40 U/L  17     ALT Latest Ref Range: 10 - 44 U/L  19     SARS-CoV2 (COVID-19) Qualitative PCR Latest Ref Range: Not Detected  Not Detected          Assessment:     1. Type 2 diabetes mellitus with hyperglycemia, without long-term current use of insulin  Hemoglobin A1C    Comprehensive Metabolic Panel    CBC Auto Differential    Uric Acid    Microalbumin/Creatinine Ratio, Urine    Urinalysis   2. Parada syndrome     3. Obesity (BMI 30-39.9)     4. History of gastric bypass  CBC Auto Differential   5. S/P laparoscopic sleeve gastrectomy  CBC Auto Differential   6. Splenomegaly     7. Nonalcoholic fatty liver disease  Comprehensive Metabolic Panel    Uric Acid    Lactic Acid, Plasma    Ammonia    Gamma GT    AFP Tumor Marker   8. DDD (degenerative disc disease), lumbosacral     9. Hypovitaminosis D  CBC Auto Differential   10. Hx of colon cancer, stage I     11. Thyroid function test abnormal  TSH   12. Abnormal tumor markers  AFP Tumor Marker        Regarding type 2 diabetes; to continue  metformin to 500mg BID and  Victoza 1.2mg QD to assist with weight management.  Regarding possible NAFLD; to obtain screening upper abdomen USS to evaluate current status.  Regarding post bariatric surgery status; s/p sleeve. To continue multivitamin supplementation and to check full nutritional labs.  Regarding hyperlipidemia; to continue pravastatin as before and will recheck lipid profile.  Regarding Parada's syndrome; ongoing ffup with hem Onc; Dr Cárdenas.  Regarding possible hypovitaminosis D; to check 25 OH Vitr D levels and replete as  needed.  Regarding bone health status; Screening DEXA from 04/21 was normal.  Regarding GERD; symptomatically stable.  Regarding hypogonadism; being ffed and managed by an outside urology group.      Plan:     Due to the current nation wide acute severe supply chain deficit in blood, urine and other lab sample tubes and collection containers, typical labs will not be obtained in the usual profile and  frequency they were obtained in time past for clinical surveillance, investigation, monitoring and/or management.

## 2022-01-25 LAB
AFP SERPL-MCNC: 2.2 NG/ML (ref 0–8.4)
ALBUMIN SERPL BCP-MCNC: 3.8 G/DL (ref 3.5–5.2)
ALP SERPL-CCNC: 56 U/L (ref 55–135)
ALT SERPL W/O P-5'-P-CCNC: 21 U/L (ref 10–44)
AMMONIA PLAS-SCNC: 40 UMOL/L (ref 10–50)
ANION GAP SERPL CALC-SCNC: 10 MMOL/L (ref 8–16)
AST SERPL-CCNC: 21 U/L (ref 10–40)
BASOPHILS # BLD AUTO: 0.03 K/UL (ref 0–0.2)
BASOPHILS NFR BLD: 0.7 % (ref 0–1.9)
BILIRUB SERPL-MCNC: 0.8 MG/DL (ref 0.1–1)
BUN SERPL-MCNC: 13 MG/DL (ref 6–20)
CALCIUM SERPL-MCNC: 9.7 MG/DL (ref 8.7–10.5)
CHLORIDE SERPL-SCNC: 108 MMOL/L (ref 95–110)
CO2 SERPL-SCNC: 21 MMOL/L (ref 23–29)
CREAT SERPL-MCNC: 1.2 MG/DL (ref 0.5–1.4)
DIFFERENTIAL METHOD: NORMAL
EOSINOPHIL # BLD AUTO: 0.1 K/UL (ref 0–0.5)
EOSINOPHIL NFR BLD: 3.2 % (ref 0–8)
ERYTHROCYTE [DISTWIDTH] IN BLOOD BY AUTOMATED COUNT: 12.8 % (ref 11.5–14.5)
EST. GFR  (AFRICAN AMERICAN): >60 ML/MIN/1.73 M^2
EST. GFR  (NON AFRICAN AMERICAN): >60 ML/MIN/1.73 M^2
ESTIMATED AVG GLUCOSE: 108 MG/DL (ref 68–131)
GGT SERPL-CCNC: 15 U/L (ref 8–55)
GLUCOSE SERPL-MCNC: 91 MG/DL (ref 70–110)
HBA1C MFR BLD: 5.4 % (ref 4–5.6)
HCT VFR BLD AUTO: 47.4 % (ref 40–54)
HGB BLD-MCNC: 16.1 G/DL (ref 14–18)
IMM GRANULOCYTES # BLD AUTO: 0.01 K/UL (ref 0–0.04)
IMM GRANULOCYTES NFR BLD AUTO: 0.2 % (ref 0–0.5)
LACTATE SERPL-SCNC: 1 MMOL/L (ref 0.5–2.2)
LYMPHOCYTES # BLD AUTO: 1 K/UL (ref 1–4.8)
LYMPHOCYTES NFR BLD: 25.9 % (ref 18–48)
MCH RBC QN AUTO: 30 PG (ref 27–31)
MCHC RBC AUTO-ENTMCNC: 34 G/DL (ref 32–36)
MCV RBC AUTO: 88 FL (ref 82–98)
MONOCYTES # BLD AUTO: 0.5 K/UL (ref 0.3–1)
MONOCYTES NFR BLD: 11.2 % (ref 4–15)
NEUTROPHILS # BLD AUTO: 2.4 K/UL (ref 1.8–7.7)
NEUTROPHILS NFR BLD: 58.8 % (ref 38–73)
NRBC BLD-RTO: 0 /100 WBC
PLATELET # BLD AUTO: 273 K/UL (ref 150–450)
PMV BLD AUTO: 9.7 FL (ref 9.2–12.9)
POTASSIUM SERPL-SCNC: 4.3 MMOL/L (ref 3.5–5.1)
PROT SERPL-MCNC: 6.9 G/DL (ref 6–8.4)
RBC # BLD AUTO: 5.37 M/UL (ref 4.6–6.2)
SODIUM SERPL-SCNC: 139 MMOL/L (ref 136–145)
TSH SERPL DL<=0.005 MIU/L-ACNC: 1 UIU/ML (ref 0.4–4)
URATE SERPL-MCNC: 4.9 MG/DL (ref 3.4–7)
WBC # BLD AUTO: 4.01 K/UL (ref 3.9–12.7)

## 2022-01-26 LAB
FINAL PATHOLOGIC DIAGNOSIS: NORMAL
GROSS: NORMAL
Lab: NORMAL
MICROSCOPIC EXAM: NORMAL

## 2022-01-27 ENCOUNTER — PATIENT MESSAGE (OUTPATIENT)
Dept: DERMATOLOGY | Facility: CLINIC | Age: 52
End: 2022-01-27
Payer: COMMERCIAL

## 2022-01-29 LAB
ALBUMIN SERPL-MCNC: 4.3 G/DL (ref 3.6–5.1)
SHBG SERPL-SCNC: 36 NMOL/L (ref 10–50)
TESTOST FREE SERPL-MCNC: 145.1 PG/ML (ref 46–224)
TESTOST SERPL-MCNC: 981 NG/DL (ref 250–1100)
TESTOSTERONE.FREE+WB SERPL-MCNC: 285.9 NG/DL (ref 110–575)

## 2022-02-04 ENCOUNTER — OFFICE VISIT (OUTPATIENT)
Dept: DERMATOLOGY | Facility: CLINIC | Age: 52
End: 2022-02-04
Payer: COMMERCIAL

## 2022-02-04 DIAGNOSIS — L71.9 ACNE ROSACEA: Primary | ICD-10-CM

## 2022-02-04 DIAGNOSIS — L73.8 PITYROSPORUM FOLLICULITIS: ICD-10-CM

## 2022-02-04 DIAGNOSIS — L93.1 SUBACUTE CUTANEOUS LUPUS ERYTHEMATOSUS: ICD-10-CM

## 2022-02-04 PROCEDURE — 99214 PR OFFICE/OUTPT VISIT, EST, LEVL IV, 30-39 MIN: ICD-10-PCS | Mod: S$GLB,,, | Performed by: DERMATOLOGY

## 2022-02-04 PROCEDURE — 99999 PR PBB SHADOW E&M-EST. PATIENT-LVL III: ICD-10-PCS | Mod: PBBFAC,,, | Performed by: DERMATOLOGY

## 2022-02-04 PROCEDURE — 99999 PR PBB SHADOW E&M-EST. PATIENT-LVL III: CPT | Mod: PBBFAC,,, | Performed by: DERMATOLOGY

## 2022-02-04 PROCEDURE — 99214 OFFICE O/P EST MOD 30 MIN: CPT | Mod: S$GLB,,, | Performed by: DERMATOLOGY

## 2022-02-04 RX ORDER — ISOTRETINOIN 30 MG/1
CAPSULE ORAL
Qty: 30 CAPSULE | Refills: 0 | Status: SHIPPED | OUTPATIENT
Start: 2022-02-04 | End: 2022-03-04 | Stop reason: SDUPTHER

## 2022-02-04 RX ORDER — KETOCONAZOLE 20 MG/ML
SHAMPOO, SUSPENSION TOPICAL
Qty: 120 ML | Refills: 5 | Status: SHIPPED | OUTPATIENT
Start: 2022-02-04 | End: 2023-02-09

## 2022-02-04 NOTE — PROGRESS NOTES
Subjective:       Patient ID:  Lyndon Lion Jr. is a 51 y.o. male who presents for   Chief Complaint   Patient presents with    Follow-up     Discuss results     LOV 1/14/22 - disease of skin and tissue    Patient here today to discuss results and treatment   Patient is using triamcinolone and states it is helping a little    Final Pathologic Diagnosis 1.  Skin, left volar forearm, eczematous, punch biopsy:   - SPONGIOTIC AND INTERFACE DERMATITIS (SEE COMMENT).   COMMENT:  These histologic features may be seen in the context of an irritant   contact dermatitis.  The presence of interface alteration, epidermal   spongiosis, and increased dermal mucin raises the possibility of subacute   cutaneous lupus.  The lack of eosinophils argues against a drug eruption.   The presence of interface alteration and dyskeratotic keratinocytes at   multiple levels within the epidermis is not typical for atopic dermatitis or   an allergic contact dermatitis.  Pityriasis lichenoides chronica has similar   histologic features to that seen in this specimen, though this specimen lacks   the confluent parakeratosis and wedge-shaped dermal infiltrate usually   associated with this entity, and the clinical presentation is not supportive   of this diagnosis.  The presence of interface alteration, epidermal   spongiosis, and increased dermal mucin raises the possibility of subacute   cutaneous lupus.  Finally, similar clinical presentation and histology has   been described in post-bariatric surgery eruptions, though this is usually   weeks to months after the procedure and is generally not described several   years post procedure.   2.  Skin, left upper back, pustule, punch biopsy:   - FEATURES CONSISTENT WITH PITYROSPORUM FOLLICULITIS.         Current Outpatient Medications:     ACCU-CHEK GUIDE TEST STRIPS Strp, USE TO TEST TWICE A DAY, Disp: 200 strip, Rfl: 3    amantadine  mg Tab, Take 1 tablet by mouth 2 (two) times  "daily., Disp: , Rfl:     ascorbic acid, vitamin C, (VITAMIN C) 100 MG tablet, Take 100 mg by mouth once daily., Disp: , Rfl:     aspirin (ECOTRIN) 81 MG EC tablet, Take 81 mg by mouth once daily., Disp: , Rfl:     COD LIVER OIL ORAL, Take by mouth., Disp: , Rfl:     doxycycline (VIBRAMYCIN) 100 MG Cap, TAKE 1 CAPSULE BY MOUTH ONCE DAILY., Disp: 30 capsule, Rfl: 1    lancets (ACCU-CHEK SOFTCLIX LANCETS) Misc, 1 Units by Misc.(Non-Drug; Combo Route) route 2 (two) times a day., Disp: 200 each, Rfl: 0    liraglutide 0.6 mg/0.1 mL, 18 mg/3 mL, subq PNIJ (VICTOZA 3-ELIZABETH) 0.6 mg/0.1 mL (18 mg/3 mL) PnIj pen, Inject 1.8 mg into the skin once daily. To begin @ 0.6mg daily for first 2 weeks then uptiitrate as tolerated., Disp: 27 mL, Rfl: 3    metFORMIN (GLUCOPHAGE-XR) 500 MG ER 24hr tablet, Take 1 tablet (500 mg total) by mouth 2 (two) times daily with meals., Disp: 180 tablet, Rfl: 3    metronidazole 0.75% (METROCREAM) 0.75 % Crea, Apply 1 application topically 2 (two) times daily. Apply to affected area, Disp: 30 g, Rfl: 1    mupirocin (BACTROBAN) 2 % ointment, Apply topically as needed. , Disp: , Rfl:     nitrofurantoin, macrocrystal-monohydrate, (MACROBID) 100 MG capsule, Take 100 mg by mouth 2 (two) times daily., Disp: , Rfl:     pen needle, diabetic (PEN NEEDLE) 32 gauge x 5/32" Ndle, 1 each by Misc.(Non-Drug; Combo Route) route once daily., Disp: 90 each, Rfl: 3    phytonadione, vitamin K1, (MEPHYTON) 5 mg Tab, Take 5 mg by mouth once., Disp: , Rfl:     pravastatin (PRAVACHOL) 20 MG tablet, Take 1 tablet (20 mg total) by mouth once daily., Disp: 90 tablet, Rfl: 3    sulfacetamide sodium-sulfur 10-5 % (w/w) Clsr, Use to wash face daily, Disp: 170 g, Rfl: 5    testosterone cypionate (DEPOTESTOTERONE CYPIONATE) 200 mg/mL injection, Inject 60 mg into the muscle every 14 (fourteen) days. Twice a week, Disp: , Rfl:     triamcinolone acetonide 0.1% (KENALOG) 0.1 % cream, AAA bid, Disp: 454 g, Rfl: 3    zinc " gluconate 50 mg tablet, Take 50 mg by mouth once daily., Disp: , Rfl:     vitamin A 8000 UNIT capsule, Take 1 capsule (8,000 Units total) by mouth once daily. (Patient not taking: Reported on 1/24/2022), Disp: 100 capsule, Rfl: 3        Review of Systems   Constitutional: Negative for fever, chills and fatigue.   Respiratory: Negative for cough and shortness of breath.    Skin: Positive for daily sunscreen use, activity-related sunscreen use and wears hat. Negative for itching and rash.   Hematologic/Lymphatic: Does not bruise/bleed easily.        Objective:    Physical Exam   Constitutional: He appears well-developed and well-nourished. No distress.   Neurological: He is alert and oriented to person, place, and time. He is not disoriented.   Psychiatric: He has a normal mood and affect.   Skin:   Areas Examined (abnormalities noted in diagram):   Head / Face Inspection Performed  Neck Inspection Performed  Back Inspection Performed  RUE Inspected  LUE Inspection Performed                       Diagram Legend     Erythematous scaling macule/papule c/w actinic keratosis       Vascular papule c/w angioma      Pigmented verrucoid papule/plaque c/w seborrheic keratosis      Yellow umbilicated papule c/w sebaceous hyperplasia      Irregularly shaped tan macule c/w lentigo     1-2 mm smooth white papules consistent with Milia      Movable subcutaneous cyst with punctum c/w epidermal inclusion cyst      Subcutaneous movable cyst c/w pilar cyst      Firm pink to brown papule c/w dermatofibroma      Pedunculated fleshy papule(s) c/w skin tag(s)      Evenly pigmented macule c/w junctional nevus     Mildly variegated pigmented, slightly irregular-bordered macule c/w mildly atypical nevus      Flesh colored to evenly pigmented papule c/w intradermal nevus       Pink pearly papule/plaque c/w basal cell carcinoma      Erythematous hyperkeratotic cursted plaque c/w SCC      Surgical scar with no sign of skin cancer recurrence       Open and closed comedones      Inflammatory papules and pustules      Verrucoid papule consistent consistent with wart     Erythematous eczematous patches and plaques     Dystrophic onycholytic nail with subungual debris c/w onychomycosis     Umbilicated papule    Erythematous-base heme-crusted tan verrucoid plaque consistent with inflamed seborrheic keratosis     Erythematous Silvery Scaling Plaque c/w Psoriasis     See annotation    Results for RENETTA MEJIA JR. (MRN [id number]) as of 2/4/2022 16:55   Ref. Range 1/24/2022 16:34   WBC Latest Ref Range: 3.90 - 12.70 K/uL 4.01   RBC Latest Ref Range: 4.60 - 6.20 M/uL 5.37   Hemoglobin Latest Ref Range: 14.0 - 18.0 g/dL 16.1   Hematocrit Latest Ref Range: 40.0 - 54.0 % 47.4   MCV Latest Ref Range: 82 - 98 fL 88   MCH Latest Ref Range: 27.0 - 31.0 pg 30.0   MCHC Latest Ref Range: 32.0 - 36.0 g/dL 34.0   RDW Latest Ref Range: 11.5 - 14.5 % 12.8   Platelets Latest Ref Range: 150 - 450 K/uL 273   MPV Latest Ref Range: 9.2 - 12.9 fL 9.7   Gran % Latest Ref Range: 38.0 - 73.0 % 58.8   Lymph % Latest Ref Range: 18.0 - 48.0 % 25.9   Mono % Latest Ref Range: 4.0 - 15.0 % 11.2   Eosinophil % Latest Ref Range: 0.0 - 8.0 % 3.2   Basophil % Latest Ref Range: 0.0 - 1.9 % 0.7   Immature Granulocytes Latest Ref Range: 0.0 - 0.5 % 0.2   Gran # (ANC) Latest Ref Range: 1.8 - 7.7 K/uL 2.4   Lymph # Latest Ref Range: 1.0 - 4.8 K/uL 1.0   Mono # Latest Ref Range: 0.3 - 1.0 K/uL 0.5   Eos # Latest Ref Range: 0.0 - 0.5 K/uL 0.1   Baso # Latest Ref Range: 0.00 - 0.20 K/uL 0.03   Immature Grans (Abs) Latest Ref Range: 0.00 - 0.04 K/uL 0.01   nRBC Latest Ref Range: 0 /100 WBC 0   Differential Method Unknown Automated   Sodium Latest Ref Range: 136 - 145 mmol/L 139   Potassium Latest Ref Range: 3.5 - 5.1 mmol/L 4.3   Chloride Latest Ref Range: 95 - 110 mmol/L 108   CO2 Latest Ref Range: 23 - 29 mmol/L 21 (L)   Anion Gap Latest Ref Range: 8 - 16 mmol/L 10   BUN Latest Ref Range: 6 - 20 mg/dL  13   Creatinine Latest Ref Range: 0.5 - 1.4 mg/dL 1.2   eGFR if non African American Latest Ref Range: >60 mL/min/1.73 m^2 >60.0   eGFR if African American Latest Ref Range: >60 mL/min/1.73 m^2 >60.0   Glucose Latest Ref Range: 70 - 110 mg/dL 91   Calcium Latest Ref Range: 8.7 - 10.5 mg/dL 9.7   Alkaline Phosphatase Latest Ref Range: 55 - 135 U/L 56   PROTEIN TOTAL Latest Ref Range: 6.0 - 8.4 g/dL 6.9   Albumin Latest Ref Range: 3.5 - 5.2 g/dL 3.8   Uric Acid Latest Ref Range: 3.4 - 7.0 mg/dL 4.9   BILIRUBIN TOTAL Latest Ref Range: 0.1 - 1.0 mg/dL 0.8   AST Latest Ref Range: 10 - 40 U/L 21   ALT Latest Ref Range: 10 - 44 U/L 21   GGT Latest Ref Range: 8 - 55 U/L 15   Ammonia Latest Ref Range: 10 - 50 umol/L 40   Results for RENETTA MEJIA JR. (MRN [id number]) as of 2/4/2022 16:55   Ref. Range 9/7/2021 12:09   Cholesterol Latest Ref Range: 120 - 199 mg/dL 121   HDL Latest Ref Range: 40 - 75 mg/dL 42   HDL/Cholesterol Ratio Latest Ref Range: 20.0 - 50.0 % 34.7   LDL Cholesterol External Latest Ref Range: 63.0 - 159.0 mg/dL 70.6   Non-HDL Cholesterol Latest Units: mg/dL 79   Total Cholesterol/HDL Ratio Latest Ref Range: 2.0 - 5.0  2.9   Triglycerides Latest Ref Range: 30 - 150 mg/dL 42     Assessment / Plan:        Acne rosacea  -     ISOtretinoin (ACCUTANE) 30 MG capsule; One cap PO daily  Dispense: 30 capsule; Refill: 0    Pityrosporum folliculitis  -     ISOtretinoin (ACCUTANE) 30 MG capsule; One cap PO daily  Dispense: 30 capsule; Refill: 0  -     ketoconazole (NIZORAL) 2 % shampoo; Wash torso with medicated shampoo at least 2x/week - let sit on skin at least 5 minutes prior to rinsing  Dispense: 120 mL; Refill: 5    Subacute cutaneous lupus erythematosus  -     FANTA Screen w/Reflex; Future; Expected date: 02/04/2022  -     Sjogrens syndrome-A extractable nuclear antibody; Future; Expected date: 02/04/2022    unable to function without doxy despite use of rosacea triple cream  Doxy problematic as it helps drive P.  Folliculitis  stop doxy  Isotretinoin will treat both recalcitrant rosacea and p follic without need for antifungal meds (itraconazole is tx of choice, interaction with statin)  Plan low dose prolonged course      Path report reviewed with patient, mentioned SCLE as most likely diagnosis  Discussed drug induced forms, only potential culprit is statin and patient has been taking for close to a year  Rash cleared with topical TAC, encouraged sun protection, check FANTA and anti-Ro  Isotretinoin would treat SCLE as well         No follow-ups on file.

## 2022-03-04 ENCOUNTER — OFFICE VISIT (OUTPATIENT)
Dept: DERMATOLOGY | Facility: CLINIC | Age: 52
End: 2022-03-04
Payer: COMMERCIAL

## 2022-03-04 ENCOUNTER — PATIENT MESSAGE (OUTPATIENT)
Dept: ENDOCRINOLOGY | Facility: CLINIC | Age: 52
End: 2022-03-04
Payer: COMMERCIAL

## 2022-03-04 DIAGNOSIS — E78.00 HYPERCHOLESTEROLEMIA: ICD-10-CM

## 2022-03-04 DIAGNOSIS — L73.8 PITYROSPORUM FOLLICULITIS: ICD-10-CM

## 2022-03-04 DIAGNOSIS — L71.9 ACNE ROSACEA: ICD-10-CM

## 2022-03-04 PROCEDURE — 99999 PR PBB SHADOW E&M-EST. PATIENT-LVL II: CPT | Mod: PBBFAC,,, | Performed by: DERMATOLOGY

## 2022-03-04 PROCEDURE — 99214 PR OFFICE/OUTPT VISIT, EST, LEVL IV, 30-39 MIN: ICD-10-PCS | Mod: S$GLB,,, | Performed by: DERMATOLOGY

## 2022-03-04 PROCEDURE — 99214 OFFICE O/P EST MOD 30 MIN: CPT | Mod: S$GLB,,, | Performed by: DERMATOLOGY

## 2022-03-04 PROCEDURE — 99999 PR PBB SHADOW E&M-EST. PATIENT-LVL II: ICD-10-PCS | Mod: PBBFAC,,, | Performed by: DERMATOLOGY

## 2022-03-04 RX ORDER — ISOTRETINOIN 30 MG/1
CAPSULE ORAL
Qty: 60 CAPSULE | Refills: 0 | Status: SHIPPED | OUTPATIENT
Start: 2022-03-04 | End: 2022-03-04

## 2022-03-04 RX ORDER — ISOTRETINOIN 30 MG/1
CAPSULE ORAL
Qty: 60 CAPSULE | Refills: 0 | Status: SHIPPED | OUTPATIENT
Start: 2022-03-04 | End: 2022-05-05 | Stop reason: SDUPTHER

## 2022-03-04 NOTE — PROGRESS NOTES
Subjective:       Patient ID:  Lyndon Lion Jr. is a 51 y.o. male who presents for   Chief Complaint   Patient presents with    Acne     LOV 2/4/2022 - disease of skin and tissue    Patient here today to start 2 nd month accutane, states he is having mild depression due to putting his dog to sleep on Tuesday, otherwise no complaints  He is still using the metro cream from [facility]          Review of Systems   Constitutional: Negative for fever, chills and fatigue.   HENT: Negative for nosebleeds and headaches.    Respiratory: Negative for cough and shortness of breath.    Gastrointestinal: Negative for nausea, vomiting, abdominal pain and diarrhea.   Musculoskeletal: Negative for myalgias and arthralgias.   Skin: Positive for dry skin, daily sunscreen use, activity-related sunscreen use, dry lips and wears hat. Negative for itching, rash, sun sensitivity and recent sunburn.   Neurological: Negative for headaches.   Psychiatric/Behavioral: Negative for depressed mood.   Hematologic/Lymphatic: Does not bruise/bleed easily.        Objective:    Physical Exam   Constitutional: He appears well-developed and well-nourished. No distress.   Neurological: He is alert and oriented to person, place, and time. He is not disoriented.   Psychiatric: He has a normal mood and affect.   Skin:   Areas Examined (abnormalities noted in diagram):   Head / Face Inspection Performed  Neck Inspection Performed  Back Inspection Performed  RUE Inspected  LUE Inspection Performed                       Diagram Legend     Erythematous scaling macule/papule c/w actinic keratosis       Vascular papule c/w angioma      Pigmented verrucoid papule/plaque c/w seborrheic keratosis      Yellow umbilicated papule c/w sebaceous hyperplasia      Irregularly shaped tan macule c/w lentigo     1-2 mm smooth white papules consistent with Milia      Movable subcutaneous cyst with punctum c/w epidermal inclusion cyst      Subcutaneous movable  cyst c/w pilar cyst      Firm pink to brown papule c/w dermatofibroma      Pedunculated fleshy papule(s) c/w skin tag(s)      Evenly pigmented macule c/w junctional nevus     Mildly variegated pigmented, slightly irregular-bordered macule c/w mildly atypical nevus      Flesh colored to evenly pigmented papule c/w intradermal nevus       Pink pearly papule/plaque c/w basal cell carcinoma      Erythematous hyperkeratotic cursted plaque c/w SCC      Surgical scar with no sign of skin cancer recurrence      Open and closed comedones      Inflammatory papules and pustules      Verrucoid papule consistent consistent with wart     Erythematous eczematous patches and plaques     Dystrophic onycholytic nail with subungual debris c/w onychomycosis     Umbilicated papule    Erythematous-base heme-crusted tan verrucoid plaque consistent with inflamed seborrheic keratosis     Erythematous Silvery Scaling Plaque c/w Psoriasis     See annotation          Assessment / Plan:        Acne rosacea  -     Discontinue: ISOtretinoin (ACCUTANE) 30 MG capsule; One cap PO BID  Dispense: 60 capsule; Refill: 0  -     ISOtretinoin (ACCUTANE) 30 MG capsule; One cap PO BID  Dispense: 60 capsule; Refill: 0    Pityrosporum folliculitis  -     Discontinue: ISOtretinoin (ACCUTANE) 30 MG capsule; One cap PO BID  Dispense: 60 capsule; Refill: 0  -     ISOtretinoin (ACCUTANE) 30 MG capsule; One cap PO BID  Dispense: 60 capsule; Refill: 0    experienced purge phenomenon early in the course, now calmed down  Tolerating well  Continue 30mg daily (pattient is 113kg)  May take up to 60mg daily  Low and slow is the plan for this severe stubborn acne / rosacea patient  P follic clearing nicely    Plan repeat labs at next visit    Discussed risks and benefits of Isotretinoin including but not limited to dry eyes, dry skin, and dry lips; headaches; nosebleeds; muscle aches; joint aches; elevated liver functions; elevated cholesterol or triglycerides; depression;  diarrhea/stomach cramping (inflammatory bowel disease); increased sun sensitivity. No laser while on Isotretinoin or for one month after completion of Isotretinoin course. No waxing and no donating blood while on Isotretinoin or for one month after completion of Isotretinoin course.                 No follow-ups on file.

## 2022-03-07 RX ORDER — PRAVASTATIN SODIUM 20 MG/1
20 TABLET ORAL DAILY
Qty: 90 TABLET | Refills: 3 | Status: SHIPPED | OUTPATIENT
Start: 2022-03-07 | End: 2023-04-21 | Stop reason: SDUPTHER

## 2022-03-15 ENCOUNTER — PATIENT MESSAGE (OUTPATIENT)
Dept: ENDOCRINOLOGY | Facility: CLINIC | Age: 52
End: 2022-03-15
Payer: COMMERCIAL

## 2022-03-21 ENCOUNTER — LAB VISIT (OUTPATIENT)
Dept: LAB | Facility: HOSPITAL | Age: 52
End: 2022-03-21
Attending: INTERNAL MEDICINE
Payer: COMMERCIAL

## 2022-03-21 DIAGNOSIS — E46 MALNUTRITION, UNSPECIFIED TYPE: ICD-10-CM

## 2022-03-21 DIAGNOSIS — Z98.84 HISTORY OF GASTRIC BYPASS: ICD-10-CM

## 2022-03-21 DIAGNOSIS — D53.9 NUTRITIONAL ANEMIA: ICD-10-CM

## 2022-03-21 DIAGNOSIS — E83.39 HYPOPHOSPHATEMIA: Primary | ICD-10-CM

## 2022-03-21 DIAGNOSIS — E55.9 HYPOVITAMINOSIS D: ICD-10-CM

## 2022-03-21 DIAGNOSIS — Z98.84 BARIATRIC SURGERY STATUS: ICD-10-CM

## 2022-03-21 DIAGNOSIS — E63.9 NUTRITIONAL DEFICIENCY: ICD-10-CM

## 2022-03-21 LAB
FERRITIN SERPL-MCNC: 28 NG/ML (ref 20–300)
FOLATE SERPL-MCNC: 7.3 NG/ML (ref 4–24)
MAGNESIUM SERPL-MCNC: 1.8 MG/DL (ref 1.6–2.6)
PHOSPHATE SERPL-MCNC: 2.3 MG/DL (ref 2.7–4.5)
TRANSFERRIN SERPL-MCNC: 310 MG/DL (ref 200–375)
VIT B12 SERPL-MCNC: 224 PG/ML (ref 210–950)

## 2022-03-21 PROCEDURE — 84591 ASSAY OF NOS VITAMIN: CPT | Performed by: INTERNAL MEDICINE

## 2022-03-21 PROCEDURE — 83785 ASSAY OF MANGANESE: CPT | Performed by: INTERNAL MEDICINE

## 2022-03-21 PROCEDURE — 82542 COL CHROMOTOGRAPHY QUAL/QUAN: CPT | Performed by: INTERNAL MEDICINE

## 2022-03-21 PROCEDURE — 82607 VITAMIN B-12: CPT | Mod: 91 | Performed by: INTERNAL MEDICINE

## 2022-03-21 PROCEDURE — 84134 ASSAY OF PREALBUMIN: CPT | Performed by: INTERNAL MEDICINE

## 2022-03-21 PROCEDURE — 84252 ASSAY OF VITAMIN B-2: CPT | Performed by: INTERNAL MEDICINE

## 2022-03-21 PROCEDURE — 84597 ASSAY OF VITAMIN K: CPT | Performed by: INTERNAL MEDICINE

## 2022-03-21 PROCEDURE — 82652 VIT D 1 25-DIHYDROXY: CPT | Performed by: INTERNAL MEDICINE

## 2022-03-21 PROCEDURE — 84100 ASSAY OF PHOSPHORUS: CPT | Performed by: INTERNAL MEDICINE

## 2022-03-21 PROCEDURE — 84446 ASSAY OF VITAMIN E: CPT | Performed by: INTERNAL MEDICINE

## 2022-03-21 PROCEDURE — 82495 ASSAY OF CHROMIUM: CPT | Performed by: INTERNAL MEDICINE

## 2022-03-21 PROCEDURE — 82180 ASSAY OF ASCORBIC ACID: CPT | Performed by: INTERNAL MEDICINE

## 2022-03-21 PROCEDURE — 83018 HEAVY METAL QUAN EACH NES: CPT | Performed by: INTERNAL MEDICINE

## 2022-03-21 PROCEDURE — 82746 ASSAY OF FOLIC ACID SERUM: CPT | Performed by: INTERNAL MEDICINE

## 2022-03-21 PROCEDURE — 84591 ASSAY OF NOS VITAMIN: CPT | Mod: 91 | Performed by: INTERNAL MEDICINE

## 2022-03-21 PROCEDURE — 83735 ASSAY OF MAGNESIUM: CPT | Performed by: INTERNAL MEDICINE

## 2022-03-21 PROCEDURE — 84590 ASSAY OF VITAMIN A: CPT | Performed by: INTERNAL MEDICINE

## 2022-03-21 PROCEDURE — 84630 ASSAY OF ZINC: CPT | Performed by: INTERNAL MEDICINE

## 2022-03-21 PROCEDURE — 82390 ASSAY OF CERULOPLASMIN: CPT | Performed by: INTERNAL MEDICINE

## 2022-03-21 PROCEDURE — 84466 ASSAY OF TRANSFERRIN: CPT | Performed by: INTERNAL MEDICINE

## 2022-03-21 PROCEDURE — 84591 ASSAY OF NOS VITAMIN: CPT | Mod: 91,59 | Performed by: INTERNAL MEDICINE

## 2022-03-21 PROCEDURE — 82607 VITAMIN B-12: CPT | Performed by: INTERNAL MEDICINE

## 2022-03-21 PROCEDURE — 84207 ASSAY OF VITAMIN B-6: CPT | Performed by: INTERNAL MEDICINE

## 2022-03-21 PROCEDURE — 86340 INTRINSIC FACTOR ANTIBODY: CPT | Performed by: INTERNAL MEDICINE

## 2022-03-21 PROCEDURE — 36415 COLL VENOUS BLD VENIPUNCTURE: CPT | Mod: PO | Performed by: INTERNAL MEDICINE

## 2022-03-21 PROCEDURE — 84255 ASSAY OF SELENIUM: CPT | Performed by: INTERNAL MEDICINE

## 2022-03-21 PROCEDURE — 82525 ASSAY OF COPPER: CPT | Performed by: INTERNAL MEDICINE

## 2022-03-21 PROCEDURE — 82728 ASSAY OF FERRITIN: CPT | Performed by: INTERNAL MEDICINE

## 2022-03-21 PROCEDURE — 84425 ASSAY OF VITAMIN B-1: CPT | Performed by: INTERNAL MEDICINE

## 2022-03-21 PROCEDURE — 82941 ASSAY OF GASTRIN: CPT | Performed by: INTERNAL MEDICINE

## 2022-03-21 RX ORDER — SODIUM,POTASSIUM PHOSPHATES 280-250MG
1 POWDER IN PACKET (EA) ORAL DAILY
Qty: 90 PACKET | Refills: 3 | Status: SHIPPED | OUTPATIENT
Start: 2022-03-21 | End: 2022-06-19

## 2022-03-22 LAB
CERULOPLASMIN SERPL-MCNC: 24 MG/DL (ref 15–45)
PREALB SERPL-MCNC: 21 MG/DL (ref 20–43)

## 2022-03-23 DIAGNOSIS — E53.8 VITAMIN B12 DEFICIENCY: Primary | ICD-10-CM

## 2022-03-23 LAB
ANNOTATION COMMENT IMP: NORMAL
CR SERPL-MCNC: <0.1 NG/ML
GASTRIN P FAST SERPL-MCNC: 97 PG/ML
IF BLOCK AB SER QL: NEGATIVE
VIT B12 SERPL-MCNC: 134 NG/L (ref 180–914)

## 2022-03-23 RX ORDER — MAGNESIUM 200 MG
1 TABLET ORAL DAILY
Qty: 100 TABLET | Refills: 3 | Status: SHIPPED | OUTPATIENT
Start: 2022-03-23 | End: 2022-06-21

## 2022-03-24 LAB
COPPER SERPL-MCNC: 800 UG/L (ref 665–1480)
SELENIUM SERPL-MCNC: 114 NG/ML (ref 70–150)
VIT B2 SERPL-MCNC: 4 MCG/L (ref 1–19)
ZINC SERPL-MCNC: 74 UG/DL (ref 60–130)

## 2022-03-25 DIAGNOSIS — Z98.84 HISTORY OF GASTRIC BYPASS: ICD-10-CM

## 2022-03-25 DIAGNOSIS — Z98.84 BARIATRIC SURGERY STATUS: Primary | ICD-10-CM

## 2022-03-25 DIAGNOSIS — E63.9 NUTRITIONAL DEFICIENCY: ICD-10-CM

## 2022-03-25 LAB
1,25(OH)2D3 SERPL-MCNC: 60 PG/ML (ref 20–79)
IODINE SERPL-MCNC: 42 NG/ML (ref 40–92)
VIT C SERPL-MCNC: 3 MG/L (ref 2–19)

## 2022-03-28 DIAGNOSIS — E50.9 VITAMIN A DEFICIENCY: ICD-10-CM

## 2022-03-28 LAB
A-TOCOPHEROL VIT E SERPL-MCNC: 1068 UG/DL (ref 500–1800)
PYRIDOXAL SERPL-MCNC: 5 UG/L (ref 5–50)
VIT A SERPL-MCNC: 35 UG/DL (ref 38–106)
VIT B1 BLD-MCNC: 66 UG/L (ref 38–122)

## 2022-03-28 RX ORDER — PLANT STANOL ESTER 450 MG
8000 TABLET ORAL DAILY
Qty: 100 CAPSULE | Refills: 3 | Status: SHIPPED | OUTPATIENT
Start: 2022-03-28 | End: 2024-03-05

## 2022-03-29 LAB
MANGANESE, SERUM: 0.6 NG/ML (ref 0.5–1.2)
NIACIN SERPL-MCNC: 2.45 UG/ML

## 2022-03-30 LAB
BIOTIN SERPL-SCNC: 950.1 PG/ML (ref 221–3004)
PANTOTHENATE SERPLBLD-MCNC: 85.18 UG/L

## 2022-03-31 ENCOUNTER — TELEPHONE (OUTPATIENT)
Dept: ENDOCRINOLOGY | Facility: CLINIC | Age: 52
End: 2022-03-31
Payer: COMMERCIAL

## 2022-04-01 LAB — PHYTONADIONE SERPL-MCNC: 1.61 NMOL/L (ref 0.22–4.88)

## 2022-04-02 ENCOUNTER — LAB VISIT (OUTPATIENT)
Dept: LAB | Facility: HOSPITAL | Age: 52
End: 2022-04-02
Attending: INTERNAL MEDICINE
Payer: COMMERCIAL

## 2022-04-02 DIAGNOSIS — Z98.84 BARIATRIC SURGERY STATUS: ICD-10-CM

## 2022-04-02 DIAGNOSIS — Z98.84 HISTORY OF GASTRIC BYPASS: ICD-10-CM

## 2022-04-02 DIAGNOSIS — E63.9 NUTRITIONAL DEFICIENCY: ICD-10-CM

## 2022-04-02 PROCEDURE — 82380 ASSAY OF CAROTENE: CPT | Performed by: INTERNAL MEDICINE

## 2022-04-02 PROCEDURE — 36415 COLL VENOUS BLD VENIPUNCTURE: CPT | Performed by: INTERNAL MEDICINE

## 2022-04-06 LAB
24R-OH-CALCIDIOL SERPL-MCNC: 2.91 NG/ML
25(OH)D2 SERPL-MCNC: 24 NG/ML
25(OH)D3 SERPL-MCNC: 28 NG/ML
25(OH)D3+25(OH)D2 SERPL-MCNC: 52 NG/ML
25HDN:24,25 DIHYDROXY VITD RATIO: 17.87

## 2022-04-12 ENCOUNTER — PATIENT MESSAGE (OUTPATIENT)
Dept: GASTROENTEROLOGY | Facility: CLINIC | Age: 52
End: 2022-04-12
Payer: COMMERCIAL

## 2022-04-12 DIAGNOSIS — Z15.09 MSH2-RELATED LYNCH SYNDROME (HNPCC1): Primary | ICD-10-CM

## 2022-04-12 DIAGNOSIS — C18.9 MALIGNANT NEOPLASM OF COLON, UNSPECIFIED PART OF COLON: ICD-10-CM

## 2022-04-12 LAB — CAROTENE SERPL-MCNC: 5 UG/DL (ref 3–91)

## 2022-04-13 ENCOUNTER — PATIENT MESSAGE (OUTPATIENT)
Dept: ENDOSCOPY | Facility: HOSPITAL | Age: 52
End: 2022-04-13
Payer: COMMERCIAL

## 2022-04-13 DIAGNOSIS — Z12.11 COLON CANCER SCREENING: Primary | ICD-10-CM

## 2022-04-13 RX ORDER — SOD SULF/POT CHLORIDE/MAG SULF 1.479 G
12 TABLET ORAL DAILY
Qty: 24 TABLET | Refills: 0 | Status: SHIPPED | OUTPATIENT
Start: 2022-04-13 | End: 2022-04-15

## 2022-05-05 ENCOUNTER — OFFICE VISIT (OUTPATIENT)
Dept: DERMATOLOGY | Facility: CLINIC | Age: 52
End: 2022-05-05
Payer: COMMERCIAL

## 2022-05-05 VITALS — WEIGHT: 249 LBS | BODY MASS INDEX: 31.95 KG/M2 | HEIGHT: 74 IN

## 2022-05-05 DIAGNOSIS — Z51.81 THERAPEUTIC DRUG MONITORING: ICD-10-CM

## 2022-05-05 DIAGNOSIS — K13.0 CHEILITIS: Primary | ICD-10-CM

## 2022-05-05 DIAGNOSIS — L73.8 PITYROSPORUM FOLLICULITIS: ICD-10-CM

## 2022-05-05 DIAGNOSIS — L71.9 ACNE ROSACEA: ICD-10-CM

## 2022-05-05 PROCEDURE — 99999 PR PBB SHADOW E&M-EST. PATIENT-LVL IV: ICD-10-PCS | Mod: PBBFAC,,, | Performed by: DERMATOLOGY

## 2022-05-05 PROCEDURE — 99214 PR OFFICE/OUTPT VISIT, EST, LEVL IV, 30-39 MIN: ICD-10-PCS | Mod: S$GLB,,, | Performed by: DERMATOLOGY

## 2022-05-05 PROCEDURE — 99214 OFFICE O/P EST MOD 30 MIN: CPT | Mod: S$GLB,,, | Performed by: DERMATOLOGY

## 2022-05-05 PROCEDURE — 99999 PR PBB SHADOW E&M-EST. PATIENT-LVL IV: CPT | Mod: PBBFAC,,, | Performed by: DERMATOLOGY

## 2022-05-05 RX ORDER — ISOTRETINOIN 30 MG/1
CAPSULE ORAL
Qty: 60 CAPSULE | Refills: 0 | Status: SHIPPED | OUTPATIENT
Start: 2022-05-05 | End: 2022-07-05 | Stop reason: SDUPTHER

## 2022-05-05 RX ORDER — TRIAMCINOLONE ACETONIDE 0.25 MG/G
OINTMENT TOPICAL
Qty: 15 G | Refills: 1 | Status: SHIPPED | OUTPATIENT
Start: 2022-05-05 | End: 2024-03-19

## 2022-05-05 NOTE — PROGRESS NOTES
"  Subjective:       Patient ID:  Lyndon Lion Jr. is a 52 y.o. male who presents for   Chief Complaint   Patient presents with    Acne     Follow up     LOV- 3/4/22  Acne rosacea    Here today for follow up on acne-accutane  Much improved    No hx of NMSC  No fhx of MM      Current Outpatient Medications:     ACCU-CHEK GUIDE TEST STRIPS Strp, USE TO TEST TWICE A DAY, Disp: 200 strip, Rfl: 3    amantadine  mg Tab, Take 1 tablet by mouth 2 (two) times daily., Disp: , Rfl:     ascorbic acid, vitamin C, (VITAMIN C) 100 MG tablet, Take 100 mg by mouth once daily., Disp: , Rfl:     aspirin (ECOTRIN) 81 MG EC tablet, Take 81 mg by mouth once daily., Disp: , Rfl:     COD LIVER OIL ORAL, Take by mouth., Disp: , Rfl:     cyanocobalamin, vitamin B-12, 1,000 mcg Subl, Place 1 lozenge under the tongue once daily., Disp: 100 tablet, Rfl: 3    ISOtretinoin (ACCUTANE) 30 MG capsule, One cap PO BID, Disp: 60 capsule, Rfl: 0    ketoconazole (NIZORAL) 2 % shampoo, Wash torso with medicated shampoo at least 2x/week - let sit on skin at least 5 minutes prior to rinsing, Disp: 120 mL, Rfl: 5    lancets (ACCU-CHEK SOFTCLIX LANCETS) Misc, 1 Units by Misc.(Non-Drug; Combo Route) route 2 (two) times a day., Disp: 200 each, Rfl: 0    metronidazole 0.75% (METROCREAM) 0.75 % Crea, Apply 1 application topically 2 (two) times daily. Apply to affected area, Disp: 30 g, Rfl: 1    mupirocin (BACTROBAN) 2 % ointment, Apply topically as needed. , Disp: , Rfl:     nitrofurantoin, macrocrystal-monohydrate, (MACROBID) 100 MG capsule, Take 100 mg by mouth 2 (two) times daily., Disp: , Rfl:     pen needle, diabetic (PEN NEEDLE) 32 gauge x 5/32" Ndle, 1 each by Misc.(Non-Drug; Combo Route) route once daily., Disp: 90 each, Rfl: 3    phytonadione, vitamin K1, (MEPHYTON) 5 mg Tab, Take 5 mg by mouth once., Disp: , Rfl:     potassium, sodium phosphates (PHOS-NAK) 280-160-250 mg PwPk, Take 1 packet by mouth once daily. for 90 doses, " Disp: 90 packet, Rfl: 3    pravastatin (PRAVACHOL) 20 MG tablet, Take 1 tablet (20 mg total) by mouth once daily., Disp: 90 tablet, Rfl: 3    sulfacetamide sodium-sulfur 10-5 % (w/w) Clsr, Use to wash face daily, Disp: 170 g, Rfl: 5    testosterone cypionate (DEPOTESTOTERONE CYPIONATE) 200 mg/mL injection, Inject 60 mg into the muscle every 14 (fourteen) days. Twice a week, Disp: , Rfl:     triamcinolone acetonide 0.1% (KENALOG) 0.1 % cream, AAA bid, Disp: 454 g, Rfl: 3    vitamin A 8000 UNIT capsule, Take 1 capsule (8,000 Units total) by mouth once daily., Disp: 100 capsule, Rfl: 3    zinc gluconate 50 mg tablet, Take 50 mg by mouth once daily., Disp: , Rfl:     liraglutide 0.6 mg/0.1 mL, 18 mg/3 mL, subq PNIJ (VICTOZA 3-ELIZABETH) 0.6 mg/0.1 mL (18 mg/3 mL) PnIj pen, Inject 1.8 mg into the skin once daily. To begin @ 0.6mg daily for first 2 weeks then uptiitrate as tolerated., Disp: 27 mL, Rfl: 3    metFORMIN (GLUCOPHAGE-XR) 500 MG ER 24hr tablet, Take 1 tablet (500 mg total) by mouth 2 (two) times daily with meals., Disp: 180 tablet, Rfl: 3        Review of Systems   Constitutional: Negative for fever, chills and fatigue.   HENT: Negative for nosebleeds and headaches.    Respiratory: Negative for cough and shortness of breath.    Gastrointestinal: Negative for nausea, vomiting, abdominal pain and diarrhea.   Musculoskeletal: Negative for myalgias and arthralgias.   Skin: Positive for dry skin, daily sunscreen use, activity-related sunscreen use, dry lips and wears hat. Negative for itching, rash, sun sensitivity and recent sunburn.   Neurological: Negative for headaches.   Psychiatric/Behavioral: Negative for depressed mood.   Hematologic/Lymphatic: Does not bruise/bleed easily.        Objective:    Physical Exam   Constitutional: He appears well-developed and well-nourished. No distress.   Neurological: He is alert and oriented to person, place, and time. He is not disoriented.   Psychiatric: He has a normal  mood and affect.   Skin:   Areas Examined (abnormalities noted in diagram):   Head / Face Inspection Performed  Neck Inspection Performed  Back Inspection Performed                       Diagram Legend     Erythematous scaling macule/papule c/w actinic keratosis       Vascular papule c/w angioma      Pigmented verrucoid papule/plaque c/w seborrheic keratosis      Yellow umbilicated papule c/w sebaceous hyperplasia      Irregularly shaped tan macule c/w lentigo     1-2 mm smooth white papules consistent with Milia      Movable subcutaneous cyst with punctum c/w epidermal inclusion cyst      Subcutaneous movable cyst c/w pilar cyst      Firm pink to brown papule c/w dermatofibroma      Pedunculated fleshy papule(s) c/w skin tag(s)      Evenly pigmented macule c/w junctional nevus     Mildly variegated pigmented, slightly irregular-bordered macule c/w mildly atypical nevus      Flesh colored to evenly pigmented papule c/w intradermal nevus       Pink pearly papule/plaque c/w basal cell carcinoma      Erythematous hyperkeratotic cursted plaque c/w SCC      Surgical scar with no sign of skin cancer recurrence      Open and closed comedones      Inflammatory papules and pustules      Verrucoid papule consistent consistent with wart     Erythematous eczematous patches and plaques     Dystrophic onycholytic nail with subungual debris c/w onychomycosis     Umbilicated papule    Erythematous-base heme-crusted tan verrucoid plaque consistent with inflamed seborrheic keratosis     Erythematous Silvery Scaling Plaque c/w Psoriasis     See annotation      Assessment / Plan:        Cheilitis  -     triamcinolone acetonide 0.025% (KENALOG) 0.025 % Oint; Thin film to lips and eczematous plaques BID PRN flare  Dispense: 15 g; Refill: 1    Acne rosacea  -     ISOtretinoin (ACCUTANE) 30 MG capsule; One cap PO BID  Dispense: 60 capsule; Refill: 0    Pityrosporum folliculitis  -     ISOtretinoin (ACCUTANE) 30 MG capsule; One cap PO BID   Dispense: 60 capsule; Refill: 0    Therapeutic drug monitoring  -     Lipid Panel; Future; Expected date: 05/05/2022  -     Hepatic Function Panel; Future; Expected date: 05/05/2022    experienced purge phenomenon early in the course, now calmed down and improving  Tolerating well, no mood disturbance  Continue 30mg daily (pattient is 113kg)  May take up to 60mg daily  Low and slow is the plan for this severe stubborn acne / rosacea patient  P follic clearing nicely  Continue strict sun protection    Plan repeat labs - LFTs and lipid panel    Discussed risks and benefits of Isotretinoin including but not limited to dry eyes, dry skin, and dry lips; headaches; nosebleeds; muscle aches; joint aches; elevated liver functions; elevated cholesterol or triglycerides; depression; diarrhea/stomach cramping (inflammatory bowel disease); increased sun sensitivity. No laser while on Isotretinoin or for one month after completion of Isotretinoin course. No waxing and no donating blood while on Isotretinoin or for one month after completion of Isotretinoin course.         Follow up in about 2 months (around 7/5/2022).

## 2022-05-05 NOTE — LETTER
May 5, 2022      [facility]  [address] [place] [address], [address] [address]  Phone: [phone]       Patient: Lyndon Lion   YOB: 1970  Date of Visit: 05/05/2022    To Whom It May Concern:    Ting Lion  was at [facility] on 05/05/2022. 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 If you have any questions or concerns, or if I can be of further assistance, please do not hesitate to contact me.    Sincerely,    Nubia Quinn RN

## 2022-05-26 ENCOUNTER — TELEPHONE (OUTPATIENT)
Dept: ENDOCRINOLOGY | Facility: CLINIC | Age: 52
End: 2022-05-26
Payer: COMMERCIAL

## 2022-05-26 NOTE — TELEPHONE ENCOUNTER
----- Message from Amador Murillo MD sent at 5/23/2022  7:48 PM CDT -----  Regarding: eye exam  Kindly check with the patient when his last eye exam was and If he hasn't had one within the last year offer to set up an appt with our eye team. If he would rather see someone outside of [facility] just emphasize to him the importance of setting up such an appt asap if he hasnt had one done within the last year.  Thanks    Amador Murillo MD

## 2022-05-27 ENCOUNTER — PATIENT MESSAGE (OUTPATIENT)
Dept: UROLOGY | Facility: CLINIC | Age: 52
End: 2022-05-27
Payer: COMMERCIAL

## 2022-05-31 ENCOUNTER — PATIENT MESSAGE (OUTPATIENT)
Dept: ADMINISTRATIVE | Facility: HOSPITAL | Age: 52
End: 2022-05-31
Payer: COMMERCIAL

## 2022-06-01 ENCOUNTER — IMMUNIZATION (OUTPATIENT)
Dept: FAMILY MEDICINE | Facility: CLINIC | Age: 52
End: 2022-06-01
Payer: COMMERCIAL

## 2022-06-01 DIAGNOSIS — Z23 NEED FOR VACCINATION: Primary | ICD-10-CM

## 2022-06-01 PROCEDURE — 91305 COVID-19, MRNA, LNP-S, PF, 30 MCG/0.3 ML DOSE VACCINE (PFIZER): CPT | Mod: PBBFAC | Performed by: RADIOLOGY

## 2022-06-03 ENCOUNTER — PATIENT MESSAGE (OUTPATIENT)
Dept: FAMILY MEDICINE | Facility: CLINIC | Age: 52
End: 2022-06-03

## 2022-06-03 ENCOUNTER — OFFICE VISIT (OUTPATIENT)
Dept: FAMILY MEDICINE | Facility: CLINIC | Age: 52
End: 2022-06-03
Payer: COMMERCIAL

## 2022-06-03 VITALS
WEIGHT: 255.31 LBS | HEIGHT: 74 IN | BODY MASS INDEX: 32.77 KG/M2 | DIASTOLIC BLOOD PRESSURE: 70 MMHG | HEART RATE: 73 BPM | SYSTOLIC BLOOD PRESSURE: 128 MMHG | OXYGEN SATURATION: 95 %

## 2022-06-03 DIAGNOSIS — E29.1 HYPOGONADISM MALE: ICD-10-CM

## 2022-06-03 DIAGNOSIS — E11.9 CONTROLLED TYPE 2 DIABETES MELLITUS WITHOUT COMPLICATION, WITHOUT LONG-TERM CURRENT USE OF INSULIN: Primary | ICD-10-CM

## 2022-06-03 DIAGNOSIS — B35.1 TOENAIL FUNGUS: ICD-10-CM

## 2022-06-03 DIAGNOSIS — E66.9 OBESITY (BMI 30-39.9): ICD-10-CM

## 2022-06-03 DIAGNOSIS — Z98.84 S/P LAPAROSCOPIC SLEEVE GASTRECTOMY: ICD-10-CM

## 2022-06-03 LAB
LEFT EYE DM RETINOPATHY: NEGATIVE
RIGHT EYE DM RETINOPATHY: NEGATIVE

## 2022-06-03 PROCEDURE — 99999 PR PBB SHADOW E&M-EST. PATIENT-LVL V: ICD-10-PCS | Mod: PBBFAC,,, | Performed by: NURSE PRACTITIONER

## 2022-06-03 PROCEDURE — 99214 OFFICE O/P EST MOD 30 MIN: CPT | Mod: S$GLB,,, | Performed by: NURSE PRACTITIONER

## 2022-06-03 PROCEDURE — 99999 PR PBB SHADOW E&M-EST. PATIENT-LVL V: CPT | Mod: PBBFAC,,, | Performed by: NURSE PRACTITIONER

## 2022-06-03 PROCEDURE — 99214 PR OFFICE/OUTPT VISIT, EST, LEVL IV, 30-39 MIN: ICD-10-PCS | Mod: S$GLB,,, | Performed by: NURSE PRACTITIONER

## 2022-06-03 RX ORDER — LIRAGLUTIDE 6 MG/ML
1.8 INJECTION SUBCUTANEOUS DAILY
Qty: 27 ML | Refills: 3 | Status: SHIPPED | OUTPATIENT
Start: 2022-06-03 | End: 2022-09-19 | Stop reason: SDUPTHER

## 2022-06-03 RX ORDER — CICLOPIROX 80 MG/ML
SOLUTION TOPICAL NIGHTLY
Qty: 6.6 ML | Refills: 2 | Status: SHIPPED | OUTPATIENT
Start: 2022-06-03 | End: 2022-09-19 | Stop reason: SDUPTHER

## 2022-06-03 RX ORDER — METFORMIN HYDROCHLORIDE 500 MG/1
500 TABLET, EXTENDED RELEASE ORAL 2 TIMES DAILY WITH MEALS
Qty: 180 TABLET | Refills: 3 | Status: SHIPPED | OUTPATIENT
Start: 2022-06-03 | End: 2022-09-19 | Stop reason: SDUPTHER

## 2022-06-03 RX ORDER — TADALAFIL 5 MG/1
TABLET ORAL
COMMUNITY
Start: 2021-12-01 | End: 2022-06-03 | Stop reason: SDUPTHER

## 2022-06-03 RX ORDER — TADALAFIL 5 MG/1
5 TABLET ORAL DAILY PRN
Qty: 90 TABLET | Refills: 0 | Status: SHIPPED | OUTPATIENT
Start: 2022-06-03 | End: 2022-08-23 | Stop reason: SDUPTHER

## 2022-06-03 NOTE — PROGRESS NOTES
Subjective:       Patient ID: Lyndon Lion Jr. is a 52 y.o. male.    Chief Complaint: Annual Exam    HPI    Patient presents today for chronic conditions follow up. He is new to me. Last visit with PCP Dutch on 12/9/21  Patient had a sleeve gastrectomy NOT a gastric bypass.~ 2011 by Dr Christophe younger  Patient had hemicolectomy In 2008. Patient was found to found colorectal ca stage 2. He received chemotherapy with Dr Cárdenas. He received 12 courses of FOLFOX (leucovorin calcium (folinic acid), fluorouracil, and oxaliplatin).  5/5/22 Derm : cheilitis, acne rosacea-triamcinolone  1/24/22 Endo [place]o: Type 2DM    Past Medical History:   Diagnosis Date    Arthritis     Asthma     AS A CHILD    Colon cancer     Family hx of prostate cancer 11/22/2016    Hx of colon cancer, stage I 07/03/2014    Parada syndrome     Tear of labium 10/2020    left shoulder Dr Molina    Uncontrolled type 2 diabetes mellitus with hyperglycemia 03/04/2021    Vitamin D deficiency     Wears glasses        Review of patient's allergies indicates:  No Known Allergies      Current Outpatient Medications:     ACCU-CHEK GUIDE TEST STRIPS Strp, USE TO TEST TWICE A DAY, Disp: 200 strip, Rfl: 3    ascorbic acid, vitamin C, (VITAMIN C) 100 MG tablet, Take 100 mg by mouth once daily., Disp: , Rfl:     aspirin (ECOTRIN) 81 MG EC tablet, Take 81 mg by mouth once daily., Disp: , Rfl:     COD LIVER OIL ORAL, Take by mouth., Disp: , Rfl:     cyanocobalamin, vitamin B-12, 1,000 mcg Subl, Place 1 lozenge under the tongue once daily., Disp: 100 tablet, Rfl: 3    ISOtretinoin (ACCUTANE) 30 MG capsule, One cap PO BID, Disp: 60 capsule, Rfl: 0    lancets (ACCU-CHEK SOFTCLIX LANCETS) Misc, 1 Units by Misc.(Non-Drug; Combo Route) route 2 (two) times a day., Disp: 200 each, Rfl: 0    metronidazole 0.75% (METROCREAM) 0.75 % Crea, Apply 1 application topically 2 (two) times daily. Apply to affected area, Disp: 30 g, Rfl: 1    mupirocin  "(BACTROBAN) 2 % ointment, Apply topically as needed. , Disp: , Rfl:     pen needle, diabetic (PEN NEEDLE) 32 gauge x 5/32" Ndle, 1 each by Misc.(Non-Drug; Combo Route) route once daily., Disp: 90 each, Rfl: 3    phytonadione, vitamin K1, (MEPHYTON) 5 mg Tab, Take 5 mg by mouth once., Disp: , Rfl:     potassium, sodium phosphates (PHOS-NAK) 280-160-250 mg PwPk, Take 1 packet by mouth once daily. for 90 doses, Disp: 90 packet, Rfl: 3    sulfacetamide sodium-sulfur 10-5 % (w/w) Clsr, Use to wash face daily, Disp: 170 g, Rfl: 5    tadalafiL (CIALIS) 5 MG tablet, Take 1 tablet (5 mg total) by mouth daily as needed for Erectile Dysfunction., Disp: 90 tablet, Rfl: 0    testosterone cypionate (DEPOTESTOTERONE CYPIONATE) 200 mg/mL injection, Inject 60 mg into the muscle every 14 (fourteen) days. Twice a week, Disp: , Rfl:     triamcinolone acetonide 0.025% (KENALOG) 0.025 % Oint, Thin film to lips and eczematous plaques BID PRN flare, Disp: 15 g, Rfl: 1    triamcinolone acetonide 0.1% (KENALOG) 0.1 % cream, AAA bid, Disp: 454 g, Rfl: 3    vitamin A 8000 UNIT capsule, Take 1 capsule (8,000 Units total) by mouth once daily., Disp: 100 capsule, Rfl: 3    zinc gluconate 50 mg tablet, Take 50 mg by mouth once daily., Disp: , Rfl:     amantadine  mg Tab, Take 1 tablet by mouth 2 (two) times daily., Disp: , Rfl:     ciclopirox (PENLAC) 8 % Soln, Apply topically nightly., Disp: 6.6 mL, Rfl: 2    ketoconazole (NIZORAL) 2 % shampoo, Wash torso with medicated shampoo at least 2x/week - let sit on skin at least 5 minutes prior to rinsing (Patient not taking: Reported on 6/3/2022), Disp: 120 mL, Rfl: 5    liraglutide 0.6 mg/0.1 mL, 18 mg/3 mL, subq PNIJ (VICTOZA 3-ELIZABETH) 0.6 mg/0.1 mL (18 mg/3 mL) PnIj pen, Inject 1.8 mg into the skin once daily. To begin @ 0.6mg daily for first 2 weeks then uptiitrate as tolerated., Disp: 27 mL, Rfl: 3    metFORMIN (GLUCOPHAGE-XR) 500 MG ER 24hr tablet, Take 1 tablet (500 mg total) " "by mouth 2 (two) times daily with meals., Disp: 180 tablet, Rfl: 3    nitrofurantoin, macrocrystal-monohydrate, (MACROBID) 100 MG capsule, Take 100 mg by mouth 2 (two) times daily., Disp: , Rfl:     pravastatin (PRAVACHOL) 20 MG tablet, Take 1 tablet (20 mg total) by mouth once daily. (Patient not taking: Reported on 6/3/2022), Disp: 90 tablet, Rfl: 3    Review of Systems   Constitutional: Negative for unexpected weight change.   HENT: Negative for trouble swallowing.    Eyes: Negative for visual disturbance.   Respiratory: Negative for shortness of breath.    Cardiovascular: Negative for chest pain, palpitations and leg swelling.   Gastrointestinal: Negative for blood in stool.   Genitourinary: Negative for hematuria.   Skin: Negative for rash.   Allergic/Immunologic: Negative for immunocompromised state.   Neurological: Negative for headaches.   Hematological: Does not bruise/bleed easily.   Psychiatric/Behavioral: Negative for agitation. The patient is not nervous/anxious.        Objective:      /70 (BP Location: Right arm, Patient Position: Sitting, BP Method: Large (Manual))   Pulse 73   Ht 6' 2" (1.88 m)   Wt 115.8 kg (255 lb 4.7 oz)   SpO2 95%   BMI 32.78 kg/m²   Physical Exam  Constitutional:       Appearance: He is well-developed. He is obese.   Eyes:      Conjunctiva/sclera: Conjunctivae normal.      Pupils: Pupils are equal, round, and reactive to light.   Cardiovascular:      Rate and Rhythm: Normal rate and regular rhythm.      Heart sounds: Normal heart sounds.   Pulmonary:      Effort: Pulmonary effort is normal.      Breath sounds: Normal breath sounds.   Abdominal:      General: Bowel sounds are normal.      Palpations: Abdomen is soft.   Musculoskeletal:         General: Normal range of motion.      Cervical back: Normal range of motion and neck supple.   Skin:     General: Skin is warm and dry.   Neurological:      Mental Status: He is alert and oriented to person, place, and time. "   Psychiatric:         Behavior: Behavior normal.         Thought Content: Thought content normal.         Judgment: Judgment normal.         Assessment:       1. Controlled type 2 diabetes mellitus without complication, without long-term current use of insulin    2. Toenail fungus    3. Secondary hypertension    4. S/P laparoscopic sleeve gastrectomy    5. Hypogonadism male    6. Obesity (BMI 30-39.9)        Plan:       Controlled type 2 diabetes mellitus without complication, without long-term current use of insulin  -     metFORMIN (GLUCOPHAGE-XR) 500 MG ER 24hr tablet; Take 1 tablet (500 mg total) by mouth 2 (two) times daily with meals.  Dispense: 180 tablet; Refill: 3  -     liraglutide 0.6 mg/0.1 mL, 18 mg/3 mL, subq PNIJ (VICTOZA 3-ELIZABETH) 0.6 mg/0.1 mL (18 mg/3 mL) PnIj pen; Inject 1.8 mg into the skin once daily. To begin @ 0.6mg daily for first 2 weeks then uptiitrate as tolerated.  Dispense: 27 mL; Refill: 3  Stable, continue management  Hemoglobin A1C   Date Value Ref Range Status   01/24/2022 5.4 4.0 - 5.6 % Final     Comment:     ADA Screening Guidelines:  5.7-6.4%  Consistent with prediabetes  >or=6.5%  Consistent with diabetes    High levels of fetal hemoglobin interfere with the HbA1C  assay. Heterozygous hemoglobin variants (HbS, HgC, etc)do  not significantly interfere with this assay.   However, presence of multiple variants may affect accuracy.     09/07/2021 5.4 4.0 - 5.6 % Final     Comment:     ADA Screening Guidelines:  5.7-6.4%  Consistent with prediabetes  >or=6.5%  Consistent with diabetes    High levels of fetal hemoglobin interfere with the HbA1C  assay. Heterozygous hemoglobin variants (HbS, HgC, etc)do  not significantly interfere with this assay.   However, presence of multiple variants may affect accuracy.     06/05/2021 5.5 4.0 - 5.6 % Final     Comment:     ADA Screening Guidelines:  5.7-6.4%  Consistent with prediabetes  >or=6.5%  Consistent with diabetes    High levels of fetal  "hemoglobin interfere with the HbA1C  assay. Heterozygous hemoglobin variants (HbS, HgC, etc)do  not significantly interfere with this assay.   However, presence of multiple variants may affect accuracy.       Toenail fungus  -     ciclopirox (PENLAC) 8 % Soln; Apply topically nightly.  Dispense: 6.6 mL; Refill: 2    -     tadalafiL (CIALIS) 5 MG tablet; Take 1 tablet (5 mg total) by mouth daily as needed for Erectile Dysfunction.  Dispense: 90 tablet; Refill: 0    S/P laparoscopic sleeve gastrectomy  Patient had a sleeve gastrectomy.~ 2011 by Dr Christophe younger  Hypogonadism male  Stable, on testosterone injection  -     tadalafiL (CIALIS) 5 MG tablet; Take 1 tablet (5 mg total) by mouth daily as needed for Erectile Dysfunction.  Dispense: 90 tablet; Refill: 0    Stable, continue follow up with Sourav Pena in [place]  Obesity (BMI 30-39.9)  Counseled patient on his ideal body weight, health consequences of being obese and current recommendations including weekly exercise and a heart healthy diet.  Current BMI is:Estimated body mass index is 32.78 kg/m² as calculated from the following:    Height as of this encounter: 6' 2" (1.88 m).    Weight as of this encounter: 115.8 kg (255 lb 4.7 oz)..  Patient is aware that ideal BMI < 25 or Weight in (lb) to have BMI = 25: 194.3.            Patient readiness: acceptance and barriers:none    During the course of the visit the patient was educated and counseled about the following:     Diabetes:  Discussed general issues about diabetes pathophysiology and management.  Addressed ADA diet.  Encouraged aerobic exercise.  Hypertension:   Dietary sodium restriction.  Regular aerobic exercise.  Obesity:   General weight loss/lifestyle modification strategies discussed (elicit support from others; identify saboteurs; non-food rewards, etc).  Regular aerobic exercise program discussed.    Goals: Diabetes: Maintain Hemoglobin A1C below 7, Hypertension: Reduce Blood Pressure and Obesity: Reduce " calorie intake and BMI    Did patient meet goals/outcomes: Yes    The following self management tools provided: declined    Patient Instructions (the written plan) was given to the patient/family.     Time spent with patient: 15 minutes    Barriers to medications present (no )    Adverse reactions to current medications (no)    Over the counter medications reviewed (Yes)

## 2022-06-03 NOTE — LETTER
Cindy 3, 2022      [facility]  [address] [place] [address]  Phone: [phone]  Fax: [phone]       Patient: Lyndon Lion   YOB: 1970  Date of Visit: 06/03/2022    To Whom It May Concern:    Ting Lion  was at [facility] on 06/03/2022.Ting Lion  was at [facility] on 05/05/2022. The patient may return to work/school on 6/3/2022 with no restrictions and diagnosis of Parada syndrome. If you have any questions or concerns, or if I can be of further assistance, please do not hesitate to contact me.       Sincerely,    Linette Arceo NP

## 2022-06-03 NOTE — LETTER
June 6, 2022      [facility]  [address] [place] [address]  Phone: [phone]  Fax: [phone]       Patient: Lyndon Lion   YOB: 1970  Date of Visit: 06/06/2022    To Whom It May Concern:    Ting Lion  was at [facility] on 06/03/2022. The patient may return to work/school on 6/4/2022 with no restrictions and diagnosis of Parada syndrome. If you have any questions or concerns, or if I can be of further assistance, please do not hesitate to contact me.    Sincerely,    Linette Arceo NP

## 2022-06-06 ENCOUNTER — PATIENT MESSAGE (OUTPATIENT)
Dept: FAMILY MEDICINE | Facility: CLINIC | Age: 52
End: 2022-06-06
Payer: COMMERCIAL

## 2022-06-06 ENCOUNTER — TELEPHONE (OUTPATIENT)
Dept: FAMILY MEDICINE | Facility: CLINIC | Age: 52
End: 2022-06-06
Payer: COMMERCIAL

## 2022-06-06 NOTE — TELEPHONE ENCOUNTER
If he was diagnosis with secondary hypertension and that's why the cialis was prescribed by an external provider-please send supporting documentation for further refills of the cilias related to secondary hypertension. If no documentation, I can not refill cialis with the diagnosis of secondary hypertension-it will be Erectile dysfunction.

## 2022-06-06 NOTE — TELEPHONE ENCOUNTER
Please inform patient after reviewing his medical records, there is no evidence of a past diagnosis of secondary hypertension. If he was diagnosis with secondary hypertension and that's why the cialis was prescribed, please send supporting documentation for further refills of the cilias related to secondary hypertension.

## 2022-06-06 NOTE — TELEPHONE ENCOUNTER
Spoke to patients wife regarding his cilias refills. She stated that it went through at the pharmacy. However He only stated that it was secondary Hypertension for insurance purposes. I Stated that he would have to show documentation of this. Patient wife wants to know what they should do next?

## 2022-06-09 ENCOUNTER — TELEPHONE (OUTPATIENT)
Dept: HEMATOLOGY/ONCOLOGY | Facility: CLINIC | Age: 52
End: 2022-06-09
Payer: COMMERCIAL

## 2022-06-09 NOTE — TELEPHONE ENCOUNTER
Spoke with the pt wife and got the pt rescheduled. Pt wife verbalized and understanding.  ----- Message from Trini Trevino sent at 6/9/2022 10:28 AM CDT -----  Regarding: ADRIANO Gudino  Type: Needs Medical Advice  Who Called:  Katheryn (spouse)  Symptoms (please be specific):    How long has patient had these symptoms:    Pharmacy name and phone #:    Best Call Back Number: [phone]  Additional Information: Ms. Campa is requesting a call back to reschedule her  appt. on 10/6.

## 2022-06-17 ENCOUNTER — PATIENT MESSAGE (OUTPATIENT)
Dept: ENDOSCOPY | Facility: HOSPITAL | Age: 52
End: 2022-06-17

## 2022-06-17 ENCOUNTER — HOSPITAL ENCOUNTER (OUTPATIENT)
Facility: HOSPITAL | Age: 52
Discharge: HOME | End: 2022-06-17
Attending: INTERNAL MEDICINE | Admitting: INTERNAL MEDICINE
Payer: COMMERCIAL

## 2022-06-17 ENCOUNTER — ANESTHESIA (OUTPATIENT)
Dept: ENDOSCOPY | Facility: HOSPITAL | Age: 52
End: 2022-06-17
Payer: COMMERCIAL

## 2022-06-17 ENCOUNTER — ANESTHESIA EVENT (OUTPATIENT)
Dept: ENDOSCOPY | Facility: HOSPITAL | Age: 52
End: 2022-06-17
Payer: COMMERCIAL

## 2022-06-17 VITALS
WEIGHT: 250 LBS | OXYGEN SATURATION: 98 % | RESPIRATION RATE: 20 BRPM | DIASTOLIC BLOOD PRESSURE: 72 MMHG | HEIGHT: 74 IN | TEMPERATURE: 98 F | BODY MASS INDEX: 32.08 KG/M2 | SYSTOLIC BLOOD PRESSURE: 115 MMHG | HEART RATE: 57 BPM

## 2022-06-17 DIAGNOSIS — Z85.038 HISTORY OF COLON CANCER: ICD-10-CM

## 2022-06-17 PROCEDURE — 25000003 PHARM REV CODE 250: Performed by: NURSE ANESTHETIST, CERTIFIED REGISTERED

## 2022-06-17 PROCEDURE — G0105 COLORECTAL SCRN; HI RISK IND: HCPCS | Performed by: INTERNAL MEDICINE

## 2022-06-17 PROCEDURE — 25000003 PHARM REV CODE 250: Performed by: INTERNAL MEDICINE

## 2022-06-17 PROCEDURE — 63600175 PHARM REV CODE 636 W HCPCS: Performed by: NURSE ANESTHETIST, CERTIFIED REGISTERED

## 2022-06-17 PROCEDURE — G0105 COLORECTAL SCRN; HI RISK IND: HCPCS | Mod: ,,, | Performed by: INTERNAL MEDICINE

## 2022-06-17 PROCEDURE — 37000008 HC ANESTHESIA 1ST 15 MINUTES: Performed by: INTERNAL MEDICINE

## 2022-06-17 PROCEDURE — G0105 COLORECTAL SCRN; HI RISK IND: ICD-10-PCS | Mod: ,,, | Performed by: INTERNAL MEDICINE

## 2022-06-17 PROCEDURE — 37000009 HC ANESTHESIA EA ADD 15 MINS: Performed by: INTERNAL MEDICINE

## 2022-06-17 RX ORDER — PROPOFOL 10 MG/ML
VIAL (ML) INTRAVENOUS
Status: DISCONTINUED | OUTPATIENT
Start: 2022-06-17 | End: 2022-06-17

## 2022-06-17 RX ORDER — LIDOCAINE HYDROCHLORIDE 20 MG/ML
INJECTION INTRAVENOUS
Status: DISCONTINUED | OUTPATIENT
Start: 2022-06-17 | End: 2022-06-17

## 2022-06-17 RX ORDER — SODIUM CHLORIDE 9 MG/ML
INJECTION, SOLUTION INTRAVENOUS CONTINUOUS
Status: DISCONTINUED | OUTPATIENT
Start: 2022-06-17 | End: 2022-06-17 | Stop reason: HOSPADM

## 2022-06-17 RX ORDER — PROPOFOL 10 MG/ML
VIAL (ML) INTRAVENOUS CONTINUOUS PRN
Status: DISCONTINUED | OUTPATIENT
Start: 2022-06-17 | End: 2022-06-17

## 2022-06-17 RX ADMIN — LIDOCAINE HYDROCHLORIDE 50 MG: 20 INJECTION, SOLUTION INTRAVENOUS at 12:06

## 2022-06-17 RX ADMIN — Medication 100 MG: at 12:06

## 2022-06-17 RX ADMIN — SODIUM CHLORIDE: 0.9 INJECTION, SOLUTION INTRAVENOUS at 12:06

## 2022-06-17 RX ADMIN — PROPOFOL 150 MCG/KG/MIN: 10 INJECTION, EMULSION INTRAVENOUS at 12:06

## 2022-06-17 NOTE — ANESTHESIA POSTPROCEDURE EVALUATION
Anesthesia Post Evaluation    Patient: Lyndon Lion Jr.    Procedure(s) Performed: Procedure(s) (LRB):  COLONOSCOPY (N/A)    Final Anesthesia Type: general      Patient location during evaluation: PACU  Patient participation: Yes- Able to Participate  Level of consciousness: awake and alert and oriented  Post-procedure vital signs: reviewed and stable  Pain management: adequate  Airway patency: patent    PONV status at discharge: No PONV  Anesthetic complications: no      Cardiovascular status: blood pressure returned to baseline, hemodynamically stable and stable  Respiratory status: unassisted, room air and spontaneous ventilation  Hydration status: euvolemic  Follow-up not needed.          Vitals Value Taken Time   /59 06/17/22 1318   Temp 36.6 °C (97.9 °F) 06/17/22 1318   Pulse 64 06/17/22 1318   Resp 16 06/17/22 1318   SpO2 94 % 06/17/22 1318         No case tracking events are documented in the log.      Pain/Karthikeyan Score: Karthikeyan Score: 8 (6/17/2022  1:18 PM)

## 2022-06-17 NOTE — TRANSFER OF CARE
"Anesthesia Transfer of Care Note    Patient: Lyndon Lion Jr.    Procedure(s) Performed: Procedure(s) (LRB):  COLONOSCOPY (N/A)    Patient location: PACU    Anesthesia Type: general    Transport from OR: Transported from OR on room air with adequate spontaneous ventilation    Post pain: adequate analgesia    Post assessment: no apparent anesthetic complications and tolerated procedure well    Post vital signs: stable    Level of consciousness: awake, alert and oriented    Nausea/Vomiting: no nausea/vomiting    Complications: none    Transfer of care protocol was followed      Last vitals:   Visit Vitals  /59   Pulse 68   Temp 97.7   Resp 18   Ht 6' 2" (1.88 m)   Wt 113.4 kg (250 lb)   SpO2 96%   BMI 32.10 kg/m²     "

## 2022-06-17 NOTE — PROVATION PATIENT INSTRUCTIONS
Discharge Summary/Instructions after an Endoscopic Procedure  Patient Name: Lyndon Lion  Patient MRN: [id number]  Patient YOB: 1970 Friday, June 17, 2022  Dom Leonardo MD  Dear patient,  As a result of recent federal legislation (The Federal Cures Act), you may   receive lab or pathology results from your procedure in your [facility]   account before your physician is able to contact you. Your physician or   their representative will relay the results to you with their   recommendations at their soonest availability.  Thank you,  RESTRICTIONS:  During your procedure today, you received medications for sedation.  These   medications may affect your judgment, balance and coordination.  Therefore,   for 24 hours, you have the following restrictions:   - DO NOT drive a car, operate machinery, make legal/financial decisions,   sign important papers or drink alcohol.    ACTIVITY:  Today: no heavy lifting, straining or running due to procedural   sedation/anesthesia.  The following day: return to full activity including work.  DIET:  Eat and drink normally unless instructed otherwise.     TREATMENT FOR COMMON SIDE EFFECTS:  - Mild abdominal pain, nausea, belching, bloating or excessive gas:  rest,   eat lightly and use a heating pad.  - Sore Throat: treat with throat lozenges and/or gargle with warm salt   water.  - Because air was used during the procedure, expelling large amounts of air   from your rectum or belching is normal.  - If a bowel prep was taken, you may not have a bowel movement for 1-3 days.    This is normal.  SYMPTOMS TO WATCH FOR AND REPORT TO YOUR PHYSICIAN:  1. Abdominal pain or bloating, other than gas cramps.  2. Chest pain.  3. Back pain.  4. Signs of infection such as: chills or fever occurring within 24 hours   after the procedure.  5. Rectal bleeding, which would show as bright red, maroon, or black stools.   (A tablespoon of blood from the rectum is not serious, especially if    hemorrhoids are present.)  6. Vomiting.  7. Weakness or dizziness.  GO DIRECTLY TO THE NEAREST EMERGENCY ROOM IF YOU HAVE ANY OF THE FOLLOWING:      Difficulty breathing              Chills and/or fever over 101 F   Persistent vomiting and/or vomiting blood   Severe abdominal pain   Severe chest pain   Black, tarry stools   Bleeding- more than one tablespoon   Any other symptom or condition that you feel may need urgent attention  Your doctor recommends these additional instructions:  If any biopsies were taken, your doctors clinic will contact you in 1 to 2   weeks with any results.  - Discharge patient to home.   - Repeat colonoscopy in 1 year for surveillance.   - Return to referring physician.   - The findings and recommendations were discussed with the patient.  For questions, problems or results please call your physician - Dom Leonardo MD at Work:  [phone].  [facility], EMERGENCY ROOM PHONE NUMBER: [phone]  IF A COMPLICATION OR EMERGENCY SITUATION ARISES AND YOU ARE UNABLE TO REACH   YOUR PHYSICIAN - GO DIRECTLY TO THE EMERGENCY ROOM.  Dom Leonardo MD  6/17/2022 1:22:23 PM  This report has been verified and signed electronically.  Dear patient,  As a result of recent federal legislation (The Federal Cures Act), you may   receive lab or pathology results from your procedure in your [facility]   account before your physician is able to contact you. Your physician or   their representative will relay the results to you with their   recommendations at their soonest availability.  Thank you,  PROVATION

## 2022-06-17 NOTE — ANESTHESIA PREPROCEDURE EVALUATION
06/17/2022  Lyndon Lion Jr. is a 52 y.o., male.  Pre-operative evaluation for Procedure(s) (LRB):  COLONOSCOPY (N/A)    Lyndon Lion Jr. is a 52 y.o. male       Patient Active Problem List   Diagnosis    Radiculopathy, lumbosacral region    Herniated lumbar intervertebral disc    Lumbar spondylosis    DDD (degenerative disc disease), lumbosacral    Acute medial meniscal tear    Obesity, unspecified    Parada syndrome    Thoracic or lumbosacral neuritis or radiculitis, unspecified    Neural foraminal stenosis of lumbar spine    MSH2-related Parada syndrome (HNPCC1)    Spondylosis of lumbar region without myelopathy or radiculopathy    Spondylolisthesis of lumbar region    Nonalcoholic fatty liver disease    Splenomegaly    Mild vitamin D deficiency    Colon dysplasia    Colon adenoma    Pneumoperitoneum    Postpolypectomy electrocoagulation syndrome    Lumbar radiculopathy    History of colon cancer, stage II    Uncontrolled type 2 diabetes mellitus with hyperglycemia    Type 2 diabetes mellitus with hyperglycemia    Obesity (BMI 30-39.9)    Gastroesophageal reflux disease without esophagitis    Bariatric surgery status    History of gastric bypass    S/P laparoscopic sleeve gastrectomy    Hypovitaminosis D    Hypophosphatemia    Vitamin B12 deficiency    Vitamin A deficiency       Past Surgical History:   Procedure Laterality Date    APPENDECTOMY      CAUDAL EPIDURAL STEROID INJECTION N/A 11/6/2018    Procedure: Injection-steroid-epidural-caudal;  Surgeon: Lyndon Brooke Jr., MD;  Location: [facility] OR;  Service: Pain Management;  Laterality: N/A;    COLON SURGERY  2008    PARTIAL COLECTOMY    COLONOSCOPY Bilateral 2012    COLONOSCOPY N/A 8/1/2016    Procedure: COLONOSCOPY;  Surgeon: Blaze Marr MD;  Location: [facility];  Service: Endoscopy;  Laterality:  N/A;    COLONOSCOPY N/A 9/20/2017    Procedure: COLONOSCOPY;  Surgeon: Dom Leonardo MD;  Location: [facility] (4TH FLR);  Service: Endoscopy;  Laterality: N/A;  not given PM prep    COLONOSCOPY N/A 10/9/2017    Procedure: COLONOSCOPY;  Surgeon: RAMON Callahan MD;  Location: [facility] (4TH FLR);  Service: Endoscopy;  Laterality: N/A;  ok for Prepopik per Dr Callahan    COLONOSCOPY N/A 11/20/2017    Procedure: COLONOSCOPY/EMR;  Surgeon: Joseluis Choe MD;  Location: [facility] (2ND FLR);  Service: Endoscopy;  Laterality: N/A;  EMR    no pm prep    COLONOSCOPY N/A 5/30/2018    Procedure: COLONOSCOPY;  Surgeon: Dom Leonardo MD;  Location: [facility] (4TH FLR);  Service: Endoscopy;  Laterality: N/A;  follow up colonoscopy in 5/2018 for Parada Syndrome         COLONOSCOPY N/A 6/10/2019    Procedure: COLONOSCOPY;  Surgeon: Dom Leonardo MD;  Location: [facility] (4TH FLR);  Service: Endoscopy;  Laterality: N/A;  Prepopik ordered per Dr. Leonardo    COLONOSCOPY N/A 7/22/2020    Procedure: COLONOSCOPY;  Surgeon: Dom Leonardo MD;  Location: [facility] (4TH FLR);  Service: Endoscopy;  Laterality: N/A;    COLONOSCOPY N/A 5/27/2021    Procedure: COLONOSCOPY;  Surgeon: Dom Leonardo MD;  Location: [facility] (4TH FLR);  Service: Endoscopy;  Laterality: N/A;  covid test 5/24-[place]  pt requests Prepopik    CYSTOSCOPY      Epidural Steroid Injection  10/23/15    Lumbar    EPIDURAL STEROID INJECTION INTO LUMBAR SPINE N/A 7/27/2018    Procedure: Injection-steroid-epidural-lumbar;  Surgeon: Lyndon Brooke Jr., MD;  Location: [facility];  Service: Pain Management;  Laterality: N/A;    ESOPHAGOGASTRODUODENOSCOPY      ESOPHAGOGASTRODUODENOSCOPY N/A 7/22/2020    Procedure: EGD (ESOPHAGOGASTRODUODENOSCOPY);  Surgeon: Dom Leonardo MD;  Location: [facility] (4TH FLR);  Service: Endoscopy;  Laterality: N/A;  Please schedule patient for EGD and colonoscopy with me MSH2 Parada syndrome and anemia evaluation  please make priority 1  covid test 7/20-[place]    FACETECTOMY OF VERTEBRA  2/13/2019    Procedure: MEDIAL FACETECTOMY L5-S1;  Surgeon: Lyndon Brooke Jr., MD;  Location: [facility] OR;  Service: Orthopedics;;    GASTRIC BYPASS  2010    SLEEVE    KNEE ARTHROSCOPY Right     LUMBAR DISCECTOMY  2/13/2019    Procedure: DISCECTOMY, SPINE, LUMBAR L5-S1;  Surgeon: Lyndon Brooke Jr., MD;  Location: [facility] OR;  Service: Orthopedics;;       Social History     Socioeconomic History    Marital status:    Occupational History     Employer: [employer]   Tobacco Use    Smoking status: Never Smoker    Smokeless tobacco: Never Used   Substance and Sexual Activity    Alcohol use: Yes     Comment: RARELY    Drug use: No     Social Determinants of Health     Financial Resource Strain: Low Risk     Difficulty of Paying Living Expenses: Not hard at all   Food Insecurity: No Food Insecurity    Worried About Running Out of Food in the Last Year: Never true    Ran Out of Food in the Last Year: Never true   Transportation Needs: No Transportation Needs    Lack of Transportation (Medical): No    Lack of Transportation (Non-Medical): No   Physical Activity: Insufficiently Active    Days of Exercise per Week: 2 days    Minutes of Exercise per Session: 20 min   Stress: No Stress Concern Present    Feeling of Stress : Only a little   Social Connections: Unknown    Frequency of Communication with Friends and Family: More than three times a week    Frequency of Social Gatherings with Friends and Family: Once a week    Active Member of Clubs or Organizations: No    Attends Club or Organization Meetings: Never    Marital Status:    Housing Stability: Low Risk     Unable to Pay for Housing in the Last Year: No    Number of Places Lived in the Last Year: 1    Unstable Housing in the Last Year: No       Current Facility-Administered Medications on File Prior to Visit   Medication Dose Route Frequency Provider Last  "Rate Last Admin    0.9%  NaCl infusion   Intravenous Continuous Dom Leonardo MD 10 mL/hr at 06/17/22 1213 New Bag at 06/17/22 1213     Current Outpatient Medications on File Prior to Visit   Medication Sig Dispense Refill    ACCU-CHEK GUIDE TEST STRIPS Strp USE TO TEST TWICE A  strip 3    amantadine  mg Tab Take 1 tablet by mouth 2 (two) times daily.      ascorbic acid, vitamin C, (VITAMIN C) 100 MG tablet Take 100 mg by mouth once daily.      aspirin (ECOTRIN) 81 MG EC tablet Take 81 mg by mouth once daily.      ciclopirox (PENLAC) 8 % Soln Apply topically nightly. 6.6 mL 2    COD LIVER OIL ORAL Take by mouth.      cyanocobalamin, vitamin B-12, 1,000 mcg Subl Place 1 lozenge under the tongue once daily. 100 tablet 3    ISOtretinoin (ACCUTANE) 30 MG capsule One cap PO BID 60 capsule 0    ketoconazole (NIZORAL) 2 % shampoo Wash torso with medicated shampoo at least 2x/week - let sit on skin at least 5 minutes prior to rinsing (Patient not taking: Reported on 6/3/2022) 120 mL 5    lancets (ACCU-CHEK SOFTCLIX LANCETS) Misc 1 Units by Misc.(Non-Drug; Combo Route) route 2 (two) times a day. 200 each 0    liraglutide 0.6 mg/0.1 mL, 18 mg/3 mL, subq PNIJ (VICTOZA 3-ELIZABETH) 0.6 mg/0.1 mL (18 mg/3 mL) PnIj pen Inject 1.8 mg into the skin once daily. To begin @ 0.6mg daily for first 2 weeks then uptiitrate as tolerated. 27 mL 3    metFORMIN (GLUCOPHAGE-XR) 500 MG ER 24hr tablet Take 1 tablet (500 mg total) by mouth 2 (two) times daily with meals. 180 tablet 3    metronidazole 0.75% (METROCREAM) 0.75 % Crea Apply 1 application topically 2 (two) times daily. Apply to affected area 30 g 1    mupirocin (BACTROBAN) 2 % ointment Apply topically as needed.       nitrofurantoin, macrocrystal-monohydrate, (MACROBID) 100 MG capsule Take 100 mg by mouth 2 (two) times daily.      pen needle, diabetic (PEN NEEDLE) 32 gauge x 5/32" Ndle 1 each by Misc.(Non-Drug; Combo Route) route once daily. 90 each 3    " phytonadione, vitamin K1, (MEPHYTON) 5 mg Tab Take 5 mg by mouth once.      potassium, sodium phosphates (PHOS-NAK) 280-160-250 mg PwPk Take 1 packet by mouth once daily. for 90 doses 90 packet 3    pravastatin (PRAVACHOL) 20 MG tablet Take 1 tablet (20 mg total) by mouth once daily. 90 tablet 3    sulfacetamide sodium-sulfur 10-5 % (w/w) Clsr Use to wash face daily 170 g 5    tadalafiL (CIALIS) 5 MG tablet Take 1 tablet (5 mg total) by mouth daily as needed for Erectile Dysfunction. 90 tablet 0    testosterone cypionate (DEPOTESTOTERONE CYPIONATE) 200 mg/mL injection Inject 60 mg into the muscle every 14 (fourteen) days. Twice a week      triamcinolone acetonide 0.025% (KENALOG) 0.025 % Oint Thin film to lips and eczematous plaques BID PRN flare 15 g 1    triamcinolone acetonide 0.1% (KENALOG) 0.1 % cream AAA bid 454 g 3    vitamin A 8000 UNIT capsule Take 1 capsule (8,000 Units total) by mouth once daily. 100 capsule 3    zinc gluconate 50 mg tablet Take 50 mg by mouth once daily.         Review of patient's allergies indicates:  No Known Allergies      CBC: No results for input(s): WBC, RBC, HGB, HCT, PLT, MCV, MCH, MCHC in the last 72 hours.    CMP: No results for input(s): NA, K, CL, CO2, BUN, CREATININE, GLU, MG, PHOS, CALCIUM, ALBUMIN, PROT, ALKPHOS, ALT, AST, BILITOT in the last 72 hours.    INR  No results for input(s): PT, INR, PROTIME, APTT in the last 72 hours.      Diagnostic Studies:    EKG:   Results for orders placed or performed during the hospital encounter of 02/06/19   EKG 12-lead    Collection Time: 02/06/19  1:20 PM    Narrative    Test Reason : M54.16,M47.816,M51.26,M51.37,M99.83,M43.16,Z01.818,    Vent. Rate : 068 BPM     Atrial Rate : 068 BPM     P-R Int : 170 ms          QRS Dur : 092 ms      QT Int : 394 ms       P-R-T Axes : 053 093 001 degrees     QTc Int : 418 ms    Normal sinus rhythm  Rightward axis  Borderline Abnormal ECG  When compared with ECG of 14-MAY-2013  12:59,  Questionable change in The axis  Confirmed by Mike King MD ([id number]) on 2/7/2019 12:17:31 PM    Referred By: RENETTA DAVIS           Confirmed By:Mike King MD       TTE:  No results found for this or any previous visit.  No results found for: EF   No results found for this or any previous visit.    LINDA:  No results found for this or any previous visit.    Stress Test:  No results found for this or any previous visit.       LHC:  No results found for this or any previous visit.       PFT:  No results found for: FEV1, FVC, TFB0NZI, TLC, DLCO     ALLERGIES:   Review of patient's allergies indicates:  No Known Allergies  LDA:      Lines/Drains/Airways     Peripheral Intravenous Line  Duration                Peripheral IV - Single Lumen 06/17/22 1225 20 G Right Hand <1 day               Anesthesia Evaluation      Airway   Mallampati: II  TM distance: Normal, at least 6 cm  Neck ROM: Normal ROM  Dental    (+) In tact    Pulmonary    (+) asthma,   Cardiovascular     Rate: Normal    Neuro/Psych      GI/Hepatic/Renal    (+) GERD,     Endo/Other    (+) diabetes mellitus,   Abdominal                       Pre-op Assessment    I have reviewed the Patient Summary Reports.    I have reviewed the Nursing Notes. I have reviewed the NPO Status.   I have reviewed the Medications.     Review of Systems  Anesthesia Hx:  No problems with previous Anesthesia  Denies Family Hx of Anesthesia complications.   Denies Personal Hx of Anesthesia complications.   Cardiovascular:  Cardiovascular Normal     Pulmonary:   Asthma    Renal/:  Renal/ Normal     Hepatic/GI:   GERD    Neurological:  Neurology Normal    Endocrine:   Diabetes        Physical Exam  General:  Well nourished      Airway/Jaw/Neck:  Airway Findings: Mouth Opening: Normal   Tongue: Normal   General Airway Assessment: Adult, Good Mallampati: II  TM Distance: Normal, at least 6 cm   Jaw/Neck Findings:  Neck ROM: Normal ROM       Dental:  Dental Findings: In tact      Chest/Lungs:  Chest/Lungs Findings: Clear to auscultation      Heart/Vascular:  Heart Findings: Rate: Normal  Rhythm: Regular Rhythm        Mental Status:  Mental Status Findings:  Alert and Oriented, Cooperative         Anesthesia Plan  Type of Anesthesia, risks & benefits discussed:  Anesthesia Type:  Gen Natural Airway    Patient's Preference:   Plan Factors:          Intra-op Monitoring Plan: standard ASA monitors  Intra-op Monitoring Plan Comments:   Post Op Pain Control Plan: per primary service following discharge from PACU  Post Op Pain Control Plan Comments:     Induction:   IV  Beta Blocker:  Patient is not currently on a Beta-Blocker (No further documentation required).       Informed Consent: Informed consent signed with the Patient and all parties understand the risks and agree with anesthesia plan.  All questions answered.  Anesthesia consent signed with patient.  ASA Score: 2     Day of Surgery Review of History & Physical:    H&P Update referred to the surgeon/provider.          Ready For Surgery From Anesthesia Perspective.           Physical Exam  General: Well nourished    Airway:  Mallampati: II   Mouth Opening: Normal  TM Distance: Normal, at least 6 cm  Tongue: Normal  Neck ROM: Normal ROM    Dental:  In tact    Chest/Lungs:  Clear to auscultation    Heart:  Rate: Normal  Rhythm: Regular Rhythm          Anesthesia Plan  Type of Anesthesia, risks & benefits discussed:    Anesthesia Type: Gen Natural Airway  Intra-op Monitoring Plan: standard ASA monitors  Post Op Pain Control Plan: per primary service following discharge from PACU  Induction:  IV  Informed Consent: Informed consent signed with the Patient and all parties understand the risks and agree with anesthesia plan.  All questions answered.   ASA Score: 2  Day of Surgery Review of History & Physical: H&P Update referred to the surgeon/provider.    Ready For Surgery From Anesthesia Perspective.       .

## 2022-06-17 NOTE — H&P
[facility]  History & Physical    Subjective:      Chief Complaint/Reason for Admission:   Colonoscopy    Lyndon Lion Jr. is a 52 y.o. male.    Past Medical History:   Diagnosis Date    Arthritis     Asthma     AS A CHILD    Colon cancer     Family hx of prostate cancer 11/22/2016    Hx of colon cancer, stage I 07/03/2014    Parada syndrome     Tear of labium 10/2020    left shoulder Dr Molina    Uncontrolled type 2 diabetes mellitus with hyperglycemia 03/04/2021    Vitamin D deficiency     Wears glasses      Past Surgical History:   Procedure Laterality Date    APPENDECTOMY      CAUDAL EPIDURAL STEROID INJECTION N/A 11/6/2018    Procedure: Injection-steroid-epidural-caudal;  Surgeon: Lyndon Brooke Jr., MD;  Location: [facility] OR;  Service: Pain Management;  Laterality: N/A;    COLON SURGERY  2008    PARTIAL COLECTOMY    COLONOSCOPY Bilateral 2012    COLONOSCOPY N/A 8/1/2016    Procedure: COLONOSCOPY;  Surgeon: Blaze Marr MD;  Location: [facility];  Service: Endoscopy;  Laterality: N/A;    COLONOSCOPY N/A 9/20/2017    Procedure: COLONOSCOPY;  Surgeon: Dom Leonardo MD;  Location: [facility] (4TH FLR);  Service: Endoscopy;  Laterality: N/A;  not given PM prep    COLONOSCOPY N/A 10/9/2017    Procedure: COLONOSCOPY;  Surgeon: RAMON Callahan MD;  Location: [facility] (4TH FLR);  Service: Endoscopy;  Laterality: N/A;  ok for Prepopik per Dr Callahan    COLONOSCOPY N/A 11/20/2017    Procedure: COLONOSCOPY/EMR;  Surgeon: Joseluis Choe MD;  Location: [facility] (2ND FLR);  Service: Endoscopy;  Laterality: N/A;  EMR    no pm prep    COLONOSCOPY N/A 5/30/2018    Procedure: COLONOSCOPY;  Surgeon: Dom Leonardo MD;  Location: [facility] (4TH FLR);  Service: Endoscopy;  Laterality: N/A;  follow up colonoscopy in 5/2018 for Parada Syndrome         COLONOSCOPY N/A 6/10/2019    Procedure: COLONOSCOPY;  Surgeon: Dom Leonardo MD;  Location: [facility] (4TH FLR);  Service:  Endoscopy;  Laterality: N/A;  Prepopik ordered per Dr. Leonardo    COLONOSCOPY N/A 7/22/2020    Procedure: COLONOSCOPY;  Surgeon: Dom Leonardo MD;  Location: [facility] (4TH FLR);  Service: Endoscopy;  Laterality: N/A;    COLONOSCOPY N/A 5/27/2021    Procedure: COLONOSCOPY;  Surgeon: Dom Leonardo MD;  Location: [facility] (4TH FLR);  Service: Endoscopy;  Laterality: N/A;  covid test 5/24-[place]  pt requests Prepopik    CYSTOSCOPY      Epidural Steroid Injection  10/23/15    Lumbar    EPIDURAL STEROID INJECTION INTO LUMBAR SPINE N/A 7/27/2018    Procedure: Injection-steroid-epidural-lumbar;  Surgeon: Lyndon Brooke Jr., MD;  Location: [facility] OR;  Service: Pain Management;  Laterality: N/A;    ESOPHAGOGASTRODUODENOSCOPY      ESOPHAGOGASTRODUODENOSCOPY N/A 7/22/2020    Procedure: EGD (ESOPHAGOGASTRODUODENOSCOPY);  Surgeon: Dom Leonardo MD;  [facility]  Service: Endoscopy;  Laterality: N/A;  Please schedule patient for EGD and colonoscopy with me MSH2 Parada syndrome and anemia evaluation please make priority 1  covid test 7/20-[place]    FACETECTOMY OF VERTEBRA  2/13/2019    Procedure: MEDIAL FACETECTOMY L5-S1;  Surgeon: Lyndon Brooke Jr., MD;  Location: [facility] OR;  Service: Orthopedics;;    GASTRIC BYPASS  2010    SLEEVE    KNEE ARTHROSCOPY Right     LUMBAR DISCECTOMY  2/13/2019    Procedure: DISCECTOMY, SPINE, LUMBAR L5-S1;  Surgeon: Lyndon Brooke Jr., MD;  Location: [facility] OR;  Service: Orthopedics;;     Family History   Problem Relation Age of Onset    Melanoma Mother     Cancer Mother     Breast cancer Mother     Heart disease Father     Diabetes Father     Liver disease Father     Hearing loss Father     Cancer Maternal Uncle         colon    Colon cancer Maternal Uncle         x2    Heart disease Maternal Uncle     Hypertension Maternal Uncle     Dementia Maternal Grandmother     Cancer Maternal Grandfather         colon ca    Colon cancer Maternal  Grandfather     Early death Maternal Grandfather     No Known Problems Sister     Polycystic ovary syndrome Daughter     No Known Problems Paternal Aunt     Alcohol abuse Paternal Uncle     Prostate cancer Paternal Uncle     Cancer Paternal Uncle     Diabetes Paternal Grandmother     Hypertension Paternal Grandfather     Prostatitis Paternal Grandfather     Psoriasis Neg Hx     Lupus Neg Hx     Eczema Neg Hx      Social History     Tobacco Use    Smoking status: Never Smoker    Smokeless tobacco: Never Used   Substance Use Topics    Alcohol use: Yes     Comment: RARELY    Drug use: No       PTA Medications   Medication Sig    ACCU-CHEK GUIDE TEST STRIPS Strp USE TO TEST TWICE A DAY    amantadine  mg Tab Take 1 tablet by mouth 2 (two) times daily.    ascorbic acid, vitamin C, (VITAMIN C) 100 MG tablet Take 100 mg by mouth once daily.    aspirin (ECOTRIN) 81 MG EC tablet Take 81 mg by mouth once daily.    ciclopirox (PENLAC) 8 % Soln Apply topically nightly.    COD LIVER OIL ORAL Take by mouth.    cyanocobalamin, vitamin B-12, 1,000 mcg Subl Place 1 lozenge under the tongue once daily.    ISOtretinoin (ACCUTANE) 30 MG capsule One cap PO BID    ketoconazole (NIZORAL) 2 % shampoo Wash torso with medicated shampoo at least 2x/week - let sit on skin at least 5 minutes prior to rinsing (Patient not taking: Reported on 6/3/2022)    lancets (ACCU-CHEK SOFTCLIX LANCETS) Misc 1 Units by Misc.(Non-Drug; Combo Route) route 2 (two) times a day.    liraglutide 0.6 mg/0.1 mL, 18 mg/3 mL, subq PNIJ (VICTOZA 3-ELIZABETH) 0.6 mg/0.1 mL (18 mg/3 mL) PnIj pen Inject 1.8 mg into the skin once daily. To begin @ 0.6mg daily for first 2 weeks then uptiitrate as tolerated.    metFORMIN (GLUCOPHAGE-XR) 500 MG ER 24hr tablet Take 1 tablet (500 mg total) by mouth 2 (two) times daily with meals.    metronidazole 0.75% (METROCREAM) 0.75 % Crea Apply 1 application topically 2 (two) times daily. Apply to affected area  "   mupirocin (BACTROBAN) 2 % ointment Apply topically as needed.     nitrofurantoin, macrocrystal-monohydrate, (MACROBID) 100 MG capsule Take 100 mg by mouth 2 (two) times daily.    pen needle, diabetic (PEN NEEDLE) 32 gauge x 5/32" Ndle 1 each by Misc.(Non-Drug; Combo Route) route once daily.    phytonadione, vitamin K1, (MEPHYTON) 5 mg Tab Take 5 mg by mouth once.    potassium, sodium phosphates (PHOS-NAK) 280-160-250 mg PwPk Take 1 packet by mouth once daily. for 90 doses    pravastatin (PRAVACHOL) 20 MG tablet Take 1 tablet (20 mg total) by mouth once daily. (Patient not taking: Reported on 6/3/2022)    sulfacetamide sodium-sulfur 10-5 % (w/w) Clsr Use to wash face daily    tadalafiL (CIALIS) 5 MG tablet Take 1 tablet (5 mg total) by mouth daily as needed for Erectile Dysfunction.    testosterone cypionate (DEPOTESTOTERONE CYPIONATE) 200 mg/mL injection Inject 60 mg into the muscle every 14 (fourteen) days. Twice a week    triamcinolone acetonide 0.025% (KENALOG) 0.025 % Oint Thin film to lips and eczematous plaques BID PRN flare    triamcinolone acetonide 0.1% (KENALOG) 0.1 % cream AAA bid    vitamin A 8000 UNIT capsule Take 1 capsule (8,000 Units total) by mouth once daily.    zinc gluconate 50 mg tablet Take 50 mg by mouth once daily.     Review of patient's allergies indicates:  No Known Allergies     Review of Systems   Constitutional: Negative for fever.   Respiratory: Negative for shortness of breath.    Cardiovascular: Negative for chest pain.   Gastrointestinal: Negative for abdominal pain and blood in stool.       Objective:      Vital Signs (Most Recent)       Vital Signs Range (Last 24H):       Physical Exam  Pulmonary:      Effort: Pulmonary effort is normal.   Neurological:      Mental Status: He is alert and oriented to person, place, and time.           Assessment:      There are no hospital problems to display for this patient.      Plan:    Surveillance colonoscopy history of stage " II colon cancer and Parada syndrome.

## 2022-06-20 LAB — POCT GLUCOSE: 85 MG/DL (ref 70–110)

## 2022-06-25 ENCOUNTER — OFFICE VISIT (OUTPATIENT)
Dept: ENDOCRINOLOGY | Facility: CLINIC | Age: 52
End: 2022-06-25
Payer: COMMERCIAL

## 2022-06-25 DIAGNOSIS — R94.6 THYROID FUNCTION TEST ABNORMAL: ICD-10-CM

## 2022-06-25 DIAGNOSIS — Z98.84 BARIATRIC SURGERY STATUS: ICD-10-CM

## 2022-06-25 DIAGNOSIS — Z15.09 MSH2-RELATED LYNCH SYNDROME (HNPCC1): ICD-10-CM

## 2022-06-25 DIAGNOSIS — M51.37 DDD (DEGENERATIVE DISC DISEASE), LUMBOSACRAL: ICD-10-CM

## 2022-06-25 DIAGNOSIS — E50.9 VITAMIN A DEFICIENCY: ICD-10-CM

## 2022-06-25 DIAGNOSIS — N52.9 ERECTILE DYSFUNCTION, UNSPECIFIED ERECTILE DYSFUNCTION TYPE: ICD-10-CM

## 2022-06-25 DIAGNOSIS — E53.8 VITAMIN B12 DEFICIENCY: ICD-10-CM

## 2022-06-25 DIAGNOSIS — K76.0 NONALCOHOLIC FATTY LIVER DISEASE: ICD-10-CM

## 2022-06-25 DIAGNOSIS — R79.89 LOW TESTOSTERONE IN MALE: ICD-10-CM

## 2022-06-25 DIAGNOSIS — R16.1 SPLENOMEGALY: Chronic | ICD-10-CM

## 2022-06-25 DIAGNOSIS — Z98.84 S/P LAPAROSCOPIC SLEEVE GASTRECTOMY: ICD-10-CM

## 2022-06-25 DIAGNOSIS — Z85.038 HISTORY OF COLON CANCER, STAGE II: ICD-10-CM

## 2022-06-25 DIAGNOSIS — E29.1 HYPOGONADISM IN MALE: ICD-10-CM

## 2022-06-25 DIAGNOSIS — K21.9 GASTROESOPHAGEAL REFLUX DISEASE WITHOUT ESOPHAGITIS: ICD-10-CM

## 2022-06-25 DIAGNOSIS — N40.0 BENIGN PROSTATIC HYPERPLASIA, UNSPECIFIED WHETHER LOWER URINARY TRACT SYMPTOMS PRESENT: ICD-10-CM

## 2022-06-25 DIAGNOSIS — E66.9 OBESITY (BMI 30-39.9): ICD-10-CM

## 2022-06-25 DIAGNOSIS — Z80.42 FAMILY HX OF PROSTATE CANCER: ICD-10-CM

## 2022-06-25 DIAGNOSIS — E55.9 HYPOVITAMINOSIS D: ICD-10-CM

## 2022-06-25 DIAGNOSIS — E11.65 TYPE 2 DIABETES MELLITUS WITH HYPERGLYCEMIA, WITHOUT LONG-TERM CURRENT USE OF INSULIN: Primary | ICD-10-CM

## 2022-06-25 DIAGNOSIS — M47.816 SPONDYLOSIS OF LUMBAR REGION WITHOUT MYELOPATHY OR RADICULOPATHY: ICD-10-CM

## 2022-06-25 PROCEDURE — 99214 OFFICE O/P EST MOD 30 MIN: CPT | Mod: 95,,, | Performed by: INTERNAL MEDICINE

## 2022-06-25 PROCEDURE — 99214 PR OFFICE/OUTPT VISIT, EST, LEVL IV, 30-39 MIN: ICD-10-PCS | Mod: 95,,, | Performed by: INTERNAL MEDICINE

## 2022-06-25 NOTE — PROGRESS NOTES
Subjective:      Patient ID: Lyndon Lion Jr. is a 52 y.o. male.    Chief Complaint:      Diabetes and Weight Gain     Patient is a 52 yr old gentleman with type 2 diabetes seen in [facility] today. This is a video televisit and thus the examinatiobn aspects of the visit are limited.    History of Present Illness    The patient, Ms Lion is a 51 yr gentleman with type 2 diabetes seen in [facility] today.  Today's visit is a video televisit.  The patient location is: home  The chief complaint leading to consultation is: Type 2 diabetes glycemic optimization    Visit type: Synchronous video and audio televisit    Face to Face time with patient: ~ 30  minutes of total time spent on the encounter, which includes face to face time and non-face to face time preparing to see the patient (eg, review of tests), Obtaining and/or reviewing separately obtained history, Documenting clinical information in the electronic or other health record, Independently interpreting results (not separately reported) and communicating results to the patient/family/caregiver, or Care coordination (not separately reported).         Each patient to whom he or she provides medical services by telemedicine is:  (1) informed of the relationship between the physician and patient and the respective role of any other health care provider with respect to management of the patient; and (2) notified that he or she may decline to receive medical services by telemedicine and may withdraw from such care at any time.    Notes:        His background comorbidities are detailed below;     1. Type 2 diabetes mellitus with hyperglycemia, with long-term current use of insulin    2. Obesity (BMI 30-39.9)    3. Lumbar spondylosis    4. DDD (degenerative disc disease), lumbosacral    5. MSH2-related Parada syndrome (HNPCC1)    6. Hx of colon cancer, stage I    7. Splenomegaly    8. Nonalcoholic fatty liver disease    9. Gastroesophageal reflux disease without esophagitis        He is presently on metformin monotherapy 500mg QD and Victoza 1.8mg Qd and his latest HBA1c from 09/21 was 5.4 which is at goal..  The patients baseline [place] score is 9.  Patient has been known to be diabetic in the last ~ 6mths.  Patient father and maternal grandmother also have diabetes.  Patient had a sleeve gastrectomy NOT a gastric bypass.~ 2011 by Dr Christophe younger.   Patient had hemicolectomy In 2008. Patient was found to found colorectal ca stage 2. He received chemotherapy with Dr Cárdenas. He received 12 courses of FOLFOX (leucovorin calcium (folinic acid), fluorouracil, and oxaliplatin).     Patient does not smoke.  Patient drinks beer very rarely presently.     Patient sees Dr Shay for ongoing eye care. Last appt was ~ 05/2020. No retinopathy.     Flu vaccination'; last received 2017  Pneumovax; never received  COVID vaccination ; first immunization received.  Patient is a .  He has no fresh complaints today but indicates that since starting testosterone replacement which he is getting through a commercial company he has noticed some weight gain though his clothes fit just as well as a before. His testosterone repletion was commenced based on low free testosterone despite normal total levels and he has not had a formal work up as to the underlying etiology of this.        Review of Systems   Constitutional: Negative for activity change, appetite change, diaphoresis, fatigue and unexpected weight change.   HENT: Negative for facial swelling, hearing loss, trouble swallowing and voice change.    Eyes: Negative for photophobia and visual disturbance.   Respiratory: Negative for apnea, cough, chest tightness, shortness of breath, wheezing and stridor.    Cardiovascular: Negative for chest pain, palpitations and leg swelling.   Gastrointestinal: Positive for diarrhea (with metformin use). Negative for abdominal distention, abdominal pain, constipation, nausea and vomiting.    Endocrine: Negative for polydipsia, polyphagia and polyuria.   Genitourinary: Negative for dysuria, flank pain, frequency, hematuria and urgency.   Musculoskeletal: Negative for arthralgias, back pain, joint swelling, myalgias and neck pain.   Skin: Negative for color change, pallor and rash.   Neurological: Negative for dizziness, tremors, seizures, syncope, light-headedness, numbness and headaches.   Hematological: Does not bruise/bleed easily.   Psychiatric/Behavioral: Negative for agitation, confusion, dysphoric mood and sleep disturbance. The patient is not nervous/anxious and is not hyperactive.        Objective: Nil; video televisit         Lab Review:      Latest Reference Range & Units 01/14/22 08:03 01/24/22 16:17 01/24/22 16:34 03/21/22 10:50 03/21/22 10:51 04/02/22 13:15 06/17/22 12:28   WBC 3.90 - 12.70 K/uL   4.01       RBC 4.60 - 6.20 M/uL   5.37       Hemoglobin 14.0 - 18.0 g/dL   16.1       Hematocrit 40.0 - 54.0 %   47.4       MCV 82 - 98 fL   88       MCH 27.0 - 31.0 pg   30.0       MCHC 32.0 - 36.0 g/dL   34.0       RDW 11.5 - 14.5 %   12.8       Platelets 150 - 450 K/uL   273       MPV 9.2 - 12.9 fL   9.7       Gran % 38.0 - 73.0 %   58.8       Lymph % 18.0 - 48.0 %   25.9       Mono % 4.0 - 15.0 %   11.2       Eosinophil % 0.0 - 8.0 %   3.2       Basophil % 0.0 - 1.9 %   0.7       Immature Granulocytes 0.0 - 0.5 %   0.2       Gran # (ANC) 1.8 - 7.7 K/uL   2.4       Lymph # 1.0 - 4.8 K/uL   1.0       Mono # 0.3 - 1.0 K/uL   0.5       Eos # 0.0 - 0.5 K/uL   0.1       Baso # 0.00 - 0.20 K/uL   0.03       Immature Grans (Abs) 0.00 - 0.04 K/uL   0.01       nRBC 0 /100 WBC   0       Differential Method    Automated       Transferrin 200 - 375 mg/dL     310     Ferritin 20.0 - 300.0 ng/mL     28     Folate 4.0 - 24.0 ng/mL     7.3     Vitamin B-12 180 - 914 pg/mL  210 - 950 pg/mL     134 (L)  224     Intrinsic Blocking Factor Negative      Negative     Gastrin pg/mL     97     Sodium 136 - 145  mmol/L   139       Potassium 3.5 - 5.1 mmol/L   4.3       Chloride 95 - 110 mmol/L   108       CO2 23 - 29 mmol/L   21 (L)       Anion Gap 8 - 16 mmol/L   10       BUN 6 - 20 mg/dL   13       Creatinine 0.5 - 1.4 mg/dL   1.2       eGFR if non African American >60 mL/min/1.73 m^2   >60.0       eGFR if African American >60 mL/min/1.73 m^2   >60.0       Glucose 70 - 110 mg/dL   91       Calcium 8.7 - 10.5 mg/dL   9.7       Phosphorus 2.7 - 4.5 mg/dL     2.3 (L)     Magnesium 1.6 - 2.6 mg/dL     1.8     Alkaline Phosphatase 55 - 135 U/L   56       PROTEIN TOTAL 6.0 - 8.4 g/dL   6.9       Albumin 3.5 - 5.2 g/dL   3.8       Prealbumin 20 - 43 mg/dL     21     Uric Acid 3.4 - 7.0 mg/dL   4.9       BILIRUBIN TOTAL 0.1 - 1.0 mg/dL   0.8       AST 10 - 40 U/L   21       ALT 10 - 44 U/L   21       GGT 8 - 55 U/L   15       Ammonia 10 - 50 umol/L   40       CERULOPLASMIN 15.0 - 45.0 mg/dL     24.0     Copper 665 - 1480 ug/L     800     Iodine, Serum 40 - 92 ng/mL     42     Lactate, [place] 0.5 - 2.2 mmol/L   1.0       Manganese, Serum 0.5 - 1.2 ng/mL     0.6     Selenium 70 - 150 ng/mL     114     Zinc, Serum-ALT 60 - 130 ug/dL     74     24,25 Dihydroxy VitD Total Not Applicable ng/mL     2.91     25HDN:24,25 Dihydroxy VitD Ratio      17.87     25-Hydroxy D Total ng/mL     52     25-HYDROXY D2 ng/mL     24     25-Hydroxy D3 ng/mL     28     AIF Comment      SEE BELOW     Carotene 3 - 91 ug/dL      5    Niacin ug/mL     2.45     Retinol, serum 38 - 106 ug/dL     35 (L)     Thiamine 38 - 122 ug/L    66      Vit D, 1,25-Dihydroxy 20 - 79 pg/mL    60      Vitamin B2 1 - 19 mcg/L     4     Vitamin B5 ug/L     85.18     Vitamin B6 5 - 50 ug/L     5     Vitamin B7, H (Biotin) 221.0 - 3004.0 pg/mL     950.1     Vitamin C 2 - 19 mg/L     3     Vitamin E 500 - 1800 ug/dL     1068     Vitamin K 0.22 - 4.88 nmol/L     1.61     Hemoglobin A1C External 4.0 - 5.6 %   5.4       Estimated Avg Glucose 68 - 131 mg/dL   108       TSH 0.400 - 4.000  uIU/mL   1.005       AFP 0.0 - 8.4 ng/mL   2.2       Albumin 3.6 - 5.1 g/dL   4.3       Sex Hormone Binding Globulin 10 - 50 nmol/L   36       Testosterone 250 - 1100 ng/dL   981       Testosterone, Bioavailable 110.0 - 575.0 ng/dL   285.9       Testosterone, Free 46.0 - 224.0 pg/mL   145.1       Chromium Level <0.3 ng/mL     <0.1     Specimen UA   Urine, Clean Catch        Color, UA Yellow, Straw, Jessica   Yellow        Appearance, UA Clear   Clear        Specific Gravity, UA 1.005 - 1.030   1.020        pH, UA 5.0 - 8.0   6.0        Protein, UA Negative   Negative        Glucose, UA Negative   Negative        Ketones, UA Negative   Negative        Occult Blood UA Negative   1+ !        NITRITE UA Negative   Negative        Bilirubin (UA) Negative   Negative        Leukocytes, UA Negative   Negative        RBC, UA 0 - 4 /hpf  0        WBC, UA 0 - 5 /hpf  1        Bacteria, UA None-Occ /hpf  Rare        Microscopic Comment   SEE COMMENT        Creatinine, Urine 23.0 - 375.0 mg/dL  127.0        Microalbumin, Urine ug/mL  7.0        MICROALB/CREAT RATIO 0.0 - 30.0 ug/mg  5.5        POCT Glucose 70 - 110 mg/dL       85   SPECIMEN TO PATHOLOGY, DERMATOLOGY  Rpt         Final Pathologic Diagnosis  1.  Skin, left volar forearm, eczematous, punch biopsy:   - SPONGIOTIC AND INTERFACE DERMATITIS (SEE COMMENT).   COMMENT:  These histologic features may be seen in the context of an irritant   contact dermatitis.  The presence of interface alteration, epidermal   spongiosis, and increased dermal mucin raises the possibility of subacute   cutaneous lupus.  The lack of eosinophils argues against a drug eruption.   The presence of interface alteration and dyskeratotic keratinocytes at   multiple levels within the epidermis is not typical for atopic dermatitis or   an allergic contact dermatitis.  Pityriasis lichenoides chronica has similar   histologic features to that seen in this specimen, though this specimen lacks   the  "confluent parakeratosis and wedge-shaped dermal infiltrate usually   associated with this entity, and the clinical presentation is not supportive   of this diagnosis.  The presence of interface alteration, epidermal   spongiosis, and increased dermal mucin raises the possibility of subacute   cutaneous lupus.  Finally, similar clinical presentation and histology has   been described in post-bariatric surgery eruptions, though this is usually   weeks to months after the procedure and is generally not described several   years post procedure.   2.  Skin, left upper back, pustule, punch biopsy:   - FEATURES CONSISTENT WITH PITYROSPORUM FOLLICULITIS.            Gross  Number of containers:  2   Part 1   Container Label: Clinic Number/AP Number:  [id number], and "left volar forearm,   eczematous"   Received in formalin is a 3 x 3 mm punch biopsy fragment of tan-white skin   excised to a depth of 4 mm. Specimen is bisected and entirely submitted in   UKX--1-A.   Part 2   Container Label: Clinic Number/AP Number:  [id number], and "left upper back,   pustule"   Received in formalin is a 3 x 3 mm punch biopsy fragment of tan skin excised   to a depth of 7 mm.  Specimen is bisected and entirely submitted in   PZB--2-A.   NEHEMIAH Cassidy            Microscopic Exam  1.  Sections show basket weave and compact orthokeratosis with focal   parakeratosis.  There is epidermal spongiosis, largely limited to the lower   half of the epidermis associated with exocytosis of lymphocytes.  Occasional   vacuolar interface alteration is noted along the dermal epidermal junction,   and a few scattered apoptotic keratinocytes localize to multiple levels   within the epidermis.  Within the superficial to upper mid dermis, there is a   mild perivascular lymphocyte-predominant inflammatory infiltrate.   Eosinophils are not identified.  Colloidal iron stain shows increased dermal   mucin within the superficial to mid dermis.  PAS stain is " negative for   yeasts/fungi.  Appropriately reactive controls were reviewed.  Multiple   levels were examined.   2.  Sections show a dilated follicle containing keratinaceous debris,   bacteria, and multiple yeasts, which are highlighted on PAS stain which was   examined with appropriately reactive controls.  The adjacent epidermis shows   minimal spongiosis with exocytosis of lymphocytes.  Interface alteration is   not identified.  Within the superficial to upper mid dermis, there is a mild   perivascular and perifollicular lymphohistiocytic infiltrate with occasional   neutrophils and a rare eosinophil.  Colloidal iron stain fails to show   increased dermal mucin.  Appropriately reactive controls were reviewed.   Multiple levels were examined.            Disclaimer  Unless the case is a 'gross only' or additional testing only, the final   diagnosis for each specimen is based on a microscopic examination of   appropriate tissue sections.            (L): Data is abnormally low  !: Data is abnormal  Rpt: View report in Results Review for more information      Assessment:     1. Type 2 diabetes mellitus with hyperglycemia, without long-term current use of insulin  Uric Acid    Hemoglobin A1C    GlycoMark (TM)    Fructosamine    Lipid Panel    Amylase    Lipase    Calcitonin   2. Obesity (BMI 30-39.9)     3. S/P laparoscopic sleeve gastrectomy     4. Bariatric surgery status     5. Vitamin A deficiency     6. Vitamin B12 deficiency     7. Hypovitaminosis D  Vitamin D    PTH, Intact    Calcium, Ionized    Calcium   8. MSH2-related Parada syndrome (HNPCC1)     9. Gastroesophageal reflux disease without esophagitis     10. Nonalcoholic fatty liver disease  Uric Acid    Lipid Panel   11. Splenomegaly     12. History of colon cancer, stage II     13. Family hx of prostate cancer  PSA, Total and Free   14. Spondylosis of lumbar region without myelopathy or radiculopathy     15. DDD (degenerative disc disease), lumbosacral      16. Thyroid function test abnormal  TSH   17. Benign prostatic hyperplasia, unspecified whether lower urinary tract symptoms present  PSA, Total and Free   18. Low testosterone in male  Dihydrotestosterone    Estradiol    Estrogens, Total    Prolactin    Follicle Stimulating Hormone    Luteinizing Hormone   19. Erectile dysfunction, unspecified erectile dysfunction type     20. Hypogonadism in male  Testosterone Panel    Testosterone Panel    Progesterone    Prolactin    Follicle Stimulating Hormone    Luteinizing Hormone        Regarding type 2 diabetes; to continue  metformin to 500mg BID and  Victoza 1.8mg QD to assist with weight management.  Regarding possible NAFLD; to obtain screening upper abdomen USS to evaluate current status.  Regarding post bariatric surgery status; s/p sleeve. To continue multivitamin supplementation and to check full nutritional labs.  Regarding hyperlipidemia; to continue pravastatin as before and will recheck lipid profile.  Regarding Parada's syndrome; ongoing ffup with hem Onc; Dr Cárdenas.  Regarding possible hypovitaminosis D; to check 25 OH Vitr D levels and replete as needed.  Regarding bone health status; Screening DEXA from 04/21 was normal.  Regarding GERD; symptomatically stable.  Regarding hypogonadism; being ffed and managed by an outside commercial group. Presently on 60mg every 14 days. To check peak and trough levels and will obtain basic etiologic work up and relevant screening imaging if indciated.      Plan:     FFup in ~ 4mths

## 2022-06-28 ENCOUNTER — OFFICE VISIT (OUTPATIENT)
Dept: UROLOGY | Facility: CLINIC | Age: 52
End: 2022-06-28
Payer: COMMERCIAL

## 2022-06-28 VITALS
HEIGHT: 74 IN | WEIGHT: 257.06 LBS | HEART RATE: 64 BPM | DIASTOLIC BLOOD PRESSURE: 81 MMHG | BODY MASS INDEX: 32.99 KG/M2 | SYSTOLIC BLOOD PRESSURE: 125 MMHG

## 2022-06-28 DIAGNOSIS — Z15.09 LYNCH SYNDROME: ICD-10-CM

## 2022-06-28 DIAGNOSIS — E66.9 OBESITY, UNSPECIFIED CLASSIFICATION, UNSPECIFIED OBESITY TYPE, UNSPECIFIED WHETHER SERIOUS COMORBIDITY PRESENT: ICD-10-CM

## 2022-06-28 DIAGNOSIS — E11.65 UNCONTROLLED TYPE 2 DIABETES MELLITUS WITH HYPERGLYCEMIA: Primary | ICD-10-CM

## 2022-06-28 DIAGNOSIS — M48.061 NEURAL FORAMINAL STENOSIS OF LUMBAR SPINE: ICD-10-CM

## 2022-06-28 LAB
MICROSCOPIC COMMENT: NORMAL
RBC #/AREA URNS AUTO: 0 /HPF (ref 0–4)
SQUAMOUS #/AREA URNS AUTO: 0 /HPF
WBC #/AREA URNS AUTO: 1 /HPF (ref 0–5)

## 2022-06-28 PROCEDURE — 99999 PR PBB SHADOW E&M-EST. PATIENT-LVL V: CPT | Mod: PBBFAC,,, | Performed by: UROLOGY

## 2022-06-28 PROCEDURE — 99213 OFFICE O/P EST LOW 20 MIN: CPT | Mod: S$GLB,,, | Performed by: UROLOGY

## 2022-06-28 PROCEDURE — 99999 PR PBB SHADOW E&M-EST. PATIENT-LVL V: ICD-10-PCS | Mod: PBBFAC,,, | Performed by: UROLOGY

## 2022-06-28 PROCEDURE — 81001 URINALYSIS AUTO W/SCOPE: CPT | Performed by: UROLOGY

## 2022-06-28 PROCEDURE — 99213 PR OFFICE/OUTPT VISIT, EST, LEVL III, 20-29 MIN: ICD-10-PCS | Mod: S$GLB,,, | Performed by: UROLOGY

## 2022-06-28 NOTE — LETTER
June 28, 2022      [facility] [address]  [address]  [facility] [address]  Phone: [phone]       Patient: Lyndon Lion   YOB: 1970  Date of Visit: 06/28/2022    To Whom It May Concern:    Ting Lion  was at [facility] on 06/28/2022 for Parada Syndome annual follow up. The patient may return to work on 6/29/22 with no restrictions. If you have any questions or concerns, or if I can be of further assistance, please do not hesitate to contact me.    Sincerely,    Guerita Stovall MD

## 2022-06-28 NOTE — LETTER
June 28, 2022      [facility] [address]  [address]  [facility] [address]  Phone: [phone]       Patient: Lyndon Lion   YOB: 1970  Date of Visit: 06/28/2022    To Whom It May Concern:    Ting Lion  was at [facility] on 06/28/2022 due to Parada Syndrome annual visit.  The patient may return to work/school on with no restrictions. If you have any questions or concerns, or if I can be of further assistance, please do not hesitate to contact me.    Sincerely,    Guerita Stovall MD

## 2022-06-28 NOTE — PROGRESS NOTES
"Urology - [facility]  Clinic Note    SUBJECTIVE:     Chief Complaint: follow up    History of Present Illness:  Lyndon Lion Jr. is a 52 y.o. male who presents to clinic for follow up. He is established to our clinic.   evaluation of Parada Syndrome.   Recently diagnosed with diabetes.   Family hx of prostate cancer - grandfather  father had BPH    Recent back surgery.   He does snore. Doesn't stop breathing.     No LUTS. Drinks caffeine. Nocturia x 1.   No lower extremity edema. Snores. No sleep apnea.   No daytime frequency. No urgency. Good stream.   No gross hematuria. No intermittency. No hesitancy.     H/o colon cancer. Has frequent bowels.   8/2008 was the colon surgery.   He is on a surveillance protocol with Dr. Leonardo.   Last colonoscopy 2021.    No ED.    Mother had breast cancer and skin cancer. Mom is 61.     Anticoagulation:  No    OBJECTIVE:     Estimated body mass index is 33 kg/m² as calculated from the following:    Height as of this encounter: 6' 2" (1.88 m).    Weight as of this encounter: 116.6 kg (257 lb 0.9 oz).    Vital Signs (Most Recent)  Pulse: 64 (06/28/22 0922)  BP: 125/81 (06/28/22 0922)    Physical Exam  Vitals reviewed.   Pulmonary:      Effort: Pulmonary effort is normal.   Genitourinary:     Penis: Normal.          Lab Results   Component Value Date    BUN 13 01/24/2022    CREATININE 1.2 01/24/2022    WBC 4.01 01/24/2022    HGB 16.1 01/24/2022    HCT 47.4 01/24/2022     01/24/2022    AST 21 01/24/2022    ALT 21 01/24/2022    ALKPHOS 56 01/24/2022    ALBUMIN 3.8 01/24/2022    ALBUMIN 4.3 01/24/2022    HGBA1C 5.4 01/24/2022        Lab Results   Component Value Date    PSA 0.45 03/19/2019    PSA 0.49 11/22/2016    PSADIAG 0.42 06/20/2020    PSAFREE 0.19 04/23/2021    PSAFREEPCT 52.78 04/23/2021   PSA 2022 - 0.9 1/29/22  PSA 2022 - 0.4 4/28/22    Imaging:  10/25/21  The adrenal glands are not enlarged.  The kidneys are of normal size contour and CT density as well " as contrast enhancement without mass stone or hydronephrosis identified on either side down to the bladder.  The abdominal aorta and inferior vena cava are of normal caliber.  Retroperitoneal adenopathy or mass is not seen.  The patient has had a prior gastric sleeve procedure with suture material in place.  Small bowel dilatation or air-fluid levels are not seen.  The patient has had a prior right colectomy with anastomosis at the hepatic flexure.  The rest of the colon appears of normal configuration without dilation or mass.  Free fluid or free air in the peritoneum is not identified.     The bladder is of normal contour without mass or asymmetry.  The prostate is not enlarged measuring 4.5 cm transversely.  Free fluid or adenopathy in the pelvis is not seen.    Urinalysis - trace protein    ASSESSMENT     1. Uncontrolled type 2 diabetes mellitus with hyperglycemia    2. Parada syndrome    3. Neural foraminal stenosis of lumbar spine    4. Obesity, unspecified classification, unspecified obesity type, unspecified whether serious comorbidity present      PLAN:   Lyndon was seen today for follow-up.    Diagnoses and all orders for this visit:    Uncontrolled type 2 diabetes mellitus with hyperglycemia    Parada syndrome  -     Urinalysis Microscopic    Neural foraminal stenosis of lumbar spine    Obesity, unspecified classification, unspecified obesity type, unspecified whether serious comorbidity present          Guerita Stovall MD

## 2022-07-05 ENCOUNTER — OFFICE VISIT (OUTPATIENT)
Dept: DERMATOLOGY | Facility: CLINIC | Age: 52
End: 2022-07-05
Payer: COMMERCIAL

## 2022-07-05 VITALS — WEIGHT: 257 LBS | HEIGHT: 74 IN | BODY MASS INDEX: 32.98 KG/M2

## 2022-07-05 DIAGNOSIS — L73.8 PITYROSPORUM FOLLICULITIS: ICD-10-CM

## 2022-07-05 DIAGNOSIS — Z51.81 THERAPEUTIC DRUG MONITORING: Primary | ICD-10-CM

## 2022-07-05 DIAGNOSIS — L71.9 ACNE ROSACEA: ICD-10-CM

## 2022-07-05 PROCEDURE — 99214 PR OFFICE/OUTPT VISIT, EST, LEVL IV, 30-39 MIN: ICD-10-PCS | Mod: S$GLB,,, | Performed by: DERMATOLOGY

## 2022-07-05 PROCEDURE — 99999 PR PBB SHADOW E&M-EST. PATIENT-LVL IV: CPT | Mod: PBBFAC,,, | Performed by: DERMATOLOGY

## 2022-07-05 PROCEDURE — 99999 PR PBB SHADOW E&M-EST. PATIENT-LVL IV: ICD-10-PCS | Mod: PBBFAC,,, | Performed by: DERMATOLOGY

## 2022-07-05 PROCEDURE — 99214 OFFICE O/P EST MOD 30 MIN: CPT | Mod: S$GLB,,, | Performed by: DERMATOLOGY

## 2022-07-05 RX ORDER — ISOTRETINOIN 30 MG/1
CAPSULE ORAL
Qty: 60 CAPSULE | Refills: 0 | Status: SHIPPED | OUTPATIENT
Start: 2022-07-05 | End: 2022-09-20

## 2022-07-05 NOTE — PROGRESS NOTES
Subjective:       Patient ID:  Lyndon Lion Jr. is a 52 y.o. male who presents for   Chief Complaint   Patient presents with    Acne     accutane     LOV 05/5/22- cheilitis, acne rosacea    Here today for accutane follow up  Feels the mediation is effective  Dry lips are manageable.        Has no hx of NMSC  Has a fhx of MM-mom    Current Outpatient Medications:     ACCU-CHEK GUIDE TEST STRIPS Strp, USE TO TEST TWICE A DAY, Disp: 200 strip, Rfl: 3    amantadine  mg Tab, Take 1 tablet by mouth 2 (two) times daily., Disp: , Rfl:     ascorbic acid, vitamin C, (VITAMIN C) 100 MG tablet, Take 100 mg by mouth once daily., Disp: , Rfl:     aspirin (ECOTRIN) 81 MG EC tablet, Take 81 mg by mouth once daily., Disp: , Rfl:     COD LIVER OIL ORAL, Take by mouth., Disp: , Rfl:     ISOtretinoin (ACCUTANE) 30 MG capsule, One cap PO BID, Disp: 60 capsule, Rfl: 0    ketoconazole (NIZORAL) 2 % shampoo, Wash torso with medicated shampoo at least 2x/week - let sit on skin at least 5 minutes prior to rinsing, Disp: 120 mL, Rfl: 5    lancets (ACCU-CHEK SOFTCLIX LANCETS) Misc, 1 Units by Misc.(Non-Drug; Combo Route) route 2 (two) times a day., Disp: 200 each, Rfl: 0    liraglutide 0.6 mg/0.1 mL, 18 mg/3 mL, subq PNIJ (VICTOZA 3-ELIZABETH) 0.6 mg/0.1 mL (18 mg/3 mL) PnIj pen, Inject 1.8 mg into the skin once daily. To begin @ 0.6mg daily for first 2 weeks then uptiitrate as tolerated., Disp: 27 mL, Rfl: 3    metFORMIN (GLUCOPHAGE-XR) 500 MG ER 24hr tablet, Take 1 tablet (500 mg total) by mouth 2 (two) times daily with meals., Disp: 180 tablet, Rfl: 3    metronidazole 0.75% (METROCREAM) 0.75 % Crea, Apply 1 application topically 2 (two) times daily. Apply to affected area, Disp: 30 g, Rfl: 1    mupirocin (BACTROBAN) 2 % ointment, Apply topically as needed. , Disp: , Rfl:     nitrofurantoin, macrocrystal-monohydrate, (MACROBID) 100 MG capsule, Take 100 mg by mouth 2 (two) times daily., Disp: , Rfl:     pen needle,  "diabetic (PEN NEEDLE) 32 gauge x 5/32" Ndle, 1 each by Misc.(Non-Drug; Combo Route) route once daily., Disp: 90 each, Rfl: 3    phytonadione, vitamin K1, (MEPHYTON) 5 mg Tab, Take 5 mg by mouth once., Disp: , Rfl:     pravastatin (PRAVACHOL) 20 MG tablet, Take 1 tablet (20 mg total) by mouth once daily., Disp: 90 tablet, Rfl: 3    sulfacetamide sodium-sulfur 10-5 % (w/w) Clsr, Use to wash face daily, Disp: 170 g, Rfl: 5    tadalafiL (CIALIS) 5 MG tablet, Take 1 tablet (5 mg total) by mouth daily as needed for Erectile Dysfunction., Disp: 90 tablet, Rfl: 0    testosterone cypionate (DEPOTESTOTERONE CYPIONATE) 200 mg/mL injection, Inject 60 mg into the muscle every 14 (fourteen) days. Twice a week, Disp: , Rfl:     triamcinolone acetonide 0.025% (KENALOG) 0.025 % Oint, Thin film to lips and eczematous plaques BID PRN flare, Disp: 15 g, Rfl: 1    triamcinolone acetonide 0.1% (KENALOG) 0.1 % cream, AAA bid, Disp: 454 g, Rfl: 3    zinc gluconate 50 mg tablet, Take 50 mg by mouth once daily., Disp: , Rfl:     ciclopirox (PENLAC) 8 % Soln, Apply topically nightly., Disp: 6.6 mL, Rfl: 2    cyanocobalamin, vitamin B-12, 1,000 mcg Subl, Place 1 lozenge under the tongue once daily., Disp: 100 tablet, Rfl: 3    vitamin A 8000 UNIT capsule, Take 1 capsule (8,000 Units total) by mouth once daily., Disp: 100 capsule, Rfl: 3        Review of Systems   Constitutional: Negative for fever, chills and fatigue.   HENT: Negative for nosebleeds and headaches.    Respiratory: Negative for cough and shortness of breath.    Gastrointestinal: Negative for nausea, vomiting, abdominal pain and diarrhea.   Musculoskeletal: Negative for myalgias and arthralgias.   Skin: Positive for dry skin, daily sunscreen use, activity-related sunscreen use, dry lips and wears hat. Negative for itching, rash, sun sensitivity and recent sunburn.   Neurological: Negative for headaches.   Psychiatric/Behavioral: Negative for depressed mood. "   Hematologic/Lymphatic: Does not bruise/bleed easily.        Objective:    Physical Exam   Constitutional: He appears well-developed and well-nourished. No distress.   Neurological: He is alert and oriented to person, place, and time. He is not disoriented.   Psychiatric: He has a normal mood and affect.   Skin:   Areas Examined (abnormalities noted in diagram):   Head / Face Inspection Performed  Neck Inspection Performed  Back Inspection Performed                       Diagram Legend     Erythematous scaling macule/papule c/w actinic keratosis       Vascular papule c/w angioma      Pigmented verrucoid papule/plaque c/w seborrheic keratosis      Yellow umbilicated papule c/w sebaceous hyperplasia      Irregularly shaped tan macule c/w lentigo     1-2 mm smooth white papules consistent with Milia      Movable subcutaneous cyst with punctum c/w epidermal inclusion cyst      Subcutaneous movable cyst c/w pilar cyst      Firm pink to brown papule c/w dermatofibroma      Pedunculated fleshy papule(s) c/w skin tag(s)      Evenly pigmented macule c/w junctional nevus     Mildly variegated pigmented, slightly irregular-bordered macule c/w mildly atypical nevus      Flesh colored to evenly pigmented papule c/w intradermal nevus       Pink pearly papule/plaque c/w basal cell carcinoma      Erythematous hyperkeratotic cursted plaque c/w SCC      Surgical scar with no sign of skin cancer recurrence      Open and closed comedones      Inflammatory papules and pustules      Verrucoid papule consistent consistent with wart     Erythematous eczematous patches and plaques     Dystrophic onycholytic nail with subungual debris c/w onychomycosis     Umbilicated papule    Erythematous-base heme-crusted tan verrucoid plaque consistent with inflamed seborrheic keratosis     Erythematous Silvery Scaling Plaque c/w Psoriasis     See annotation      Assessment / Plan:        Therapeutic drug monitoring  FLP and LFTs ordered, needs to draw  in coming week    Acne rosacea  -     ISOtretinoin (ACCUTANE) 30 MG capsule; One cap PO BID  Dispense: 60 capsule; Refill: 0    Pityrosporum folliculitis  -     ISOtretinoin (ACCUTANE) 30 MG capsule; One cap PO BID  Dispense: 60 capsule; Refill: 0    both improving nicely, not quite at goal  Completed total of 5 months therapy 30mg dailly, continue     Tolerating well, no mood disturbance  Continue 30mg daily (pattient is 113kg)  May take up to 60mg daily  Low and slow is the plan for this severe stubborn acne / rosacea patient  P follic clearing nicely  Continue strict sun protection         No follow-ups on file.

## 2022-07-07 ENCOUNTER — TELEPHONE (OUTPATIENT)
Dept: ENDOCRINOLOGY | Facility: CLINIC | Age: 52
End: 2022-07-07

## 2022-07-07 NOTE — TELEPHONE ENCOUNTER
----- Message from Britany Dunn sent at 7/7/2022 10:27 AM CDT -----  Contact: pt wife  Type: Needs Medical Advice    Who Called: pt wife  Best Call Back Number: [phone]  Inquiry/Question: pt wife called to schedule ultrasound and another scan I do not see orders in chart to be able to schedule at this time, please advise pt once orders have been placed so that we can schedule,  Thank you~

## 2022-07-08 DIAGNOSIS — R16.1 SPLENOMEGALY: ICD-10-CM

## 2022-07-08 DIAGNOSIS — K76.0 NONALCOHOLIC FATTY LIVER DISEASE: Primary | ICD-10-CM

## 2022-07-11 ENCOUNTER — LAB VISIT (OUTPATIENT)
Dept: LAB | Facility: HOSPITAL | Age: 52
End: 2022-07-11
Attending: DERMATOLOGY
Payer: COMMERCIAL

## 2022-07-11 ENCOUNTER — HOSPITAL ENCOUNTER (OUTPATIENT)
Dept: RADIOLOGY | Facility: HOSPITAL | Age: 52
Discharge: HOME | End: 2022-07-11
Attending: INTERNAL MEDICINE
Payer: COMMERCIAL

## 2022-07-11 DIAGNOSIS — L93.1 SUBACUTE CUTANEOUS LUPUS ERYTHEMATOSUS: ICD-10-CM

## 2022-07-11 DIAGNOSIS — E83.39 HYPOPHOSPHATEMIA: ICD-10-CM

## 2022-07-11 DIAGNOSIS — K76.0 NONALCOHOLIC FATTY LIVER DISEASE: ICD-10-CM

## 2022-07-11 DIAGNOSIS — Z51.81 THERAPEUTIC DRUG MONITORING: ICD-10-CM

## 2022-07-11 DIAGNOSIS — R16.1 SPLENOMEGALY: ICD-10-CM

## 2022-07-11 PROCEDURE — 76705 US ABDOMEN LIMITED: ICD-10-PCS | Mod: 26,,, | Performed by: RADIOLOGY

## 2022-07-11 PROCEDURE — 76705 ECHO EXAM OF ABDOMEN: CPT | Mod: TC

## 2022-07-11 PROCEDURE — 76705 ECHO EXAM OF ABDOMEN: CPT | Mod: 26,,, | Performed by: RADIOLOGY

## 2022-07-11 PROCEDURE — 84100 ASSAY OF PHOSPHORUS: CPT | Performed by: INTERNAL MEDICINE

## 2022-07-11 PROCEDURE — 80076 HEPATIC FUNCTION PANEL: CPT | Performed by: DERMATOLOGY

## 2022-07-11 PROCEDURE — 86038 ANTINUCLEAR ANTIBODIES: CPT | Performed by: DERMATOLOGY

## 2022-07-11 PROCEDURE — 36415 COLL VENOUS BLD VENIPUNCTURE: CPT | Mod: PO | Performed by: DERMATOLOGY

## 2022-07-11 PROCEDURE — 86235 NUCLEAR ANTIGEN ANTIBODY: CPT | Performed by: DERMATOLOGY

## 2022-07-11 PROCEDURE — 80061 LIPID PANEL: CPT | Performed by: DERMATOLOGY

## 2022-07-12 LAB
ALBUMIN SERPL BCP-MCNC: 4.2 G/DL (ref 3.5–5.2)
ALP SERPL-CCNC: 50 U/L (ref 55–135)
ALT SERPL W/O P-5'-P-CCNC: 26 U/L (ref 10–44)
ANA SER QL IF: NORMAL
ANTI-SSA ANTIBODY: 0.05 RATIO (ref 0–0.99)
ANTI-SSA INTERPRETATION: NEGATIVE
AST SERPL-CCNC: 22 U/L (ref 10–40)
BILIRUB DIRECT SERPL-MCNC: 0.4 MG/DL (ref 0.1–0.3)
BILIRUB SERPL-MCNC: 1.2 MG/DL (ref 0.1–1)
CHOLEST SERPL-MCNC: 171 MG/DL (ref 120–199)
CHOLEST/HDLC SERPL: 3.9 {RATIO} (ref 2–5)
HDLC SERPL-MCNC: 44 MG/DL (ref 40–75)
HDLC SERPL: 25.7 % (ref 20–50)
LDLC SERPL CALC-MCNC: 113.4 MG/DL (ref 63–159)
NONHDLC SERPL-MCNC: 127 MG/DL
PHOSPHATE SERPL-MCNC: 3.5 MG/DL (ref 2.7–4.5)
PROT SERPL-MCNC: 7.1 G/DL (ref 6–8.4)
TRIGL SERPL-MCNC: 68 MG/DL (ref 30–150)

## 2022-07-25 ENCOUNTER — LAB VISIT (OUTPATIENT)
Dept: LAB | Facility: HOSPITAL | Age: 52
End: 2022-07-25
Attending: INTERNAL MEDICINE
Payer: COMMERCIAL

## 2022-07-25 DIAGNOSIS — E55.9 HYPOVITAMINOSIS D: ICD-10-CM

## 2022-07-25 DIAGNOSIS — Z80.42 FAMILY HX OF PROSTATE CANCER: ICD-10-CM

## 2022-07-25 DIAGNOSIS — R79.89 LOW TESTOSTERONE IN MALE: ICD-10-CM

## 2022-07-25 DIAGNOSIS — E11.65 TYPE 2 DIABETES MELLITUS WITH HYPERGLYCEMIA, WITHOUT LONG-TERM CURRENT USE OF INSULIN: ICD-10-CM

## 2022-07-25 DIAGNOSIS — K76.0 NONALCOHOLIC FATTY LIVER DISEASE: ICD-10-CM

## 2022-07-25 DIAGNOSIS — E29.1 HYPOGONADISM IN MALE: ICD-10-CM

## 2022-07-25 DIAGNOSIS — N40.0 BENIGN PROSTATIC HYPERPLASIA, UNSPECIFIED WHETHER LOWER URINARY TRACT SYMPTOMS PRESENT: ICD-10-CM

## 2022-07-25 DIAGNOSIS — R94.6 THYROID FUNCTION TEST ABNORMAL: ICD-10-CM

## 2022-07-25 LAB
25(OH)D3+25(OH)D2 SERPL-MCNC: 34 NG/ML (ref 30–96)
CALCIUM SERPL-MCNC: 9.3 MG/DL (ref 8.7–10.5)
CHOLEST SERPL-MCNC: 159 MG/DL (ref 120–199)
CHOLEST/HDLC SERPL: 4 {RATIO} (ref 2–5)
ESTIMATED AVG GLUCOSE: 126 MG/DL (ref 68–131)
ESTRADIOL SERPL-MCNC: 29 PG/ML (ref 11–44)
FSH SERPL-ACNC: 0.51 MIU/ML (ref 0.95–11.95)
HBA1C MFR BLD: 6 % (ref 4–5.6)
HDLC SERPL-MCNC: 40 MG/DL (ref 40–75)
HDLC SERPL: 25.2 % (ref 20–50)
LDLC SERPL CALC-MCNC: 89.4 MG/DL (ref 63–159)
LH SERPL-ACNC: <0.2 MIU/ML (ref 0.6–12.1)
LIPASE SERPL-CCNC: 27 U/L (ref 4–60)
NONHDLC SERPL-MCNC: 119 MG/DL
PROGEST SERPL-MCNC: 0.1 NG/ML
PROLACTIN SERPL IA-MCNC: 11.6 NG/ML (ref 3.5–19.4)
PTH-INTACT SERPL-MCNC: 53.4 PG/ML (ref 9–77)
TRIGL SERPL-MCNC: 148 MG/DL (ref 30–150)
TSH SERPL DL<=0.005 MIU/L-ACNC: 1.32 UIU/ML (ref 0.4–4)
URATE SERPL-MCNC: 6.4 MG/DL (ref 3.4–7)

## 2022-07-25 PROCEDURE — 82642 DIHYDROTESTOSTERONE: CPT | Performed by: INTERNAL MEDICINE

## 2022-07-25 PROCEDURE — 80061 LIPID PANEL: CPT | Performed by: INTERNAL MEDICINE

## 2022-07-25 PROCEDURE — 82985 ASSAY OF GLYCATED PROTEIN: CPT | Performed by: INTERNAL MEDICINE

## 2022-07-25 PROCEDURE — 82308 ASSAY OF CALCITONIN: CPT | Performed by: INTERNAL MEDICINE

## 2022-07-25 PROCEDURE — 82040 ASSAY OF SERUM ALBUMIN: CPT | Performed by: INTERNAL MEDICINE

## 2022-07-25 PROCEDURE — 82670 ASSAY OF TOTAL ESTRADIOL: CPT | Performed by: INTERNAL MEDICINE

## 2022-07-25 PROCEDURE — 82310 ASSAY OF CALCIUM: CPT | Performed by: INTERNAL MEDICINE

## 2022-07-25 PROCEDURE — 84144 ASSAY OF PROGESTERONE: CPT | Performed by: INTERNAL MEDICINE

## 2022-07-25 PROCEDURE — 84146 ASSAY OF PROLACTIN: CPT | Performed by: INTERNAL MEDICINE

## 2022-07-25 PROCEDURE — 83001 ASSAY OF GONADOTROPIN (FSH): CPT | Performed by: INTERNAL MEDICINE

## 2022-07-25 PROCEDURE — 82306 VITAMIN D 25 HYDROXY: CPT | Performed by: INTERNAL MEDICINE

## 2022-07-25 PROCEDURE — 83002 ASSAY OF GONADOTROPIN (LH): CPT | Performed by: INTERNAL MEDICINE

## 2022-07-25 PROCEDURE — 83690 ASSAY OF LIPASE: CPT | Performed by: INTERNAL MEDICINE

## 2022-07-25 PROCEDURE — 84153 ASSAY OF PSA TOTAL: CPT | Performed by: INTERNAL MEDICINE

## 2022-07-25 PROCEDURE — 82672 ASSAY OF ESTROGEN: CPT | Performed by: INTERNAL MEDICINE

## 2022-07-25 PROCEDURE — 84154 ASSAY OF PSA FREE: CPT | Performed by: INTERNAL MEDICINE

## 2022-07-25 PROCEDURE — 83036 HEMOGLOBIN GLYCOSYLATED A1C: CPT | Performed by: INTERNAL MEDICINE

## 2022-07-25 PROCEDURE — 84550 ASSAY OF BLOOD/URIC ACID: CPT | Performed by: INTERNAL MEDICINE

## 2022-07-25 PROCEDURE — 84443 ASSAY THYROID STIM HORMONE: CPT | Performed by: INTERNAL MEDICINE

## 2022-07-25 PROCEDURE — 84378 SUGARS SINGLE QUANT: CPT | Performed by: INTERNAL MEDICINE

## 2022-07-25 PROCEDURE — 82330 ASSAY OF CALCIUM: CPT | Performed by: INTERNAL MEDICINE

## 2022-07-25 PROCEDURE — 83970 ASSAY OF PARATHORMONE: CPT | Performed by: INTERNAL MEDICINE

## 2022-07-26 LAB
CA-I BLDV-SCNC: 1.29 MMOL/L (ref 1.06–1.42)
PROSTATE SPECIFIC ANTIGEN, TOTAL: 0.4 NG/ML (ref 0–4)
PSA FREE MFR SERPL: 50 %
PSA FREE SERPL-MCNC: 0.2 NG/ML (ref 0–1.5)

## 2022-07-27 LAB — CALCIT SERPL-MCNC: 6.4 PG/ML

## 2022-07-28 LAB — ESTROGEN SERPL-MCNC: 202 PG/ML

## 2022-07-29 LAB
FRUCTOSAMINE SERPL-SCNC: 297 UMOL /L (ref 151–300)
GLYCOMARK (TM): 1.5 UG/ML

## 2022-08-01 LAB
ALBUMIN SERPL-MCNC: 4.1 G/DL (ref 3.6–5.1)
SHBG SERPL-SCNC: 30 NMOL/L (ref 10–50)
TESTOST FREE SERPL-MCNC: 107.8 PG/ML (ref 46–224)
TESTOST SERPL-MCNC: 674 NG/DL (ref 250–1100)
TESTOSTERONE.FREE+WB SERPL-MCNC: 203 NG/DL (ref 110–575)

## 2022-08-02 LAB — ANDROSTANOLONE SERPL-MCNC: 491 PG/ML (ref 112–955)

## 2022-08-24 ENCOUNTER — PATIENT MESSAGE (OUTPATIENT)
Dept: ADMINISTRATIVE | Facility: HOSPITAL | Age: 52
End: 2022-08-24
Payer: COMMERCIAL

## 2022-08-24 ENCOUNTER — PATIENT OUTREACH (OUTPATIENT)
Dept: ADMINISTRATIVE | Facility: HOSPITAL | Age: 52
End: 2022-08-24
Payer: COMMERCIAL

## 2022-08-24 NOTE — LETTER
AUTHORIZATION FOR RELEASE OF   CONFIDENTIAL INFORMATION    Dear Dr. Shay,    We are seeing Lyndon Lion Jr., date of birth 1970, in the clinic at [facility]. Evan Judd MD is the patient's PCP. Lyndon Lion Jr. has an outstanding lab/procedure at the time we reviewed his chart. In order to help keep his health information updated, he has authorized us to request the following medical record(s):        (  )  MAMMOGRAM                                      (  )  COLONOSCOPY      (  )  PAP SMEAR                                          (  )  OUTSIDE LAB RESULTS     (  )  DEXA SCAN                                          (X) EYE EXAM- most recent            (  )  FOOT EXAM                                          (  )  ENTIRE RECORD     (  )  OUTSIDE IMMUNIZATIONS                 (  )  _______________         Please fax records to [facility], Raymond Baez, MD, [phone]    Fabian Hester  [phone]          Patient Name: Lyndon Lion Jr.  : 1970  Patient Phone #: [phone]

## 2022-09-01 ENCOUNTER — PATIENT OUTREACH (OUTPATIENT)
Dept: ADMINISTRATIVE | Facility: HOSPITAL | Age: 52
End: 2022-09-01
Payer: COMMERCIAL

## 2022-09-19 ENCOUNTER — OFFICE VISIT (OUTPATIENT)
Dept: FAMILY MEDICINE | Facility: CLINIC | Age: 52
End: 2022-09-19
Payer: COMMERCIAL

## 2022-09-19 VITALS
RESPIRATION RATE: 17 BRPM | TEMPERATURE: 98 F | BODY MASS INDEX: 34.46 KG/M2 | HEART RATE: 65 BPM | HEIGHT: 74 IN | SYSTOLIC BLOOD PRESSURE: 120 MMHG | DIASTOLIC BLOOD PRESSURE: 76 MMHG | OXYGEN SATURATION: 97 % | WEIGHT: 268.5 LBS

## 2022-09-19 DIAGNOSIS — E29.1 HYPOGONADISM MALE: ICD-10-CM

## 2022-09-19 DIAGNOSIS — B35.1 TOENAIL FUNGUS: ICD-10-CM

## 2022-09-19 DIAGNOSIS — E53.8 VITAMIN B12 DEFICIENCY: ICD-10-CM

## 2022-09-19 DIAGNOSIS — E66.9 OBESITY (BMI 30-39.9): ICD-10-CM

## 2022-09-19 DIAGNOSIS — M54.16 LUMBAR RADICULOPATHY: ICD-10-CM

## 2022-09-19 DIAGNOSIS — E11.9 CONTROLLED TYPE 2 DIABETES MELLITUS WITHOUT COMPLICATION, WITHOUT LONG-TERM CURRENT USE OF INSULIN: Primary | ICD-10-CM

## 2022-09-19 DIAGNOSIS — Z23 NEED FOR PNEUMOCOCCAL VACCINATION: ICD-10-CM

## 2022-09-19 PROCEDURE — 90677 PCV20 VACCINE IM: CPT | Mod: S$GLB,,, | Performed by: FAMILY MEDICINE

## 2022-09-19 PROCEDURE — 90471 PNEUMOCOCCAL CONJUGATE VACCINE 20-VALENT: ICD-10-PCS | Mod: 59,S$GLB,, | Performed by: FAMILY MEDICINE

## 2022-09-19 PROCEDURE — 99213 OFFICE O/P EST LOW 20 MIN: CPT | Mod: 25,S$GLB,, | Performed by: FAMILY MEDICINE

## 2022-09-19 PROCEDURE — 90677 PNEUMOCOCCAL CONJUGATE VACCINE 20-VALENT: ICD-10-PCS | Mod: S$GLB,,, | Performed by: FAMILY MEDICINE

## 2022-09-19 PROCEDURE — 96372 PR INJECTION,THERAP/PROPH/DIAG2ST, IM OR SUBCUT: ICD-10-PCS | Mod: S$GLB,,, | Performed by: FAMILY MEDICINE

## 2022-09-19 PROCEDURE — 96372 THER/PROPH/DIAG INJ SC/IM: CPT | Mod: S$GLB,,, | Performed by: FAMILY MEDICINE

## 2022-09-19 PROCEDURE — 99999 PR PBB SHADOW E&M-EST. PATIENT-LVL V: CPT | Mod: PBBFAC,,, | Performed by: FAMILY MEDICINE

## 2022-09-19 PROCEDURE — 99213 PR OFFICE/OUTPT VISIT, EST, LEVL III, 20-29 MIN: ICD-10-PCS | Mod: 25,S$GLB,, | Performed by: FAMILY MEDICINE

## 2022-09-19 PROCEDURE — 90471 IMMUNIZATION ADMIN: CPT | Mod: 59,S$GLB,, | Performed by: FAMILY MEDICINE

## 2022-09-19 PROCEDURE — 99999 PR PBB SHADOW E&M-EST. PATIENT-LVL V: ICD-10-PCS | Mod: PBBFAC,,, | Performed by: FAMILY MEDICINE

## 2022-09-19 RX ORDER — MAGNESIUM 200 MG
1 TABLET ORAL DAILY
Qty: 100 TABLET | Refills: 3 | Status: CANCELLED | OUTPATIENT
Start: 2022-09-19 | End: 2022-12-18

## 2022-09-19 RX ORDER — TADALAFIL 5 MG/1
5 TABLET ORAL DAILY PRN
Qty: 90 TABLET | Refills: 0 | Status: SHIPPED | OUTPATIENT
Start: 2022-09-19 | End: 2023-03-17 | Stop reason: SDUPTHER

## 2022-09-19 RX ORDER — CICLOPIROX 80 MG/ML
SOLUTION TOPICAL NIGHTLY
Qty: 6.6 ML | Refills: 2 | Status: SHIPPED | OUTPATIENT
Start: 2022-09-19 | End: 2023-03-17 | Stop reason: SDUPTHER

## 2022-09-19 RX ORDER — CYANOCOBALAMIN 1000 UG/ML
1000 INJECTION, SOLUTION INTRAMUSCULAR; SUBCUTANEOUS
Status: COMPLETED | OUTPATIENT
Start: 2022-09-19 | End: 2022-09-19

## 2022-09-19 RX ORDER — METFORMIN HYDROCHLORIDE 500 MG/1
500 TABLET, EXTENDED RELEASE ORAL 2 TIMES DAILY WITH MEALS
Qty: 180 TABLET | Refills: 3 | Status: SHIPPED | OUTPATIENT
Start: 2022-09-19 | End: 2023-10-09

## 2022-09-19 RX ORDER — LIRAGLUTIDE 6 MG/ML
1.8 INJECTION SUBCUTANEOUS DAILY
Qty: 27 ML | Refills: 3 | Status: SHIPPED | OUTPATIENT
Start: 2022-09-19 | End: 2023-02-09

## 2022-09-19 RX ORDER — THIAMINE HCL 100 MG
2 TABLET ORAL DAILY
Qty: 180 LOZENGE | Refills: 3 | Status: SHIPPED | OUTPATIENT
Start: 2022-09-19

## 2022-09-19 RX ADMIN — CYANOCOBALAMIN 1000 MCG: 1000 INJECTION, SOLUTION INTRAMUSCULAR; SUBCUTANEOUS at 09:09

## 2022-09-19 NOTE — PROGRESS NOTES
[facility]     Subjective:    Chief Complaint:   Chief Complaint   Patient presents with    Follow-up    Annual Exam       History of Present Illness:  52 y.o. male presents for multiple issues.   Encounter Diagnoses   Name Primary?    Controlled type 2 diabetes mellitus without complication, without long-term current use of insulin Yes  No polyuria, polydipsia, blurry vision, chest pain, dyspnea or claudication.  No foot burning, numbness or pain. Taking medication compliantly without noted sided effects. Follows diet fairly well. Home glucose monitoring in the range of 130.  Has seen diabetic educator. Last eye exam 1within the year was reportedly negative. Last foot exam negative within the past year.      Toenail fungus     Vitamin B12 deficiency General weakness, concentration    Lumbar radiculopathy     Obesity (BMI 30-39.9)     Hypogonadism male Testosterone daily 600 last level 4 days after last dose.    Need for pneumococcal vaccination              I reviewed the patients chart dating back for the past few appointments. See above.    The following portions of the patient's history were reviewed and updated as appropriate: allergies, current medications, past family history, past medical history, past social history, past surgical history and problem list.    He denies chest pain upon exertion, dyspnea, nausea, vomiting, diaphoresis, and syncope. No pleuritic chest pain, unilateral leg swelling, calf tenderness, or calf pain.      Past Medical History:   Diagnosis Date    Arthritis     Asthma     AS A CHILD    Colon cancer     Family hx of prostate cancer 11/22/2016    Hx of colon cancer, stage I 07/03/2014    Parada syndrome     Tear of labium 10/2020    left shoulder Dr Molina    Uncontrolled type 2 diabetes mellitus with hyperglycemia 03/04/2021    Vitamin D deficiency     Wears glasses        Past Surgical History:   Procedure Laterality Date    APPENDECTOMY      CAUDAL EPIDURAL STEROID INJECTION  N/A 11/6/2018    Procedure: Injection-steroid-epidural-caudal;  Surgeon: Lyndon Brooke Jr., MD;  Location: [facility] OR;  Service: Pain Management;  Laterality: N/A;    COLON SURGERY  2008    PARTIAL COLECTOMY    COLONOSCOPY Bilateral 2012    COLONOSCOPY N/A 8/1/2016    Procedure: COLONOSCOPY;  Surgeon: Blaze Marr MD;  Location: [facility] ENDO;  Service: Endoscopy;  Laterality: N/A;    COLONOSCOPY N/A 9/20/2017    Procedure: COLONOSCOPY;  Surgeon: Dom Leonardo MD;  Location: [facility] (4TH FLR);  Service: Endoscopy;  Laterality: N/A;  not given PM prep    COLONOSCOPY N/A 10/9/2017    Procedure: COLONOSCOPY;  Surgeon: RAMON Callahan MD;  Location: [facility] (4TH FLR);  Service: Endoscopy;  Laterality: N/A;  ok for Prepopik per Dr Callahan    COLONOSCOPY N/A 11/20/2017    Procedure: COLONOSCOPY/EMR;  Surgeon: Joseluis Choe MD;  Location: [facility] (2ND FLR);  Service: Endoscopy;  Laterality: N/A;  EMR    no pm prep    COLONOSCOPY N/A 5/30/2018    Procedure: COLONOSCOPY;  Surgeon: Dom Leonardo MD;  Location: [facility] (4TH FLR);  Service: Endoscopy;  Laterality: N/A;  follow up colonoscopy in 5/2018 for Parada Syndrome         COLONOSCOPY N/A 6/10/2019    Procedure: COLONOSCOPY;  Surgeon: Dom Leonardo MD;  Location: [facility] (4TH FLR);  Service: Endoscopy;  Laterality: N/A;  Prepopik ordered per Dr. Leonardo    COLONOSCOPY N/A 7/22/2020    Procedure: COLONOSCOPY;  Surgeon: Dom Leonardo MD;  Location: [facility] (4TH FLR);  Service: Endoscopy;  Laterality: N/A;    COLONOSCOPY N/A 5/27/2021    Procedure: COLONOSCOPY;  Surgeon: Dom Leonardo MD;  Location: [facility] (4TH FLR);  Service: Endoscopy;  Laterality: N/A;  covid test 5/24-[place]  pt requests Prepopik    COLONOSCOPY N/A 6/17/2022    Procedure: COLONOSCOPY;  Surgeon: Dom Leonardo MD;  Location: [facility] (4TH FLR);  Service: Endoscopy;  Laterality: N/A;  He was discovered to have colon cancer and had right hemicolectomy        for  stage II on 08/08/2008. MSH2 Parada syndrome.  sutab requested  instructions via the portal -sm  fully vaccinated - sm    CYSTOSCOPY      Epidural Steroid Injection  10/23/15    Lumbar    EPIDURAL STEROID INJECTION INTO LUMBAR SPINE N/A 7/27/2018    Procedure: Injection-steroid-epidural-lumbar;  Surgeon: Lyndon Brooke Jr., MD;  Location: [facility] OR;  Service: Pain Management;  Laterality: N/A;    ESOPHAGOGASTRODUODENOSCOPY      ESOPHAGOGASTRODUODENOSCOPY N/A 7/22/2020    Procedure: EGD (ESOPHAGOGASTRODUODENOSCOPY);  Surgeon: Dom Leonardo MD;  Location: [address]);  Service: Endoscopy;  Laterality: N/A;  Please schedule patient for EGD and colonoscopy with me MSH2 Parada syndrome and anemia evaluation please make priority 1  covid test 7/20-[place]    FACETECTOMY OF VERTEBRA  2/13/2019    Procedure: MEDIAL FACETECTOMY L5-S1;  Surgeon: Lyndon Brooke Jr., MD;  Location: [facility] OR;  Service: Orthopedics;;    GASTRIC BYPASS  2010    SLEEVE    KNEE ARTHROSCOPY Right     LUMBAR DISCECTOMY  2/13/2019    Procedure: DISCECTOMY, SPINE, LUMBAR L5-S1;  Surgeon: Lyndon Brooke Jr., MD;  Location: [facility] OR;  Service: Orthopedics;;       Social History  Social History     Tobacco Use    Smoking status: Never    Smokeless tobacco: Never   Substance Use Topics    Alcohol use: Yes     Comment: RARELY    Drug use: No       Family History   Problem Relation Age of Onset    Melanoma Mother     Cancer Mother     Breast cancer Mother     Heart disease Father     Diabetes Father     Liver disease Father     Hearing loss Father     Cancer Maternal Uncle         colon    Colon cancer Maternal Uncle         x2    Heart disease Maternal Uncle     Hypertension Maternal Uncle     Dementia Maternal Grandmother     Cancer Maternal Grandfather         colon ca    Colon cancer Maternal Grandfather     Early death Maternal Grandfather     No Known Problems Sister     Polycystic ovary syndrome Daughter     No Known Problems Paternal  "Aunt     Alcohol abuse Paternal Uncle     Prostate cancer Paternal Uncle     Cancer Paternal Uncle     Diabetes Paternal Grandmother     Hypertension Paternal Grandfather     Prostatitis Paternal Grandfather     Psoriasis Neg Hx     Lupus Neg Hx     Eczema Neg Hx      Review of patient's allergies indicates:  No Known Allergies    Review of Systems [Negative unless checked off]    General ROS: []fever, []chills, []weight loss, []malaise/fatigue.  ENT ROS: []congestion, []rhinorrhea,  []sore throat, []neck pain, []hearing loss.  Ophthalmic ROS:[]blurry vision, [] double vision, []photophobia, []eye pain.  Respiratory ROS: []cough, []pleuritic chest pain, []shortness of breath, []wheezing.  CVS ROS:[]chest pain, []dyspnea on exertion, []palpitations, []orthopnea, []leg swelling, []PND.   GI ROS: []nausea, []vomiting, [] epigastric pain, []abd pain, []diarrhea, []constipation, []blood/melena in stool.   Urology ROS:[]dysuria, []frequency, []flank pain,[] trouble voiding, [] hematuria.   MSK ROS: []myalgias, []joint pain, []muscular weakness,  []back pain, [] falls.   Derm ROS: []pruritis, []rash, []jaundice.  Neurological:[]dizziness,[x]numbness,[]loss of consciousness, []weakness  []headaches.   Psych ROS: []hallucinations, []depression, []anxiety, []suicidal ideation.    Physical Examination  /76 (BP Location: Right arm, Patient Position: Sitting, BP Method: Large (Manual))   Pulse 65   Temp 97.8 °F (36.6 °C) (Oral)   Resp 17   Ht 6' 2" (1.88 m)   Wt 121.8 kg (268 lb 8.3 oz)   SpO2 97%   BMI 34.48 kg/m²   Wt Readings from Last 3 Encounters:   09/19/22 121.8 kg (268 lb 8.3 oz)   07/05/22 116.6 kg (257 lb)   06/28/22 116.6 kg (257 lb 0.9 oz)     BP Readings from Last 3 Encounters:   09/19/22 120/76   06/28/22 125/81   06/17/22 115/72     Estimated body mass index is 34.48 kg/m² as calculated from the following:    Height as of this encounter: 6' 2" (1.88 m).    Weight as of this encounter: 121.8 kg (268 lb " 8.3 oz).     General appearance: alert, cooperative, no distress  Eyes: pupils equal and reactive, extraocular eye movements intact   Ears: right ear normal left ear normal  Nose: normal and patent, no erythema, discharge or polyps   Sinuses: Normal paranasal sinuses without tenderness   Throat: mucous membranes moist, pharynx normal without lesions   Neck: no thyromegaly, trachea midline  Lungs: clear to auscultation, no wheezes, rales or rhonchi, symmetric air entry, no dullness to percussion bilaterally.  Heart: normal rate, regular rhythm, normal S1, S2, no murmurs, rubs, clicks or gallops, no displacement of the PMI.  Abdomen: soft, nontender, nondistended, no masses or organomegaly No rigidity, rebound, or guarding.   Back: full range of motion, no tenderness, palpable spasm or pain on motion   Extremities: peripheral pulses normal, no pedal edema, no clubbing or cyanosis   Feet: warm, good capillary refill.  Neurological:alert, oriented, normal speech, no focal findings or movement disorder noted   Psychiatric: alert, oriented to person, place, and time  Integument: normal coloration and turgor, no rashes, no suspicious skin lesions noted.    Data reviewed    Previous medical records reviewed and summarized above in HPI. { Click here to CRISTIANA ALL REVIEWED          :[id number]}274}    Laboratory    I have reviewed old labs below:   274}  Lab Results   Component Value Date    WBC 4.01 01/24/2022    HGB 16.1 01/24/2022    HCT 47.4 01/24/2022    MCV 88 01/24/2022     01/24/2022     01/24/2022    K 4.3 01/24/2022     01/24/2022    CALCIUM 9.3 07/25/2022    PHOS 3.5 07/11/2022    CO2 21 (L) 01/24/2022    GLU 91 01/24/2022    BUN 13 01/24/2022    CREATININE 1.2 01/24/2022    ANIONGAP 10 01/24/2022    ESTGFRAFRICA >60.0 01/24/2022    EGFRNONAA >60.0 01/24/2022    PROT 7.1 07/11/2022    ALBUMIN 4.1 07/25/2022    BILITOT 1.2 (H) 07/11/2022    ALKPHOS 50 (L) 07/11/2022    ALT 26 07/11/2022    AST 22  07/11/2022    INR 1.0 04/23/2021    CHOL 159 07/25/2022    TRIG 148 07/25/2022    HDL 40 07/25/2022    LDLCALC 89.4 07/25/2022    TSH 1.318 07/25/2022    PSA 0.45 03/19/2019    HGBA1C 6.0 (H) 07/25/2022   Toenal gnugucm,    Imaging/Tracing: I have reviewed the pertinent results/findings and my personal findings are below:  274}    Assessment/Plan     274}    Lyndon Lion Jr. is a 52 y.o. male who presents to clinic with:    1. Controlled type 2 diabetes mellitus without complication, without long-term current use of insulin    2. Toenail fungus    3. Vitamin B12 deficiency         Diagnostic impression remarks       1. Controlled type 2 diabetes mellitus without complication, without long-term current use of insulin    2. Toenail fungus    3. Vitamin B12 deficiency      Medication Monitoring: In today's visit, monitoring for drug toxicity was accomplished. Proper use of medications was discussed.     Counseling: Counseling included discussion regarding imaging findings, diagnosis, possibilities, treatment options, medications, risks, and benefits. He had many questions regarding the options and long-term effects. All questions were answered. He expressed understanding after counseling regarding the diagnosis and recommendations. He was capable and demonstrated competence with understanding of these options. Shared decision making was performed resulting in him choosing the current treatment plan.     He was counseled about the importance of healthy dietary habits as well as routine physical activity and exercise for better health outcomes. I also discussed the importance of cancer screening.     I also discussed the importance of close follow up to discuss labs, change or modify his medications if needed, monitor side effects, and further evaluation of medical problems.     Additional workup planned: see labs ordered below.    See below for labs and meds ordered with associated diagnosis      Medication List  "with Changes/Refills   Current Medications    ACCU-CHEK GUIDE TEST STRIPS STRP    USE TO TEST TWICE A DAY    AMANTADINE  MG TAB    Take 1 tablet by mouth 2 (two) times daily.    ASCORBIC ACID, VITAMIN C, (VITAMIN C) 100 MG TABLET    Take 100 mg by mouth once daily.    ASPIRIN (ECOTRIN) 81 MG EC TABLET    Take 81 mg by mouth once daily.    CICLOPIROX (PENLAC) 8 % SOLN    Apply topically nightly.    COD LIVER OIL ORAL    Take by mouth.    CYANOCOBALAMIN, VITAMIN B-12, 1,000 MCG SUBL    Place 1 lozenge under the tongue once daily.    ISOTRETINOIN (ACCUTANE) 30 MG CAPSULE    One cap PO BID    KETOCONAZOLE (NIZORAL) 2 % SHAMPOO    Wash torso with medicated shampoo at least 2x/week - let sit on skin at least 5 minutes prior to rinsing    LANCETS (ACCU-CHEK SOFTCLIX LANCETS) MISC    1 Units by Misc.(Non-Drug; Combo Route) route 2 (two) times a day.    LIRAGLUTIDE 0.6 MG/0.1 ML, 18 MG/3 ML, SUBQ PNIJ (VICTOZA 3-ELIZABETH) 0.6 MG/0.1 ML (18 MG/3 ML) PNIJ PEN    Inject 1.8 mg into the skin once daily. To begin @ 0.6mg daily for first 2 weeks then uptiitrate as tolerated.    METFORMIN (GLUCOPHAGE-XR) 500 MG ER 24HR TABLET    Take 1 tablet (500 mg total) by mouth 2 (two) times daily with meals.    METRONIDAZOLE 0.75% (METROCREAM) 0.75 % CREA    Apply 1 application topically 2 (two) times daily. Apply to affected area    MUPIROCIN (BACTROBAN) 2 % OINTMENT    Apply topically as needed.     NITROFURANTOIN, MACROCRYSTAL-MONOHYDRATE, (MACROBID) 100 MG CAPSULE    Take 100 mg by mouth 2 (two) times daily.    PEN NEEDLE, DIABETIC (PEN NEEDLE) 32 GAUGE X 5/32" NDLE    1 each by Misc.(Non-Drug; Combo Route) route once daily.    PHYTONADIONE, VITAMIN K1, (MEPHYTON) 5 MG TAB    Take 5 mg by mouth once.    PRAVASTATIN (PRAVACHOL) 20 MG TABLET    Take 1 tablet (20 mg total) by mouth once daily.    SULFACETAMIDE SODIUM-SULFUR 10-5 % (W/W) CLSR    Use to wash face daily    TADALAFIL (CIALIS) 5 MG TABLET    Take 1 tablet (5 mg total) by mouth " "daily as needed for Erectile Dysfunction.    TESTOSTERONE CYPIONATE (DEPOTESTOTERONE CYPIONATE) 200 MG/ML INJECTION    Inject 60 mg into the muscle every 14 (fourteen) days. Twice a week    TRIAMCINOLONE ACETONIDE 0.025% (KENALOG) 0.025 % OINT    Thin film to lips and eczematous plaques BID PRN flare    TRIAMCINOLONE ACETONIDE 0.1% (KENALOG) 0.1 % CREAM    AAA bid    VITAMIN A 8000 UNIT CAPSULE    Take 1 capsule (8,000 Units total) by mouth once daily.    ZINC GLUCONATE 50 MG TABLET    Take 50 mg by mouth once daily.     Modified Medications    No medications on file       Follow up in about 6 months (around 3/19/2023) for labs before next visit. for further workup and reassessment if labs and tests obtained are stable or sooner as needed. He was instructed to call the clinic or go to the emergency department if his symptoms do not improve, worsens, or if new symptoms develop. Patient knows to call any time if an emergency arises. Shared decision making occurred and he verbalized understanding in agreement with this plan.     Documentation entered by me for this encounter may have been done in part using speech-recognition technology. Although I have made an effort to ensure accuracy, "sound like" errors may exist and should be interpreted in context.       Evan Judd MD     Discussed health maintenance guidelines appropriate for age.    "

## 2022-09-19 NOTE — PROGRESS NOTES
Subjective:       Patient ID: Lyndon Lion Jr. is a 52 y.o. male.    Chief Complaint: No chief complaint on file.    HPI  Review of Systems      Objective:      Physical Exam    Assessment:       Problem List Items Addressed This Visit    None      Plan:       ***

## 2022-09-19 NOTE — LETTER
September 19, 2022      [facility]  [address]  [place] [address]  Phone: [phone]  Fax: [phone]       Patient: Lyndon Lion   YOB: 1970  Date of Visit: 09/19/2022    To Whom It May Concern:    Ting Lion  was at [facility] on 09/19/2022. The patient may return to work 09/20/2022 With no restrictions. If you have any questions or concerns, or if I can be of further assistance, please do not hesitate to contact me.    Sincerely,      jessica Pearson

## 2022-09-20 ENCOUNTER — OFFICE VISIT (OUTPATIENT)
Dept: DERMATOLOGY | Facility: CLINIC | Age: 52
End: 2022-09-20
Payer: COMMERCIAL

## 2022-09-20 VITALS — WEIGHT: 268 LBS | HEIGHT: 74 IN | BODY MASS INDEX: 34.39 KG/M2

## 2022-09-20 DIAGNOSIS — L71.9 ROSACEA: Primary | ICD-10-CM

## 2022-09-20 DIAGNOSIS — L73.8 PITYROSPORUM FOLLICULITIS: ICD-10-CM

## 2022-09-20 PROCEDURE — 99999 PR PBB SHADOW E&M-EST. PATIENT-LVL IV: CPT | Mod: PBBFAC,,, | Performed by: DERMATOLOGY

## 2022-09-20 PROCEDURE — 99999 PR PBB SHADOW E&M-EST. PATIENT-LVL IV: ICD-10-PCS | Mod: PBBFAC,,, | Performed by: DERMATOLOGY

## 2022-09-20 PROCEDURE — 99214 PR OFFICE/OUTPT VISIT, EST, LEVL IV, 30-39 MIN: ICD-10-PCS | Mod: S$GLB,,, | Performed by: DERMATOLOGY

## 2022-09-20 PROCEDURE — 99214 OFFICE O/P EST MOD 30 MIN: CPT | Mod: S$GLB,,, | Performed by: DERMATOLOGY

## 2022-09-20 RX ORDER — MINOCYCLINE 15 MG/G
1 AEROSOL, FOAM TOPICAL DAILY
Qty: 30 G | Refills: 5 | Status: SHIPPED | OUTPATIENT
Start: 2022-09-20 | End: 2023-05-01

## 2022-09-20 NOTE — PROGRESS NOTES
Subjective:       Patient ID:  Lyndon Lion Jr. is a 52 y.o. male who presents for   Chief Complaint   Patient presents with    Acne     LOV: 7/5/2022    LOV 05/5/22- cheilitis, acne rosacea     Here today for accutane follow up  Low dose 30mg daily for the past 7 months  Feels the medication is effective  Lips are splitting, using triamcinolone BID plus chap stick, no relief     Recent intense sun exposure (grass cutting) led to a flare on cheeks      Has no hx of NMSC  Has a fhx of MM-mom    Current Outpatient Medications:   ·  ACCU-CHEK GUIDE TEST STRIPS Strp, USE TO TEST TWICE A DAY, Disp: 200 strip, Rfl: 3  ·  ascorbic acid, vitamin C, (VITAMIN C) 100 MG tablet, Take 100 mg by mouth once daily., Disp: , Rfl:   ·  aspirin (ECOTRIN) 81 MG EC tablet, Take 81 mg by mouth once daily., Disp: , Rfl:   ·  ciclopirox (PENLAC) 8 % Soln, Apply topically nightly., Disp: 6.6 mL, Rfl: 2  ·  cyanocobalamin, vitamin B-12, 2,500 mcg Lozg, Place 2 tablets under the tongue once daily., Disp: 180 lozenge, Rfl: 3  ·  ISOtretinoin (ACCUTANE) 30 MG capsule, One cap PO BID, Disp: 60 capsule, Rfl: 0  ·  ketoconazole (NIZORAL) 2 % shampoo, Wash torso with medicated shampoo at least 2x/week - let sit on skin at least 5 minutes prior to rinsing, Disp: 120 mL, Rfl: 5  ·  lancets (ACCU-CHEK SOFTCLIX LANCETS) Misc, 1 Units by Misc.(Non-Drug; Combo Route) route 2 (two) times a day., Disp: 200 each, Rfl: 0  ·  liraglutide 0.6 mg/0.1 mL, 18 mg/3 mL, subq PNIJ (VICTOZA 3-ELIZABETH) 0.6 mg/0.1 mL (18 mg/3 mL) PnIj pen, Inject 1.8 mg into the skin once daily. To begin @ 0.6mg daily for first 2 weeks then uptiitrate as tolerated., Disp: 27 mL, Rfl: 3  ·  metFORMIN (GLUCOPHAGE-XR) 500 MG ER 24hr tablet, Take 1 tablet (500 mg total) by mouth 2 (two) times daily with meals., Disp: 180 tablet, Rfl: 3  ·  metronidazole 0.75% (METROCREAM) 0.75 % Crea, Apply 1 application topically 2 (two) times daily. Apply to affected area, Disp: 30 g, Rfl: 1  ·  pen  "needle, diabetic (PEN NEEDLE) 32 gauge x 5/32" Ndle, 1 each by Misc.(Non-Drug; Combo Route) route once daily., Disp: 90 each, Rfl: 3  ·  pravastatin (PRAVACHOL) 20 MG tablet, Take 1 tablet (20 mg total) by mouth once daily., Disp: 90 tablet, Rfl: 3  ·  sulfacetamide sodium-sulfur 10-5 % (w/w) Clsr, Use to wash face daily, Disp: 170 g, Rfl: 5  ·  tadalafiL (CIALIS) 5 MG tablet, Take 1 tablet (5 mg total) by mouth daily as needed for Erectile Dysfunction., Disp: 90 tablet, Rfl: 0  ·  testosterone cypionate (DEPOTESTOTERONE CYPIONATE) 200 mg/mL injection, Inject 60 mg into the muscle every 14 (fourteen) days. Twice a week, Disp: , Rfl:   ·  triamcinolone acetonide 0.025% (KENALOG) 0.025 % Oint, Thin film to lips and eczematous plaques BID PRN flare, Disp: 15 g, Rfl: 1  ·  triamcinolone acetonide 0.1% (KENALOG) 0.1 % cream, AAA bid, Disp: 454 g, Rfl: 3  ·  zinc gluconate 50 mg tablet, Take 50 mg by mouth once daily., Disp: , Rfl:   ·  vitamin A 8000 UNIT capsule, Take 1 capsule (8,000 Units total) by mouth once daily., Disp: 100 capsule, Rfl: 3      Review of Systems   Constitutional:  Negative for fever, chills and fatigue.   HENT:  Negative for nosebleeds and headaches.    Respiratory:  Negative for cough and shortness of breath.    Gastrointestinal:  Negative for nausea, vomiting, abdominal pain and diarrhea.   Musculoskeletal:  Negative for myalgias and arthralgias.   Skin:  Positive for dry skin, daily sunscreen use, activity-related sunscreen use, dry lips and wears hat. Negative for itching, rash, sun sensitivity and recent sunburn.   Neurological:  Negative for headaches.   Psychiatric/Behavioral:  Negative for depressed mood.    Hematologic/Lymphatic: Does not bruise/bleed easily.      Objective:    Physical Exam   Constitutional: He appears well-developed and well-nourished. No distress.   Neurological: He is alert and oriented to person, place, and time. He is not disoriented.   Psychiatric: He has a normal " mood and affect.   Skin:   Areas Examined (abnormalities noted in diagram):   Head / Face Inspection Performed  Neck Inspection Performed  Back Inspection Performed                Diagram Legend     Erythematous scaling macule/papule c/w actinic keratosis       Vascular papule c/w angioma      Pigmented verrucoid papule/plaque c/w seborrheic keratosis      Yellow umbilicated papule c/w sebaceous hyperplasia      Irregularly shaped tan macule c/w lentigo     1-2 mm smooth white papules consistent with Milia      Movable subcutaneous cyst with punctum c/w epidermal inclusion cyst      Subcutaneous movable cyst c/w pilar cyst      Firm pink to brown papule c/w dermatofibroma      Pedunculated fleshy papule(s) c/w skin tag(s)      Evenly pigmented macule c/w junctional nevus     Mildly variegated pigmented, slightly irregular-bordered macule c/w mildly atypical nevus      Flesh colored to evenly pigmented papule c/w intradermal nevus       Pink pearly papule/plaque c/w basal cell carcinoma      Erythematous hyperkeratotic cursted plaque c/w SCC      Surgical scar with no sign of skin cancer recurrence      Open and closed comedones      Inflammatory papules and pustules      Verrucoid papule consistent consistent with wart     Erythematous eczematous patches and plaques     Dystrophic onycholytic nail with subungual debris c/w onychomycosis     Umbilicated papule    Erythematous-base heme-crusted tan verrucoid plaque consistent with inflamed seborrheic keratosis     Erythematous Silvery Scaling Plaque c/w Psoriasis     See annotation     Latest Reference Range & Units 07/11/22 16:35 07/25/22 12:29   Phosphorus 2.7 - 4.5 mg/dL 3.5    Alkaline Phosphatase 55 - 135 U/L 50 (L)    PROTEIN TOTAL 6.0 - 8.4 g/dL 7.1    Albumin 3.5 - 5.2 g/dL 4.2    Uric Acid 3.4 - 7.0 mg/dL  6.4   BILIRUBIN TOTAL 0.1 - 1.0 mg/dL 1.2 (H)    Bilirubin, Direct 0.1 - 0.3 mg/dL 0.4 (H)    AST 10 - 40 U/L 22    ALT 10 - 44 U/L 26    Lipase 4 - 60 U/L   27   Cholesterol 120 - 199 mg/dL 171 159   HDL 40 - 75 mg/dL 44 40   HDL/Cholesterol Ratio 20.0 - 50.0 % 25.7 25.2   LDL Cholesterol External 63.0 - 159.0 mg/dL 113.4 89.4   Non-HDL Cholesterol mg/dL 127 119   Total Cholesterol/HDL Ratio 2.0 - 5.0  3.9 4.0   Triglycerides 30 - 150 mg/dL 68 148   (L): Data is abnormally low  (H): Data is abnormally high  Assessment / Plan:        Rosacea  -     minocycline (ZILXI) 1.5 % Foam; Apply 1 application topically once daily.  Dispense: 30 g; Refill: 5  One episode of missed sunscreen application led to immediate flare after months of clear skin.   Stop isotretinoin  Resume triple cream  Trial of topical minocycline (past inability to maintain off PO doxy, likely contributed to P follic issue)  Strict sun protection    Pityrosporum folliculitis  Cleared with low dose isotretinoin 30mg daily x 7 months  Ok to stop                   No follow-ups on file.

## 2022-09-25 NOTE — ANESTHESIA PREPROCEDURE EVALUATION
02/13/2019  Lyndon Lion Jr. is a 48 y.o., male.    Anesthesia Evaluation      I have reviewed the Medications.     Review of Systems  Anesthesia Hx:  No problems with previous Anesthesia   Social:  Non-Smoker, No Alcohol Use    Cardiovascular:  Cardiovascular Normal     Pulmonary:   Asthma    Renal/:  Renal/ Normal     Hepatic/GI:  Hepatic/GI Normal Hx gastric bypass   Musculoskeletal:   Percocet 10 Spine Disorders: Chronic Pain    Neurological:   Peripheral Neuropathy    Endocrine:  Endocrine Normal        Physical Exam  General:  Obesity    Airway/Jaw/Neck:  Airway Findings: Mouth Opening: Normal Tongue: Normal  General Airway Assessment: Adult, Average  Mallampati: II  Jaw/Neck Findings:  Neck ROM: Normal ROM       Chest/Lungs:  Chest/Lungs Findings: Clear to auscultation, Normal Respiratory Rate     Heart/Vascular:  Heart Findings: Rate: Normal  Rhythm: Regular Rhythm  Sounds: Normal  Heart murmur: negative       Mental Status:  Mental Status Findings:  Cooperative, Alert and Oriented         Anesthesia Plan  Type of Anesthesia, risks & benefits discussed:  Anesthesia Type:  general  Patient's Preference:   Intra-op Monitoring Plan:   Intra-op Monitoring Plan Comments:   Post Op Pain Control Plan:   Post Op Pain Control Plan Comments:   Induction:   IV  Beta Blocker:  Patient is not currently on a Beta-Blocker (No further documentation required).       Informed Consent: Patient understands risks and agrees with Anesthesia plan.  Questions answered. Anesthesia consent signed with patient.  ASA Score: 2     Day of Surgery Review of History & Physical:            Ready For Surgery From Anesthesia Perspective.        38 y.o. M with psych hx since age 14, h/o PTSD, in the past diagnosed with bipolar with psychotic features, with h/o multiple admissions (6) and 2-3 Suicidal attempts, (cutting b/l wrists and dorsal aspect of left arm and by overdose on aspirin),last hospitalized in [facility] on 8/23/22 for paranoia and suicidal ideation but was evaluated by psych and deemed able to be discharged home. Pt presented with AMS/ unresponsiveness. Per hx by family recent heavy alcohol intake and found unresponsive with multiple pill bottles near him concerning for overdose (tylenol/ zyprexa/gabapentin/ oxycodone). In the ER Noted with elevated acetaminophen level >600 range, AG metabolic acidosis. Pt was intubated for airway protection and admitted to the MICU for acute metabolic encephalopathy secondary to polysubstance ingestion and tylenol toxicity. Pt went into shock thought to be secondary to sedatives and required pressor support. S/p activated charcoal and continued on NAC as well as fomepizole, nephro consulted and pt underwent emergent hemodialysis on 9/20. Pt was able to be extubated and weaned off of pressor support. Shiley cath was removed. Although tylenol level is down trended pts liver enzymes increased significantly. Pt deemed appropriate to be downgraded from the MICU to the hospitalist service.     Acute metabolic encephalopathy secondary to poly substance abuse   underlying hx Bipolar DO (severe depression)/ suicidal ideation/ attempts and PTSD   s/p intubation for airway protection on 9/20 and extubated 9/21   s/p pressor support secondary to shock from sedatives   U tox positive for THZ, lithium and salicylate negative   c/w treatment for tylenol toxicity   Hold zyprexa for now   Hold all home Psych meds for now   c/w constant observation 1:1 for suicidal ideation   Psych consult appreciated, started on Ativan PRN      Tylenol toxicity   Tylenol level currently wnl   AST/ALT trending down   INR now trending down   Discussed with toxicology- S/p NAC protocol given LFTs downtrending.   Trend LFTs/ tylenol/ coags and CMP  Hold hepatotoxic agents    Acute liver failure  Hepatology consult appreciated   Trend LFTs  Work up sent per Hepatology   CT A/P triphasic reviewed: no acute liver pathology     Rhabdomyolysis  likely secondary to unclear down time   trending down   Decrease IVF rate   nephro f/u noted and appreciated     Aspiration pna   Leukocytosis improving   CT Chest: Consolidation of the lingula and patchy opacities within left upper lobe likely representing pneumonia  c/w Rocephin   BCx with NGTD    UA negative - f/u U cx     Electrolyte disturbances   Hypokalemia and Hypophosphatemia   Replace IV and PO   Monitor      Alcohol abuse  Alcohol level <10   c/w CIWA protocol   for now will remain off of librium - unknown last drink and pt s/p sedative weaning   c/w thiamine, folic acid and MVI      Hyperglycemia   Hba1c 5.6   Improving     Hypertension   likely from anxiety/ agitation   c/w monitoring BP      DVT PPx - c/w Lovenox SQ      Activity level: Increase as tolerated    Dispo: Pending dispo to inpatient psych rehab.

## 2022-09-28 ENCOUNTER — HOSPITAL ENCOUNTER (OUTPATIENT)
Dept: RADIOLOGY | Facility: CLINIC | Age: 52
Discharge: HOME | End: 2022-09-28
Attending: NURSE PRACTITIONER
Payer: COMMERCIAL

## 2022-09-28 DIAGNOSIS — Z85.038 HISTORY OF COLON CANCER, STAGE II: ICD-10-CM

## 2022-09-28 PROCEDURE — 71046 X-RAY EXAM CHEST 2 VIEWS: CPT | Mod: 26,,, | Performed by: RADIOLOGY

## 2022-09-28 PROCEDURE — 71046 XR CHEST PA AND LATERAL: ICD-10-PCS | Mod: 26,,, | Performed by: RADIOLOGY

## 2022-09-28 PROCEDURE — 71046 X-RAY EXAM CHEST 2 VIEWS: CPT | Mod: TC,FY,PO

## 2022-10-06 ENCOUNTER — TELEPHONE (OUTPATIENT)
Dept: HEMATOLOGY/ONCOLOGY | Facility: CLINIC | Age: 52
End: 2022-10-06
Payer: COMMERCIAL

## 2022-10-07 ENCOUNTER — OFFICE VISIT (OUTPATIENT)
Dept: HEMATOLOGY/ONCOLOGY | Facility: CLINIC | Age: 52
End: 2022-10-07
Payer: COMMERCIAL

## 2022-10-07 VITALS
WEIGHT: 270.94 LBS | OXYGEN SATURATION: 97 % | SYSTOLIC BLOOD PRESSURE: 119 MMHG | HEIGHT: 74 IN | RESPIRATION RATE: 16 BRPM | BODY MASS INDEX: 34.77 KG/M2 | HEART RATE: 67 BPM | DIASTOLIC BLOOD PRESSURE: 77 MMHG | TEMPERATURE: 98 F

## 2022-10-07 DIAGNOSIS — Z15.09 LYNCH SYNDROME: ICD-10-CM

## 2022-10-07 DIAGNOSIS — Z85.038 HISTORY OF COLON CANCER, STAGE II: Primary | ICD-10-CM

## 2022-10-07 DIAGNOSIS — Z15.09 MSH2-RELATED LYNCH SYNDROME (HNPCC1): ICD-10-CM

## 2022-10-07 PROCEDURE — 99999 PR PBB SHADOW E&M-EST. PATIENT-LVL V: CPT | Mod: PBBFAC,,, | Performed by: NURSE PRACTITIONER

## 2022-10-07 PROCEDURE — 99999 PR PBB SHADOW E&M-EST. PATIENT-LVL V: ICD-10-PCS | Mod: PBBFAC,,, | Performed by: NURSE PRACTITIONER

## 2022-10-07 PROCEDURE — 99213 PR OFFICE/OUTPT VISIT, EST, LEVL III, 20-29 MIN: ICD-10-PCS | Mod: S$GLB,,, | Performed by: NURSE PRACTITIONER

## 2022-10-07 PROCEDURE — 99213 OFFICE O/P EST LOW 20 MIN: CPT | Mod: S$GLB,,, | Performed by: NURSE PRACTITIONER

## 2022-10-07 NOTE — PROGRESS NOTES
Subjective:       Patient ID: Lyndon Lion Jr. is a 52 y.o. male.    Chief Complaint: Annual surveillance for colon cancer    HPI:  This is a 52-year-old white gentleman known to Dr. Cárdenas for Stage II adenocarcinoma of the colon for which he is status post resection (2008) and 12 cycles of adjuvant FOLFOX.    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      He presents to the clinic today for his annual evaluation. Recent colonoscopy 06/17/22 with follow up in 1 year.    He remains on Metformin & Victoza for DM.  Last HgbA1c = 6.0.   He remains active and well.  He continues to work FT.   He denies any discomfort with fevers, chills, drenching night sweats, changes in the character or caliber of his stool, abdominal discomfort/bloating, nausea, vomiting, constipation, unexplained weight loss, irregular heartbeat, chest pain, rectal bleeding, etc.    No other new complaints or pertinent findings on a 14-point review of systems.     Past Medical History:   Diagnosis Date    Arthritis     Asthma     AS A CHILD    Colon cancer     Family hx of prostate cancer 11/22/2016    Hx of colon cancer, stage I 07/03/2014    Parada syndrome     Tear of labium 10/2020    left shoulder Dr Molina    Uncontrolled type 2 diabetes mellitus with hyperglycemia 03/04/2021    Vitamin D deficiency     Wears glasses      Past Surgical History:   Procedure Laterality Date    APPENDECTOMY      CAUDAL EPIDURAL STEROID INJECTION N/A 11/6/2018    Procedure: Injection-steroid-epidural-caudal;  Surgeon: Lyndon Brooke Jr., MD;  Location: [facility] OR;  Service: Pain Management;  Laterality: N/A;    COLON SURGERY  2008    PARTIAL COLECTOMY    COLONOSCOPY Bilateral 2012    COLONOSCOPY N/A 8/1/2016    Procedure: COLONOSCOPY;  Surgeon: Blaze Marr MD;  Location: [facility];  Service: Endoscopy;  Laterality: N/A;    COLONOSCOPY N/A 9/20/2017    Procedure: COLONOSCOPY;  Surgeon: Dom Leonardo MD;  Location: [address]  FLR);  Service: Endoscopy;  Laterality: N/A;  not given PM prep    COLONOSCOPY N/A 10/9/2017    Procedure: COLONOSCOPY;  Surgeon: RAMON Callahan MD;  Location: [facility] (4TH FLR);  Service: Endoscopy;  Laterality: N/A;  ok for Prepopik per Dr Callahan    COLONOSCOPY N/A 11/20/2017    Procedure: COLONOSCOPY/EMR;  Surgeon: Joseluis Choe MD;  Location: [facility] ENDO (2ND FLR);  Service: Endoscopy;  Laterality: N/A;  EMR    no pm prep    COLONOSCOPY N/A 5/30/2018    Procedure: COLONOSCOPY;  Surgeon: Dom Leonardo MD;  Location: [facility] (4TH FLR);  Service: Endoscopy;  Laterality: N/A;  follow up colonoscopy in 5/2018 for Parada Syndrome         COLONOSCOPY N/A 6/10/2019    Procedure: COLONOSCOPY;  Surgeon: Dom Leonardo MD;  Location: [facility] (4TH FLR);  Service: Endoscopy;  Laterality: N/A;  Prepopik ordered per Dr. Leonardo    COLONOSCOPY N/A 7/22/2020    Procedure: COLONOSCOPY;  Surgeon: Dom Leonardo MD;  Location: [facility] (4TH FLR);  Service: Endoscopy;  Laterality: N/A;    COLONOSCOPY N/A 5/27/2021    Procedure: COLONOSCOPY;  Surgeon: Dom Leonardo MD;  Location: [facility] (4TH FLR);  Service: Endoscopy;  Laterality: N/A;  covid test 5/24-[place]  pt requests Prepopik    COLONOSCOPY N/A 6/17/2022    Procedure: COLONOSCOPY;  Surgeon: Dom Leonardo MD;  Location: [facility] (4TH FLR);  Service: Endoscopy;  Laterality: N/A;  He was discovered to have colon cancer and had right hemicolectomy        for stage II on 08/08/2008. MSH2 Parada syndrome.  sutab requested  instructions via the portal -sm  fully vaccinated - sm    CYSTOSCOPY      Epidural Steroid Injection  10/23/15    Lumbar    EPIDURAL STEROID INJECTION INTO LUMBAR SPINE N/A 7/27/2018    Procedure: Injection-steroid-epidural-lumbar;  Surgeon: Lyndon Brooke Jr., MD;  Location: [facility];  Service: Pain Management;  Laterality: N/A;    ESOPHAGOGASTRODUODENOSCOPY      ESOPHAGOGASTRODUODENOSCOPY N/A 7/22/2020    Procedure: EGD  (ESOPHAGOGASTRODUODENOSCOPY);  Surgeon: Dom Leonardo MD;  [facility]  Service: Endoscopy;  Laterality: N/A;  Please schedule patient for EGD and colonoscopy with me MSH2 Parada syndrome and anemia evaluation please make priority 1  covid test 7/20-[place]    FACETECTOMY OF VERTEBRA  2/13/2019    Procedure: MEDIAL FACETECTOMY L5-S1;  Surgeon: Lyndon Brooke Jr., MD;  Location: [facility] OR;  Service: Orthopedics;;    GASTRIC BYPASS  2010    SLEEVE    KNEE ARTHROSCOPY Right     LUMBAR DISCECTOMY  2/13/2019    Procedure: DISCECTOMY, SPINE, LUMBAR L5-S1;  Surgeon: Lyndon Brooke Jr., MD;  Location: [facility] OR;  Service: Orthopedics;;     Current Outpatient Medications on File Prior to Visit   Medication Sig Dispense Refill    ACCU-CHEK GUIDE TEST STRIPS Strp USE TO TEST TWICE A  strip 3    ascorbic acid, vitamin C, (VITAMIN C) 100 MG tablet Take 100 mg by mouth once daily.      aspirin (ECOTRIN) 81 MG EC tablet Take 81 mg by mouth once daily.      ciclopirox (PENLAC) 8 % Soln Apply topically nightly. 6.6 mL 2    cyanocobalamin, vitamin B-12, 2,500 mcg Lozg Place 2 tablets under the tongue once daily. 180 lozenge 3    ketoconazole (NIZORAL) 2 % shampoo Wash torso with medicated shampoo at least 2x/week - let sit on skin at least 5 minutes prior to rinsing 120 mL 5    lancets (ACCU-CHEK SOFTCLIX LANCETS) Misc 1 Units by Misc.(Non-Drug; Combo Route) route 2 (two) times a day. 200 each 0    liraglutide 0.6 mg/0.1 mL, 18 mg/3 mL, subq PNIJ (VICTOZA 3-ELIZABETH) 0.6 mg/0.1 mL (18 mg/3 mL) PnIj pen Inject 1.8 mg into the skin once daily. To begin @ 0.6mg daily for first 2 weeks then uptiitrate as tolerated. 27 mL 3    metFORMIN (GLUCOPHAGE-XR) 500 MG ER 24hr tablet Take 1 tablet (500 mg total) by mouth 2 (two) times daily with meals. 180 tablet 3    metronidazole 0.75% (METROCREAM) 0.75 % Crea Apply 1 application topically 2 (two) times daily. Apply to affected area 30 g 1    minocycline (ZILXI) 1.5 %  "Foam Apply 1 application topically once daily. 30 g 5    pen needle, diabetic (PEN NEEDLE) 32 gauge x 5/32" Ndle 1 each by Misc.(Non-Drug; Combo Route) route once daily. 90 each 3    pravastatin (PRAVACHOL) 20 MG tablet Take 1 tablet (20 mg total) by mouth once daily. 90 tablet 3    sulfacetamide sodium-sulfur 10-5 % (w/w) Clsr Use to wash face daily 170 g 5    tadalafiL (CIALIS) 5 MG tablet Take 1 tablet (5 mg total) by mouth daily as needed for Erectile Dysfunction. 90 tablet 0    testosterone cypionate (DEPOTESTOTERONE CYPIONATE) 200 mg/mL injection Inject 60 mg into the muscle every 14 (fourteen) days. Twice a week      triamcinolone acetonide 0.025% (KENALOG) 0.025 % Oint Thin film to lips and eczematous plaques BID PRN flare 15 g 1    triamcinolone acetonide 0.1% (KENALOG) 0.1 % cream AAA bid 454 g 3    zinc gluconate 50 mg tablet Take 50 mg by mouth once daily.      vitamin A 8000 UNIT capsule Take 1 capsule (8,000 Units total) by mouth once daily. 100 capsule 3     No current facility-administered medications on file prior to visit.       Review of Systems   Constitutional: Negative.    HENT: Negative.     Eyes: Negative.    Respiratory: Negative.     Cardiovascular: Negative.    Gastrointestinal: Negative.    Endocrine: Negative.    Genitourinary: Negative.    Musculoskeletal: Negative.    Allergic/Immunologic: Negative.    Neurological: Negative.    Hematological: Negative.    Psychiatric/Behavioral: Negative.         Objective:     Weight:  Gain of 21 pounds in 1 year (To note:  Loss of 29 pounds the year prior)  Wt Readings from Last 3 Encounters:   10/07/22 122.9 kg (270 lb 15.1 oz)   09/20/22 121.6 kg (268 lb)   09/19/22 121.8 kg (268 lb 8.3 oz)     Temp Readings from Last 3 Encounters:   10/07/22 98.2 °F (36.8 °C) (Temporal)   09/19/22 97.8 °F (36.6 °C) (Oral)   06/17/22 97.9 °F (36.6 °C) (Temporal)     BP Readings from Last 3 Encounters:   10/07/22 119/77   09/19/22 120/76   06/28/22 125/81     Pulse " Readings from Last 3 Encounters:   10/07/22 67   09/19/22 65   06/28/22 64     Physical Exam  Vitals reviewed.   Constitutional:       Appearance: Normal appearance. He is well-developed.   HENT:      Head: Normocephalic and atraumatic.      Mouth/Throat:      Mouth: Mucous membranes are moist.      Pharynx: Oropharynx is clear.   Eyes:      Conjunctiva/sclera: Conjunctivae normal.      Pupils: Pupils are equal, round, and reactive to light.   Cardiovascular:      Rate and Rhythm: Normal rate and regular rhythm.      Pulses: Normal pulses.      Heart sounds: Normal heart sounds.   Pulmonary:      Effort: Pulmonary effort is normal.      Breath sounds: Normal breath sounds.   Abdominal:      General: Bowel sounds are normal.      Palpations: Abdomen is soft.   Musculoskeletal:         General: Normal range of motion.      Cervical back: Normal range of motion and neck supple.   Lymphadenopathy:      Cervical: No cervical adenopathy.      Right cervical: No posterior cervical adenopathy.     Left cervical: No posterior cervical adenopathy.      Lower Body: No right inguinal adenopathy. No left inguinal adenopathy.   Skin:     General: Skin is warm and dry.      Capillary Refill: Capillary refill takes less than 2 seconds.   Neurological:      Mental Status: He is alert and oriented to person, place, and time.   Psychiatric:         Mood and Affect: Mood normal.         Speech: Speech normal.         Behavior: Behavior normal.         Thought Content: Thought content normal.         Judgment: Judgment normal.       LABORATORY:    Lab Results   Component Value Date    WBC 4.93 09/28/2022    RBC 5.98 09/28/2022    HGB 17.6 09/28/2022    HCT 51.4 09/28/2022    MCV 86 09/28/2022    MCH 29.4 09/28/2022    MCHC 34.2 09/28/2022    RDW 12.8 09/28/2022     09/28/2022    MPV 9.0 (L) 09/28/2022    GRAN 2.6 09/28/2022    GRAN 52.8 09/28/2022    LYMPH 1.5 09/28/2022    LYMPH 30.4 09/28/2022    MONO 0.5 09/28/2022    MONO 9.7  09/28/2022    EOS 0.3 09/28/2022    BASO 0.05 09/28/2022    EOSINOPHIL 5.9 09/28/2022    BASOPHIL 1.0 09/28/2022     CMP  Sodium   Date Value Ref Range Status   09/28/2022 138 136 - 145 mmol/L Final     Potassium   Date Value Ref Range Status   09/28/2022 4.5 3.5 - 5.1 mmol/L Final     Chloride   Date Value Ref Range Status   09/28/2022 108 95 - 110 mmol/L Final     CO2   Date Value Ref Range Status   09/28/2022 23 23 - 29 mmol/L Final     Glucose   Date Value Ref Range Status   09/28/2022 160 (H) 70 - 110 mg/dL Final     BUN   Date Value Ref Range Status   09/28/2022 14 6 - 20 mg/dL Final     Creatinine   Date Value Ref Range Status   09/28/2022 1.4 0.5 - 1.4 mg/dL Final   05/14/2013 1.3 0.5 - 1.4 mg/dL Final     Calcium   Date Value Ref Range Status   09/28/2022 9.3 8.7 - 10.5 mg/dL Final   05/14/2013 9.8 8.7 - 10.5 mg/dL Final     Total Protein   Date Value Ref Range Status   09/28/2022 6.7 6.0 - 8.4 g/dL Final     Albumin   Date Value Ref Range Status   09/28/2022 4.0 3.5 - 5.2 g/dL Final   09/28/2022 4.2 3.6 - 5.1 g/dL Final     Total Bilirubin   Date Value Ref Range Status   09/28/2022 0.9 0.1 - 1.0 mg/dL Final     Comment:     For infants and newborns, interpretation of results should be based  on gestational age, weight and in agreement with clinical  observations.    Premature Infant recommended reference ranges:  Up to 24 hours.............<8.0 mg/dL  Up to 48 hours............<12.0 mg/dL  3-5 days..................<15.0 mg/dL  6-29 days.................<15.0 mg/dL       Alkaline Phosphatase   Date Value Ref Range Status   09/28/2022 60 55 - 135 U/L Final     AST   Date Value Ref Range Status   09/28/2022 27 10 - 40 U/L Final     ALT   Date Value Ref Range Status   09/28/2022 35 10 - 44 U/L Final     Anion Gap   Date Value Ref Range Status   09/28/2022 7 (L) 8 - 16 mmol/L Final   05/14/2013 10 5 - 15 meq/L Final     eGFR if    Date Value Ref Range Status   01/24/2022 >60.0 >60 mL/min/1.73  m^2 Final     eGFR if non    Date Value Ref Range Status   01/24/2022 >60.0 >60 mL/min/1.73 m^2 Final     Comment:     Calculation used to obtain the estimated glomerular filtration  rate (eGFR) is the CKD-EPI equation.        LDH:  214    Vitamin D:  29    Lab Results   Component Value Date    HGBA1C 6.0 (H) 07/25/2022     RADIOLOGY:  CXR dated 09/28/2022:  Impression:  No acute cardiopulmonary disease.    Colonoscopy:  Dated 06/17/22:       Impression:  - The examined portion of the ileum was normal.                          - Diverticulosis in the recto-sigmoid colon, in the sigmoid colon and in the descending colon.                          - Non-bleeding internal hemorrhoids.                          - The entire examined colon is normal.                          - No specimens collected.   Recommendation:        - Discharge patient to home.                                 - Repeat colonoscopy in 1 year     Assessment:       1. History of colon cancer, stage II    2. MSH2-related Parada syndrome (HNPCC1)    3. Parada syndrome        3.  Rosacea - followed in Dermatology  4.  Hx of Vitamin D deficiency - remains low; presently on Vitamin D3 5000 I.u. daily; will increase to 7000/d    Plan:       1.  Follow up in 1 year with interval CBC, CMP, LDH, Vitamin D & CXR.  2.  Colonoscopy tbd in 06/2023  3.  FMLA papers to be completed in November/2022; patient/wife Katheryn to drop off.     Assessment/plan reviewed and approved by Dr. Leong.    Route Chart for Scheduling    Med Onc Chart Routing      Follow up with physician    Follow up with EUGENE 1 year. F/U in 1 year with CBC, CMP, LDH, VIT D & CXR   Infusion scheduling note    Injection scheduling note    Labs    Imaging    Pharmacy appointment No pharmacy appointment needed      Other referrals No additional referrals needed

## 2022-10-07 NOTE — LETTER
"  [facility]  [address]  [place] [address]  Phone: [phone]  Fax: [phone]   October 7, 2022    Patient: Lyndon Lion (Joseph)   YOB: 1970   Date of Visit: 10/7/2022   Patient ID [id number]       To Whom It May Concern:    Lyndon Lion (Joseph) was seen in clinic today on 10/7/2022 for surveillance of History of colon cancer & Parada disease. He may return with no restrictions.      Sincerely,         Topher Gudino NP   ,       "

## 2022-12-29 NOTE — TELEPHONE ENCOUNTER
Please enter order if indicated.   Wife states [address] [facility] did not receive order for pt's new  CPAP machine- see 8/20 encounter     FAX# [phone] Gabapentin Counseling: I discussed with the patient the risks of gabapentin including but not limited to dizziness, somnolence, fatigue and ataxia.

## 2023-01-07 ENCOUNTER — PATIENT MESSAGE (OUTPATIENT)
Dept: ENDOCRINOLOGY | Facility: CLINIC | Age: 53
End: 2023-01-07
Payer: COMMERCIAL

## 2023-01-09 ENCOUNTER — PATIENT MESSAGE (OUTPATIENT)
Dept: HEMATOLOGY/ONCOLOGY | Facility: CLINIC | Age: 53
End: 2023-01-09
Payer: COMMERCIAL

## 2023-01-10 DIAGNOSIS — R97.8 ABNORMAL TUMOR MARKERS: ICD-10-CM

## 2023-01-10 DIAGNOSIS — Z79.890 LONG-TERM CURRENT USE OF TESTOSTERONE REPLACEMENT THERAPY: ICD-10-CM

## 2023-01-10 DIAGNOSIS — E55.9 HYPOVITAMINOSIS D: ICD-10-CM

## 2023-01-10 DIAGNOSIS — K76.0 NONALCOHOLIC FATTY LIVER DISEASE: ICD-10-CM

## 2023-01-10 DIAGNOSIS — R79.89 LOW TESTOSTERONE IN MALE: ICD-10-CM

## 2023-01-10 DIAGNOSIS — E50.9 VITAMIN A DEFICIENCY: ICD-10-CM

## 2023-01-10 DIAGNOSIS — E11.65 TYPE 2 DIABETES MELLITUS WITH HYPERGLYCEMIA, WITHOUT LONG-TERM CURRENT USE OF INSULIN: Primary | ICD-10-CM

## 2023-01-10 DIAGNOSIS — E46 MALNUTRITION, UNSPECIFIED TYPE: ICD-10-CM

## 2023-01-10 DIAGNOSIS — E56.9 MULTIPLE VITAMIN DEFICIENCY: ICD-10-CM

## 2023-01-10 DIAGNOSIS — Z98.84 HISTORY OF GASTRIC BYPASS: ICD-10-CM

## 2023-01-10 DIAGNOSIS — E53.8 VITAMIN B12 DEFICIENCY: ICD-10-CM

## 2023-01-10 DIAGNOSIS — R94.6 THYROID FUNCTION TEST ABNORMAL: ICD-10-CM

## 2023-01-10 DIAGNOSIS — E63.9 NUTRITIONAL DEFICIENCY: ICD-10-CM

## 2023-01-10 DIAGNOSIS — E29.1 HYPOGONADISM IN MALE: ICD-10-CM

## 2023-01-10 DIAGNOSIS — E78.00 HYPERCHOLESTEROLEMIA: ICD-10-CM

## 2023-01-10 DIAGNOSIS — D53.9 NUTRITIONAL ANEMIA: ICD-10-CM

## 2023-01-10 DIAGNOSIS — R16.1 SPLENOMEGALY: ICD-10-CM

## 2023-01-10 DIAGNOSIS — Z98.84 BARIATRIC SURGERY STATUS: ICD-10-CM

## 2023-01-10 DIAGNOSIS — N40.0 BENIGN PROSTATIC HYPERPLASIA, UNSPECIFIED WHETHER LOWER URINARY TRACT SYMPTOMS PRESENT: ICD-10-CM

## 2023-01-17 ENCOUNTER — OFFICE VISIT (OUTPATIENT)
Dept: ENDOCRINOLOGY | Facility: CLINIC | Age: 53
End: 2023-01-17
Payer: COMMERCIAL

## 2023-01-17 VITALS
HEART RATE: 72 BPM | OXYGEN SATURATION: 95 % | TEMPERATURE: 98 F | HEIGHT: 74 IN | SYSTOLIC BLOOD PRESSURE: 124 MMHG | DIASTOLIC BLOOD PRESSURE: 80 MMHG | WEIGHT: 277.44 LBS | BODY MASS INDEX: 35.6 KG/M2

## 2023-01-17 DIAGNOSIS — K76.0 NONALCOHOLIC FATTY LIVER DISEASE: ICD-10-CM

## 2023-01-17 DIAGNOSIS — N52.9 ERECTILE DYSFUNCTION, UNSPECIFIED ERECTILE DYSFUNCTION TYPE: ICD-10-CM

## 2023-01-17 DIAGNOSIS — E50.9 VITAMIN A DEFICIENCY: ICD-10-CM

## 2023-01-17 DIAGNOSIS — R16.1 SPLENOMEGALY: Chronic | ICD-10-CM

## 2023-01-17 DIAGNOSIS — Z15.09 MSH2-RELATED LYNCH SYNDROME (HNPCC1): ICD-10-CM

## 2023-01-17 DIAGNOSIS — E66.9 OBESITY (BMI 30-39.9): ICD-10-CM

## 2023-01-17 DIAGNOSIS — Z98.84 BARIATRIC SURGERY STATUS: ICD-10-CM

## 2023-01-17 DIAGNOSIS — E55.9 HYPOVITAMINOSIS D: ICD-10-CM

## 2023-01-17 DIAGNOSIS — K21.9 GASTROESOPHAGEAL REFLUX DISEASE WITHOUT ESOPHAGITIS: ICD-10-CM

## 2023-01-17 DIAGNOSIS — Z85.038 HX OF COLON CANCER, STAGE I: ICD-10-CM

## 2023-01-17 DIAGNOSIS — Z98.84 S/P LAPAROSCOPIC SLEEVE GASTRECTOMY: ICD-10-CM

## 2023-01-17 DIAGNOSIS — E11.65 TYPE 2 DIABETES MELLITUS WITH HYPERGLYCEMIA, WITHOUT LONG-TERM CURRENT USE OF INSULIN: Primary | ICD-10-CM

## 2023-01-17 DIAGNOSIS — E53.8 VITAMIN B12 DEFICIENCY: ICD-10-CM

## 2023-01-17 DIAGNOSIS — M47.816 LUMBAR SPONDYLOSIS: ICD-10-CM

## 2023-01-17 DIAGNOSIS — E29.1 HYPOGONADISM IN MALE: ICD-10-CM

## 2023-01-17 PROCEDURE — 99999 PR PBB SHADOW E&M-EST. PATIENT-LVL V: ICD-10-PCS | Mod: PBBFAC,,, | Performed by: INTERNAL MEDICINE

## 2023-01-17 PROCEDURE — 99999 PR PBB SHADOW E&M-EST. PATIENT-LVL V: CPT | Mod: PBBFAC,,, | Performed by: INTERNAL MEDICINE

## 2023-01-17 PROCEDURE — 99214 PR OFFICE/OUTPT VISIT, EST, LEVL IV, 30-39 MIN: ICD-10-PCS | Mod: S$GLB,,, | Performed by: INTERNAL MEDICINE

## 2023-01-17 PROCEDURE — 99214 OFFICE O/P EST MOD 30 MIN: CPT | Mod: S$GLB,,, | Performed by: INTERNAL MEDICINE

## 2023-01-17 RX ORDER — SEMAGLUTIDE 1.34 MG/ML
1 INJECTION, SOLUTION SUBCUTANEOUS
Qty: 3 PEN | Refills: 3 | Status: SHIPPED | OUTPATIENT
Start: 2023-01-17 | End: 2023-04-21

## 2023-01-17 NOTE — PROGRESS NOTES
Subjective:      Patient ID: Lyndon Lion Jr. is a 52 y.o. male.    Chief Complaint:      Diabetes and Weight Gain     Patient is a 52 yr old gentleman with type 2 diabetes seen in [facility] today.  He is also s/p elective sleeve gastrectomy.      History of Present Illness    The patient, Ms Lion is a 51 yr gentleman with type 2 diabetes seen in [facility] today.       His background comorbidities are detailed below;     1. Type 2 diabetes mellitus with hyperglycemia, with long-term current use of insulin    2. Obesity (BMI 30-39.9)    3. Lumbar spondylosis    4. DDD (degenerative disc disease), lumbosacral    5. MSH2-related Parada syndrome (HNPCC1)    6. Hx of colon cancer, stage I    7. Splenomegaly    8. Nonalcoholic fatty liver disease    9. Gastroesophageal reflux disease without esophagitis       He is presently on metformin monotherapy 500mg QD and Victoza 1.8mg Qd and his latest HBA1c from 09/21 was 5.4 which is at goal..  The patients baseline [place] score is 9.  Patient has been known to be diabetic in the last ~ 6mths.  Patient father and maternal grandmother also have diabetes.  Patient had a sleeve gastrectomy NOT a gastric bypass.~ 2011 by Dr Christophe younger.   Patient had hemicolectomy In 2008. Patient was found to found colorectal ca stage 2. He received chemotherapy with Dr Cárdenas. He received 12 courses of FOLFOX (leucovorin calcium (folinic acid), fluorouracil, and oxaliplatin).     Patient does not smoke.  Patient drinks beer very rarely presently.     Patient sees Dr Shay for ongoing eye care. Last appt was ~ 05/2020. No retinopathy.     Flu vaccination'; last received 2017  Pneumovax; never received  COVID vaccination ; first immunization received.  Patient is a .  He has no fresh complaints today but indicates that since starting testosterone replacement which he is getting through a commercial company he has noticed some weight gain though his clothes fit just as well  "as a before. His testosterone repletion was commenced based on low free testosterone despite normal total levels and he has not had a formal work up as to the underlying etiology of this.        Review of Systems   Constitutional:  Negative for activity change, appetite change, diaphoresis, fatigue and unexpected weight change.   HENT:  Negative for facial swelling, hearing loss, trouble swallowing and voice change.    Eyes:  Negative for photophobia and visual disturbance.   Respiratory:  Negative for apnea, cough, chest tightness, shortness of breath, wheezing and stridor.    Cardiovascular:  Negative for chest pain, palpitations and leg swelling.   Gastrointestinal:  Positive for diarrhea (with metformin use). Negative for abdominal distention, abdominal pain, constipation, nausea and vomiting.   Endocrine: Negative for polydipsia, polyphagia and polyuria.   Genitourinary:  Negative for dysuria, flank pain, frequency, hematuria and urgency.   Musculoskeletal:  Negative for arthralgias, back pain, joint swelling, myalgias and neck pain.   Skin:  Negative for color change, pallor and rash.   Neurological:  Negative for dizziness, tremors, seizures, syncope, light-headedness, numbness and headaches.   Hematological:  Does not bruise/bleed easily.   Psychiatric/Behavioral:  Negative for agitation, confusion, dysphoric mood and sleep disturbance. The patient is not nervous/anxious and is not hyperactive.      Objective:   /80 (BP Location: Left arm, Patient Position: Sitting, BP Method: Large (Manual))   Pulse 72   Temp 98 °F (36.7 °C) (Oral)   Ht 6' 2" (1.88 m)   Wt 125.8 kg (277 lb 7.2 oz)   SpO2 95%   BMI 35.62 kg/m² Body surface area is 2.56 meters squared. Patient has gained 37 lbs in the last year.           Physical Exam ;    Lab Review:      Latest Reference Range & Units 01/14/22 08:03 01/24/22 16:17 01/24/22 16:34 03/21/22 10:50 03/21/22 10:51 04/02/22 13:15 06/03/22 00:00 06/17/22 12:28 06/28/22 " 09:59 07/11/22 16:35 07/11/22 17:33 07/25/22 12:29 09/28/22 12:43 09/28/22 13:07   WBC 3.90 - 12.70 K/uL   4.01          4.93    RBC 4.60 - 6.20 M/uL   5.37          5.98    Hemoglobin 14.0 - 18.0 g/dL   16.1          17.6    Hematocrit 40.0 - 54.0 %   47.4          51.4    MCV 82 - 98 fL   88          86    MCH 27.0 - 31.0 pg   30.0          29.4    MCHC 32.0 - 36.0 g/dL   34.0          34.2    RDW 11.5 - 14.5 %   12.8          12.8    Platelets 150 - 450 K/uL   273          266    MPV 9.2 - 12.9 fL   9.7          9.0 (L)    Gran % 38.0 - 73.0 %   58.8          52.8    Lymph % 18.0 - 48.0 %   25.9          30.4    Mono % 4.0 - 15.0 %   11.2          9.7    Eosinophil % 0.0 - 8.0 %   3.2          5.9    Basophil % 0.0 - 1.9 %   0.7          1.0    Immature Granulocytes 0.0 - 0.5 %   0.2          0.2    Gran # (ANC) 1.8 - 7.7 K/uL   2.4          2.6    Lymph # 1.0 - 4.8 K/uL   1.0          1.5    Mono # 0.3 - 1.0 K/uL   0.5          0.5    Eos # 0.0 - 0.5 K/uL   0.1          0.3    Baso # 0.00 - 0.20 K/uL   0.03          0.05    Immature Grans (Abs) 0.00 - 0.04 K/uL   0.01          0.01    nRBC 0 /100 WBC   0          0    Differential Method    Automated          Automated    Transferrin 200 - 375 mg/dL     310            Ferritin 20.0 - 300.0 ng/mL     28            Folate 4.0 - 24.0 ng/mL     7.3            Vitamin B-12 180 - 914 ng/L  210 - 950 pg/mL     134 (L)  224            Intrinsic Blocking Factor Negative      Negative            Gastrin pg/mL     97            Sodium 136 - 145 mmol/L   139          138    Potassium 3.5 - 5.1 mmol/L   4.3          4.5    Chloride 95 - 110 mmol/L   108          108    CO2 23 - 29 mmol/L   21 (L)          23    Anion Gap 8 - 16 mmol/L   10          7 (L)    BUN 6 - 20 mg/dL   13          14    Creatinine 0.5 - 1.4 mg/dL   1.2          1.4    eGFR >60 mL/min/1.73 m^2             >60.0    eGFR if non African American >60 mL/min/1.73 m^2   >60.0              eGFR if   American >60 mL/min/1.73 m^2   >60.0              Glucose 70 - 110 mg/dL   91          160 (H)    Calcium 8.7 - 10.5 mg/dL   9.7         9.3 9.3    Ionized Calcium 1.06 - 1.42 mmol/L            1.29     Phosphorus 2.7 - 4.5 mg/dL     2.3 (L)     3.5       Magnesium 1.6 - 2.6 mg/dL     1.8            Alkaline Phosphatase 55 - 135 U/L   56       50 (L)   60    PROTEIN TOTAL 6.0 - 8.4 g/dL   6.9       7.1   6.7    Albumin 3.5 - 5.2 g/dL   3.8       4.2   4.0    Albumin 3.6 - 5.1 g/dL   4.3         4.1 4.2    Prealbumin 20 - 43 mg/dL     21            Uric Acid 3.4 - 7.0 mg/dL   4.9         6.4     BILIRUBIN TOTAL 0.1 - 1.0 mg/dL   0.8       1.2 (H)   0.9    Bilirubin, Direct 0.1 - 0.3 mg/dL          0.4 (H)       AST 10 - 40 U/L   21       22   27    ALT 10 - 44 U/L   21       26   35    GGT 8 - 55 U/L   15              Ammonia 10 - 50 umol/L   40              Amylase 20 - 110 U/L             34    Lipase 4 - 60 U/L            27     Cholesterol 120 - 199 mg/dL          171  159     HDL 40 - 75 mg/dL          44  40     HDL/Cholesterol Ratio 20.0 - 50.0 %          25.7  25.2     LDL Cholesterol External 63.0 - 159.0 mg/dL          113.4  89.4     Non-HDL Cholesterol mg/dL          127  119     Total Cholesterol/HDL Ratio 2.0 - 5.0           3.9  4.0     Triglycerides 30 - 150 mg/dL          68  148      - 260 U/L             214    CERULOPLASMIN 15.0 - 45.0 mg/dL     24.0            Copper 665 - 1480 ug/L     800            Iodine, Serum 40 - 92 ng/mL     42            Lactate, [place] 0.5 - 2.2 mmol/L   1.0              Manganese, Serum 0.5 - 1.2 ng/mL     0.6            Selenium 70 - 150 ng/mL     114            Zinc, Serum-ALT 60 - 130 ug/dL     74            24,25 Dihydroxy VitD Total Not Applicable ng/mL     2.91            25HDN:24,25 Dihydroxy VitD Ratio      17.87            25-Hydroxy D Total ng/mL     52            25-HYDROXY D2 ng/mL     24            25-Hydroxy D3 ng/mL     28            AIF Comment       SEE BELOW            Carotene 3 - 91 ug/dL      5           Niacin ug/mL     2.45            Retinol, serum 38 - 106 ug/dL     35 (L)            Thiamine 38 - 122 ug/L    66             Vit D, 1,25-Dihydroxy 20 - 79 pg/mL    60             Vit D, 25-Hydroxy 30 - 96 ng/mL            34 29 (L)    Vitamin B2 1 - 19 mcg/L     4            Vitamin B5 ug/L     85.18            Vitamin B6 5 - 50 ug/L     5            Vitamin B7, H (Biotin) 221.0 - 3004.0 pg/mL     950.1            Vitamin C 2 - 19 mg/L     3            Vitamin E 500 - 1800 ug/dL     1068            Vitamin K 0.22 - 4.88 nmol/L     1.61            Hemoglobin A1C External 4.0 - 5.6 %   5.4         6.0 (H)     Estimated Avg Glucose 68 - 131 mg/dL   108         126     GlycoMark (TM) ug/mL            1.5 (L)     TSH 0.400 - 4.000 uIU/mL   1.005         1.318     PTH 9.0 - 77.0 pg/mL            53.4     AFP 0.0 - 8.4 ng/mL   2.2              Calcitonin <=14.3 pg/mL            6.4     PSA Total 0.00 - 4.00 ng/mL            0.40     PSA, Free 0.00 - 1.50 ng/mL            0.20     PSA, Free % Not established %            50.00     Dihydrotestosterone 112 - 955 pg/mL            491     Estradiol 11 - 44 pg/mL            29     Estrogen pg/mL            202     FSH 0.95 - 11.95 mIU/mL            0.51 (L)     LH 0.6 - 12.1 mIU/mL            <0.2 (L)     Progesterone <0.2 ng/mL            0.1     Prolactin 3.5 - 19.4 ng/mL            11.6     Sex Hormone Binding Globulin 10 - 50 nmol/L   36         30 27    Testosterone 250 - 1100 ng/dL   981         674 929    Testosterone, Bioavailable 110.0 - 575.0 ng/dL   285.9         203.0 338.9    Testosterone, Free 46.0 - 224.0 pg/mL   145.1         107.8 175.9    Chromium Level <0.3 ng/mL     <0.1            FANTA Screen Negative <1:80           Negative <1:80       Anti-SSA Antibody 0.00 - 0.99 Ratio          0.05       Anti-SSA Interpretation Negative           Negative       Specimen UA   Urine, Clean Catch                Color, UA Yellow, Straw, Jessica   Yellow               Appearance, UA Clear   Clear               Specific Gravity, UA 1.005 - 1.030   1.020               pH, UA 5.0 - 8.0   6.0               Protein, UA Negative   Negative               Glucose, UA Negative   Negative               Ketones, UA Negative   Negative               Occult Blood UA Negative   1+ !               NITRITE UA Negative   Negative               Bilirubin (UA) Negative   Negative               Leukocytes, UA Negative   Negative               RBC, UA 0 - 4 /hpf  0       0        WBC, UA 0 - 5 /hpf  1       1        Bacteria, UA None-Occ /hpf  Rare               Squam Epithel, UA /hpf         0        Microscopic Comment   SEE COMMENT       SEE COMMENT        Creatinine, Urine 23.0 - 375.0 mg/dL  127.0               Urine Microalbumin ug/mL  7.0               MICROALB/CREAT RATIO 0.0 - 30.0 ug/mg  5.5               POCT Glucose 70 - 110 mg/dL        85         FRUCTOSAMINE 151 - 300 umol /L            297     XR CHEST PA AND LATERAL               Rpt   US ABDOMEN LIMITED            Rpt      SPECIMEN TO PATHOLOGY, DERMATOLOGY  Rpt                Final Pathologic Diagnosis  1.  Skin, left volar forearm, eczematous, punch biopsy:  - SPONGIOTIC AND INTERFACE DERMATITIS (SEE COMMENT).  COMMENT:  These histologic features may be seen in the context of an irritant  contact dermatitis.  The presence of interface alteration, epidermal  spongiosis, and increased dermal mucin raises the possibility of subacute  cutaneous lupus.  The lack of eosinophils argues against a drug eruption.  The presence of interface alteration and dyskeratotic keratinocytes at  multiple levels within the epidermis is not typical for atopic dermatitis or  an allergic contact dermatitis.  Pityriasis lichenoides chronica has similar  histologic features to that seen in this specimen, though this specimen lacks  the confluent parakeratosis and wedge-shaped dermal infiltrate  "usually  associated with this entity, and the clinical presentation is not supportive  of this diagnosis.  The presence of interface alteration, epidermal  spongiosis, and increased dermal mucin raises the possibility of subacute  cutaneous lupus.  Finally, similar clinical presentation and histology has  been described in post-bariatric surgery eruptions, though this is usually  weeks to months after the procedure and is generally not described several  years post procedure.  2.  Skin, left upper back, pustule, punch biopsy:  - FEATURES CONSISTENT WITH PITYROSPORUM FOLLICULITIS.                  Gross  Number of containers:  2  Part 1  Container Label: Clinic Number/AP Number:  [id number], and "left volar forearm,  eczematous"  Received in formalin is a 3 x 3 mm punch biopsy fragment of tan-white skin  excised to a depth of 4 mm. Specimen is bisected and entirely submitted in  BDV--1-A.  Part 2  Container Label: Clinic Number/AP Number:  [id number], and "left upper back,  pustule"  Received in formalin is a 3 x 3 mm punch biopsy fragment of tan skin excised  to a depth of 7 mm.  Specimen is bisected and entirely submitted in  CWA--2-A.  NEHEMIAH Cassidy                  Microscopic Exam  1.  Sections show basket weave and compact orthokeratosis with focal  parakeratosis.  There is epidermal spongiosis, largely limited to the lower  half of the epidermis associated with exocytosis of lymphocytes.  Occasional  vacuolar interface alteration is noted along the dermal epidermal junction,  and a few scattered apoptotic keratinocytes localize to multiple levels  within the epidermis.  Within the superficial to upper mid dermis, there is a  mild perivascular lymphocyte-predominant inflammatory infiltrate.  Eosinophils are not identified.  Colloidal iron stain shows increased dermal  mucin within the superficial to mid dermis.  PAS stain is negative for  yeasts/fungi.  Appropriately reactive controls were reviewed.  " Multiple  levels were examined.  2.  Sections show a dilated follicle containing keratinaceous debris,  bacteria, and multiple yeasts, which are highlighted on PAS stain which was  examined with appropriately reactive controls.  The adjacent epidermis shows  minimal spongiosis with exocytosis of lymphocytes.  Interface alteration is  not identified.  Within the superficial to upper mid dermis, there is a mild  perivascular and perifollicular lymphohistiocytic infiltrate with occasional  neutrophils and a rare eosinophil.  Colloidal iron stain fails to show  increased dermal mucin.  Appropriately reactive controls were reviewed.  Multiple levels were examined.                  Disclaimer  Unless the case is a 'gross only' or additional testing only, the final  diagnosis for each specimen is based on a microscopic examination of  appropriate tissue sections.                  Left Eye DM Retinopathy        Negative (E)          Right Eye DM Retinopathy        Negative (E)          (L): Data is abnormally low  (H): Data is abnormally high  !: Data is abnormal  Rpt: View report in Results Review for more information  (E): External lab result  Assessment:     1. Type 2 diabetes mellitus with hyperglycemia, without long-term current use of insulin        2. Obesity (BMI 30-39.9)        3. Bariatric surgery status        4. S/P laparoscopic sleeve gastrectomy        5. Hypovitaminosis D        6. Vitamin B12 deficiency        7. Vitamin A deficiency        8. Hypogonadism in male        9. Nonalcoholic fatty liver disease        10. Splenomegaly        11. Gastroesophageal reflux disease without esophagitis        12. Hx of colon cancer, stage I        13. MSH2-related Parada syndrome (HNPCC1)        14. Erectile dysfunction, unspecified erectile dysfunction type        15. Lumbar spondylosis               Regarding type 2 diabetes; to continue  metformin to 500mg BID and  to switch from victoza to Ozempic 1mg weekly to  assist with his weight management.  Regarding possible NAFLD; to obtain screening upper abdomen USS to evaluate current status.  Regarding post bariatric surgery status; s/p sleeve. To continue multivitamin supplementation and to check full nutritional labs prior to next visit.  Regarding hyperlipidemia; to continue pravastatin as before and will recheck lipid profile.  Regarding Parada's syndrome; ongoing ffup with hem Onc; Dr Cárdenas.  Regarding possible hypovitaminosis D; to check 25 OH Vitr D levels and replete as needed.  Regarding bone health status; Screening DEXA from 04/21 was normal.  Regarding GERD; symptomatically stable.  Regarding hypogonadism; being ffed and managed by an outside commercial group. Presently on 60mg every 14 days. To check peak and trough levels and will obtain basic etiologic work up and relevant screening imaging if indciated.      Plan:     FFup in ~ 4mths

## 2023-01-18 ENCOUNTER — TELEPHONE (OUTPATIENT)
Dept: ENDOCRINOLOGY | Facility: CLINIC | Age: 53
End: 2023-01-18

## 2023-01-18 ENCOUNTER — LAB VISIT (OUTPATIENT)
Dept: LAB | Facility: HOSPITAL | Age: 53
End: 2023-01-18
Attending: INTERNAL MEDICINE
Payer: COMMERCIAL

## 2023-01-18 DIAGNOSIS — Z79.890 LONG-TERM CURRENT USE OF TESTOSTERONE REPLACEMENT THERAPY: ICD-10-CM

## 2023-01-18 DIAGNOSIS — R16.1 SPLENOMEGALY: ICD-10-CM

## 2023-01-18 DIAGNOSIS — Z98.84 HISTORY OF GASTRIC BYPASS: ICD-10-CM

## 2023-01-18 DIAGNOSIS — N40.0 BENIGN PROSTATIC HYPERPLASIA, UNSPECIFIED WHETHER LOWER URINARY TRACT SYMPTOMS PRESENT: ICD-10-CM

## 2023-01-18 DIAGNOSIS — E11.65 TYPE 2 DIABETES MELLITUS WITH HYPERGLYCEMIA, WITHOUT LONG-TERM CURRENT USE OF INSULIN: ICD-10-CM

## 2023-01-18 DIAGNOSIS — E29.1 HYPOGONADISM IN MALE: ICD-10-CM

## 2023-01-18 DIAGNOSIS — E46 MALNUTRITION, UNSPECIFIED TYPE: ICD-10-CM

## 2023-01-18 DIAGNOSIS — Z98.84 BARIATRIC SURGERY STATUS: ICD-10-CM

## 2023-01-18 DIAGNOSIS — E78.00 HYPERCHOLESTEROLEMIA: ICD-10-CM

## 2023-01-18 DIAGNOSIS — D53.9 NUTRITIONAL ANEMIA: ICD-10-CM

## 2023-01-18 DIAGNOSIS — E63.9 NUTRITIONAL DEFICIENCY: ICD-10-CM

## 2023-01-18 DIAGNOSIS — E55.9 HYPOVITAMINOSIS D: ICD-10-CM

## 2023-01-18 DIAGNOSIS — R97.8 ABNORMAL TUMOR MARKERS: ICD-10-CM

## 2023-01-18 DIAGNOSIS — K76.0 NONALCOHOLIC FATTY LIVER DISEASE: ICD-10-CM

## 2023-01-18 DIAGNOSIS — E56.9 MULTIPLE VITAMIN DEFICIENCY: ICD-10-CM

## 2023-01-18 DIAGNOSIS — R94.6 THYROID FUNCTION TEST ABNORMAL: ICD-10-CM

## 2023-01-18 DIAGNOSIS — R79.89 LOW TESTOSTERONE IN MALE: ICD-10-CM

## 2023-01-18 LAB
BASOPHILS # BLD AUTO: 0.05 K/UL (ref 0–0.2)
BASOPHILS NFR BLD: 1 % (ref 0–1.9)
DIFFERENTIAL METHOD: ABNORMAL
EOSINOPHIL # BLD AUTO: 0.2 K/UL (ref 0–0.5)
EOSINOPHIL NFR BLD: 3.1 % (ref 0–8)
ERYTHROCYTE [DISTWIDTH] IN BLOOD BY AUTOMATED COUNT: 12.4 % (ref 11.5–14.5)
ERYTHROCYTE [SEDIMENTATION RATE] IN BLOOD BY PHOTOMETRIC METHOD: 15 MM/HR (ref 0–23)
HCT VFR BLD AUTO: 53.7 % (ref 40–54)
HGB BLD-MCNC: 18.4 G/DL (ref 14–18)
IMM GRANULOCYTES # BLD AUTO: 0.02 K/UL (ref 0–0.04)
IMM GRANULOCYTES NFR BLD AUTO: 0.4 % (ref 0–0.5)
LYMPHOCYTES # BLD AUTO: 1.4 K/UL (ref 1–4.8)
LYMPHOCYTES NFR BLD: 27.3 % (ref 18–48)
MCH RBC QN AUTO: 30.5 PG (ref 27–31)
MCHC RBC AUTO-ENTMCNC: 34.3 G/DL (ref 32–36)
MCV RBC AUTO: 89 FL (ref 82–98)
MONOCYTES # BLD AUTO: 0.6 K/UL (ref 0.3–1)
MONOCYTES NFR BLD: 11.3 % (ref 4–15)
NEUTROPHILS # BLD AUTO: 3 K/UL (ref 1.8–7.7)
NEUTROPHILS NFR BLD: 56.9 % (ref 38–73)
NRBC BLD-RTO: 0 /100 WBC
PLATELET # BLD AUTO: 284 K/UL (ref 150–450)
PMV BLD AUTO: 9.4 FL (ref 9.2–12.9)
RBC # BLD AUTO: 6.03 M/UL (ref 4.6–6.2)
WBC # BLD AUTO: 5.23 K/UL (ref 3.9–12.7)

## 2023-01-18 PROCEDURE — 83520 IMMUNOASSAY QUANT NOS NONAB: CPT | Performed by: INTERNAL MEDICINE

## 2023-01-18 PROCEDURE — 84443 ASSAY THYROID STIM HORMONE: CPT | Performed by: INTERNAL MEDICINE

## 2023-01-18 PROCEDURE — 82308 ASSAY OF CALCITONIN: CPT | Performed by: INTERNAL MEDICINE

## 2023-01-18 PROCEDURE — 83690 ASSAY OF LIPASE: CPT | Performed by: INTERNAL MEDICINE

## 2023-01-18 PROCEDURE — 80053 COMPREHEN METABOLIC PANEL: CPT | Performed by: INTERNAL MEDICINE

## 2023-01-18 PROCEDURE — 82670 ASSAY OF TOTAL ESTRADIOL: CPT | Performed by: INTERNAL MEDICINE

## 2023-01-18 PROCEDURE — 86141 C-REACTIVE PROTEIN HS: CPT | Performed by: INTERNAL MEDICINE

## 2023-01-18 PROCEDURE — 84378 SUGARS SINGLE QUANT: CPT | Performed by: INTERNAL MEDICINE

## 2023-01-18 PROCEDURE — 82495 ASSAY OF CHROMIUM: CPT | Performed by: INTERNAL MEDICINE

## 2023-01-18 PROCEDURE — 82150 ASSAY OF AMYLASE: CPT | Performed by: INTERNAL MEDICINE

## 2023-01-18 PROCEDURE — 84153 ASSAY OF PSA TOTAL: CPT | Performed by: INTERNAL MEDICINE

## 2023-01-18 PROCEDURE — 0014M MISCELLANEOUS SENDOUT TEST, BLOOD: CPT | Performed by: INTERNAL MEDICINE

## 2023-01-18 PROCEDURE — 85652 RBC SED RATE AUTOMATED: CPT | Performed by: INTERNAL MEDICINE

## 2023-01-18 PROCEDURE — 85025 COMPLETE CBC W/AUTO DIFF WBC: CPT | Performed by: INTERNAL MEDICINE

## 2023-01-18 PROCEDURE — 82652 VIT D 1 25-DIHYDROXY: CPT | Performed by: INTERNAL MEDICINE

## 2023-01-18 PROCEDURE — 82140 ASSAY OF AMMONIA: CPT | Performed by: INTERNAL MEDICINE

## 2023-01-18 PROCEDURE — 30000890 MISCELLANEOUS SENDOUT TEST, BLOOD: Performed by: INTERNAL MEDICINE

## 2023-01-18 PROCEDURE — 83605 ASSAY OF LACTIC ACID: CPT | Performed by: INTERNAL MEDICINE

## 2023-01-18 PROCEDURE — 82672 ASSAY OF ESTROGEN: CPT | Performed by: INTERNAL MEDICINE

## 2023-01-18 PROCEDURE — 84403 ASSAY OF TOTAL TESTOSTERONE: CPT | Performed by: INTERNAL MEDICINE

## 2023-01-18 PROCEDURE — 85730 THROMBOPLASTIN TIME PARTIAL: CPT | Performed by: INTERNAL MEDICINE

## 2023-01-18 PROCEDURE — 82985 ASSAY OF GLYCATED PROTEIN: CPT | Performed by: INTERNAL MEDICINE

## 2023-01-18 PROCEDURE — 84134 ASSAY OF PREALBUMIN: CPT | Performed by: INTERNAL MEDICINE

## 2023-01-18 PROCEDURE — 82330 ASSAY OF CALCIUM: CPT | Performed by: INTERNAL MEDICINE

## 2023-01-18 PROCEDURE — 84550 ASSAY OF BLOOD/URIC ACID: CPT | Performed by: INTERNAL MEDICINE

## 2023-01-18 PROCEDURE — 82306 VITAMIN D 25 HYDROXY: CPT | Performed by: INTERNAL MEDICINE

## 2023-01-18 PROCEDURE — 84466 ASSAY OF TRANSFERRIN: CPT | Performed by: INTERNAL MEDICINE

## 2023-01-18 PROCEDURE — 82977 ASSAY OF GGT: CPT | Performed by: INTERNAL MEDICINE

## 2023-01-18 PROCEDURE — 36415 COLL VENOUS BLD VENIPUNCTURE: CPT | Mod: PO | Performed by: INTERNAL MEDICINE

## 2023-01-18 PROCEDURE — 82105 ALPHA-FETOPROTEIN SERUM: CPT | Performed by: INTERNAL MEDICINE

## 2023-01-18 PROCEDURE — 85610 PROTHROMBIN TIME: CPT | Performed by: INTERNAL MEDICINE

## 2023-01-18 PROCEDURE — 80061 LIPID PANEL: CPT | Performed by: INTERNAL MEDICINE

## 2023-01-18 PROCEDURE — 82728 ASSAY OF FERRITIN: CPT | Performed by: INTERNAL MEDICINE

## 2023-01-18 PROCEDURE — 84270 ASSAY OF SEX HORMONE GLOBUL: CPT | Performed by: INTERNAL MEDICINE

## 2023-01-18 PROCEDURE — 83036 HEMOGLOBIN GLYCOSYLATED A1C: CPT | Mod: 59 | Performed by: INTERNAL MEDICINE

## 2023-01-18 NOTE — TELEPHONE ENCOUNTER
Spoke to wife , asking to set up the needed labs and return visits. She states that they already made the lab appointments. Says that she wants to have even the nutritional labs drawn today. Advised wife that you wanted them drawn in one month with the peak. She states that this is wrong. Please advise. Also she states that you wanted to see him in a month because of the new medication. Advised that I have written down 4 months with Ms. Richter and then the already set up August appt. Please advise on this also.

## 2023-01-19 DIAGNOSIS — E61.1 IRON DEFICIENCY: Primary | ICD-10-CM

## 2023-01-19 LAB
25(OH)D3+25(OH)D2 SERPL-MCNC: 21 NG/ML (ref 30–96)
AFP SERPL-MCNC: 2.2 NG/ML (ref 0–8.4)
ALBUMIN SERPL BCP-MCNC: 4.2 G/DL (ref 3.5–5.2)
ALP SERPL-CCNC: 58 U/L (ref 55–135)
ALT SERPL W/O P-5'-P-CCNC: 37 U/L (ref 10–44)
AMMONIA PLAS-SCNC: 48 UMOL/L (ref 10–50)
AMYLASE SERPL-CCNC: 45 U/L (ref 20–110)
ANION GAP SERPL CALC-SCNC: 13 MMOL/L (ref 8–16)
APTT BLDCRRT: 30.1 SEC (ref 21–32)
AST SERPL-CCNC: 27 U/L (ref 10–40)
BILIRUB SERPL-MCNC: 0.8 MG/DL (ref 0.1–1)
BUN SERPL-MCNC: 17 MG/DL (ref 6–20)
CA-I BLDV-SCNC: 1.29 MMOL/L (ref 1.06–1.42)
CALCIUM SERPL-MCNC: 9.6 MG/DL (ref 8.7–10.5)
CHLORIDE SERPL-SCNC: 105 MMOL/L (ref 95–110)
CHOLEST SERPL-MCNC: 166 MG/DL (ref 120–199)
CHOLEST/HDLC SERPL: 4 {RATIO} (ref 2–5)
CO2 SERPL-SCNC: 20 MMOL/L (ref 23–29)
CREAT SERPL-MCNC: 1.4 MG/DL (ref 0.5–1.4)
CRP SERPL-MCNC: 3.52 MG/L (ref 0–3.19)
EST. GFR  (NO RACE VARIABLE): >60 ML/MIN/1.73 M^2
ESTIMATED AVG GLUCOSE: 131 MG/DL (ref 68–131)
ESTRADIOL SERPL-MCNC: 88 PG/ML (ref 11–44)
FERRITIN SERPL-MCNC: 39 NG/ML (ref 20–300)
GGT SERPL-CCNC: 28 U/L (ref 8–55)
GLUCOSE SERPL-MCNC: 89 MG/DL (ref 70–110)
HBA1C MFR BLD: 6.2 % (ref 4–5.6)
HDLC SERPL-MCNC: 41 MG/DL (ref 40–75)
HDLC SERPL: 24.7 % (ref 20–50)
INR PPP: 1 (ref 0.8–1.2)
IRON SERPL-MCNC: 64 UG/DL (ref 45–160)
LACTATE SERPL-SCNC: 0.8 MMOL/L (ref 0.5–2.2)
LDLC SERPL CALC-MCNC: 107 MG/DL (ref 63–159)
LIPASE SERPL-CCNC: 21 U/L (ref 4–60)
NONHDLC SERPL-MCNC: 125 MG/DL
POTASSIUM SERPL-SCNC: 4 MMOL/L (ref 3.5–5.1)
PREALB SERPL-MCNC: 21 MG/DL (ref 20–43)
PROSTATE SPECIFIC ANTIGEN, TOTAL: 0.45 NG/ML (ref 0–4)
PROT SERPL-MCNC: 7.4 G/DL (ref 6–8.4)
PROTHROMBIN TIME: 10.7 SEC (ref 9–12.5)
PSA FREE MFR SERPL: 44.44 %
PSA FREE SERPL-MCNC: 0.2 NG/ML (ref 0–1.5)
SATURATED IRON: 15 % (ref 20–50)
SODIUM SERPL-SCNC: 138 MMOL/L (ref 136–145)
TOTAL IRON BINDING CAPACITY: 435 UG/DL (ref 250–450)
TRANSFERRIN SERPL-MCNC: 294 MG/DL (ref 200–375)
TRANSFERRIN SERPL-MCNC: 294 MG/DL (ref 200–375)
TRIGL SERPL-MCNC: 90 MG/DL (ref 30–150)
TSH SERPL DL<=0.005 MIU/L-ACNC: 1.4 UIU/ML (ref 0.4–4)
URATE SERPL-MCNC: 5 MG/DL (ref 3.4–7)

## 2023-01-20 LAB
CALCIT SERPL-MCNC: 10.5 PG/ML
CR SERPL-MCNC: 0.2 NG/ML

## 2023-01-23 ENCOUNTER — DOCUMENTATION ONLY (OUTPATIENT)
Dept: ENDOCRINOLOGY | Facility: CLINIC | Age: 53
End: 2023-01-23
Payer: COMMERCIAL

## 2023-01-23 LAB
1,25(OH)2D3 SERPL-MCNC: 55 PG/ML (ref 20–79)
ESTROGEN SERPL-MCNC: 262 PG/ML
FRUCTOSAMINE SERPL-SCNC: 284 UMOL /L (ref 151–300)
MISCELLANEOUS TEST NAME: NORMAL
REFERENCE LAB: NORMAL
SPECIMEN TYPE: NORMAL
TEST RESULT: NORMAL

## 2023-01-24 LAB
ALBUMIN SERPL-MCNC: 4.1 G/DL (ref 3.6–5.1)
GLYCOMARK (TM): <1 UG/ML
SHBG SERPL-SCNC: 23 NMOL/L (ref 10–50)
TESTOST FREE SERPL-MCNC: 422.9 PG/ML (ref 46–224)
TESTOST SERPL-MCNC: 1585 NG/DL (ref 250–1100)
TESTOSTERONE.FREE+WB SERPL-MCNC: 796.2 NG/DL (ref 110–575)

## 2023-01-24 NOTE — PROGRESS NOTES
I have reviewed the recent ELF score results on the patient obtained @ [facility] from 01/18/23. It is 9.59 ( < 9.80) which is essentially normal  and indicates a low risk potential for progression of the  NAFLD to hepatic fibrosis and/or cirrhosis.     We will await the hepatic fibroscan for validation of this result.

## 2023-02-08 ENCOUNTER — LAB VISIT (OUTPATIENT)
Dept: LAB | Facility: HOSPITAL | Age: 53
End: 2023-02-08
Attending: INTERNAL MEDICINE
Payer: COMMERCIAL

## 2023-02-08 ENCOUNTER — PATIENT MESSAGE (OUTPATIENT)
Dept: DERMATOLOGY | Facility: CLINIC | Age: 53
End: 2023-02-08
Payer: COMMERCIAL

## 2023-02-08 DIAGNOSIS — E53.8 VITAMIN B12 DEFICIENCY: ICD-10-CM

## 2023-02-08 DIAGNOSIS — R79.89 LOW TESTOSTERONE IN MALE: ICD-10-CM

## 2023-02-08 DIAGNOSIS — E50.9 VITAMIN A DEFICIENCY: ICD-10-CM

## 2023-02-08 DIAGNOSIS — E29.1 HYPOGONADISM IN MALE: ICD-10-CM

## 2023-02-08 DIAGNOSIS — E63.9 NUTRITIONAL DEFICIENCY: ICD-10-CM

## 2023-02-08 DIAGNOSIS — Z98.84 HISTORY OF GASTRIC BYPASS: ICD-10-CM

## 2023-02-08 DIAGNOSIS — E55.9 HYPOVITAMINOSIS D: ICD-10-CM

## 2023-02-08 DIAGNOSIS — Z98.84 BARIATRIC SURGERY STATUS: ICD-10-CM

## 2023-02-08 DIAGNOSIS — E56.9 MULTIPLE VITAMIN DEFICIENCY: ICD-10-CM

## 2023-02-08 DIAGNOSIS — D53.9 NUTRITIONAL ANEMIA: ICD-10-CM

## 2023-02-08 DIAGNOSIS — E46 MALNUTRITION, UNSPECIFIED TYPE: ICD-10-CM

## 2023-02-08 LAB
FOLATE SERPL-MCNC: 6.4 NG/ML (ref 4–24)
MAGNESIUM SERPL-MCNC: 2.3 MG/DL (ref 1.6–2.6)
PTH-INTACT SERPL-MCNC: 87.8 PG/ML (ref 9–77)
VIT B12 SERPL-MCNC: 489 PG/ML (ref 210–950)

## 2023-02-08 PROCEDURE — 83970 ASSAY OF PARATHORMONE: CPT | Performed by: INTERNAL MEDICINE

## 2023-02-08 PROCEDURE — 84590 ASSAY OF VITAMIN A: CPT | Performed by: INTERNAL MEDICINE

## 2023-02-08 PROCEDURE — 84591 ASSAY OF NOS VITAMIN: CPT | Mod: 91 | Performed by: INTERNAL MEDICINE

## 2023-02-08 PROCEDURE — 84425 ASSAY OF VITAMIN B-1: CPT | Performed by: INTERNAL MEDICINE

## 2023-02-08 PROCEDURE — 82525 ASSAY OF COPPER: CPT | Performed by: INTERNAL MEDICINE

## 2023-02-08 PROCEDURE — 84446 ASSAY OF VITAMIN E: CPT | Performed by: INTERNAL MEDICINE

## 2023-02-08 PROCEDURE — 84207 ASSAY OF VITAMIN B-6: CPT | Performed by: INTERNAL MEDICINE

## 2023-02-08 PROCEDURE — 84270 ASSAY OF SEX HORMONE GLOBUL: CPT | Performed by: INTERNAL MEDICINE

## 2023-02-08 PROCEDURE — 83735 ASSAY OF MAGNESIUM: CPT | Performed by: INTERNAL MEDICINE

## 2023-02-08 PROCEDURE — 82380 ASSAY OF CAROTENE: CPT | Performed by: INTERNAL MEDICINE

## 2023-02-08 PROCEDURE — 83921 ORGANIC ACID SINGLE QUANT: CPT | Performed by: INTERNAL MEDICINE

## 2023-02-08 PROCEDURE — 83785 ASSAY OF MANGANESE: CPT | Performed by: INTERNAL MEDICINE

## 2023-02-08 PROCEDURE — 82607 VITAMIN B-12: CPT | Performed by: INTERNAL MEDICINE

## 2023-02-08 PROCEDURE — 84630 ASSAY OF ZINC: CPT | Performed by: INTERNAL MEDICINE

## 2023-02-08 PROCEDURE — 82180 ASSAY OF ASCORBIC ACID: CPT | Performed by: INTERNAL MEDICINE

## 2023-02-08 PROCEDURE — 84252 ASSAY OF VITAMIN B-2: CPT | Performed by: INTERNAL MEDICINE

## 2023-02-08 PROCEDURE — 82390 ASSAY OF CERULOPLASMIN: CPT | Performed by: INTERNAL MEDICINE

## 2023-02-08 PROCEDURE — 84255 ASSAY OF SELENIUM: CPT | Performed by: INTERNAL MEDICINE

## 2023-02-08 PROCEDURE — 82746 ASSAY OF FOLIC ACID SERUM: CPT | Performed by: INTERNAL MEDICINE

## 2023-02-08 PROCEDURE — 84597 ASSAY OF VITAMIN K: CPT | Performed by: INTERNAL MEDICINE

## 2023-02-08 PROCEDURE — 83018 HEAVY METAL QUAN EACH NES: CPT | Performed by: INTERNAL MEDICINE

## 2023-02-08 PROCEDURE — 84403 ASSAY OF TOTAL TESTOSTERONE: CPT | Performed by: INTERNAL MEDICINE

## 2023-02-08 PROCEDURE — 82607 VITAMIN B-12: CPT | Mod: 91 | Performed by: INTERNAL MEDICINE

## 2023-02-09 ENCOUNTER — OFFICE VISIT (OUTPATIENT)
Dept: DERMATOLOGY | Facility: CLINIC | Age: 53
End: 2023-02-09
Payer: COMMERCIAL

## 2023-02-09 ENCOUNTER — OFFICE VISIT (OUTPATIENT)
Dept: OTOLARYNGOLOGY | Facility: CLINIC | Age: 53
End: 2023-02-09
Payer: COMMERCIAL

## 2023-02-09 VITALS — HEIGHT: 74 IN | WEIGHT: 263.25 LBS | BODY MASS INDEX: 33.79 KG/M2

## 2023-02-09 VITALS — HEIGHT: 74 IN | BODY MASS INDEX: 33.37 KG/M2 | WEIGHT: 260 LBS

## 2023-02-09 DIAGNOSIS — Z87.2 HISTORY OF ROSACEA: ICD-10-CM

## 2023-02-09 DIAGNOSIS — J34.0 NASAL ABSCESS: ICD-10-CM

## 2023-02-09 DIAGNOSIS — E11.9 TYPE 2 DIABETES MELLITUS WITHOUT COMPLICATION, WITHOUT LONG-TERM CURRENT USE OF INSULIN: ICD-10-CM

## 2023-02-09 DIAGNOSIS — L02.92 FURUNCULOSIS: Primary | ICD-10-CM

## 2023-02-09 DIAGNOSIS — J34.0 FURUNCLE OF NOSE: Primary | ICD-10-CM

## 2023-02-09 DIAGNOSIS — J34.89 NASAL VESTIBULITIS: ICD-10-CM

## 2023-02-09 LAB — CERULOPLASMIN SERPL-MCNC: 22 MG/DL (ref 15–45)

## 2023-02-09 PROCEDURE — 87205 SMEAR GRAM STAIN: CPT | Performed by: OTOLARYNGOLOGY

## 2023-02-09 PROCEDURE — 99213 OFFICE O/P EST LOW 20 MIN: CPT | Mod: S$GLB,,, | Performed by: DERMATOLOGY

## 2023-02-09 PROCEDURE — 99204 OFFICE O/P NEW MOD 45 MIN: CPT | Mod: 25,S$GLB,, | Performed by: OTOLARYNGOLOGY

## 2023-02-09 PROCEDURE — 30000 PR DRAIN ABSCESS/HEMATOMA,NASAL: ICD-10-PCS | Mod: S$GLB,,, | Performed by: OTOLARYNGOLOGY

## 2023-02-09 PROCEDURE — 87186 SC STD MICRODIL/AGAR DIL: CPT | Performed by: OTOLARYNGOLOGY

## 2023-02-09 PROCEDURE — 99999 PR PBB SHADOW E&M-EST. PATIENT-LVL IV: ICD-10-PCS | Mod: PBBFAC,,, | Performed by: OTOLARYNGOLOGY

## 2023-02-09 PROCEDURE — 99204 PR OFFICE/OUTPT VISIT, NEW, LEVL IV, 45-59 MIN: ICD-10-PCS | Mod: 25,S$GLB,, | Performed by: OTOLARYNGOLOGY

## 2023-02-09 PROCEDURE — 99213 PR OFFICE/OUTPT VISIT, EST, LEVL III, 20-29 MIN: ICD-10-PCS | Mod: S$GLB,,, | Performed by: DERMATOLOGY

## 2023-02-09 PROCEDURE — 87070 CULTURE OTHR SPECIMN AEROBIC: CPT | Performed by: DERMATOLOGY

## 2023-02-09 PROCEDURE — 87077 CULTURE AEROBIC IDENTIFY: CPT | Performed by: OTOLARYNGOLOGY

## 2023-02-09 PROCEDURE — 99999 PR PBB SHADOW E&M-EST. PATIENT-LVL IV: CPT | Mod: PBBFAC,,, | Performed by: OTOLARYNGOLOGY

## 2023-02-09 PROCEDURE — 30000 DRAINAGE OF NOSE LESION: CPT | Mod: S$GLB,,, | Performed by: OTOLARYNGOLOGY

## 2023-02-09 PROCEDURE — 87070 CULTURE OTHR SPECIMN AEROBIC: CPT | Mod: 59 | Performed by: OTOLARYNGOLOGY

## 2023-02-09 PROCEDURE — 99999 PR PBB SHADOW E&M-EST. PATIENT-LVL IV: CPT | Mod: PBBFAC,,, | Performed by: DERMATOLOGY

## 2023-02-09 PROCEDURE — 87076 CULTURE ANAEROBE IDENT EACH: CPT | Performed by: OTOLARYNGOLOGY

## 2023-02-09 PROCEDURE — 87075 CULTR BACTERIA EXCEPT BLOOD: CPT | Performed by: OTOLARYNGOLOGY

## 2023-02-09 PROCEDURE — 99999 PR PBB SHADOW E&M-EST. PATIENT-LVL IV: ICD-10-PCS | Mod: PBBFAC,,, | Performed by: DERMATOLOGY

## 2023-02-09 RX ORDER — MUPIROCIN 20 MG/G
OINTMENT TOPICAL 3 TIMES DAILY
Qty: 22 G | Refills: 0 | Status: SHIPPED | OUTPATIENT
Start: 2023-02-09 | End: 2023-05-01

## 2023-02-09 RX ORDER — DOXYCYCLINE 100 MG/1
100 CAPSULE ORAL EVERY 12 HOURS
Qty: 20 CAPSULE | Refills: 0 | Status: SHIPPED | OUTPATIENT
Start: 2023-02-09 | End: 2023-03-02

## 2023-02-09 NOTE — PROGRESS NOTES
"  Subjective:       Patient ID:  Lyndon Lion Jr. is a 52 y.o. male who presents for   Chief Complaint   Patient presents with    Spot     Nose     LOV 9/20/22- Rosacea    Patient here today for  spot to nose x 3 weeks  Pt states theres lot of pressure to area, painful and bleeds  Felt it started inside his nose  Took doxy 100mg daily for the past 3 days      Derm HX:  Denies Phx of NMSC  Denies Fhx of MM    Current Outpatient Medications:   ·  ACCU-CHEK GUIDE TEST STRIPS Strp, USE TO TEST TWICE A DAY, Disp: 200 strip, Rfl: 3  ·  ascorbic acid, vitamin C, (VITAMIN C) 100 MG tablet, Take 100 mg by mouth once daily., Disp: , Rfl:   ·  aspirin (ECOTRIN) 81 MG EC tablet, Take 81 mg by mouth once daily., Disp: , Rfl:   ·  cyanocobalamin, vitamin B-12, 2,500 mcg Lozg, Place 2 tablets under the tongue once daily., Disp: 180 lozenge, Rfl: 3  ·  ferrous gluconate 225 mg (27 mg iron) Tab, Take 27 mg by mouth 2 (two) times daily with meals., Disp: 200 tablet, Rfl: 3  ·  ketoconazole (NIZORAL) 2 % shampoo, Wash torso with medicated shampoo at least 2x/week - let sit on skin at least 5 minutes prior to rinsing, Disp: 120 mL, Rfl: 5  ·  lancets (ACCU-CHEK SOFTCLIX LANCETS) Misc, 1 Units by Misc.(Non-Drug; Combo Route) route 2 (two) times a day., Disp: 200 each, Rfl: 0  ·  metronidazole 0.75% (METROCREAM) 0.75 % Crea, Apply 1 application topically 2 (two) times daily. Apply to affected area, Disp: 30 g, Rfl: 1  ·  minocycline (ZILXI) 1.5 % Foam, Apply 1 application topically once daily., Disp: 30 g, Rfl: 5  ·  pen needle, diabetic (PEN NEEDLE) 32 gauge x 5/32" Ndle, 1 each by Misc.(Non-Drug; Combo Route) route once daily., Disp: 90 each, Rfl: 3  ·  pravastatin (PRAVACHOL) 20 MG tablet, Take 1 tablet (20 mg total) by mouth once daily., Disp: 90 tablet, Rfl: 3  ·  semaglutide (OZEMPIC) 1 mg/dose (4 mg/3 mL), Inject 1 mg into the skin every 7 days., Disp: 3 pen, Rfl: 3  ·  sulfacetamide sodium-sulfur 10-5 % (w/w) Clsr, Use to " wash face daily, Disp: 170 g, Rfl: 5  ·  testosterone cypionate (DEPOTESTOTERONE CYPIONATE) 200 mg/mL injection, Inject 60 mg into the muscle every 14 (fourteen) days. Twice a week, Disp: , Rfl:   ·  triamcinolone acetonide 0.025% (KENALOG) 0.025 % Oint, Thin film to lips and eczematous plaques BID PRN flare, Disp: 15 g, Rfl: 1  ·  triamcinolone acetonide 0.1% (KENALOG) 0.1 % cream, AAA bid, Disp: 454 g, Rfl: 3  ·  zinc gluconate 50 mg tablet, Take 50 mg by mouth once daily., Disp: , Rfl:   ·  ciclopirox (PENLAC) 8 % Soln, Apply topically nightly., Disp: 6.6 mL, Rfl: 2  ·  liraglutide 0.6 mg/0.1 mL, 18 mg/3 mL, subq PNIJ (VICTOZA 3-ELIZABETH) 0.6 mg/0.1 mL (18 mg/3 mL) PnIj pen, Inject 1.8 mg into the skin once daily. To begin @ 0.6mg daily for first 2 weeks then uptiitrate as tolerated., Disp: 27 mL, Rfl: 3  ·  metFORMIN (GLUCOPHAGE-XR) 500 MG ER 24hr tablet, Take 1 tablet (500 mg total) by mouth 2 (two) times daily with meals., Disp: 180 tablet, Rfl: 3  ·  tadalafiL (CIALIS) 5 MG tablet, Take 1 tablet (5 mg total) by mouth daily as needed for Erectile Dysfunction., Disp: 90 tablet, Rfl: 0  ·  vitamin A 8000 UNIT capsule, Take 1 capsule (8,000 Units total) by mouth once daily., Disp: 100 capsule, Rfl: 3      Review of Systems   Constitutional:  Negative for fever, chills and fatigue.   HENT:  Negative for nosebleeds and headaches.    Respiratory:  Negative for cough and shortness of breath.    Gastrointestinal:  Negative for nausea, vomiting, abdominal pain and diarrhea.   Musculoskeletal:  Negative for myalgias and arthralgias.   Skin:  Positive for dry skin, daily sunscreen use, activity-related sunscreen use, dry lips and wears hat. Negative for itching, rash, sun sensitivity and recent sunburn.   Neurological:  Negative for headaches.   Psychiatric/Behavioral:  Negative for depressed mood.    Hematologic/Lymphatic: Does not bruise/bleed easily.      Objective:    Physical Exam   Skin:   Areas Examined (abnormalities  noted in diagram):   Head / Face Inspection Performed            Diagram Legend     Erythematous scaling macule/papule c/w actinic keratosis       Vascular papule c/w angioma      Pigmented verrucoid papule/plaque c/w seborrheic keratosis      Yellow umbilicated papule c/w sebaceous hyperplasia      Irregularly shaped tan macule c/w lentigo     1-2 mm smooth white papules consistent with Milia      Movable subcutaneous cyst with punctum c/w epidermal inclusion cyst      Subcutaneous movable cyst c/w pilar cyst      Firm pink to brown papule c/w dermatofibroma      Pedunculated fleshy papule(s) c/w skin tag(s)      Evenly pigmented macule c/w junctional nevus     Mildly variegated pigmented, slightly irregular-bordered macule c/w mildly atypical nevus      Flesh colored to evenly pigmented papule c/w intradermal nevus       Pink pearly papule/plaque c/w basal cell carcinoma      Erythematous hyperkeratotic cursted plaque c/w SCC      Surgical scar with no sign of skin cancer recurrence      Open and closed comedones      Inflammatory papules and pustules      Verrucoid papule consistent consistent with wart     Erythematous eczematous patches and plaques     Dystrophic onycholytic nail with subungual debris c/w onychomycosis     Umbilicated papule    Erythematous-base heme-crusted tan verrucoid plaque consistent with inflamed seborrheic keratosis     Erythematous Silvery Scaling Plaque c/w Psoriasis     See annotation            Assessment / Plan:        Furuncle of nose  -     Aerobic culture    Attempted to drain,   Risks, benefits and alternatives discussed. Informed verbal consent obtained.  Area cleaned with hibiclens. 3cc lidocaine with  epinephrine was used as a local anesthetic. Lesion incised with #11 blade and curettage attempted with very little success. Bacterial culture performed.    Contacted ENT Dr Mcghee for input, kindly agreed to see the patient and take over management           No follow-ups on  file.

## 2023-02-09 NOTE — PROGRESS NOTES
[facility]    Subjective:      Patient: Lyndon Lion Jr. Patient PCP: Evan Judd MD         :  1970     Sex:  male      MRN:  [id number]          Date of Visit: 2023      Chief Complaint: Other (Noticed swelling on right side of nasal tip 3 days ago. Thought was ingrown nasal hair at first. Does have rosacea. Dermatology tried draining the area, but did not get anything out. Pt was sent straight over for ENT evaluation. )      Patient ID: Lyndon Lion Jr. is a 52 y.o. 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 Walt Rubio at Dermatology for concern of nasal abscess with attempts at drainage not revealing any purulence.  Symptoms of right predominant nasal tip swelling began about 3 days ago with what felt like an ingrown hair.  He does have a longstanding history of rosacea.  No fevers or chills or night sweats.      Review of Systems     Past Medical History  He has a past medical history of Arthritis, Asthma, Colon cancer, Family hx of prostate cancer, colon cancer, stage I, Parada syndrome, Tear of labium, Uncontrolled type 2 diabetes mellitus with hyperglycemia, Vitamin D deficiency, and Wears glasses.    Family / Surgical / Social History  His family history includes Alcohol abuse in his paternal uncle; Breast cancer in his mother; Cancer in his maternal grandfather, maternal uncle, mother, and paternal uncle; Colon cancer in his maternal grandfather and maternal uncle; Dementia in his maternal grandmother; Diabetes in his father and paternal grandmother; Early death in his maternal grandfather; Hearing loss in his father; Heart disease in his father and maternal uncle; Hypertension in his  maternal uncle and paternal grandfather; Liver disease in his father; Melanoma in his mother; No Known Problems in his paternal aunt and sister; Polycystic ovary syndrome in his daughter; Prostate cancer in his paternal uncle; Prostatitis in his paternal grandfather.    Past Surgical History:   Procedure Laterality Date    APPENDECTOMY      CAUDAL EPIDURAL STEROID INJECTION N/A 11/6/2018    Procedure: Injection-steroid-epidural-caudal;  Surgeon: Lyndon Brooke Jr., MD;  Location: [facility];  Service: Pain Management;  Laterality: N/A;    COLON SURGERY  2008    PARTIAL COLECTOMY    COLONOSCOPY Bilateral 2012    COLONOSCOPY N/A 8/1/2016    Procedure: COLONOSCOPY;  Surgeon: Blaze Marr MD;  Location: [facility] ENDO;  Service: Endoscopy;  Laterality: N/A;    COLONOSCOPY N/A 9/20/2017    Procedure: COLONOSCOPY;  Surgeon: Dom Leonardo MD;  Location: [facility] (4TH FLR);  Service: Endoscopy;  Laterality: N/A;  not given PM prep    COLONOSCOPY N/A 10/9/2017    Procedure: COLONOSCOPY;  Surgeon: RAMON Callahan MD;  Location: [facility] (4TH FLR);  Service: Endoscopy;  Laterality: N/A;  ok for Prepopik per Dr Callahan    COLONOSCOPY N/A 11/20/2017    Procedure: COLONOSCOPY/EMR;  Surgeon: Joseluis Choe MD;  Location: [facility] (2ND FLR);  Service: Endoscopy;  Laterality: N/A;  EMR    no pm prep    COLONOSCOPY N/A 5/30/2018    Procedure: COLONOSCOPY;  Surgeon: Dom Leonardo MD;  Location: [facility] (4TH FLR);  Service: Endoscopy;  Laterality: N/A;  follow up colonoscopy in 5/2018 for Parada Syndrome         COLONOSCOPY N/A 6/10/2019    Procedure: COLONOSCOPY;  Surgeon: Dom Leonardo MD;  Location: [facility] ENDO (4TH FLR);  Service: Endoscopy;  Laterality: N/A;  Prepopik ordered per Dr. Leonardo    COLONOSCOPY N/A 7/22/2020    Procedure: COLONOSCOPY;  Surgeon: Dom Leonardo MD;  Location: [facility] ENDO (4TH FLR);  Service: Endoscopy;  Laterality: N/A;    COLONOSCOPY N/A 5/27/2021    Procedure: COLONOSCOPY;  Surgeon:  Dom Leonardo MD;  Location: [facility] (4TH FLR);  Service: Endoscopy;  Laterality: N/A;  covid test 5/24-[place]  pt requests Prepopik    COLONOSCOPY N/A 6/17/2022    Procedure: COLONOSCOPY;  Surgeon: Dom Leonardo MD;  Location: [facility] (4TH FLR);  Service: Endoscopy;  Laterality: N/A;  He was discovered to have colon cancer and had right hemicolectomy        for stage II on 08/08/2008. MSH2 Parada syndrome.  sutab requested  instructions via the portal -sm  fully vaccinated - sm    CYSTOSCOPY      Epidural Steroid Injection  10/23/15    Lumbar    EPIDURAL STEROID INJECTION INTO LUMBAR SPINE N/A 7/27/2018    Procedure: Injection-steroid-epidural-lumbar;  Surgeon: Lyndon Brooke Jr., MD;  Location: [facility] OR;  Service: Pain Management;  Laterality: N/A;    ESOPHAGOGASTRODUODENOSCOPY      ESOPHAGOGASTRODUODENOSCOPY N/A 7/22/2020    Procedure: EGD (ESOPHAGOGASTRODUODENOSCOPY);  Surgeon: Dom Leonardo MD;  [facility]  Service: Endoscopy;  Laterality: N/A;  Please schedule patient for EGD and colonoscopy with me MSH2 Parada syndrome and anemia evaluation please make priority 1  covid test 7/20-[place]    FACETECTOMY OF VERTEBRA  2/13/2019    Procedure: MEDIAL FACETECTOMY L5-S1;  Surgeon: Lyndon Brooke Jr., MD;  Location: [facility] OR;  Service: Orthopedics;;    GASTRIC BYPASS  2010    SLEEVE    KNEE ARTHROSCOPY Right     LUMBAR DISCECTOMY  2/13/2019    Procedure: DISCECTOMY, SPINE, LUMBAR L5-S1;  Surgeon: Lyndon Brooke Jr., MD;  Location: [facility] OR;  Service: Orthopedics;;       Social History     Tobacco Use    Smoking status: Never    Smokeless tobacco: Never   Substance and Sexual Activity    Alcohol use: Yes     Comment: RARELY    Drug use: No    Sexual activity: Yes     Partners: Female       Medications  He has a current medication list which includes the following prescription(s): accu-chek guide test strips, ascorbic acid (vitamin c), aspirin, ciclopirox, cyanocobalamin  (vitamin b-12), ferrous gluconate, lancets, metformin, metronidazole 0.75%, zilxi, pen needle, diabetic, pravastatin, ozempic, sulfacetamide sodium-sulfur, tadalafil, testosterone cypionate, triamcinolone acetonide 0.025%, triamcinolone acetonide 0.1%, vitamin a, and zinc gluconate.      Allergies  Review of patient's allergies indicates:  No Known Allergies    All medications, allergies, and past history have been reviewed.    Objective:      Vitals:  Vitals - 1 value per visit 1/17/2023 2/9/2023 2/9/2023   SYSTOLIC 124 - -   DIASTOLIC 80 - -   Pulse 72 - -   Temp 98 - -   Resp - - -   SPO2 95 - -   Weight (lb) 277.45 260 263.23   Weight (kg) 125.85 117.935 119.4   Height 74 74 74   BMI (Calculated) 35.6 33.4 33.8   VISIT REPORT - - -   Waist Measurements - - -   Pain Score  - 4 -   Some recent data might be hidden       Body surface area is 2.5 meters squared.    Physical Exam:    GENERAL  APPEARANCE -  alert, appears stated age, and cooperative  BARRIER(S) TO COMMUNICATION -  none VOICE - appropriate for age and gender    INTEGUMENTARY  See photos from Dr. Rubio earlier today.  Rosacea and right nasal tip fullness/induration w/o fluctuance    HEENT  HEAD: Normocephalic, without obvious abnormality, atraumatic  FACE: INSPECTION - Symmetric, no signs of trauma, no suspicious lesion(s)  PALPATION -  No masses SALIVARY GLANDS - non-tender with no appreciable mass  STRENGTH - facial symmetry  NECK/THYROID: normal atraumatic, no neck masses, normal thyroid, no jvd    EYES  Normal occular alignment and mobility with no visible nystagmus at rest    EARS/NOSE/MOUTH/THROAT  EARS  PINNAE AND EXTERNAL EARS - no suspicious lesion HEARING - grossly intact to voice/finger rub    NOSE AND SINUSES  EXTERNAL NOSE - see Dr. Rubio's photos and integumentary above.  There is swelling of the nasal vestibule depressing the lateral crura of the lower lateral cartilage with an opening extending up into the nasal tip explored as  noted during I&D and exploration procedure with iris scissors lysing adhesions and wiping out granular tissue and debris without gross purulence sent for culture abscess seemed to track superficial to the lower lateral cartilage into the right nasal tip with thick induration of the overlying skin including sebaceous hypertrophy which is exaggerated by the edema and active infection SEPTUM - normal/no obstruction on anterior exam without decongestion TURBINATES - within normal limits MUCOSA - within normal limits       CHEST AND LUNG   INSPECTION & AUSCULTATION - normal effort, no stridor    CARDIOVASCULAR  AUSCULTATION & PERIPHERAL VASCULAR - regular rate and rhythm.    NEUROLOGIC  MENTAL STATUS - alert, interactive CRANIAL NERVES - normal    LYMPHATIC  HEAD AND NECK - non-palpable      Procedure(s):  Incision and drainage nasal tip abscess. See procedure note.    Labs:  WBC   Date Value Ref Range Status   01/18/2023 5.23 3.90 - 12.70 K/uL Final     Hemoglobin   Date Value Ref Range Status   01/18/2023 18.4 (H) 14.0 - 18.0 g/dL Final     Platelets   Date Value Ref Range Status   01/18/2023 284 150 - 450 K/uL Final     Creatinine   Date Value Ref Range Status   01/18/2023 1.4 0.5 - 1.4 mg/dL Final     TSH   Date Value Ref Range Status   01/18/2023 1.395 0.400 - 4.000 uIU/mL Final     Glucose   Date Value Ref Range Status   01/18/2023 89 70 - 110 mg/dL Final     Hemoglobin A1C   Date Value Ref Range Status   01/18/2023 6.2 (H) 4.0 - 5.6 % Final     Comment:     ADA Screening Guidelines:  5.7-6.4%  Consistent with prediabetes  >or=6.5%  Consistent with diabetes    High levels of fetal hemoglobin interfere with the HbA1C  assay. Heterozygous hemoglobin variants (HbS, HgC, etc)do  not significantly interfere with this assay.   However, presence of multiple variants may affect accuracy.           Assessment:      Problem List Items Addressed This Visit    None  Visit Diagnoses       Furunculosis    -  Primary    Nasal  abscess        Nasal vestibulitis        Type 2 diabetes mellitus without complication, without long-term current use of insulin        History of rosacea                     Plan:      The inside of the nose where the packing is and the entire outside of the nose that is involved covered in the prescribed mupirocin/Bactroban ointment playing at least 3 times daily.  Use saline to clean the nose of any mucus drainage and reapply.    Take doxycycline twice daily for 10 full days.  SPF 30 applied outwardly and had and sun protection while on the cruise avoiding on both direct and indirect sunlight while on doxycycline as discussed.      Pull the packing out as instructed in 48 hours (Saturday afternoon) hold pressure for any minor bleeding.  The sutures in the outside of the nose will dissolve on their own over the course of a week or so especially if kept moist with ointment.  Wash the nose and inside the nose with soap and water regularly.  Crusting and scabbing can be cleared with a 50 50 water peroxide mix.      Cell phone number provided for any questions or concerns while traveling.  Attempt to use the portal [facility]) for any lesser urgent questions.    Routine follow-up in 3 weeks to assess response to treatment but also discuss other issues including longstanding anosmia.

## 2023-02-09 NOTE — PATIENT INSTRUCTIONS
The inside of the nose where the packing is and the entire outside of the nose that is involved covered in the prescribed mupirocin/Bactroban ointment playing at least 3 times daily.  Use saline to clean the nose of any mucus drainage and reapply.    Take doxycycline twice daily for 10 full days.  SPF 30 applied outwardly and had and sun protection while on the cruise avoiding on both direct and indirect sunlight while on doxycycline as discussed.      Pull the packing out as instructed in 48 hours (Saturday afternoon) hold pressure for any minor bleeding.  The sutures in the outside of the nose will dissolve on their own over the course of a week or so especially if kept moist with ointment.  Wash the nose and inside the nose with soap and water regularly.  Crusting and scabbing can be cleared with a 50 50 water peroxide mix.      Cell phone number provided for any questions or concerns while traveling.  Attempt to use the portal (my [facility]) for any lesser urgent questions.    Routine follow-up in 3 weeks to assess response to treatment but also discuss other issues including longstanding anosmia.

## 2023-02-09 NOTE — PROCEDURES
"PROCEDURE NOTE      PRE-OP DIAGNOSIS: nasal abscess, rosacea, vestibulitis    POST-OP DIAGNOSIS: same    PROCEDURE(S): I&D nasal tip abscess    SURGEON: Garth Mcghee    ASSISTANT: N/A    ANESTHESIA: local infiltration right ala and columella of 1% lidocaine with epi 1:100k, < 1.5 ml    PATIENT CONDITION: good    ESTIMATED BLOOD LOSS: none    SPECIMEN(S): GS/Routine/anaerobic culture    COMPLICATIONS: none    DRAIN(S): approx 5 cm of 1/4" iodoform gauze    FINDINGS: see PE in office note    PROCEDURE:  Verbal emergency consent obtained all risks and benefits discussed.  Local anesthesia performed after swabbing the nose intranasally and externally with Betadine.  Entering in through the existing folliculitis drainage pathway anterior 1/3 of the alar rim within the nasal passage exploring with iris scissors superficial to the lower lateral cartilage up into the nasal tip and into the subdermal and intradermal levels all wiped out with cotton swabs and culture obtained.  Simple interrupted sutures x2 of 5 0 chromic placed in the previous external incision made by Dr. Rubio.  Wound packed with a proximally 5 cm one-quarter-inch iodoform gauze.      Patient got a little bit lightheaded and nauseous and was kept supine for the rest of the procedure and recovered nicely.      Post care instructions reviewed and provided through the patient portal.    "

## 2023-02-10 LAB
GRAM STN SPEC: NORMAL
IODINE SERPL-MCNC: 43 NG/ML (ref 40–92)
SELENIUM SERPL-MCNC: 128 MCG/L (ref 110–165)
VIT B12 SERPL-MCNC: 390 NG/L (ref 180–914)

## 2023-02-11 LAB
NIACIN SERPL-MCNC: <5 NG/ML
NICOTINAMIDE SERPL-MCNC: 14.9 NG/ML (ref 5–48)
NICOTINURATE SERPL-MCNC: <5 NG/ML
VIT B2 SERPL-MCNC: 3 MCG/L (ref 1–19)

## 2023-02-13 ENCOUNTER — PATIENT MESSAGE (OUTPATIENT)
Dept: OTOLARYNGOLOGY | Facility: CLINIC | Age: 53
End: 2023-02-13
Payer: COMMERCIAL

## 2023-02-13 LAB
BACTERIA SPEC AEROBE CULT: NORMAL
BACTERIA SPEC AEROBE CULT: NORMAL
COPPER SERPL-MCNC: 948 UG/L (ref 665–1480)
VIT C SERPL-MCNC: 2 MG/L (ref 2–19)
ZINC SERPL-MCNC: 72 UG/DL (ref 60–130)

## 2023-02-14 ENCOUNTER — TELEPHONE (OUTPATIENT)
Dept: DERMATOLOGY | Facility: CLINIC | Age: 53
End: 2023-02-14
Payer: COMMERCIAL

## 2023-02-14 LAB
A-TOCOPHEROL VIT E SERPL-MCNC: 1187 UG/DL (ref 500–1800)
CAROTENE SERPL-MCNC: 4 UG/DL (ref 3–91)
MANGANESE, SERUM: 0.8 NG/ML (ref 0.5–1.2)
METHYLMALONATE SERPL-SCNC: 0.11 NMOL/ML
PYRIDOXAL SERPL-MCNC: 12 UG/L (ref 5–50)
VIT A SERPL-MCNC: 42 UG/DL (ref 38–106)

## 2023-02-14 NOTE — TELEPHONE ENCOUNTER
----- Message from Chata Rubio MD sent at 2/13/2023  5:16 PM CST -----  No growth. Hope your nose is healing nicely (patient on a cruise currently)

## 2023-02-15 LAB
ALBUMIN SERPL-MCNC: 4.5 G/DL (ref 3.6–5.1)
BIOTIN SERPL-SCNC: 1158.5 PG/ML (ref 221–3004)
PANTOTHENATE SERPLBLD-MCNC: 64.28 UG/L
SHBG SERPL-SCNC: 25 NMOL/L (ref 10–50)
TESTOST FREE SERPL-MCNC: 62.2 PG/ML (ref 46–224)
TESTOST SERPL-MCNC: 384 NG/DL (ref 250–1100)
TESTOSTERONE.FREE+WB SERPL-MCNC: 128 NG/DL (ref 110–575)

## 2023-02-16 ENCOUNTER — TELEPHONE (OUTPATIENT)
Dept: DERMATOLOGY | Facility: CLINIC | Age: 53
End: 2023-02-16
Payer: COMMERCIAL

## 2023-02-16 ENCOUNTER — PATIENT MESSAGE (OUTPATIENT)
Dept: DERMATOLOGY | Facility: CLINIC | Age: 53
End: 2023-02-16
Payer: COMMERCIAL

## 2023-02-16 LAB
BACTERIA SPEC ANAEROBE CULT: NORMAL
PHYTONADIONE SERPL-MCNC: 1.41 NMOL/L (ref 0.22–4.88)
VIT B1 BLD-MCNC: 71 UG/L (ref 38–122)

## 2023-02-16 NOTE — TELEPHONE ENCOUNTER
Contacted pt in regards to culture, spoke to wife on his behalf. She states nose is healing slowly but they notice progress, wife states they will send pictures of progress when they're home from vacation.

## 2023-02-17 ENCOUNTER — PATIENT MESSAGE (OUTPATIENT)
Dept: OTOLARYNGOLOGY | Facility: CLINIC | Age: 53
End: 2023-02-17
Payer: COMMERCIAL

## 2023-02-17 ENCOUNTER — TELEPHONE (OUTPATIENT)
Dept: OTOLARYNGOLOGY | Facility: CLINIC | Age: 53
End: 2023-02-17
Payer: COMMERCIAL

## 2023-02-17 NOTE — TELEPHONE ENCOUNTER
----- Message from Vivian Fields, Patient Care Assistant sent at 2/17/2023 10:53 AM CST -----  Contact: Lior wife  Type:  Patient Returning Call    Who Called:  lior   Who Left Message for Patient:  Renae  Does the patient know what this is regarding?:  appt for Monday and pt will be there  Best Call Back Number:  [phone]    Additional Information:  thanks

## 2023-02-17 NOTE — TELEPHONE ENCOUNTER
Received secure chat message from Dr. Mcghee and Dr. Rubio that pt needs to be seen Monday for recheck of the nasal abscess. Left message on voicemail for pt to call back. Thanks, Renae

## 2023-02-20 ENCOUNTER — OFFICE VISIT (OUTPATIENT)
Dept: OTOLARYNGOLOGY | Facility: CLINIC | Age: 53
End: 2023-02-20
Payer: COMMERCIAL

## 2023-02-20 VITALS — TEMPERATURE: 98 F | HEIGHT: 74 IN | BODY MASS INDEX: 33.47 KG/M2 | WEIGHT: 260.81 LBS

## 2023-02-20 DIAGNOSIS — D48.5 NEOPLASM OF UNCERTAIN BEHAVIOR OF SKIN OF NOSE: ICD-10-CM

## 2023-02-20 DIAGNOSIS — Z87.2 HISTORY OF ROSACEA: ICD-10-CM

## 2023-02-20 DIAGNOSIS — E11.9 TYPE 2 DIABETES MELLITUS WITHOUT COMPLICATION, WITHOUT LONG-TERM CURRENT USE OF INSULIN: ICD-10-CM

## 2023-02-20 DIAGNOSIS — L02.92 FURUNCULOSIS: Primary | ICD-10-CM

## 2023-02-20 DIAGNOSIS — J34.89 NASAL VESTIBULITIS: ICD-10-CM

## 2023-02-20 PROCEDURE — 99214 PR OFFICE/OUTPT VISIT, EST, LEVL IV, 30-39 MIN: ICD-10-PCS | Mod: 25,S$GLB,, | Performed by: OTOLARYNGOLOGY

## 2023-02-20 PROCEDURE — 11106 PR INCISIONAL BIOPSY, SKIN, SINGLE LESION: ICD-10-PCS | Mod: S$GLB,,, | Performed by: OTOLARYNGOLOGY

## 2023-02-20 PROCEDURE — 99214 OFFICE O/P EST MOD 30 MIN: CPT | Mod: 25,S$GLB,, | Performed by: OTOLARYNGOLOGY

## 2023-02-20 PROCEDURE — 88305 TISSUE EXAM BY PATHOLOGIST: CPT | Performed by: PATHOLOGY

## 2023-02-20 PROCEDURE — 99999 PR PBB SHADOW E&M-EST. PATIENT-LVL III: CPT | Mod: PBBFAC,,, | Performed by: OTOLARYNGOLOGY

## 2023-02-20 PROCEDURE — 88305 TISSUE EXAM BY PATHOLOGIST: ICD-10-PCS | Mod: 26,,, | Performed by: PATHOLOGY

## 2023-02-20 PROCEDURE — 11106 INCAL BX SKN SINGLE LES: CPT | Mod: S$GLB,,, | Performed by: OTOLARYNGOLOGY

## 2023-02-20 PROCEDURE — 99999 PR PBB SHADOW E&M-EST. PATIENT-LVL III: ICD-10-PCS | Mod: PBBFAC,,, | Performed by: OTOLARYNGOLOGY

## 2023-02-20 PROCEDURE — 88305 TISSUE EXAM BY PATHOLOGIST: CPT | Mod: 26,,, | Performed by: PATHOLOGY

## 2023-02-20 NOTE — PROCEDURES
PROCEDURE NOTE      PRE-OP DIAGNOSIS:  Enlarging skin lesion right nasal tip of uncertain behavior    POST-OP DIAGNOSIS: same    PROCEDURE(S):  5 mm punch biopsy of right nasal tip lesion    SURGEON: Garth Mcghee    ASSISTANT: N/A    ANESTHESIA:  0.5 mL 1% lidocaine with epinephrine [id number]    PATIENT CONDITION:  Good    ESTIMATED BLOOD LOSS:  None    SPECIMEN(S):  Right nasal tip biopsy in buffered formalin to pathology.  [facility].    COMPLICATIONS: none    DRAIN(S): n/a    FINDINGS:  See physical exam    PROCEDURE:  Consent obtained swabbed with Betadine infiltrated locally superior to the prior incision site/necrosis.  5 mm skin punch biopsy used.  Silver nitrate into the biopsy site with easy control of bleeding.  Dressed with Bactroban ointment.  Tolerated well.

## 2023-02-20 NOTE — PATIENT INSTRUCTIONS
Patient instructed to keep the area covered with Aquaphor to allow for healing of the punch biopsy.  Direct pressure with any bleeding to stop.  Call if not notified of results in the next 2-3 days.      As discussed our hope is that this is a inflammatory hypertrophic condition or at worst a low-grade malignancy which can be treated with debridement and fulguration (cautery) locally rather than wide excision with flap reconstruction as would be required for squamous cell carcinoma with Dr. Alyx Spivey at all at [facility].

## 2023-02-20 NOTE — PROGRESS NOTES
[facility]    Subjective:      Patient: Lyndon Lion Jr. Patient PCP: Evan Judd MD         :  1970     Sex:  male      MRN:  [id number]          Date of Visit: 2023      Chief Complaint: Follow-up (Nasal abscess still painful and getting larger)    2023 follow-up visit: Lyndon Lion Jr. is a 52 y.o. male lifelong NON-smoker with these mellitus well controlled with last hemoglobin A1c just over 6.2% seen for sudden onset right nasal tip lesion with pain and swelling by Dr. Rubio.  Started with a vestibular columellar abscess.  Drained internally through this passage way with some minimal inflammatory material not gross purulence incision without biopsy by Dr. Rubio of the external thickened nasal skin was closed with 2 simple interrupted chromic sutures.  Placed on b.i.d. doxycycline and packing removed by the patient 2 days after the procedure.  Patient continues to feel pain and enlargement of the nasal tip.  He is returned from his vacation.  Completed doxycycline.  Using medical grade honey and emollient but there is rapid growth of the nasal tip lesion.  There is still pain in the area extending into the paranasal areas bilaterally but not in the vestibule as before.  No active drainage.  No bleeding.    2023 initial patient consultation: Lyndon Lion Jr. is a 52 y.o. 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 Walt Rubio at Dermatology for concern of nasal abscess with attempts at drainage not revealing any purulence.  Symptoms of right predominant nasal tip swelling began about 3 days ago with what felt like an ingrown hair.  He does have a longstanding history of  rosacea.  No fevers or chills or night sweats.      Review of Systems     Past Medical History  He has a past medical history of Arthritis, Asthma, Colon cancer, Family hx of prostate cancer, colon cancer, stage I, Parada syndrome, Tear of labium, Uncontrolled type 2 diabetes mellitus with hyperglycemia, Vitamin D deficiency, and Wears glasses.    Family / Surgical / Social History  His family history includes Alcohol abuse in his paternal uncle; Breast cancer in his mother; Cancer in his maternal grandfather, maternal uncle, mother, and paternal uncle; Colon cancer in his maternal grandfather and maternal uncle; Dementia in his maternal grandmother; Diabetes in his father and paternal grandmother; Early death in his maternal grandfather; Hearing loss in his father; Heart disease in his father and maternal uncle; Hypertension in his maternal uncle and paternal grandfather; Liver disease in his father; Melanoma in his mother; No Known Problems in his paternal aunt and sister; Polycystic ovary syndrome in his daughter; Prostate cancer in his paternal uncle; Prostatitis in his paternal grandfather.    Past Surgical History:   Procedure Laterality Date    APPENDECTOMY      CAUDAL EPIDURAL STEROID INJECTION N/A 11/6/2018    Procedure: Injection-steroid-epidural-caudal;  Surgeon: Lyndon Brooke Jr., MD;  Location: [facility] OR;  Service: Pain Management;  Laterality: N/A;    COLON SURGERY  2008    PARTIAL COLECTOMY    COLONOSCOPY Bilateral 2012    COLONOSCOPY N/A 8/1/2016    Procedure: COLONOSCOPY;  Surgeon: Blaze Marr MD;  Location: [facility];  Service: Endoscopy;  Laterality: N/A;    COLONOSCOPY N/A 9/20/2017    Procedure: COLONOSCOPY;  Surgeon: Dom Leonardo MD;  Location: [facility] ([address] [place] [address]);  Service: Endoscopy;  Laterality: N/A;  not given PM prep    COLONOSCOPY N/A 10/9/2017    Procedure: COLONOSCOPY;  Surgeon: RAMON Callahan MD;  Location: [facility] ([address] [place] [address]);  Service: Endoscopy;  Laterality: N/A;  ok  for Prepopik per Dr Callahan    COLONOSCOPY N/A 11/20/2017    Procedure: COLONOSCOPY/EMR;  Surgeon: Joseluis Choe MD;  Location: [facility] (2ND FLR);  Service: Endoscopy;  Laterality: N/A;  EMR    no pm prep    COLONOSCOPY N/A 5/30/2018    Procedure: COLONOSCOPY;  Surgeon: Dom Leonardo MD;  Location: [facility] (4TH FLR);  Service: Endoscopy;  Laterality: N/A;  follow up colonoscopy in 5/2018 for Parada Syndrome         COLONOSCOPY N/A 6/10/2019    Procedure: COLONOSCOPY;  Surgeon: Dom Leonardo MD;  Location: [facility] (4TH FLR);  Service: Endoscopy;  Laterality: N/A;  Prepopik ordered per Dr. Leonardo    COLONOSCOPY N/A 7/22/2020    Procedure: COLONOSCOPY;  Surgeon: Dmo Leonardo MD;  Location: [facility] (4TH FLR);  Service: Endoscopy;  Laterality: N/A;    COLONOSCOPY N/A 5/27/2021    Procedure: COLONOSCOPY;  Surgeon: Dom Leonardo MD;  Location: [facility] (4TH FLR);  Service: Endoscopy;  Laterality: N/A;  covid test 5/24-[place]  pt requests Prepopik    COLONOSCOPY N/A 6/17/2022    Procedure: COLONOSCOPY;  Surgeon: Dom Leonardo MD;  Location: [facility] (4TH FLR);  Service: Endoscopy;  Laterality: N/A;  He was discovered to have colon cancer and had right hemicolectomy        for stage II on 08/08/2008. MSH2 Parada syndrome.  sutab requested  instructions via the portal -sm  fully vaccinated - sm    CYSTOSCOPY      Epidural Steroid Injection  10/23/15    Lumbar    EPIDURAL STEROID INJECTION INTO LUMBAR SPINE N/A 7/27/2018    Procedure: Injection-steroid-epidural-lumbar;  Surgeon: Lyndon Brooke Jr., MD;  Location: [facility] OR;  Service: Pain Management;  Laterality: N/A;    ESOPHAGOGASTRODUODENOSCOPY      ESOPHAGOGASTRODUODENOSCOPY N/A 7/22/2020    Procedure: EGD (ESOPHAGOGASTRODUODENOSCOPY);  Surgeon: Dom Leonardo MD;  Location: [facility] (4TH FLR);  Service: Endoscopy;  Laterality: N/A;  Please schedule patient for EGD and colonoscopy with me MSH2 Parada syndrome and anemia evaluation please  make priority 1  covid test 7/20-[place]    FACETECTOMY OF VERTEBRA  2/13/2019    Procedure: MEDIAL FACETECTOMY L5-S1;  Surgeon: Lyndon Brooke Jr., MD;  Location: [facility] OR;  Service: Orthopedics;;    GASTRIC BYPASS  2010    SLEEVE    KNEE ARTHROSCOPY Right     LUMBAR DISCECTOMY  2/13/2019    Procedure: DISCECTOMY, SPINE, LUMBAR L5-S1;  Surgeon: Lyndon Brooke Jr., MD;  Location: [facility] OR;  Service: Orthopedics;;       Social History     Tobacco Use    Smoking status: Never    Smokeless tobacco: Never   Substance and Sexual Activity    Alcohol use: Yes     Comment: RARELY    Drug use: No    Sexual activity: Yes     Partners: Female       Medications  He has a current medication list which includes the following prescription(s): accu-chek guide test strips, ascorbic acid (vitamin c), aspirin, cyanocobalamin (vitamin b-12), ferrous gluconate, lancets, zilxi, mupirocin, pen needle, diabetic, pravastatin, ozempic, sulfacetamide sodium-sulfur, testosterone cypionate, triamcinolone acetonide 0.025%, triamcinolone acetonide 0.1%, zinc gluconate, ciclopirox, doxycycline, metformin, metronidazole 0.75%, tadalafil, and vitamin a.      Allergies  Review of patient's allergies indicates:  No Known Allergies    All medications, allergies, and past history have been reviewed.    Objective:      Vitals:  Vitals - 1 value per visit 2/9/2023 2/20/2023 2/20/2023   SYSTOLIC - - -   DIASTOLIC - - -   Pulse - - -   Temp - - 98.3   Resp - - -   SPO2 - - -   Weight (lb) 263.23 - 260.8   Weight (kg) 119.4 - 118.3   Height 74 - 74   BMI (Calculated) 33.8 - 33.5   VISIT REPORT - - -   Waist Measurements - - -   Pain Score  - 4 -   Some recent data might be hidden       Body surface area is 2.49 meters squared.    Physical Exam:    GENERAL  APPEARANCE -  alert, appears stated age, and cooperative  BARRIER(S) TO COMMUNICATION -  none VOICE - appropriate for age and gender    INTEGUMENTARY  See photos from Dr. Rubio earlier today.   Rosacea and right nasal tip fullness/induration w/o fluctuance    HEENT  HEAD: Normocephalic, without obvious abnormality, atraumatic  FACE: INSPECTION - Symmetric, no signs of trauma, no suspicious lesion(s)  PALPATION -  No masses SALIVARY GLANDS - non-tender with no appreciable mass  STRENGTH - facial symmetry  NECK/THYROID: normal atraumatic, no neck masses, normal thyroid, no jvd    EYES  Normal occular alignment and mobility with no visible nystagmus at rest    EARS/NOSE/MOUTH/THROAT  EARS  PINNAE AND EXTERNAL EARS - no suspicious lesion HEARING - grossly intact to voice/finger rub    NOSE AND SINUSES  EXTERNAL NOSE - prominent cutaneous venous congestion with white yellow subcutaneous nodular dermal changes.  Scabbing/eschar of the previous incision site.  Right nasal tip entirely involved down to the inferior tip lobule crossing the midline and to the supra tip area mostly exophytic.  The columella is normal as is the septum no evidence of residual vestibulitis or abscess.  SEPTUM - normal/no obstruction on anterior exam without decongestion TURBINATES - within normal limits MUCOSA - within normal limits       CHEST AND LUNG   INSPECTION & AUSCULTATION - normal effort, no stridor    CARDIOVASCULAR  AUSCULTATION & PERIPHERAL VASCULAR - regular rate and rhythm.    NEUROLOGIC  MENTAL STATUS - alert, interactive CRANIAL NERVES - normal    LYMPHATIC  HEAD AND NECK - non-palpable      Procedure(s):  5 mm punch biopsy of the right nasal tip.  See note.    Labs:  WBC   Date Value Ref Range Status   01/18/2023 5.23 3.90 - 12.70 K/uL Final     Hemoglobin   Date Value Ref Range Status   01/18/2023 18.4 (H) 14.0 - 18.0 g/dL Final     Platelets   Date Value Ref Range Status   01/18/2023 284 150 - 450 K/uL Final     Creatinine   Date Value Ref Range Status   01/18/2023 1.4 0.5 - 1.4 mg/dL Final     TSH   Date Value Ref Range Status   01/18/2023 1.395 0.400 - 4.000 uIU/mL Final     Glucose   Date Value Ref Range Status    01/18/2023 89 70 - 110 mg/dL Final     Hemoglobin A1C   Date Value Ref Range Status   01/18/2023 6.2 (H) 4.0 - 5.6 % Final     Comment:     ADA Screening Guidelines:  5.7-6.4%  Consistent with prediabetes  >or=6.5%  Consistent with diabetes    High levels of fetal hemoglobin interfere with the HbA1C  assay. Heterozygous hemoglobin variants (HbS, HgC, etc)do  not significantly interfere with this assay.   However, presence of multiple variants may affect accuracy.           Assessment:      Problem List Items Addressed This Visit    None  Visit Diagnoses       Furunculosis    -  Primary    Nasal vestibulitis        Type 2 diabetes mellitus without complication, without long-term current use of insulin        History of rosacea        Neoplasm of uncertain behavior of skin of nose                   Plan:      Significant worsening of intradermal process.  Certainly basal cell carcinomas a consideration possibly nodular sclerosing with rapid growth.  Alternatively some sort of worsening of underlying rosacea and sebaceous hypertrophy associated with vestibulitis and nasal tip abscess.  5 mm punch biopsy just superior to the prior external incision from Dr. Rubio performed.  Not at the junction but in the center of the lesion.  Rushed pathology.      Patient instructed to keep the area covered with Aquaphor to allow for healing of the punch biopsy.  Direct pressure with any bleeding to stop.  Call if not notified of results in the next 2-3 days.      As discussed our hope is that this is a inflammatory hypertrophic condition or at worst a low-grade malignancy which can be treated with debridement and fulguration (cautery) locally rather than wide excision with flap reconstruction as would be required for squamous cell carcinoma with Dr. Alyx Spivey at all at [facility].

## 2023-02-21 DIAGNOSIS — B35.1 TOENAIL FUNGUS: ICD-10-CM

## 2023-02-22 RX ORDER — CICLOPIROX 80 MG/ML
SOLUTION TOPICAL
Qty: 6.6 ML | Refills: 2 | OUTPATIENT
Start: 2023-02-22

## 2023-02-22 NOTE — TELEPHONE ENCOUNTER
Medication refused due to failing protocol.    Requested Prescriptions   Pending Prescriptions Disp Refills    ciclopirox (PENLAC) 8 % Soln [Pharmacy Med Name: CICLOPIROX 8% SOLUTION] 6.6 mL 2     Sig: APPLY TO AFFECTED AREA AT NIGHT       Off-Protocol Failed - 2/22/2023  8:09 AM        Failed - Medication not assigned to a protocol, review manually.        Passed - Valid encounter within last 12 months     Recent Visits  Date Type Provider Dept   09/19/22 Office Visit Evan Judd MD [facility]   06/03/22 Office Visit Linette Arceo NP [facility]   12/09/21 Office Visit Evan Judd MD [facility]   06/07/21 Office Visit Antionette Dozier NP [facility]   03/04/21 Office Visit Antionette Dozier NP [facility]   Showing recent visits within past 720 days and meeting all other requirements  Future Appointments  No visits were found meeting these conditions.  Showing future appointments within next 150 days and meeting all other requirements        Future Appointments                In 1 week Garth Mcghee MD [facility], [facility]    In 3 weeks Evan Judd MD [facility] - Family Medicine, [facility]    In 2 months CHI Richter PA-C [facility] - Endocrinology, Diabetes & Metabolism, [place]    In 2 months Chata Rubio MD [facility] - Dermatology, [facility] [place]    In 3 months MD [facility] - Urology [facility], [facility]    In 5 months Amador Murillo MD [facility] - Endocrinology, Diabetes & Metabolism, [place]    In 6 months Evan Judd MD [facility] - Family Medicine, [place]    In 7 months [facility] XR1 YSIO MAX LIMIT 400 LBS [facility] [address]    In 7 months LAB, [facility] [address]    In 7 months Topher Gudino NP [facility] - Hematology Oncology, [facility]

## 2023-02-24 ENCOUNTER — PATIENT MESSAGE (OUTPATIENT)
Dept: OTOLARYNGOLOGY | Facility: CLINIC | Age: 53
End: 2023-02-24
Payer: COMMERCIAL

## 2023-02-24 NOTE — TELEPHONE ENCOUNTER
I reached out to our Pathology Tech, Genia. Was advised that the PA in [place] is out of the office this week. Genia states that all specimens were sent to [facility] for resulting. Genia called and spoke to the Pathology Supervisor, Corrina (ext [phone]). Corrina stated that if the sample was not marked expedite, it would have been sent to [facility] (outside lab). Genia let Corrina know it was marked expedite. Genia states that Corrina told her she would check with the outside lab for a preliminary results. Genia also told me that Corrina said that I could reach out to her directly. I called Corrina to check the status. Corrina stated that the sample was sent to the outside lab because it was not expedited, advised her that it was marked as expedite. Corrina stated that she was checking for a preliminary report, then she hung up on me. I was not able to get any other information.

## 2023-02-25 ENCOUNTER — PATIENT MESSAGE (OUTPATIENT)
Dept: HEMATOLOGY/ONCOLOGY | Facility: CLINIC | Age: 53
End: 2023-02-25
Payer: COMMERCIAL

## 2023-02-27 ENCOUNTER — PATIENT MESSAGE (OUTPATIENT)
Dept: OTOLARYNGOLOGY | Facility: CLINIC | Age: 53
End: 2023-02-27
Payer: COMMERCIAL

## 2023-02-27 ENCOUNTER — OFFICE VISIT (OUTPATIENT)
Dept: OTOLARYNGOLOGY | Facility: CLINIC | Age: 53
End: 2023-02-27
Payer: COMMERCIAL

## 2023-02-27 VITALS
SYSTOLIC BLOOD PRESSURE: 164 MMHG | DIASTOLIC BLOOD PRESSURE: 85 MMHG | WEIGHT: 264 LBS | BODY MASS INDEX: 33.9 KG/M2 | HEART RATE: 87 BPM

## 2023-02-27 DIAGNOSIS — C44.321 SQUAMOUS CELL CARCINOMA OF SKIN OF NOSE: ICD-10-CM

## 2023-02-27 DIAGNOSIS — L85.8: Primary | ICD-10-CM

## 2023-02-27 PROCEDURE — 99214 OFFICE O/P EST MOD 30 MIN: CPT | Mod: S$GLB,,, | Performed by: OTOLARYNGOLOGY

## 2023-02-27 PROCEDURE — 99999 PR PBB SHADOW E&M-EST. PATIENT-LVL V: ICD-10-PCS | Mod: PBBFAC,,, | Performed by: OTOLARYNGOLOGY

## 2023-02-27 PROCEDURE — 99999 PR PBB SHADOW E&M-EST. PATIENT-LVL V: CPT | Mod: PBBFAC,,, | Performed by: OTOLARYNGOLOGY

## 2023-02-27 PROCEDURE — 99214 PR OFFICE/OUTPT VISIT, EST, LEVL IV, 30-39 MIN: ICD-10-PCS | Mod: S$GLB,,, | Performed by: OTOLARYNGOLOGY

## 2023-02-27 NOTE — H&P (VIEW-ONLY)
History of Present Illness:   Lyndon Lion is a 52 y.o. year old male evaluated on 2/27/2023, in the Otolaryngology-Head and Neck Surgery Clinic at [facility]. The patient was referred by Dr. 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  Patient reports that the lesion started about 02/08/2023 with what was thought to be an infected nasal hair.  Has had subsequent rapid growth that he is documented and photographs.  He had a punch biopsy done by Dr. Mcghee this past week and in communication with the pathologist this was read out as squamous cell carcinoma however report is not available yet.  Patient was scheduled to see me.  He reports prior bleeding from the area but he has not had any bleeding recently.  He has fullness but no true nasal obstruction.  He is anxious to proceed with treatment.           Past Medical/Surgical History  Past Medical History:   Diagnosis Date    Arthritis     Asthma     AS A CHILD    Colon cancer     Family hx of prostate cancer 11/22/2016    Hx of colon cancer, stage I 07/03/2014    Parada syndrome     Tear of labium 10/2020    left shoulder Dr Molina    Uncontrolled type 2 diabetes mellitus with hyperglycemia 03/04/2021    Vitamin D deficiency     Wears glasses      His  has a past surgical history that includes Gastric bypass (2010); Colon surgery (2008); Knee arthroscopy (Right); Appendectomy; Colonoscopy (Bilateral, 2012); Epidural Steroid Injection (10/23/15); Colonoscopy (N/A, 8/1/2016); Colonoscopy (N/A, 9/20/2017); Colonoscopy (N/A, 10/9/2017); Esophagogastroduodenoscopy; Colonoscopy (N/A, 11/20/2017); Colonoscopy (N/A, 5/30/2018); Epidural steroid injection into lumbar spine (N/A, 7/27/2018); Caudal epidural steroid injection (N/A, 11/6/2018); Lumbar discectomy (2/13/2019); Facetectomy of vertebra (2/13/2019); Colonoscopy (N/A, 6/10/2019); Esophagogastroduodenoscopy (N/A, 7/22/2020); Colonoscopy (N/A, 7/22/2020); Colonoscopy (N/A, 5/27/2021);  Cystoscopy; and Colonoscopy (N/A, 6/17/2022).     Past Family/Social History  His family history includes Alcohol abuse in his paternal uncle; Breast cancer in his mother; Cancer in his maternal grandfather, maternal uncle, mother, and paternal uncle; Colon cancer in his maternal grandfather and maternal uncle; Dementia in his maternal grandmother; Diabetes in his father and paternal grandmother; Early death in his maternal grandfather; Hearing loss in his father; Heart disease in his father and maternal uncle; Hypertension in his maternal uncle and paternal grandfather; Liver disease in his father; Melanoma in his mother; No Known Problems in his paternal aunt and sister; Polycystic ovary syndrome in his daughter; Prostate cancer in his paternal uncle; Prostatitis in his paternal grandfather.  He  reports that he has never smoked. He has never used smokeless tobacco. He reports current alcohol use. He reports that he does not use drugs.     Medications/Allergies/Immunizations  His current medication(s) include:   Current Outpatient Medications   Medication Sig Dispense Refill    ACCU-CHEK GUIDE TEST STRIPS Strp USE TO TEST TWICE A  strip 3    ascorbic acid, vitamin C, (VITAMIN C) 100 MG tablet Take 100 mg by mouth once daily.      aspirin (ECOTRIN) 81 MG EC tablet Take 81 mg by mouth once daily.      ciclopirox (PENLAC) 8 % Soln Apply topically nightly. 6.6 mL 2    cyanocobalamin, vitamin B-12, 2,500 mcg Lozg Place 2 tablets under the tongue once daily. 180 lozenge 3    doxycycline (MONODOX) 100 MG capsule Take 1 capsule (100 mg total) by mouth every 12 (twelve) hours. (Patient not taking: Reported on 2/20/2023) 20 capsule 0    ferrous gluconate 225 mg (27 mg iron) Tab Take 27 mg by mouth 2 (two) times daily with meals. 200 tablet 3    lancets (ACCU-CHEK SOFTCLIX LANCETS) Misc 1 Units by Misc.(Non-Drug; Combo Route) route 2 (two) times a day. 200 each 0    metFORMIN (GLUCOPHAGE-XR) 500 MG ER 24hr tablet Take  "1 tablet (500 mg total) by mouth 2 (two) times daily with meals. 180 tablet 3    metronidazole 0.75% (METROCREAM) 0.75 % Crea Apply 1 application topically 2 (two) times daily. Apply to affected area (Patient not taking: Reported on 2/20/2023) 30 g 1    minocycline (ZILXI) 1.5 % Foam Apply 1 application topically once daily. 30 g 5    mupirocin (BACTROBAN) 2 % ointment Apply topically 3 (three) times daily. 22 g 0    pen needle, diabetic (PEN NEEDLE) 32 gauge x 5/32" Ndle 1 each by Misc.(Non-Drug; Combo Route) route once daily. 90 each 3    pravastatin (PRAVACHOL) 20 MG tablet Take 1 tablet (20 mg total) by mouth once daily. 90 tablet 3    semaglutide (OZEMPIC) 1 mg/dose (4 mg/3 mL) Inject 1 mg into the skin every 7 days. 3 pen 3    sulfacetamide sodium-sulfur 10-5 % (w/w) Clsr Use to wash face daily 170 g 5    tadalafiL (CIALIS) 5 MG tablet Take 1 tablet (5 mg total) by mouth daily as needed for Erectile Dysfunction. 90 tablet 0    testosterone cypionate (DEPOTESTOTERONE CYPIONATE) 200 mg/mL injection Inject 60 mg into the muscle every 14 (fourteen) days. Twice a week      triamcinolone acetonide 0.025% (KENALOG) 0.025 % Oint Thin film to lips and eczematous plaques BID PRN flare 15 g 1    triamcinolone acetonide 0.1% (KENALOG) 0.1 % cream AAA bid 454 g 3    vitamin A 8000 UNIT capsule Take 1 capsule (8,000 Units total) by mouth once daily. 100 capsule 3    zinc gluconate 50 mg tablet Take 50 mg by mouth once daily.       No current facility-administered medications for this visit.        Allergies: Patient has no known allergies.     Immunizations:   Immunization History   Administered Date(s) Administered    COVID-19, MRNA, LN-S, PF (Pfizer) (Gray Cap) 06/01/2022    COVID-19, MRNA, LN-S, PF (Pfizer) (Purple Cap) 04/08/2021, 04/29/2021, 01/03/2022    Hepatitis A / Hepatitis B 11/22/2016, 12/28/2016, 05/18/2017    Influenza 10/30/2008, 09/29/2009, 12/14/2011    Influenza - Quadrivalent - PF *Preferred* (6 months " and older) 09/29/2009, 11/11/2016, 12/08/2017, 11/04/2022    Influenza Split 10/30/2008, 12/14/2011    Pneumococcal Conjugate - 20 Valent 09/19/2022    Td - PF (ADULT) 11/11/2016         Review of Systems   Constitutional: Negative for fever, weight loss and weight gain.  Skin: Negative for rash, itchiness, dryness  HENT:  As per HPI  Cardiovascular: Negative for chest pain and dyspnea on exertion .   Respiratory: Is not experiencing shortness of breath.   Gastrointestinal: Negative for nausea and vomiting.   Neurological: Negative for headaches.   Lymph/Heme: Negative for lymphadenopathy or easy bruising  Musculoskeletal: Negative for joint or muscle pain  Psychiatric: The patient is not nervous/anxious.        All other systems are negative except for that listed in the HPI.      PHYSICAL EXAM:   Vital Signs:  BP (!) 164/85 (Patient Position: Sitting)   Pulse 87   Wt 119.7 kg (264 lb)   BMI 33.90 kg/m²      General:  Well-developed, well-nourished  Communication and Voice:  Clear pitch and clarity  Hearing: Hearing adequate for verbal communication bilaterally   Inspection:  Normocephalic and atraumatic without mass or lesion  Palpation:  Facial skeleton intact without bony stepoffs  Parotid Glands:  No mass or tenderness  Facial Strength:  Facial motility symmetric and full bilaterally  Pinna:  External ear intact and fully developed  External canal:  Canal is patent with intact skin  Tympanic Membrane:  Clear and mobile  External nose:  Right nasal tip subunit as well as soft tissue triangle with large exophytic 2 x 2 cm mass with surrounding vascularity up to supra tip area with central crater and ulceration with keratotic debris Internal Nose:  Minimal extension into vestibule at the dome but no involvement of the septum Septum intact and midline.  No edema, polyp, or rhinorrhea.  TMJ:  No pain to palpation with full mobility  Oral cavity, Lips, Teeth, and Gums:  Mucosa and teeth intact and viable, No  lesions, masses or ulcers  Oropharynx: No erythema or exudate, no masses or ulcerations, non-obstructive tonsils  Nasopharynx:  No mass or lesion with intact mucosa  Hypopharynx:  Not well visualized secondary to gagging  Larynx:  Not well visualized secondary to gagging  Neck, Trachea, Lymphatics:  Midline trachea without mass or lesion, no lymphadenopathy  Thyroid:  No mass or nodularity  Eyes: No nystagmus with equal extraocular motion bilaterally  Neuro/Psych/Balance: Patient oriented and appropriate in interaction;  Appropriate mood and affect;  Gait is intact with no imbalance; Cranial nerves I-XII are intact  Respiratory effort:  Equal inspiration and expiration without stridor  Peripheral Vascular:  Warm extremities with equal pulses            PATHOLOGY REVIEW:    Pending  RADIOLOGIC REVIEW:    None    ASSESSMENT:   1. Keratoacanthoma of nose    2. 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  Given rapidly progressive nature and clinical appearance I believe this is more consistent with keratoacanthoma.  Explained to the patient that sometimes these lesions do involute.  I still recommend excision but would like to await final pathology report.  Depending on final pathology report if this is indeed invasive squamous cell carcinoma I would consider sentinel lymph node biopsy.  I have set him up tentatively for 03/07/2023.      I believe that Mr. Lion has a good understanding of the issues involved and I answered all of his questions.     DISCLAIMER: This note was prepared with [employer] voice recognition transcription software. Garbled syntax, mangled pronouns, and other bizarre constructions may be attributed to that software system. While efforts were made to correct any mistakes made by this voice recognition program, some errors and/or omissions may remain in the note that were missed when the note was originally created.       0

## 2023-02-27 NOTE — PROGRESS NOTES
History of Present Illness:   Lyndon Lion is a 52 y.o. year old male evaluated on 2/27/2023, in the Otolaryngology-Head and Neck Surgery Clinic at [facility]. The patient was referred by Dr. 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  Patient reports that the lesion started about 02/08/2023 with what was thought to be an infected nasal hair.  Has had subsequent rapid growth that he is documented and photographs.  He had a punch biopsy done by Dr. Mcghee this past week and in communication with the pathologist this was read out as squamous cell carcinoma however report is not available yet.  Patient was scheduled to see me.  He reports prior bleeding from the area but he has not had any bleeding recently.  He has fullness but no true nasal obstruction.  He is anxious to proceed with treatment.           Past Medical/Surgical History  Past Medical History:   Diagnosis Date    Arthritis     Asthma     AS A CHILD    Colon cancer     Family hx of prostate cancer 11/22/2016    Hx of colon cancer, stage I 07/03/2014    Parada syndrome     Tear of labium 10/2020    left shoulder Dr Molina    Uncontrolled type 2 diabetes mellitus with hyperglycemia 03/04/2021    Vitamin D deficiency     Wears glasses      His  has a past surgical history that includes Gastric bypass (2010); Colon surgery (2008); Knee arthroscopy (Right); Appendectomy; Colonoscopy (Bilateral, 2012); Epidural Steroid Injection (10/23/15); Colonoscopy (N/A, 8/1/2016); Colonoscopy (N/A, 9/20/2017); Colonoscopy (N/A, 10/9/2017); Esophagogastroduodenoscopy; Colonoscopy (N/A, 11/20/2017); Colonoscopy (N/A, 5/30/2018); Epidural steroid injection into lumbar spine (N/A, 7/27/2018); Caudal epidural steroid injection (N/A, 11/6/2018); Lumbar discectomy (2/13/2019); Facetectomy of vertebra (2/13/2019); Colonoscopy (N/A, 6/10/2019); Esophagogastroduodenoscopy (N/A, 7/22/2020); Colonoscopy (N/A, 7/22/2020); Colonoscopy (N/A, 5/27/2021);  Cystoscopy; and Colonoscopy (N/A, 6/17/2022).     Past Family/Social History  His family history includes Alcohol abuse in his paternal uncle; Breast cancer in his mother; Cancer in his maternal grandfather, maternal uncle, mother, and paternal uncle; Colon cancer in his maternal grandfather and maternal uncle; Dementia in his maternal grandmother; Diabetes in his father and paternal grandmother; Early death in his maternal grandfather; Hearing loss in his father; Heart disease in his father and maternal uncle; Hypertension in his maternal uncle and paternal grandfather; Liver disease in his father; Melanoma in his mother; No Known Problems in his paternal aunt and sister; Polycystic ovary syndrome in his daughter; Prostate cancer in his paternal uncle; Prostatitis in his paternal grandfather.  He  reports that he has never smoked. He has never used smokeless tobacco. He reports current alcohol use. He reports that he does not use drugs.     Medications/Allergies/Immunizations  His current medication(s) include:   Current Outpatient Medications   Medication Sig Dispense Refill    ACCU-CHEK GUIDE TEST STRIPS Strp USE TO TEST TWICE A  strip 3    ascorbic acid, vitamin C, (VITAMIN C) 100 MG tablet Take 100 mg by mouth once daily.      aspirin (ECOTRIN) 81 MG EC tablet Take 81 mg by mouth once daily.      ciclopirox (PENLAC) 8 % Soln Apply topically nightly. 6.6 mL 2    cyanocobalamin, vitamin B-12, 2,500 mcg Lozg Place 2 tablets under the tongue once daily. 180 lozenge 3    doxycycline (MONODOX) 100 MG capsule Take 1 capsule (100 mg total) by mouth every 12 (twelve) hours. (Patient not taking: Reported on 2/20/2023) 20 capsule 0    ferrous gluconate 225 mg (27 mg iron) Tab Take 27 mg by mouth 2 (two) times daily with meals. 200 tablet 3    lancets (ACCU-CHEK SOFTCLIX LANCETS) Misc 1 Units by Misc.(Non-Drug; Combo Route) route 2 (two) times a day. 200 each 0    metFORMIN (GLUCOPHAGE-XR) 500 MG ER 24hr tablet Take  "1 tablet (500 mg total) by mouth 2 (two) times daily with meals. 180 tablet 3    metronidazole 0.75% (METROCREAM) 0.75 % Crea Apply 1 application topically 2 (two) times daily. Apply to affected area (Patient not taking: Reported on 2/20/2023) 30 g 1    minocycline (ZILXI) 1.5 % Foam Apply 1 application topically once daily. 30 g 5    mupirocin (BACTROBAN) 2 % ointment Apply topically 3 (three) times daily. 22 g 0    pen needle, diabetic (PEN NEEDLE) 32 gauge x 5/32" Ndle 1 each by Misc.(Non-Drug; Combo Route) route once daily. 90 each 3    pravastatin (PRAVACHOL) 20 MG tablet Take 1 tablet (20 mg total) by mouth once daily. 90 tablet 3    semaglutide (OZEMPIC) 1 mg/dose (4 mg/3 mL) Inject 1 mg into the skin every 7 days. 3 pen 3    sulfacetamide sodium-sulfur 10-5 % (w/w) Clsr Use to wash face daily 170 g 5    tadalafiL (CIALIS) 5 MG tablet Take 1 tablet (5 mg total) by mouth daily as needed for Erectile Dysfunction. 90 tablet 0    testosterone cypionate (DEPOTESTOTERONE CYPIONATE) 200 mg/mL injection Inject 60 mg into the muscle every 14 (fourteen) days. Twice a week      triamcinolone acetonide 0.025% (KENALOG) 0.025 % Oint Thin film to lips and eczematous plaques BID PRN flare 15 g 1    triamcinolone acetonide 0.1% (KENALOG) 0.1 % cream AAA bid 454 g 3    vitamin A 8000 UNIT capsule Take 1 capsule (8,000 Units total) by mouth once daily. 100 capsule 3    zinc gluconate 50 mg tablet Take 50 mg by mouth once daily.       No current facility-administered medications for this visit.        Allergies: Patient has no known allergies.     Immunizations:   Immunization History   Administered Date(s) Administered    COVID-19, MRNA, LN-S, PF (Pfizer) (Gray Cap) 06/01/2022    COVID-19, MRNA, LN-S, PF (Pfizer) (Purple Cap) 04/08/2021, 04/29/2021, 01/03/2022    Hepatitis A / Hepatitis B 11/22/2016, 12/28/2016, 05/18/2017    Influenza 10/30/2008, 09/29/2009, 12/14/2011    Influenza - Quadrivalent - PF *Preferred* (6 months " and older) 09/29/2009, 11/11/2016, 12/08/2017, 11/04/2022    Influenza Split 10/30/2008, 12/14/2011    Pneumococcal Conjugate - 20 Valent 09/19/2022    Td - PF (ADULT) 11/11/2016         Review of Systems   Constitutional: Negative for fever, weight loss and weight gain.  Skin: Negative for rash, itchiness, dryness  HENT:  As per HPI  Cardiovascular: Negative for chest pain and dyspnea on exertion .   Respiratory: Is not experiencing shortness of breath.   Gastrointestinal: Negative for nausea and vomiting.   Neurological: Negative for headaches.   Lymph/Heme: Negative for lymphadenopathy or easy bruising  Musculoskeletal: Negative for joint or muscle pain  Psychiatric: The patient is not nervous/anxious.        All other systems are negative except for that listed in the HPI.      PHYSICAL EXAM:   Vital Signs:  BP (!) 164/85 (Patient Position: Sitting)   Pulse 87   Wt 119.7 kg (264 lb)   BMI 33.90 kg/m²      General:  Well-developed, well-nourished  Communication and Voice:  Clear pitch and clarity  Hearing: Hearing adequate for verbal communication bilaterally   Inspection:  Normocephalic and atraumatic without mass or lesion  Palpation:  Facial skeleton intact without bony stepoffs  Parotid Glands:  No mass or tenderness  Facial Strength:  Facial motility symmetric and full bilaterally  Pinna:  External ear intact and fully developed  External canal:  Canal is patent with intact skin  Tympanic Membrane:  Clear and mobile  External nose:  Right nasal tip subunit as well as soft tissue triangle with large exophytic 2 x 2 cm mass with surrounding vascularity up to supra tip area with central crater and ulceration with keratotic debris Internal Nose:  Minimal extension into vestibule at the dome but no involvement of the septum Septum intact and midline.  No edema, polyp, or rhinorrhea.  TMJ:  No pain to palpation with full mobility  Oral cavity, Lips, Teeth, and Gums:  Mucosa and teeth intact and viable, No  lesions, masses or ulcers  Oropharynx: No erythema or exudate, no masses or ulcerations, non-obstructive tonsils  Nasopharynx:  No mass or lesion with intact mucosa  Hypopharynx:  Not well visualized secondary to gagging  Larynx:  Not well visualized secondary to gagging  Neck, Trachea, Lymphatics:  Midline trachea without mass or lesion, no lymphadenopathy  Thyroid:  No mass or nodularity  Eyes: No nystagmus with equal extraocular motion bilaterally  Neuro/Psych/Balance: Patient oriented and appropriate in interaction;  Appropriate mood and affect;  Gait is intact with no imbalance; Cranial nerves I-XII are intact  Respiratory effort:  Equal inspiration and expiration without stridor  Peripheral Vascular:  Warm extremities with equal pulses            PATHOLOGY REVIEW:    Pending  RADIOLOGIC REVIEW:    None    ASSESSMENT:   1. Keratoacanthoma of nose    2. 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  Given rapidly progressive nature and clinical appearance I believe this is more consistent with keratoacanthoma.  Explained to the patient that sometimes these lesions do involute.  I still recommend excision but would like to await final pathology report.  Depending on final pathology report if this is indeed invasive squamous cell carcinoma I would consider sentinel lymph node biopsy.  I have set him up tentatively for 03/07/2023.      I believe that Mr. Lion has a good understanding of the issues involved and I answered all of his questions.     DISCLAIMER: This note was prepared with [employer] voice recognition transcription software. Garbled syntax, mangled pronouns, and other bizarre constructions may be attributed to that software system. While efforts were made to correct any mistakes made by this voice recognition program, some errors and/or omissions may remain in the note that were missed when the note was originally created.

## 2023-02-28 ENCOUNTER — PATIENT MESSAGE (OUTPATIENT)
Dept: SURGERY | Facility: HOSPITAL | Age: 53
End: 2023-02-28
Payer: COMMERCIAL

## 2023-02-28 LAB
FINAL PATHOLOGIC DIAGNOSIS: NORMAL
Lab: NORMAL

## 2023-02-28 NOTE — TELEPHONE ENCOUNTER
Pathology was able to send me the pathology report. They are working on getting it in the actual pathology results section. I have uploaded the PDF file with the results into Media and attached it to the office visit with Dr. Mcghee on 2/20/23. Let us know if you need anything further. Thanks, Renae

## 2023-03-01 DIAGNOSIS — C44.321 SQUAMOUS CELL CARCINOMA OF SKIN OF NOSE: Primary | ICD-10-CM

## 2023-03-02 NOTE — ANESTHESIA PAT ROS NOTE
03/02/2023  Lyndon Lion Jr. is a 52 y.o., male.      Pre-op Assessment          Review of Systems  Anesthesia Hx:  No problems with previous Anesthesia   History of prior surgery of interest to airway management or planning:  Previous anesthesia: General COLONOSCOPY (Abdomen) 6/17/22 LAMINECTOMY, SPINE, LUMBAR, FOR DECOMPRESSION L5-S1 (Spine Lumbar) DISCECTOMY, SPINE, LUMBAR L5-S1 (Spine Lumbar) MEDIAL FACETECTOMY L5-S1 (Back) 2/13/19 with general anesthesia.  Procedure performed at an [facility].      Airway issues documented on chart review include mask, easy, videolaryngoscope used  , view on video-laryngoscopy Grade I    Denies Family Hx of Anesthesia complications.    Denies Personal Hx of Anesthesia complications.                    Social:  Non-Smoker, Social Alcohol Use       Hematology/Oncology:                   Hematology Comments: HUTCHINS SYNDROME    Current/Recent Cancer.  --  Cancer in past history (COLON):              chemotherapy and surgery       EENT/Dental:   KERATOCANTHOMA OF NOSE          Cardiovascular:            Denies Angina.     hyperlipidemia     Functional Capacity good / => 4 METS                         Pulmonary:    Asthma   Denies Shortness of breath.  Denies Recent URI.                 Renal/:  Renal/ Normal                 Hepatic/GI:        S/P SLEEVE GASTRECTOMY       Liver Disease, Fatty Liver        Musculoskeletal:  Arthritis             Lumbar Spine Disorders, Lumbar Disc Disease   Endocrine:  Diabetes, type 2         Obesity / BMI > 30  Psych:  Psychiatric Normal                       Anesthesia Assessment: Preoperative EQUATION    Planned Procedure: Procedure(s) (LRB):  EXCISION OR SURGICAL resection nasal skin cancer (Bilateral)  Requested Anesthesia Type:General  Surgeon: Alyx Spivey MD  Service: ENT  Known or anticipated Date of  Surgery:3/7/2023    Surgeon notes: reviewed    Electronic QUestionnaire Assessment completed via nurse interview with patient.        Triage considerations:     The patient has no apparent active cardiac condition (No unstable coronary Syndrome such as severe unstable angina or recent [<1 month] myocardial infarction, decompensated CHF, severe valvular   disease or significant arrhythmia)    Previous anesthesia records:GETA, No problems, and VIDEO LARYNGOSCOPE USED    Airway/Jaw/Neck:  Airway Findings: Mouth Opening: Normal   Tongue: Normal   General Airway Assessment: Adult, Good Mallampati: II  TM Distance: Normal, at least 6 cm   Jaw/Neck Findings:  Neck ROM: Normal ROM    Present Prior to Hospital Arrival?: No Placement Date: 02/13/19 Placement Time: 0937 Method of Intubation: Dr Gabi Mcfarlane with med student  Inserted by: Anesthesia MD Airway Device: Endotracheal Tube Mask Ventilation: Easy Intubated: Postinduction Blade: Manjit #4 Airway Device Size: 7.5 Style: Cuffed Cuff Inflation: Minimal occlusive pressure Inflation Amount (mL): 4 Placement Verified By: Auscultation;Capnometry;ETT Condensation Grade: Grade I Complicating Factors: None Findings Post-Intubation: Positive EtCO2;Bilateral breath sounds;Atraumatic/Condition of teeth unchanged Depth of Insertion (cm): 23 Secured at: Lips Complications: None Breath Sounds: Equal Bilateral Insertion attempts (enter comment if more than 2 attempts): 1     Last PCP note: 3-6 months ago , within [facility]   Subspecialty notes: Dermatology, Endocrinology, ENT, Hematology/Oncology    Other important co-morbidities: DM2, HLD, Obesity, and FATTY LIVER, S/P SLEEVE GASTRECTOMY       Tests already available:  Available tests,  within 3 months , within [facility] .     2/8/23  VITAMINS, PTH, METHYLMALONIC ACID    1/18/23 (SEE Epic FOR ALL LABS)  PREALBUMIN, CHROMIUM, PT/INR, PTT, CALCITRIOL, A1C (6.2), TSH, CALCIUM, CBC, CMP             Instructions given. (See in Nurse's  note)    Optimization:  Anesthesia Preop Clinic Assessment NOT Indicated    Medical Opinion NOT Indicated             Plan:    Testing:  None Needed        Patient  has previously scheduled Medical Appointment: NOT AT THIS TIME    Navigation:  Straight Line to surgery.               No tests, anesthesia preop clinic visit, or consult required.

## 2023-03-06 ENCOUNTER — ANESTHESIA EVENT (OUTPATIENT)
Dept: SURGERY | Facility: HOSPITAL | Age: 53
End: 2023-03-06
Payer: COMMERCIAL

## 2023-03-06 NOTE — ANESTHESIA PREPROCEDURE EVALUATION
[facility]  Anesthesia Pre-Operative Evaluation         Patient Name: Lyndon Lion Jr.  YOB: 1970  MRN: [id number]    SUBJECTIVE:     Pre-operative evaluation for Procedure(s) (LRB):  EXCISION OR SURGICAL resection nasal skin cancer (Bilateral)     03/06/2023    Lyndon Lion Jr. is a 52 y.o. male w/ a significant PMHx of asthma, ADAN, GERD, Parada syndrome, colon cancer, and DM2. 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 Lesion has been associated w/ bleeding in the past.     Patient now presents for the above procedure(s).       Prev airway: 02/13/19; Placement Time: 0937; Method of Intubation: [place]arlane (Dr Ashley with med student); Inserted by: Anesthesia MD; Airway Device: Endotracheal Tube; Mask Ventilation: Easy; Intubated: Postinduction; Blade: Manjit #4; Airway Device Size: 7.5; Style: Cuffed; Cuff Inflation: Minimal occlusive pressure; Inflation Amount: 4; Placement Verified By: Auscultation, Capnometry, ETT Condensation; Grade: Grade I; Complicating Factors: None      Patient Active Problem List   Diagnosis    Radiculopathy, lumbosacral region    Herniated lumbar intervertebral disc    Lumbar spondylosis    DDD (degenerative disc disease), lumbosacral    Acute medial meniscal tear    Obesity, unspecified    Parada syndrome    Thoracic or lumbosacral neuritis or radiculitis, unspecified    Neural foraminal stenosis of lumbar spine    MSH2-related Parada syndrome (HNPCC1)    Spondylosis of lumbar region without myelopathy or radiculopathy    Spondylolisthesis of lumbar region    Nonalcoholic fatty liver disease    Splenomegaly    Mild vitamin D deficiency    Colon dysplasia    Colon adenoma    Pneumoperitoneum    Postpolypectomy electrocoagulation syndrome    Lumbar radiculopathy    History of colon cancer, stage II    Uncontrolled type 2 diabetes mellitus with hyperglycemia    Type 2 diabetes mellitus with hyperglycemia     "Obesity (BMI 30-39.9)    Gastroesophageal reflux disease without esophagitis    Bariatric surgery status    S/P laparoscopic sleeve gastrectomy    Hypovitaminosis D    Hypophosphatemia    Vitamin B12 deficiency    Vitamin A deficiency    Erectile dysfunction    Hypogonadism in male       Review of patient's allergies indicates:  No Known Allergies    Current Inpatient Medications:      No current facility-administered medications on file prior to encounter.     Current Outpatient Medications on File Prior to Encounter   Medication Sig Dispense Refill    aspirin (ECOTRIN) 81 MG EC tablet Take 81 mg by mouth once daily.      metFORMIN (GLUCOPHAGE-XR) 500 MG ER 24hr tablet Take 1 tablet (500 mg total) by mouth 2 (two) times daily with meals. (Patient taking differently: Take 500 mg by mouth once daily.) 180 tablet 3    tadalafiL (CIALIS) 5 MG tablet Take 1 tablet (5 mg total) by mouth daily as needed for Erectile Dysfunction. 90 tablet 0    ACCU-CHEK GUIDE TEST STRIPS Strp USE TO TEST TWICE A  strip 3    ascorbic acid, vitamin C, (VITAMIN C) 100 MG tablet Take 100 mg by mouth once daily.      ciclopirox (PENLAC) 8 % Soln Apply topically nightly. 6.6 mL 2    cyanocobalamin, vitamin B-12, 2,500 mcg Lozg Place 2 tablets under the tongue once daily. 180 lozenge 3    ferrous gluconate 225 mg (27 mg iron) Tab Take 27 mg by mouth 2 (two) times daily with meals. 200 tablet 3    lancets (ACCU-CHEK SOFTCLIX LANCETS) Misc 1 Units by Misc.(Non-Drug; Combo Route) route 2 (two) times a day. 200 each 0    metronidazole 0.75% (METROCREAM) 0.75 % Crea Apply 1 application topically 2 (two) times daily. Apply to affected area (Patient not taking: Reported on 2/20/2023) 30 g 1    minocycline (ZILXI) 1.5 % Foam Apply 1 application topically once daily. 30 g 5    mupirocin (BACTROBAN) 2 % ointment Apply topically 3 (three) times daily. 22 g 0    pen needle, diabetic (PEN NEEDLE) 32 gauge x 5/32" Ndle 1 each by " Misc.(Non-Drug; Combo Route) route once daily. 90 each 3    pravastatin (PRAVACHOL) 20 MG tablet Take 1 tablet (20 mg total) by mouth once daily. 90 tablet 3    semaglutide (OZEMPIC) 1 mg/dose (4 mg/3 mL) Inject 1 mg into the skin every 7 days. (Patient taking differently: Inject 1 mg into the skin every Tuesday.) 3 pen 3    sulfacetamide sodium-sulfur 10-5 % (w/w) Clsr Use to wash face daily 170 g 5    testosterone cypionate (DEPOTESTOTERONE CYPIONATE) 200 mg/mL injection Inject 60 mg into the muscle every 14 (fourteen) days. Twice a week      triamcinolone acetonide 0.025% (KENALOG) 0.025 % Oint Thin film to lips and eczematous plaques BID PRN flare 15 g 1    triamcinolone acetonide 0.1% (KENALOG) 0.1 % cream AAA bid 454 g 3    vitamin A 8000 UNIT capsule Take 1 capsule (8,000 Units total) by mouth once daily. 100 capsule 3    zinc gluconate 50 mg tablet Take 50 mg by mouth once daily.         Past Surgical History:   Procedure Laterality Date    APPENDECTOMY      CAUDAL EPIDURAL STEROID INJECTION N/A 11/6/2018    Procedure: Injection-steroid-epidural-caudal;  Surgeon: Lyndon Brooke Jr., MD;  Location: [facility];  Service: Pain Management;  Laterality: N/A;    COLON SURGERY  2008    PARTIAL COLECTOMY    COLONOSCOPY Bilateral 2012    COLONOSCOPY N/A 8/1/2016    Procedure: COLONOSCOPY;  Surgeon: Blaze Marr MD;  Location: [facility];  Service: Endoscopy;  Laterality: N/A;    COLONOSCOPY N/A 9/20/2017    Procedure: COLONOSCOPY;  Surgeon: Dom Leonardo MD;  Location: [facility] (4TH FLR);  Service: Endoscopy;  Laterality: N/A;  not given PM prep    COLONOSCOPY N/A 10/9/2017    Procedure: COLONOSCOPY;  Surgeon: RAMON Callahan MD;  Location: [facility] (4TH FLR);  Service: Endoscopy;  Laterality: N/A;  ok for Prepopik per Dr Callahan    COLONOSCOPY N/A 11/20/2017    Procedure: COLONOSCOPY/EMR;  Surgeon: Joseluis Choe MD;  Location: [facility] (2ND FLR);  Service: Endoscopy;  Laterality: N/A;   EMR    no pm prep    COLONOSCOPY N/A 5/30/2018    Procedure: COLONOSCOPY;  Surgeon: Dom Leonardo MD;  Location: [facility] (4TH FLR);  Service: Endoscopy;  Laterality: N/A;  follow up colonoscopy in 5/2018 for Parada Syndrome         COLONOSCOPY N/A 6/10/2019    Procedure: COLONOSCOPY;  Surgeon: Dom Leonardo MD;  [facility]  Service: Endoscopy;  Laterality: N/A;  Prepopik ordered per Dr. Leonardo    COLONOSCOPY N/A 7/22/2020    Procedure: COLONOSCOPY;  Surgeon: Dom Leonardo MD;  [facility]  Service: Endoscopy;  Laterality: N/A;    COLONOSCOPY N/A 5/27/2021    Procedure: COLONOSCOPY;  Surgeon: Dom Leonardo MD;  [facility]  Service: Endoscopy;  Laterality: N/A;  covid test 5/24-[place]  pt requests Prepopik    COLONOSCOPY N/A 6/17/2022    Procedure: COLONOSCOPY;  Surgeon: Dom Leonardo MD;  [facility]: [facility] ([facility]  Service: Endoscopy;  Laterality: N/A;  He was discovered to have colon cancer and had right hemicolectomy        for stage II on 08/08/2008. MSH2 Parada syndrome.  sutab requested  instructions via the portal -sm  fully vaccinated - sm    CYSTOSCOPY      Epidural Steroid Injection  10/23/15    Lumbar    EPIDURAL STEROID INJECTION INTO LUMBAR SPINE N/A 7/27/2018    Procedure: Injection-steroid-epidural-lumbar;  Surgeon: Lyndon Brooke Jr., MD;  Location: [facility];  Service: Pain Management;  Laterality: N/A;    ESOPHAGOGASTRODUODENOSCOPY      ESOPHAGOGASTRODUODENOSCOPY N/A 7/22/2020    Procedure: EGD (ESOPHAGOGASTRODUODENOSCOPY);  Surgeon: Dom Leonardo MD;  Location: [facility] ([facility]  Service: Endoscopy;  Laterality: N/A;  Please schedule patient for EGD and colonoscopy with me MSH2 Parada syndrome and anemia evaluation please make priority 1  covid test 7/20-[place]    FACETECTOMY OF VERTEBRA  2/13/2019    Procedure: MEDIAL FACETECTOMY L5-S1;  Surgeon: Lyndon Brooke Jr., MD;  Location: [facility];   Service: Orthopedics;;    GASTRIC BYPASS  2010    SLEEVE    KNEE ARTHROSCOPY Right     LUMBAR DISCECTOMY  2/13/2019    Procedure: DISCECTOMY, SPINE, LUMBAR L5-S1;  Surgeon: Renetta Davis Jr., MD;  Location: [facility] OR;  Service: Orthopedics;;       Social History:  Tobacco Use: Low Risk     Smoking Tobacco Use: Never    Smokeless Tobacco Use: Never    Passive Exposure: Not on file      Alcohol Use: Not on file        OBJECTIVE:     Vital Signs Range (Last 24H):         Significant Labs:  Lab Results   Component Value Date    WBC 5.23 01/18/2023    HGB 18.4 (H) 01/18/2023    HCT 53.7 01/18/2023     01/18/2023    CHOL 166 01/18/2023    TRIG 90 01/18/2023    HDL 41 01/18/2023    ALT 37 01/18/2023    AST 27 01/18/2023     01/18/2023    K 4.0 01/18/2023     01/18/2023    CREATININE 1.4 01/18/2023    BUN 17 01/18/2023    CO2 20 (L) 01/18/2023    TSH 1.395 01/18/2023    PSA 0.45 03/19/2019    INR 1.0 01/18/2023    HGBA1C 6.2 (H) 01/18/2023       Diagnostic Studies: No relevant studies.    EKG:   Results for orders placed or performed during the hospital encounter of 02/06/19   EKG 12-lead    Collection Time: 02/06/19  1:20 PM    Narrative    Test Reason : M54.16,M47.816,M51.26,M51.37,M99.83,M43.16,Z01.818,    Vent. Rate : 068 BPM     Atrial Rate : 068 BPM     P-R Int : 170 ms          QRS Dur : 092 ms      QT Int : 394 ms       P-R-T Axes : 053 093 001 degrees     QTc Int : 418 ms    Normal sinus rhythm  Rightward axis  Borderline Abnormal ECG  When compared with ECG of 14-MAY-2013 12:59,  Questionable change in The axis  Confirmed by Mike King MD ([id number]) on 2/7/2019 12:17:31 PM    Referred By: RENETTA DAVIS           Confirmed By:Mike King MD       2D ECHO:  TTE:  No results found for this or any previous visit.    LINDA:  No results found for this or any previous visit.    ASSESSMENT/PLAN:           Pre-op Assessment    I have reviewed the Patient Summary Reports.     I have reviewed the Nursing  Notes. I have reviewed the NPO Status.   I have reviewed the Medications.     Review of Systems  Anesthesia Hx:  No problems with previous Anesthesia  History of prior surgery of interest to airway management or planning:  Denies Personal Hx of Anesthesia complications.   Social:  Non-Smoker    Hematology/Oncology:  Hematology Normal      Current/Recent Cancer.   EENT/Dental:EENT/Dental Normal   Cardiovascular:  Cardiovascular Normal     Pulmonary:   Asthma    Renal/:  Renal/ Normal     Hepatic/GI:   GERD Liver Disease,    Musculoskeletal:  Spine Disorders: lumbar Disc disease and Degenerative disease    Neurological:  Neurology Normal    Endocrine:   Diabetes, type 2        Physical Exam  General: Well nourished, Alert and Oriented    Airway:  Mallampati: III / II  Mouth Opening: Normal  TM Distance: Normal  Tongue: Normal  Neck ROM: Normal ROM    Dental:  Intact    Chest/Lungs:  Clear to auscultation, Normal Respiratory Rate    Heart:  Rate: Normal  Rhythm: Regular Rhythm  Sounds: Normal        Anesthesia Plan  Type of Anesthesia, risks & benefits discussed:    Anesthesia Type: Gen ETT  Intra-op Monitoring Plan: Standard ASA Monitors  Post Op Pain Control Plan: multimodal analgesia and IV/PO Opioids PRN  Induction:  IV  Airway Plan: Direct, Post-Induction  Informed Consent: Informed consent signed with the Patient and all parties understand the risks and agree with anesthesia plan.  All questions answered.   ASA Score: 3  Day of Surgery Review of History & Physical: H&P Update referred to the surgeon/provider.    Ready For Surgery From Anesthesia Perspective.     .

## 2023-03-07 ENCOUNTER — HOSPITAL ENCOUNTER (OUTPATIENT)
Facility: HOSPITAL | Age: 53
Discharge: HOME | End: 2023-03-07
Attending: OTOLARYNGOLOGY | Admitting: OTOLARYNGOLOGY
Payer: COMMERCIAL

## 2023-03-07 ENCOUNTER — ANESTHESIA (OUTPATIENT)
Dept: SURGERY | Facility: HOSPITAL | Age: 53
End: 2023-03-07
Payer: COMMERCIAL

## 2023-03-07 ENCOUNTER — HOSPITAL ENCOUNTER (OUTPATIENT)
Dept: RADIOLOGY | Facility: HOSPITAL | Age: 53
Discharge: HOME | End: 2023-03-07
Attending: OTOLARYNGOLOGY | Admitting: OTOLARYNGOLOGY
Payer: COMMERCIAL

## 2023-03-07 VITALS
HEART RATE: 86 BPM | BODY MASS INDEX: 33.88 KG/M2 | SYSTOLIC BLOOD PRESSURE: 150 MMHG | TEMPERATURE: 98 F | WEIGHT: 264 LBS | HEIGHT: 74 IN | DIASTOLIC BLOOD PRESSURE: 73 MMHG | OXYGEN SATURATION: 93 % | RESPIRATION RATE: 20 BRPM

## 2023-03-07 DIAGNOSIS — C44.321 SQUAMOUS CELL CARCINOMA OF SKIN OF NOSE: ICD-10-CM

## 2023-03-07 DIAGNOSIS — C44.321 SQUAMOUS CELL CANCER OF SKIN OF NASAL TIP: Primary | ICD-10-CM

## 2023-03-07 LAB — POCT GLUCOSE: 102 MG/DL (ref 70–110)

## 2023-03-07 PROCEDURE — 25000003 PHARM REV CODE 250: Performed by: STUDENT IN AN ORGANIZED HEALTH CARE EDUCATION/TRAINING PROGRAM

## 2023-03-07 PROCEDURE — 82962 GLUCOSE BLOOD TEST: CPT | Performed by: OTOLARYNGOLOGY

## 2023-03-07 PROCEDURE — 88305 TISSUE EXAM BY PATHOLOGIST: ICD-10-PCS | Mod: 26,,, | Performed by: STUDENT IN AN ORGANIZED HEALTH CARE EDUCATION/TRAINING PROGRAM

## 2023-03-07 PROCEDURE — 36000707: Performed by: OTOLARYNGOLOGY

## 2023-03-07 PROCEDURE — 38510 BIOPSY/REMOVAL LYMPH NODES: CPT | Mod: 50,51,, | Performed by: OTOLARYNGOLOGY

## 2023-03-07 PROCEDURE — D9220A PRA ANESTHESIA: Mod: ,,, | Performed by: ANESTHESIOLOGY

## 2023-03-07 PROCEDURE — 27201423 OPTIME MED/SURG SUP & DEVICES STERILE SUPPLY: Performed by: OTOLARYNGOLOGY

## 2023-03-07 PROCEDURE — 38510 PR BIOPSY/REM LYMPH NODES, CERVICAL: ICD-10-PCS | Mod: 50,51,, | Performed by: OTOLARYNGOLOGY

## 2023-03-07 PROCEDURE — 37000008 HC ANESTHESIA 1ST 15 MINUTES: Performed by: OTOLARYNGOLOGY

## 2023-03-07 PROCEDURE — 88307 TISSUE EXAM BY PATHOLOGIST: CPT | Mod: 26,,, | Performed by: STUDENT IN AN ORGANIZED HEALTH CARE EDUCATION/TRAINING PROGRAM

## 2023-03-07 PROCEDURE — 25000003 PHARM REV CODE 250: Performed by: OTOLARYNGOLOGY

## 2023-03-07 PROCEDURE — 63600175 PHARM REV CODE 636 W HCPCS: Performed by: STUDENT IN AN ORGANIZED HEALTH CARE EDUCATION/TRAINING PROGRAM

## 2023-03-07 PROCEDURE — 30150 PR PARTIAL EXCISION OF NOSE: ICD-10-PCS | Mod: ,,, | Performed by: OTOLARYNGOLOGY

## 2023-03-07 PROCEDURE — 78195 LYMPH SYSTEM IMAGING: CPT | Mod: TC

## 2023-03-07 PROCEDURE — 37000009 HC ANESTHESIA EA ADD 15 MINS: Performed by: OTOLARYNGOLOGY

## 2023-03-07 PROCEDURE — 36000706: Performed by: OTOLARYNGOLOGY

## 2023-03-07 PROCEDURE — A9520 TC99 TILMANOCEPT DIAG 0.5MCI: HCPCS

## 2023-03-07 PROCEDURE — D9220A PRA ANESTHESIA: ICD-10-PCS | Mod: ,,, | Performed by: ANESTHESIOLOGY

## 2023-03-07 PROCEDURE — 71000015 HC POSTOP RECOV 1ST HR: Performed by: OTOLARYNGOLOGY

## 2023-03-07 PROCEDURE — 88305 TISSUE EXAM BY PATHOLOGIST: CPT | Mod: 26,,, | Performed by: STUDENT IN AN ORGANIZED HEALTH CARE EDUCATION/TRAINING PROGRAM

## 2023-03-07 PROCEDURE — 88305 TISSUE EXAM BY PATHOLOGIST: CPT | Performed by: STUDENT IN AN ORGANIZED HEALTH CARE EDUCATION/TRAINING PROGRAM

## 2023-03-07 PROCEDURE — 71000044 HC DOSC ROUTINE RECOVERY FIRST HOUR: Performed by: OTOLARYNGOLOGY

## 2023-03-07 PROCEDURE — 30150 RHINECTOMY PARTIAL: CPT | Mod: ,,, | Performed by: OTOLARYNGOLOGY

## 2023-03-07 PROCEDURE — 78195 LYMPH SYSTEM IMAGING: CPT | Mod: 26,,, | Performed by: RADIOLOGY

## 2023-03-07 PROCEDURE — 88307 PR  SURG PATH,LEVEL V: ICD-10-PCS | Mod: 26,,, | Performed by: STUDENT IN AN ORGANIZED HEALTH CARE EDUCATION/TRAINING PROGRAM

## 2023-03-07 PROCEDURE — 78195 NM LYMPHATICS AND LYMPH NODE IMAGING: ICD-10-PCS | Mod: 26,,, | Performed by: RADIOLOGY

## 2023-03-07 RX ORDER — KETAMINE HCL IN 0.9 % NACL 50 MG/5 ML
SYRINGE (ML) INTRAVENOUS
Status: DISCONTINUED | OUTPATIENT
Start: 2023-03-07 | End: 2023-03-07

## 2023-03-07 RX ORDER — CEPHALEXIN 500 MG/1
500 CAPSULE ORAL EVERY 6 HOURS
Qty: 40 CAPSULE | Refills: 0 | Status: SHIPPED | OUTPATIENT
Start: 2023-03-07 | End: 2023-03-17

## 2023-03-07 RX ORDER — PROPOFOL 10 MG/ML
VIAL (ML) INTRAVENOUS
Status: DISCONTINUED | OUTPATIENT
Start: 2023-03-07 | End: 2023-03-07

## 2023-03-07 RX ORDER — CEFAZOLIN SODIUM 1 G/3ML
INJECTION, POWDER, FOR SOLUTION INTRAMUSCULAR; INTRAVENOUS
Status: DISCONTINUED | OUTPATIENT
Start: 2023-03-07 | End: 2023-03-07

## 2023-03-07 RX ORDER — DEXAMETHASONE SODIUM PHOSPHATE 4 MG/ML
INJECTION, SOLUTION INTRA-ARTICULAR; INTRALESIONAL; INTRAMUSCULAR; INTRAVENOUS; SOFT TISSUE
Status: DISCONTINUED | OUTPATIENT
Start: 2023-03-07 | End: 2023-03-07

## 2023-03-07 RX ORDER — HYDROMORPHONE HYDROCHLORIDE 1 MG/ML
0.2 INJECTION, SOLUTION INTRAMUSCULAR; INTRAVENOUS; SUBCUTANEOUS EVERY 5 MIN PRN
Status: CANCELLED | OUTPATIENT
Start: 2023-03-07

## 2023-03-07 RX ORDER — HALOPERIDOL 5 MG/ML
0.5 INJECTION INTRAMUSCULAR EVERY 10 MIN PRN
Status: CANCELLED | OUTPATIENT
Start: 2023-03-07

## 2023-03-07 RX ORDER — HYDROCODONE BITARTRATE AND ACETAMINOPHEN 5; 325 MG/1; MG/1
1 TABLET ORAL EVERY 6 HOURS PRN
Qty: 25 TABLET | Refills: 0 | Status: ON HOLD | OUTPATIENT
Start: 2023-03-07 | End: 2023-03-21 | Stop reason: SDUPTHER

## 2023-03-07 RX ORDER — ONDANSETRON 2 MG/ML
INJECTION INTRAMUSCULAR; INTRAVENOUS
Status: DISCONTINUED | OUTPATIENT
Start: 2023-03-07 | End: 2023-03-07

## 2023-03-07 RX ORDER — SUCCINYLCHOLINE CHLORIDE 20 MG/ML
INJECTION INTRAMUSCULAR; INTRAVENOUS
Status: DISCONTINUED | OUTPATIENT
Start: 2023-03-07 | End: 2023-03-07

## 2023-03-07 RX ORDER — MIDAZOLAM HYDROCHLORIDE 1 MG/ML
INJECTION, SOLUTION INTRAMUSCULAR; INTRAVENOUS
Status: DISCONTINUED | OUTPATIENT
Start: 2023-03-07 | End: 2023-03-07

## 2023-03-07 RX ORDER — SODIUM CHLORIDE 9 MG/ML
INJECTION, SOLUTION INTRAVENOUS CONTINUOUS
Status: DISCONTINUED | OUTPATIENT
Start: 2023-03-07 | End: 2023-03-07 | Stop reason: HOSPADM

## 2023-03-07 RX ORDER — FENTANYL CITRATE 50 UG/ML
INJECTION, SOLUTION INTRAMUSCULAR; INTRAVENOUS
Status: DISCONTINUED | OUTPATIENT
Start: 2023-03-07 | End: 2023-03-07

## 2023-03-07 RX ORDER — ONDANSETRON 4 MG/1
8 TABLET, ORALLY DISINTEGRATING ORAL EVERY 6 HOURS PRN
Qty: 20 TABLET | Refills: 0 | Status: SHIPPED | OUTPATIENT
Start: 2023-03-07 | End: 2023-04-17 | Stop reason: SDUPTHER

## 2023-03-07 RX ORDER — LIDOCAINE HYDROCHLORIDE AND EPINEPHRINE 10; 10 MG/ML; UG/ML
INJECTION, SOLUTION INFILTRATION; PERINEURAL
Status: DISCONTINUED | OUTPATIENT
Start: 2023-03-07 | End: 2023-03-07 | Stop reason: HOSPADM

## 2023-03-07 RX ORDER — LIDOCAINE HYDROCHLORIDE 20 MG/ML
INJECTION INTRAVENOUS
Status: DISCONTINUED | OUTPATIENT
Start: 2023-03-07 | End: 2023-03-07

## 2023-03-07 RX ORDER — ACETAMINOPHEN 500 MG
1000 TABLET ORAL
Status: COMPLETED | OUTPATIENT
Start: 2023-03-07 | End: 2023-03-07

## 2023-03-07 RX ORDER — SODIUM CHLORIDE 0.9 % (FLUSH) 0.9 %
10 SYRINGE (ML) INJECTION
Status: CANCELLED | OUTPATIENT
Start: 2023-03-07

## 2023-03-07 RX ORDER — DEXMEDETOMIDINE HYDROCHLORIDE 100 UG/ML
INJECTION, SOLUTION INTRAVENOUS
Status: DISCONTINUED | OUTPATIENT
Start: 2023-03-07 | End: 2023-03-07

## 2023-03-07 RX ORDER — PHENYLEPHRINE HCL IN 0.9% NACL 1 MG/10 ML
SYRINGE (ML) INTRAVENOUS
Status: DISCONTINUED | OUTPATIENT
Start: 2023-03-07 | End: 2023-03-07

## 2023-03-07 RX ORDER — EPHEDRINE SULFATE 50 MG/ML
INJECTION, SOLUTION INTRAVENOUS
Status: DISCONTINUED | OUTPATIENT
Start: 2023-03-07 | End: 2023-03-07

## 2023-03-07 RX ORDER — FENTANYL CITRATE 50 UG/ML
25 INJECTION, SOLUTION INTRAMUSCULAR; INTRAVENOUS EVERY 5 MIN PRN
Status: CANCELLED | OUTPATIENT
Start: 2023-03-07

## 2023-03-07 RX ADMIN — Medication 10 MG: at 02:03

## 2023-03-07 RX ADMIN — MIDAZOLAM HYDROCHLORIDE 2 MG: 1 INJECTION, SOLUTION INTRAMUSCULAR; INTRAVENOUS at 12:03

## 2023-03-07 RX ADMIN — DEXMEDETOMIDINE 8 MCG: 100 INJECTION, SOLUTION, CONCENTRATE INTRAVENOUS at 01:03

## 2023-03-07 RX ADMIN — EPHEDRINE SULFATE 5 MG: 50 INJECTION INTRAVENOUS at 01:03

## 2023-03-07 RX ADMIN — SODIUM CHLORIDE, SODIUM GLUCONATE, SODIUM ACETATE, POTASSIUM CHLORIDE, MAGNESIUM CHLORIDE, SODIUM PHOSPHATE, DIBASIC, AND POTASSIUM PHOSPHATE: .53; .5; .37; .037; .03; .012; .00082 INJECTION, SOLUTION INTRAVENOUS at 12:03

## 2023-03-07 RX ADMIN — EPHEDRINE SULFATE 5 MG: 50 INJECTION INTRAVENOUS at 02:03

## 2023-03-07 RX ADMIN — Medication 100 MCG: at 12:03

## 2023-03-07 RX ADMIN — ONDANSETRON 4 MG: 2 INJECTION INTRAMUSCULAR; INTRAVENOUS at 02:03

## 2023-03-07 RX ADMIN — EPHEDRINE SULFATE 10 MG: 50 INJECTION INTRAVENOUS at 12:03

## 2023-03-07 RX ADMIN — SODIUM CHLORIDE: 0.9 INJECTION, SOLUTION INTRAVENOUS at 11:03

## 2023-03-07 RX ADMIN — DEXMEDETOMIDINE 8 MCG: 100 INJECTION, SOLUTION, CONCENTRATE INTRAVENOUS at 02:03

## 2023-03-07 RX ADMIN — EPHEDRINE SULFATE 5 MG: 50 INJECTION INTRAVENOUS at 12:03

## 2023-03-07 RX ADMIN — Medication 100 MCG: at 02:03

## 2023-03-07 RX ADMIN — DEXAMETHASONE SODIUM PHOSPHATE 4 MG: 4 INJECTION, SOLUTION INTRAMUSCULAR; INTRAVENOUS at 12:03

## 2023-03-07 RX ADMIN — PROPOFOL 150 MG: 10 INJECTION, EMULSION INTRAVENOUS at 12:03

## 2023-03-07 RX ADMIN — Medication 150 MCG: at 12:03

## 2023-03-07 RX ADMIN — FENTANYL CITRATE 100 MCG: 50 INJECTION, SOLUTION INTRAMUSCULAR; INTRAVENOUS at 12:03

## 2023-03-07 RX ADMIN — Medication 10 MG: at 01:03

## 2023-03-07 RX ADMIN — CEFAZOLIN 2 G: 330 INJECTION, POWDER, FOR SOLUTION INTRAMUSCULAR; INTRAVENOUS at 12:03

## 2023-03-07 RX ADMIN — PROPOFOL 50 MG: 10 INJECTION, EMULSION INTRAVENOUS at 12:03

## 2023-03-07 RX ADMIN — LIDOCAINE HYDROCHLORIDE 100 MG: 20 INJECTION INTRAVENOUS at 12:03

## 2023-03-07 RX ADMIN — MIDAZOLAM HYDROCHLORIDE 2 MG: 1 INJECTION, SOLUTION INTRAMUSCULAR; INTRAVENOUS at 11:03

## 2023-03-07 RX ADMIN — PROPOFOL 60 MG: 10 INJECTION, EMULSION INTRAVENOUS at 01:03

## 2023-03-07 RX ADMIN — SUCCINYLCHOLINE CHLORIDE 100 MG: 20 INJECTION, SOLUTION INTRAMUSCULAR; INTRAVENOUS at 12:03

## 2023-03-07 RX ADMIN — Medication 30 MG: at 12:03

## 2023-03-07 RX ADMIN — Medication 50 MCG: at 12:03

## 2023-03-07 RX ADMIN — ACETAMINOPHEN 1000 MG: 500 TABLET ORAL at 10:03

## 2023-03-07 NOTE — TRANSFER OF CARE
"Anesthesia Transfer of Care Note    Patient: Lyndon Lion Jr.    Procedure(s) Performed: Procedure(s) (LRB):  EXCISION OR SURGICAL resection nasal skin cancer (Bilateral)    Patient location: [facility]    Anesthesia Type: general    Transport from OR: Transported from OR on 6-10 L/min O2 by face mask with adequate spontaneous ventilation    Post pain: adequate analgesia    Post assessment: no apparent anesthetic complications and tolerated procedure well    Post vital signs: stable    Level of consciousness: awake and responds to stimulation    Nausea/Vomiting: no nausea/vomiting    Complications: none    Transfer of care protocol was followed      Last vitals:   Visit Vitals  BP (!) 145/77 (BP Location: Right arm, Patient Position: Lying)   Pulse 86   Temp 36.5 °C (97.7 °F) (Temporal)   Resp 18   Ht 6' 2" (1.88 m)   Wt 119.7 kg (264 lb)   SpO2 100%   BMI 33.90 kg/m²     "

## 2023-03-07 NOTE — OP NOTE
Date of service: 3/7/2023    Pre-operative Diagnosis:   Squamous cell carcinoma of nasal skin    Post-operative Diagnosis:  Same    Procedures Performed:  1. Partial rhinectomy with excision of squamous cell carcinoma of nasal skin with excised dimensions being 3.5 x 3 cm  2. Bilateral sentinel lymph node biopsy and mapping     Surgeon: Alyx Spivey MD    Assistant(s):  Albertina Sanchez MD    Anesthesia: General Endotracheal    EBL:  50 cc    Specimens:  Number of specimens: 6 Name of specimens: 1. Nasal resection - short stitch superior , long stitch left, skin cancer en face processing- perm 2) right sentinel lymph node #1-perm 3) right sentinel lymph node #2-perm 4) left sentinel lymph node #1-perm 5) left sentinel lymph node #2-perm 6) left sentinel lymph nose #3    Findings:  Patient had a large exophytic tumor with central crater and necrosis of the right nasal skin centered around the right ala and soft tissue triangle.  The tumor did extend into the vestibule and into the superior part of the nasal septum.  1 cm margins were taken circumferentially.  Three sentinel lymph nodes were removed on the left and 2 on the right.  On the right maximum Neoprobe reading for 1st sentinel lymph node was in the 120s and after 2nd excision the bed was down to less than 10.  On the left side maximum reading was in the 80s and decreased to less than 10 as well.    Indications for Procedure:  Mr. Lion's 52-year-old male seen in my clinic with enlarging lesion of the skin of the nose initially thought to be vestibulitis but biopsy by Dr. Mcghee and found to be squamous cell carcinoma.  He was scheduled for excision with sentinel lymph node biopsy.    Procedure in Detail:  After informed consent was obtained from patient in holding area the risks and benefits reviewed patient was taken back to OR 14.  Anesthesia proceeded with general endotracheal anesthesia and patient was turned 180°.  Endotracheal tube was secured  with a silk suture to the lower lip.  Time-out was undertaken with verification of patient and correct procedure.  I proceeded with prepping.      1 cm margins were marked out around the nasal tumor extending into the ala and into the left ala as well just lateral to the soft tissue triangle as well as into the upper lip.  Area circumferentially was injected with 1% lidocaine with [id number] epinephrine.  Neck as well as midface were prepped and draped in usual sterile fashion.      Starting with main specimen resection in order to remove bulk of radiotracer with care to stay 1 cm away from the tumor skin incision was made in the superior aspect of the tumor and this was deepened down to the level of nasal smas and perichondrium of upper lateral cartilages initially and this was continued along the lower lateral cartilages to mid dome.  Skin incision was also made through the ala bilaterally and again continued to the level of the vestibule.  At this point columellar incision was made in the upper lip again taking care to stay 1 cm from the tumor.  This was then deepened into the vestibule with resection of portion of the left lower lateral as well as the entirety of the right lower lateral and medial yee on the right.  Resection was initially brought into the membranous septum but then after reexamination anterior septal angle was spared and reattached with a PDS suture.  Specimen was sent off with a short stitch marking superior and a long stitch marking lateral.  Hemostasis was obtained with bipolar cautery with control of angular artery and columellar arteries.  Attention was turned to sentinel lymph node mapping and dissection.      Starting on the right side as this was the brightest on the imaging and the 1st sentinel lymph node incision was made just over the angle of the mandible extending 1 and half to 2 cm which was previously infiltrated with local.  This was continued through the subdermal tissue and  down to the parotid fascia.  Parotid fascia was entered and layer by layer with bipolar forceps use in order to protect marginal nerve.  Dissection was undertaken in the direction of the marginal nerve.  After seeking with the probe to sentinel lymph nodes with the above readings on the Neoprobe were resected with no further activity in the bed.  The exact same procedure was repeated on the left side except the left side the sentinel lymph nodes were in the perifacial area just below the mandible incision was made with a 15 blade and again continued down to the fascia.  Platysma was divided with bipolar cautery the left marginal mandibular nerve was identified and positively traced and spared.  Three lymph nodes were removed with the readings above in the finding section.  Wounds were irrigated hemostasis was obtained in bilateral necks and with Surgicel placed and incisions were closed with deep 3-0 Vicryl and 5 0 fast-absorbing gut for the skin.  Attention was turned back to the nose and Xeroform was placed and secured with a 2-0 silk suture.  Patient was turned over to Anesthesia awakened and taken to recovery in stable condition.      Complications:  None    Attestation:  I was present for the entire procedure    DISCLAIMER: This note was prepared with [employer] voice recognition transcription software. Garbled syntax, mangled pronouns, and other bizarre constructions may be attributed to that software system. While efforts were made to correct any mistakes made by this voice recognition program, some errors and/or omissions may remain in the note that were missed when the note was originally created.

## 2023-03-07 NOTE — ANESTHESIA PROCEDURE NOTES
Intubation    Date/Time: 3/7/2023 12:10 PM  Performed by: Alejandro Li MD  Authorized by: Lyndon Burton MD     Intubation:     Induction:  Intravenous    Intubated:  Postinduction    Mask Ventilation:  Not attempted    Attempts:  1    Attempted By:  Resident anesthesiologist    Method of Intubation:  Video laryngoscopy    Blade:  Mcfarlane 3    Laryngeal View Grade: Grade I - full view of cords      Difficult Airway Encountered?: No      Complications:  None    Airway Device:  Oral endotracheal tube    Airway Device Size:  7.5    Style/Cuff Inflation:  Cuffed (inflated to minimal occlusive pressure)    Secured at:  The lips    Placement Verified By:  Capnometry and Revisualization with laryngoscopy    Complicating Factors:  None    Findings Post-Intubation:  BS equal bilateral and atraumatic/condition of teeth unchanged

## 2023-03-07 NOTE — INTERVAL H&P NOTE
The patient has been examined and the H&P has been reviewed:    I concur with the findings and changes have been noted since the H&P was written: Final pathology consistent with SCC. Plan for resection, sentinel node biopsy with staged reconstruction at later date.  Surgery   risks, benefits and alternative options discussed and understood by patient/family.          There are no hospital problems to display for this patient.

## 2023-03-07 NOTE — BRIEF OP NOTE
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]    Post-op Diagnosis:  Post-Op Diagnosis Codes:     * Keratoacanthoma of nose [L85.8]    Procedure(s) (LRB):  EXCISION OR SURGICAL resection nasal skin cancer (Bilateral)    Anesthesia: General    Operative Findings:   WLE of nasal SCC   Bilateral sentinel lymph node biopsies (5 total)    Estimated Blood Loss: 20 cc         Specimens:   Specimen (24h ago, onward)       Start     Ordered    03/07/23 1407  Specimen to Pathology, Surgery ENT  Once        Comments: Pre-op Diagnosis: Keratoacanthoma of nose [L85.8]Procedure(s):EXCISION OR SURGICAL resection nasal skin cancer Number of specimens: 6Name of specimens: 1. Nasal resection - short stitch superior , long stitch left, skin cancer en face processing- perm2) right sentinel lymph node #1-perm3) right sentinel lymph node #2-perm4) left sentinel lymph node #1-perm5) left sentinel lymph node #2-perm6) left sentinel lymph nose #3-perm     References:    Click here for ordering Quick Tip   Question Answer Comment   Procedure Type: ENT    Specimen Class: Routine/Screening    Which provider would you like to cc? ALYX SPIVEY    Release to patient Immediate        03/07/23 1435                      Discharge Note    OUTCOME: Patient tolerated treatment/procedure well without complication and is now ready for discharge.    DISPOSITION: Home or Self Care    FINAL DIAGNOSIS:  Squamous cell cancer of skin of nasal tip    FOLLOWUP: In clinic    DISCHARGE INSTRUCTIONS:    Discharge Procedure Orders   Lifting restrictions   Order Comments: No heavy lifting, straining, strenuous activity for 2 weeks   No nose blowing, sneeze with your mouth open     Notify your health care provider if you  experience any of the following:  temperature >100.4     Notify your health care provider if you experience any of the following:  redness, tenderness, or signs of infection (pain, swelling, redness, odor or green/yellow discharge around incision site)     Notify your health care provider if you experience any of the following:   Order Comments: Significant bleeding     Leave dressing on - Keep it clean, dry, and intact until clinic visit   Order Comments: Apply vaseline ointment to neck incisions  Nose dressing is sutured in place and will remain in place until reconstruction/follow up        Clinical Reference Documents Added to Patient Instructions         Document    GENERAL ANESTHESIA DISCHARGE INSTRUCTIONS (ENGLISH)

## 2023-03-09 ENCOUNTER — PATIENT MESSAGE (OUTPATIENT)
Dept: OTOLARYNGOLOGY | Facility: CLINIC | Age: 53
End: 2023-03-09
Payer: COMMERCIAL

## 2023-03-09 NOTE — ANESTHESIA POSTPROCEDURE EVALUATION
Anesthesia Post Evaluation    Patient: Lyndon Lion Jr.    Procedure(s) Performed: Procedure(s) (LRB):  EXCISION OR SURGICAL resection nasal skin cancer (Bilateral)    Final Anesthesia Type: general      Patient location during evaluation: PACU  Patient participation: Yes- Able to Participate  Level of consciousness: awake and alert  Post-procedure vital signs: reviewed and stable  Pain management: adequate  Airway patency: patent    PONV status at discharge: No PONV  Anesthetic complications: no      Cardiovascular status: blood pressure returned to baseline  Respiratory status: unassisted  Hydration status: euvolemic  Follow-up not needed.          Vitals Value Taken Time   /74 03/07/23 1617   Temp 36.5 °C (97.7 °F) 03/07/23 1500   Pulse 90 03/07/23 1623   Resp 20 03/07/23 1600   SpO2 93 % 03/07/23 1623   Vitals shown include unvalidated device data.      No case tracking events are documented in the log.      Pain/Karthikeyan Score: No data recorded

## 2023-03-15 ENCOUNTER — OFFICE VISIT (OUTPATIENT)
Dept: OTOLARYNGOLOGY | Facility: CLINIC | Age: 53
End: 2023-03-15
Payer: COMMERCIAL

## 2023-03-15 VITALS
WEIGHT: 253.5 LBS | HEIGHT: 74 IN | HEART RATE: 71 BPM | DIASTOLIC BLOOD PRESSURE: 79 MMHG | SYSTOLIC BLOOD PRESSURE: 125 MMHG | BODY MASS INDEX: 32.53 KG/M2

## 2023-03-15 DIAGNOSIS — Z09 POSTOP CHECK: Primary | ICD-10-CM

## 2023-03-15 PROCEDURE — 99024 POSTOP FOLLOW-UP VISIT: CPT | Mod: S$GLB,,, | Performed by: OTOLARYNGOLOGY

## 2023-03-15 PROCEDURE — 99999 PR PBB SHADOW E&M-EST. PATIENT-LVL IV: ICD-10-PCS | Mod: PBBFAC,,, | Performed by: OTOLARYNGOLOGY

## 2023-03-15 PROCEDURE — 99024 PR POST-OP FOLLOW-UP VISIT: ICD-10-PCS | Mod: S$GLB,,, | Performed by: OTOLARYNGOLOGY

## 2023-03-15 PROCEDURE — 99999 PR PBB SHADOW E&M-EST. PATIENT-LVL IV: CPT | Mod: PBBFAC,,, | Performed by: OTOLARYNGOLOGY

## 2023-03-15 RX ORDER — DOXYCYCLINE 100 MG/1
100 CAPSULE ORAL 2 TIMES DAILY
Qty: 28 CAPSULE | Refills: 0 | Status: SHIPPED | OUTPATIENT
Start: 2023-03-15 | End: 2023-03-29

## 2023-03-16 ENCOUNTER — PATIENT MESSAGE (OUTPATIENT)
Dept: OTOLARYNGOLOGY | Facility: CLINIC | Age: 53
End: 2023-03-16
Payer: COMMERCIAL

## 2023-03-16 DIAGNOSIS — S01.20XA OPEN WOUND OF NOSE, UNSPECIFIED OPEN WOUND TYPE, INITIAL ENCOUNTER: Primary | ICD-10-CM

## 2023-03-16 LAB
FINAL PATHOLOGIC DIAGNOSIS: NORMAL
GROSS: NORMAL
Lab: NORMAL

## 2023-03-17 ENCOUNTER — TELEPHONE (OUTPATIENT)
Dept: FAMILY MEDICINE | Facility: CLINIC | Age: 53
End: 2023-03-17
Payer: COMMERCIAL

## 2023-03-17 ENCOUNTER — PATIENT MESSAGE (OUTPATIENT)
Dept: FAMILY MEDICINE | Facility: CLINIC | Age: 53
End: 2023-03-17
Payer: COMMERCIAL

## 2023-03-17 DIAGNOSIS — E29.1 HYPOGONADISM MALE: ICD-10-CM

## 2023-03-17 DIAGNOSIS — B35.1 TOENAIL FUNGUS: ICD-10-CM

## 2023-03-17 DIAGNOSIS — B35.1 TOENAIL FUNGUS: Primary | ICD-10-CM

## 2023-03-17 DIAGNOSIS — E29.1 HYPOGONADISM IN MALE: ICD-10-CM

## 2023-03-17 RX ORDER — SILDENAFIL 50 MG/1
50 TABLET, FILM COATED ORAL DAILY PRN
Qty: 20 TABLET | Refills: 3 | Status: SHIPPED | OUTPATIENT
Start: 2023-03-17 | End: 2023-03-17 | Stop reason: CLARIF

## 2023-03-17 RX ORDER — TADALAFIL 5 MG/1
5 TABLET ORAL DAILY PRN
Qty: 90 TABLET | Refills: 3 | Status: CANCELLED | OUTPATIENT
Start: 2023-03-17 | End: 2024-03-16

## 2023-03-17 RX ORDER — CICLOPIROX 80 MG/ML
SOLUTION TOPICAL NIGHTLY
Qty: 6.6 ML | Refills: 3 | Status: SHIPPED | OUTPATIENT
Start: 2023-03-17

## 2023-03-17 RX ORDER — CICLOPIROX 80 MG/ML
SOLUTION TOPICAL NIGHTLY
Qty: 6.6 ML | Refills: 2 | OUTPATIENT
Start: 2023-03-17 | End: 2023-04-16

## 2023-03-17 RX ORDER — TADALAFIL 5 MG/1
5 TABLET ORAL DAILY PRN
Qty: 90 TABLET | Refills: 0 | Status: CANCELLED | OUTPATIENT
Start: 2023-03-17 | End: 2023-06-15

## 2023-03-17 RX ORDER — TADALAFIL 5 MG/1
5 TABLET ORAL DAILY PRN
Qty: 90 TABLET | Refills: 3 | Status: SHIPPED | OUTPATIENT
Start: 2023-03-17 | End: 2023-12-04 | Stop reason: SDUPTHER

## 2023-03-17 NOTE — TELEPHONE ENCOUNTER
Spoke to pt. Pt stated the wrong medications were refilled. Pt stated he takes tadalafil and not viagra. Medication pended.     LOV 9/19/22  LAB 2/8/23  Next OV 5/30/23

## 2023-03-17 NOTE — TELEPHONE ENCOUNTER
No new care gaps identified.  [facility] Embedded Care Gaps. Reference number: [id number]. 3/17/2023   11:15:37 AM KIMBERLYT

## 2023-03-17 NOTE — TELEPHONE ENCOUNTER
----- Message from Zara Hsu sent at 3/17/2023 11:47 AM CDT -----  Contact: Patient  Type:  Needs Medical Advice    Who Called: Patients Wife      Pharmacy name and phone #:    [id number] - [place] - [address]  [address] [place] [address]  Phone: [phone] Fax: [phone]      Would the patient rather a call back or a response via [facility]? Call    Best Call Back Number: [phone] (home)     Additional Information: Patient was called in Meds but the wrong meds were called in. Please call to advise

## 2023-03-17 NOTE — TELEPHONE ENCOUNTER
----- Message from Zara Hsu sent at 3/17/2023 11:47 AM CDT -----  Contact: Patient  Type:  Needs Medical Advice    Who Called: Patients Wife      Pharmacy name and phone #:    [id number] - [place] - [address]  [address] [address]  Phone: [phone] Fax: [phone]      Would the patient rather a call back or a response via [facility]? Call    Best Call Back Number: [phone] (home)     Additional Information: Patient was called in Meds but the wrong meds were called in. Please call to advise

## 2023-03-17 NOTE — TELEPHONE ENCOUNTER
No new care gaps identified.  [facility] Embedded Care Gaps. Reference number: [id number]. 3/17/2023   1:14:59 PM CDT

## 2023-03-20 ENCOUNTER — ANESTHESIA EVENT (OUTPATIENT)
Dept: SURGERY | Facility: HOSPITAL | Age: 53
End: 2023-03-20
Payer: COMMERCIAL

## 2023-03-20 NOTE — PROGRESS NOTES
"  Subjective: 52 y.o. 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  Pathology just came back and he is here to discuss next step and reconstruction.  He reports no issues with the wound.  Has not had any significant oozing.  Neck incisions with some puffiness but no facial weakness patient continues on antibiotic but is on Keflex and has not been taking his doxycycline      Objective:  /79   Pulse 71   Ht 6' 2" (1.88 m)   Wt 115 kg (253 lb 8.5 oz)   BMI 32.55 kg/m²     Exam:  General No acute distress  Xeroform bolster in place without surrounding erythema or drainage  Neck incisions healing appropriately with some surrounding edema but no fluid collection    Pathology review:     NASAL CAVITY AND PARANASAL SINUSES:    SPECIMEN      Procedure       Excision       Tumor Focality       Unifocal       Tumor Laterality       Midline       Tumor Size       Greatest Dimension (Centimeters) 4.1 cm           Squamous Cell Carcinoma and Variants           Verrucous squamous cell carcinoma       Histologic Grade       G1: Well differentiated       Lymphovascular Invasion       Not Identified       Perineural Invasion       Not identified       Margins       Uninvolved by invasive tumor; the closest margin is deep margin         Distance from Closest Margin (Millimeters) At least 0.5 mm       Regional Lymph Node Status:      All regional lymph nodes negative fo tumor       Number of Lymph Nodes Examined:      5       pT Category:           pT1       pN Category:           pN0     Assessment / Plan:  Patient is ready for reconstruction and will schedule him for 03/21/2023.  Risks benefits and alternatives discussed.  Given close deep margin I will re resect at vestibule superiorly with some part of the anterior septal angle.  Need for auricular cartilage graft with likely composite graft and paramedian forehead " flap was discussed.  Consent to be obtained day of surgery.

## 2023-03-21 ENCOUNTER — HOSPITAL ENCOUNTER (OUTPATIENT)
Facility: HOSPITAL | Age: 53
Discharge: HOME | End: 2023-03-21
Attending: OTOLARYNGOLOGY | Admitting: OTOLARYNGOLOGY
Payer: COMMERCIAL

## 2023-03-21 ENCOUNTER — ANESTHESIA (OUTPATIENT)
Dept: SURGERY | Facility: HOSPITAL | Age: 53
End: 2023-03-21
Payer: COMMERCIAL

## 2023-03-21 VITALS
HEIGHT: 74 IN | HEART RATE: 82 BPM | TEMPERATURE: 98 F | SYSTOLIC BLOOD PRESSURE: 143 MMHG | WEIGHT: 250 LBS | BODY MASS INDEX: 32.08 KG/M2 | RESPIRATION RATE: 18 BRPM | DIASTOLIC BLOOD PRESSURE: 67 MMHG | OXYGEN SATURATION: 93 %

## 2023-03-21 DIAGNOSIS — C44.321 SQUAMOUS CELL CARCINOMA OF SKIN OF NASAL TIP: ICD-10-CM

## 2023-03-21 DIAGNOSIS — C44.321 SQUAMOUS CELL CANCER OF SKIN OF NASAL TIP: Primary | ICD-10-CM

## 2023-03-21 LAB — POCT GLUCOSE: 95 MG/DL (ref 70–110)

## 2023-03-21 PROCEDURE — 15760 COMPOSITE SKIN GRAFT: CPT | Mod: 51,58,, | Performed by: OTOLARYNGOLOGY

## 2023-03-21 PROCEDURE — 30520 PR REPAIR, NASAL SEPTUM: ICD-10-PCS | Mod: 51,58,, | Performed by: OTOLARYNGOLOGY

## 2023-03-21 PROCEDURE — 12054 PR LAYR CLOS WND FACE,FACIAL 7.6-12.5 CM: ICD-10-PCS | Mod: 59,58,, | Performed by: OTOLARYNGOLOGY

## 2023-03-21 PROCEDURE — 88305 TISSUE EXAM BY PATHOLOGIST: CPT | Mod: 26,,, | Performed by: PATHOLOGY

## 2023-03-21 PROCEDURE — 37000008 HC ANESTHESIA 1ST 15 MINUTES: Performed by: OTOLARYNGOLOGY

## 2023-03-21 PROCEDURE — 12054 INTMD RPR FACE/MM 7.6-12.5CM: CPT | Mod: 59,58,, | Performed by: OTOLARYNGOLOGY

## 2023-03-21 PROCEDURE — 88305 TISSUE EXAM BY PATHOLOGIST: CPT | Performed by: PATHOLOGY

## 2023-03-21 PROCEDURE — 14060 TIS TRNFR E/N/E/L 10 SQ CM/<: CPT | Mod: 51,58,, | Performed by: OTOLARYNGOLOGY

## 2023-03-21 PROCEDURE — 25000003 PHARM REV CODE 250: Performed by: OTOLARYNGOLOGY

## 2023-03-21 PROCEDURE — 37000009 HC ANESTHESIA EA ADD 15 MINS: Performed by: OTOLARYNGOLOGY

## 2023-03-21 PROCEDURE — 11641 PR EXC SKIN MALIG 0.6-1CM FACE,FACIAL: ICD-10-PCS | Mod: 59,58,, | Performed by: OTOLARYNGOLOGY

## 2023-03-21 PROCEDURE — 63600175 PHARM REV CODE 636 W HCPCS: Performed by: STUDENT IN AN ORGANIZED HEALTH CARE EDUCATION/TRAINING PROGRAM

## 2023-03-21 PROCEDURE — 88305 TISSUE EXAM BY PATHOLOGIST: ICD-10-PCS | Mod: 26,,, | Performed by: PATHOLOGY

## 2023-03-21 PROCEDURE — 15760 PR COMPOSITE SKIN GRAFT: ICD-10-PCS | Mod: 51,58,, | Performed by: OTOLARYNGOLOGY

## 2023-03-21 PROCEDURE — D9220A PRA ANESTHESIA: ICD-10-PCS | Mod: ,,, | Performed by: ANESTHESIOLOGY

## 2023-03-21 PROCEDURE — 36000706: Performed by: OTOLARYNGOLOGY

## 2023-03-21 PROCEDURE — 25000003 PHARM REV CODE 250: Performed by: STUDENT IN AN ORGANIZED HEALTH CARE EDUCATION/TRAINING PROGRAM

## 2023-03-21 PROCEDURE — 71000044 HC DOSC ROUTINE RECOVERY FIRST HOUR: Performed by: OTOLARYNGOLOGY

## 2023-03-21 PROCEDURE — 36000707: Performed by: OTOLARYNGOLOGY

## 2023-03-21 PROCEDURE — 88304 TISSUE EXAM BY PATHOLOGIST: CPT | Performed by: PATHOLOGY

## 2023-03-21 PROCEDURE — 71000016 HC POSTOP RECOV ADDL HR: Performed by: OTOLARYNGOLOGY

## 2023-03-21 PROCEDURE — 15731 PR FOREHEAD FLAP W/VASC PEDICLE: ICD-10-PCS | Mod: 58,,, | Performed by: OTOLARYNGOLOGY

## 2023-03-21 PROCEDURE — 71000015 HC POSTOP RECOV 1ST HR: Performed by: OTOLARYNGOLOGY

## 2023-03-21 PROCEDURE — 15731 FOREHEAD FLAP W/VASC PEDICLE: CPT | Mod: 58,,, | Performed by: OTOLARYNGOLOGY

## 2023-03-21 PROCEDURE — 13152 CMPLX RPR E/N/E/L 2.6-7.5 CM: CPT | Mod: 59,58,, | Performed by: OTOLARYNGOLOGY

## 2023-03-21 PROCEDURE — 30520 REPAIR OF NASAL SEPTUM: CPT | Mod: 51,58,, | Performed by: OTOLARYNGOLOGY

## 2023-03-21 PROCEDURE — 27201423 OPTIME MED/SURG SUP & DEVICES STERILE SUPPLY: Performed by: OTOLARYNGOLOGY

## 2023-03-21 PROCEDURE — 14060 PR ADJ TISS XFER LID,NOS,EAR <10 SQCM: ICD-10-PCS | Mod: 51,58,, | Performed by: OTOLARYNGOLOGY

## 2023-03-21 PROCEDURE — D9220A PRA ANESTHESIA: Mod: ,,, | Performed by: ANESTHESIOLOGY

## 2023-03-21 PROCEDURE — 13152 PR RECMPL WND LID,NOS,EAR 2.6-7.5 CM: ICD-10-PCS | Mod: 59,58,, | Performed by: OTOLARYNGOLOGY

## 2023-03-21 PROCEDURE — 11641 EXC F/E/E/N/L MAL+MRG 0.6-1: CPT | Mod: 59,58,, | Performed by: OTOLARYNGOLOGY

## 2023-03-21 PROCEDURE — 82962 GLUCOSE BLOOD TEST: CPT | Performed by: OTOLARYNGOLOGY

## 2023-03-21 RX ORDER — ACETAMINOPHEN 500 MG
1000 TABLET ORAL ONCE
Status: COMPLETED | OUTPATIENT
Start: 2023-03-21 | End: 2023-03-21

## 2023-03-21 RX ORDER — LIDOCAINE HYDROCHLORIDE 20 MG/ML
INJECTION, SOLUTION EPIDURAL; INFILTRATION; INTRACAUDAL; PERINEURAL
Status: DISCONTINUED | OUTPATIENT
Start: 2023-03-21 | End: 2023-03-21

## 2023-03-21 RX ORDER — FENTANYL CITRATE 50 UG/ML
INJECTION, SOLUTION INTRAMUSCULAR; INTRAVENOUS
Status: DISCONTINUED | OUTPATIENT
Start: 2023-03-21 | End: 2023-03-21

## 2023-03-21 RX ORDER — HYDROMORPHONE HYDROCHLORIDE 1 MG/ML
0.2 INJECTION, SOLUTION INTRAMUSCULAR; INTRAVENOUS; SUBCUTANEOUS EVERY 5 MIN PRN
Status: DISCONTINUED | OUTPATIENT
Start: 2023-03-21 | End: 2023-03-21 | Stop reason: HOSPADM

## 2023-03-21 RX ORDER — PHENYLEPHRINE HYDROCHLORIDE 10 MG/ML
INJECTION INTRAVENOUS
Status: DISCONTINUED | OUTPATIENT
Start: 2023-03-21 | End: 2023-03-21

## 2023-03-21 RX ORDER — ONDANSETRON 2 MG/ML
4 INJECTION INTRAMUSCULAR; INTRAVENOUS DAILY PRN
Status: DISCONTINUED | OUTPATIENT
Start: 2023-03-21 | End: 2023-03-21 | Stop reason: HOSPADM

## 2023-03-21 RX ORDER — NEOSTIGMINE METHYLSULFATE 0.5 MG/ML
INJECTION, SOLUTION INTRAVENOUS
Status: DISCONTINUED | OUTPATIENT
Start: 2023-03-21 | End: 2023-03-21

## 2023-03-21 RX ORDER — SODIUM CHLORIDE 0.9 % (FLUSH) 0.9 %
10 SYRINGE (ML) INJECTION
Status: DISCONTINUED | OUTPATIENT
Start: 2023-03-21 | End: 2023-03-21 | Stop reason: HOSPADM

## 2023-03-21 RX ORDER — HYDROCODONE BITARTRATE AND ACETAMINOPHEN 5; 325 MG/1; MG/1
1 TABLET ORAL EVERY 6 HOURS PRN
Qty: 25 TABLET | Refills: 0 | Status: SHIPPED | OUTPATIENT
Start: 2023-03-21 | End: 2023-05-01

## 2023-03-21 RX ORDER — ROCURONIUM BROMIDE 10 MG/ML
INJECTION, SOLUTION INTRAVENOUS
Status: DISCONTINUED | OUTPATIENT
Start: 2023-03-21 | End: 2023-03-21

## 2023-03-21 RX ORDER — VASOPRESSIN 20 [USP'U]/ML
INJECTION, SOLUTION INTRAMUSCULAR; SUBCUTANEOUS
Status: DISCONTINUED | OUTPATIENT
Start: 2023-03-21 | End: 2023-03-21

## 2023-03-21 RX ORDER — FENTANYL CITRATE 50 UG/ML
25 INJECTION, SOLUTION INTRAMUSCULAR; INTRAVENOUS EVERY 5 MIN PRN
Status: DISCONTINUED | OUTPATIENT
Start: 2023-03-21 | End: 2023-03-21

## 2023-03-21 RX ORDER — PROPOFOL 10 MG/ML
VIAL (ML) INTRAVENOUS
Status: DISCONTINUED | OUTPATIENT
Start: 2023-03-21 | End: 2023-03-21

## 2023-03-21 RX ORDER — CEFAZOLIN SODIUM 1 G/3ML
INJECTION, POWDER, FOR SOLUTION INTRAMUSCULAR; INTRAVENOUS
Status: DISCONTINUED | OUTPATIENT
Start: 2023-03-21 | End: 2023-03-21

## 2023-03-21 RX ORDER — KETAMINE HCL IN 0.9 % NACL 50 MG/5 ML
SYRINGE (ML) INTRAVENOUS
Status: DISCONTINUED | OUTPATIENT
Start: 2023-03-21 | End: 2023-03-21

## 2023-03-21 RX ORDER — HALOPERIDOL 5 MG/ML
0.5 INJECTION INTRAMUSCULAR EVERY 10 MIN PRN
Status: DISCONTINUED | OUTPATIENT
Start: 2023-03-21 | End: 2023-03-21 | Stop reason: HOSPADM

## 2023-03-21 RX ORDER — FENTANYL CITRATE 50 UG/ML
25 INJECTION, SOLUTION INTRAMUSCULAR; INTRAVENOUS EVERY 5 MIN PRN
Status: DISCONTINUED | OUTPATIENT
Start: 2023-03-21 | End: 2023-03-21 | Stop reason: HOSPADM

## 2023-03-21 RX ORDER — DEXAMETHASONE SODIUM PHOSPHATE 4 MG/ML
INJECTION, SOLUTION INTRA-ARTICULAR; INTRALESIONAL; INTRAMUSCULAR; INTRAVENOUS; SOFT TISSUE
Status: DISCONTINUED | OUTPATIENT
Start: 2023-03-21 | End: 2023-03-21

## 2023-03-21 RX ORDER — ONDANSETRON 4 MG/1
4 TABLET, ORALLY DISINTEGRATING ORAL EVERY 6 HOURS PRN
Qty: 15 TABLET | Refills: 0 | Status: SHIPPED | OUTPATIENT
Start: 2023-03-21 | End: 2023-05-01

## 2023-03-21 RX ORDER — LIDOCAINE HYDROCHLORIDE AND EPINEPHRINE 10; 10 MG/ML; UG/ML
INJECTION, SOLUTION INFILTRATION; PERINEURAL
Status: DISCONTINUED | OUTPATIENT
Start: 2023-03-21 | End: 2023-03-21 | Stop reason: HOSPADM

## 2023-03-21 RX ORDER — ONDANSETRON 2 MG/ML
INJECTION INTRAMUSCULAR; INTRAVENOUS
Status: DISCONTINUED | OUTPATIENT
Start: 2023-03-21 | End: 2023-03-21

## 2023-03-21 RX ORDER — MIDAZOLAM HYDROCHLORIDE 5 MG/ML
INJECTION INTRAMUSCULAR; INTRAVENOUS
Status: DISCONTINUED | OUTPATIENT
Start: 2023-03-21 | End: 2023-03-21

## 2023-03-21 RX ADMIN — ROCURONIUM BROMIDE 60 MG: 10 INJECTION INTRAVENOUS at 08:03

## 2023-03-21 RX ADMIN — PHENYLEPHRINE HYDROCHLORIDE 200 MCG: 10 INJECTION INTRAVENOUS at 08:03

## 2023-03-21 RX ADMIN — HYDROMORPHONE HYDROCHLORIDE 0.2 MG: 1 INJECTION, SOLUTION INTRAMUSCULAR; INTRAVENOUS; SUBCUTANEOUS at 03:03

## 2023-03-21 RX ADMIN — Medication 10 MG: at 01:03

## 2023-03-21 RX ADMIN — DEXAMETHASONE SODIUM PHOSPHATE 4 MG: 4 INJECTION, SOLUTION INTRAMUSCULAR; INTRAVENOUS at 08:03

## 2023-03-21 RX ADMIN — SODIUM CHLORIDE 0.25 MCG/KG/MIN: 9 INJECTION, SOLUTION INTRAVENOUS at 09:03

## 2023-03-21 RX ADMIN — Medication 10 MG: at 10:03

## 2023-03-21 RX ADMIN — MIDAZOLAM HYDROCHLORIDE 2 MG: 5 INJECTION, SOLUTION INTRAMUSCULAR; INTRAVENOUS at 08:03

## 2023-03-21 RX ADMIN — Medication 20 MG: at 08:03

## 2023-03-21 RX ADMIN — NEOSTIGMINE METHYLSULFATE 4 MG: 0.5 INJECTION, SOLUTION INTRAVENOUS at 01:03

## 2023-03-21 RX ADMIN — VASOPRESSIN 2 UNITS: 20 INJECTION INTRAVENOUS at 09:03

## 2023-03-21 RX ADMIN — PROPOFOL 250 MG: 10 INJECTION, EMULSION INTRAVENOUS at 08:03

## 2023-03-21 RX ADMIN — CEFAZOLIN 2 G: 330 INJECTION, POWDER, FOR SOLUTION INTRAMUSCULAR; INTRAVENOUS at 12:03

## 2023-03-21 RX ADMIN — ACETAMINOPHEN 1000 MG: 500 TABLET ORAL at 06:03

## 2023-03-21 RX ADMIN — GLYCOPYRROLATE 0.6 MG: 0.2 INJECTION, SOLUTION INTRAMUSCULAR; INTRAVENOUS at 01:03

## 2023-03-21 RX ADMIN — HALOPERIDOL LACTATE 0.5 MG: 5 INJECTION, SOLUTION INTRAMUSCULAR at 02:03

## 2023-03-21 RX ADMIN — LIDOCAINE HYDROCHLORIDE 100 MG: 20 INJECTION, SOLUTION EPIDURAL; INFILTRATION; INTRACAUDAL; PERINEURAL at 08:03

## 2023-03-21 RX ADMIN — FENTANYL CITRATE 50 MCG: 50 INJECTION, SOLUTION INTRAMUSCULAR; INTRAVENOUS at 08:03

## 2023-03-21 RX ADMIN — VASOPRESSIN 2 UNITS: 20 INJECTION INTRAVENOUS at 11:03

## 2023-03-21 RX ADMIN — SODIUM CHLORIDE: 0.9 INJECTION, SOLUTION INTRAVENOUS at 08:03

## 2023-03-21 RX ADMIN — ROCURONIUM BROMIDE 20 MG: 10 INJECTION INTRAVENOUS at 10:03

## 2023-03-21 RX ADMIN — ROCURONIUM BROMIDE 20 MG: 10 INJECTION INTRAVENOUS at 09:03

## 2023-03-21 RX ADMIN — HYDROMORPHONE HYDROCHLORIDE 0.2 MG: 1 INJECTION, SOLUTION INTRAMUSCULAR; INTRAVENOUS; SUBCUTANEOUS at 02:03

## 2023-03-21 RX ADMIN — CEFAZOLIN 2 G: 330 INJECTION, POWDER, FOR SOLUTION INTRAMUSCULAR; INTRAVENOUS at 08:03

## 2023-03-21 RX ADMIN — Medication 10 MG: at 12:03

## 2023-03-21 RX ADMIN — PROPOFOL 50 MG: 10 INJECTION, EMULSION INTRAVENOUS at 08:03

## 2023-03-21 RX ADMIN — ONDANSETRON 4 MG: 2 INJECTION INTRAMUSCULAR; INTRAVENOUS at 12:03

## 2023-03-21 NOTE — CARE UPDATE
Safety checklist filled out with patient. Awaiting update to H&P, surgery consent, anesthesia consent, preop orders, and admit order.

## 2023-03-21 NOTE — OP NOTE
.Date of service: 3/21/2023    Pre-operative Diagnosis:   1. Squamous cell carcinoma right nasal ala and tip status post resection  2. Open wound of the nose  3. Nasal septal deviation   4. Open wound of central upper lip    Post-operative Diagnosis:  Same    Procedures Performed:  1. Paramedian forehead flap for coverage of nasal defect  2. Composite right auricular cartilage and skin graft for coverage of framework and lining of right nasal ala  3. Septoplasty for cartilage harvest with septal extension graft  4. Re-resection of anterior septum and remaining dome of lower lateral cartilage with resected area 1 x 1 cm  5. Complex closure of forehead secondary defect with closed length being 8 cm  6. Complex closure of right ear donor site with closed length 2.7 cm  7. Closure of lip defect with bilateral advancement flap with advanced area being 2.5 x 1.5 cm     Surgeon: Alyx Spivey MD    Assistant(s):  Wilmar Stuart MD    Anesthesia: General Endotracheal    EBL:  200 cc    Specimens:  Number of specimens: 1Name of specimens: Re-resection deep nasal margin    Findings:  Patient had a complex defect of right ala and nasal tip as well as septal defect after re-resection today.  Anterior septal angle was addressed with septal cartilage harvest and extension graft with resuspension of left nasal dome to this at the lower lateral cartilage.  Right ala was reconstructed with composite graft for framework and vestibular lining.  Paramedian forehead flap was used to cover remainder of the skin defect and was sutured to skin of the composite graft.    Indications for Procedure:  Mr. Lion is a 52-year-old male that I have taken care of with squamous cell carcinoma resected as well as sentinel lymph node biopsy done on 03/07/2023.  Patient presents today for re-resection as well as reconstruction with paramedian forehead flap.    Procedure in Detail:  After informed consent was obtained from patient in holding area the  risks and benefits reviewed patient was taken back to OR 6.  Anesthesia proceeded with general endotracheal anesthesia with intubation with an oral Kerri tube.  Patient was turned 90° and time-out was undertaken verification of patient and correct procedure.  Previously placed Xeroform bolster was removed with area surrounding the nasal defect injected with 1% lidocaine with [id number] of epinephrine photo documentation of the defect was obtained.  Midface was prepped and draped in usual sterile fashion to include bilateral ears.    Attention was 1st turned to re-resection at the dome on the right.  Previous cut into the septal cartilage was continued and piece of the anterior septal angle was removed as well as remnant of upper lateral at the dome and this tissue was sent off along with vestibular skin for permanent.    Attention was then turned to upper lip defect.  Wide undermining was undertaken into the upper lip on either side of the defect with undermining undertaken in the subcutaneous level.  Back cuts were created underneath the ala bilaterally in order to provide advancement flap and incision was extended 1 cm into the ala on each side.  The bilateral advancement flaps were then secured with deep 4-0 PDS suture and then 5 0 fast-absorbing gut for the skin.  There was a small central defect at the base of the columella with advancement flaps not covering that area and this was left to granulate.  Attention was turned to nasal defect    Wide undermining was undertaken in a subcutaneous plane superiorly and laterally from the nasal defect along the right ala remnant undermining was undertaken both in a subcutaneous plane and then in a submucosal plane just above the remainder of lower lateral cartilage in a sub perichondrial plane to elevate the mucosal skin for reapproximation with composite graft.  Area around the nasal defect was infiltrated with 1% lidocaine with [id number] of epinephrine and measurement was  undertaken with needed composite graft skin being 2.5 cm x 1 cm and attention was turned to the graft harvests.      Starting on the right ear area was drawn out in the nusrat bowl extending 2.5 cm x 1 cm with lateral aspect just below the anti helix at the edge of the nusrat bowl.  Area surrounding the composite graft was infiltrated with local.  Fifteen blade was used to make an incision laterally 1st through the skin and the cartilage and then through the skin only at the anterior aspect of the composite graft skin.  Undermining was then undertaken in a sub perichondrial plane on the remainder of the nusrat bowl superiorly and inferiorly towards the EAC.  Cartilage cuts beyond the undermined area was then made with a 15 blade and remainder of the graft was harvested with curved tenotomy scissors with detachment of the cartilage anteriorly from postauricular soft tissues.  Hemostasis was obtained with bipolar cautery and attention was turned to closure of the right ear.  Perichondrial skin was approximated to anterior perichondrial sub cutaneous tissues with deep 4-0 PDS suture.  This was done along the length of the cartilage defect.  In order to kill the dead space and approximate the skin.  Once skin was approximated this was closed with a running locking 5 0 fast-absorbing gut suture.  Bolsters placed and then secured with 4-0 nylon.  Attention was turned to obtaining of the septal cartilage graft and septoplasty     Starting with the mucosal defect at the anterior septum 15 blade was used to cross-reese the septum and submucoperichondrial plane was identified on the right.  This was developed with Daniel and then suction with wide elevation of mucoperichondrial plane on the right.  Of note there was a inferior cartilage spur as well as bony spur at the maxillary crest on the right.  Careful elevation was undertaken with a small tear into the mucoperichondrium on the right.  After elevation well beyond the  bony cartilaginous junction 15 blade was used to make an incision 4 rectangular piece of cartilage to span the deviated cartilage on the right.  Was made with the superior cut and anterior cut with care to leave more than a cm of caudal and dorsal septum.  Contralateral left-sided mucoperichondrial plane was elevated with [place] after completing of the cuts and this was developed beyond the bony cartilaginous junction with Lange scissors used to make cut into the bone.  The cartilage bone complex was then freed with caudal from the maxillary crest and delivered for grafting.  At this point attention was turned to cartilage carving of both the septum and the ear cartilage.      On the back table septal cartilage was trimmed to appropriate size to fill the anterior septal defect at the anterior septal angle.  Cartilage was also scored to appropriate thickness.  The posterior perichondrium of the composite graft from the right ear was removed with combination of 15 blade and iris scissors in order to expose cartilage on the posterior aspect.  Attention was turned to grafting at this point and septal cartilage was placed in position and and initially secured to the remnant of the dorsal septum and then caudal septum with 5 0 PDS suture.  The right dome was affixed to the new anterior septal angle which was created with the graft with 5 0 PDS suture as well.  The auricular composite graft was then brought in and placed in the pocket in the lateral ala with composite skin and good position in the anterior medial part was affixed to the septal cartilage extension graft with 5 0 PDS suture as well.  This point the composite skin was secured to the posterior vestibular skin and mucosa with 5 0 chromic suture.  Attention was turned at this point the harvest of the paramedian forehead flap.      Template made out of draping material was used to create a template for the right ala left nasal tip and columellar defect as well as  the soft tissue triangles bilaterally.  Attempt was made at Doppler ring of left supratrochlear vessel but this could not be found with the Doppler and this was measured out at 1.8 cm from the midline with pedicle drawn out.  Assuring good rotation into the defect the template was brought in into the distribution of the supratrochlear on the left and in order to provide good reach this was taken back into the hairline with trimming of the hair.  Template was drawn out in the distribution of the supratrochlear and area surrounding the flap was injected with 1% lidocaine with [id number] of epinephrine.  Starting at the distal tip at the columellar portion of the flap and the bilateral ala and dome incision was made with 15 blade and elevation was initially done on the flap in a subcutaneous plane just below the hair follicles with a thin flap elevated midway through the flap at the nose this was transitioned slowly to a sub galea plane.  This was elevated the remainder of the cuts along either side of the pedicle made with a 15 blade and continued to subgaleal level with Metzenbaum scissors.  About 1 cm from the orbital rim this was deepened with Bovie cautery down through the periosteum.  Elevation was then undertaken with 9. Elevator with freeing of the pedicle to the edge of the orbital rim.  Corrugators and soft tissue on either side of the pedicle was freed with bipolar cautery in order to allow reach and cuts with a 15 blade were made into the frontalis layer in order to provide better reach as well.  At this point attention was turned to closure of the forehead and then inset of paramedian forehead flap.      The superior most aspect of the donor site was cleared of subcutaneous tissue with Bovie cautery.  Subgaleal elevation was undertaken on either side of the secondary defect in the forehead.  Cuts were made through the galea with Bovie cautery in cutting mode and then the secondary defect at the forehead was  closed in a triangulated fashion with small area left to granulate.  This was done with deep 3-0 Vicryl suture and staples for the skin behind the hairline and then running locking 5 0 Prolene suture for the remainder of the forehead skin.    Attention was turned back to the nose with paramedian forehead flap inset initially at ala and left dome with deep combination of 4-0 PDS and 5 0 PDS suture columella was closed as much as possible with a small gap inferiorly left granulate with columella part of the paramedian forehead flap used to cover the anterior septal extension graft.  The skin at the membranous columella and on the right ala from the paramedian forehead flap as well as to the left soft tissue triangle was closed with 5 0 chromic with flap closed to mucosa and or skin of the composite graft respectively.  Once the intranasal portion was closed off from the flap the remainder was inset with deep 4-0 and 5 0 PDS suture and running locking Prolene stitch for the skin.  Wounds were cleaned and Surgicel was placed under the pedicle and Xeroform was placed and remainder of the defect at the forehead and hair was washed and patient was turned over to Anesthesia awakened and taken to recovery in stable condition.    Complications:  None    Attestation:  I was present for the entire procedure    DISCLAIMER: This note was prepared with Noteleaf voice recognition transcription software. Garbled syntax, mangled pronouns, and other bizarre constructions may be attributed to that software system. While efforts were made to correct any mistakes made by this voice recognition program, some errors and/or omissions may remain in the note that were missed when the note was originally created.

## 2023-03-21 NOTE — ANESTHESIA PROCEDURE NOTES
Intubation    Date/Time: 3/21/2023 8:16 AM  Performed by: Reggie Espino MD  Authorized by: Chapo Doshi MD     Intubation:     Induction:  Intravenous    Intubated:  Postinduction    Mask Ventilation:  Easy with oral airway    Attempts:  2    Attempted By:  Resident anesthesiologist    Method of Intubation:  Direct    Blade:  Atkinson 2    Laryngeal View Grade: Grade IIb - only the arytenoids and epiglottis seen      Attempted By (2nd Attempt):  Resident anesthesiologist    Method of Intubation (2nd Attempt):  Video laryngoscopy    Blade (2nd Attempt):  Mcfarlane 4    Laryngeal View Grade (2nd Attempt): Grade IIa - cords partially seen      Difficult Airway Encountered?: No      Complications:  None    Airway Device:  Oral albert    Airway Device Size:  7.5    Style/Cuff Inflation:  Cuffed    Inflation Amount (mL):  6    Tube secured:  23    Secured at:  The lips    Placement Verified By:  Capnometry    Complicating Factors:  Large/floppy epiglottis    Findings Post-Intubation:  BS equal bilateral and atraumatic/condition of teeth unchanged

## 2023-03-21 NOTE — BRIEF OP NOTE
[facility] (Former[facility])  Brief Operative Note    Surgery Date: 3/21/2023     Surgeon(s) and Role:     * Connie Spivey MD - Primary     * Bhanu Stuart MD    Assisting Surgeon: None    Pre-op Diagnosis:  Open wound of nose, unspecified open wound type, initial encounter [S01.20XA]    Post-op Diagnosis:  Post-Op Diagnosis Codes:     * Open wound of nose, unspecified open wound type, initial encounter [S01.20XA]    Procedure(s) (LRB):  RECONSTRUCTION, NOSE (N/A)  APPLICATION, CARTILAGE GRAFT (N/A)  CREATION, FLAP, ROTATION (N/A)    Anesthesia: General    Operative Findings: nasal reconstruction using paramedian forehead flap and right composite auricular cartilage graft    Estimated Blood Loss: * No values recorded between 3/21/2023  8:40 AM and 3/21/2023  1:29 PM *         Specimens:   Specimen (24h ago, onward)       Start     Ordered    03/21/23 0844  Specimen to Pathology, Surgery ENT  Once        Comments: Pre-op Diagnosis: Open wound of nose, unspecified open wound type, initial encounter [S01.20XA]Procedure(s):RECONSTRUCTION, NOSEAPPLICATION, CARTILAGE GRAFTCREATION, FLAP, ROTATION Number of specimens: 1Name of specimens: Re-resection deep nasal margin     References:    Click here for ordering Quick Tip   Question Answer Comment   Procedure Type: ENT    Which provider would you like to cc? CONNIE SPIVEY    Release to patient Immediate        03/21/23 0844                      Discharge Note    OUTCOME: Patient tolerated treatment/procedure well without complication and is now ready for discharge.    DISPOSITION: Home or Self Care    FINAL DIAGNOSIS:  Squamous cell cancer of skin of nasal tip    FOLLOWUP: In clinic    DISCHARGE INSTRUCTIONS:    Discharge Procedure Orders   Diet Adult Regular     Other restrictions (specify):   Order Comments: No heavy lifting (nothing more than 10 lbs) for 2 weeks. No strenuous activity for 2 weeks.     Notify your health care provider if you experience any of the  following:  persistent nausea and vomiting or diarrhea     Notify your health care provider if you experience any of the following:  severe uncontrolled pain     Notify your health care provider if you experience any of the following:  redness, tenderness, or signs of infection (pain, swelling, redness, odor or green/yellow discharge around incision site)     Change dressing (specify)   Order Comments: No wetting incision for two days. Apply vaseline or aquafor to incisions twice a day. If crusting forms around incision you can clean with 1/2 peroxide and 1/2 water prior to applying vaseline or aquafor.

## 2023-03-21 NOTE — TRANSFER OF CARE
"Anesthesia Transfer of Care Note    Patient: Lyndon Lion Jr.    Procedure(s) Performed: Procedure(s) (LRB):  RECONSTRUCTION, NOSE (N/A)  APPLICATION, CARTILAGE GRAFT (N/A)  CREATION, FLAP, ROTATION (N/A)    Patient location: [facility]    Anesthesia Type: general    Transport from OR: Transported from OR on 6-10 L/min O2 by face mask with adequate spontaneous ventilation    Post pain: adequate analgesia    Post assessment: no apparent anesthetic complications and tolerated procedure well    Post vital signs: stable    Level of consciousness: awake and alert    Nausea/Vomiting: no nausea/vomiting    Complications: none    Transfer of care protocol was followed      Last vitals:   Visit Vitals  BP (!) 133/91   Pulse 69   Temp 36.8 °C (98.2 °F) (Oral)   Resp 18   Ht 6' 2" (1.88 m)   Wt 113.4 kg (250 lb)   SpO2 98%   BMI 32.10 kg/m²     "

## 2023-03-22 NOTE — ANESTHESIA POSTPROCEDURE EVALUATION
Anesthesia Post Evaluation    Patient: Lyndon Lion Jr.    Procedure(s) Performed: Procedure(s) (LRB):  RECONSTRUCTION, NOSE (N/A)  APPLICATION, CARTILAGE GRAFT (N/A)  CREATION, FLAP, ROTATION (N/A)    Final Anesthesia Type: general      Patient location during evaluation: PACU  Patient participation: Yes- Able to Participate  Level of consciousness: awake and alert  Post-procedure vital signs: reviewed and stable  Pain management: adequate  Airway patency: patent    PONV status at discharge: No PONV  Anesthetic complications: no      Cardiovascular status: blood pressure returned to baseline  Respiratory status: unassisted  Hydration status: euvolemic  Follow-up not needed.          Vitals Value Taken Time   /67 03/21/23 1603   Temp ** 03/22/23 1008   Pulse 89 03/21/23 1612   Resp 29 03/21/23 1612   SpO2 95 % 03/21/23 1612   Vitals shown include unvalidated device data.      No case tracking events are documented in the log.      Pain/Karthikeyan Score: Pain Rating Prior to Med Admin: 4 (3/21/2023  3:20 PM)  Pain Rating Post Med Admin: 2 (3/21/2023  4:46 PM)  Karthikeyan Score: 10 (3/21/2023  1:45 PM)

## 2023-03-23 ENCOUNTER — PATIENT MESSAGE (OUTPATIENT)
Dept: OTOLARYNGOLOGY | Facility: CLINIC | Age: 53
End: 2023-03-23
Payer: COMMERCIAL

## 2023-03-27 ENCOUNTER — PATIENT MESSAGE (OUTPATIENT)
Dept: OTOLARYNGOLOGY | Facility: CLINIC | Age: 53
End: 2023-03-27
Payer: COMMERCIAL

## 2023-03-27 LAB
FINAL PATHOLOGIC DIAGNOSIS: NORMAL
GROSS: NORMAL
Lab: NORMAL

## 2023-03-29 ENCOUNTER — OFFICE VISIT (OUTPATIENT)
Dept: OTOLARYNGOLOGY | Facility: CLINIC | Age: 53
End: 2023-03-29
Payer: COMMERCIAL

## 2023-03-29 VITALS — SYSTOLIC BLOOD PRESSURE: 122 MMHG | DIASTOLIC BLOOD PRESSURE: 89 MMHG | HEART RATE: 81 BPM

## 2023-03-29 DIAGNOSIS — C44.321 SQUAMOUS CELL CARCINOMA OF SKIN OF NOSE: Primary | ICD-10-CM

## 2023-03-29 DIAGNOSIS — Z09 POSTOP CHECK: ICD-10-CM

## 2023-03-29 PROCEDURE — 99024 PR POST-OP FOLLOW-UP VISIT: ICD-10-PCS | Mod: S$GLB,,, | Performed by: OTOLARYNGOLOGY

## 2023-03-29 PROCEDURE — 99999 PR PBB SHADOW E&M-EST. PATIENT-LVL V: ICD-10-PCS | Mod: PBBFAC,,, | Performed by: OTOLARYNGOLOGY

## 2023-03-29 PROCEDURE — 99024 POSTOP FOLLOW-UP VISIT: CPT | Mod: S$GLB,,, | Performed by: OTOLARYNGOLOGY

## 2023-03-29 PROCEDURE — 99999 PR PBB SHADOW E&M-EST. PATIENT-LVL V: CPT | Mod: PBBFAC,,, | Performed by: OTOLARYNGOLOGY

## 2023-03-29 NOTE — H&P (VIEW-ONLY)
Subjective: 52 y.o. male seen in follow up s/p paramedian forehead flap for coverage of right nasal defect 03/21/2023 after removal of squamous cell carcinoma 03/07/2023.  Patient doing well with pain control.  He reports some crusting from incisions but otherwise no further bleeding from pedicle.      Objective:  /89 (Patient Position: Sitting)   Pulse 81     Exam:  General No acute distress  Area behind hairline with staples removed.  Triangular area just below the hairline with Xeroform dressing removed showing good granulation tissue.  Prolene was removed from vertical forehead incision with incision clean dry intact.  Paramedian forehead flap fully viable at inset at the nose with some blunting of the right nasal tip Prolenes removed from both sides and as well as the ala margin.  Some crusting was removed from the right nasal cavity.      Pathology review:   RESECTION DEEP MARGIN FROM NASAL AREA:   MICROSCOPIC FOCUS OF RESIDUAL SQUAMOUS CARCINOMA  FROM THE APPARENT   EDGES OF THE SPECIMEN   MAJOR AREAS OF ACUTE INFLAMMATION     Assessment / Plan:    Doing well after 1st stage of paramedian forehead flap for reconstruction of a large nasal defect.  Second stage for division and inset was set up on April 18th.  Pathology reviewed and small residual focus of squamous cell carcinoma removed.  Discussed possible need for further revision for the flap.

## 2023-04-05 ENCOUNTER — HOSPITAL ENCOUNTER (EMERGENCY)
Facility: HOSPITAL | Age: 53
Discharge: HOME | End: 2023-04-05
Attending: EMERGENCY MEDICINE
Payer: COMMERCIAL

## 2023-04-05 ENCOUNTER — PATIENT MESSAGE (OUTPATIENT)
Dept: SURGERY | Facility: HOSPITAL | Age: 53
End: 2023-04-05
Payer: COMMERCIAL

## 2023-04-05 ENCOUNTER — NURSE TRIAGE (OUTPATIENT)
Dept: ADMINISTRATIVE | Facility: CLINIC | Age: 53
End: 2023-04-05
Payer: COMMERCIAL

## 2023-04-05 VITALS
HEIGHT: 74 IN | WEIGHT: 250 LBS | RESPIRATION RATE: 14 BRPM | HEART RATE: 76 BPM | SYSTOLIC BLOOD PRESSURE: 131 MMHG | DIASTOLIC BLOOD PRESSURE: 89 MMHG | OXYGEN SATURATION: 95 % | BODY MASS INDEX: 32.08 KG/M2 | TEMPERATURE: 98 F

## 2023-04-05 DIAGNOSIS — T14.8XXA BLEEDING FROM WOUND: Primary | ICD-10-CM

## 2023-04-05 PROCEDURE — 99282 EMERGENCY DEPT VISIT SF MDM: CPT

## 2023-04-06 NOTE — ED PROVIDER NOTES
Encounter Date: 4/5/2023       History     Chief Complaint   Patient presents with    arterial bleed     Patient had a nose flap done 2 weeks by dr Banks and now is bleeding      Chief complaint:  Bleeding from wound on nose    HPI:  52-year-old male who had a flap graft procedure on his forehead/nose on 03/21/2023 presents with bleeding.  He denies any other bleeding and has no hematuria, melena or hematochezia.  He has no pain, fever or dizziness.  Past medical history is significant for squamous cell carcinoma, diabetes    Review of patient's allergies indicates:  No Known Allergies  Past Medical History:   Diagnosis Date    Arthritis     Asthma     AS A CHILD    Colon cancer     Family hx of prostate cancer 11/22/2016    Hx of colon cancer, stage I 07/03/2014    Parada syndrome     Tear of labium 10/2020    left shoulder Dr Molina    Uncontrolled type 2 diabetes mellitus with hyperglycemia 03/04/2021    Vitamin D deficiency     Wears glasses      Past Surgical History:   Procedure Laterality Date    APPENDECTOMY      APPLICATION OF CARTILAGE GRAFT N/A 3/21/2023    Procedure: APPLICATION, CARTILAGE GRAFT;  Surgeon: Alyx Spivey MD;  Location: [facility] 2ND FLR;  Service: ENT;  Laterality: N/A;    CAUDAL EPIDURAL STEROID INJECTION N/A 11/6/2018    Procedure: Injection-steroid-epidural-caudal;  Surgeon: Lyndon Brooke Jr., MD;  Location: [facility];  Service: Pain Management;  Laterality: N/A;    COLON SURGERY  2008    PARTIAL COLECTOMY    COLONOSCOPY Bilateral 2012    COLONOSCOPY N/A 8/1/2016    Procedure: COLONOSCOPY;  Surgeon: Blaze Marr MD;  Location: [facility];  Service: Endoscopy;  Laterality: N/A;    COLONOSCOPY N/A 9/20/2017    Procedure: COLONOSCOPY;  Surgeon: Dom Leonardo MD;  Location: [facility] (4TH FLR);  Service: Endoscopy;  Laterality: N/A;  not given PM prep    COLONOSCOPY N/A 10/9/2017    Procedure: COLONOSCOPY;  Surgeon: RAMON Callahan MD;  Location: [facility] (4TH FLR);  Service:  Endoscopy;  Laterality: N/A;  ok for Prepopik per Dr Callahan    COLONOSCOPY N/A 11/20/2017    Procedure: COLONOSCOPY/EMR;  Surgeon: Joseluis Choe MD;  Location: [facility] (2ND FLR);  Service: Endoscopy;  Laterality: N/A;  EMR    no pm prep    COLONOSCOPY N/A 5/30/2018    Procedure: COLONOSCOPY;  Surgeon: Dom Leonardo MD;  Location: [facility] (4TH FLR);  Service: Endoscopy;  Laterality: N/A;  follow up colonoscopy in 5/2018 for Parada Syndrome         COLONOSCOPY N/A 6/10/2019    Procedure: COLONOSCOPY;  Surgeon: Dom Leonardo MD;  Location: [facility] (4TH FLR);  Service: Endoscopy;  Laterality: N/A;  Prepopik ordered per Dr. Leonardo    COLONOSCOPY N/A 7/22/2020    Procedure: COLONOSCOPY;  Surgeon: Dom Leonardo MD;  Location: [facility] (4TH FLR);  Service: Endoscopy;  Laterality: N/A;    COLONOSCOPY N/A 5/27/2021    Procedure: COLONOSCOPY;  Surgeon: Dom Leonardo MD;  Location: [facility] (4TH FLR);  Service: Endoscopy;  Laterality: N/A;  covid test 5/24-[place]  pt requests Prepopik    COLONOSCOPY N/A 6/17/2022    Procedure: COLONOSCOPY;  Surgeon: Dom Leonardo MD;  Location: [facility] (4TH FLR);  Service: Endoscopy;  Laterality: N/A;  He was discovered to have colon cancer and had right hemicolectomy        for stage II on 08/08/2008. MSH2 Parada syndrome.  sutab requested  instructions via the portal -sm  fully vaccinated - sm    CYSTOSCOPY      Epidural Steroid Injection  10/23/15    Lumbar    EPIDURAL STEROID INJECTION INTO LUMBAR SPINE N/A 7/27/2018    Procedure: Injection-steroid-epidural-lumbar;  Surgeon: Lyndon Brooke Jr., MD;  Location: [facility] OR;  Service: Pain Management;  Laterality: N/A;    ESOPHAGOGASTRODUODENOSCOPY      ESOPHAGOGASTRODUODENOSCOPY N/A 7/22/2020    Procedure: EGD (ESOPHAGOGASTRODUODENOSCOPY);  Surgeon: Dom Leonardo MD;  Location: [facility] (4TH FLR);  Service: Endoscopy;  Laterality: N/A;  Please schedule patient for EGD and colonoscopy with me MSH2 Parada  syndrome and anemia evaluation please make priority 1  covid test 7/20-[place]    EXCISION OR SURGICAL PLANING, SKIN, NOSE, FOR RHINOPHYMA Bilateral 3/7/2023    Procedure: EXCISION OR SURGICAL resection nasal skin cancer;  Surgeon: Alyx Spivey MD;  Location: [facility] OR 2ND FLR;  Service: ENT;  Laterality: Bilateral;    FACETECTOMY OF VERTEBRA  2/13/2019    Procedure: MEDIAL FACETECTOMY L5-S1;  Surgeon: Lyndon Brooke Jr., MD;  Location: [facility] OR;  Service: Orthopedics;;    GASTRIC BYPASS  2010    SLEEVE    KNEE ARTHROSCOPY Right     LUMBAR DISCECTOMY  2/13/2019    Procedure: DISCECTOMY, SPINE, LUMBAR L5-S1;  Surgeon: Lyndon Brooke Jr., MD;  Location: [facility] OR;  Service: Orthopedics;;    NASAL RECONSTRUCTION N/A 3/21/2023    Procedure: RECONSTRUCTION, NOSE;  Surgeon: Alyx Spivey MD;  Location: [facility] OR 2ND FLR;  Service: ENT;  Laterality: N/A;    ROTATION FLAP SURGERY N/A 3/21/2023    Procedure: CREATION, FLAP, ROTATION;  Surgeon: Alyx Spivey MD;  Location: [facility] OR 2ND FLR;  Service: ENT;  Laterality: N/A;     Family History   Problem Relation Age of Onset    Melanoma Mother     Cancer Mother     Breast cancer Mother     Heart disease Father     Diabetes Father     Liver disease Father     Hearing loss Father     Cancer Maternal Uncle         colon    Colon cancer Maternal Uncle         x2    Heart disease Maternal Uncle     Hypertension Maternal Uncle     Dementia Maternal Grandmother     Cancer Maternal Grandfather         colon ca    Colon cancer Maternal Grandfather     Early death Maternal Grandfather     No Known Problems Sister     Polycystic ovary syndrome Daughter     No Known Problems Paternal Aunt     Alcohol abuse Paternal Uncle     Prostate cancer Paternal Uncle     Cancer Paternal Uncle     Diabetes Paternal Grandmother     Hypertension Paternal Grandfather     Prostatitis Paternal Grandfather     Psoriasis Neg Hx     Lupus Neg Hx     Eczema Neg Hx      Social History     Tobacco Use    Smoking  status: Never    Smokeless tobacco: Never   Substance Use Topics    Alcohol use: Yes     Comment: RARELY    Drug use: No     Review of Systems   Constitutional:  Negative for fever.   Respiratory:  Negative for shortness of breath.    Genitourinary:  Negative for flank pain.   Musculoskeletal:  Negative for gait problem.   Skin:  Positive for wound.   Neurological:  Negative for weakness.   Psychiatric/Behavioral:  Negative for confusion.      Physical Exam     Initial Vitals [04/05/23 1823]   BP Pulse Resp Temp SpO2   (!) 145/87 84 20 98.3 °F (36.8 °C) 97 %      MAP       --         Physical Exam    Constitutional: Vital signs are normal. He is not diaphoretic.  Non-toxic appearance. He does not have a sickly appearance. No distress.   HENT:   Head: Normocephalic and atraumatic.   Bleeding from the right lateral side of the flap graft.   Eyes: Conjunctivae are normal.   Neck: Phonation normal. Neck supple. No thyromegaly present. No tracheal deviation present.   Cardiovascular:  Normal rate.           Musculoskeletal:      Cervical back: Neck supple.     Neurological: He is alert and oriented to person, place, and time.   Skin: Skin is warm, dry and intact. No pallor.   Psychiatric: He has a normal mood and affect. His speech is normal and behavior is normal. Thought content normal.       ED Course   Procedures  Labs Reviewed - No data to display       Imaging Results    None          Medications - No data to display  Medical Decision Making:   ED Management:  52-year-old male who underwent a flap graft of his face/nose presents with bleeding.  Quick clot is applied with no further bleeding.  He is referred to surgery and is encouraged to return for return of bleeding.                        Clinical Impression:   Final diagnoses:  [T14.8XXA] Bleeding from wound (Primary)        ED Disposition Condition    Discharge Stable          ED Prescriptions    None       Follow-up Information       Follow up With  Specialties Details Why Contact Info    Alyx Spivey MD Otolaryngology In 1 day  [address] [facility] [address]  [phone]               Kenton Harris III, MD  04/06/23 3583

## 2023-04-06 NOTE — TELEPHONE ENCOUNTER
Spoke with Katheryn (wife) who states patient is currently in the ER.  Katheryn states she has asked several time that Dr. Spivey be notified.  She states patient is in the middle of having reconstructive surgery.  Spoke with Thelma in the ED who states she would send nurse in to speak with the patient.   Katheryn does not have any further needs at this time.   Reason for Disposition   Health Information question, no triage required and triager able to answer question    Protocols used: Information Only Call - No Triage-A-

## 2023-04-07 ENCOUNTER — HOSPITAL ENCOUNTER (EMERGENCY)
Facility: HOSPITAL | Age: 53
Discharge: HOME | End: 2023-04-07
Attending: EMERGENCY MEDICINE
Payer: COMMERCIAL

## 2023-04-07 ENCOUNTER — HOSPITAL ENCOUNTER (EMERGENCY)
Facility: HOSPITAL | Age: 53
Discharge: ANOTHER HOSPITAL | End: 2023-04-07
Attending: EMERGENCY MEDICINE
Payer: COMMERCIAL

## 2023-04-07 ENCOUNTER — PATIENT MESSAGE (OUTPATIENT)
Dept: DERMATOLOGY | Facility: CLINIC | Age: 53
End: 2023-04-07
Payer: COMMERCIAL

## 2023-04-07 VITALS
HEIGHT: 74 IN | TEMPERATURE: 98 F | OXYGEN SATURATION: 97 % | RESPIRATION RATE: 18 BRPM | DIASTOLIC BLOOD PRESSURE: 84 MMHG | WEIGHT: 250 LBS | SYSTOLIC BLOOD PRESSURE: 129 MMHG | BODY MASS INDEX: 32.08 KG/M2 | HEART RATE: 67 BPM

## 2023-04-07 VITALS
OXYGEN SATURATION: 95 % | SYSTOLIC BLOOD PRESSURE: 141 MMHG | TEMPERATURE: 98 F | RESPIRATION RATE: 17 BRPM | WEIGHT: 250 LBS | DIASTOLIC BLOOD PRESSURE: 90 MMHG | HEART RATE: 73 BPM | BODY MASS INDEX: 32.1 KG/M2

## 2023-04-07 DIAGNOSIS — L76.82 OTHER POSTOPERATIVE COMPLICATION OF SKIN: Primary | ICD-10-CM

## 2023-04-07 DIAGNOSIS — R04.0 BLEEDING FROM THE NOSE: Primary | ICD-10-CM

## 2023-04-07 LAB
ALBUMIN SERPL BCP-MCNC: 4.2 G/DL (ref 3.5–5.2)
ALP SERPL-CCNC: 54 U/L (ref 55–135)
ALT SERPL W/O P-5'-P-CCNC: 28 U/L (ref 10–44)
ANION GAP SERPL CALC-SCNC: 8 MMOL/L (ref 8–16)
AST SERPL-CCNC: 19 U/L (ref 10–40)
BASOPHILS # BLD AUTO: 0.03 K/UL (ref 0–0.2)
BASOPHILS NFR BLD: 0.5 % (ref 0–1.9)
BILIRUB SERPL-MCNC: 0.9 MG/DL (ref 0.1–1)
BUN SERPL-MCNC: 14 MG/DL (ref 6–20)
CALCIUM SERPL-MCNC: 9 MG/DL (ref 8.7–10.5)
CHLORIDE SERPL-SCNC: 107 MMOL/L (ref 95–110)
CO2 SERPL-SCNC: 24 MMOL/L (ref 23–29)
CREAT SERPL-MCNC: 1.2 MG/DL (ref 0.5–1.4)
DIFFERENTIAL METHOD: ABNORMAL
EOSINOPHIL # BLD AUTO: 0.3 K/UL (ref 0–0.5)
EOSINOPHIL NFR BLD: 5.2 % (ref 0–8)
ERYTHROCYTE [DISTWIDTH] IN BLOOD BY AUTOMATED COUNT: 12.7 % (ref 11.5–14.5)
EST. GFR  (NO RACE VARIABLE): >60 ML/MIN/1.73 M^2
GLUCOSE SERPL-MCNC: 119 MG/DL (ref 70–110)
HCT VFR BLD AUTO: 47.6 % (ref 40–54)
HGB BLD-MCNC: 17.1 G/DL (ref 14–18)
IMM GRANULOCYTES # BLD AUTO: 0.02 K/UL (ref 0–0.04)
IMM GRANULOCYTES NFR BLD AUTO: 0.3 % (ref 0–0.5)
LYMPHOCYTES # BLD AUTO: 1.6 K/UL (ref 1–4.8)
LYMPHOCYTES NFR BLD: 28.3 % (ref 18–48)
MCH RBC QN AUTO: 30.8 PG (ref 27–31)
MCHC RBC AUTO-ENTMCNC: 35.9 G/DL (ref 32–36)
MCV RBC AUTO: 86 FL (ref 82–98)
MONOCYTES # BLD AUTO: 0.6 K/UL (ref 0.3–1)
MONOCYTES NFR BLD: 9.9 % (ref 4–15)
NEUTROPHILS # BLD AUTO: 3.2 K/UL (ref 1.8–7.7)
NEUTROPHILS NFR BLD: 55.8 % (ref 38–73)
NRBC BLD-RTO: 0 /100 WBC
PLATELET # BLD AUTO: 263 K/UL (ref 150–450)
PMV BLD AUTO: 9 FL (ref 9.2–12.9)
POTASSIUM SERPL-SCNC: 3.8 MMOL/L (ref 3.5–5.1)
PROT SERPL-MCNC: 7.7 G/DL (ref 6–8.4)
RBC # BLD AUTO: 5.56 M/UL (ref 4.6–6.2)
SODIUM SERPL-SCNC: 139 MMOL/L (ref 136–145)
WBC # BLD AUTO: 5.73 K/UL (ref 3.9–12.7)

## 2023-04-07 PROCEDURE — 99281 EMR DPT VST MAYX REQ PHY/QHP: CPT | Mod: 25

## 2023-04-07 PROCEDURE — 80053 COMPREHEN METABOLIC PANEL: CPT | Performed by: EMERGENCY MEDICINE

## 2023-04-07 PROCEDURE — 99284 EMERGENCY DEPT VISIT MOD MDM: CPT | Mod: ,,, | Performed by: EMERGENCY MEDICINE

## 2023-04-07 PROCEDURE — 99213 OFFICE O/P EST LOW 20 MIN: CPT | Mod: 24,,, | Performed by: OTOLARYNGOLOGY

## 2023-04-07 PROCEDURE — 85025 COMPLETE CBC W/AUTO DIFF WBC: CPT | Performed by: EMERGENCY MEDICINE

## 2023-04-07 PROCEDURE — 99285 EMERGENCY DEPT VISIT HI MDM: CPT

## 2023-04-07 PROCEDURE — 25000003 PHARM REV CODE 250

## 2023-04-07 PROCEDURE — 99213 PR OFFICE/OUTPT VISIT, EST, LEVL III, 20-29 MIN: ICD-10-PCS | Mod: 24,,, | Performed by: OTOLARYNGOLOGY

## 2023-04-07 PROCEDURE — 99284 PR EMERGENCY DEPT VISIT,LEVEL IV: ICD-10-PCS | Mod: ,,, | Performed by: EMERGENCY MEDICINE

## 2023-04-07 RX ORDER — OXYMETAZOLINE HCL 0.05 %
1 SPRAY, NON-AEROSOL (ML) NASAL
Status: COMPLETED | OUTPATIENT
Start: 2023-04-07 | End: 2023-04-07

## 2023-04-07 RX ADMIN — OXYMETAZOLINE HCL 1 SPRAY: 0.05 SPRAY NASAL at 10:04

## 2023-04-07 NOTE — ED NOTES
Pt presents to ED from outside facility via EMS for ENT consult. Pt was seen in previous facility w/ complaints r/t bleeding of graft site on nose -- flap was placed to repair squamous cell carcinoma of the face. Pt recently had similar bleeding episode which was able to be controlled + flap bleeding again. Pt denies pain to site. Denies dizziness, lightheadedness, weakness, or any other symptoms at this time. Pt has gauze on nose at this time + with bleeding controlled.    Patient identifiers for Lyndon Lion Jr. 52 y.o. male checked and correct.  Chief Complaint   Patient presents with    Wound Check     Pt arrives from [facility] for evaluation of a skin flap wound on his nose.      Past Medical History:   Diagnosis Date    Arthritis     Asthma     AS A CHILD    Colon cancer     Family hx of prostate cancer 11/22/2016    Hx of colon cancer, stage I 07/03/2014    Parada syndrome     Tear of labium 10/2020    left shoulder Dr Molina    Uncontrolled type 2 diabetes mellitus with hyperglycemia 03/04/2021    Vitamin D deficiency     Wears glasses

## 2023-04-07 NOTE — Clinical Note
"Lyndon Potter" Ayad was seen and treated in our emergency department on 4/7/2023.  He may return to work on 04/08/2023.       If you have any questions or concerns, please don't hesitate to call.      Melissa Donaldson MD"

## 2023-04-07 NOTE — ED PROVIDER NOTES
Encounter Date: 4/7/2023       History     Chief Complaint   Patient presents with    bleeding between eye brows     Arterial bleed. Applied quick clot. Hx of squamous cell carcinoma in nose     HPI    Seen and evaluated.  Presents with bleeding from wound to graft site.  Had a recent similar episode and was seen at an outside hospital.  Flap was done to repair squamous cell carcinoma of the face.  At home, he applied quick clot, currently hemostatic.  Denies additional complaints    Review of patient's allergies indicates:  No Known Allergies  Past Medical History:   Diagnosis Date    Arthritis     Asthma     AS A CHILD    Colon cancer     Family hx of prostate cancer 11/22/2016    Hx of colon cancer, stage I 07/03/2014    Parada syndrome     Tear of labium 10/2020    left shoulder Dr Molina    Uncontrolled type 2 diabetes mellitus with hyperglycemia 03/04/2021    Vitamin D deficiency     Wears glasses      Past Surgical History:   Procedure Laterality Date    APPENDECTOMY      APPLICATION OF CARTILAGE GRAFT N/A 3/21/2023    Procedure: APPLICATION, CARTILAGE GRAFT;  Surgeon: Alyx Spivey MD;  [address]  Service: ENT;  Laterality: N/A;    CAUDAL EPIDURAL STEROID INJECTION N/A 11/6/2018    Procedure: Injection-steroid-epidural-caudal;  Surgeon: Lyndon Brooke Jr., MD;  Location: [facility] OR;  Service: Pain Management;  Laterality: N/A;    COLON SURGERY  2008    PARTIAL COLECTOMY    COLONOSCOPY Bilateral 2012    COLONOSCOPY N/A 8/1/2016    Procedure: COLONOSCOPY;  Surgeon: Blaze Marr MD;  Location: [facility];  Service: Endoscopy;  Laterality: N/A;    COLONOSCOPY N/A 9/20/2017    Procedure: COLONOSCOPY;  Surgeon: Dom Leonardo MD;  Location: [facility] (4TH FLR);  Service: Endoscopy;  Laterality: N/A;  not given PM prep    COLONOSCOPY N/A 10/9/2017    Procedure: COLONOSCOPY;  Surgeon: RAMON Callahan MD;  Location: [facility] (4TH FLR);  Service: Endoscopy;  Laterality: N/A;  ok for Prepopik  per Dr Callahan    COLONOSCOPY N/A 11/20/2017    Procedure: COLONOSCOPY/EMR;  Surgeon: Joseluis Choe MD;  Location: [facility] (2ND FLR);  Service: Endoscopy;  Laterality: N/A;  EMR    no pm prep    COLONOSCOPY N/A 5/30/2018    Procedure: COLONOSCOPY;  Surgeon: Dom Leonardo MD;  Location: [facility] (4TH FLR);  Service: Endoscopy;  Laterality: N/A;  follow up colonoscopy in 5/2018 for Parada Syndrome         COLONOSCOPY N/A 6/10/2019    Procedure: COLONOSCOPY;  Surgeon: Dom Leonardo MD;  Location: [facility] (4TH FLR);  Service: Endoscopy;  Laterality: N/A;  Prepopik ordered per Dr. Leonardo    COLONOSCOPY N/A 7/22/2020    Procedure: COLONOSCOPY;  Surgeon: Dom Leonardo MD;  Location: [facility] (4TH FLR);  Service: Endoscopy;  Laterality: N/A;    COLONOSCOPY N/A 5/27/2021    Procedure: COLONOSCOPY;  Surgeon: Dom Leonardo MD;  Location: [facility] (4TH FLR);  Service: Endoscopy;  Laterality: N/A;  covid test 5/24-[place]  pt requests Prepopik    COLONOSCOPY N/A 6/17/2022    Procedure: COLONOSCOPY;  Surgeon: Dom Leonardo MD;  Location: [facility] (4TH FLR);  Service: Endoscopy;  Laterality: N/A;  He was discovered to have colon cancer and had right hemicolectomy        for stage II on 08/08/2008. MSH2 Parada syndrome.  sutab requested  instructions via the portal -sm  fully vaccinated - sm    CYSTOSCOPY      Epidural Steroid Injection  10/23/15    Lumbar    EPIDURAL STEROID INJECTION INTO LUMBAR SPINE N/A 7/27/2018    Procedure: Injection-steroid-epidural-lumbar;  Surgeon: Lyndon Brooke Jr., MD;  Location: [facility] OR;  Service: Pain Management;  Laterality: N/A;    ESOPHAGOGASTRODUODENOSCOPY      ESOPHAGOGASTRODUODENOSCOPY N/A 7/22/2020    Procedure: EGD (ESOPHAGOGASTRODUODENOSCOPY);  Surgeon: Dom Leonardo MD;  Location: [facility] (4TH FLR);  Service: Endoscopy;  Laterality: N/A;  Please schedule patient for EGD and colonoscopy with me MSH2 Parada syndrome and anemia evaluation please make  priority 1  covid test 7/20-[place]    EXCISION OR SURGICAL PLANING, SKIN, NOSE, FOR RHINOPHYMA Bilateral 3/7/2023    Procedure: EXCISION OR SURGICAL resection nasal skin cancer;  Surgeon: Alyx Spivey MD;  Location: [facility] OR 2ND FLR;  Service: ENT;  Laterality: Bilateral;    FACETECTOMY OF VERTEBRA  2/13/2019    Procedure: MEDIAL FACETECTOMY L5-S1;  Surgeon: Lyndon Brooke Jr., MD;  Location: [facility] OR;  Service: Orthopedics;;    GASTRIC BYPASS  2010    SLEEVE    KNEE ARTHROSCOPY Right     LUMBAR DISCECTOMY  2/13/2019    Procedure: DISCECTOMY, SPINE, LUMBAR L5-S1;  Surgeon: Lyndon Brooke Jr., MD;  Location: [facility] OR;  Service: Orthopedics;;    NASAL RECONSTRUCTION N/A 3/21/2023    Procedure: RECONSTRUCTION, NOSE;  Surgeon: Alyx Spivey MD;  Location[facility] 2ND FLR;  Service: ENT;  Laterality: N/A;    ROTATION FLAP SURGERY N/A 3/21/2023    Procedure: CREATION, FLAP, ROTATION;  Surgeon: Alyx Spivey MD;  Location: [facility] OR 2ND FLR;  Service: ENT;  Laterality: N/A;     Family History   Problem Relation Age of Onset    Melanoma Mother     Cancer Mother     Breast cancer Mother     Heart disease Father     Diabetes Father     Liver disease Father     Hearing loss Father     Cancer Maternal Uncle         colon    Colon cancer Maternal Uncle         x2    Heart disease Maternal Uncle     Hypertension Maternal Uncle     Dementia Maternal Grandmother     Cancer Maternal Grandfather         colon ca    Colon cancer Maternal Grandfather     Early death Maternal Grandfather     No Known Problems Sister     Polycystic ovary syndrome Daughter     No Known Problems Paternal Aunt     Alcohol abuse Paternal Uncle     Prostate cancer Paternal Uncle     Cancer Paternal Uncle     Diabetes Paternal Grandmother     Hypertension Paternal Grandfather     Prostatitis Paternal Grandfather     Psoriasis Neg Hx     Lupus Neg Hx     Eczema Neg Hx      Social History     Tobacco Use    Smoking status: Never    Smokeless tobacco: Never    Substance Use Topics    Alcohol use: Yes     Comment: RARELY    Drug use: No     Review of Systems   Constitutional:  Negative for fever.   HENT:  Negative for sore throat.    Respiratory:  Negative for shortness of breath.    Cardiovascular:  Negative for chest pain.   Gastrointestinal:  Negative for nausea.   Skin:  Positive for wound.   Hematological:  Bruises/bleeds easily.   All other systems reviewed and are negative.    Physical Exam     Initial Vitals [04/07/23 0610]   BP Pulse Resp Temp SpO2   (!) 150/93 83 16 98.1 °F (36.7 °C) 96 %      MAP       --         Physical Exam    Nursing note and vitals reviewed.  Constitutional: He appears well-developed and well-nourished.   HENT:   Head: Normocephalic.   Hemostatic wound to mid face with quick clot in place   Eyes: Conjunctivae are normal.   Cardiovascular:  Normal rate and regular rhythm.           Abdominal: Abdomen is soft.   Musculoskeletal:         General: Normal range of motion.     Neurological: He is alert and oriented to person, place, and time.   Skin: Skin is warm and dry.   Psychiatric: He has a normal mood and affect. His speech is normal.       ED Course   Procedures  Labs Reviewed   CBC W/ AUTO DIFFERENTIAL - Abnormal; Notable for the following components:       Result Value    MPV 9.0 (*)     All other components within normal limits   COMPREHENSIVE METABOLIC PANEL          Imaging Results    None          Medications - No data to display  Medical Decision Making:   Initial Assessment:   Seen and evaluated.  Presented with a chief complaint of facial bleeding. He is post operative with previous facial flap and noted bleeding from flap.  He achieved hemostasis with quick clot.  I did not take down the quick clot Aziz currently hemostatic.  This is the 2nd episodic bout of bleeding the patient has noted.  He is not in extremis he is clinically otherwise well appearing.  His original procedure was done secondary to squamous cell carcinoma of  the face.  He has an IV in place labs have been drawn while we work on facilitating transfer to the main hospital as his procedure was performed there.  He will be transferred in fair condition                          Clinical Impression:   Final diagnoses:  [L76.82] Other postoperative complication of skin (Primary)        ED Disposition Condition    Transfer to Another Facility Stable                Russ Brar Jr., MD  04/07/23 0664

## 2023-04-07 NOTE — DISCHARGE INSTRUCTIONS
Thank you for coming to our Emergency Department today. It is important to remember that some problems or medical conditions are difficult to diagnose and may not be found or addressed during your Emergency Department visit.     You have been given a bottle of Afrin which you should spray on the bleed if it is too reoccur.  Additionally you have been given quick clot which you should apply over the graft and site of bleeding.  Additionally you have been given gauze which you can help PAC and apply pressure to site of bleeding.  Please return to emergency department if you experience ongoing bleeding.    Be sure to follow up with your primary care doctor and review all labs/imaging/tests that were performed during your ER visit with them. Some labs/imaging/tests may be outside of the normal range, and require non-emergent follow-up and/or further investigation/treatment/procedures/testing to help diagnose/exclude/prevent complications or other potentially serious medical conditions that were not discussed or addressed during your ER visit.    If you do not have a primary care doctor, you may contact the one listed on your discharge paperwork or you may also call the [facility] Appointment Desk at [phone] to schedule an appointment and establish care with one. It is important to your health that you have a primary care doctor.    Please take all medications as directed. All medications may potentially have side-effects and it is impossible to predict which medications may give you side-effects or what side-effects (if any) they will give you.. If you feel that you are having a negative effect or side-effect of any medication you should immediately stop taking them and seek medical attention. If you feel that you are having a life-threatening reaction call 231.    Return to the ER with any questions/concerns, new/concerning symptoms, worsening or failure to improve.     Do not drive, swim, climb to height,  take a bath, operate heavy machinery, drink alcohol or take potentially sedating medications, sign any legal documents or make any important decisions for 24 hours if you have received any pain medications, sedatives or mood altering drugs during your ER visit or within 24 hours of taking them if they have been prescribed to you.     You can find additional resources for Dentists, hearing aids, durable medical equipment, low cost pharmacies and other resources at [email or web address]://[place].org    BELOW THIS LINE ONLY APPLIES IF YOU HAVE A COVID TEST PENDING OR IF YOU HAVE BEEN DIAGNOSED WITH COVID:  Please access [facility] to review the results of your test. Until the results of your COVID test return, you should isolate yourself so as not to potentially spread illness to others.   If your COVID test returns positive, you should isolate yourself so as not to spread illness to others. After five full days, if you are feeling better and you have not had fever for 24 hours, you can return to your typical daily activities, but you must wear a mask around others for an additional 5 days.   If your COVID test returns negative and you are either unvaccinated or more than six months out from your two-dose vaccine and are not yet boosted, you should still quarantine for 5 full days followed by strict mask use for an additional 5 full days.   If your COVID test returns negative and you have received your 2-dose initial vaccine as well as a booster, you should continue strict mask use for 10 full days after the exposure.  For all those exposed, best practice includes a test at day 5 after the exposure. This can be a home test or a test through one of the many testing centers throughout our community.   Masking is always advised to limit the spread of COVID. Cdc.gov is an excellent site to obtain the latest up to date recommendations regarding COVID and COVID testing.     CDC Testing and Quarantine Guidelines for patients with  exposure to a known-positive COVID-19 person:  A close exposure is defined as anyone who has had an exposure (masked or unmasked) to a known COVID -19 positive person within 6 feet of someone for a cumulative total of 15 minutes or more over a 24-hour period.   Vaccinated and/or if you recently had a positive covid test within 90 days do NOT need to quarantine after contact with someone who had COVID-19 unless you develop symptoms.   Fully vaccinated people who have not had a positive test within 90 days, should get tested 3-5 days after their exposure, even if they don't have symptoms and wear a mask indoors in public for 14 days following exposure or until their test result is negative.      Unvaccinated and/or NOT had a positive test within 90 days and meet close exposure  You are required by CDC guidelines to quarantine for at least 5 days from time of exposure followed by 5 days of strict masking. It is recommended, but not required to test after 5 days, unless you develop symptoms, in which case you should test at that time.  If you get tested after 5 days and your test is positive, your 5 day period of isolation starts the day of the positive test.    If your exposure does not meet the above definition, you can return to your normal daily activities to include social distancing, wearing a mask and frequent handwashing.      Here is a link to guidance from the CDC:  https://www.cdc.gov/media/releases/2021/s1227-isolation-quarantine-guidance.html      [facility] Testing Sites:  https://ldh.la.gov/page/3934      [facility] website with testing locations and guidance:  [email or web address]

## 2023-04-07 NOTE — ED PROVIDER NOTES
Encounter Date: 4/7/2023       History     Chief Complaint   Patient presents with    Wound Check     Pt arrives from [facility] for evaluation of a skin flap wound on his nose.      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  Patient now presents with recurrence of bleeding from the flap.  Patient reports that this morning when he woke up they were steady trickle of blood from the flap on the right lateral aspect of the graft.  Patient reports being discharged with quick clot and applied a quick clot dressing with improvement in his bleeding. Patient denies any trauma to the nose, pain, lightheadedness.     Review of patient's allergies indicates:  No Known Allergies  Past Medical History:   Diagnosis Date    Arthritis     Asthma     AS A CHILD    Colon cancer     Family hx of prostate cancer 11/22/2016    Hx of colon cancer, stage I 07/03/2014    Parada syndrome     Tear of labium 10/2020    left shoulder Dr Molina    Uncontrolled type 2 diabetes mellitus with hyperglycemia 03/04/2021    Vitamin D deficiency     Wears glasses      Past Surgical History:   Procedure Laterality Date    APPENDECTOMY      APPLICATION OF CARTILAGE GRAFT N/A 3/21/2023    Procedure: APPLICATION, CARTILAGE GRAFT;  Surgeon: Alyx Spivey MD;  Location: [address];  Service: ENT;  Laterality: N/A;    CAUDAL EPIDURAL STEROID INJECTION N/A 11/6/2018    Procedure: Injection-steroid-epidural-caudal;  Surgeon: Lyndon Brooke Jr., MD;  Location: [facility] OR;  Service: Pain Management;  Laterality: N/A;    COLON SURGERY  2008    PARTIAL COLECTOMY    COLONOSCOPY Bilateral 2012    COLONOSCOPY N/A 8/1/2016    Procedure: COLONOSCOPY;  Surgeon: Blaze Marr MD;  Location: [facility];  Service: Endoscopy;  Laterality: N/A;    COLONOSCOPY N/A 9/20/2017    Procedure: COLONOSCOPY;   Surgeon: Dom Leonardo MD;  Location: [facility] (4TH FLR);  Service: Endoscopy;  Laterality: N/A;  not given PM prep    COLONOSCOPY N/A 10/9/2017    Procedure: COLONOSCOPY;  Surgeon: RAMON Callahan MD;  Location: [facility] (4TH FLR);  Service: Endoscopy;  Laterality: N/A;  ok for Prepopik per Dr Callahan    COLONOSCOPY N/A 11/20/2017    Procedure: COLONOSCOPY/EMR;  Surgeon: Joseluis Choe MD;  Location: [facility] ENDO (2ND FLR);  Service: Endoscopy;  Laterality: N/A;  EMR    no pm prep    COLONOSCOPY N/A 5/30/2018    Procedure: COLONOSCOPY;  Surgeon: Dmo Leonardo MD;  Location: [facility] (4TH FLR);  Service: Endoscopy;  Laterality: N/A;  follow up colonoscopy in 5/2018 for Parada Syndrome         COLONOSCOPY N/A 6/10/2019    Procedure: COLONOSCOPY;  Surgeon: Dom Leonardo MD;  Location: [facility] (4TH FLR);  Service: Endoscopy;  Laterality: N/A;  Prepopik ordered per Dr. Leonardo    COLONOSCOPY N/A 7/22/2020    Procedure: COLONOSCOPY;  Surgeon: Dom Leonardo MD;  Location: [facility] (4TH FLR);  Service: Endoscopy;  Laterality: N/A;    COLONOSCOPY N/A 5/27/2021    Procedure: COLONOSCOPY;  Surgeon: Dom Leonardo MD;  Location: [facility] (4TH FLR);  Service: Endoscopy;  Laterality: N/A;  covid test 5/24-[place]  pt requests Prepopik    COLONOSCOPY N/A 6/17/2022    Procedure: COLONOSCOPY;  Surgeon: Dom Leonardo MD;  Location: [facility] (4TH FLR);  Service: Endoscopy;  Laterality: N/A;  He was discovered to have colon cancer and had right hemicolectomy        for stage II on 08/08/2008. MSH2 Parada syndrome.  sutab requested  instructions via the portal -sm  fully vaccinated - sm    CYSTOSCOPY      Epidural Steroid Injection  10/23/15    Lumbar    EPIDURAL STEROID INJECTION INTO LUMBAR SPINE N/A 7/27/2018    Procedure: Injection-steroid-epidural-lumbar;  Surgeon: Lyndon Brooke Jr., MD;  Location: [facility] OR;  Service: Pain Management;  Laterality: N/A;    ESOPHAGOGASTRODUODENOSCOPY       ESOPHAGOGASTRODUODENOSCOPY N/A 7/22/2020    Procedure: EGD (ESOPHAGOGASTRODUODENOSCOPY);  Surgeon: Dom Leonardo MD;  Location: [facility] (4TH FLR);  Service: Endoscopy;  Laterality: N/A;  Please schedule patient for EGD and colonoscopy with me MSH2 Parada syndrome and anemia evaluation please make priority 1  covid test 7/20-[place]    EXCISION OR SURGICAL PLANING, SKIN, NOSE, FOR RHINOPHYMA Bilateral 3/7/2023    Procedure: EXCISION OR SURGICAL resection nasal skin cancer;  Surgeon: Alyx Spivey MD;  Location: [facility] OR 2ND FLR;  Service: ENT;  Laterality: Bilateral;    FACETECTOMY OF VERTEBRA  2/13/2019    Procedure: MEDIAL FACETECTOMY L5-S1;  Surgeon: Lyndon Brooke Jr., MD;  Location: [facility];  Service: Orthopedics;;    GASTRIC BYPASS  2010    SLEEVE    KNEE ARTHROSCOPY Right     LUMBAR DISCECTOMY  2/13/2019    Procedure: DISCECTOMY, SPINE, LUMBAR L5-S1;  Surgeon: Lyndon Brooke Jr., MD;  Location: [facility] OR;  Service: Orthopedics;;    NASAL RECONSTRUCTION N/A 3/21/2023    Procedure: RECONSTRUCTION, NOSE;  Surgeon: Alyx Spivey MD;  Location: [facility] OR 2ND FLR;  Service: ENT;  Laterality: N/A;    ROTATION FLAP SURGERY N/A 3/21/2023    Procedure: CREATION, FLAP, ROTATION;  Surgeon: Alyx Spivey MD;  Location: [facility] OR 2ND FLR;  Service: ENT;  Laterality: N/A;     Family History   Problem Relation Age of Onset    Melanoma Mother     Cancer Mother     Breast cancer Mother     Heart disease Father     Diabetes Father     Liver disease Father     Hearing loss Father     Cancer Maternal Uncle         colon    Colon cancer Maternal Uncle         x2    Heart disease Maternal Uncle     Hypertension Maternal Uncle     Dementia Maternal Grandmother     Cancer Maternal Grandfather         colon ca    Colon cancer Maternal Grandfather     Early death Maternal Grandfather     No Known Problems Sister     Polycystic ovary syndrome Daughter     No Known Problems Paternal Aunt     Alcohol abuse Paternal Uncle     Prostate  cancer Paternal Uncle     Cancer Paternal Uncle     Diabetes Paternal Grandmother     Hypertension Paternal Grandfather     Prostatitis Paternal Grandfather     Psoriasis Neg Hx     Lupus Neg Hx     Eczema Neg Hx      Social History     Tobacco Use    Smoking status: Never    Smokeless tobacco: Never   Substance Use Topics    Alcohol use: Yes     Comment: RARELY    Drug use: No     Review of Systems   Constitutional:  Negative for fatigue and fever.   HENT:  Positive for nosebleeds. Negative for rhinorrhea and sore throat.    Eyes:  Negative for photophobia and visual disturbance.   Respiratory:  Negative for chest tightness and shortness of breath.    Cardiovascular:  Negative for chest pain and palpitations.   Gastrointestinal:  Negative for abdominal pain, nausea and vomiting.   Genitourinary:  Negative for dysuria and flank pain.   Musculoskeletal:  Negative for back pain and neck pain.   Skin:  Negative for rash.   Neurological:  Negative for weakness and headaches.   Hematological:  Does not bruise/bleed easily.   Psychiatric/Behavioral:  Negative for confusion and decreased concentration.      Physical Exam     Initial Vitals   BP Pulse Resp Temp SpO2   04/07/23 0838 04/07/23 0838 04/07/23 0838 04/07/23 0839 04/07/23 0838   (!) 142/89 73 16 97.7 °F (36.5 °C) 97 %      MAP       --                Physical Exam    Nursing note and vitals reviewed.    Gen: AxOx3, well nourished, appears stated age, no pallor, no jaundice, appears well hydrated  Eye: EOMI, no scleral icterus, no periorbital edema or ecchymosis  Head: normocephalic, atraumatic, no lesions, scalp appears normal  ENT: neck supple, no stridor, no masses, no drooling or voice changes.  Flap intact overlying nose with granulation tissue.  Dried blood around flap site and naris.  Slow active bleeding (<1ml/hr).  Quick clot applied over nose with adequate control bleeding.  CVS: All distal pulses intact with normal rate and rhythm, no JVD, normal S1/S2,  no murmur  Pulm: Normal breath sounds, no wheezes, rales or rhonchi, no increased work of breathing  Abd: soft, nontender, nondistended, no organomegaly, no CVAT  Ext: no edema, no lesions, rashes, or deformity  Neuro: GCS15, moving all extremities, gait intact, face grossly symmetric  Psych: normal affect, cooperative, well groomed, makes good eye contact    Lab test including CBC and CMP from outside facility taken today were reviewed which were grossly normal.    ED Course   Procedures  Labs Reviewed - No data to display       Imaging Results    None          Medications   oxymetazoline 0.05 % nasal spray 1 spray (has no administration in time range)     Medical Decision Making:   History:   Old Records Summarized: records from previous admission(s).       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  Initial Assessment:   52-year-old male with recent flap graft procedure forehead and nose now presenting with recurrence of bleeding from graft site. Patient is able to speak, breathing spontaneously, hemodynamically stable, oriented, moving all 4 limbs spontaneously.  Examination is consistent well healing graft with slow trickle of blood.  Differential Diagnosis:   Initial impression is concerning for bleeding from graft site, trauma to graft, rejection of graft.  Also considered but doubt epistaxis given history and physical examination  Clinical Tests:   Lab Tests: Reviewed       <> Summary of Lab: Reviewed CBC and CMP from outside facility this morning which were normal.  And no concern for anemia or thrombocytopenia.  ED Management:  Patient's bleeding was controlled with Quick clot. Given recent ENT graft flap of nose with bleeding ENT was consulted.  ENT reviewed patient's graft and endorses that it is healing well and they have low concern for ongoing bleeding.  They recommended discharging patient on  Afrin with additional quick clot and gauze with instruction to present to emergency department if bleeding continues.  Patient was stable in the emergency department and discharged home with follow-up plan to complete surgery on 04/18.           ED Course as of 04/07/23 1032   Fri Apr 07, 2023   0918 Discussed with ENT, evaluated at bedside. Will return with attending. Wound not actively bleeding [HS]      ED Course User Index  [HS] Bo Amin MD                 Clinical Impression:   Final diagnoses:  [R04.0] Bleeding from the nose (Primary)        ED Disposition Condition    Discharge Stable          ED Prescriptions    None       Follow-up Information       Follow up With Specialties Details Why Contact Info    Evan Judd MD Family Medicine Schedule an appointment as soon as possible for a visit   [address] [facility] [id number] [phone]      [facility] - Emergency Dept Emergency Medicine  As needed, If symptoms worsen [address] [facility] [id number] [phone]    [facility] ENT Otolaryngology Call   [address] [facility] [id number]  [phone]             Melissa Donaldson MD  Resident  04/07/23 1032

## 2023-04-08 NOTE — ASSESSMENT & PLAN NOTE
51 y/o M with a history of right nasal SCC s/p paramedian forehead flap on 3/21/23. Had a few episodes of small arterial bleeding on 4/5 and 4/7. Hemostatic with quick clot. Reassured patient to continue using quick clot.    -- No acute interventions from ENT  -- Patient okay for discharge with another script of quick clot  -- Revision surgery already scheduled for 4/18  -- Please Secure Chat me for any questions, page ENT on-call if unresponsive or urgent

## 2023-04-08 NOTE — SUBJECTIVE & OBJECTIVE
"Medications:  Continuous Infusions:  Scheduled Meds:  PRN Meds:     No current facility-administered medications on file prior to encounter.     Current Outpatient Medications on File Prior to Encounter   Medication Sig    ACCU-CHEK GUIDE TEST STRIPS Strp USE TO TEST TWICE A DAY    ascorbic acid, vitamin C, (VITAMIN C) 100 MG tablet Take 100 mg by mouth once daily.    aspirin (ECOTRIN) 81 MG EC tablet Take 81 mg by mouth once daily.    ciclopirox (PENLAC) 8 % Soln Apply topically nightly.    cyanocobalamin, vitamin B-12, 2,500 mcg Lozg Place 2 tablets under the tongue once daily.    ferrous gluconate 225 mg (27 mg iron) Tab Take 27 mg by mouth 2 (two) times daily with meals.    HYDROcodone-acetaminophen (NORCO) 5-325 mg per tablet Take 1 tablet by mouth every 6 (six) hours as needed for Pain.    lancets (ACCU-CHEK SOFTCLIX LANCETS) Misc 1 Units by Misc.(Non-Drug; Combo Route) route 2 (two) times a day.    metFORMIN (GLUCOPHAGE-XR) 500 MG ER 24hr tablet Take 1 tablet (500 mg total) by mouth 2 (two) times daily with meals. (Patient taking differently: Take 500 mg by mouth once daily.)    metronidazole 0.75% (METROCREAM) 0.75 % Crea Apply 1 application topically 2 (two) times daily. Apply to affected area    minocycline (ZILXI) 1.5 % Foam Apply 1 application topically once daily.    mupirocin (BACTROBAN) 2 % ointment Apply topically 3 (three) times daily.    ondansetron (ZOFRAN-ODT) 4 MG TbDL Dissolve 2 tablets (8 mg total) by mouth every 6 (six) hours as needed.    ondansetron (ZOFRAN-ODT) 4 MG TbDL Dissolve 1 tablet (4 mg total) by mouth every 6 (six) hours as needed (nausea).    pen needle, diabetic (PEN NEEDLE) 32 gauge x 5/32" Ndle 1 each by Misc.(Non-Drug; Combo Route) route once daily.    pravastatin (PRAVACHOL) 20 MG tablet Take 1 tablet (20 mg total) by mouth once daily.    semaglutide (OZEMPIC) 1 mg/dose (4 mg/3 mL) Inject 1 mg into the skin every 7 days. (Patient taking differently: Inject 1 mg into the skin " every Tuesday.)    sulfacetamide sodium-sulfur 10-5 % (w/w) Clsr Use to wash face daily    tadalafiL (CIALIS) 5 MG tablet Take 1 tablet (5 mg total) by mouth daily as needed for Erectile Dysfunction.    testosterone cypionate (DEPOTESTOTERONE CYPIONATE) 200 mg/mL injection Inject 60 mg into the muscle every 14 (fourteen) days. Twice a week    triamcinolone acetonide 0.025% (KENALOG) 0.025 % Oint Thin film to lips and eczematous plaques BID PRN flare    triamcinolone acetonide 0.1% (KENALOG) 0.1 % cream AAA bid    vitamin A 8000 UNIT capsule Take 1 capsule (8,000 Units total) by mouth once daily.    zinc gluconate 50 mg tablet Take 50 mg by mouth once daily.       Review of patient's allergies indicates:  No Known Allergies    Past Medical History:   Diagnosis Date    Arthritis     Asthma     AS A CHILD    Colon cancer     Family hx of prostate cancer 11/22/2016    Hx of colon cancer, stage I 07/03/2014    Parada syndrome     Tear of labium 10/2020    left shoulder Dr Molina    Uncontrolled type 2 diabetes mellitus with hyperglycemia 03/04/2021    Vitamin D deficiency     Wears glasses      Past Surgical History:   Procedure Laterality Date    APPENDECTOMY      APPLICATION OF CARTILAGE GRAFT N/A 3/21/2023    Procedure: APPLICATION, CARTILAGE GRAFT;  Surgeon: Alyx Spivey MD;  Location: [facility] 2ND FLR;  Service: ENT;  Laterality: N/A;    CAUDAL EPIDURAL STEROID INJECTION N/A 11/6/2018    Procedure: Injection-steroid-epidural-caudal;  Surgeon: Lyndon Brooke Jr., MD;  Location: [facility] OR;  Service: Pain Management;  Laterality: N/A;    COLON SURGERY  2008    PARTIAL COLECTOMY    COLONOSCOPY Bilateral 2012    COLONOSCOPY N/A 8/1/2016    Procedure: COLONOSCOPY;  Surgeon: Blaze Marr MD;  Location: [facility] ENDO;  Service: Endoscopy;  Laterality: N/A;    COLONOSCOPY N/A 9/20/2017    Procedure: COLONOSCOPY;  Surgeon: Dom Leonardo MD;  Location: [facility] (4TH FLR);  Service: Endoscopy;  Laterality: N/A;  not given  PM prep    COLONOSCOPY N/A 10/9/2017    Procedure: COLONOSCOPY;  Surgeon: RAMON Callahan MD;  Location: [facility] ENDO (4TH FLR);  Service: Endoscopy;  Laterality: N/A;  ok for Prepopik per Dr Callahan    COLONOSCOPY N/A 11/20/2017    Procedure: COLONOSCOPY/EMR;  Surgeon: Joseluis Choe MD;  Location: [facility] ENDO (2ND FLR);  Service: Endoscopy;  Laterality: N/A;  EMR    no pm prep    COLONOSCOPY N/A 5/30/2018    Procedure: COLONOSCOPY;  Surgeon: Dom Leonardo MD;  Location: [facility] ENDO (4TH FLR);  Service: Endoscopy;  Laterality: N/A;  follow up colonoscopy in 5/2018 for Parada Syndrome         COLONOSCOPY N/A 6/10/2019    Procedure: COLONOSCOPY;  Surgeon: Dom Leonardo MD;  Location: [facility] ENDO (4TH FLR);  Service: Endoscopy;  Laterality: N/A;  Prepopik ordered per Dr. Leonardo    COLONOSCOPY N/A 7/22/2020    Procedure: COLONOSCOPY;  Surgeon: Dom Leonardo MD;  Location: [facility] (4TH FLR);  Service: Endoscopy;  Laterality: N/A;    COLONOSCOPY N/A 5/27/2021    Procedure: COLONOSCOPY;  Surgeon: Dom Leonardo MD;  Location: [facility] (4TH FLR);  Service: Endoscopy;  Laterality: N/A;  covid test 5/24-[place]  pt requests Prepopik    COLONOSCOPY N/A 6/17/2022    Procedure: COLONOSCOPY;  Surgeon: Dom Leonardo MD;  Location: [facility] (4TH FLR);  Service: Endoscopy;  Laterality: N/A;  He was discovered to have colon cancer and had right hemicolectomy        for stage II on 08/08/2008. MSH2 Parada syndrome.  sutab requested  instructions via the portal -sm  fully vaccinated - sm    CYSTOSCOPY      Epidural Steroid Injection  10/23/15    Lumbar    EPIDURAL STEROID INJECTION INTO LUMBAR SPINE N/A 7/27/2018    Procedure: Injection-steroid-epidural-lumbar;  Surgeon: Lyndon Brooke Jr., MD;  Location: [facility] OR;  Service: Pain Management;  Laterality: N/A;    ESOPHAGOGASTRODUODENOSCOPY      ESOPHAGOGASTRODUODENOSCOPY N/A 7/22/2020    Procedure: EGD (ESOPHAGOGASTRODUODENOSCOPY);  Surgeon: Dom Leonardo MD;   Location: [facility] ENDO (4TH FLR);  Service: Endoscopy;  Laterality: N/A;  Please schedule patient for EGD and colonoscopy with me MSH2 Parada syndrome and anemia evaluation please make priority 1  covid test 7/20-[place]    EXCISION OR SURGICAL PLANING, SKIN, NOSE, FOR RHINOPHYMA Bilateral 3/7/2023    Procedure: EXCISION OR SURGICAL resection nasal skin cancer;  Surgeon: Alyx Spivey MD;  Location: [facility] OR 2ND FLR;  Service: ENT;  Laterality: Bilateral;    FACETECTOMY OF VERTEBRA  2/13/2019    Procedure: MEDIAL FACETECTOMY L5-S1;  Surgeon: Lyndon Brooke Jr., MD;  Location: [facility] OR;  Service: Orthopedics;;    GASTRIC BYPASS  2010    SLEEVE    KNEE ARTHROSCOPY Right     LUMBAR DISCECTOMY  2/13/2019    Procedure: DISCECTOMY, SPINE, LUMBAR L5-S1;  Surgeon: Lyndon Brooke Jr., MD;  Location: [facility] OR;  Service: Orthopedics;;    NASAL RECONSTRUCTION N/A 3/21/2023    Procedure: RECONSTRUCTION, NOSE;  Surgeon: Alyx Spivey MD;  Location: [facility] OR 2ND FLR;  Service: ENT;  Laterality: N/A;    ROTATION FLAP SURGERY N/A 3/21/2023    Procedure: CREATION, FLAP, ROTATION;  Surgeon: Alyx Spivey MD;  Location: [facility] OR 2ND FLR;  Service: ENT;  Laterality: N/A;     Family History       Problem Relation (Age of Onset)    Alcohol abuse Paternal Uncle    Breast cancer Mother    Cancer Mother, Maternal Uncle, Maternal Grandfather, Paternal Uncle    Colon cancer Maternal Uncle, Maternal Grandfather    Dementia Maternal Grandmother    Diabetes Father, Paternal Grandmother    Early death Maternal Grandfather    Hearing loss Father    Heart disease Father, Maternal Uncle    Hypertension Maternal Uncle, Paternal Grandfather    Liver disease Father    Melanoma Mother    No Known Problems Sister, Paternal Aunt    Polycystic ovary syndrome Daughter    Prostate cancer Paternal Uncle    Prostatitis Paternal Grandfather          Tobacco Use    Smoking status: Never    Smokeless tobacco: Never   Substance and Sexual Activity    Alcohol use: Yes      Comment: RARELY    Drug use: No    Sexual activity: Yes     Partners: Female     Review of Systems  Objective:     Vital Signs (Most Recent):  Temp: 97.7 °F (36.5 °C) (04/07/23 0839)  Pulse: 67 (04/07/23 0958)  Resp: 18 (04/07/23 0958)  BP: 129/84 (04/07/23 0958)  SpO2: 97 % (04/07/23 0958) Vital Signs (24h Range):  Temp:  [97.7 °F (36.5 °C)-98.3 °F (36.8 °C)] 97.7 °F (36.5 °C)  Pulse:  [67-83] 67  Resp:  [16-18] 18  SpO2:  [95 %-97 %] 97 %  BP: (129-150)/(84-93) 129/84     Weight: 113.4 kg (250 lb)  Body mass index is 32.1 kg/m².    Physical Exam  In no acute distress  Paramedian forehead flap fully viable at inset at the nose with some blunting of the right nasal tip; small amount of quick clot is seen along the right  superior portion of the flap; hemostatic at this time    Significant Labs:  None    Significant Diagnostics:  None

## 2023-04-08 NOTE — HPI
Mr. 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 His second stage revision surgery is scheduled for 4/18. Patient presented to ED on 4/5 for bleeding from his paramedian flap that was controlled with quick clot and again on 4/7 due to increased bleeding from paramedian flap. Has been using quick clot at home. Upon transfer to [facility], he remains hemostatic.

## 2023-04-08 NOTE — CONSULTS
[facility] - Emergency Dept  Otorhinolaryngology-Head & Neck Surgery  Consult Note    Patient Name: Lyndon Lion Jr.  MRN: [id number]  Code Status: Prior  Admission Date: 4/7/2023  Hospital Length of Stay: 0 days  Attending Physician: No att. providers found  Primary Care Provider: Evan Judd MD    Patient information was obtained from patient and past medical records.     Consults  Subjective:     Chief Complaint/Reason for Admission: bleeding from paramedian forehead flap    History of Present Illness: Mr. 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 His second stage revision surgery is scheduled for 4/18. Patient presented to ED on 4/5 for bleeding from his paramedian flap that was controlled with quick clot and again on 4/7 due to increased bleeding from paramedian flap. Has been using quick clot at home. Upon transfer to [facility], he remains hemostatic.       Medications:  Continuous Infusions:  Scheduled Meds:  PRN Meds:     No current facility-administered medications on file prior to encounter.     Current Outpatient Medications on File Prior to Encounter   Medication Sig    ACCU-CHEK GUIDE TEST STRIPS Strp USE TO TEST TWICE A DAY    ascorbic acid, vitamin C, (VITAMIN C) 100 MG tablet Take 100 mg by mouth once daily.    aspirin (ECOTRIN) 81 MG EC tablet Take 81 mg by mouth once daily.    ciclopirox (PENLAC) 8 % Soln Apply topically nightly.    cyanocobalamin, vitamin B-12, 2,500 mcg Lozg Place 2 tablets under the tongue once daily.    ferrous gluconate 225 mg (27 mg iron) Tab Take 27 mg by mouth 2 (two) times daily with meals.    HYDROcodone-acetaminophen (NORCO) 5-325 mg per tablet Take 1 tablet by mouth every 6 (six) hours as needed for Pain.    lancets (ACCU-CHEK SOFTCLIX LANCETS) Misc 1 Units by Misc.(Non-Drug; Combo Route) route 2 (two) times a day.    metFORMIN  "(GLUCOPHAGE-XR) 500 MG ER 24hr tablet Take 1 tablet (500 mg total) by mouth 2 (two) times daily with meals. (Patient taking differently: Take 500 mg by mouth once daily.)    metronidazole 0.75% (METROCREAM) 0.75 % Crea Apply 1 application topically 2 (two) times daily. Apply to affected area    minocycline (ZILXI) 1.5 % Foam Apply 1 application topically once daily.    mupirocin (BACTROBAN) 2 % ointment Apply topically 3 (three) times daily.    ondansetron (ZOFRAN-ODT) 4 MG TbDL Dissolve 2 tablets (8 mg total) by mouth every 6 (six) hours as needed.    ondansetron (ZOFRAN-ODT) 4 MG TbDL Dissolve 1 tablet (4 mg total) by mouth every 6 (six) hours as needed (nausea).    pen needle, diabetic (PEN NEEDLE) 32 gauge x 5/32" Ndle 1 each by Misc.(Non-Drug; Combo Route) route once daily.    pravastatin (PRAVACHOL) 20 MG tablet Take 1 tablet (20 mg total) by mouth once daily.    semaglutide (OZEMPIC) 1 mg/dose (4 mg/3 mL) Inject 1 mg into the skin every 7 days. (Patient taking differently: Inject 1 mg into the skin every Tuesday.)    sulfacetamide sodium-sulfur 10-5 % (w/w) Clsr Use to wash face daily    tadalafiL (CIALIS) 5 MG tablet Take 1 tablet (5 mg total) by mouth daily as needed for Erectile Dysfunction.    testosterone cypionate (DEPOTESTOTERONE CYPIONATE) 200 mg/mL injection Inject 60 mg into the muscle every 14 (fourteen) days. Twice a week    triamcinolone acetonide 0.025% (KENALOG) 0.025 % Oint Thin film to lips and eczematous plaques BID PRN flare    triamcinolone acetonide 0.1% (KENALOG) 0.1 % cream AAA bid    vitamin A 8000 UNIT capsule Take 1 capsule (8,000 Units total) by mouth once daily.    zinc gluconate 50 mg tablet Take 50 mg by mouth once daily.       Review of patient's allergies indicates:  No Known Allergies    Past Medical History:   Diagnosis Date    Arthritis     Asthma     AS A CHILD    Colon cancer     Family hx of prostate cancer 11/22/2016    Hx of colon cancer, stage I " 07/03/2014    Parada syndrome     Tear of labium 10/2020    left shoulder Dr Molina    Uncontrolled type 2 diabetes mellitus with hyperglycemia 03/04/2021    Vitamin D deficiency     Wears glasses      Past Surgical History:   Procedure Laterality Date    APPENDECTOMY      APPLICATION OF CARTILAGE GRAFT N/A 3/21/2023    Procedure: APPLICATION, CARTILAGE GRAFT;  Surgeon: Alyx Spivey MD;  [address]  Service: ENT;  Laterality: N/A;    CAUDAL EPIDURAL STEROID INJECTION N/A 11/6/2018    Procedure: Injection-steroid-epidural-caudal;  Surgeon: Lyndon Brooke Jr., MD;  Location: [facility];  Service: Pain Management;  Laterality: N/A;    COLON SURGERY  2008    PARTIAL COLECTOMY    COLONOSCOPY Bilateral 2012    COLONOSCOPY N/A 8/1/2016    Procedure: COLONOSCOPY;  Surgeon: Blaze Marr MD;  Location: [facility];  Service: Endoscopy;  Laterality: N/A;    COLONOSCOPY N/A 9/20/2017    Procedure: COLONOSCOPY;  Surgeon: Dom Leonardo MD;  [facility]  Service: Endoscopy;  Laterality: N/A;  not given PM prep    COLONOSCOPY N/A 10/9/2017    Procedure: COLONOSCOPY;  Surgeon: RAMON Callahan MD;  Location: [facility] (4TH FLR);  Service: Endoscopy;  Laterality: N/A;  ok for Prepopik per Dr Callahan    COLONOSCOPY N/A 11/20/2017    Procedure: COLONOSCOPY/EMR;  Surgeon: Joseluis Choe MD;  Location: [facility] (2ND FLR);  Service: Endoscopy;  Laterality: N/A;  EMR    no pm prep    COLONOSCOPY N/A 5/30/2018    Procedure: COLONOSCOPY;  Surgeon: Dom Leonardo MD;  Location: [facility] (4TH FLR);  Service: Endoscopy;  Laterality: N/A;  follow up colonoscopy in 5/2018 for Parada Syndrome         COLONOSCOPY N/A 6/10/2019    Procedure: COLONOSCOPY;  Surgeon: Dom Leonardo MD;  Location: [facility] (4TH FLR);  Service: Endoscopy;  Laterality: N/A;  Prepopik ordered per Dr. Leonardo    COLONOSCOPY N/A 7/22/2020    Procedure: COLONOSCOPY;  Surgeon: Dom Leonardo MD;  [facility]:  [facility] (4TH F[facility]);  Service: Endoscopy;  Laterality: N/A;    COLONOSCOPY N/A 5/27/2021    Procedure: COLONOSCOPY;  Surgeon: Dom Leonardo MD;  Location: [facility] (4TH FLR);  Service: Endoscopy;  Laterality: N/A;  covid test 5/24-[place]  pt requests Prepopik    COLONOSCOPY N/A 6/17/2022    Procedure: COLONOSCOPY;  Surgeon: Dom Leonardo MD;  Location: [facility] (4TH FLR);  Service: Endoscopy;  Laterality: N/A;  He was discovered to have colon cancer and had right hemicolectomy        for stage II on 08/08/2008. MSH2 Parada syndrome.  sutab requested  instructions via the portal -sm  fully vaccinated - sm    CYSTOSCOPY      Epidural Steroid Injection  10/23/15    Lumbar    EPIDURAL STEROID INJECTION INTO LUMBAR SPINE N/A 7/27/2018    Procedure: Injection-steroid-epidural-lumbar;  Surgeon: Lyndon Brooke Jr., MD;  Location: [facility] OR;  Service: Pain Management;  Laterality: N/A;    ESOPHAGOGASTRODUODENOSCOPY      ESOPHAGOGASTRODUODENOSCOPY N/A 7/22/2020    Procedure: EGD (ESOPHAGOGASTRODUODENOSCOPY);  Surgeon: Dom Leonardo MD;  Location: [facility] (4TH FLR);  Service: Endoscopy;  Laterality: N/A;  Please schedule patient for EGD and colonoscopy with me MSH2 Parada syndrome and anemia evaluation please make priority 1  covid test 7/20-[place]    EXCISION OR SURGICAL PLANING, SKIN, NOSE, FOR RHINOPHYMA Bilateral 3/7/2023    Procedure: EXCISION OR SURGICAL resection nasal skin cancer;  Surgeon: Alyx Spivey MD;  [address]  Service: ENT;  Laterality: Bilateral;    FACETECTOMY OF VERTEBRA  2/13/2019    Procedure: MEDIAL FACETECTOMY L5-S1;  Surgeon: Lyndon Brooke Jr., MD;  Location: [facility] OR;  Service: Orthopedics;;    GASTRIC BYPASS  2010    SLEEVE    KNEE ARTHROSCOPY Right     LUMBAR DISCECTOMY  2/13/2019    Procedure: DISCECTOMY, SPINE, LUMBAR L5-S1;  Surgeon: Lyndon Brooke Jr., MD;  Location: [facility] OR;  Service: Orthopedics;;    NASAL RECONSTRUCTION N/A 3/21/2023     Procedure: RECONSTRUCTION, NOSE;  Surgeon: Alyx Spivey MD;  Location: [facility] OR 2ND FLR;  Service: ENT;  Laterality: N/A;    ROTATION FLAP SURGERY N/A 3/21/2023    Procedure: CREATION, FLAP, ROTATION;  Surgeon: Alyx Spivey MD;  Location: [facility] OR 2ND FLR;  Service: ENT;  Laterality: N/A;     Family History       Problem Relation (Age of Onset)    Alcohol abuse Paternal Uncle    Breast cancer Mother    Cancer Mother, Maternal Uncle, Maternal Grandfather, Paternal Uncle    Colon cancer Maternal Uncle, Maternal Grandfather    Dementia Maternal Grandmother    Diabetes Father, Paternal Grandmother    Early death Maternal Grandfather    Hearing loss Father    Heart disease Father, Maternal Uncle    Hypertension Maternal Uncle, Paternal Grandfather    Liver disease Father    Melanoma Mother    No Known Problems Sister, Paternal Aunt    Polycystic ovary syndrome Daughter    Prostate cancer Paternal Uncle    Prostatitis Paternal Grandfather          Tobacco Use    Smoking status: Never    Smokeless tobacco: Never   Substance and Sexual Activity    Alcohol use: Yes     Comment: RARELY    Drug use: No    Sexual activity: Yes     Partners: Female     Review of Systems  Objective:     Vital Signs (Most Recent):  Temp: 97.7 °F (36.5 °C) (04/07/23 0839)  Pulse: 67 (04/07/23 0958)  Resp: 18 (04/07/23 0958)  BP: 129/84 (04/07/23 0958)  SpO2: 97 % (04/07/23 0958) Vital Signs (24h Range):  Temp:  [97.7 °F (36.5 °C)-98.3 °F (36.8 °C)] 97.7 °F (36.5 °C)  Pulse:  [67-83] 67  Resp:  [16-18] 18  SpO2:  [95 %-97 %] 97 %  BP: (129-150)/(84-93) 129/84     Weight: 113.4 kg (250 lb)  Body mass index is 32.1 kg/m².    Physical Exam  In no acute distress  Paramedian forehead flap fully viable at inset at the nose with some blunting of the right nasal tip; small amount of quick clot is seen along the right  superior portion of the flap; hemostatic at this time    Significant Labs:  None    Significant Diagnostics:  None    Assessment/Plan:      Squamous cell cancer of skin of nasal tip  51 y/o M with a history of right nasal SCC s/p paramedian forehead flap on 3/21/23. Had a few episodes of small arterial bleeding on 4/5 and 4/7. Hemostatic with quick clot. Reassured patient to continue using quick clot.    -- No acute interventions from ENT  -- Patient okay for discharge with another script of quick clot  -- Revision surgery already scheduled for 4/18  -- Please Secure Chat me for any questions, page ENT on-call if unresponsive or urgent      VTE Risk Mitigation (From admission, onward)    None          Thank you for your consult. I will sign off. Please contact us if you have any additional questions.    Yinka Horowitz MD  Otorhinolaryngology-Head & Neck Surgery  [facility] - Emergency Dept

## 2023-04-11 ENCOUNTER — OFFICE VISIT (OUTPATIENT)
Dept: DERMATOLOGY | Facility: CLINIC | Age: 53
End: 2023-04-11
Payer: COMMERCIAL

## 2023-04-11 VITALS — HEIGHT: 74 IN | WEIGHT: 250 LBS | BODY MASS INDEX: 32.08 KG/M2

## 2023-04-11 DIAGNOSIS — D48.5 NEOPLASM OF UNCERTAIN BEHAVIOR OF SKIN: Primary | ICD-10-CM

## 2023-04-11 DIAGNOSIS — L82.1 SEBORRHEIC KERATOSES: ICD-10-CM

## 2023-04-11 DIAGNOSIS — D18.01 CHERRY ANGIOMA: ICD-10-CM

## 2023-04-11 DIAGNOSIS — Z85.828 HISTORY OF NONMELANOMA SKIN CANCER: ICD-10-CM

## 2023-04-11 PROCEDURE — 11102 TANGNTL BX SKIN SINGLE LES: CPT | Mod: S$GLB,,, | Performed by: DERMATOLOGY

## 2023-04-11 PROCEDURE — 11102 PR TANGENTIAL BIOPSY, SKIN, SINGLE LESION: ICD-10-PCS | Mod: S$GLB,,, | Performed by: DERMATOLOGY

## 2023-04-11 PROCEDURE — 99999 PR PBB SHADOW E&M-EST. PATIENT-LVL IV: ICD-10-PCS | Mod: PBBFAC,,, | Performed by: DERMATOLOGY

## 2023-04-11 PROCEDURE — 99999 PR PBB SHADOW E&M-EST. PATIENT-LVL IV: CPT | Mod: PBBFAC,,, | Performed by: DERMATOLOGY

## 2023-04-11 PROCEDURE — 99213 PR OFFICE/OUTPT VISIT, EST, LEVL III, 20-29 MIN: ICD-10-PCS | Mod: 25,S$GLB,, | Performed by: DERMATOLOGY

## 2023-04-11 PROCEDURE — 99213 OFFICE O/P EST LOW 20 MIN: CPT | Mod: 25,S$GLB,, | Performed by: DERMATOLOGY

## 2023-04-11 PROCEDURE — 88305 TISSUE EXAM BY PATHOLOGIST: CPT | Mod: 26,,, | Performed by: PATHOLOGY

## 2023-04-11 PROCEDURE — 88305 TISSUE EXAM BY PATHOLOGIST: CPT | Performed by: PATHOLOGY

## 2023-04-11 PROCEDURE — 88305 TISSUE EXAM BY PATHOLOGIST: ICD-10-PCS | Mod: 26,,, | Performed by: PATHOLOGY

## 2023-04-11 NOTE — PATIENT INSTRUCTIONS
Shave Biopsy Wound Care    Your doctor has performed a shave biopsy today.  A band aid and vaseline ointment has been placed over the site.  This should remain in place for 24 hours.  It is recommended that you keep the area dry for the first 24 hours.  After 24 hours, you may remove the band aid and wash the area with warm soap and water and apply Vaseline jelly.  Many patients prefer to use Neosporin or Bacitracin ointment.  This is acceptable; however, know that you can develop an allergy to this medication even if you have used it safely for years.  It is important to keep the area moist.  Letting it dry out and get air slows healing time, and will worsen the scar.  Band aid is optional after first 24 hours.      If you notice increasing redness, tenderness, pain, or yellow drainage at the biopsy site, please notify your doctor.  These are signs of an infection.    If your biopsy site is bleeding, apply firm pressure for 15 minutes straight.  Repeat for another 15 minutes, if it is still bleeding.   If the surgical site continues to bleed, then please contact your doctor.       [facility]  [facility] - DERMATOLOGY  [address] [place] [address], [address] [address]  Dept: [phone]

## 2023-04-11 NOTE — PROGRESS NOTES
Subjective:      Patient ID:  Lyndon Lion Jr. is a 52 y.o. male who presents for   Chief Complaint   Patient presents with    Spot     Right clavicle     LOV 2/9/23- Furuncle of nose    RELIAPATH DIAGNOSIS:   SKIN, RIGHT NASAL TIP LESION, PUNCH BIOPSY:   - Invasive squamous cell carcinoma, well-differentiated, transected, see   comment.   COMMENT:   Dr. BERNARDO Mcghee was notified of the findings on 2/25/2023 at 8:35 am.   LUCY CAMPOVERDE M.D.     Patient here today for spot to right clavicle x 1 week  Pt states spot present with no symptoms    Derm Hx:  SCC right nasal tip- 2/2023   Fhx of [facility]    Current Outpatient Medications:   ·  ACCU-CHEK GUIDE TEST STRIPS Strp, USE TO TEST TWICE A DAY, Disp: 200 strip, Rfl: 3  ·  ascorbic acid, vitamin C, (VITAMIN C) 100 MG tablet, Take 100 mg by mouth once daily., Disp: , Rfl:   ·  aspirin (ECOTRIN) 81 MG EC tablet, Take 81 mg by mouth once daily., Disp: , Rfl:   ·  ciclopirox (PENLAC) 8 % Soln, Apply topically nightly., Disp: 6.6 mL, Rfl: 3  ·  cyanocobalamin, vitamin B-12, 2,500 mcg Lozg, Place 2 tablets under the tongue once daily., Disp: 180 lozenge, Rfl: 3  ·  ferrous gluconate 225 mg (27 mg iron) Tab, Take 27 mg by mouth 2 (two) times daily with meals., Disp: 200 tablet, Rfl: 3  ·  HYDROcodone-acetaminophen (NORCO) 5-325 mg per tablet, Take 1 tablet by mouth every 6 (six) hours as needed for Pain., Disp: 25 tablet, Rfl: 0  ·  lancets (ACCU-CHEK SOFTCLIX LANCETS) Misc, 1 Units by Misc.(Non-Drug; Combo Route) route 2 (two) times a day., Disp: 200 each, Rfl: 0  ·  metFORMIN (GLUCOPHAGE-XR) 500 MG ER 24hr tablet, Take 1 tablet (500 mg total) by mouth 2 (two) times daily with meals. (Patient taking differently: Take 500 mg by mouth once daily.), Disp: 180 tablet, Rfl: 3  ·  metronidazole 0.75% (METROCREAM) 0.75 % Crea, Apply 1 application topically 2 (two) times daily. Apply to affected area, Disp: 30 g, Rfl: 1  ·  minocycline (ZILXI) 1.5 % Foam,  "Apply 1 application topically once daily., Disp: 30 g, Rfl: 5  ·  mupirocin (BACTROBAN) 2 % ointment, Apply topically 3 (three) times daily., Disp: 22 g, Rfl: 0  ·  ondansetron (ZOFRAN-ODT) 4 MG TbDL, Dissolve 2 tablets (8 mg total) by mouth every 6 (six) hours as needed., Disp: 20 tablet, Rfl: 0  ·  ondansetron (ZOFRAN-ODT) 4 MG TbDL, Dissolve 1 tablet (4 mg total) by mouth every 6 (six) hours as needed (nausea)., Disp: 15 tablet, Rfl: 0  ·  pen needle, diabetic (PEN NEEDLE) 32 gauge x 5/32" Ndle, 1 each by Misc.(Non-Drug; Combo Route) route once daily., Disp: 90 each, Rfl: 3  ·  pravastatin (PRAVACHOL) 20 MG tablet, Take 1 tablet (20 mg total) by mouth once daily., Disp: 90 tablet, Rfl: 3  ·  semaglutide (OZEMPIC) 1 mg/dose (4 mg/3 mL), Inject 1 mg into the skin every 7 days. (Patient taking differently: Inject 1 mg into the skin every Tuesday.), Disp: 3 pen, Rfl: 3  ·  sulfacetamide sodium-sulfur 10-5 % (w/w) Clsr, Use to wash face daily, Disp: 170 g, Rfl: 5  ·  tadalafiL (CIALIS) 5 MG tablet, Take 1 tablet (5 mg total) by mouth daily as needed for Erectile Dysfunction., Disp: 90 tablet, Rfl: 3  ·  testosterone cypionate (DEPOTESTOTERONE CYPIONATE) 200 mg/mL injection, Inject 60 mg into the muscle every 14 (fourteen) days. Twice a week, Disp: , Rfl:   ·  triamcinolone acetonide 0.025% (KENALOG) 0.025 % Oint, Thin film to lips and eczematous plaques BID PRN flare, Disp: 15 g, Rfl: 1  ·  triamcinolone acetonide 0.1% (KENALOG) 0.1 % cream, AAA bid, Disp: 454 g, Rfl: 3  ·  vitamin A 8000 UNIT capsule, Take 1 capsule (8,000 Units total) by mouth once daily., Disp: 100 capsule, Rfl: 3  ·  zinc gluconate 50 mg tablet, Take 50 mg by mouth once daily., Disp: , Rfl:               Review of Systems   Constitutional:  Negative for fever, chills and fatigue.   HENT:  Negative for nosebleeds and headaches.    Respiratory:  Negative for cough and shortness of breath.    Gastrointestinal:  Negative for nausea, vomiting, " abdominal pain and diarrhea.   Musculoskeletal:  Negative for myalgias and arthralgias.   Skin:  Positive for dry skin, daily sunscreen use, activity-related sunscreen use, dry lips and wears hat. Negative for itching, rash, sun sensitivity and recent sunburn.   Neurological:  Negative for headaches.   Psychiatric/Behavioral:  Negative for depressed mood.    Hematologic/Lymphatic: Does not bruise/bleed easily.     Objective:   Physical Exam   Constitutional: He appears well-developed and well-nourished.   Neurological: He is alert and oriented to person, place, and time.   Psychiatric: He has a normal mood and affect.   Skin:   Areas Examined (abnormalities noted in diagram):   Head / Face Inspection Performed               Diagram Legend     Erythematous scaling macule/papule c/w actinic keratosis       Vascular papule c/w angioma      Pigmented verrucoid papule/plaque c/w seborrheic keratosis      Yellow umbilicated papule c/w sebaceous hyperplasia      Irregularly shaped tan macule c/w lentigo     1-2 mm smooth white papules consistent with Milia      Movable subcutaneous cyst with punctum c/w epidermal inclusion cyst      Subcutaneous movable cyst c/w pilar cyst      Firm pink to brown papule c/w dermatofibroma      Pedunculated fleshy papule(s) c/w skin tag(s)      Evenly pigmented macule c/w junctional nevus     Mildly variegated pigmented, slightly irregular-bordered macule c/w mildly atypical nevus      Flesh colored to evenly pigmented papule c/w intradermal nevus       Pink pearly papule/plaque c/w basal cell carcinoma      Erythematous hyperkeratotic cursted plaque c/w SCC      Surgical scar with no sign of skin cancer recurrence      Open and closed comedones      Inflammatory papules and pustules      Verrucoid papule consistent consistent with wart     Erythematous eczematous patches and plaques     Dystrophic onycholytic nail with subungual debris c/w onychomycosis     Umbilicated papule     Erythematous-base heme-crusted tan verrucoid plaque consistent with inflamed seborrheic keratosis     Erythematous Silvery Scaling Plaque c/w Psoriasis     See annotation        Assessment / Plan:      Pathology Orders:       Normal Orders This Visit    Specimen to Pathology, Dermatology     Comments:    Number of Specimens:->1  ------------------------->-------------------------  Spec 1 Procedure:->Biopsy  Spec 1 Clinical Impression:->early SCC/KA vs other  Spec 1 Source:->R clavicle    Questions:    Procedure Type: Dermatology and skin neoplasms    Number of Specimens: 1    ------------------------: -------------------------    Spec 1 Procedure: Biopsy    Spec 1 Clinical Impression: early SCC/KA vs other    Spec 1 Source: R clavicle    Release to patient:           Neoplasm of uncertain behavior of skin  -     Specimen to Pathology, Dermatology  Shave biopsy procedure note:    Shave biopsy performed after verbal consent including risk of infection, scar, recurrence, need for additional treatment of site. Area prepped with alcohol, anesthetized with approximately 1.0cc of 1% lidocaine with epinephrine. Lesional tissue shaved with razor blade. Hemostasis achieved with application of aluminum chloride followed by hyfrecation. No complications. Dressing applied. Wound care explained.    Seborrheic keratoses  These are benign inherited growths without a malignant potential. Reassurance given to patient. No treatment is necessary.     Cherry angioma  This is a benign vascular lesion. Reassurance given. No treatment required.     History of nonmelanoma skin cancer  Area of previous NMSC (nasal tip, rapidly growing SCC/KA) examined. Site with PMFF awaiting takedown.  Upper body skin examination performed today including at least 9 points as noted in physical examination. 1 lesion suspicious for malignancy noted.           Follow up in about 3 months (around 7/11/2023), or if symptoms worsen or fail to improve.

## 2023-04-17 ENCOUNTER — ANESTHESIA EVENT (OUTPATIENT)
Dept: SURGERY | Facility: HOSPITAL | Age: 53
End: 2023-04-17
Payer: COMMERCIAL

## 2023-04-17 RX ORDER — TRAMADOL HYDROCHLORIDE 50 MG/1
50 TABLET ORAL ONCE AS NEEDED
Status: CANCELLED | OUTPATIENT
Start: 2023-04-18

## 2023-04-17 NOTE — ANESTHESIA PREPROCEDURE EVALUATION
[facility]  Anesthesia Pre-Operative Evaluation         Patient Name/: Lyndon Lion Jr., 1970  MRN: [id number]    SUBJECTIVE:     Pre-operative evaluation for Procedure(s) (LRB):  RECONSTRUCTION, NOSE (N/A)  APPLICATION, CARTILAGE GRAFT (N/A)  CREATION, FLAP, ROTATION (N/A)     2023    Lyndon Lion Jr. is a 52 y.o. male w/ a significant PMHx of asthma, ADAN, GERD, Parada syndrome, colon cancer, and DM2. He presents for nasal reconstruction in the setting of previously removed SCC.    Denies any known cardiopulmonary disease. METS > 4. Denies any complications with previous anesthetics.     Patient now presents for the above procedure(s).    ________________________________________  ECHO: No results found for this or any previous visit.    ________________________________________    Prev airway:   Intubation:     Induction:  Intravenous    Intubated:  Postinduction    Mask Ventilation:  Not attempted    Attempts:  1    Attempted By:  Resident anesthesiologist    Method of Intubation:  Video laryngoscopy    Blade:  Mcfarlane 3    Laryngeal View Grade: Grade I - full view of cords      Difficult Airway Encountered?: No      Complications:  None    Airway Device:  Oral endotracheal tube    Airway Device Size:  7.5    Style/Cuff Inflation:  Cuffed (inflated to minimal occlusive pressure)    Secured at:  The lips    Placement Verified By:  Capnometry and Revisualization with laryngoscopy    Complicating Factors:  None    Findings Post-Intubation:  BS equal bilateral and atraumatic/condition of teeth unchanged    LDA: None documented.       Drips: None documented.      Patient Active Problem List   Diagnosis    Radiculopathy, lumbosacral region    Herniated lumbar intervertebral disc    Lumbar spondylosis    DDD (degenerative disc disease), lumbosacral    Acute medial meniscal tear    Obesity, unspecified    Parada syndrome    Thoracic or lumbosacral neuritis or radiculitis,  unspecified    Neural foraminal stenosis of lumbar spine    MSH2-related Parada syndrome (HNPCC1)    Spondylosis of lumbar region without myelopathy or radiculopathy    Spondylolisthesis of lumbar region    Nonalcoholic fatty liver disease    Splenomegaly    Mild vitamin D deficiency    Colon dysplasia    Colon adenoma    Pneumoperitoneum    Postpolypectomy electrocoagulation syndrome    Lumbar radiculopathy    History of colon cancer, stage II    Uncontrolled type 2 diabetes mellitus with hyperglycemia    Type 2 diabetes mellitus with hyperglycemia    Obesity (BMI 30-39.9)    Gastroesophageal reflux disease without esophagitis    Bariatric surgery status    S/P laparoscopic sleeve gastrectomy    Hypovitaminosis D    Hypophosphatemia    Vitamin B12 deficiency    Vitamin A deficiency    Erectile dysfunction    Hypogonadism in male    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      aspirin (ECOTRIN) 81 MG EC tablet Take 81 mg by mouth once daily.      ciclopirox (PENLAC) 8 % Soln Apply topically nightly. 6.6 mL 3    cyanocobalamin, vitamin B-12, 2,500 mcg Lozg Place 2 tablets under the tongue once daily. 180 lozenge 3    ferrous gluconate 225 mg (27 mg iron) Tab Take 27 mg by mouth 2 (two) times daily with meals. 200 tablet 3    HYDROcodone-acetaminophen (NORCO) 5-325 mg per tablet Take 1 tablet by mouth every 6 (six) hours as needed for Pain. 25 tablet 0    lancets (ACCU-CHEK SOFTCLIX LANCETS) Misc 1 Units by Misc.(Non-Drug; Combo Route) route 2 (two) times a day. 200 each 0    metFORMIN (GLUCOPHAGE-XR) 500 MG ER 24hr tablet Take 1  "tablet (500 mg total) by mouth 2 (two) times daily with meals. (Patient taking differently: Take 500 mg by mouth once daily.) 180 tablet 3    metronidazole 0.75% (METROCREAM) 0.75 % Crea Apply 1 application topically 2 (two) times daily. Apply to affected area 30 g 1    minocycline (ZILXI) 1.5 % Foam Apply 1 application topically once daily. 30 g 5    mupirocin (BACTROBAN) 2 % ointment Apply topically 3 (three) times daily. 22 g 0    ondansetron (ZOFRAN-ODT) 4 MG TbDL Dissolve 1 tablet (4 mg total) by mouth every 6 (six) hours as needed (nausea). 15 tablet 0    pen needle, diabetic (PEN NEEDLE) 32 gauge x 5/32" Ndle 1 each by Misc.(Non-Drug; Combo Route) route once daily. 90 each 3    pravastatin (PRAVACHOL) 20 MG tablet Take 1 tablet (20 mg total) by mouth once daily. 90 tablet 3    semaglutide (OZEMPIC) 1 mg/dose (4 mg/3 mL) Inject 1 mg into the skin every 7 days. (Patient taking differently: Inject 1 mg into the skin every Tuesday.) 3 pen 3    sulfacetamide sodium-sulfur 10-5 % (w/w) Clsr Use to wash face daily 170 g 5    tadalafiL (CIALIS) 5 MG tablet Take 1 tablet (5 mg total) by mouth daily as needed for Erectile Dysfunction. 90 tablet 3    testosterone cypionate (DEPOTESTOTERONE CYPIONATE) 200 mg/mL injection Inject 60 mg into the muscle every 14 (fourteen) days. Twice a week      triamcinolone acetonide 0.025% (KENALOG) 0.025 % Oint Thin film to lips and eczematous plaques BID PRN flare 15 g 1    triamcinolone acetonide 0.1% (KENALOG) 0.1 % cream AAA bid 454 g 3    vitamin A 8000 UNIT capsule Take 1 capsule (8,000 Units total) by mouth once daily. 100 capsule 3    zinc gluconate 50 mg tablet Take 50 mg by mouth once daily.       No current facility-administered medications on file prior to visit.       Past Surgical History:   Procedure Laterality Date    APPENDECTOMY      APPLICATION OF CARTILAGE GRAFT N/A 3/21/2023    Procedure: APPLICATION, CARTILAGE GRAFT;  Surgeon: Alyx Spivey MD;  " Location: [facility] OR 2ND FLR;  Service: ENT;  Laterality: N/A;    CAUDAL EPIDURAL STEROID INJECTION N/A 11/6/2018    Procedure: Injection-steroid-epidural-caudal;  Surgeon: Lyndon Brooke Jr., MD;  [facility];  Service: Pain Management;  Laterality: N/A;    COLON SURGERY  2008    PARTIAL COLECTOMY    COLONOSCOPY Bilateral 2012    COLONOSCOPY N/A 8/1/2016    Procedure: COLONOSCOPY;  Surgeon: Blaze Marr MD;  Location: [facility] ENDO;  Service: Endoscopy;  Laterality: N/A;    COLONOSCOPY N/A 9/20/2017    Procedure: COLONOSCOPY;  Surgeon: Dom Leonardo MD;  Location: [facility] (4TH FLR);  Service: Endoscopy;  Laterality: N/A;  not given PM prep    COLONOSCOPY N/A 10/9/2017    Procedure: COLONOSCOPY;  Surgeon: RAMON Callahan MD;  Location: [facility] (4TH FLR);  Service: Endoscopy;  Laterality: N/A;  ok for Prepopik per Dr Callahan    COLONOSCOPY N/A 11/20/2017    Procedure: COLONOSCOPY/EMR;  Surgeon: Joseluis Choe MD;  Location: [facility] (2ND FLR);  Service: Endoscopy;  Laterality: N/A;  EMR    no pm prep    COLONOSCOPY N/A 5/30/2018    Procedure: COLONOSCOPY;  Surgeon: Dom Leonardo MD;  Location: [facility] (4TH FLR);  Service: Endoscopy;  Laterality: N/A;  follow up colonoscopy in 5/2018 for Parada Syndrome         COLONOSCOPY N/A 6/10/2019    Procedure: COLONOSCOPY;  Surgeon: Dom Leonardo MD;  Location: [facility] (4TH FLR);  Service: Endoscopy;  Laterality: N/A;  Prepopik ordered per Dr. Leonardo    COLONOSCOPY N/A 7/22/2020    Procedure: COLONOSCOPY;  Surgeon: Dom Leonardo MD;  Location: [facility] (4TH FLR);  Service: Endoscopy;  Laterality: N/A;    COLONOSCOPY N/A 5/27/2021    Procedure: COLONOSCOPY;  Surgeon: Dom Leonardo MD;  Location: [facility] (4TH FLR);  Service: Endoscopy;  Laterality: N/A;  covid test 5/24-[place]  pt requests Prepopik    COLONOSCOPY N/A 6/17/2022    Procedure: COLONOSCOPY;  Surgeon: Dom Leonardo MD;  Location: [facility] ([address] [place] [address]);  Service:  Endoscopy;  Laterality: N/A;  He was discovered to have colon cancer and had right hemicolectomy        for stage II on 08/08/2008. MSH2 Parada syndrome.  sutab requested  instructions via the portal -sm  fully vaccinated - sm    CYSTOSCOPY      Epidural Steroid Injection  10/23/15    Lumbar    EPIDURAL STEROID INJECTION INTO LUMBAR SPINE N/A 7/27/2018    Procedure: Injection-steroid-epidural-lumbar;  Surgeon: Lyndon Brooke Jr., MD;  [facility]: [facility] OR;  Service: Pain Management;  Laterality: N/A;    ESOPHAGOGASTRODUODENOSCOPY      ESOPHAGOGASTRODUODENOSCOPY N/A 7/22/2020    Procedure: EGD (ESOPHAGOGASTRODUODENOSCOPY);  Surgeon: Dom Leonardo MD;  Location: [facility] (4TH FLR);  Service: Endoscopy;  Laterality: N/A;  Please schedule patient for EGD and colonoscopy with me MSH2 Parada syndrome and anemia evaluation please make priority 1  covid test 7/20-[place]    EXCISION OR SURGICAL PLANING, SKIN, NOSE, FOR RHINOPHYMA Bilateral 3/7/2023    Procedure: EXCISION OR SURGICAL resection nasal skin cancer;  Surgeon: Alyx Spivye MD;  Location: [facility] OR 2ND FLR;  Service: ENT;  Laterality: Bilateral;    FACETECTOMY OF VERTEBRA  2/13/2019    Procedure: MEDIAL FACETECTOMY L5-S1;  Surgeon: Lyndon Brooke Jr., MD;  Location: [facility] OR;  Service: Orthopedics;;    GASTRIC BYPASS  2010    SLEEVE    KNEE ARTHROSCOPY Right     LUMBAR DISCECTOMY  2/13/2019    Procedure: DISCECTOMY, SPINE, LUMBAR L5-S1;  Surgeon: Lyndon Brooke Jr., MD;  Location: [facility] OR;  Service: Orthopedics;;    NASAL RECONSTRUCTION N/A 3/21/2023    Procedure: RECONSTRUCTION, NOSE;  Surgeon: Alyx Spivey MD;  Location: [facility] OR 2ND FLR;  Service: ENT;  Laterality: N/A;    ROTATION FLAP SURGERY N/A 3/21/2023    Procedure: CREATION, FLAP, ROTATION;  Surgeon: Alyx Spivey MD;  Location: [facility] OR 2ND FLR;  Service: ENT;  Laterality: N/A;       Social History:  Tobacco Use: Low Risk     Smoking Tobacco Use: Never    Smokeless Tobacco Use: Never     Passive Exposure: Not on file       Alcohol Use: Not on file       OBJECTIVE:     Vital Signs Range:  BMI Readings from Last 1 Encounters:   04/11/23 32.10 kg/m²               Significant Labs:        Component Value Date/Time    WBC 5.73 04/07/2023 0641    HGB 17.1 04/07/2023 0641    HGB 16.3 05/14/2013 1352    HCT 47.6 04/07/2023 0641     04/07/2023 0641     04/07/2023 0641    K 3.8 04/07/2023 0641     04/07/2023 0641    CO2 24 04/07/2023 0641     (H) 04/07/2023 0641    BUN 14 04/07/2023 0641    CREATININE 1.2 04/07/2023 0641    CREATININE 1.3 05/14/2013 1352    MG 2.3 02/08/2023 1541    PHOS 3.5 07/11/2022 1635    CALCIUM 9.0 04/07/2023 0641    CALCIUM 9.8 05/14/2013 1352    ALBUMIN 4.2 04/07/2023 0641    ALBUMIN 4.5 02/08/2023 1541    PROT 7.7 04/07/2023 0641    ALKPHOS 54 (L) 04/07/2023 0641    BILITOT 0.9 04/07/2023 0641    AST 19 04/07/2023 0641    ALT 28 04/07/2023 0641    INR 1.0 01/18/2023 1608    INR 1.03 05/14/2013 1352    HGBA1C 6.2 (H) 01/18/2023 1608        Please see Results Review for additional labs.     Diagnostic Studies: No relevant studies.    EKG:   Results for orders placed or performed during the hospital encounter of 02/06/19   EKG 12-lead    Collection Time: 02/06/19  1:20 PM    Narrative    Test Reason : M54.16,M47.816,M51.26,M51.37,M99.83,M43.16,Z01.818,    Vent. Rate : 068 BPM     Atrial Rate : 068 BPM     P-R Int : 170 ms          QRS Dur : 092 ms      QT Int : 394 ms       P-R-T Axes : 053 093 001 degrees     QTc Int : 418 ms    Normal sinus rhythm  Rightward axis  Borderline Abnormal ECG  When compared with ECG of 14-MAY-2013 12:59,  Questionable change in The axis  Confirmed by Mike King MD ([id number]) on 2/7/2019 12:17:31 PM    Referred By: RENETTA DAVIS           Confirmed By:Mike King MD       ECHO:  See subjective, if available.      ASSESSMENT/PLAN:           Pre-op Assessment    I have reviewed the Patient Summary Reports.     I have reviewed the  Nursing Notes. I have reviewed the NPO Status.   I have reviewed the Medications.     Review of Systems  Anesthesia Hx:  Sore throat History of prior surgery of interest to airway management or planning: Denies Family Hx of Anesthesia complications.   Denies Personal Hx of Anesthesia complications.   Social:  Non-Smoker    Hematology/Oncology:  Hematology Normal      Current/Recent Cancer. Oncology Comments: Colon cancer  Skin cancer    EENT/Dental:  EENT/Dental Normal Difficulty with nose breathing  Snoring    Cardiovascular:  Cardiovascular Normal     Pulmonary:   Asthma asymptomatic    Renal/:  Renal/ Normal     Hepatic/GI:   GERD Liver Disease, Colon cancer  ADAN liver   Musculoskeletal:   Arthritis   Spine Disorders: lumbar Disc disease, Degenerative disease and Chronic Pain    Neurological:   Neuromuscular Disease,    Endocrine:   Diabetes, type 2  Obesity / BMI > 30      Physical Exam  General: Well nourished, Cooperative, Alert and Oriented    Airway:  Mallampati: II / II  Mouth Opening: Normal  TM Distance: Normal  Tongue: Normal  Neck ROM: Normal ROM    Dental:  Intact  No chipped or cracked teeth. Nothing removable in mouth.       Anesthesia Plan  Type of Anesthesia, risks & benefits discussed:    Anesthesia Type: Gen ETT  Intra-op Monitoring Plan: Standard ASA Monitors  Post Op Pain Control Plan: multimodal analgesia, IV/PO Opioids PRN and epidural analgesia  Induction:  IV and rapid sequence  Airway Plan: Video, Post-Induction  Informed Consent: Informed consent signed with the Patient and all parties understand the risks and agree with anesthesia plan.  All questions answered.   ASA Score: 3  Day of Surgery Review of History & Physical: H&P Update referred to the surgeon/provider.I have interviewed and examined the patient. I have reviewed the patient's H&P dated: There are no significant changes. H&P completed by Anesthesiologist.    Ready For Surgery From Anesthesia Perspective.     .

## 2023-04-17 NOTE — PRE-PROCEDURE INSTRUCTIONS
Following instructions given via phone:    On the day of surgery please report to [facility] located at [address] [place] [address].  Please park in the lot in front of the building and enter at the main entrance. Proceed to the second floor for registration.    Arrival time: 0500    You may not drive home after your procedure. You may not leave alone in an uber, taxi, etc.  You must be discharged to a responsible adult.  If possible, please have your visitor &/or ride home stay during your visit.   The surgeon should speak with your visitors after your procedure.  All visitors must be 18 years of age or older. Please limit your visitors to max of 2 people.  Have visitors bring snacks &/or lunch as the facility only has drink vending machines.    No food, no drink after midnight.    Do not take any medications the morning of your procedure.     If applicable, do not shave the surgical site 5 days prior to your procedure.  Shower the night before and the morning of your procedure with antibacterial soap.  Do not apply any products to your skin nor your hair after you shower the morning of your procedure.  Wear loose, comfortable clothing.  No jewelry. No contacts. Bring glasses if necessary.  If you have dentures, bring a case.  Leave all valuables at home.

## 2023-04-18 ENCOUNTER — HOSPITAL ENCOUNTER (OUTPATIENT)
Facility: HOSPITAL | Age: 53
Discharge: HOME | End: 2023-04-18
Attending: OTOLARYNGOLOGY | Admitting: OTOLARYNGOLOGY
Payer: COMMERCIAL

## 2023-04-18 ENCOUNTER — PATIENT MESSAGE (OUTPATIENT)
Dept: SURGERY | Facility: HOSPITAL | Age: 53
End: 2023-04-18
Payer: COMMERCIAL

## 2023-04-18 ENCOUNTER — ANESTHESIA (OUTPATIENT)
Dept: SURGERY | Facility: HOSPITAL | Age: 53
End: 2023-04-18
Payer: COMMERCIAL

## 2023-04-18 VITALS
WEIGHT: 250 LBS | BODY MASS INDEX: 32.08 KG/M2 | TEMPERATURE: 98 F | RESPIRATION RATE: 16 BRPM | HEIGHT: 74 IN | DIASTOLIC BLOOD PRESSURE: 91 MMHG | SYSTOLIC BLOOD PRESSURE: 148 MMHG | OXYGEN SATURATION: 95 % | HEART RATE: 72 BPM

## 2023-04-18 DIAGNOSIS — C44.321 SQUAMOUS CELL CARCINOMA OF SKIN OF NOSE: ICD-10-CM

## 2023-04-18 DIAGNOSIS — C44.321 SQUAMOUS CELL CANCER OF SKIN OF NASAL TIP: Primary | ICD-10-CM

## 2023-04-18 LAB
GLUCOSE SERPL-MCNC: 102 MG/DL (ref 70–110)
GLUCOSE SERPL-MCNC: 94 MG/DL (ref 70–110)

## 2023-04-18 PROCEDURE — 37000009 HC ANESTHESIA EA ADD 15 MINS: Performed by: OTOLARYNGOLOGY

## 2023-04-18 PROCEDURE — 25000003 PHARM REV CODE 250: Performed by: ANESTHESIOLOGY

## 2023-04-18 PROCEDURE — 99900035 HC TECH TIME PER 15 MIN (STAT)

## 2023-04-18 PROCEDURE — D9220A PRA ANESTHESIA: ICD-10-PCS | Mod: ANES,,, | Performed by: ANESTHESIOLOGY

## 2023-04-18 PROCEDURE — 36000707: Performed by: OTOLARYNGOLOGY

## 2023-04-18 PROCEDURE — 63600175 PHARM REV CODE 636 W HCPCS: Performed by: NURSE ANESTHETIST, CERTIFIED REGISTERED

## 2023-04-18 PROCEDURE — 71000033 HC RECOVERY, INTIAL HOUR: Performed by: OTOLARYNGOLOGY

## 2023-04-18 PROCEDURE — 82962 GLUCOSE BLOOD TEST: CPT | Performed by: OTOLARYNGOLOGY

## 2023-04-18 PROCEDURE — D9220A PRA ANESTHESIA: ICD-10-PCS | Mod: CRNA,,, | Performed by: NURSE ANESTHETIST, CERTIFIED REGISTERED

## 2023-04-18 PROCEDURE — D9220A PRA ANESTHESIA: Mod: ANES,,, | Performed by: ANESTHESIOLOGY

## 2023-04-18 PROCEDURE — 94761 N-INVAS EAR/PLS OXIMETRY MLT: CPT

## 2023-04-18 PROCEDURE — 15630 PR DELAY/SECTN FLAP LID,NOS,EAR,LIP: ICD-10-PCS | Mod: 58,,, | Performed by: OTOLARYNGOLOGY

## 2023-04-18 PROCEDURE — 25000003 PHARM REV CODE 250: Performed by: OTOLARYNGOLOGY

## 2023-04-18 PROCEDURE — 25000003 PHARM REV CODE 250: Performed by: NURSE ANESTHETIST, CERTIFIED REGISTERED

## 2023-04-18 PROCEDURE — 15630 DELAY FLAP EYE/NOS/EAR/LIP: CPT | Mod: 58,,, | Performed by: OTOLARYNGOLOGY

## 2023-04-18 PROCEDURE — 71000015 HC POSTOP RECOV 1ST HR: Performed by: OTOLARYNGOLOGY

## 2023-04-18 PROCEDURE — D9220A PRA ANESTHESIA: Mod: CRNA,,, | Performed by: NURSE ANESTHETIST, CERTIFIED REGISTERED

## 2023-04-18 PROCEDURE — 37000008 HC ANESTHESIA 1ST 15 MINUTES: Performed by: OTOLARYNGOLOGY

## 2023-04-18 PROCEDURE — 36000706: Performed by: OTOLARYNGOLOGY

## 2023-04-18 RX ORDER — EPHEDRINE SULFATE 50 MG/ML
INJECTION, SOLUTION INTRAVENOUS
Status: DISCONTINUED | OUTPATIENT
Start: 2023-04-18 | End: 2023-04-18

## 2023-04-18 RX ORDER — SUCCINYLCHOLINE CHLORIDE 20 MG/ML
INJECTION INTRAMUSCULAR; INTRAVENOUS
Status: DISCONTINUED | OUTPATIENT
Start: 2023-04-18 | End: 2023-04-18

## 2023-04-18 RX ORDER — HYDROMORPHONE HYDROCHLORIDE 1 MG/ML
0.5 INJECTION, SOLUTION INTRAMUSCULAR; INTRAVENOUS; SUBCUTANEOUS EVERY 5 MIN PRN
Status: DISCONTINUED | OUTPATIENT
Start: 2023-04-18 | End: 2023-04-18 | Stop reason: HOSPADM

## 2023-04-18 RX ORDER — LIDOCAINE HYDROCHLORIDE 10 MG/ML
INJECTION, SOLUTION INTRAVENOUS
Status: DISCONTINUED | OUTPATIENT
Start: 2023-04-18 | End: 2023-04-18

## 2023-04-18 RX ORDER — PROPOFOL 10 MG/ML
VIAL (ML) INTRAVENOUS
Status: DISCONTINUED | OUTPATIENT
Start: 2023-04-18 | End: 2023-04-18

## 2023-04-18 RX ORDER — ONDANSETRON 2 MG/ML
INJECTION INTRAMUSCULAR; INTRAVENOUS
Status: DISCONTINUED | OUTPATIENT
Start: 2023-04-18 | End: 2023-04-18

## 2023-04-18 RX ORDER — FAMOTIDINE 10 MG/ML
INJECTION INTRAVENOUS
Status: DISCONTINUED | OUTPATIENT
Start: 2023-04-18 | End: 2023-04-18

## 2023-04-18 RX ORDER — LIDOCAINE HYDROCHLORIDE AND EPINEPHRINE 10; 10 MG/ML; UG/ML
INJECTION, SOLUTION INFILTRATION; PERINEURAL
Status: DISCONTINUED | OUTPATIENT
Start: 2023-04-18 | End: 2023-04-18 | Stop reason: HOSPADM

## 2023-04-18 RX ORDER — ONDANSETRON 2 MG/ML
4 INJECTION INTRAMUSCULAR; INTRAVENOUS ONCE AS NEEDED
Status: DISCONTINUED | OUTPATIENT
Start: 2023-04-18 | End: 2023-04-18 | Stop reason: HOSPADM

## 2023-04-18 RX ORDER — GABAPENTIN 300 MG/1
600 CAPSULE ORAL
Status: COMPLETED | OUTPATIENT
Start: 2023-04-18 | End: 2023-04-18

## 2023-04-18 RX ORDER — FENTANYL CITRATE 50 UG/ML
INJECTION, SOLUTION INTRAMUSCULAR; INTRAVENOUS
Status: DISCONTINUED | OUTPATIENT
Start: 2023-04-18 | End: 2023-04-18

## 2023-04-18 RX ORDER — ACETAMINOPHEN 500 MG
1000 TABLET ORAL
Status: COMPLETED | OUTPATIENT
Start: 2023-04-18 | End: 2023-04-18

## 2023-04-18 RX ORDER — LIDOCAINE HYDROCHLORIDE 10 MG/ML
1 INJECTION, SOLUTION EPIDURAL; INFILTRATION; INTRACAUDAL; PERINEURAL ONCE AS NEEDED
Status: DISCONTINUED | OUTPATIENT
Start: 2023-04-18 | End: 2023-04-18 | Stop reason: HOSPADM

## 2023-04-18 RX ORDER — ROCURONIUM BROMIDE 10 MG/ML
INJECTION, SOLUTION INTRAVENOUS
Status: DISCONTINUED | OUTPATIENT
Start: 2023-04-18 | End: 2023-04-18

## 2023-04-18 RX ORDER — SODIUM CHLORIDE 9 MG/ML
INJECTION, SOLUTION INTRAVENOUS CONTINUOUS
Status: DISCONTINUED | OUTPATIENT
Start: 2023-04-18 | End: 2023-04-18 | Stop reason: HOSPADM

## 2023-04-18 RX ADMIN — FAMOTIDINE 20 MG: 10 INJECTION, SOLUTION INTRAVENOUS at 07:04

## 2023-04-18 RX ADMIN — GABAPENTIN 600 MG: 300 CAPSULE ORAL at 05:04

## 2023-04-18 RX ADMIN — EPHEDRINE SULFATE 10 MG: 50 INJECTION INTRAVENOUS at 07:04

## 2023-04-18 RX ADMIN — FENTANYL CITRATE 100 MCG: 50 INJECTION, SOLUTION INTRAMUSCULAR; INTRAVENOUS at 07:04

## 2023-04-18 RX ADMIN — SODIUM CHLORIDE: 0.9 INJECTION, SOLUTION INTRAVENOUS at 07:04

## 2023-04-18 RX ADMIN — PROPOFOL 200 MG: 10 INJECTION, EMULSION INTRAVENOUS at 07:04

## 2023-04-18 RX ADMIN — LIDOCAINE HYDROCHLORIDE 50 MG: 10 INJECTION, SOLUTION INTRAVENOUS at 07:04

## 2023-04-18 RX ADMIN — SODIUM CHLORIDE 2 G: 9 INJECTION, SOLUTION INTRAVENOUS at 07:04

## 2023-04-18 RX ADMIN — ROCURONIUM BROMIDE 5 MG: 10 INJECTION INTRAVENOUS at 07:04

## 2023-04-18 RX ADMIN — ACETAMINOPHEN 1000 MG: 500 TABLET ORAL at 05:04

## 2023-04-18 RX ADMIN — SUCCINYLCHOLINE CHLORIDE 120 MG: 20 INJECTION, SOLUTION INTRAMUSCULAR; INTRAVENOUS at 07:04

## 2023-04-18 RX ADMIN — EPHEDRINE SULFATE 5 MG: 50 INJECTION INTRAVENOUS at 08:04

## 2023-04-18 RX ADMIN — ONDANSETRON 4 MG: 2 INJECTION, SOLUTION INTRAMUSCULAR; INTRAVENOUS at 07:04

## 2023-04-18 NOTE — OP NOTE
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  Composite graft is viable with intact lining.  Flap divided and inset with some bulkiness left at the tip.    Indications for Procedure:  Mr. Lion's 52-year-old gentleman with a history of squamous cell carcinoma of the right ala and tip that I had resected with margins clear on 2nd resection with paramedian forehead flap done for reconstruction as well as a composite graft.  He returns today for division and inset.    Procedure in Detail:  After informed consent was obtained in holding area the risks and benefits reviewed patient was taken back to OR 4.  Anesthesia proceeded with general endotracheal anesthesia with intubation with an oral Kerri tube and patient was turned 90° and time-out was undertaken verification of patient and correct procedure.  I injected 1% lidocaine with epinephrine area around the base of the pedicle and native nasal skin.  Midface was prepped and draped in usual sterile fashion.      Pedicle was divided along mid length over the bridge of the nose.  Attention was 1st turned to the base of the pedicle at the eyebrow and realigning the eyebrow.  Excess subcutaneous tissue was trimmed with a 15 blade and triangular base of the pedicle was thinned out and undermining was undertaken at the native forehead skin.  Base of the pedicle was then closed with deep 4-0 PDS suture and a running locking 5 0 Prolene.      Attention was turned to the flap at the nasal defect.  Excess tissue was  again trimmed superiorly with a 15 C blade and subcutaneous tissue was thinned out with iris scissors and 15 blade.  I did not proceed with thinning out at the tip area and laterally in order to preserve collateral blood supply.  After adequate thinning at the superior edge of the flap this was inset with closure with deep 5 0 PDS suture and running locking 6 0 Prolene for the skin.  Wounds were cleaned and patient was turned turned over to Anesthesia awakened and taken to recovery in stable condition.    Complications:  None    Attestation:  I was present for the entire procedure    DISCLAIMER: This note was prepared with "[employer] voice recognition transcription software. Garbled syntax, mangled pronouns, and other bizarre constructions may be attributed to that software system. While efforts were made to correct any mistakes made by this voice recognition program, some errors and/or omissions may remain in the note that were missed when the note was originally created.

## 2023-04-18 NOTE — PLAN OF CARE
Pt arrived to recovery 18 via stretcher per OR team. Bedside report received. Pt attached to bedside monitor. VSS. Pt drowsy post procedure; responsive to verbal stimuli. Pt on 6L of oxygen via simple face mask; blow by  oxygen sats 95%. Pt IV access saline locked and flushes without difficulty. Will continue to monitor.

## 2023-04-18 NOTE — DISCHARGE SUMMARY
[facility]  Discharge Note  Short Stay    Procedure(s) (LRB):  REVISION, PROCEDURE INVOLVING FLAP GRAFT (Bilateral)      OUTCOME: Patient tolerated treatment/procedure well without complication and is now ready for discharge.    DISPOSITION: Home or Self Care    FINAL DIAGNOSIS:  <principal problem not specified>    FOLLOWUP: In clinic    DISCHARGE INSTRUCTIONS:    Discharge Procedure Orders   Lifting restrictions   Order Comments: No heavy lifting more than 15 lbs for 7-10 days. No strenuous activity.     No dressing needed   Order Comments: No wetting incision for two days. Apply vaseline or aquafor to incisions twice a day. If crusting forms around incision you can clean with 1/2 peroxide and 1/2 water prior to applying vaseline or aquafor.        TIME SPENT ON DISCHARGE: 6 minutes

## 2023-04-18 NOTE — ANESTHESIA PROCEDURE NOTES
Intubation    Date/Time: 4/18/2023 7:24 AM  Performed by: Rj Boles CRNA  Authorized by: Rj Boles CRNA     Intubation:     Induction:  Intravenous    Intubated:  Postinduction    Mask Ventilation:  Easy with oral airway    Attempts:  1    Attempted By:  CRNA    Method of Intubation:  Direct    Blade:  Manjit 4    Laryngeal View Grade: Grade I - full view of cords      Difficult Airway Encountered?: No      Complications:  None    Airway Device:  Oral albert    Airway Device Size:  8.0    Style/Cuff Inflation:  Cuffed    Tube secured:  21    Secured at:  The lips    Placement Verified By:  Capnometry and Colorimetric ETCO2 device    Complicating Factors:  None    Findings Post-Intubation:  BS equal bilateral

## 2023-04-18 NOTE — TRANSFER OF CARE
"Anesthesia Transfer of Care Note    Patient: Lyndon Lion Jr.    Procedure(s) Performed: Procedure(s) (LRB):  REVISION, PROCEDURE INVOLVING FLAP GRAFT (Bilateral)    Patient location: PACU    Transport from OR: Transported from OR on 2-3 L/min O2 by NC with adequate spontaneous ventilation    Post pain: adequate analgesia    Post assessment: no apparent anesthetic complications    Post vital signs: stable    Level of consciousness: awake    Nausea/Vomiting: no nausea/vomiting    Complications: none    Transfer of care protocol was followed      Last vitals:   Visit Vitals  /82   Pulse 80   Temp 36.6 °C (97.9 °F) (Temporal)   Resp 16   Ht 6' 2" (1.88 m)   Wt 113.4 kg (250 lb)   SpO2 (!) 92%   BMI 32.10 kg/m²     "

## 2023-04-18 NOTE — ANESTHESIA POSTPROCEDURE EVALUATION
Anesthesia Post Evaluation    Patient: Lyndon Lion Jr.    Procedure(s) Performed: Procedure(s) (LRB):  REVISION, PROCEDURE INVOLVING FLAP GRAFT (Bilateral)    Final Anesthesia Type: general      Patient location during evaluation: PACU  Level of consciousness: awake and alert  Post-procedure vital signs: reviewed and stable  Pain management: adequate  Airway patency: patent    PONV status at discharge: No PONV  Anesthetic complications: no      Cardiovascular status: blood pressure returned to baseline  Respiratory status: unassisted, spontaneous ventilation and room air  Hydration status: euvolemic  Follow-up not needed.          Vitals Value Taken Time   /85 04/18/23 0901     04/18/23 0912   Pulse 77 04/18/23 0912   Resp 15 04/18/23 0912   SpO2 95 % 04/18/23 0912   Vitals shown include unvalidated device data.      Event Time   Out of Recovery 09:08:00         Pain/Karthikeyan Score: Pain Rating Prior to Med Admin: 0 (4/18/2023  5:54 AM)  Karthikeyan Score: 10 (4/18/2023  9:08 AM)

## 2023-04-19 LAB
FINAL PATHOLOGIC DIAGNOSIS: NORMAL
GROSS: NORMAL
Lab: NORMAL
MICROSCOPIC EXAM: NORMAL
MISCELLANEOUS TEST NAME: NORMAL
REFERENCE LAB: NORMAL
SPECIMEN TYPE: NORMAL
TEST RESULT: NORMAL

## 2023-04-20 ENCOUNTER — TELEPHONE (OUTPATIENT)
Dept: DERMATOLOGY | Facility: CLINIC | Age: 53
End: 2023-04-20
Payer: COMMERCIAL

## 2023-04-20 NOTE — TELEPHONE ENCOUNTER
----- Message from Chata Rubio MD sent at 4/19/2023  5:47 PM CDT -----  This lesion is benign and is more commonly seen in patients which Parada syndrome, which we already know you have. No further treatment needed.    Skin, right clavicle, shave biopsy:   -SEBACEOUS ADENOMA, see comment     COMMENT:  The histological findings are most consistent with sebaceous adenoma, which is a benign lesion.  However, it may be a marker for [place]-Shane syndrome which is characterized by multiple cutaneous sebaceous adenomas and potential visceral   malignancies, often involving the gastrointestinal tract.  Clinical correlation is recommended.

## 2023-04-21 ENCOUNTER — LAB VISIT (OUTPATIENT)
Dept: LAB | Facility: HOSPITAL | Age: 53
End: 2023-04-21
Attending: PHYSICIAN ASSISTANT
Payer: COMMERCIAL

## 2023-04-21 ENCOUNTER — OFFICE VISIT (OUTPATIENT)
Dept: ENDOCRINOLOGY | Facility: CLINIC | Age: 53
End: 2023-04-21
Payer: COMMERCIAL

## 2023-04-21 VITALS
OXYGEN SATURATION: 98 % | HEART RATE: 66 BPM | BODY MASS INDEX: 32.89 KG/M2 | TEMPERATURE: 98 F | WEIGHT: 256.31 LBS | SYSTOLIC BLOOD PRESSURE: 126 MMHG | HEIGHT: 74 IN | DIASTOLIC BLOOD PRESSURE: 82 MMHG

## 2023-04-21 DIAGNOSIS — E11.65 UNCONTROLLED TYPE 2 DIABETES MELLITUS WITH HYPERGLYCEMIA: ICD-10-CM

## 2023-04-21 DIAGNOSIS — E11.65 UNCONTROLLED TYPE 2 DIABETES MELLITUS WITH HYPERGLYCEMIA: Primary | ICD-10-CM

## 2023-04-21 DIAGNOSIS — E66.9 OBESITY (BMI 30-39.9): ICD-10-CM

## 2023-04-21 DIAGNOSIS — E78.5 HYPERLIPIDEMIA, UNSPECIFIED HYPERLIPIDEMIA TYPE: ICD-10-CM

## 2023-04-21 DIAGNOSIS — E29.1 HYPOGONADISM IN MALE: ICD-10-CM

## 2023-04-21 LAB
ESTIMATED AVG GLUCOSE: 114 MG/DL (ref 68–131)
HBA1C MFR BLD: 5.6 % (ref 4–5.6)

## 2023-04-21 PROCEDURE — 99999 PR PBB SHADOW E&M-EST. PATIENT-LVL V: ICD-10-PCS | Mod: PBBFAC,,, | Performed by: PHYSICIAN ASSISTANT

## 2023-04-21 PROCEDURE — 36415 COLL VENOUS BLD VENIPUNCTURE: CPT | Mod: PO | Performed by: PHYSICIAN ASSISTANT

## 2023-04-21 PROCEDURE — 83036 HEMOGLOBIN GLYCOSYLATED A1C: CPT | Performed by: PHYSICIAN ASSISTANT

## 2023-04-21 PROCEDURE — 99999 PR PBB SHADOW E&M-EST. PATIENT-LVL V: CPT | Mod: PBBFAC,,, | Performed by: PHYSICIAN ASSISTANT

## 2023-04-21 PROCEDURE — 99214 PR OFFICE/OUTPT VISIT, EST, LEVL IV, 30-39 MIN: ICD-10-PCS | Mod: S$GLB,,, | Performed by: PHYSICIAN ASSISTANT

## 2023-04-21 PROCEDURE — 99214 OFFICE O/P EST MOD 30 MIN: CPT | Mod: S$GLB,,, | Performed by: PHYSICIAN ASSISTANT

## 2023-04-21 RX ORDER — PRAVASTATIN SODIUM 20 MG/1
20 TABLET ORAL DAILY
Qty: 90 TABLET | Refills: 3 | Status: SHIPPED | OUTPATIENT
Start: 2023-04-21 | End: 2023-12-04 | Stop reason: SDUPTHER

## 2023-04-21 RX ORDER — SEMAGLUTIDE 2.68 MG/ML
2 INJECTION, SOLUTION SUBCUTANEOUS
Qty: 3 ML | Refills: 11 | Status: SHIPPED | OUTPATIENT
Start: 2023-04-21 | End: 2024-04-26

## 2023-04-21 NOTE — PROGRESS NOTES
"CC: This 52 y.o. male presents for management of T2DM  and chronic conditions pending review including HTN, HLP    HPI: was diagnosed with T2DM in 3/21. Arrives with his wife. Pt recently had sx for scc. Pt has Parada Syndrome.  Has never been hospitalized r/t DM.  Family hx of DM: father, grandmother  Fhx of thyroid disease: none  Denies missing doses of DM medication.   hypoglycemia at home: none  monitoring BG at home:  Fastins    Diet:   BF-chic lexi sandwich  LH-chicken fried rice  DN-skipped  Snacks  on pb and j.  Avoids sugary beverages.     Exercise: none due to recent sx    CURRENT DM MEDS: Metformin 500 mg bid, Ozempic 1 mg weekly    Dexa  was normal.    Standards of Care:  Eye exam:  Dr. Shay  Podiatry exam: PCP    Hypogonadism  Taking 60 mg q 14 days from another provider.     NAFLD  Liver u/s  shows fatty infiltration at 17 cm.    PMHx, PSHx: reviewed in epic. Hx of colon cancer.  Social Hx: no E/T use.    Wt Readings from Last 6 Encounters:   23 116.3 kg (256 lb 4.6 oz)   23 113.4 kg (250 lb)   23 113.4 kg (250 lb)   23 113.4 kg (250 lb)   23 113.4 kg (250 lb)   23 113.4 kg (250 lb)      ROS:   Gen: Appetite good, no weight gain or loss, denies fatigue and weakness.  Skin: Skin is intact and heals well, no rashes, no hair changes  Eyes: Denies visual disturbances  Resp: no SOB or MENDEZ, no cough  Cardiac: No palpitations, chest pain, no edema   GI: No nausea or vomiting, diarrhea, constipation, or abdominal pain.  /GYN: No nocturia, burning or pain.   MS/Neuro: Denies numbness/ tingling in BLE; Gait steady, speech clear  Psych: Denies drug/ETOH abuse, no hx of depression.  Other systems: negative.    /82 (BP Location: Left arm, Patient Position: Sitting, BP Method: Small (Manual))   Pulse 66   Temp 97.8 °F (36.6 °C) (Oral)   Ht 6' 2" (1.88 m)   Wt 116.3 kg (256 lb 4.6 oz)   SpO2 98%   BMI 32.91 kg/m²      PE:  GENERAL: Well " developed, well nourished.  PSYCH: AAOx3, appropriate mood and affect, pleasant expression, conversant, appears relaxed, well groomed.   EYES: Conjunctiva, corneas clear  NECK: Supple, trachea midline,no thyromegaly or nodules  CHEST: Resp even and unlabored, CTA bilateral.  CARDIAC: RRR, S1, S2 heard, no murmurs  VASCULAR: DP pulses +2/4 bilaterally, no edema.  NEURO: Gait steady, CN ll-Xll grossly intact  SKIN: sutures on forehead and nose intact, Skin warm and dry no acanthosis nigracans.  4/23  Foot Exam: no sores or macerations noted.     Protective Sensation (w/ 10 gram monofilament):  Right: Intact  Left: Intact    Visual Inspection:  Normal -  Bilateral, Nails Intact - without Evidence of Foot Deformity- Bilateral, and Onychomycosis -  Bilateral    Pedal Pulses:   Right: Present  Left: Present    Posterior Tibialis Pulses:   Right:Present  Left: Present     Vibratory Sensation  Right:Decreased  Left:Decreased    Personally reviewed labs below:    Admission on 04/18/2023, Discharged on 04/18/2023   Component Date Value Ref Range Status    POC Glucose 04/18/2023 94  70 - 110 MG/DL Final    POC Glucose 04/18/2023 102  70 - 110 MG/DL Final   Office Visit on 04/11/2023   Component Date Value Ref Range Status    Final Pathologic Diagnosis 04/11/2023    Final                    Value:Skin, right clavicle, shave biopsy:  -SEBACEOUS ADENOMA, see comment    COMMENT:  The histological findings are most consistent with sebaceous adenoma, which is a benign lesion.  However, it may be a marker for [place]-Shane syndrome which is characterized by multiple cutaneous sebaceous adenomas and potential visceral   malignancies, often involving the gastrointestinal tract.  Clinical correlation is recommended.      Interp By Matthieu Lucero M.D., Signed on 04/19/2023 at 12:17    Gross 04/11/2023    Final                    Value:Container Label: Clinic Number/AP Number:  [id number] / [id number], and &quot;right clavicle&quot;    Received in  formalin is an 8 x 8 x 1 mm shave biopsy fragment of tan-white skin.  The skin surface shows a 3 x 3 x 1 mm raised tan pink papule 1 mm from the nearest margin.  Specimen is inked, trisected, and entirely submitted in VSA--1-A.    CRISPIN Cassidy.        Microscopic Exam 04/11/2023 Sections show a lesion characterized by circumscribed lobules of sebocytes with excess basophilic cells.  There is minimal variation in size and shape of the cells, but some degree of maturation is present.  The lesion extends to the deep biopsy edge.   Final    Disclaimer 04/11/2023 Unless the case is a 'gross only' or additional testing only, the final diagnosis for each specimen is based on a microscopic examination of appropriate tissue sections.   Final   Admission on 04/07/2023, Discharged on 04/07/2023   Component Date Value Ref Range Status    WBC 04/07/2023 5.73  3.90 - 12.70 K/uL Final    RBC 04/07/2023 5.56  4.60 - 6.20 M/uL Final    Hemoglobin 04/07/2023 17.1  14.0 - 18.0 g/dL Final    Hematocrit 04/07/2023 47.6  40.0 - 54.0 % Final    MCV 04/07/2023 86  82 - 98 fL Final    MCH 04/07/2023 30.8  27.0 - 31.0 pg Final    MCHC 04/07/2023 35.9  32.0 - 36.0 g/dL Final    RDW 04/07/2023 12.7  11.5 - 14.5 % Final    Platelets 04/07/2023 263  150 - 450 K/uL Final    MPV 04/07/2023 9.0 (L)  9.2 - 12.9 fL Final    Immature Granulocytes 04/07/2023 0.3  0.0 - 0.5 % Final    Gran # (ANC) 04/07/2023 3.2  1.8 - 7.7 K/uL Final    Immature Grans (Abs) 04/07/2023 0.02  0.00 - 0.04 K/uL Final    Comment: Mild elevation in immature granulocytes is non specific and   can be seen in a variety of conditions including stress response,   acute inflammation, trauma and pregnancy. Correlation with other   laboratory and clinical findings is essential.      Lymph # 04/07/2023 1.6  1.0 - 4.8 K/uL Final    Mono # 04/07/2023 0.6  0.3 - 1.0 K/uL Final    Eos # 04/07/2023 0.3  0.0 - 0.5 K/uL Final    Baso # 04/07/2023 0.03  0.00 - 0.20 K/uL Final    nRBC  04/07/2023 0  0 /100 WBC Final    Gran % 04/07/2023 55.8  38.0 - 73.0 % Final    Lymph % 04/07/2023 28.3  18.0 - 48.0 % Final    Mono % 04/07/2023 9.9  4.0 - 15.0 % Final    Eosinophil % 04/07/2023 5.2  0.0 - 8.0 % Final    Basophil % 04/07/2023 0.5  0.0 - 1.9 % Final    Differential Method 04/07/2023 Automated   Final    Sodium 04/07/2023 139  136 - 145 mmol/L Final    Potassium 04/07/2023 3.8  3.5 - 5.1 mmol/L Final    Chloride 04/07/2023 107  95 - 110 mmol/L Final    CO2 04/07/2023 24  23 - 29 mmol/L Final    Glucose 04/07/2023 119 (H)  70 - 110 mg/dL Final    BUN 04/07/2023 14  6 - 20 mg/dL Final    Creatinine 04/07/2023 1.2  0.5 - 1.4 mg/dL Final    Calcium 04/07/2023 9.0  8.7 - 10.5 mg/dL Final    Total Protein 04/07/2023 7.7  6.0 - 8.4 g/dL Final    Albumin 04/07/2023 4.2  3.5 - 5.2 g/dL Final    Total Bilirubin 04/07/2023 0.9  0.1 - 1.0 mg/dL Final    Comment: For infants and newborns, interpretation of results should be based  on gestational age, weight and in agreement with clinical  observations.    Premature Infant recommended reference ranges:  Up to 24 hours.............<8.0 mg/dL  Up to 48 hours............<12.0 mg/dL  3-5 days..................<15.0 mg/dL  6-29 days.................<15.0 mg/dL      Alkaline Phosphatase 04/07/2023 54 (L)  55 - 135 U/L Final    AST 04/07/2023 19  10 - 40 U/L Final    ALT 04/07/2023 28  10 - 44 U/L Final    Anion Gap 04/07/2023 8  8 - 16 mmol/L Final    eGFR 04/07/2023 >60.0  >60 mL/min/1.73 m^2 Final         ASSESSMENT and PLAN:    1. Uncontrolled type 2 diabetes mellitus with hyperglycemia  Hemoglobin A1C    semaglutide (OZEMPIC) 2 mg/dose (8 mg/3 mL) PnIj      2. Hyperlipidemia, unspecified hyperlipidemia type  pravastatin (PRAVACHOL) 20 MG tablet      3. Hypogonadism in male        4. Obesity (BMI 30-39.9)           T2DM with hyperglycemia-A1c today. Increase ozempic to 2 mg weekly. This will help with wt modulation. A1cs have been stable. F/u w/ PCP. Discussed DM,  progression of disease, long term complications, tx options.   Discussed A1c and BG goals.   Reviewed  hypoglycemia, s/s and appropriate tx.   Instructed to monitor BG and bring meter/ log to every clinic visit.   - takes ASA, statin    HLP - elevated LDL , pt stopped statin due to surgeries. Restart statin therapy, LFTs WNL  Hypogonadism-stable f/u w/ provider.  Obesity-Body mass index is 32.91 kg/m². Increase exercise to 30 min daily. Ozempic will help with wt loss.    VHUQT-[id number]-KCIj are normal.     Follow-Up   A1c  F/u w/ PCP

## 2023-04-26 NOTE — ANESTHESIA PREPROCEDURE EVALUATION
[facility]  Anesthesia Pre-Operative Evaluation         Patient Name/: Lyndon Lion Jr., 1970  MRN: [id number]    SUBJECTIVE:     Pre-operative evaluation for Procedure(s) (LRB):  RECONSTRUCTION, NOSE (N/A)  APPLICATION, CARTILAGE GRAFT (N/A)  CREATION, FLAP, ROTATION (N/A)     2023    Lyndon Lion Jr. is a 52 y.o. male w/ a significant PMHx of asthma, ADAN, GERD, Parada syndrome, colon cancer, and DM2. He presents for nasal reconstruction in the setting of previously removed SCC.    Denies any known cardiopulmonary disease. METS > 4. Denies any complications with previous anesthetics.     Patient now presents for the above procedure(s).    ________________________________________  ECHO: No results found for this or any previous visit.    ________________________________________    Prev airway:   Intubation:     Induction:  Intravenous    Intubated:  Postinduction    Mask Ventilation:  Not attempted    Attempts:  1    Attempted By:  Resident anesthesiologist    Method of Intubation:  Video laryngoscopy    Blade:  [place]ane 3    Laryngeal View Grade: Grade I - full view of cords      Difficult Airway Encountered?: No      Complications:  None    Airway Device:  Oral endotracheal tube    Airway Device Size:  7.5    Style/Cuff Inflation:  Cuffed (inflated to minimal occlusive pressure)    Secured at:  The lips    Placement Verified By:  Capnometry and Revisualization with laryngoscopy    Complicating Factors:  None    Findings Post-Intubation:  BS equal bilateral and atraumatic/condition of teeth unchanged    LDA: None documented.       Drips: None documented.      Patient Active Problem List   Diagnosis    Radiculopathy, lumbosacral region    Herniated lumbar intervertebral disc    Lumbar spondylosis    DDD (degenerative disc disease), lumbosacral    Acute medial meniscal tear    Obesity, unspecified    Parada syndrome    Thoracic or lumbosacral neuritis or radiculitis,  unspecified    Neural foraminal stenosis of lumbar spine    MSH2-related Parada syndrome (HNPCC1)    Spondylosis of lumbar region without myelopathy or radiculopathy    Spondylolisthesis of lumbar region    Nonalcoholic fatty liver disease    Splenomegaly    Mild vitamin D deficiency    Colon dysplasia    Colon adenoma    Pneumoperitoneum    Postpolypectomy electrocoagulation syndrome    Lumbar radiculopathy    History of colon cancer, stage II    Uncontrolled type 2 diabetes mellitus with hyperglycemia    Type 2 diabetes mellitus with hyperglycemia    Obesity (BMI 30-39.9)    Gastroesophageal reflux disease without esophagitis    Bariatric surgery status    S/P laparoscopic sleeve gastrectomy    Hypovitaminosis D    Hypophosphatemia    Vitamin B12 deficiency    Vitamin A deficiency    Erectile dysfunction    Hypogonadism in male    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     Current Outpatient Medications on File Prior to Encounter   Medication Sig Dispense Refill    ACCU-CHEK GUIDE TEST STRIPS Strp USE TO TEST TWICE A  strip 3    ascorbic acid, vitamin C, (VITAMIN C) 100 MG tablet Take 100 mg by mouth once daily.      aspirin (ECOTRIN) 81 MG EC tablet Take 81 mg by mouth once daily.      cyanocobalamin, vitamin B-12, 2,500 mcg Lozg Place 2 tablets under the tongue once daily. 180 lozenge 3    doxycycline (VIBRAMYCIN) 100 MG Cap Take 1 capsule (100 mg total) by mouth 2 (two) times daily. for 14 days 28 capsule 0    ferrous gluconate 225 mg (27 mg iron) Tab Take 27 mg by mouth 2 (two) times daily with meals. 200 tablet 3    HYDROcodone-acetaminophen (NORCO) 5-325 mg per tablet Take 1 tablet by mouth every 6 (six) hours as needed for Pain. 25 tablet 0    lancets (ACCU-CHEK SOFTCLIX LANCETS) Misc 1  "Units by Misc.(Non-Drug; Combo Route) route 2 (two) times a day. 200 each 0    metFORMIN (GLUCOPHAGE-XR) 500 MG ER 24hr tablet Take 1 tablet (500 mg total) by mouth 2 (two) times daily with meals. (Patient taking differently: Take 500 mg by mouth once daily.) 180 tablet 3    metronidazole 0.75% (METROCREAM) 0.75 % Crea Apply 1 application topically 2 (two) times daily. Apply to affected area 30 g 1    minocycline (ZILXI) 1.5 % Foam Apply 1 application topically once daily. 30 g 5    mupirocin (BACTROBAN) 2 % ointment Apply topically 3 (three) times daily. 22 g 0    ondansetron (ZOFRAN-ODT) 4 MG TbDL Dissolve 2 tablets (8 mg total) by mouth every 6 (six) hours as needed. 20 tablet 0    pen needle, diabetic (PEN NEEDLE) 32 gauge x 5/32" Ndle 1 each by Misc.(Non-Drug; Combo Route) route once daily. 90 each 3    pravastatin (PRAVACHOL) 20 MG tablet Take 1 tablet (20 mg total) by mouth once daily. 90 tablet 3    semaglutide (OZEMPIC) 1 mg/dose (4 mg/3 mL) Inject 1 mg into the skin every 7 days. (Patient taking differently: Inject 1 mg into the skin every Tuesday.) 3 pen 3    sulfacetamide sodium-sulfur 10-5 % (w/w) Clsr Use to wash face daily 170 g 5    testosterone cypionate (DEPOTESTOTERONE CYPIONATE) 200 mg/mL injection Inject 60 mg into the muscle every 14 (fourteen) days. Twice a week      triamcinolone acetonide 0.025% (KENALOG) 0.025 % Oint Thin film to lips and eczematous plaques BID PRN flare 15 g 1    triamcinolone acetonide 0.1% (KENALOG) 0.1 % cream AAA bid 454 g 3    vitamin A 8000 UNIT capsule Take 1 capsule (8,000 Units total) by mouth once daily. 100 capsule 3    zinc gluconate 50 mg tablet Take 50 mg by mouth once daily.         Past Surgical History:   Procedure Laterality Date    APPENDECTOMY      CAUDAL EPIDURAL STEROID INJECTION N/A 11/6/2018    Procedure: Injection-steroid-epidural-caudal;  Surgeon: Lyndon Brooke Jr., MD;  Location: [facility] OR;  Service: Pain Management;  " Laterality: N/A;    COLON SURGERY  2008    PARTIAL COLECTOMY    COLONOSCOPY Bilateral 2012    COLONOSCOPY N/A 8/1/2016    Procedure: COLONOSCOPY;  Surgeon: Blaze Marr MD;  Location: [facility] ENDO;  Service: Endoscopy;  Laterality: N/A;    COLONOSCOPY N/A 9/20/2017    Procedure: COLONOSCOPY;  Surgeon: Dom Leonardo MD;  Location: [facility] (4TH FLR);  Service: Endoscopy;  Laterality: N/A;  not given PM prep    COLONOSCOPY N/A 10/9/2017    Procedure: COLONOSCOPY;  Surgeon: RAMON Callahan MD;  Location: [facility] (4TH FLR);  Service: Endoscopy;  Laterality: N/A;  ok for Prepopik per Dr Callahan    COLONOSCOPY N/A 11/20/2017    Procedure: COLONOSCOPY/EMR;  Surgeon: Joseluis Choe MD;  Location: [facility] (2ND FLR);  Service: Endoscopy;  Laterality: N/A;  EMR    no pm prep    COLONOSCOPY N/A 5/30/2018    Procedure: COLONOSCOPY;  Surgeon: Dom Leonardo MD;  Location: [facility] (4TH FLR);  Service: Endoscopy;  Laterality: N/A;  follow up colonoscopy in 5/2018 for Parada Syndrome         COLONOSCOPY N/A 6/10/2019    Procedure: COLONOSCOPY;  Surgeon: Dom Leonardo MD;  Location: [facility] (4TH FLR);  Service: Endoscopy;  Laterality: N/A;  Prepopik ordered per Dr. Leonardo    COLONOSCOPY N/A 7/22/2020    Procedure: COLONOSCOPY;  Surgeon: Dom Leonardo MD;  Location: [facility] (4TH FLR);  Service: Endoscopy;  Laterality: N/A;    COLONOSCOPY N/A 5/27/2021    Procedure: COLONOSCOPY;  Surgeon: Dom Leonardo MD;  Location: [facility] (4TH FLR);  Service: Endoscopy;  Laterality: N/A;  covid test 5/24-[place]  pt requests Prepopik    COLONOSCOPY N/A 6/17/2022    Procedure: COLONOSCOPY;  Surgeon: Dom Leonardo MD;  Location: [facility] (4TH FLR);  Service: Endoscopy;  Laterality: N/A;  He was discovered to have colon cancer and had right hemicolectomy        for stage II on 08/08/2008. MSH2 Parada syndrome.  sutab requested  instructions via the portal -sm  fully vaccinated - sm    CYSTOSCOPY       Epidural Steroid Injection  10/23/15    Lumbar    EPIDURAL STEROID INJECTION INTO LUMBAR SPINE N/A 7/27/2018    Procedure: Injection-steroid-epidural-lumbar;  Surgeon: Lyndon Brooke Jr., MD;  Location: [facility] OR;  Service: Pain Management;  Laterality: N/A;    ESOPHAGOGASTRODUODENOSCOPY      ESOPHAGOGASTRODUODENOSCOPY N/A 7/22/2020    Procedure: EGD (ESOPHAGOGASTRODUODENOSCOPY);  Surgeon: Dom Leonardo MD;  Location: [facility] (4TH FLR);  Service: Endoscopy;  Laterality: N/A;  Please schedule patient for EGD and colonoscopy with me MSH2 Parada syndrome and anemia evaluation please make priority 1  covid test 7/20-[place]    EXCISION OR SURGICAL PLANING, SKIN, NOSE, FOR RHINOPHYMA Bilateral 3/7/2023    Procedure: EXCISION OR SURGICAL resection nasal skin cancer;  Surgeon: Alyx Spivey MD;  Location: [facility] 2ND FLR;  Service: ENT;  Laterality: Bilateral;    FACETECTOMY OF VERTEBRA  2/13/2019    Procedure: MEDIAL FACETECTOMY L5-S1;  Surgeon: Lyndon rBooke Jr., MD;  Location: [facility] OR;  Service: Orthopedics;;    GASTRIC BYPASS  2010    SLEEVE    KNEE ARTHROSCOPY Right     LUMBAR DISCECTOMY  2/13/2019    Procedure: DISCECTOMY, SPINE, LUMBAR L5-S1;  Surgeon: Lyndon Brooke Jr., MD;  Location: [facility];  Service: Orthopedics;;       Social History:  Tobacco Use: Low Risk     Smoking Tobacco Use: Never    Smokeless Tobacco Use: Never    Passive Exposure: Not on file       Alcohol Use: Not on file       OBJECTIVE:     Vital Signs Range:  BMI Readings from Last 1 Encounters:   03/15/23 32.55 kg/m²               Significant Labs:        Component Value Date/Time    WBC 5.23 01/18/2023 1608    HGB 18.4 (H) 01/18/2023 1608    HGB 16.3 05/14/2013 1352    HCT 53.7 01/18/2023 1608     01/18/2023 1608     01/18/2023 1608    K 4.0 01/18/2023 1608     01/18/2023 1608    CO2 20 (L) 01/18/2023 1608    GLU 89 01/18/2023 1608    BUN 17 01/18/2023 1608    CREATININE 1.4 01/18/2023 1608     CREATININE 1.3 05/14/2013 1352    MG 2.3 02/08/2023 1541    PHOS 3.5 07/11/2022 1635    CALCIUM 9.6 01/18/2023 1608    CALCIUM 9.8 05/14/2013 1352    ALBUMIN 4.5 02/08/2023 1541    PROT 7.4 01/18/2023 1608    ALKPHOS 58 01/18/2023 1608    BILITOT 0.8 01/18/2023 1608    AST 27 01/18/2023 1608    ALT 37 01/18/2023 1608    INR 1.0 01/18/2023 1608    INR 1.03 05/14/2013 1352    HGBA1C 6.2 (H) 01/18/2023 1608        Please see Results Review for additional labs.     Diagnostic Studies: No relevant studies.    EKG:   Results for orders placed or performed during the hospital encounter of 02/06/19   EKG 12-lead    Collection Time: 02/06/19  1:20 PM    Narrative    Test Reason : M54.16,M47.816,M51.26,M51.37,M99.83,M43.16,Z01.818,    Vent. Rate : 068 BPM     Atrial Rate : 068 BPM     P-R Int : 170 ms          QRS Dur : 092 ms      QT Int : 394 ms       P-R-T Axes : 053 093 001 degrees     QTc Int : 418 ms    Normal sinus rhythm  Rightward axis  Borderline Abnormal ECG  When compared with ECG of 14-MAY-2013 12:59,  Questionable change in The axis  Confirmed by Mike King MD ([id number]) on 2/7/2019 12:17:31 PM    Referred By: RENETTA DAVIS           Confirmed By:Mike King MD       ECHO:  See subjective, if available.      ASSESSMENT/PLAN:           Pre-op Assessment    I have reviewed the Patient Summary Reports.     I have reviewed the Nursing Notes. I have reviewed the NPO Status.   I have reviewed the Medications.     Review of Systems  Anesthesia Hx:  No problems with previous Anesthesia  History of prior surgery of interest to airway management or planning:  Denies Personal Hx of Anesthesia complications.   Social:  Non-Smoker    Hematology/Oncology:  Hematology Normal      Current/Recent Cancer.   EENT/Dental:EENT/Dental Normal   Cardiovascular:  Cardiovascular Normal     Pulmonary:   Asthma    Renal/:  Renal/ Normal     Hepatic/GI:   GERD Liver Disease,    Musculoskeletal:  Spine Disorders: lumbar Disc disease and  Degenerative disease    Neurological:  Neurology Normal    Endocrine:   Diabetes, type 2        Physical Exam  General: Well nourished, Cooperative, Alert and Oriented    Airway:  Mallampati: III / II  Mouth Opening: Normal  TM Distance: Normal  Tongue: Normal  Neck ROM: Normal ROM    Dental:  No chipped or cracked teeth. Nothing removable in mouth.       Anesthesia Plan  Type of Anesthesia, risks & benefits discussed:    Anesthesia Type: Gen ETT  Intra-op Monitoring Plan: Standard ASA Monitors  Post Op Pain Control Plan: multimodal analgesia, IV/PO Opioids PRN and epidural analgesia  Induction:  IV and rapid sequence  Airway Plan: Video, Post-Induction  Informed Consent: Informed consent signed with the Patient and all parties understand the risks and agree with anesthesia plan.  All questions answered.   ASA Score: 3  Day of Surgery Review of History & Physical: H&P Update referred to the surgeon/provider.    Ready For Surgery From Anesthesia Perspective.     .       no

## 2023-04-28 ENCOUNTER — OFFICE VISIT (OUTPATIENT)
Dept: OTOLARYNGOLOGY | Facility: CLINIC | Age: 53
End: 2023-04-28
Payer: COMMERCIAL

## 2023-04-28 VITALS — BODY MASS INDEX: 33.8 KG/M2 | HEIGHT: 72 IN | TEMPERATURE: 99 F | WEIGHT: 249.56 LBS

## 2023-04-28 DIAGNOSIS — Z09 POSTOP CHECK: Primary | ICD-10-CM

## 2023-04-28 PROCEDURE — 99024 PR POST-OP FOLLOW-UP VISIT: ICD-10-PCS | Mod: S$GLB,,, | Performed by: OTOLARYNGOLOGY

## 2023-04-28 PROCEDURE — 99999 PR PBB SHADOW E&M-EST. PATIENT-LVL IV: CPT | Mod: PBBFAC,,, | Performed by: OTOLARYNGOLOGY

## 2023-04-28 PROCEDURE — 99024 POSTOP FOLLOW-UP VISIT: CPT | Mod: S$GLB,,, | Performed by: OTOLARYNGOLOGY

## 2023-04-28 PROCEDURE — 99999 PR PBB SHADOW E&M-EST. PATIENT-LVL IV: ICD-10-PCS | Mod: PBBFAC,,, | Performed by: OTOLARYNGOLOGY

## 2023-05-01 ENCOUNTER — PATIENT MESSAGE (OUTPATIENT)
Dept: OTOLARYNGOLOGY | Facility: CLINIC | Age: 53
End: 2023-05-01
Payer: COMMERCIAL

## 2023-05-01 NOTE — PROGRESS NOTES
Subjective: 52 y.o. 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  Patient is doing well.  He is no issues with the wound.  Has some crusting but no drainage.  He denies any nasal obstruction.      Objective:  Temp 99 °F (37.2 °C) (Temporal)   Ht 6' (1.829 m)   Wt 113.2 kg (249 lb 9 oz)   BMI 33.85 kg/m²     Exam:  General No acute distress  Prolenes removed from forehead at the base of the pedicle and at the inset.  Flap viable with continued distal loss at the columella but viable composite graft with crusting removed and mucosa lysed septum just below the anterior septal angle      Assessment / Plan:    Healing well after paramedian forehead flap division and inset.  He will need debulking and shaping of the flap by told him that we would need to do this after full healing and I will see him back in 6 weeks to re-evaluate

## 2023-05-09 ENCOUNTER — PATIENT MESSAGE (OUTPATIENT)
Dept: GASTROENTEROLOGY | Facility: CLINIC | Age: 53
End: 2023-05-09
Payer: COMMERCIAL

## 2023-05-12 ENCOUNTER — TELEPHONE (OUTPATIENT)
Dept: ENDOSCOPY | Facility: HOSPITAL | Age: 53
End: 2023-05-12
Payer: COMMERCIAL

## 2023-05-12 NOTE — TELEPHONE ENCOUNTER
----- Message from Rozina Arceo sent at 5/12/2023  9:50 AM CDT -----  Regarding: Colonoscopy/Endoscopy  Contact: Pt @ [phone]  Pt's wife is calling to schedule colonoscopy/endoscopy. Asking for a call back

## 2023-05-24 ENCOUNTER — TELEPHONE (OUTPATIENT)
Dept: ENDOSCOPY | Facility: HOSPITAL | Age: 53
End: 2023-05-24
Payer: COMMERCIAL

## 2023-05-24 VITALS — BODY MASS INDEX: 31.95 KG/M2 | WEIGHT: 249 LBS | HEIGHT: 74 IN

## 2023-05-24 DIAGNOSIS — Z15.09 MSH2-RELATED LYNCH SYNDROME (HNPCC1): ICD-10-CM

## 2023-05-24 DIAGNOSIS — Z90.49 HISTORY OF RIGHT HEMICOLECTOMY: ICD-10-CM

## 2023-05-24 DIAGNOSIS — Z85.038 HISTORY OF COLON CANCER: Primary | ICD-10-CM

## 2023-05-24 RX ORDER — SOD SULF/POT CHLORIDE/MAG SULF 1.479 G
12 TABLET ORAL DAILY
Qty: 24 TABLET | Refills: 0 | Status: SHIPPED | OUTPATIENT
Start: 2023-05-24 | End: 2023-08-01

## 2023-05-24 NOTE — TELEPHONE ENCOUNTER
"Per Dr. Leonardo:  Procedure: EGD/Colonoscopy     Diagnosis: colon cancer and had right hemicolectomy        for stage II on 2008. MSH2 Parada syndrome.     Procedure Timin-8 weeks     *If within 4 weeks selected, please deb as high priority*     *If greater than 12 weeks, please select "5-12 weeks" and delay sending until 2 months prior to requested date*     Provider: Myself     Location: [address]     Additional Scheduling Information: No scheduling concerns     Prep Specifications:Standard prep     Have you attached a patient to this message: yes            "

## 2023-05-29 ENCOUNTER — OFFICE VISIT (OUTPATIENT)
Dept: FAMILY MEDICINE | Facility: CLINIC | Age: 53
End: 2023-05-29
Payer: COMMERCIAL

## 2023-05-29 VITALS
BODY MASS INDEX: 32.62 KG/M2 | HEIGHT: 74 IN | HEART RATE: 73 BPM | OXYGEN SATURATION: 96 % | SYSTOLIC BLOOD PRESSURE: 128 MMHG | WEIGHT: 254.19 LBS | RESPIRATION RATE: 16 BRPM | DIASTOLIC BLOOD PRESSURE: 74 MMHG | TEMPERATURE: 98 F

## 2023-05-29 DIAGNOSIS — E78.5 HYPERLIPIDEMIA, UNSPECIFIED HYPERLIPIDEMIA TYPE: Primary | ICD-10-CM

## 2023-05-29 DIAGNOSIS — E11.65 TYPE 2 DIABETES MELLITUS WITH HYPERGLYCEMIA, WITHOUT LONG-TERM CURRENT USE OF INSULIN: ICD-10-CM

## 2023-05-29 DIAGNOSIS — Z98.84 S/P LAPAROSCOPIC SLEEVE GASTRECTOMY: ICD-10-CM

## 2023-05-29 DIAGNOSIS — Z15.09 LYNCH SYNDROME: ICD-10-CM

## 2023-05-29 DIAGNOSIS — M54.16 LUMBAR RADICULOPATHY: ICD-10-CM

## 2023-05-29 DIAGNOSIS — K76.0 NONALCOHOLIC FATTY LIVER DISEASE: ICD-10-CM

## 2023-05-29 DIAGNOSIS — E66.9 OBESITY (BMI 30-39.9): ICD-10-CM

## 2023-05-29 DIAGNOSIS — K21.9 GASTROESOPHAGEAL REFLUX DISEASE WITHOUT ESOPHAGITIS: ICD-10-CM

## 2023-05-29 DIAGNOSIS — E29.1 HYPOGONADISM IN MALE: ICD-10-CM

## 2023-05-29 PROCEDURE — 99214 OFFICE O/P EST MOD 30 MIN: CPT | Mod: S$GLB,,, | Performed by: NURSE PRACTITIONER

## 2023-05-29 PROCEDURE — 99999 PR PBB SHADOW E&M-EST. PATIENT-LVL V: CPT | Mod: PBBFAC,,, | Performed by: NURSE PRACTITIONER

## 2023-05-29 PROCEDURE — 99999 PR PBB SHADOW E&M-EST. PATIENT-LVL V: ICD-10-PCS | Mod: PBBFAC,,, | Performed by: NURSE PRACTITIONER

## 2023-05-29 PROCEDURE — 99214 PR OFFICE/OUTPT VISIT, EST, LEVL IV, 30-39 MIN: ICD-10-PCS | Mod: S$GLB,,, | Performed by: NURSE PRACTITIONER

## 2023-05-29 RX ORDER — QUINIDINE GLUCONATE 324 MG
TABLET, EXTENDED RELEASE ORAL
COMMUNITY
Start: 2023-05-16

## 2023-05-29 NOTE — PROGRESS NOTES
Subjective:       Patient ID: Lyndon Lion Jr. is a 53 y.o. male.    Chief Complaint: Follow-up    HPI      Patient presents today for chronic conditions follow up. Last A1c 5.6 on 4/21/23. Last visit with [facility]BP-Dr. Judd on 9/19/22  Patient had a sleeve gastrectomy NOT a gastric bypass.~ 2011 by Dr Christophe younger. Patient had hemicolectomy In 2008. Patient was found to found colorectal ca stage 2. He received chemotherapy with Dr Cárdenas. He received 12 courses of FOLFOX (leucovorin calcium (folinic acid), fluorouracil, and oxaliplatin).        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  Assessment / Plan: Healing well after paramedian forehead flap division and inset.  He will need debulking and shaping of the flap by told him that we would need to do this after full healing and I will see him back in 6 weeks to re-evaluate          4/21/23 [place] Richter: T2DM with hyperglycemia-A1c today. Increase ozempic to 2 mg weekly. This will help with wt modulation. A1cs have been stable. F/u w/ PCP. Discussed DM, progression of disease, long term complications, tx options.   Discussed A1c and BG goals.   Reviewed  hypoglycemia, s/s and appropriate tx.   Instructed to monitor BG and bring meter/ log to every clinic visit.   - takes ASA, statin      Review of patient's allergies indicates:  No Known Allergies      Current Outpatient Medications:     ACCU-CHEK GUIDE TEST STRIPS Strp, USE TO TEST TWICE A DAY, Disp: 200 strip, Rfl: 3    ascorbic acid, vitamin C, (VITAMIN C) 100 MG tablet, Take 100 mg by mouth once daily., Disp: , Rfl:     aspirin (ECOTRIN) 81 MG EC tablet, Take 81 mg by mouth once daily., Disp: , Rfl:     ciclopirox (PENLAC) 8 % Soln, Apply topically nightly., Disp: 6.6 mL, Rfl: 3    cyanocobalamin, vitamin B-12, 2,500 mcg Lozg, Place 2 tablets under the tongue once daily., Disp: 180 lozenge, Rfl: 3    ferrous gluconate (FERGON) 240 (27 FE) MG tablet, TAKE 27 MG BY MOUTH 2 (TWO)  "TIMES DAILY WITH MEALS., Disp: , Rfl:     lancets (ACCU-CHEK SOFTCLIX LANCETS) Misc, 1 Units by Misc.(Non-Drug; Combo Route) route 2 (two) times a day., Disp: 200 each, Rfl: 0    pen needle, diabetic (PEN NEEDLE) 32 gauge x 5/32" Ndle, 1 each by Misc.(Non-Drug; Combo Route) route once daily., Disp: 90 each, Rfl: 3    pravastatin (PRAVACHOL) 20 MG tablet, Take 1 tablet (20 mg total) by mouth once daily., Disp: 90 tablet, Rfl: 3    semaglutide (OZEMPIC) 2 mg/dose (8 mg/3 mL) PnIj, Inject 2 mg into the skin every 7 days., Disp: 3 mL, Rfl: 11    sod sulf-pot chloride-mag sulf (SUTAB) 1.479-0.188- 0.225 gram tablet, Take 12 tablets by mouth once daily. Take according to package instructions with indicated amount of water., Disp: 24 tablet, Rfl: 0    sulfacetamide sodium-sulfur 10-5 % (w/w) Clsr, Use to wash face daily, Disp: 170 g, Rfl: 5    tadalafiL (CIALIS) 5 MG tablet, Take 1 tablet (5 mg total) by mouth daily as needed for Erectile Dysfunction., Disp: 90 tablet, Rfl: 3    testosterone cypionate (DEPOTESTOTERONE CYPIONATE) 200 mg/mL injection, Inject 60 mg into the muscle every 14 (fourteen) days. Twice a week, Disp: , Rfl:     triamcinolone acetonide 0.025% (KENALOG) 0.025 % Oint, Thin film to lips and eczematous plaques BID PRN flare, Disp: 15 g, Rfl: 1    triamcinolone acetonide 0.1% (KENALOG) 0.1 % cream, AAA bid, Disp: 454 g, Rfl: 3    zinc gluconate 50 mg tablet, Take 50 mg by mouth once daily., Disp: , Rfl:     metFORMIN (GLUCOPHAGE-XR) 500 MG ER 24hr tablet, Take 1 tablet (500 mg total) by mouth 2 (two) times daily with meals., Disp: 180 tablet, Rfl: 3    vitamin A 8000 UNIT capsule, Take 1 capsule (8,000 Units total) by mouth once daily., Disp: 100 capsule, Rfl: 3    Review of Systems   Constitutional:  Negative for unexpected weight change.   HENT:  Negative for trouble swallowing.    Eyes:  Negative for visual disturbance.   Respiratory:  Negative for shortness of breath.    Cardiovascular:  Negative for " "chest pain, palpitations and leg swelling.   Gastrointestinal:  Negative for blood in stool.   Genitourinary:  Negative for hematuria.   Skin:  Negative for rash.   Allergic/Immunologic: Negative for immunocompromised state.   Neurological:  Negative for headaches.   Hematological:  Does not bruise/bleed easily.   Psychiatric/Behavioral:  Negative for agitation. The patient is not nervous/anxious.      Objective:      /74 (BP Location: Left arm, Patient Position: Sitting, BP Method: Large (Manual))   Pulse 73   Temp 97.5 °F (36.4 °C) (Oral)   Resp 16   Ht 6' 2" (1.88 m)   Wt 115.3 kg (254 lb 3.1 oz)   SpO2 96%   BMI 32.64 kg/m²   Physical Exam  Constitutional:       Appearance: He is well-developed.   Eyes:      Conjunctiva/sclera: Conjunctivae normal.      Pupils: Pupils are equal, round, and reactive to light.   Cardiovascular:      Rate and Rhythm: Normal rate and regular rhythm.      Heart sounds: Normal heart sounds.   Pulmonary:      Effort: Pulmonary effort is normal.   Musculoskeletal:         General: Normal range of motion.   Neurological:      Mental Status: He is alert and oriented to person, place, and time.   Psychiatric:         Behavior: Behavior normal.         Thought Content: Thought content normal.         Judgment: Judgment normal.       Assessment:       1. Hyperlipidemia, unspecified hyperlipidemia type    2. Type 2 diabetes mellitus with hyperglycemia, without long-term current use of insulin    3. Gastroesophageal reflux disease without esophagitis    4. Nonalcoholic fatty liver disease    5. Hypogonadism in male    6. Lumbar radiculopathy    7. S/P laparoscopic sleeve gastrectomy    8. Parada syndrome    9. Obesity (BMI 30-39.9)        Plan:       Hyperlipidemia, unspecified hyperlipidemia type  Stable, on pravastatin  The 10-year ASCVD risk score (Naseem DK, et al., 2019) is: 7.6%    Values used to calculate the score:      Age: 53 years      Sex: Male      Is Non- " : No      Diabetic: Yes      Tobacco smoker: No      Systolic Blood Pressure: 128 mmHg      Is BP treated: No      HDL Cholesterol: 41 mg/dL      Total Cholesterol: 166 mg/dL   Type 2 diabetes mellitus with hyperglycemia, without long-term current use of insulin  Stable, continue Endo follow up  Follow the ADA diet  Hemoglobin A1C   Date Value Ref Range Status   04/21/2023 5.6 4.0 - 5.6 % Final     Comment:     ADA Screening Guidelines:  5.7-6.4%  Consistent with prediabetes  >or=6.5%  Consistent with diabetes    High levels of fetal hemoglobin interfere with the HbA1C  assay. Heterozygous hemoglobin variants (HbS, HgC, etc)do  not significantly interfere with this assay.   However, presence of multiple variants may affect accuracy.     01/18/2023 6.2 (H) 4.0 - 5.6 % Final     Comment:     ADA Screening Guidelines:  5.7-6.4%  Consistent with prediabetes  >or=6.5%  Consistent with diabetes    High levels of fetal hemoglobin interfere with the HbA1C  assay. Heterozygous hemoglobin variants (HbS, HgC, etc)do  not significantly interfere with this assay.   However, presence of multiple variants may affect accuracy.     07/25/2022 6.0 (H) 4.0 - 5.6 % Final     Comment:     ADA Screening Guidelines:  5.7-6.4%  Consistent with prediabetes  >or=6.5%  Consistent with diabetes    High levels of fetal hemoglobin interfere with the HbA1C  assay. Heterozygous hemoglobin variants (HbS, HgC, etc)do  not significantly interfere with this assay.   However, presence of multiple variants may affect accuracy.        Gastroesophageal reflux disease without esophagitis  Stable, continue management  Nonalcoholic fatty liver disease  Stable, continue management  Hypogonadism in male  Stable, continue management  Lumbar radiculopathy  Stable, continue management  S/P laparoscopic sleeve gastrectomy  Stable, continue management  Parada syndrome  Stable, continue management  Obesity (BMI 30-39.9)  Counseled patient on his ideal  "body weight, health consequences of being obese and current recommendations including weekly exercise and a heart healthy diet.  Current BMI is:Estimated body mass index is 32.64 kg/m² as calculated from the following:    Height as of this encounter: 6' 2" (1.88 m).    Weight as of this encounter: 115.3 kg (254 lb 3.1 oz)..  Patient is aware that ideal BMI < 25 or Weight in (lb) to have BMI = 25: 194.3.           Patient readiness: acceptance and barriers:none    During the course of the visit the patient was educated and counseled about the following:     Diabetes:  Addressed ADA diet.  Encouraged aerobic exercise.  Obesity:   Informal exercise measures discussed, e.g. taking stairs instead of elevator.  Regular aerobic exercise program discussed.    Goals: Diabetes: Maintain Hemoglobin A1C below 7 and Obesity: Reduce calorie intake and BMI    Did patient meet goals/outcomes: Yes    The following self management tools provided: declined    Patient Instructions (the written plan) was given to the patient/family.     Time spent with patient: 15 minutes    Barriers to medications present (no )    Adverse reactions to current medications (no)    Over the counter medications reviewed (Yes)          "

## 2023-06-06 LAB
LEFT EYE DM RETINOPATHY: NEGATIVE
RIGHT EYE DM RETINOPATHY: NEGATIVE

## 2023-06-14 ENCOUNTER — OFFICE VISIT (OUTPATIENT)
Dept: UROLOGY | Facility: CLINIC | Age: 53
End: 2023-06-14
Payer: COMMERCIAL

## 2023-06-14 VITALS
SYSTOLIC BLOOD PRESSURE: 144 MMHG | BODY MASS INDEX: 32.47 KG/M2 | DIASTOLIC BLOOD PRESSURE: 84 MMHG | HEIGHT: 74 IN | HEART RATE: 69 BPM | WEIGHT: 253 LBS

## 2023-06-14 DIAGNOSIS — R31.29 MICROHEMATURIA: ICD-10-CM

## 2023-06-14 DIAGNOSIS — Z15.09 MSH2-RELATED LYNCH SYNDROME (HNPCC1): Primary | ICD-10-CM

## 2023-06-14 PROCEDURE — 99213 PR OFFICE/OUTPT VISIT, EST, LEVL III, 20-29 MIN: ICD-10-PCS | Mod: S$GLB,,, | Performed by: UROLOGY

## 2023-06-14 PROCEDURE — 99999 PR PBB SHADOW E&M-EST. PATIENT-LVL IV: ICD-10-PCS | Mod: PBBFAC,,, | Performed by: UROLOGY

## 2023-06-14 PROCEDURE — 99999 PR PBB SHADOW E&M-EST. PATIENT-LVL IV: CPT | Mod: PBBFAC,,, | Performed by: UROLOGY

## 2023-06-14 PROCEDURE — 99213 OFFICE O/P EST LOW 20 MIN: CPT | Mod: S$GLB,,, | Performed by: UROLOGY

## 2023-06-14 NOTE — PROGRESS NOTES
"Urology - [facility]  Clinic Note    SUBJECTIVE:     Chief Complaint: follow up    History of Present Illness:  Lyndon Lion Jr. is a 53 y.o. male who presents to clinic for follow up. He is established to our clinic.   evaluation of Parada Syndrome.   Recent skin cancer.   Recently diagnosed with diabetes.   Family hx of prostate cancer - grandfather  father had BPH    Recent back surgery.   He does snore. Doesn't stop breathing.     No LUTS. Drinks caffeine. Nocturia x 1.   No lower extremity edema. Snores. No sleep apnea.   No daytime frequency. No urgency. Good stream.   No gross hematuria. No intermittency. No hesitancy.     H/o colon cancer. Has frequent bowels.   8/2008 was the colon surgery.   He is on a surveillance protocol with Dr. Leonardo.     No ED.    Mother had breast cancer and skin cancer. Mom is 61.     Anticoagulation:  No    OBJECTIVE:     Estimated body mass index is 32.48 kg/m² as calculated from the following:    Height as of this encounter: 6' 2" (1.88 m).    Weight as of this encounter: 114.8 kg (253 lb).    Vital Signs (Most Recent)  Pulse: 69 (06/14/23 1239)  BP: (!) 144/84 (06/14/23 1239)    Physical Exam  Vitals reviewed.   Pulmonary:      Effort: Pulmonary effort is normal.   Genitourinary:     Penis: Normal.       Comments: Prostate was smooth without nodularity. No rectal masses.  30 grams.       Lab Results   Component Value Date    BUN 14 04/07/2023    CREATININE 1.2 04/07/2023    WBC 5.73 04/07/2023    HGB 17.1 04/07/2023    HCT 47.6 04/07/2023     04/07/2023    AST 19 04/07/2023    ALT 28 04/07/2023    ALKPHOS 54 (L) 04/07/2023    ALBUMIN 4.2 04/07/2023    HGBA1C 5.6 04/21/2023        Lab Results   Component Value Date    PSA 0.45 03/19/2019    PSA 0.49 11/22/2016    PSADIAG 0.42 06/20/2020    PSAFREE 0.20 01/18/2023    PSAFREE 0.20 07/25/2022    PSAFREE 0.19 04/23/2021    PSAFREEPCT 44.44 01/18/2023    PSAFREEPCT 50.00 07/25/2022    PSAFREEPCT 52.78 " 04/23/2021   PSA 2022 - 0.9 1/29/22  PSA 2022 - 0.4 4/28/22    Imaging:  10/25/21  The adrenal glands are not enlarged.  The kidneys are of normal size contour and CT density as well as contrast enhancement without mass stone or hydronephrosis identified on either side down to the bladder.  The abdominal aorta and inferior vena cava are of normal caliber.  Retroperitoneal adenopathy or mass is not seen.  The patient has had a prior gastric sleeve procedure with suture material in place.  Small bowel dilatation or air-fluid levels are not seen.  The patient has had a prior right colectomy with anastomosis at the hepatic flexure.  The rest of the colon appears of normal configuration without dilation or mass.  Free fluid or free air in the peritoneum is not identified.     The bladder is of normal contour without mass or asymmetry.  The prostate is not enlarged measuring 4.5 cm transversely.  Free fluid or adenopathy in the pelvis is not seen.    Urinalysis - trace protein    ASSESSMENT     1. MSH2-related Parada syndrome (HNPCC1)      PLAN:   Lyndon was seen today for annual exam.    Diagnoses and all orders for this visit:    MSH2-related Parada syndrome (HNPCC1)  -     Urinalysis Microscopic    Patient will drop off urine      Guerita Stovall MD

## 2023-06-16 ENCOUNTER — TELEPHONE (OUTPATIENT)
Dept: ENDOSCOPY | Facility: HOSPITAL | Age: 53
End: 2023-06-16
Payer: COMMERCIAL

## 2023-06-19 ENCOUNTER — LAB VISIT (OUTPATIENT)
Dept: LAB | Facility: HOSPITAL | Age: 53
End: 2023-06-19
Attending: UROLOGY
Payer: COMMERCIAL

## 2023-06-19 ENCOUNTER — OFFICE VISIT (OUTPATIENT)
Dept: OTOLARYNGOLOGY | Facility: CLINIC | Age: 53
End: 2023-06-19
Payer: COMMERCIAL

## 2023-06-19 VITALS — WEIGHT: 252 LBS | BODY MASS INDEX: 32.35 KG/M2

## 2023-06-19 DIAGNOSIS — C44.321 SQUAMOUS CELL CARCINOMA OF SKIN OF NOSE: Primary | ICD-10-CM

## 2023-06-19 DIAGNOSIS — R31.29 MICROHEMATURIA: ICD-10-CM

## 2023-06-19 DIAGNOSIS — J34.89 NASAL VESTIBULITIS: ICD-10-CM

## 2023-06-19 DIAGNOSIS — M95.0 ACQUIRED NASAL DEFORMITY: ICD-10-CM

## 2023-06-19 LAB
BACTERIA #/AREA URNS AUTO: ABNORMAL /HPF
CAOX CRY UR QL COMP ASSIST: ABNORMAL
HYALINE CASTS UR QL AUTO: 4 /LPF
MICROSCOPIC COMMENT: ABNORMAL
RBC #/AREA URNS AUTO: 44 /HPF (ref 0–4)
SQUAMOUS #/AREA URNS AUTO: 0 /HPF
WBC #/AREA URNS AUTO: 1 /HPF (ref 0–5)

## 2023-06-19 PROCEDURE — 99214 OFFICE O/P EST MOD 30 MIN: CPT | Mod: 24,S$GLB,, | Performed by: OTOLARYNGOLOGY

## 2023-06-19 PROCEDURE — 99999 PR PBB SHADOW E&M-EST. PATIENT-LVL V: ICD-10-PCS | Mod: PBBFAC,,, | Performed by: OTOLARYNGOLOGY

## 2023-06-19 PROCEDURE — 81001 URINALYSIS AUTO W/SCOPE: CPT | Performed by: UROLOGY

## 2023-06-19 PROCEDURE — 99999 PR PBB SHADOW E&M-EST. PATIENT-LVL V: CPT | Mod: PBBFAC,,, | Performed by: OTOLARYNGOLOGY

## 2023-06-19 PROCEDURE — 99214 PR OFFICE/OUTPT VISIT, EST, LEVL IV, 30-39 MIN: ICD-10-PCS | Mod: 24,S$GLB,, | Performed by: OTOLARYNGOLOGY

## 2023-06-20 ENCOUNTER — PATIENT MESSAGE (OUTPATIENT)
Dept: UROLOGY | Facility: CLINIC | Age: 53
End: 2023-06-20
Payer: COMMERCIAL

## 2023-06-20 DIAGNOSIS — R31.29 MICROHEMATURIA: Primary | ICD-10-CM

## 2023-06-21 ENCOUNTER — TELEPHONE (OUTPATIENT)
Dept: UROLOGY | Facility: CLINIC | Age: 53
End: 2023-06-21
Payer: COMMERCIAL

## 2023-06-21 NOTE — TELEPHONE ENCOUNTER
CT scan, BMP and cysto has been scheduled.    DONNA Preston      ----- Message from Guerita Stovall MD sent at 6/20/2023  6:38 AM CDT -----  Has high level microscopic blood. Please schedule CT urogram asap. Thanks.   Cysto also. Can be with me when I get back or see if he wants to do sooner with someone else

## 2023-06-22 ENCOUNTER — ANESTHESIA EVENT (OUTPATIENT)
Dept: ENDOSCOPY | Facility: HOSPITAL | Age: 53
End: 2023-06-22
Payer: COMMERCIAL

## 2023-06-22 ENCOUNTER — HOSPITAL ENCOUNTER (OUTPATIENT)
Facility: HOSPITAL | Age: 53
Discharge: HOME | End: 2023-06-22
Attending: INTERNAL MEDICINE | Admitting: INTERNAL MEDICINE
Payer: COMMERCIAL

## 2023-06-22 ENCOUNTER — ANESTHESIA (OUTPATIENT)
Dept: ENDOSCOPY | Facility: HOSPITAL | Age: 53
End: 2023-06-22
Payer: COMMERCIAL

## 2023-06-22 VITALS
SYSTOLIC BLOOD PRESSURE: 113 MMHG | HEART RATE: 73 BPM | DIASTOLIC BLOOD PRESSURE: 67 MMHG | TEMPERATURE: 98 F | BODY MASS INDEX: 32.08 KG/M2 | RESPIRATION RATE: 16 BRPM | HEIGHT: 74 IN | WEIGHT: 250 LBS | OXYGEN SATURATION: 95 %

## 2023-06-22 DIAGNOSIS — K22.10 EROSIVE ESOPHAGITIS: Primary | ICD-10-CM

## 2023-06-22 DIAGNOSIS — Z85.038 HISTORY OF COLON CANCER: ICD-10-CM

## 2023-06-22 LAB — POCT GLUCOSE: 84 MG/DL (ref 70–110)

## 2023-06-22 PROCEDURE — 88305 TISSUE EXAM BY PATHOLOGIST: CPT | Mod: 26,,, | Performed by: PATHOLOGY

## 2023-06-22 PROCEDURE — 88342 CHG IMMUNOCYTOCHEMISTRY: ICD-10-PCS | Mod: 26,,, | Performed by: PATHOLOGY

## 2023-06-22 PROCEDURE — 43239 PR EGD, FLEX, W/BIOPSY, SGL/MULTI: ICD-10-PCS | Mod: 51,,, | Performed by: INTERNAL MEDICINE

## 2023-06-22 PROCEDURE — 25000003 PHARM REV CODE 250: Performed by: NURSE ANESTHETIST, CERTIFIED REGISTERED

## 2023-06-22 PROCEDURE — 82962 GLUCOSE BLOOD TEST: CPT | Performed by: INTERNAL MEDICINE

## 2023-06-22 PROCEDURE — 27201012 HC FORCEPS, HOT/COLD, DISP: Performed by: INTERNAL MEDICINE

## 2023-06-22 PROCEDURE — 37000009 HC ANESTHESIA EA ADD 15 MINS: Performed by: INTERNAL MEDICINE

## 2023-06-22 PROCEDURE — 63600175 PHARM REV CODE 636 W HCPCS: Performed by: NURSE ANESTHETIST, CERTIFIED REGISTERED

## 2023-06-22 PROCEDURE — 88342 IMHCHEM/IMCYTCHM 1ST ANTB: CPT | Performed by: PATHOLOGY

## 2023-06-22 PROCEDURE — G0105 COLORECTAL SCRN; HI RISK IND: HCPCS | Mod: ,,, | Performed by: INTERNAL MEDICINE

## 2023-06-22 PROCEDURE — E9220 PRA ENDO ANESTHESIA: HCPCS | Mod: 33,,, | Performed by: NURSE ANESTHETIST, CERTIFIED REGISTERED

## 2023-06-22 PROCEDURE — 88305 TISSUE EXAM BY PATHOLOGIST: CPT | Mod: 59 | Performed by: PATHOLOGY

## 2023-06-22 PROCEDURE — E9220 PRA ENDO ANESTHESIA: ICD-10-PCS | Mod: 33,,, | Performed by: NURSE ANESTHETIST, CERTIFIED REGISTERED

## 2023-06-22 PROCEDURE — 37000008 HC ANESTHESIA 1ST 15 MINUTES: Performed by: INTERNAL MEDICINE

## 2023-06-22 PROCEDURE — 43239 EGD BIOPSY SINGLE/MULTIPLE: CPT | Performed by: INTERNAL MEDICINE

## 2023-06-22 PROCEDURE — 88342 IMHCHEM/IMCYTCHM 1ST ANTB: CPT | Mod: 26,,, | Performed by: PATHOLOGY

## 2023-06-22 PROCEDURE — G0105 COLORECTAL SCRN; HI RISK IND: HCPCS | Performed by: INTERNAL MEDICINE

## 2023-06-22 PROCEDURE — 43239 EGD BIOPSY SINGLE/MULTIPLE: CPT | Mod: 51,,, | Performed by: INTERNAL MEDICINE

## 2023-06-22 PROCEDURE — 88305 TISSUE EXAM BY PATHOLOGIST: ICD-10-PCS | Mod: 26,,, | Performed by: PATHOLOGY

## 2023-06-22 PROCEDURE — G0105 COLORECTAL SCRN; HI RISK IND: ICD-10-PCS | Mod: ,,, | Performed by: INTERNAL MEDICINE

## 2023-06-22 RX ORDER — PROPOFOL 10 MG/ML
VIAL (ML) INTRAVENOUS CONTINUOUS PRN
Status: DISCONTINUED | OUTPATIENT
Start: 2023-06-22 | End: 2023-06-22

## 2023-06-22 RX ORDER — SODIUM CHLORIDE 9 MG/ML
INJECTION, SOLUTION INTRAVENOUS CONTINUOUS
Status: DISCONTINUED | OUTPATIENT
Start: 2023-06-22 | End: 2023-06-22 | Stop reason: HOSPADM

## 2023-06-22 RX ORDER — RABEPRAZOLE SODIUM 20 MG/1
20 TABLET, DELAYED RELEASE ORAL
Qty: 90 TABLET | Refills: 3 | Status: SHIPPED | OUTPATIENT
Start: 2023-06-22 | End: 2024-03-19 | Stop reason: SDUPTHER

## 2023-06-22 RX ORDER — LIDOCAINE HYDROCHLORIDE 20 MG/ML
INJECTION, SOLUTION EPIDURAL; INFILTRATION; INTRACAUDAL; PERINEURAL
Status: DISCONTINUED | OUTPATIENT
Start: 2023-06-22 | End: 2023-06-22

## 2023-06-22 RX ORDER — PROPOFOL 10 MG/ML
VIAL (ML) INTRAVENOUS
Status: DISCONTINUED | OUTPATIENT
Start: 2023-06-22 | End: 2023-06-22

## 2023-06-22 RX ADMIN — LIDOCAINE HYDROCHLORIDE 100 MG: 20 INJECTION, SOLUTION EPIDURAL; INFILTRATION; INTRACAUDAL; PERINEURAL at 03:06

## 2023-06-22 RX ADMIN — PROPOFOL 100 MG: 10 INJECTION, EMULSION INTRAVENOUS at 03:06

## 2023-06-22 RX ADMIN — Medication 200 MCG/KG/MIN: at 03:06

## 2023-06-22 RX ADMIN — SODIUM CHLORIDE: 0.9 INJECTION, SOLUTION INTRAVENOUS at 03:06

## 2023-06-22 NOTE — TRANSFER OF CARE
"Anesthesia Transfer of Care Note    Patient: Lyndon Lion Jr.    Procedure(s) Performed: Procedure(s) (LRB):  EGD (ESOPHAGOGASTRODUODENOSCOPY) (N/A)  COLONOSCOPY (N/A)    Patient location: PACU    Anesthesia Type: general    Transport from OR: Transported from OR on room air with adequate spontaneous ventilation    Post pain: adequate analgesia    Post assessment: no apparent anesthetic complications    Post vital signs: stable    Level of consciousness: awake    Nausea/Vomiting: no nausea/vomiting    Complications: none    Transfer of care protocol was followed      Last vitals:   Visit Vitals  BP (!) 93/51   Pulse 80   Temp 36.6 °C (97.9 °F)   Resp 18   Ht 6' 2" (1.88 m)   Wt 113.4 kg (250 lb)   SpO2 (!) 94%   BMI 32.10 kg/m²     "

## 2023-06-22 NOTE — ANESTHESIA POSTPROCEDURE EVALUATION
Anesthesia Post Evaluation    Patient: Lyndon Lion Jr.    Procedure(s) Performed: Procedure(s) (LRB):  EGD (ESOPHAGOGASTRODUODENOSCOPY) (N/A)  COLONOSCOPY (N/A)    Final Anesthesia Type: general      Patient location during evaluation: PACU  Patient participation: Yes- Able to Participate  Level of consciousness: awake and alert and oriented  Post-procedure vital signs: reviewed and stable  Pain management: adequate  Airway patency: patent    PONV status at discharge: No PONV  Anesthetic complications: no      Cardiovascular status: hemodynamically stable  Respiratory status: nasal cannula  Hydration status: euvolemic  Follow-up not needed.          Vitals Value Taken Time   /67 06/22/23 1613   Temp 36.6 °C (97.9 °F) 06/22/23 1548   Pulse 73 06/22/23 1613   Resp 16 06/22/23 1613   SpO2 95 % 06/22/23 1613         No case tracking events are documented in the log.      Pain/Karthikeyan Score: Karthikeyan Score: 10 (6/22/2023  4:08 PM)

## 2023-06-22 NOTE — PROGRESS NOTES
History of Present Illness:   Lyndon Lion is a 53 y.o. year old male evaluated on 6/22/2023, in the Otolaryngology-Head and Neck Surgery Clinic at [facility]. 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  Patient here to discuss further debulking and fine tuning of the flap.  He has some right-sided nasal obstruction.  He reports increased mucus around internal lining.  He also has notching along the membranous columella.  The obstruction and deformity of the right nostril is what concerns him the most.  His only other complaint today is set back of the right ear from the composite graft he reports that because he wears a hard hat he gets sweat that accumulates behind the right ear that is difficult to clean off and he keeps antifungal Cream there.    Initial HPI  Initially referred by Dr. 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  Patient reports that the lesion started about 02/08/2023 with what was thought to be an infected nasal hair.  Has had subsequent rapid growth that he is documented and photographs.  He had a punch biopsy done by Dr. Mcghee this past week and in communication with the pathologist this was read out as squamous cell carcinoma however report is not available yet.  Patient was scheduled to see me.  He reports prior bleeding from the area but he has not had any bleeding recently.  He has fullness but no true nasal obstruction.  He is anxious to proceed with treatment.           Past Medical/Surgical History  Past Medical History:   Diagnosis Date    Arthritis     Asthma     AS A CHILD    Colon cancer     Family hx of prostate cancer 11/22/2016    Hx of colon cancer, stage I 07/03/2014    Parada syndrome     Squamous cell carcinoma of skin     Tear of labium 10/2020    left shoulder Dr Molina     Uncontrolled type 2 diabetes mellitus with hyperglycemia 03/04/2021    Vitamin D deficiency     Wears glasses      His  has a past surgical history that includes Gastric bypass (2010); Colon surgery (2008); Knee arthroscopy (Right); Appendectomy; Colonoscopy (Bilateral, 2012); Epidural Steroid Injection (10/23/15); Colonoscopy (N/A, 8/1/2016); Colonoscopy (N/A, 9/20/2017); Colonoscopy (N/A, 10/9/2017); Esophagogastroduodenoscopy; Colonoscopy (N/A, 11/20/2017); Colonoscopy (N/A, 5/30/2018); Epidural steroid injection into lumbar spine (N/A, 7/27/2018); Caudal epidural steroid injection (N/A, 11/6/2018); Lumbar discectomy (2/13/2019); Facetectomy of vertebra (2/13/2019); Colonoscopy (N/A, 6/10/2019); Esophagogastroduodenoscopy (N/A, 7/22/2020); Colonoscopy (N/A, 7/22/2020); Colonoscopy (N/A, 5/27/2021); Cystoscopy; Colonoscopy (N/A, 6/17/2022); excision or surgical planing, skin, nose, for rhinophyma (Bilateral, 3/7/2023); Nasal reconstruction (N/A, 3/21/2023); Application of cartilage graft (N/A, 3/21/2023); Rotation flap surgery (N/A, 3/21/2023); and Revision of flap graft (Bilateral, 4/18/2023).     Past Family/Social History  His family history includes Alcohol abuse in his paternal uncle; Basal cell carcinoma in his father; Breast cancer in his mother; Cancer in his maternal grandfather, maternal uncle, mother, and paternal uncle; Colon cancer in his maternal grandfather and maternal uncle; Dementia in his maternal grandmother; Diabetes in his father and paternal grandmother; Early death in his maternal grandfather; Hearing loss in his father; Heart disease in his father and maternal uncle; Hypertension in his maternal uncle and paternal grandfather; Liver disease in his father; Melanoma in his mother; No Known Problems in his paternal aunt and sister; Polycystic ovary syndrome in his daughter; Prostate cancer in his paternal uncle; Prostatitis in his paternal grandfather.  He  reports that he has never smoked.  He has never used smokeless tobacco. He reports current alcohol use. He reports that he does not use drugs.     Medications/Allergies/Immunizations  His current medication(s) include:   No current facility-administered medications for this visit.     No current outpatient medications on file.     Facility-Administered Medications Ordered in Other Visits   Medication Dose Route Frequency Provider Last Rate Last Admin    0.9%  NaCl infusion   Intravenous Continuous Dom Leonardo MD            Allergies: Patient has no known allergies.     Immunizations:   Immunization History   Administered Date(s) Administered    COVID-19, MRNA, LN-S, PF (Pfizer) (Gray Cap) 06/01/2022    COVID-19, MRNA, LN-S, PF (Pfizer) (Purple Cap) 04/08/2021, 04/29/2021, 01/03/2022    Hepatitis A / Hepatitis B 11/22/2016, 12/28/2016, 05/18/2017    Influenza 10/30/2008, 09/29/2009, 12/14/2011    Influenza - Quadrivalent - PF *Preferred* (6 months and older) 09/29/2009, 11/11/2016, 12/08/2017, 11/04/2022    Influenza Split 10/30/2008, 12/14/2011    Pneumococcal Conjugate - 20 Valent 09/19/2022    Td - PF (ADULT) 11/11/2016         Review of Systems   Constitutional: Negative for fever, weight loss and weight gain.  Skin: Negative for rash, itchiness, dryness  HENT:  As per HPI  Cardiovascular: Negative for chest pain and dyspnea on exertion .   Respiratory: Is not experiencing shortness of breath.   Gastrointestinal: Negative for nausea and vomiting.   Neurological: Negative for headaches.   Lymph/Heme: Negative for lymphadenopathy or easy bruising  Musculoskeletal: Negative for joint or muscle pain  Psychiatric: The patient is not nervous/anxious.        All other systems are negative except for that listed in the HPI.      PHYSICAL EXAM:   Vital Signs:  Wt 114.3 kg (251 lb 15.8 oz)   BMI 32.35 kg/m²      General:  Well-developed, well-nourished  Communication and Voice:  Clear pitch and clarity  Hearing: Hearing adequate for verbal  communication bilaterally   Inspection:  Normocephalic and atraumatic without mass or lesion  Palpation:  Facial skeleton intact without bony stepoffs  Parotid Glands:  No mass or tenderness  Facial Strength:  Facial motility symmetric and full bilaterally  Pinna:  External ear intact and fully developed  External canal:  Canal is patent with intact skin  Tympanic Membrane:  Clear and mobile  External nose:  Right ala and tip subunit paramedian forehead flap well incorporated with some bulkiness superiorly and to the left of the tip.  He has great take of the septal graft at the anterior septal angle with mucocele cessation however there was a defect of the columella between the flap and the base of the columella as well as blunting of the soft tissue triangle.  He has increased mucus which was debrided crusted around nasal hairs which were trimmed.  He also has bulkiness of the flap superiorly above level of the ala crease.    Internal Nose:  As described above with some crusting along incorporated composite graft with good epithelialization remainder of Septum intact and midline.  No edema, polyp, or rhinorrhea.  TMJ:  No pain to palpation with full mobility  Oral cavity, Lips, Teeth, and Gums:  Mucosa and teeth intact and viable, No lesions, masses or ulcers  Oropharynx: No erythema or exudate, no masses or ulcerations, non-obstructive tonsils  Nasopharynx:  No mass or lesion with intact mucosa  Hypopharynx:  Not well visualized secondary to gagging  Larynx:  Not well visualized secondary to gagging  Neck, Trachea, Lymphatics:  Midline trachea without mass or lesion, no lymphadenopathy  Thyroid:  No mass or nodularity  Eyes: No nystagmus with equal extraocular motion bilaterally  Neuro/Psych/Balance: Patient oriented and appropriate in interaction;  Appropriate mood and affect;  Gait is intact with no imbalance; Cranial nerves I-XII are intact  Respiratory effort:  Equal inspiration and expiration without  stridor  Peripheral Vascular:  Warm extremities with equal pulses            PATHOLOGY REVIEW:   Initial nasal tumor resection with sentinel lymph node biopsy 03/07/2023   NASAL CAVITY AND PARANASAL SINUSES:    SPECIMEN      Procedure       Excision       Tumor Focality       Unifocal       Tumor Laterality       Midline       Tumor Size       Greatest Dimension (Centimeters) 4.1 cm           Squamous Cell Carcinoma and Variants           Verrucous squamous   cell carcinoma       Histologic Grade       G1: Well differentiated       Lymphovascular Invasion       Not Identified       Perineural Invasion       Not identified       Margins       Uninvolved by invasive tumor; the closest margin is deep   margin         Distance from Closest Margin (Millimeters) At least 0.5 mm       Regional Lymph Node Status:      All regional lymph nodes negative for   tumor       Number of Lymph Nodes Examined:      5       pT Category:           pT1       pN Category:           pN0    Re-resection 03/21/2023   RESECTION DEEP MARGIN FROM NASAL AREA:   MICROSCOPIC FOCUS OF RESIDUAL SQUAMOUS CARCINOMA  FROM THE APPARENT   EDGES OF THE SPECIMEN   MAJOR AREAS OF ACUTE INFLAMMATION   RADIOLOGIC REVIEW:    None    ASSESSMENT:   1. Squamous cell carcinoma of skin of nose    2. Acquired nasal deformity    3. Nasal vestibulitis            PLAN:   Patient recovering well after division and inset of paramedian forehead flap with continued nasal obstruction on the right and bulkiness of the flap as well as crusting.  I will schedule him for 1st revision with debulking of the flap and reestablishing of soft tissue triangle as well as addressing of the columella.  Risks and benefits reviewed patient will be scheduled for 06/29/2023.  Consent to be obtained day of surgery.    I believe that Mr. Lion has a good understanding of the issues involved and I answered all of his questions.     DISCLAIMER: This note was prepared with Martina  voice recognition transcription software. Garbled syntax, mangled pronouns, and other bizarre constructions may be attributed to that software system. While efforts were made to correct any mistakes made by this voice recognition program, some errors and/or omissions may remain in the note that were missed when the note was originally created.

## 2023-06-22 NOTE — [employer] PATIENT INSTRUCTIONS
Discharge Summary/Instructions after an Endoscopic Procedure  Patient Name: Lyndon Lion  Patient MRN: [id number]  Patient YOB: 1970 Thursday, June 22, 2023  Dom Leonardo MD  Dear patient,  As a result of recent federal legislation (The Federal Cures Act), you may   receive lab or pathology results from your procedure in your [facility]   account before your physician is able to contact you. Your physician or   their representative will relay the results to you with their   recommendations at their soonest availability.  Thank you,  RESTRICTIONS:  During your procedure today, you received medications for sedation.  These   medications may affect your judgment, balance and coordination.  Therefore,   for 24 hours, you have the following restrictions:   - DO NOT drive a car, operate machinery, make legal/financial decisions,   sign important papers or drink alcohol.    ACTIVITY:  Today: no heavy lifting, straining or running due to procedural   sedation/anesthesia.  The following day: return to full activity including work.  DIET:  Eat and drink normally unless instructed otherwise.     TREATMENT FOR COMMON SIDE EFFECTS:  - Mild abdominal pain, nausea, belching, bloating or excessive gas:  rest,   eat lightly and use a heating pad.  - Sore Throat: treat with throat lozenges and/or gargle with warm salt   water.  - Because air was used during the procedure, expelling large amounts of air   from your rectum or belching is normal.  - If a bowel prep was taken, you may not have a bowel movement for 1-3 days.    This is normal.  SYMPTOMS TO WATCH FOR AND REPORT TO YOUR PHYSICIAN:  1. Abdominal pain or bloating, other than gas cramps.  2. Chest pain.  3. Back pain.  4. Signs of infection such as: chills or fever occurring within 24 hours   after the procedure.  5. Rectal bleeding, which would show as bright red, maroon, or black stools.   (A tablespoon of blood from the rectum is not serious, especially  if   hemorrhoids are present.)  6. Vomiting.  7. Weakness or dizziness.  GO DIRECTLY TO THE NEAREST EMERGENCY ROOM IF YOU HAVE ANY OF THE FOLLOWING:      Difficulty breathing              Chills and/or fever over 101 F   Persistent vomiting and/or vomiting blood   Severe abdominal pain   Severe chest pain   Black, tarry stools   Bleeding- more than one tablespoon   Any other symptom or condition that you feel may need urgent attention  Your doctor recommends these additional instructions:  If any biopsies were taken, your doctors clinic will contact you in 1 to 2   weeks with any results.  - Discharge patient to home.   - Follow an antireflux regimen indefinitely.   - Await pathology results.   - Use Aciphex 20mg once daily (or any other full strength proton pump   inhibitor) - best taken 45 minutes before your first protein containing   meal.   - Return to GI clinic at the next available appointment.   - Repeat upper endoscopy in 12 weeks to check healing.   - The findings and recommendations were discussed with the patient.  For questions, problems or results please call your physician - Dom Leonardo MD at Work:  [phone].  [facility], EMERGENCY ROOM PHONE NUMBER: [phone]  IF A COMPLICATION OR EMERGENCY SITUATION ARISES AND YOU ARE UNABLE TO REACH   YOUR PHYSICIAN - GO DIRECTLY TO THE EMERGENCY ROOM.  Dom Leonardo MD  6/22/2023 3:31:15 PM  This report has been verified and signed electronically.  Dear patient,  As a result of recent federal legislation (The Federal Cures Act), you may   receive lab or pathology results from your procedure in your [facility]   account before your physician is able to contact you. Your physician or   their representative will relay the results to you with their   recommendations at their soonest availability.  Thank you,  PROVATION

## 2023-06-22 NOTE — ANESTHESIA PREPROCEDURE EVALUATION
06/22/2023  Lyndon Lion Jr. is a 53 y.o., male.    Procedure Summary    Case: [id number] Date/Time: 06/22/23 1345   Procedures:        EGD (ESOPHAGOGASTRODUODENOSCOPY) (Abdomen) - see telephone encounter dated 5/24/23 6/16/23-Precall completed appointment confirmed-MH       COLONOSCOPY (Abdomen) - see telephone encounter dated 5/24/23/ Pt/ Pt wife requested sutab - prep ins. on portal / Ozempic for DM- ERW   Anesthesia type: Choice   Diagnosis:        History of colon cancer [Z85.038]       History of right hemicolectomy [Z90.49]       MSH2-related Parada syndrome (HNPCC1) [Z15.09]         Patient Active Problem List   Diagnosis    Radiculopathy, lumbosacral region    Herniated lumbar intervertebral disc    Lumbar spondylosis    DDD (degenerative disc disease), lumbosacral    Acute medial meniscal tear    Obesity, unspecified    Parada syndrome    Thoracic or lumbosacral neuritis or radiculitis, unspecified    Neural foraminal stenosis of lumbar spine    MSH2-related Parada syndrome (HNPCC1)    Spondylosis of lumbar region without myelopathy or radiculopathy    Spondylolisthesis of lumbar region    Nonalcoholic fatty liver disease    Splenomegaly    Mild vitamin D deficiency    Colon dysplasia    Colon adenoma    Pneumoperitoneum    Postpolypectomy electrocoagulation syndrome    Lumbar radiculopathy    History of colon cancer, stage II    Uncontrolled type 2 diabetes mellitus with hyperglycemia    Type 2 diabetes mellitus with hyperglycemia    Obesity (BMI 30-39.9)    Gastroesophageal reflux disease without esophagitis    Bariatric surgery status    S/P laparoscopic sleeve gastrectomy    Hypovitaminosis D    Hypophosphatemia    Vitamin B12 deficiency    Vitamin A deficiency    Erectile dysfunction    Hypogonadism in male    Squamous cell cancer of skin of nasal tip        Past Surgical History:   Procedure Laterality Date    APPENDECTOMY      APPLICATION OF CARTILAGE GRAFT N/A 3/21/2023    Procedure: APPLICATION, CARTILAGE GRAFT;  Surgeon: Alyx Spivey MD;  Location: [facility] OR 2ND FLR;  Service: ENT;  Laterality: N/A;    CAUDAL EPIDURAL STEROID INJECTION N/A 11/6/2018    Procedure: Injection-steroid-epidural-caudal;  Surgeon: Lyndon Brooke Jr., MD;  Location: [facility] OR;  Service: Pain Management;  Laterality: N/A;    COLON SURGERY  2008    PARTIAL COLECTOMY    COLONOSCOPY Bilateral 2012    COLONOSCOPY N/A 8/1/2016    Procedure: COLONOSCOPY;  Surgeon: Blaze Marr MD;  Location: [facility] ENDO;  Service: Endoscopy;  Laterality: N/A;    COLONOSCOPY N/A 9/20/2017    Procedure: COLONOSCOPY;  Surgeon: Dom Leonardo MD;  Location: [facility] (4TH FLR);  Service: Endoscopy;  Laterality: N/A;  not given PM prep    COLONOSCOPY N/A 10/9/2017    Procedure: COLONOSCOPY;  Surgeon: RAMON Callahan MD;  Location: [facility] ENDO (4TH FLR);  Service: Endoscopy;  Laterality: N/A;  ok for Prepopik per Dr Callahan    COLONOSCOPY N/A 11/20/2017    Procedure: COLONOSCOPY/EMR;  Surgeon: Joseluis Choe MD;  Location: [facility] ENDO (2ND FLR);  Service: Endoscopy;  Laterality: N/A;  EMR    no pm prep    COLONOSCOPY N/A 5/30/2018    Procedure: COLONOSCOPY;  Surgeon: Dom Leonardo MD;  Location: [facility] ENDO (4TH FLR);  Service: Endoscopy;  Laterality: N/A;  follow up colonoscopy in 5/2018 for Parada Syndrome         COLONOSCOPY N/A 6/10/2019    Procedure: COLONOSCOPY;  Surgeon: Dom Leonardo MD;  Location: [facility] ENDO (4TH FLR);  Service: Endoscopy;  Laterality: N/A;  Prepopik ordered per Dr. Leonardo    COLONOSCOPY N/A 7/22/2020    Procedure: COLONOSCOPY;  Surgeon: Dom Leonardo MD;  Location: [facility] ENDO (4TH FLR);  Service: Endoscopy;  Laterality: N/A;    COLONOSCOPY N/A 5/27/2021    Procedure: COLONOSCOPY;  Surgeon: Dom Leonardo MD;  Location: [facility] ENDO (4TH FLR);  Service:  Endoscopy;  Laterality: N/A;  covid test 5/24-[place]  pt requests Prepopik    COLONOSCOPY N/A 6/17/2022    Procedure: COLONOSCOPY;  Surgeon: Dom Leonardo MD;  Location: [facility] (4TH FLR);  Service: Endoscopy;  Laterality: N/A;  He was discovered to have colon cancer and had right hemicolectomy        for stage II on 08/08/2008. MSH2 Parada syndrome.  sutab requested  instructions via the portal -sm  fully vaccinated - sm    CYSTOSCOPY      Epidural Steroid Injection  10/23/15    Lumbar    EPIDURAL STEROID INJECTION INTO LUMBAR SPINE N/A 7/27/2018    Procedure: Injection-steroid-epidural-lumbar;  Surgeon: Lyndon Brooke Jr., MD;  Location: [facility] OR;  Service: Pain Management;  Laterality: N/A;    ESOPHAGOGASTRODUODENOSCOPY      ESOPHAGOGASTRODUODENOSCOPY N/A 7/22/2020    Procedure: EGD (ESOPHAGOGASTRODUODENOSCOPY);  Surgeon: Dom Leonardo MD;  [facility]  Service: Endoscopy;  Laterality: N/A;  Please schedule patient for EGD and colonoscopy with me MSH2 Parada syndrome and anemia evaluation please make priority 1  covid test 7/20-[place]    EXCISION OR SURGICAL PLANING, SKIN, NOSE, FOR RHINOPHYMA Bilateral 3/7/2023    Procedure: EXCISION OR SURGICAL resection nasal skin cancer;  Surgeon: Alyx Spivey MD;  [facility];  Service: ENT;  Laterality: Bilateral;    FACETECTOMY OF VERTEBRA  2/13/2019    Procedure: MEDIAL FACETECTOMY L5-S1;  Surgeon: Lyndon Brooke Jr., MD;  Location: [facility] OR;  Service: Orthopedics;;    GASTRIC BYPASS  2010    SLEEVE    KNEE ARTHROSCOPY Right     LUMBAR DISCECTOMY  2/13/2019    Procedure: DISCECTOMY, SPINE, LUMBAR L5-S1;  Surgeon: Lyndon Brooke Jr., MD;  Location: [facility] OR;  Service: Orthopedics;;    NASAL RECONSTRUCTION N/A 3/21/2023    Procedure: RECONSTRUCTION, NOSE;  Surgeon: Alyx Spivey MD;  Location: [facility] OR 2ND FLR;  Service: ENT;  Laterality: N/A;    REVISION OF FLAP GRAFT Bilateral 4/18/2023    Procedure: REVISION,  "PROCEDURE INVOLVING FLAP GRAFT;  Surgeon: Alyx Spivey MD;  Location: [facility] OR;  Service: ENT;  Laterality: Bilateral;    ROTATION FLAP SURGERY N/A 3/21/2023    Procedure: CREATION, FLAP, ROTATION;  Surgeon: Alyx Spivey MD;  [facility];  Service: ENT;  Laterality: N/A;       Social History     Socioeconomic History    Marital status:    Occupational History     Employer: [employer]   Tobacco Use    Smoking status: Never    Smokeless tobacco: Never   Substance and Sexual Activity    Alcohol use: Yes     Comment: RARELY    Drug use: No    Sexual activity: Yes     Partners: Female       Current Outpatient Medications on File Prior to Visit   Medication Sig Dispense Refill    ACCU-CHEK GUIDE TEST STRIPS Strp USE TO TEST TWICE A  strip 3    ascorbic acid, vitamin C, (VITAMIN C) 100 MG tablet Take 100 mg by mouth once daily.      aspirin (ECOTRIN) 81 MG EC tablet Take 81 mg by mouth once daily.      ciclopirox (PENLAC) 8 % Soln Apply topically nightly. 6.6 mL 3    cyanocobalamin, vitamin B-12, 2,500 mcg Lozg Place 2 tablets under the tongue once daily. 180 lozenge 3    ferrous gluconate (FERGON) 240 (27 FE) MG tablet TAKE 27 MG BY MOUTH 2 (TWO) TIMES DAILY WITH MEALS.      lancets (ACCU-CHEK SOFTCLIX LANCETS) Misc 1 Units by Misc.(Non-Drug; Combo Route) route 2 (two) times a day. 200 each 0    metFORMIN (GLUCOPHAGE-XR) 500 MG ER 24hr tablet Take 1 tablet (500 mg total) by mouth 2 (two) times daily with meals. 180 tablet 3    pen needle, diabetic (PEN NEEDLE) 32 gauge x 5/32" Ndle 1 each by Misc.(Non-Drug; Combo Route) route once daily. 90 each 3    pravastatin (PRAVACHOL) 20 MG tablet Take 1 tablet (20 mg total) by mouth once daily. 90 tablet 3    semaglutide (OZEMPIC) 2 mg/dose (8 mg/3 mL) PnIj Inject 2 mg into the skin every 7 days. 3 mL 11    sod sulf-pot chloride-mag sulf (SUTAB) 1.479-0.188- 0.225 gram tablet Take 12 tablets by mouth once daily. Take according to " package instructions with indicated amount of water. 24 tablet 0    sulfacetamide sodium-sulfur 10-5 % (w/w) Clsr Use to wash face daily 170 g 5    tadalafiL (CIALIS) 5 MG tablet Take 1 tablet (5 mg total) by mouth daily as needed for Erectile Dysfunction. 90 tablet 3    testosterone cypionate (DEPOTESTOTERONE CYPIONATE) 200 mg/mL injection Inject 60 mg into the muscle every 14 (fourteen) days. Twice a week      triamcinolone acetonide 0.025% (KENALOG) 0.025 % Oint Thin film to lips and eczematous plaques BID PRN flare 15 g 1    triamcinolone acetonide 0.1% (KENALOG) 0.1 % cream AAA bid 454 g 3    vitamin A 8000 UNIT capsule Take 1 capsule (8,000 Units total) by mouth once daily. 100 capsule 3    zinc gluconate 50 mg tablet Take 50 mg by mouth once daily.       No current facility-administered medications on file prior to visit.       Review of patient's allergies indicates:  No Known Allergies      CBC: No results for input(s): WBC, RBC, HGB, HCT, PLT, MCV, MCH, MCHC in the last 72 hours.    CMP: No results for input(s): NA, K, CL, CO2, BUN, CREATININE, GLU, MG, PHOS, CALCIUM, ALBUMIN, PROT, ALKPHOS, ALT, AST, BILITOT in the last 72 hours.    INR  No results for input(s): PT, INR, PROTIME, APTT in the last 72 hours.      Diagnostic Studies:    EKG:   Results for orders placed or performed during the hospital encounter of 02/06/19   EKG 12-lead    Collection Time: 02/06/19  1:20 PM    Narrative    Test Reason : M54.16,M47.816,M51.26,M51.37,M99.83,M43.16,Z01.818,    Vent. Rate : 068 BPM     Atrial Rate : 068 BPM     P-R Int : 170 ms          QRS Dur : 092 ms      QT Int : 394 ms       P-R-T Axes : 053 093 001 degrees     QTc Int : 418 ms    Normal sinus rhythm  Rightward axis  Borderline Abnormal ECG  When compared with ECG of 14-MAY-2013 12:59,  Questionable change in The axis  Confirmed by Mike King MD ([id number]) on 2/7/2019 12:17:31 PM    Referred By: RENETTA DAVIS           Confirmed By:Mike King MD        TTE:  No results found for this or any previous visit.  No results found for: EF   No results found for this or any previous visit.    LINDA:  No results found for this or any previous visit.    Stress Test:  No results found for this or any previous visit.       LHC:  No results found for this or any previous visit.       PFT:  No results found for: FEV1, FVC, DRB7JYP, TLC, DLCO     ALLERGIES:   Review of patient's allergies indicates:  No Known Allergies  LDA:      Lines/Drains/Airways     None                   Pre-op Assessment    I have reviewed the Patient Summary Reports.    I have reviewed the Nursing Notes. I have reviewed the NPO Status.   I have reviewed the Medications.     Review of Systems  Anesthesia Hx:  No problems with previous Anesthesia  Denies Family Hx of Anesthesia complications.   Denies Personal Hx of Anesthesia complications.   Hematology/Oncology:         -- Cancer in past history: Oncology Comments: H/o colon cancer     Cardiovascular:  Cardiovascular Normal     Pulmonary:   Asthma    Renal/:  Renal/ Normal     Hepatic/GI:   GERD    Neurological:  Neurology Normal    Endocrine:   Diabetes        Physical Exam  General:  Well nourished      Airway/Jaw/Neck:  Airway Findings: Mouth Opening: Normal   Tongue: Normal   General Airway Assessment: Adult, Good Mallampati: II  TM Distance: Normal, at least 6 cm   Jaw/Neck Findings:  Neck ROM: Normal ROM       Dental:  Dental Findings: In tact     Chest/Lungs:  Chest/Lungs Findings: Clear to auscultation      Heart/Vascular:  Heart Findings: Rate: Normal  Rhythm: Regular Rhythm        Mental Status:  Mental Status Findings:  Alert and Oriented, Cooperative         Anesthesia Plan  Type of Anesthesia, risks & benefits discussed:  Anesthesia Type:  Gen Natural Airway    Patient's Preference:   Plan Factors:          Intra-op Monitoring Plan: standard ASA monitors  Intra-op Monitoring Plan Comments:   Post Op Pain Control Plan: per primary  service following discharge from PACU  Post Op Pain Control Plan Comments:     Induction:   IV  Beta Blocker:  Patient is not currently on a Beta-Blocker (No further documentation required).       Informed Consent: Informed consent signed with the Patient and all parties understand the risks and agree with anesthesia plan.  All questions answered.  Anesthesia consent signed with patient.  ASA Score: 2     Day of Surgery Review of History & Physical:    H&P Update referred to the surgeon/provider.          Ready For Surgery From Anesthesia Perspective.           Physical Exam  General: Well nourished    Airway:  Mallampati: II   Mouth Opening: Normal  TM Distance: Normal, at least 6 cm  Tongue: Normal  Neck ROM: Normal ROM    Dental:  In tact    Chest/Lungs:  Clear to auscultation    Heart:  Rate: Normal  Rhythm: Regular Rhythm          Anesthesia Plan  Type of Anesthesia, risks & benefits discussed:    Anesthesia Type: Gen Natural Airway  Intra-op Monitoring Plan: standard ASA monitors  Post Op Pain Control Plan: per primary service following discharge from PACU  Induction:  IV  Informed Consent: Informed consent signed with the Patient and all parties understand the risks and agree with anesthesia plan.  All questions answered.   ASA Score: 2  Day of Surgery Review of History & Physical: H&P Update referred to the surgeon/provider.    Ready For Surgery From Anesthesia Perspective.       .

## 2023-06-22 NOTE — PROVATION PATIENT INSTRUCTIONS
Discharge Summary/Instructions after an Endoscopic Procedure  Patient Name: Lyndon Lion  Patient MRN: [id number]  Patient YOB: 1970 Thursday, June 22, 2023  Dom Leonardo MD  Dear patient,  As a result of recent federal legislation (The Federal Cures Act), you may   receive lab or pathology results from your procedure in your [facility]   account before your physician is able to contact you. Your physician or   their representative will relay the results to you with their   recommendations at their soonest availability.  Thank you,  RESTRICTIONS:  During your procedure today, you received medications for sedation.  These   medications may affect your judgment, balance and coordination.  Therefore,   for 24 hours, you have the following restrictions:   - DO NOT drive a car, operate machinery, make legal/financial decisions,   sign important papers or drink alcohol.    ACTIVITY:  Today: no heavy lifting, straining or running due to procedural   sedation/anesthesia.  The following day: return to full activity including work.  DIET:  Eat and drink normally unless instructed otherwise.     TREATMENT FOR COMMON SIDE EFFECTS:  - Mild abdominal pain, nausea, belching, bloating or excessive gas:  rest,   eat lightly and use a heating pad.  - Sore Throat: treat with throat lozenges and/or gargle with warm salt   water.  - Because air was used during the procedure, expelling large amounts of air   from your rectum or belching is normal.  - If a bowel prep was taken, you may not have a bowel movement for 1-3 days.    This is normal.  SYMPTOMS TO WATCH FOR AND REPORT TO YOUR PHYSICIAN:  1. Abdominal pain or bloating, other than gas cramps.  2. Chest pain.  3. Back pain.  4. Signs of infection such as: chills or fever occurring within 24 hours   after the procedure.  5. Rectal bleeding, which would show as bright red, maroon, or black stools.   (A tablespoon of blood from the rectum is not serious, especially  if   hemorrhoids are present.)  6. Vomiting.  7. Weakness or dizziness.  GO DIRECTLY TO THE NEAREST EMERGENCY ROOM IF YOU HAVE ANY OF THE FOLLOWING:      Difficulty breathing              Chills and/or fever over 101 F   Persistent vomiting and/or vomiting blood   Severe abdominal pain   Severe chest pain   Black, tarry stools   Bleeding- more than one tablespoon   Any other symptom or condition that you feel may need urgent attention  Your doctor recommends these additional instructions:  If any biopsies were taken, your doctors clinic will contact you in 1 to 2   weeks with any results.  - Discharge patient to home.   - Repeat colonoscopy in 1 year for surveillance.   - Return to GI clinic at the next available appointment.   - Return to primary care physician.   - The findings and recommendations were discussed with the patient.  For questions, problems or results please call your physician - Dom Leonardo MD at Work:  [phone].  [facility] [place], EMERGENCY ROOM PHONE NUMBER: [phone]  IF A COMPLICATION OR EMERGENCY SITUATION ARISES AND YOU ARE UNABLE TO REACH   YOUR PHYSICIAN - GO DIRECTLY TO THE EMERGENCY ROOM.  Dom Leonardo MD  6/22/2023 3:49:48 PM  This report has been verified and signed electronically.  Dear patient,  As a result of recent federal legislation (The Federal Cures Act), you may   receive lab or pathology results from your procedure in your [facility]   account before your physician is able to contact you. Your physician or   their representative will relay the results to you with their   recommendations at their soonest availability.  Thank you,  PROVATION

## 2023-06-22 NOTE — H&P
[facility] 4[facility]  History & Physical    Subjective:      Chief Complaint/Reason for Admission:     EGD and colonoscopy    He was discovered to have colon cancer and had right hemicolectomy        for stage II on 08/08/2008. MSH2 Parada syndrome.     Lyndon Lion Jr. is a 53 y.o. male.    Past Medical History:   Diagnosis Date    Arthritis     Asthma     AS A CHILD    Colon cancer     Family hx of prostate cancer 11/22/2016    Hx of colon cancer, stage I 07/03/2014    Parada syndrome     Squamous cell carcinoma of skin     Tear of labium 10/2020    left shoulder Dr Molina    Uncontrolled type 2 diabetes mellitus with hyperglycemia 03/04/2021    Vitamin D deficiency     Wears glasses      Past Surgical History:   Procedure Laterality Date    APPENDECTOMY      APPLICATION OF CARTILAGE GRAFT N/A 3/21/2023    Procedure: APPLICATION, CARTILAGE GRAFT;  Surgeon: Alyx Spivey MD;  Location: [address];  Service: ENT;  Laterality: N/A;    CAUDAL EPIDURAL STEROID INJECTION N/A 11/6/2018    Procedure: Injection-steroid-epidural-caudal;  Surgeon: Lyndon Brooke Jr., MD;  Location: [facility];  Service: Pain Management;  Laterality: N/A;    COLON SURGERY  2008    PARTIAL COLECTOMY    COLONOSCOPY Bilateral 2012    COLONOSCOPY N/A 8/1/2016    Procedure: COLONOSCOPY;  Surgeon: Blaze Marr MD;  Location: [facility];  Service: Endoscopy;  Laterality: N/A;    COLONOSCOPY N/A 9/20/2017    Procedure: COLONOSCOPY;  Surgeon: Dom Leonardo MD;  Location: [address]);  Service: Endoscopy;  Laterality: N/A;  not given PM prep    COLONOSCOPY N/A 10/9/2017    Procedure: COLONOSCOPY;  Surgeon: RAMON Callahan MD;  [address]  Service: Endoscopy;  Laterality: N/A;  ok for Prepopik per Dr Callahan    COLONOSCOPY N/A 11/20/2017    Procedure: COLONOSCOPY/EMR;  Surgeon: Joseluis Choe MD;  Location: [facility]);  Service: Endoscopy;  Laterality: N/A;  EMR    no pm prep     COLONOSCOPY N/A 5/30/2018    Procedure: COLONOSCOPY;  Surgeon: Dom Leonardo MD;  Location: [facility] (4TH FLR);  Service: Endoscopy;  Laterality: N/A;  follow up colonoscopy in 5/2018 for Parada Syndrome         COLONOSCOPY N/A 6/10/2019    Procedure: COLONOSCOPY;  Surgeon: Dom Leonadro MD;  [facility]  Service: Endoscopy;  Laterality: N/A;  Prepopik ordered per Dr. Leonardo    COLONOSCOPY N/A 7/22/2020    Procedure: COLONOSCOPY;  Surgeon: Dom Leonardo MD;  [facility]  Service: Endoscopy;  Laterality: N/A;    COLONOSCOPY N/A 5/27/2021    Procedure: COLONOSCOPY;  Surgeon: Dom Leonardo MD;  [facility]  Service: Endoscopy;  Laterality: N/A;  covid test 5/24-[place]  pt requests Prepopik    COLONOSCOPY N/A 6/17/2022    Procedure: COLONOSCOPY;  Surgeon: Dom Leonardo MD;  [facility]  Service: Endoscopy;  Laterality: N/A;  He was discovered to have colon cancer and had right hemicolectomy        for stage II on 08/08/2008. MSH2 Parada syndrome.  sutab requested  instructions via the portal -sm  fully vaccinated - sm    CYSTOSCOPY      Epidural Steroid Injection  10/23/15    Lumbar    EPIDURAL STEROID INJECTION INTO LUMBAR SPINE N/A 7/27/2018    Procedure: Injection-steroid-epidural-lumbar;  Surgeon: Lyndon Brooke Jr., MD;  Location: [facility] OR;  Service: Pain Management;  Laterality: N/A;    ESOPHAGOGASTRODUODENOSCOPY      ESOPHAGOGASTRODUODENOSCOPY N/A 7/22/2020    Procedure: EGD (ESOPHAGOGASTRODUODENOSCOPY);  Surgeon: Dom Leonardo MD;  [facility]  Service: Endoscopy;  Laterality: N/A;  Please schedule patient for EGD and colonoscopy with me MSH2 Parada syndrome and anemia evaluation please make priority 1  covid test 7/20-[place]    EXCISION OR SURGICAL PLANING, SKIN, NOSE, FOR RHINOPHYMA Bilateral 3/7/2023    Procedure: EXCISION OR SURGICAL resection nasal skin cancer;  Surgeon: Alyx Spivey,  MD;  Location: [facility] 2ND F[facility];  Service: ENT;  Laterality: Bilateral;    FACETECTOMY OF VERTEBRA  2/13/2019    Procedure: MEDIAL FACETECTOMY L5-S1;  Surgeon: Lyndon Brooke Jr., MD;  Location: [facility] OR;  Service: Orthopedics;;    GASTRIC BYPASS  2010    SLEEVE    KNEE ARTHROSCOPY Right     LUMBAR DISCECTOMY  2/13/2019    Procedure: DISCECTOMY, SPINE, LUMBAR L5-S1;  Surgeon: Lyndon Brooke Jr., MD;  Location: [facility] OR;  Service: Orthopedics;;    NASAL RECONSTRUCTION N/A 3/21/2023    Procedure: RECONSTRUCTION, NOSE;  Surgeon: Alyx Spivey MD;  Location: [facility] 2ND F[facility];  Service: ENT;  Laterality: N/A;    REVISION OF FLAP GRAFT Bilateral 4/18/2023    Procedure: REVISION, PROCEDURE INVOLVING FLAP GRAFT;  Surgeon: Alyx Spivey MD;  Location: [facility] OR;  Service: ENT;  Laterality: Bilateral;    ROTATION FLAP SURGERY N/A 3/21/2023    Procedure: CREATION, FLAP, ROTATION;  Surgeon: Alyx Spivey MD;  Location: [facility] OR 2ND FLR;  Service: ENT;  Laterality: N/A;     Family History   Problem Relation Age of Onset    Melanoma Mother     Cancer Mother         breast    Breast cancer Mother     Heart disease Father     Diabetes Father     Liver disease Father     Hearing loss Father     Basal cell carcinoma Father     No Known Problems Sister     Polycystic ovary syndrome Daughter     Cancer Maternal Uncle         colon    Colon cancer Maternal Uncle         x2    Heart disease Maternal Uncle     Hypertension Maternal Uncle     No Known Problems Paternal Aunt     Alcohol abuse Paternal Uncle     Prostate cancer Paternal Uncle     Cancer Paternal Uncle     Dementia Maternal Grandmother     Cancer Maternal Grandfather         colon ca    Colon cancer Maternal Grandfather     Early death Maternal Grandfather     Diabetes Paternal Grandmother     Hypertension Paternal Grandfather     Prostatitis Paternal Grandfather     Psoriasis Neg Hx     Lupus Neg Hx     Eczema Neg Hx      Social History     Tobacco Use    Smoking status:  "Never    Smokeless tobacco: Never   Substance Use Topics    Alcohol use: Yes     Comment: RARELY    Drug use: No       PTA Medications   Medication Sig    ascorbic acid, vitamin C, (VITAMIN C) 100 MG tablet Take 100 mg by mouth once daily.    aspirin (ECOTRIN) 81 MG EC tablet Take 81 mg by mouth once daily.    cyanocobalamin, vitamin B-12, 2,500 mcg Lozg Place 2 tablets under the tongue once daily.    ferrous gluconate (FERGON) 240 (27 FE) MG tablet TAKE 27 MG BY MOUTH 2 (TWO) TIMES DAILY WITH MEALS.    metFORMIN (GLUCOPHAGE-XR) 500 MG ER 24hr tablet Take 1 tablet (500 mg total) by mouth 2 (two) times daily with meals.    testosterone cypionate (DEPOTESTOTERONE CYPIONATE) 200 mg/mL injection Inject 60 mg into the muscle every 14 (fourteen) days. Twice a week    zinc gluconate 50 mg tablet Take 50 mg by mouth once daily.    ACCU-CHEK GUIDE TEST STRIPS Strp USE TO TEST TWICE A DAY    ciclopirox (PENLAC) 8 % Soln Apply topically nightly.    lancets (ACCU-CHEK SOFTCLIX LANCETS) Misc 1 Units by Misc.(Non-Drug; Combo Route) route 2 (two) times a day.    pen needle, diabetic (PEN NEEDLE) 32 gauge x 5/32" Ndle 1 each by Misc.(Non-Drug; Combo Route) route once daily.    pravastatin (PRAVACHOL) 20 MG tablet Take 1 tablet (20 mg total) by mouth once daily.    semaglutide (OZEMPIC) 2 mg/dose (8 mg/3 mL) PnIj Inject 2 mg into the skin every 7 days.    sod sulf-pot chloride-mag sulf (SUTAB) 1.479-0.188- 0.225 gram tablet Take 12 tablets by mouth once daily. Take according to package instructions with indicated amount of water.    sulfacetamide sodium-sulfur 10-5 % (w/w) Clsr Use to wash face daily    tadalafiL (CIALIS) 5 MG tablet Take 1 tablet (5 mg total) by mouth daily as needed for Erectile Dysfunction.    triamcinolone acetonide 0.025% (KENALOG) 0.025 % Oint Thin film to lips and eczematous plaques BID PRN flare    triamcinolone acetonide 0.1% (KENALOG) 0.1 % cream AAA bid    vitamin A 8000 UNIT capsule Take 1 capsule (8,000 " Units total) by mouth once daily.     Review of patient's allergies indicates:  No Known Allergies     Review of Systems   Constitutional:  Negative for fever.   Cardiovascular:  Negative for chest pain.     Objective:      Vital Signs (Most Recent)  Temp: 98.4 °F (36.9 °C) (06/22/23 1302)  Pulse: 75 (06/22/23 1302)  Resp: 16 (06/22/23 1302)  BP: 134/74 (06/22/23 1302)  SpO2: 97 % (06/22/23 1302)    Vital Signs Range (Last 24H):  Temp:  [98.4 °F (36.9 °C)]   Pulse:  [75]   Resp:  [16]   BP: (134)/(74)   SpO2:  [97 %]     Physical Exam  Cardiovascular:      Rate and Rhythm: Normal rate.   Neurological:      Mental Status: He is alert and oriented to person, place, and time.         Assessment:        EGD and colonoscopy    He was discovered to have colon cancer and had right hemicolectomy        for stage II on 08/08/2008. MSH2 Parada syndrome.       Plan:        EGD and colonoscopy    He was discovered to have colon cancer and had right hemicolectomy        for stage II on 08/08/2008. MSH2 Parada syndrome.

## 2023-06-23 ENCOUNTER — HOSPITAL ENCOUNTER (OUTPATIENT)
Dept: RADIOLOGY | Facility: HOSPITAL | Age: 53
Discharge: HOME | End: 2023-06-23
Attending: UROLOGY
Payer: COMMERCIAL

## 2023-06-23 DIAGNOSIS — Z01.818 PRE-OP TESTING: Primary | ICD-10-CM

## 2023-06-23 DIAGNOSIS — R31.29 MICROHEMATURIA: ICD-10-CM

## 2023-06-23 PROCEDURE — 25500020 PHARM REV CODE 255: Performed by: UROLOGY

## 2023-06-23 PROCEDURE — 74178 CT ABD&PLV WO CNTR FLWD CNTR: CPT | Mod: TC

## 2023-06-23 RX ADMIN — IOHEXOL 100 ML: 350 INJECTION, SOLUTION INTRAVENOUS at 02:06

## 2023-06-23 NOTE — ANESTHESIA PAT ROS NOTE
06/23/2023  Lyndon Lion Jr. is a 53 y.o., male.      Pre-op Assessment          Review of Systems  Anesthesia Hx:  No problems with previous Anesthesia   History of prior surgery of interest to airway management or planning:  Previous anesthesia: General REVISION, PROCEDURE INVOLVING FLAP GRAFT (Bilateral) 4/18/23 with general anesthesia.  Procedure performed at an [facility].      Airway issues documented on chart review include mask, easy, easy direct laryngoscopy , view on direct laryngoscopy Grade I    Denies Family Hx of Anesthesia complications.    Denies Personal Hx of Anesthesia complications.                     Social:  Non-Smoker, Social Alcohol Use       Hematology/Oncology:                     Hematology Comments: HUTCHINS SYNDROME    Current/Recent Cancer.  --  Cancer in past history (COLON):              chemotherapy and surgery       EENT/Dental:   SQUAMOUS CELL CARCINOMA OF SKIN OF NOSE          Cardiovascular:            Denies Angina.     hyperlipidemia     Functional Capacity good / => 4 METS                         Pulmonary:    Asthma   Denies Shortness of breath.  Denies Recent URI.                 Renal/:  Renal/ Normal                 Hepatic/GI:        S/P SLEEVE GASTRECTOMY       Liver Disease, Fatty Liver        Musculoskeletal:  Arthritis             Lumbar Spine Disorders, Lumbar Disc Disease   Endocrine:  Diabetes, type 2         Obesity / BMI > 30  Psych:  Psychiatric Normal                  Anesthesia Assessment: Preoperative EQUATION    Planned Procedure: Procedure(s) (LRB):  REVISION, PROCEDURE INVOLVING FLAP GRAFT (N/A)  Requested Anesthesia Type:General/MAC  Surgeon: Alyx Spivey MD  Service: ENT  Known or anticipated Date of Surgery:6/29/2023    Surgeon notes: reviewed    Electronic QUestionnaire Assessment completed via nurse interview with patient.         Triage considerations:     The patient has no apparent active cardiac condition (No unstable coronary Syndrome such as severe unstable angina or recent [<1 month] myocardial infarction, decompensated CHF, severe valvular   disease or significant arrhythmia)    Previous anesthesia records:GETA and No problems    Airway/Jaw/Neck:  Airway Findings: Mouth Opening: Normal   Tongue: Normal   General Airway Assessment: Adult, Good Mallampati: II  TM Distance: Normal, at least 6 cm   Jaw/Neck Findings:  Neck ROM: Normal ROM    Intubation     Date/Time: 4/18/2023 7:24 AM  Performed by: Rj Boles CRNA  Authorized by: Rj Boles CRNA      Intubation:     Induction:  Intravenous    Intubated:  Postinduction    Mask Ventilation:  Easy with oral airway    Attempts:  1    Attempted By:  CRNA    Method of Intubation:  Direct    Blade:  Manjit 4    Laryngeal View Grade: Grade I - full view of cords      Difficult Airway Encountered?: No      Complications:  None    Airway Device:  Oral albert    Airway Device Size:  8.0    Style/Cuff Inflation:  Cuffed    Tube secured:  21    Secured at:  The lips    Placement Verified By:  Capnometry and Colorimetric ETCO2 device    Complicating Factors:  None    Findings Post-Intubation:  BS equal bilateral    Intubation     Date/Time: 3/21/2023 8:16 AM  Performed by: Reggie Espino MD  Authorized by: Chapo Doshi MD      Intubation:     Induction:  Intravenous    Intubated:  Postinduction    Mask Ventilation:  Easy with oral airway    Attempts:  2    Attempted By:  Resident anesthesiologist    Method of Intubation:  Direct    Blade:  Atkinson 2    Laryngeal View Grade: Grade IIb - only the arytenoids and epiglottis seen      Attempted By (2nd Attempt):  Resident anesthesiologist    Method of Intubation (2nd Attempt):  Video laryngoscopy    Blade (2nd Attempt):  Mcfarlane 4    Laryngeal View Grade (2nd Attempt): Grade IIa - cords partially seen      Difficult Airway  Encountered?: No      Complications:  None    Airway Device:  Oral albert    Airway Device Size:  7.5    Style/Cuff Inflation:  Cuffed    Inflation Amount (mL):  6    Tube secured:  23    Secured at:  The lips    Placement Verified By:  Capnometry    Complicating Factors:  Large/floppy epiglottis    Findings Post-Intubation:  BS equal bilateral and atraumatic/condition of teeth unchanged          Last PCP note: within 1 month , within [facility]   Subspecialty notes: Dermatology, Endocrinology, ENT, Urology    Other important co-morbidities: DM2, Obesity, and s/p sleeve gastrectomyy, squamous cell carcinoma of nose       Tests already available:  Available tests,  within 3 months , within [facility] .     4/21/23  A1C    4/7/23  CMP, CBC    3/7/23  NM LYMPHATICS AND LYMPH NODE IMAGING            Instructions given. (See in Nurse's note)    Optimization:  Anesthesia Preop Clinic Assessment  NOT Indicated    Medical Opinion NOT Indicated           Plan:    Testing:  PT/INR and PTT     Patient  has previously scheduled Medical Appointment: 6/26 LAB    Navigation: Tests Scheduled.                           Results will be tracked by Preop Clinic.

## 2023-06-26 ENCOUNTER — LAB VISIT (OUTPATIENT)
Dept: LAB | Facility: HOSPITAL | Age: 53
End: 2023-06-26
Attending: UROLOGY
Payer: COMMERCIAL

## 2023-06-26 DIAGNOSIS — R31.29 MICROHEMATURIA: ICD-10-CM

## 2023-06-26 DIAGNOSIS — Z01.818 PRE-OP TESTING: ICD-10-CM

## 2023-06-26 PROCEDURE — 85730 THROMBOPLASTIN TIME PARTIAL: CPT | Performed by: ANESTHESIOLOGY

## 2023-06-26 PROCEDURE — 80048 BASIC METABOLIC PNL TOTAL CA: CPT | Performed by: UROLOGY

## 2023-06-26 PROCEDURE — 85610 PROTHROMBIN TIME: CPT | Performed by: ANESTHESIOLOGY

## 2023-06-26 PROCEDURE — 36415 COLL VENOUS BLD VENIPUNCTURE: CPT | Mod: PO | Performed by: ANESTHESIOLOGY

## 2023-06-27 ENCOUNTER — PATIENT MESSAGE (OUTPATIENT)
Dept: SURGERY | Facility: HOSPITAL | Age: 53
End: 2023-06-27
Payer: COMMERCIAL

## 2023-06-27 LAB
ANION GAP SERPL CALC-SCNC: 9 MMOL/L (ref 8–16)
APTT PPP: 31.5 SEC (ref 21–32)
BUN SERPL-MCNC: 18 MG/DL (ref 6–20)
CALCIUM SERPL-MCNC: 8.9 MG/DL (ref 8.7–10.5)
CHLORIDE SERPL-SCNC: 109 MMOL/L (ref 95–110)
CO2 SERPL-SCNC: 23 MMOL/L (ref 23–29)
CREAT SERPL-MCNC: 1.3 MG/DL (ref 0.5–1.4)
EST. GFR  (NO RACE VARIABLE): >60 ML/MIN/1.73 M^2
GLUCOSE SERPL-MCNC: 100 MG/DL (ref 70–110)
INR PPP: 1.1 (ref 0.8–1.2)
POTASSIUM SERPL-SCNC: 4 MMOL/L (ref 3.5–5.1)
PROTHROMBIN TIME: 11.4 SEC (ref 9–12.5)
SODIUM SERPL-SCNC: 141 MMOL/L (ref 136–145)

## 2023-06-28 ENCOUNTER — ANESTHESIA EVENT (OUTPATIENT)
Dept: SURGERY | Facility: HOSPITAL | Age: 53
End: 2023-06-28
Payer: COMMERCIAL

## 2023-06-28 LAB
FINAL PATHOLOGIC DIAGNOSIS: NORMAL
GROSS: NORMAL
Lab: NORMAL
SUPPLEMENTAL DIAGNOSIS: NORMAL

## 2023-06-29 ENCOUNTER — HOSPITAL ENCOUNTER (OUTPATIENT)
Facility: HOSPITAL | Age: 53
Discharge: HOME | End: 2023-06-29
Attending: OTOLARYNGOLOGY | Admitting: OTOLARYNGOLOGY
Payer: COMMERCIAL

## 2023-06-29 ENCOUNTER — ANESTHESIA (OUTPATIENT)
Dept: SURGERY | Facility: HOSPITAL | Age: 53
End: 2023-06-29
Payer: COMMERCIAL

## 2023-06-29 VITALS
OXYGEN SATURATION: 95 % | DIASTOLIC BLOOD PRESSURE: 73 MMHG | HEIGHT: 74 IN | SYSTOLIC BLOOD PRESSURE: 133 MMHG | TEMPERATURE: 97 F | HEART RATE: 73 BPM | BODY MASS INDEX: 32.08 KG/M2 | RESPIRATION RATE: 21 BRPM | WEIGHT: 250 LBS

## 2023-06-29 DIAGNOSIS — L98.8 MOHS DEFECT: Primary | ICD-10-CM

## 2023-06-29 DIAGNOSIS — M95.0 ACQUIRED NASAL DEFORMITY: ICD-10-CM

## 2023-06-29 DIAGNOSIS — C44.321 SQUAMOUS CELL CANCER OF SKIN OF NASAL TIP: ICD-10-CM

## 2023-06-29 DIAGNOSIS — Z98.890 MOHS DEFECT: Primary | ICD-10-CM

## 2023-06-29 DIAGNOSIS — J34.89 OBSTRUCTION OF NASAL VALVE: ICD-10-CM

## 2023-06-29 LAB — POCT GLUCOSE: 92 MG/DL (ref 70–110)

## 2023-06-29 PROCEDURE — 25000003 PHARM REV CODE 250: Performed by: NURSE ANESTHETIST, CERTIFIED REGISTERED

## 2023-06-29 PROCEDURE — 14060 TIS TRNFR E/N/E/L 10 SQ CM/<: CPT | Mod: ,,, | Performed by: OTOLARYNGOLOGY

## 2023-06-29 PROCEDURE — 25000003 PHARM REV CODE 250: Performed by: OTOLARYNGOLOGY

## 2023-06-29 PROCEDURE — 71000015 HC POSTOP RECOV 1ST HR: Performed by: OTOLARYNGOLOGY

## 2023-06-29 PROCEDURE — 36000706: Performed by: OTOLARYNGOLOGY

## 2023-06-29 PROCEDURE — 14060 PR ADJ TISS XFER LID,NOS,EAR <10 SQCM: ICD-10-PCS | Mod: ,,, | Performed by: OTOLARYNGOLOGY

## 2023-06-29 PROCEDURE — D9220A PRA ANESTHESIA: ICD-10-PCS | Mod: ANES,,, | Performed by: ANESTHESIOLOGY

## 2023-06-29 PROCEDURE — 88305 TISSUE EXAM BY PATHOLOGIST: CPT | Performed by: PATHOLOGY

## 2023-06-29 PROCEDURE — 88305 TISSUE EXAM BY PATHOLOGIST: ICD-10-PCS | Mod: 26,,, | Performed by: PATHOLOGY

## 2023-06-29 PROCEDURE — 88305 TISSUE EXAM BY PATHOLOGIST: CPT | Mod: 26,,, | Performed by: PATHOLOGY

## 2023-06-29 PROCEDURE — 71000044 HC DOSC ROUTINE RECOVERY FIRST HOUR: Performed by: OTOLARYNGOLOGY

## 2023-06-29 PROCEDURE — D9220A PRA ANESTHESIA: ICD-10-PCS | Mod: CRNA,,, | Performed by: NURSE ANESTHETIST, CERTIFIED REGISTERED

## 2023-06-29 PROCEDURE — 37000008 HC ANESTHESIA 1ST 15 MINUTES: Performed by: OTOLARYNGOLOGY

## 2023-06-29 PROCEDURE — 36000707: Performed by: OTOLARYNGOLOGY

## 2023-06-29 PROCEDURE — 37000009 HC ANESTHESIA EA ADD 15 MINS: Performed by: OTOLARYNGOLOGY

## 2023-06-29 PROCEDURE — D9220A PRA ANESTHESIA: Mod: CRNA,,, | Performed by: NURSE ANESTHETIST, CERTIFIED REGISTERED

## 2023-06-29 PROCEDURE — 63600175 PHARM REV CODE 636 W HCPCS: Performed by: NURSE ANESTHETIST, CERTIFIED REGISTERED

## 2023-06-29 PROCEDURE — 71000016 HC POSTOP RECOV ADDL HR: Performed by: OTOLARYNGOLOGY

## 2023-06-29 PROCEDURE — D9220A PRA ANESTHESIA: Mod: ANES,,, | Performed by: ANESTHESIOLOGY

## 2023-06-29 PROCEDURE — 82962 GLUCOSE BLOOD TEST: CPT | Performed by: OTOLARYNGOLOGY

## 2023-06-29 RX ORDER — MIDAZOLAM HYDROCHLORIDE 1 MG/ML
INJECTION, SOLUTION INTRAMUSCULAR; INTRAVENOUS
Status: DISCONTINUED | OUTPATIENT
Start: 2023-06-29 | End: 2023-06-29

## 2023-06-29 RX ORDER — FENTANYL CITRATE 50 UG/ML
25 INJECTION, SOLUTION INTRAMUSCULAR; INTRAVENOUS EVERY 5 MIN PRN
Status: DISCONTINUED | OUTPATIENT
Start: 2023-06-29 | End: 2023-07-03 | Stop reason: HOSPADM

## 2023-06-29 RX ORDER — SODIUM CHLORIDE 9 MG/ML
INJECTION, SOLUTION INTRAVENOUS CONTINUOUS
Status: DISCONTINUED | OUTPATIENT
Start: 2023-06-29 | End: 2023-07-03 | Stop reason: HOSPADM

## 2023-06-29 RX ORDER — MUPIROCIN 20 MG/G
OINTMENT TOPICAL 3 TIMES DAILY
Qty: 22 G | Refills: 0 | Status: SHIPPED | OUTPATIENT
Start: 2023-06-29 | End: 2023-07-04

## 2023-06-29 RX ORDER — LIDOCAINE HYDROCHLORIDE 10 MG/ML
INJECTION, SOLUTION EPIDURAL; INFILTRATION; INTRACAUDAL; PERINEURAL
Status: DISCONTINUED
Start: 2023-06-29 | End: 2023-06-29 | Stop reason: HOSPADM

## 2023-06-29 RX ORDER — ROCURONIUM BROMIDE 10 MG/ML
INJECTION, SOLUTION INTRAVENOUS
Status: DISCONTINUED | OUTPATIENT
Start: 2023-06-29 | End: 2023-06-29

## 2023-06-29 RX ORDER — LIDOCAINE HYDROCHLORIDE AND EPINEPHRINE 10; 10 MG/ML; UG/ML
INJECTION, SOLUTION INFILTRATION; PERINEURAL
Status: DISCONTINUED | OUTPATIENT
Start: 2023-06-29 | End: 2023-06-29 | Stop reason: HOSPADM

## 2023-06-29 RX ORDER — FENTANYL CITRATE 50 UG/ML
INJECTION, SOLUTION INTRAMUSCULAR; INTRAVENOUS
Status: DISCONTINUED | OUTPATIENT
Start: 2023-06-29 | End: 2023-06-29

## 2023-06-29 RX ORDER — LIDOCAINE HYDROCHLORIDE 20 MG/ML
INJECTION INTRAVENOUS
Status: DISCONTINUED | OUTPATIENT
Start: 2023-06-29 | End: 2023-06-29

## 2023-06-29 RX ORDER — DEXAMETHASONE SODIUM PHOSPHATE 4 MG/ML
INJECTION, SOLUTION INTRA-ARTICULAR; INTRALESIONAL; INTRAMUSCULAR; INTRAVENOUS; SOFT TISSUE
Status: DISCONTINUED | OUTPATIENT
Start: 2023-06-29 | End: 2023-06-29

## 2023-06-29 RX ORDER — SODIUM CHLORIDE 0.9 % (FLUSH) 0.9 %
3 SYRINGE (ML) INJECTION
Status: DISCONTINUED | OUTPATIENT
Start: 2023-06-29 | End: 2023-07-03 | Stop reason: HOSPADM

## 2023-06-29 RX ORDER — PROPOFOL 10 MG/ML
VIAL (ML) INTRAVENOUS
Status: DISCONTINUED | OUTPATIENT
Start: 2023-06-29 | End: 2023-06-29

## 2023-06-29 RX ORDER — CEFAZOLIN SODIUM 1 G/3ML
INJECTION, POWDER, FOR SOLUTION INTRAMUSCULAR; INTRAVENOUS
Status: DISCONTINUED | OUTPATIENT
Start: 2023-06-29 | End: 2023-06-29

## 2023-06-29 RX ORDER — OXYMETAZOLINE HCL 0.05 %
SPRAY, NON-AEROSOL (ML) NASAL
Status: DISCONTINUED | OUTPATIENT
Start: 2023-06-29 | End: 2023-06-29 | Stop reason: HOSPADM

## 2023-06-29 RX ORDER — SUCCINYLCHOLINE CHLORIDE 20 MG/ML
INJECTION INTRAMUSCULAR; INTRAVENOUS
Status: DISCONTINUED | OUTPATIENT
Start: 2023-06-29 | End: 2023-06-29

## 2023-06-29 RX ORDER — TRAMADOL HYDROCHLORIDE 50 MG/1
50 TABLET ORAL ONCE AS NEEDED
Status: DISCONTINUED | OUTPATIENT
Start: 2023-06-29 | End: 2023-07-03 | Stop reason: HOSPADM

## 2023-06-29 RX ORDER — EPHEDRINE SULFATE 50 MG/ML
INJECTION, SOLUTION INTRAVENOUS
Status: DISCONTINUED | OUTPATIENT
Start: 2023-06-29 | End: 2023-06-29

## 2023-06-29 RX ADMIN — FENTANYL CITRATE 50 MCG: 50 INJECTION, SOLUTION INTRAMUSCULAR; INTRAVENOUS at 11:06

## 2023-06-29 RX ADMIN — LIDOCAINE HYDROCHLORIDE 100 MG: 20 INJECTION INTRAVENOUS at 11:06

## 2023-06-29 RX ADMIN — EPHEDRINE SULFATE 5 MG: 50 INJECTION INTRAVENOUS at 12:06

## 2023-06-29 RX ADMIN — EPHEDRINE SULFATE 10 MG: 50 INJECTION INTRAVENOUS at 01:06

## 2023-06-29 RX ADMIN — DEXAMETHASONE SODIUM PHOSPHATE 4 MG: 4 INJECTION, SOLUTION INTRAMUSCULAR; INTRAVENOUS at 12:06

## 2023-06-29 RX ADMIN — EPHEDRINE SULFATE 15 MG: 50 INJECTION INTRAVENOUS at 12:06

## 2023-06-29 RX ADMIN — MIDAZOLAM HYDROCHLORIDE 2 MG: 1 INJECTION, SOLUTION INTRAMUSCULAR; INTRAVENOUS at 11:06

## 2023-06-29 RX ADMIN — EPHEDRINE SULFATE 15 MG: 50 INJECTION INTRAVENOUS at 01:06

## 2023-06-29 RX ADMIN — SUCCINYLCHOLINE CHLORIDE 200 MG: 20 INJECTION, SOLUTION INTRAMUSCULAR; INTRAVENOUS at 11:06

## 2023-06-29 RX ADMIN — SODIUM CHLORIDE, SODIUM GLUCONATE, SODIUM ACETATE, POTASSIUM CHLORIDE, MAGNESIUM CHLORIDE, SODIUM PHOSPHATE, DIBASIC, AND POTASSIUM PHOSPHATE: .53; .5; .37; .037; .03; .012; .00082 INJECTION, SOLUTION INTRAVENOUS at 12:06

## 2023-06-29 RX ADMIN — CEFAZOLIN 2 G: 330 INJECTION, POWDER, FOR SOLUTION INTRAMUSCULAR; INTRAVENOUS at 12:06

## 2023-06-29 RX ADMIN — ROCURONIUM BROMIDE 5 MG: 10 INJECTION INTRAVENOUS at 11:06

## 2023-06-29 RX ADMIN — SODIUM CHLORIDE: 0.9 INJECTION, SOLUTION INTRAVENOUS at 11:06

## 2023-06-29 RX ADMIN — PROPOFOL 200 MG: 10 INJECTION, EMULSION INTRAVENOUS at 11:06

## 2023-06-29 RX ADMIN — ROCURONIUM BROMIDE 45 MG: 10 INJECTION INTRAVENOUS at 12:06

## 2023-06-29 NOTE — ANESTHESIA PREPROCEDURE EVALUATION
06/29/2023  Lyndon Lion Jr. is a 53 y.o., male.  Pre-operative evaluation for Procedure(s) (LRB):  REVISION, PROCEDURE INVOLVING FLAP GRAFT (N/A)    Lyndon Lion Jr. is a 53 y.o. male     Patient Active Problem List   Diagnosis    Radiculopathy, lumbosacral region    Herniated lumbar intervertebral disc    Lumbar spondylosis    DDD (degenerative disc disease), lumbosacral    Acute medial meniscal tear    Obesity, unspecified    Parada syndrome    Thoracic or lumbosacral neuritis or radiculitis, unspecified    Neural foraminal stenosis of lumbar spine    MSH2-related Parada syndrome (HNPCC1)    Spondylosis of lumbar region without myelopathy or radiculopathy    Spondylolisthesis of lumbar region    Nonalcoholic fatty liver disease    Splenomegaly    Mild vitamin D deficiency    Colon dysplasia    Colon adenoma    Pneumoperitoneum    Postpolypectomy electrocoagulation syndrome    Lumbar radiculopathy    History of colon cancer, stage II    Uncontrolled type 2 diabetes mellitus with hyperglycemia    Type 2 diabetes mellitus with hyperglycemia    Obesity (BMI 30-39.9)    Gastroesophageal reflux disease without esophagitis    Bariatric surgery status    S/P laparoscopic sleeve gastrectomy    Hypovitaminosis D    Hypophosphatemia    Vitamin B12 deficiency    Vitamin A deficiency    Erectile dysfunction    Hypogonadism in male    Squamous cell cancer of skin of nasal tip       Review of patient's allergies indicates:  No Known Allergies    No current facility-administered medications on file prior to encounter.     Current Outpatient Medications on File Prior to Encounter   Medication Sig Dispense Refill    sod sulf-pot chloride-mag sulf (SUTAB) 1.479-0.188- 0.225 gram tablet Take 12 tablets by mouth once daily. Take according to package instructions with indicated amount  "of water. 24 tablet 0    ACCU-CHEK GUIDE TEST STRIPS Strp USE TO TEST TWICE A  strip 3    ascorbic acid, vitamin C, (VITAMIN C) 100 MG tablet Take 100 mg by mouth once daily.      aspirin (ECOTRIN) 81 MG EC tablet Take 81 mg by mouth once daily.      ciclopirox (PENLAC) 8 % Soln Apply topically nightly. 6.6 mL 3    cyanocobalamin, vitamin B-12, 2,500 mcg Lozg Place 2 tablets under the tongue once daily. 180 lozenge 3    ferrous gluconate (FERGON) 240 (27 FE) MG tablet TAKE 27 MG BY MOUTH 2 (TWO) TIMES DAILY WITH MEALS.      lancets (ACCU-CHEK SOFTCLIX LANCETS) Misc 1 Units by Misc.(Non-Drug; Combo Route) route 2 (two) times a day. 200 each 0    metFORMIN (GLUCOPHAGE-XR) 500 MG ER 24hr tablet Take 1 tablet (500 mg total) by mouth 2 (two) times daily with meals. 180 tablet 3    pen needle, diabetic (PEN NEEDLE) 32 gauge x 5/32" Ndle 1 each by Misc.(Non-Drug; Combo Route) route once daily. 90 each 3    pravastatin (PRAVACHOL) 20 MG tablet Take 1 tablet (20 mg total) by mouth once daily. 90 tablet 3    semaglutide (OZEMPIC) 2 mg/dose (8 mg/3 mL) PnIj Inject 2 mg into the skin every 7 days. (Patient taking differently: Inject 2 mg into the skin every Tuesday.) 3 mL 11    sulfacetamide sodium-sulfur 10-5 % (w/w) Clsr Use to wash face daily 170 g 5    tadalafiL (CIALIS) 5 MG tablet Take 1 tablet (5 mg total) by mouth daily as needed for Erectile Dysfunction. 90 tablet 3    testosterone cypionate (DEPOTESTOTERONE CYPIONATE) 200 mg/mL injection Inject 60 mg into the muscle every 14 (fourteen) days. Twice a week      triamcinolone acetonide 0.025% (KENALOG) 0.025 % Oint Thin film to lips and eczematous plaques BID PRN flare 15 g 1    triamcinolone acetonide 0.1% (KENALOG) 0.1 % cream AAA bid 454 g 3    vitamin A 8000 UNIT capsule Take 1 capsule (8,000 Units total) by mouth once daily. 100 capsule 3    zinc gluconate 50 mg tablet Take 50 mg by mouth once daily.         Past Surgical History: "   Procedure Laterality Date    APPENDECTOMY      APPLICATION OF CARTILAGE GRAFT N/A 3/21/2023    Procedure: APPLICATION, CARTILAGE GRAFT;  Surgeon: Alyx Spivey MD;  Location: [facility] OR 2ND FLR;  Service: ENT;  Laterality: N/A;    CAUDAL EPIDURAL STEROID INJECTION N/A 11/6/2018    Procedure: Injection-steroid-epidural-caudal;  Surgeon: Lyndon Brooke Jr., MD;  Location: [facility] OR;  Service: Pain Management;  Laterality: N/A;    COLON SURGERY  2008    PARTIAL COLECTOMY    COLONOSCOPY Bilateral 2012    COLONOSCOPY N/A 8/1/2016    Procedure: COLONOSCOPY;  Surgeon: Blaze Marr MD;  Location: [facility] ENDO;  Service: Endoscopy;  Laterality: N/A;    COLONOSCOPY N/A 9/20/2017    Procedure: COLONOSCOPY;  Surgeon: Dom Leonardo MD;  Location: [facility] (4TH FLR);  Service: Endoscopy;  Laterality: N/A;  not given PM prep    COLONOSCOPY N/A 10/9/2017    Procedure: COLONOSCOPY;  Surgeon: RAMON Callahan MD;  Location: [facility] (4TH FLR);  Service: Endoscopy;  Laterality: N/A;  ok for Prepopik per Dr Callahan    COLONOSCOPY N/A 11/20/2017    Procedure: COLONOSCOPY/EMR;  Surgeon: Joseluis Choe MD;  Location: [facility] (2ND FLR);  Service: Endoscopy;  Laterality: N/A;  EMR    no pm prep    COLONOSCOPY N/A 5/30/2018    Procedure: COLONOSCOPY;  Surgeon: Dom Leonardo MD;  Location: [facility] (4TH FLR);  Service: Endoscopy;  Laterality: N/A;  follow up colonoscopy in 5/2018 for Parada Syndrome         COLONOSCOPY N/A 6/10/2019    Procedure: COLONOSCOPY;  Surgeon: Dom Leonardo MD;  Location: [facility] (4TH FLR);  Service: Endoscopy;  Laterality: N/A;  Prepopik ordered per Dr. Leonardo    COLONOSCOPY N/A 7/22/2020    Procedure: COLONOSCOPY;  Surgeon: Dom Leonardo MD;  Location: [facility] ENDO (4TH FLR);  Service: Endoscopy;  Laterality: N/A;    COLONOSCOPY N/A 5/27/2021    Procedure: COLONOSCOPY;  Surgeon: Dom Leonardo MD;  Location: [facility] ENDO (4TH FLR);  Service: Endoscopy;  Laterality: N/A;  covid  test 5/24-[place]  pt requests Prepopik    COLONOSCOPY N/A 6/17/2022    Procedure: COLONOSCOPY;  Surgeon: Dom Leonardo MD;  Location: [facility] (4TH FLR);  Service: Endoscopy;  Laterality: N/A;  He was discovered to have colon cancer and had right hemicolectomy        for stage II on 08/08/2008. MSH2 Parada syndrome.  sutab requested  instructions via the portal -sm  fully vaccinated - sm    COLONOSCOPY N/A 6/22/2023    Procedure: COLONOSCOPY;  Surgeon: Dom Leonardo MD;  Location: [facility] (4TH FLR);  Service: Endoscopy;  Laterality: N/A;  see telephone encounter dated 5/24/23/ Pt/ Pt wife requested sutab - prep ins. on portal / Ozempic for DM- ERW    CYSTOSCOPY      Epidural Steroid Injection  10/23/15    Lumbar    EPIDURAL STEROID INJECTION INTO LUMBAR SPINE N/A 7/27/2018    Procedure: Injection-steroid-epidural-lumbar;  Surgeon: Lyndon Brooke Jr., MD;  Location: [facility];  Service: Pain Management;  Laterality: N/A;    ESOPHAGOGASTRODUODENOSCOPY      ESOPHAGOGASTRODUODENOSCOPY N/A 7/22/2020    Procedure: EGD (ESOPHAGOGASTRODUODENOSCOPY);  Surgeon: Dom Leonardo MD;  Location: [facility] (4TH FLR);  Service: Endoscopy;  Laterality: N/A;  Please schedule patient for EGD and colonoscopy with me MSH2 Parada syndrome and anemia evaluation please make priority 1  covid test 7/20-[place]    ESOPHAGOGASTRODUODENOSCOPY N/A 6/22/2023    Procedure: EGD (ESOPHAGOGASTRODUODENOSCOPY);  Surgeon: Dom Leonardo MD;  Location: [facility] (4TH FLR);  Service: Endoscopy;  Laterality: N/A;  see telephone encounter dated 5/24/23 6/16/23-[place] completed appointment confirmed-[facility]    EXCISION OR SURGICAL PLANING, SKIN, NOSE, FOR RHINOPHYMA Bilateral 3/7/2023    Procedure: EXCISION OR SURGICAL resection nasal skin cancer;  Surgeon: Alyx Spivey MD;  Location: [facility] OR 2ND FLR;  Service: ENT;  Laterality: Bilateral;    FACETECTOMY OF VERTEBRA  2/13/2019    Procedure: MEDIAL FACETECTOMY  L5-S1;  Surgeon: Lyndon Brooke Jr., MD;  Location: [facility] OR;  Service: Orthopedics;;    GASTRIC BYPASS  2010    SLEEVE    KNEE ARTHROSCOPY Right     LUMBAR DISCECTOMY  2/13/2019    Procedure: DISCECTOMY, SPINE, LUMBAR L5-S1;  Surgeon: Lyndon Brooke Jr., MD;  Location: [facility] OR;  Service: Orthopedics;;    NASAL RECONSTRUCTION N/A 3/21/2023    Procedure: RECONSTRUCTION, NOSE;  Surgeon: Alyx Spivey MD;  Location: [facility] OR 2ND FLR;  Service: ENT;  Laterality: N/A;    REVISION OF FLAP GRAFT Bilateral 4/18/2023    Procedure: REVISION, PROCEDURE INVOLVING FLAP GRAFT;  Surgeon: Alyx Spivey MD;  Location: [facility] OR;  Service: ENT;  Laterality: Bilateral;    ROTATION FLAP SURGERY N/A 3/21/2023    Procedure: CREATION, FLAP, ROTATION;  Surgeon: Alyx Spivey MD;  Location: [facility] OR 2ND FLR;  Service: ENT;  Laterality: N/A;         CMP:   Recent Labs     06/26/23  1356      K 4.0      CO2 23   BUN 18   CREATININE 1.3      CALCIUM 8.9       INR  Recent Labs     06/26/23  1356   INR 1.1   APTT 31.5             Pre-op Assessment    I have reviewed the Patient Summary Reports.     I have reviewed the Nursing Notes. I have reviewed the NPO Status.   I have reviewed the Medications.     Review of Systems  Anesthesia Hx:  No problems with previous Anesthesia    Pulmonary:   Childhood asthma   Hepatic/GI:   GERD    Endocrine:   Diabetes On Ozempic - held last week's dose. Per surgeon request       Physical Exam  General: Cooperative    Airway:  Mallampati: III   Mouth Opening: Normal  TM Distance: Normal  Tongue: Normal  Neck ROM: Normal ROM    Dental:  Intact        Anesthesia Plan  Type of Anesthesia, risks & benefits discussed:    Anesthesia Type: Gen ETT  Intra-op Monitoring Plan: Standard ASA Monitors  Post Op Pain Control Plan: multimodal analgesia  Induction:  IV  Airway Plan: Direct  Informed Consent: Informed consent signed with the Patient and all parties understand the risks and agree with  anesthesia plan.  All questions answered.   ASA Score: 3  Day of Surgery Review of History & Physical: H&P Update referred to the surgeon/provider.    Ready For Surgery From Anesthesia Perspective.     .

## 2023-06-29 NOTE — ANESTHESIA PROCEDURE NOTES
Intubation    Date/Time: 6/29/2023 12:00 PM  Performed by: Stephanie Malagon  Authorized by: Guerita Feng MD     Intubation:     Induction:  Rapid sequence induction    Intubated:  Postinduction    Mask Ventilation:  Not attempted    Attempts:  1    Attempted By:  Student    Method of Intubation:  Video laryngoscopy    Blade:  Mcfarlane 4    Laryngeal View Grade: Grade I - full view of cords      Difficult Airway Encountered?: No      Complications:  None    Airway Device:  Oral albert    Airway Device Size:  7.5    Style/Cuff Inflation:  Cuffed (inflated to minimal occlusive pressure)    Tube secured:  24    Secured at:  The lips    Placement Verified By:  Capnometry    Complicating Factors:  None    Findings Post-Intubation:  BS equal bilateral and atraumatic/condition of teeth unchanged

## 2023-06-29 NOTE — PROGRESS NOTES
Patient discharged to home via wheelchair, escorted by his wife Pt alert and talkative, vitals stable, tolerating PO intake. Discharge instructions (written and verbal) and follow-up information given to patient who verbalized understanding, as well as a readiness for discharge. [facility] contact info provided for additional questions following discharge.

## 2023-06-29 NOTE — BRIEF OP NOTE
[facility] - Surgery ([facility])  Brief Operative Note    Surgery Date: 6/29/2023     Surgeon(s) and Role:     * Alyx Spivey MD - Primary     * Albertina Mccall MD - Resident - Assisting    Pre-op Diagnosis:  Squamous cell carcinoma of skin of nose [C44.321]    Post-op Diagnosis:  Post-Op Diagnosis Codes:     * Squamous cell carcinoma of skin of nose [C44.321]    Procedure(s) (LRB):  REVISION, PROCEDURE INVOLVING FLAP GRAFT (N/A)    Anesthesia: General/MAC    Operative Findings:   Biopsy of R nasal alar vestibular mucosa   Wedge and advancement of columella and R soft tissue triangle for reconstruction    Estimated Blood Loss: 5 cc         Specimens:   Specimen (24h ago, onward)       Start     Ordered    06/29/23 1243  Specimen to Pathology, Surgery ENT  Once        Comments: Pre-op Diagnosis: Squamous cell carcinoma of skin of nose [C44.321]Procedure(s):REVISION, PROCEDURE INVOLVING FLAP GRAFT Number of specimens: 1Name of specimens: 1- Right Nasal Vestibule- Permanent     References:    Click here for ordering Quick Tip   Question Answer Comment   Procedure Type: ENT    Which provider would you like to cc? ALYX SPIVEY    Release to patient Immediate        06/29/23 1259                      Discharge Note    OUTCOME: Patient tolerated treatment/procedure well without complication and is now ready for discharge.    DISPOSITION: Home or Self Care    FINAL DIAGNOSIS:  Mohs defect    FOLLOWUP: In clinic    DISCHARGE INSTRUCTIONS:    Discharge Procedure Orders   Other restrictions (specify):   Order Comments: No heavy lifting >10 lbs x 2 weeks  No straining or exercising x 2 weeks  No nose blowing, sneeze with mouth open     Notify your health care provider if you experience any of the following:  temperature >100.4     Notify your health care provider if you experience any of the following:  redness, tenderness, or signs of infection (pain, swelling, redness, odor or green/yellow discharge around incision site)      No dressing needed   Order Comments: Gently apply ointment to nostrils using q-tip

## 2023-06-29 NOTE — ANESTHESIA PROCEDURE NOTES
Intubation    Date/Time: 6/29/2023 12:00 PM  Performed by: Derick Nunez CRNA  Authorized by: Guerita Feng MD     Intubation:     Induction:  Rapid sequence induction    Intubated:  Postinduction    Mask Ventilation:  N/a    Attempts:  1    Attempted By:  Student    Method of Intubation:  Video laryngoscopy    Blade:  Manjit 4    Laryngeal View Grade: Grade I - full view of cords      Difficult Airway Encountered?: No      Complications:  Soft tissue trauma (small laceration to the right upper lip)    Airway Device:  Oral albert    Airway Device Size:  7.5    Style/Cuff Inflation:  Cuffed    Inflation Amount (mL):  6    Tube secured:  22    Secured at:  The teeth    Placement Verified By:  Capnometry    Complicating Factors:  Obesity    Findings Post-Intubation:  BS equal bilateral and atraumatic/condition of teeth unchanged

## 2023-06-29 NOTE — PATIENT INSTRUCTIONS
DISCHARGE INSTRUCTIONS:    Discharge Procedure Orders   Other restrictions (specify):   Order Comments: No heavy lifting >10 lbs x 2 weeks  No straining or exercising x 2 weeks  No nose blowing, sneeze with mouth open     Notify your health care provider if you experience any of the following:  temperature >100.4     Notify your health care provider if you experience any of the following:  redness, tenderness, or signs of infection (pain, swelling, redness, odor or green/yellow discharge around incision site)     No dressing needed   Order Comments: Gently apply ointment to nostrils using q-tip

## 2023-06-29 NOTE — TRANSFER OF CARE
"Anesthesia Transfer of Care Note    Patient: Lyndon Lion Jr.    Procedure(s) Performed: Procedure(s) (LRB):  REVISION, PROCEDURE INVOLVING FLAP GRAFT (N/A)    Patient location: PACU    Anesthesia Type: general    Transport from OR: Transported from OR on 6-10 L/min O2 by face mask with adequate spontaneous ventilation    Post pain: adequate analgesia    Post assessment: no apparent anesthetic complications and tolerated procedure well    Post vital signs: stable    Level of consciousness: awake    Nausea/Vomiting: no nausea/vomiting    Complications: none    Transfer of care protocol was followed      Last vitals:   Visit Vitals  BP (!) 143/78   Pulse 76   Temp 36.4 °C (97.6 °F) (Oral)   Resp 16   Ht 6' 2" (1.88 m)   Wt 113.4 kg (250 lb)   SpO2 (!) 93%   BMI 32.10 kg/m²     " 28-May-2022

## 2023-06-29 NOTE — ANESTHESIA POSTPROCEDURE EVALUATION
Anesthesia Post Evaluation    Patient: Lyndon Lion Jr.    Procedure(s) Performed: Procedure(s) (LRB):  REVISION, PROCEDURE INVOLVING FLAP GRAFT (N/A)    Final Anesthesia Type: general      Patient location during evaluation: PACU  Patient participation: Yes- Able to Participate  Level of consciousness: awake and alert and oriented  Post-procedure vital signs: reviewed and stable  Pain management: adequate  Airway patency: patent    PONV status at discharge: No PONV  Anesthetic complications: no      Cardiovascular status: hemodynamically stable  Respiratory status: unassisted and spontaneous ventilation  Hydration status: euvolemic  Follow-up not needed.          Vitals Value Taken Time   /73 06/29/23 1401   Temp 36.1 °C (96.9 °F) 06/29/23 1329   Pulse 72 06/29/23 1401   Resp 17 06/29/23 1401   SpO2 93 % 06/29/23 1401   Vitals shown include unvalidated device data.      No case tracking events are documented in the log.      Pain/Karthikeyan Score: Karthikeyan Score: 10 (6/29/2023  1:45 PM)

## 2023-07-03 ENCOUNTER — PATIENT MESSAGE (OUTPATIENT)
Dept: ENDOSCOPY | Facility: HOSPITAL | Age: 53
End: 2023-07-03
Payer: COMMERCIAL

## 2023-07-03 ENCOUNTER — TELEPHONE (OUTPATIENT)
Dept: ENDOSCOPY | Facility: HOSPITAL | Age: 53
End: 2023-07-03
Payer: COMMERCIAL

## 2023-07-03 DIAGNOSIS — K44.9 HIATAL HERNIA: ICD-10-CM

## 2023-07-03 DIAGNOSIS — K20.90 ESOPHAGITIS: Primary | ICD-10-CM

## 2023-07-03 DIAGNOSIS — Z90.3 H/O GASTRIC SLEEVE: ICD-10-CM

## 2023-07-03 PROBLEM — M95.0 ACQUIRED NASAL DEFORMITY: Status: ACTIVE | Noted: 2023-07-03

## 2023-07-03 PROBLEM — J34.89 OBSTRUCTION OF NASAL VALVE: Status: ACTIVE | Noted: 2023-07-03

## 2023-07-03 NOTE — TELEPHONE ENCOUNTER
"Spoke to pt to schedule procedure(s) Upper Endoscopy (EGD)       Physician to perform procedure(s) Dr. ANALILIA Leonardo  Date of Procedure (s) 09/01/2023  Arrival Time 06:30 AM  Time of Procedure(s) 07:30 AM   Location of Procedure(s) [facility] [address]  Type of Rx Prep sent to patient: Other  Instructions provided to patient via [facility]    Patient was informed on the following information and verbalized understanding. Screening questionnaire reviewed with patient and complete. If procedure requires anesthesia, a responsible adult needs to be present to accompany the patient home, patient cannot drive after receiving anesthesia. Appointment details are tentative, especially check-in time. Patient will receive a prep-op call 4 days prior to confirm check-in time for procedure. If applicable the patient should contact their pharmacy to verify Rx for procedure prep is ready for pick-up. Patient was advised to call the scheduling department at [phone] if pharmacy states no Rx is available. Patient was advised to call the endoscopy scheduling department if any questions or concerns arise.       Endoscopy Scheduling Department     From: Dom Leonardo MD      Sent: 6/22/2023   3:34 PM CDT      To: [facility]        Procedure: EGD on PPI        Diagnosis: Esophagitis, Hiatal hernia and sleeve gastrectomy        Procedure Timing: 10-12 weeks        *If within 4 weeks selected, please deb as high priority*       *If greater than 12 weeks, please select "5-12 weeks" and delay sending until 2 months prior to requested date*        Provider: Myself        Location: No Preference        Additional Scheduling Information: Sleeve gastrectomy and hiatal hernia 4cm and on Ozempic and erosive esophagitis        Prep Specifications:Gastroparesis (Extended clear liquid)        Have you attached a patient to this message: yes     "

## 2023-07-03 NOTE — OP NOTE
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 Lion is a 53-year-old gentleman well known to me after history of squamous cell carcinoma that was rapidly growing to the right nasal ala and nasal tip with resection and sentinel lymph node biopsy as well as staged reconstruction with re-excision with paramedian forehead flap and composite graft.  He is now more than 2 months from his division and inset and seen in clinic with vestibular stenosis and scheduled for flap debulking and soft tissue rearrangement for repair of stenosis.    Procedure in Detail:  After informed consent was obtained in holding area the risks and benefits reviewed patient was taken back to OR 13.  Anesthesia proceeded with general endotracheal anesthesia with intubation with an oral Kerri.  Patient was turned 90° with time-out undertaken verification of patient and correct procedure.  Midface was prepped and draped in usual sterile fashion.  Injection was undertaken at the previous flap and at the remaining columella with area of mucocele cessation over prior septal graft.  This was done with 1% lidocaine with [id number] of epinephrine.    Initially 15 C blade was used to take off epithelial layer at the caudal edge of the prior septal graft in order to obtain a fresh edge for advancement flap.  The same was done on the posterior aspect of  the overhanging flap from the tip that was  from the columella.  Back cut was made into the vestibule superiorly with elevation of internal aspect of paramedian flap to the edge of the skin of the composite cartilage.  Excess bulk and cartilage was removed on the right from the vestibule with re draping of skin.  Of note there was a small irregularity laterally that was shave biopsied with a 15 C and sent for permanent pathology.  Further undermining of the created skin flap on the columellar aspect was undertaken to the dome.  Of note the back cut was 5 mm on the right vestibule and then another 5 mm on the right.  Cut into the skin of the columella was then also undertaken with wide elevation and undermining of the skin of the columella down to the lip in order to recruit the skin superiorly.  At this point central debulked advancement flap from just below the nasal tip from the flap was sewed with deep 4-0 Monocryl to the columella.  The posterior aspect of the flap was then quilted from the right to left with 4-0 plain gut on a straight needle.  Remainder of the skin was closed with 5 0 chromic and 5 0 fast-absorbing gut.  Total undermined area to include the elevated flap to the tip and the columella was 1.5 x 1 cm.  Wounds were cleaned the patient was turned over to Anesthesia awakened and taken to recovery in stable condition.    Complications:  None    Attestation:  I was present for the entire procedure    DISCLAIMER: This note was prepared with [employer] voice recognition transcription software. Garbled syntax, mangled pronouns, and other bizarre constructions may be attributed to that software system. While efforts were made to correct any mistakes made by this voice recognition program, some errors and/or omissions may remain in the note that were missed when the note was originally created.

## 2023-07-06 LAB
COMMENT: NORMAL
FINAL PATHOLOGIC DIAGNOSIS: NORMAL
GROSS: NORMAL
Lab: NORMAL

## 2023-07-10 ENCOUNTER — PATIENT OUTREACH (OUTPATIENT)
Dept: ADMINISTRATIVE | Facility: HOSPITAL | Age: 53
End: 2023-07-10
Payer: COMMERCIAL

## 2023-07-23 ENCOUNTER — PATIENT MESSAGE (OUTPATIENT)
Dept: ENDOSCOPY | Facility: HOSPITAL | Age: 53
End: 2023-07-23
Payer: COMMERCIAL

## 2023-07-23 NOTE — PROGRESS NOTES
Lyndon your EGD pathology was benign no celiac sprue no dysplasia no H pylori    Use Aciphex 20mg once daily (or any other full strength proton pump inhibitor) - best taken 45 minutes before your first protein containing meal.    - Return to GI clinic at the next available appointment.                            - Repeat upper endoscopy in 12 weeks to check healing.     1.  Duodenal bulb, polyp, biopsy:   Duodenum with gastric type foveolar metaplasia.   No evidence of dysplasia or malignancy.     2.  Stomach, biopsy:   Stomach, biopsy:   Chronic gastritis.   Immunostain for helicobacter species will be performed and results will follow in a supplemental report.  VC     Comment: Interp By Amy Lopes M.D., Signed on 06/28/2023 at 12:48  Supplemental Diagnosis      An immunohistochemical stain with appropriate control for Helicobacter pylori was performed on specimen #2 (stomach) and is negative for organisms.

## 2023-07-24 ENCOUNTER — PATIENT MESSAGE (OUTPATIENT)
Dept: ENDOCRINOLOGY | Facility: CLINIC | Age: 53
End: 2023-07-24
Payer: COMMERCIAL

## 2023-07-24 DIAGNOSIS — E11.65 UNCONTROLLED TYPE 2 DIABETES MELLITUS WITH HYPERGLYCEMIA: Primary | ICD-10-CM

## 2023-07-24 RX ORDER — SEMAGLUTIDE 1.34 MG/ML
1 INJECTION, SOLUTION SUBCUTANEOUS
Qty: 3 ML | Refills: 11 | Status: SHIPPED | OUTPATIENT
Start: 2023-07-24 | End: 2023-12-04

## 2023-07-26 ENCOUNTER — PATIENT MESSAGE (OUTPATIENT)
Dept: OTOLARYNGOLOGY | Facility: CLINIC | Age: 53
End: 2023-07-26

## 2023-07-26 ENCOUNTER — PROCEDURE VISIT (OUTPATIENT)
Dept: UROLOGY | Facility: CLINIC | Age: 53
End: 2023-07-26
Payer: COMMERCIAL

## 2023-07-26 ENCOUNTER — OFFICE VISIT (OUTPATIENT)
Dept: OTOLARYNGOLOGY | Facility: CLINIC | Age: 53
End: 2023-07-26
Payer: COMMERCIAL

## 2023-07-26 VITALS
TEMPERATURE: 99 F | SYSTOLIC BLOOD PRESSURE: 130 MMHG | RESPIRATION RATE: 18 BRPM | HEART RATE: 73 BPM | DIASTOLIC BLOOD PRESSURE: 88 MMHG | WEIGHT: 244.31 LBS | HEIGHT: 73 IN | BODY MASS INDEX: 32.38 KG/M2

## 2023-07-26 VITALS — WEIGHT: 246.06 LBS | BODY MASS INDEX: 32.46 KG/M2

## 2023-07-26 DIAGNOSIS — R31.29 MICROHEMATURIA: ICD-10-CM

## 2023-07-26 DIAGNOSIS — C44.321 SQUAMOUS CELL CANCER OF SKIN OF NASAL TIP: Primary | ICD-10-CM

## 2023-07-26 DIAGNOSIS — L90.5 SCAR OF NOSE: ICD-10-CM

## 2023-07-26 PROCEDURE — 99024 POSTOP FOLLOW-UP VISIT: CPT | Mod: S$GLB,,, | Performed by: OTOLARYNGOLOGY

## 2023-07-26 PROCEDURE — 99024 PR POST-OP FOLLOW-UP VISIT: ICD-10-PCS | Mod: S$GLB,,, | Performed by: OTOLARYNGOLOGY

## 2023-07-26 PROCEDURE — 99999 PR PBB SHADOW E&M-EST. PATIENT-LVL V: CPT | Mod: PBBFAC,,, | Performed by: OTOLARYNGOLOGY

## 2023-07-26 PROCEDURE — 52000 CYSTOURETHROSCOPY: CPT | Mod: S$GLB,,, | Performed by: UROLOGY

## 2023-07-26 PROCEDURE — 52000 CYSTOSCOPY: ICD-10-PCS | Mod: S$GLB,,, | Performed by: UROLOGY

## 2023-07-26 PROCEDURE — 99999 PR PBB SHADOW E&M-EST. PATIENT-LVL V: ICD-10-PCS | Mod: PBBFAC,,, | Performed by: OTOLARYNGOLOGY

## 2023-07-26 RX ORDER — LIDOCAINE HYDROCHLORIDE 20 MG/ML
JELLY TOPICAL
Status: COMPLETED | OUTPATIENT
Start: 2023-07-26 | End: 2023-07-26

## 2023-07-26 RX ADMIN — LIDOCAINE HYDROCHLORIDE: 20 JELLY TOPICAL at 10:07

## 2023-07-26 NOTE — PROCEDURES
Cystoscopy    Date/Time: 7/26/2023 10:30 AM  Performed by: Guerita Stovall MD  Authorized by: Guerita Stovall MD     Consent Done?:  Yes (Written)  Timeout: prior to procedure the correct patient, procedure, and site was verified    Prep: patient was prepped and draped in usual sterile fashion    Local anesthesia used?: Yes    Anesthesia:  Intraurethral instillation  Indications: hematuria    Position:  Supine  Anesthesia:  Intraurethral instillation  Preparation: Patient was prepped and draped in usual sterile fashion    Scope type:  Flexible cystoscope  External exam normal: Yes    Urethra normal: Yes    Bladder neck normal: Yes    Bladder normal: Yes     patient tolerated the procedure well with no immediate complications  Comments:      Negative hematuria workup. Follow up 1 year in clinic with urinalysis.

## 2023-07-26 NOTE — PATIENT INSTRUCTIONS
What to Expect After a Cystoscopy  For the next 24-48 hours, you may feel a mild burning when you urinate. This burning is normal and expected. Drink plenty of water to dilute the urine to help relieve the burning sensation. You may also see a small amount of blood in your urine after the procedure.    Unless you are already taking antibiotics, you may be given an antibiotic after the test to prevent infection.    Signs and Symptoms to Report  Call the [facility] at [phone] if you develop any of the following:  Fever of 101 degrees or higher  Chills or persistent bleeding  Inability to urinate

## 2023-08-01 NOTE — PROGRESS NOTES
History of Present Illness:   Lyndon Lion is a 53 y.o. year old male evaluated on 8/1/2023, in the Otolaryngology-Head and Neck Surgery Clinic at [facility].  Patient returns about 1 month status post revision of paramedian forehead flap with opening of right vestibule and narrowing of the columella.  He is pleased with the result with more airflow through the right nostril.  He reports that the right ear has not been bothering him as much since last visit.  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  Patient here to discuss further debulking and fine tuning of the flap.  He has some right-sided nasal obstruction.  He reports increased mucus around internal lining.  He also has notching along the membranous columella.  The obstruction and deformity of the right nostril is what concerns him the most.  His only other complaint today is set back of the right ear from the composite graft he reports that because he wears a hard hat he gets sweat that accumulates behind the right ear that is difficult to clean off and he keeps antifungal Cream there.         Past Medical/Surgical History  Past Medical History:   Diagnosis Date    Arthritis     Asthma     AS A CHILD    Colon cancer     Family hx of prostate cancer 11/22/2016    Hx of colon cancer, stage I 07/03/2014    Parada syndrome     Squamous cell carcinoma of skin     Tear of labium 10/2020    left shoulder Dr Molina    Uncontrolled type 2 diabetes mellitus with hyperglycemia 03/04/2021    Vitamin D deficiency     Wears glasses      His  has a past surgical history that includes Gastric bypass (2010); Colon surgery (2008); Knee arthroscopy (Right); Appendectomy; Colonoscopy  (Bilateral, 2012); Epidural Steroid Injection (10/23/15); Colonoscopy (N/A, 8/1/2016); Colonoscopy (N/A, 9/20/2017); Colonoscopy (N/A, 10/9/2017); Esophagogastroduodenoscopy; Colonoscopy (N/A, 11/20/2017); Colonoscopy (N/A, 5/30/2018); Epidural steroid injection into lumbar spine (N/A, 7/27/2018); Caudal epidural steroid injection (N/A, 11/6/2018); Lumbar discectomy (2/13/2019); Facetectomy of vertebra (2/13/2019); Colonoscopy (N/A, 6/10/2019); Esophagogastroduodenoscopy (N/A, 7/22/2020); Colonoscopy (N/A, 7/22/2020); Colonoscopy (N/A, 5/27/2021); Cystoscopy; Colonoscopy (N/A, 6/17/2022); excision or surgical planing, skin, nose, for rhinophyma (Bilateral, 3/7/2023); Nasal reconstruction (N/A, 3/21/2023); Application of cartilage graft (N/A, 3/21/2023); Rotation flap surgery (N/A, 3/21/2023); Revision of flap graft (Bilateral, 4/18/2023); Esophagogastroduodenoscopy (N/A, 6/22/2023); Colonoscopy (N/A, 6/22/2023); and Revision of flap graft (N/A, 6/29/2023).     Past Family/Social History  His family history includes Alcohol abuse in his paternal uncle; Basal cell carcinoma in his father; Breast cancer in his mother; Cancer in his maternal grandfather, maternal uncle, mother, and paternal uncle; Colon cancer in his maternal grandfather and maternal uncle; Dementia in his maternal grandmother; Diabetes in his father and paternal grandmother; Early death in his maternal grandfather; Hearing loss in his father; Heart disease in his father and maternal uncle; Hypertension in his maternal uncle and paternal grandfather; Liver disease in his father; Melanoma in his mother; No Known Problems in his paternal aunt and sister; Polycystic ovary syndrome in his daughter; Prostate cancer in his paternal uncle; Prostatitis in his paternal grandfather.  He  reports that he has never smoked. He has never used smokeless tobacco. He reports current alcohol use. He reports that he does not use drugs.    "  Medications/Allergies/Immunizations  His current medication(s) include:   Current Outpatient Medications   Medication Sig Dispense Refill    ACCU-CHEK GUIDE TEST STRIPS Strp USE TO TEST TWICE A  strip 3    ascorbic acid, vitamin C, (VITAMIN C) 100 MG tablet Take 100 mg by mouth once daily.      aspirin (ECOTRIN) 81 MG EC tablet Take 81 mg by mouth once daily.      ciclopirox (PENLAC) 8 % Soln Apply topically nightly. 6.6 mL 3    cyanocobalamin, vitamin B-12, 2,500 mcg Lozg Place 2 tablets under the tongue once daily. 180 lozenge 3    ferrous gluconate (FERGON) 240 (27 FE) MG tablet TAKE 27 MG BY MOUTH 2 (TWO) TIMES DAILY WITH MEALS.      lancets (ACCU-CHEK SOFTCLIX LANCETS) Misc 1 Units by Misc.(Non-Drug; Combo Route) route 2 (two) times a day. 200 each 0    metFORMIN (GLUCOPHAGE-XR) 500 MG ER 24hr tablet Take 1 tablet (500 mg total) by mouth 2 (two) times daily with meals. 180 tablet 3    pen needle, diabetic (PEN NEEDLE) 32 gauge x 5/32" Ndle 1 each by Misc.(Non-Drug; Combo Route) route once daily. 90 each 3    pravastatin (PRAVACHOL) 20 MG tablet Take 1 tablet (20 mg total) by mouth once daily. 90 tablet 3    RABEprazole (ACIPHEX) 20 mg tablet Take 1 tablet (20 mg total) by mouth before breakfast. 90 tablet 3    semaglutide (OZEMPIC) 1 mg/dose (4 mg/3 mL) Inject 1 mg into the skin every 7 days. 3 mL 11    sod sulf-pot chloride-mag sulf (SUTAB) 1.479-0.188- 0.225 gram tablet Take 12 tablets by mouth once daily. Take according to package instructions with indicated amount of water. 24 tablet 0    sulfacetamide sodium-sulfur 10-5 % (w/w) Clsr Use to wash face daily 170 g 5    tadalafiL (CIALIS) 5 MG tablet Take 1 tablet (5 mg total) by mouth daily as needed for Erectile Dysfunction. 90 tablet 3    testosterone cypionate (DEPOTESTOTERONE CYPIONATE) 200 mg/mL injection Inject 60 mg into the muscle every 14 (fourteen) days. Twice a week      triamcinolone acetonide 0.025% (KENALOG) 0.025 % Oint Thin film to " lips and eczematous plaques BID PRN flare 15 g 1    triamcinolone acetonide 0.1% (KENALOG) 0.1 % cream AAA bid 454 g 3    vitamin A 8000 UNIT capsule Take 1 capsule (8,000 Units total) by mouth once daily. 100 capsule 3    zinc gluconate 50 mg tablet Take 50 mg by mouth once daily.       No current facility-administered medications for this visit.        Allergies: Patient has no known allergies.     Immunizations:   Immunization History   Administered Date(s) Administered    COVID-19, MRNA, LN-S, PF (Pfizer) (Gray Cap) 06/01/2022    COVID-19, MRNA, LN-S, PF (Pfizer) (Purple Cap) 04/08/2021, 04/29/2021, 01/03/2022    Hepatitis A / Hepatitis B 11/22/2016, 12/28/2016, 05/18/2017    Influenza 10/30/2008, 09/29/2009, 12/14/2011    Influenza - Quadrivalent - PF *Preferred* (6 months and older) 09/29/2009, 11/11/2016, 12/08/2017, 11/04/2022    Influenza Split 10/30/2008, 12/14/2011    Pneumococcal Conjugate - 20 Valent 09/19/2022    Td - PF (ADULT) 11/11/2016         Review of Systems   Constitutional: Negative for fever, weight loss and weight gain.  Skin: Negative for rash, itchiness, dryness  HENT:  As per HPI  Cardiovascular: Negative for chest pain and dyspnea on exertion .   Respiratory: Is not experiencing shortness of breath.   Gastrointestinal: Negative for nausea and vomiting.   Neurological: Negative for headaches.   Lymph/Heme: Negative for lymphadenopathy or easy bruising  Musculoskeletal: Negative for joint or muscle pain  Psychiatric: The patient is not nervous/anxious.        All other systems are negative except for that listed in the HPI.      PHYSICAL EXAM:   Vital Signs:  Wt 111.6 kg (246 lb 0.5 oz)   BMI 32.46 kg/m²      General:  Well-developed, well-nourished  Communication and Voice:  Clear pitch and clarity  Hearing: Hearing adequate for verbal communication bilaterally   Inspection:  Normocephalic and atraumatic without mass or lesion  Palpation:  Facial skeleton intact without bony  stepoffs  Parotid Glands:  No mass or tenderness  Facial Strength:  Facial motility symmetric and full bilaterally  Pinna:  External ear intact and fully developed  External canal:  Canal is patent with intact skin  Tympanic Membrane:  Clear and mobile  External nose:  Right ala and tip subunit paramedian forehead flap well incorporated with some bulkiness superiorly and to the left of the tip.  He has great take of the septal graft at the anterior septal angle.  Soft tissue triangle on the right is better with less bulkiness to the flap since revision but unfortunately we had the columella he did have re separation of the flap from inferior columella with separation of the flap from mucosa.  Continued deformity of the tip to the left with bulkiness.   Internal Nose:  As described above with some crusting along incorporated composite graft with good epithelialization remainder of Septum intact and midline.  No edema, polyp, or rhinorrhea.  TMJ:  No pain to palpation with full mobility  Oral cavity, Lips, Teeth, and Gums:  Mucosa and teeth intact and viable, No lesions, masses or ulcers  Oropharynx: No erythema or exudate, no masses or ulcerations, non-obstructive tonsils  Nasopharynx:  No mass or lesion with intact mucosa  Hypopharynx:  Not well visualized secondary to gagging  Larynx:  Not well visualized secondary to gagging  Neck, Trachea, Lymphatics:  Midline trachea without mass or lesion, no lymphadenopathy  Thyroid:  No mass or nodularity  Eyes: No nystagmus with equal extraocular motion bilaterally  Neuro/Psych/Balance: Patient oriented and appropriate in interaction;  Appropriate mood and affect;  Gait is intact with no imbalance; Cranial nerves I-XII are intact  Respiratory effort:  Equal inspiration and expiration without stridor  Peripheral Vascular:  Warm extremities with equal pulses            PATHOLOGY REVIEW:   Initial nasal tumor resection with sentinel lymph node biopsy 03/07/2023   NASAL CAVITY  AND PARANASAL SINUSES:    SPECIMEN      Procedure       Excision       Tumor Focality       Unifocal       Tumor Laterality       Midline       Tumor Size       Greatest Dimension (Centimeters) 4.1 cm           Squamous Cell Carcinoma and Variants           Verrucous squamous   cell carcinoma       Histologic Grade       G1: Well differentiated       Lymphovascular Invasion       Not Identified       Perineural Invasion       Not identified       Margins       Uninvolved by invasive tumor; the closest margin is deep   margin         Distance from Closest Margin (Millimeters) At least 0.5 mm       Regional Lymph Node Status:      All regional lymph nodes negative for   tumor       Number of Lymph Nodes Examined:      5       pT Category:           pT1       pN Category:           pN0    Re-resection 03/21/2023   RESECTION DEEP MARGIN FROM NASAL AREA:   MICROSCOPIC FOCUS OF RESIDUAL SQUAMOUS CARCINOMA  FROM THE APPARENT   EDGES OF THE SPECIMEN   MAJOR AREAS OF ACUTE INFLAMMATION   RADIOLOGIC REVIEW:    None    ASSESSMENT:   1. 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 Scar of nose            PLAN:   Patient doing very well after revision of paramedian forehead flap on 06/29/2023.  I will schedule him for another debulking and shaping of the flap for 08/29/2023.  Consent  obtained.    I believe that Mr. Lion has a good understanding of the issues involved and I answered all of his questions.     DISCLAIMER: This note was prepared with Gruv[employer] voice recognition transcription software. Garbled syntax, mangled pronouns, and other bizarre constructions may be attributed to that software system. While efforts were made to correct any mistakes made by this voice recognition program, some errors and/or omissions may remain in the note that were missed when the note was originally created.

## 2023-08-21 NOTE — PRE-PROCEDURE INSTRUCTIONS
Called to notify pt's wife to hold Ozempic 1 week prior to sugery. Pt's wife verbalized understanding.

## 2023-08-28 ENCOUNTER — OFFICE VISIT (OUTPATIENT)
Dept: FAMILY MEDICINE | Facility: CLINIC | Age: 53
End: 2023-08-28
Payer: COMMERCIAL

## 2023-08-28 ENCOUNTER — ANESTHESIA EVENT (OUTPATIENT)
Dept: SURGERY | Facility: HOSPITAL | Age: 53
End: 2023-08-28
Payer: COMMERCIAL

## 2023-08-28 ENCOUNTER — OFFICE VISIT (OUTPATIENT)
Dept: DERMATOLOGY | Facility: CLINIC | Age: 53
End: 2023-08-28
Payer: COMMERCIAL

## 2023-08-28 VITALS
WEIGHT: 255.94 LBS | HEART RATE: 84 BPM | OXYGEN SATURATION: 95 % | SYSTOLIC BLOOD PRESSURE: 138 MMHG | TEMPERATURE: 98 F | DIASTOLIC BLOOD PRESSURE: 70 MMHG | BODY MASS INDEX: 33.92 KG/M2 | HEIGHT: 73 IN

## 2023-08-28 VITALS — BODY MASS INDEX: 33.8 KG/M2 | HEIGHT: 73 IN | WEIGHT: 255 LBS

## 2023-08-28 DIAGNOSIS — C44.321 SQUAMOUS CELL CANCER OF SKIN OF NASAL TIP: ICD-10-CM

## 2023-08-28 DIAGNOSIS — E78.5 HYPERLIPIDEMIA, UNSPECIFIED HYPERLIPIDEMIA TYPE: ICD-10-CM

## 2023-08-28 DIAGNOSIS — Z00.00 ANNUAL PHYSICAL EXAM: Primary | ICD-10-CM

## 2023-08-28 DIAGNOSIS — D18.01 CHERRY ANGIOMA: ICD-10-CM

## 2023-08-28 DIAGNOSIS — L82.1 SEBORRHEIC KERATOSES: ICD-10-CM

## 2023-08-28 DIAGNOSIS — E66.9 OBESITY (BMI 30-39.9): ICD-10-CM

## 2023-08-28 DIAGNOSIS — K21.9 GASTROESOPHAGEAL REFLUX DISEASE WITHOUT ESOPHAGITIS: ICD-10-CM

## 2023-08-28 DIAGNOSIS — E11.65 TYPE 2 DIABETES MELLITUS WITH HYPERGLYCEMIA, WITHOUT LONG-TERM CURRENT USE OF INSULIN: ICD-10-CM

## 2023-08-28 DIAGNOSIS — Z15.09 MSH2-RELATED LYNCH SYNDROME (HNPCC1): ICD-10-CM

## 2023-08-28 DIAGNOSIS — L71.9 ROSACEA: Primary | ICD-10-CM

## 2023-08-28 DIAGNOSIS — Z08 ENCOUNTER FOR FOLLOW-UP SURVEILLANCE OF SKIN CANCER: ICD-10-CM

## 2023-08-28 DIAGNOSIS — E11.9 CONTROLLED TYPE 2 DIABETES MELLITUS WITHOUT COMPLICATION, WITHOUT LONG-TERM CURRENT USE OF INSULIN: ICD-10-CM

## 2023-08-28 DIAGNOSIS — Z98.84 S/P LAPAROSCOPIC SLEEVE GASTRECTOMY: ICD-10-CM

## 2023-08-28 DIAGNOSIS — Z85.828 ENCOUNTER FOR FOLLOW-UP SURVEILLANCE OF SKIN CANCER: ICD-10-CM

## 2023-08-28 DIAGNOSIS — M51.37 DDD (DEGENERATIVE DISC DISEASE), LUMBOSACRAL: ICD-10-CM

## 2023-08-28 PROCEDURE — 99214 OFFICE O/P EST MOD 30 MIN: CPT | Mod: S$GLB,,, | Performed by: NURSE PRACTITIONER

## 2023-08-28 PROCEDURE — 99999 PR PBB SHADOW E&M-EST. PATIENT-LVL V: CPT | Mod: PBBFAC,,, | Performed by: NURSE PRACTITIONER

## 2023-08-28 PROCEDURE — 99999 PR PBB SHADOW E&M-EST. PATIENT-LVL V: ICD-10-PCS | Mod: PBBFAC,,, | Performed by: NURSE PRACTITIONER

## 2023-08-28 PROCEDURE — 99214 PR OFFICE/OUTPT VISIT, EST, LEVL IV, 30-39 MIN: ICD-10-PCS | Mod: S$GLB,,, | Performed by: DERMATOLOGY

## 2023-08-28 PROCEDURE — 99214 PR OFFICE/OUTPT VISIT, EST, LEVL IV, 30-39 MIN: ICD-10-PCS | Mod: S$GLB,,, | Performed by: NURSE PRACTITIONER

## 2023-08-28 PROCEDURE — 99214 OFFICE O/P EST MOD 30 MIN: CPT | Mod: S$GLB,,, | Performed by: DERMATOLOGY

## 2023-08-28 RX ORDER — AZELAIC ACID 0.15 G/G
GEL TOPICAL
Qty: 50 G | Refills: 5 | Status: SHIPPED | OUTPATIENT
Start: 2023-08-28

## 2023-08-28 NOTE — PROGRESS NOTES
Subjective:       Patient ID: Lyndon Lion Jr. is a 53 y.o. male.    Chief Complaint: Follow-up    HPI      Patient presents today for chronic conditions follow up. Last office visit with Dutch on 9/19/22. Labs reviewed 4/23.       Patient had a sleeve gastrectomy NOT a gastric bypass.~ 2011 by Dr Christophe younger. Patient had hemicolectomy In 2008. Patient was found to found colorectal ca stage 2. He received chemotherapy with Dr Cárdenas. He received 12 courses of FOLFOX (leucovorin calcium (folinic acid), fluorouracil, and oxaliplatin).      7/26/23  ENT-Dr.Issam Spivey :  squamous cell carcinoma of the nose excision.  Assessment / Plan: Healing well after paramedian forehead flap division and inset.  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    I will schedule him for another debulking and shaping of the flap for 08/29/2023.        6/14/23 urology -   MSH2-related Parada syndrome      Past Medical History:   Diagnosis Date    Arthritis     Asthma     AS A CHILD    Colon cancer     Family hx of prostate cancer 11/22/2016    Hx of colon cancer, stage I 07/03/2014    Parada syndrome     Squamous cell carcinoma of skin     Tear of labium 10/2020    left shoulder Dr Molina    Uncontrolled type 2 diabetes mellitus with hyperglycemia 03/04/2021    Vitamin D deficiency     Wears glasses        Review of patient's allergies indicates:  No Known Allergies    No current facility-administered medications for this visit.  No current outpatient medications on file.    Facility-Administered Medications Ordered in Other Visits:     0.9%  NaCl infusion, , Intravenous, Continuous, Alyx Spivey MD    LIDOcaine (PF) 10 mg/ml (1%) injection 10 mg, 1 mL, Intradermal, Once PRN, Alyx Spivey MD    Review of Systems   Constitutional:  Negative for unexpected weight change.   HENT:  Negative for trouble swallowing.    Eyes:   "Negative for visual disturbance.   Respiratory:  Negative for shortness of breath.    Cardiovascular:  Negative for chest pain, palpitations and leg swelling.   Gastrointestinal:  Negative for blood in stool.   Genitourinary:  Negative for hematuria.   Skin:  Negative for rash.   Allergic/Immunologic: Negative for immunocompromised state.   Neurological:  Negative for headaches.   Hematological:  Does not bruise/bleed easily.   Psychiatric/Behavioral:  Negative for agitation. The patient is not nervous/anxious.        Objective:      /70 (BP Location: Right arm, Patient Position: Sitting, BP Method: Small (Manual))   Pulse 84   Temp 98.4 °F (36.9 °C) (Oral)   Ht 6' 1" (1.854 m)   Wt 116.1 kg (255 lb 15.3 oz)   SpO2 95%   BMI 33.77 kg/m²   Physical Exam  Constitutional:       Appearance: He is well-developed.   Eyes:      Conjunctiva/sclera: Conjunctivae normal.      Pupils: Pupils are equal, round, and reactive to light.   Cardiovascular:      Rate and Rhythm: Normal rate and regular rhythm.      Heart sounds: Normal heart sounds.   Pulmonary:      Effort: Pulmonary effort is normal.   Musculoskeletal:         General: Normal range of motion.   Neurological:      Mental Status: He is alert and oriented to person, place, and time.   Psychiatric:         Behavior: Behavior normal.         Thought Content: Thought content normal.         Judgment: Judgment normal.         Assessment:       1. Annual physical exam    2. Hyperlipidemia, unspecified hyperlipidemia type    3. Type 2 diabetes mellitus with hyperglycemia, without long-term current use of insulin    4. Gastroesophageal reflux disease without esophagitis    5. Controlled type 2 diabetes mellitus without complication, without long-term current use of insulin    6. MSH2-related Parada syndrome (HNPCC1)    7. DDD (degenerative disc disease), lumbosacral    8. S/P laparoscopic sleeve gastrectomy    9. Obesity (BMI 30-39.9)        Plan:       Annual " physical exam  -     Lipid Panel; Future; Expected date: 08/28/2023  -     CBC W/ AUTO DIFFERENTIAL; Future; Expected date: 08/28/2023  -     COMPREHENSIVE METABOLIC PANEL; Future; Expected date: 08/28/2023    Hyperlipidemia, unspecified hyperlipidemia type  Stable, on pravastatin  The 10-year ASCVD risk score (Naseem ELLISON, et al., 2019) is: 10.4%    Values used to calculate the score:      Age: 53 years      Sex: Male      Is Non- : No      Diabetic: Yes      Tobacco smoker: No      Systolic Blood Pressure: 154 mmHg      Is BP treated: No      HDL Cholesterol: 41 mg/dL      Total Cholesterol: 166 mg/dL   Type 2 diabetes mellitus with hyperglycemia, without long-term current use of insulin  -     Hemoglobin A1C; Future; Expected date: 08/28/2023  -     Lipid Panel; Future; Expected date: 08/28/2023  -     CBC W/ AUTO DIFFERENTIAL; Future; Expected date: 08/28/2023  -     COMPREHENSIVE METABOLIC PANEL; Future; Expected date: 08/28/2023  Stable, continue management  Follow the ADA diet  Hemoglobin A1C   Date Value Ref Range Status   04/21/2023 5.6 4.0 - 5.6 % Final     Comment:     ADA Screening Guidelines:  5.7-6.4%  Consistent with prediabetes  >or=6.5%  Consistent with diabetes    High levels of fetal hemoglobin interfere with the HbA1C  assay. Heterozygous hemoglobin variants (HbS, HgC, etc)do  not significantly interfere with this assay.   However, presence of multiple variants may affect accuracy.     01/18/2023 6.2 (H) 4.0 - 5.6 % Final     Comment:     ADA Screening Guidelines:  5.7-6.4%  Consistent with prediabetes  >or=6.5%  Consistent with diabetes    High levels of fetal hemoglobin interfere with the HbA1C  assay. Heterozygous hemoglobin variants (HbS, HgC, etc)do  not significantly interfere with this assay.   However, presence of multiple variants may affect accuracy.     07/25/2022 6.0 (H) 4.0 - 5.6 % Final     Comment:     ADA Screening Guidelines:  5.7-6.4%  Consistent with  "prediabetes  >or=6.5%  Consistent with diabetes    High levels of fetal hemoglobin interfere with the HbA1C  assay. Heterozygous hemoglobin variants (HbS, HgC, etc)do  not significantly interfere with this assay.   However, presence of multiple variants may affect accuracy.        Gastroesophageal reflux disease without esophagitis  Stable, continue management    MSH2-related Parada syndrome (HNPCC1)  Stable, continue management  DDD (degenerative disc disease), lumbosacral  Stable, continue management  S/P laparoscopic sleeve gastrectomy  -     Iron and TIBC; Future; Expected date: 08/28/2023  -     FERRITIN; Future; Expected date: 08/28/2023  -     VITAMIN B12; Future; Expected date: 08/28/2023  -     VITAMIN A; Future; Expected date: 08/28/2023    Obesity (BMI 30-39.9)  Counseled patient on his ideal body weight, health consequences of being obese and current recommendations including weekly exercise and a heart healthy diet.  Current BMI is:Estimated body mass index is 33.77 kg/m² as calculated from the following:    Height as of this encounter: 6' 1" (1.854 m).    Weight as of this encounter: 116.1 kg (255 lb 15.3 oz)..  Patient is aware that ideal BMI < 25 or Weight in (lb) to have BMI = 25: 189.1.           Patient readiness: acceptance and barriers:none    During the course of the visit the patient was educated and counseled about the following:     Diabetes:  Discussed general issues about diabetes pathophysiology and management.  Addressed ADA diet.  Encouraged aerobic exercise.  Hypertension:   Dietary sodium restriction.  Regular aerobic exercise.  Obesity:   General weight loss/lifestyle modification strategies discussed (elicit support from others; identify saboteurs; non-food rewards, etc).  Regular aerobic exercise program discussed.    Goals: Diabetes: Maintain Hemoglobin A1C below 7, Hypertension: Reduce Blood Pressure, and Obesity: Reduce calorie intake and BMI    Did patient meet goals/outcomes: " Yes    The following self management tools provided: declined    Patient Instructions (the written plan) was given to the patient/family.     Time spent with patient: 30 minutes    Barriers to medications present (no )    Adverse reactions to current medications (no)    Over the counter medications reviewed (Yes)

## 2023-08-28 NOTE — PATIENT INSTRUCTIONS
Wade Haney,     If you are due for any health screening(s) below please notify me so we can arrange them to be ordered and scheduled to maintain your health. Most healthy patients complete it. Don't lose out on improving your health.     All of your core healthy metrics are met.

## 2023-08-28 NOTE — PRE-PROCEDURE INSTRUCTIONS
The following was discussed with pt via phone and sent to pt portal. Pt verbalized understanding.    Dear Lyndon ,    You are scheduled for a procedure with Dr. Spivey on 8/29/2023. Your scheduled arrival time is 5:45am.  This arrival time is roughly 2 hours before your anticipated procedure time to allow sufficient time for pre-op.  Please wear comfortable clothes.  Most patients do not need to change into a gown.  Please do not wear a dress.  This procedure will take place at the [facility] at the corner of [facility] and [facility].  [facility] [place] [facility] to [facility].  The address is:    [address] [place] [address].  [place] [address]    After entering the building, you will proceed to the second floor where you can check in with registration. You should take any medications that you routinely take for blood pressure (other than those listed below), heart medications, thyroid, cholesterol, etc.     If you wear contact lenses, please wear glasses to your procedure.    Your fasting instructions are as follow:  Nothing to eat or drink after midnight tonight. You may have small amounts of water to take medications in the morning. You MUST have a responsible adult to bring you home.      This evening (8/28/2023) and tomorrow morning, please hold the following medications:  -Aspirin and Aspirin-containing products (Goody's powder, Excedrin)  -NSAIDs (Advil, Ibuprofen, Aleve, Diclofenac)  -Vitamins/Supplements  -Herbal remedies/Teas  -Stimulants (Adderall, Vyvanse, Adipex)  -Diabetic medication (Please bring with you day of procedure)  -CONTINUE HOLDING ASPIRIN  -CONTINUE HOLDING OZEMPIC  -VITAMIN C  -VITAMIN B12  -FERROUS GLUCONATE  -METFORMIN - HOLD TONIGHT AND IN THE MORNING  -KENALOG OINTMENT  -ZINC GLUCONATE    -May take Tylenol      This evening (8/28/2023) and tomorrow morning, take a shower using antibacterial soap (ex: Hibiclens or Dial antibacterial soap). DO NOT  apply deodorant, lotion, cologne, or anything else to the skin.    If you have any procedure-specific questions, please call your surgeon's office. Any other questions, don't hesitate to call at [phone].    Thanks,  RONNELL Sams  Pre-Admit Dept OC

## 2023-08-28 NOTE — PROGRESS NOTES
"  Subjective:      Patient ID:  Lyndon Lion Jr. is a 53 y.o. male who presents for   Chief Complaint   Patient presents with    Skin Check     TBSC     LOV 4/11/23 NUB     Skin, right clavicle, shave biopsy:   -SEBACEOUS ADENOMA, see comment     COMMENT:  The histological findings are most consistent with sebaceous adenoma, which is a benign lesion.  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  Clinical correlation is recommended.     Patient here today for TBSC.    Uses sulfur soap on face      Derm Hx:  SCC right nasal tip- 2/2023 , s/p PFF Dr Spivey  [facility]    Current Outpatient Medications:   ·  ACCU-CHEK GUIDE TEST STRIPS Strp, USE TO TEST TWICE A DAY, Disp: 200 strip, Rfl: 3  ·  ascorbic acid, vitamin C, (VITAMIN C) 100 MG tablet, Take 100 mg by mouth once daily., Disp: , Rfl:   ·  aspirin (ECOTRIN) 81 MG EC tablet, Take 81 mg by mouth once daily., Disp: , Rfl:   ·  ciclopirox (PENLAC) 8 % Soln, Apply topically nightly., Disp: 6.6 mL, Rfl: 3  ·  cyanocobalamin, vitamin B-12, 2,500 mcg Lozg, Place 2 tablets under the tongue once daily., Disp: 180 lozenge, Rfl: 3  ·  ferrous gluconate (FERGON) 240 (27 FE) MG tablet, TAKE 27 MG BY MOUTH 2 (TWO) TIMES DAILY WITH MEALS., Disp: , Rfl:   ·  lancets (ACCU-CHEK SOFTCLIX LANCETS) Misc, 1 Units by Misc.(Non-Drug; Combo Route) route 2 (two) times a day., Disp: 200 each, Rfl: 0  ·  pen needle, diabetic (PEN NEEDLE) 32 gauge x 5/32" Ndle, 1 each by Misc.(Non-Drug; Combo Route) route once daily., Disp: 90 each, Rfl: 3  ·  pravastatin (PRAVACHOL) 20 MG tablet, Take 1 tablet (20 mg total) by mouth once daily., Disp: 90 tablet, Rfl: 3  ·  RABEprazole (ACIPHEX) 20 mg tablet, Take 1 tablet (20 mg total) by mouth before breakfast., Disp: 90 tablet, Rfl: 3  ·  semaglutide (OZEMPIC) 1 mg/dose (4 mg/3 mL), " Inject 1 mg into the skin every 7 days., Disp: 3 mL, Rfl: 11  ·  sulfacetamide sodium-sulfur 10-5 % (w/w) Clsr, Use to wash face daily, Disp: 170 g, Rfl: 5  ·  testosterone cypionate (DEPOTESTOTERONE CYPIONATE) 200 mg/mL injection, Inject 60 mg into the muscle every 14 (fourteen) days. Twice a week, Disp: , Rfl:   ·  triamcinolone acetonide 0.025% (KENALOG) 0.025 % Oint, Thin film to lips and eczematous plaques BID PRN flare, Disp: 15 g, Rfl: 1  ·  triamcinolone acetonide 0.1% (KENALOG) 0.1 % cream, AAA bid, Disp: 454 g, Rfl: 3  ·  zinc gluconate 50 mg tablet, Take 50 mg by mouth once daily., Disp: , Rfl:   ·  metFORMIN (GLUCOPHAGE-XR) 500 MG ER 24hr tablet, Take 1 tablet (500 mg total) by mouth 2 (two) times daily with meals., Disp: 180 tablet, Rfl: 3  ·  tadalafiL (CIALIS) 5 MG tablet, Take 1 tablet (5 mg total) by mouth daily as needed for Erectile Dysfunction., Disp: 90 tablet, Rfl: 3  ·  vitamin A 8000 UNIT capsule, Take 1 capsule (8,000 Units total) by mouth once daily., Disp: 100 capsule, Rfl: 3        Review of Systems   Constitutional:  Negative for fever, chills and fatigue.   HENT:  Negative for nosebleeds and headaches.    Respiratory:  Negative for cough and shortness of breath.    Gastrointestinal:  Negative for nausea, vomiting, abdominal pain and diarrhea.   Musculoskeletal:  Negative for myalgias and arthralgias.   Skin:  Positive for daily sunscreen use, activity-related sunscreen use and wears hat. Negative for itching, rash, dry skin, sun sensitivity, recent sunburn and dry lips.   Neurological:  Negative for headaches.   Psychiatric/Behavioral:  Negative for depressed mood.    Hematologic/Lymphatic: Does not bruise/bleed easily.       Objective:   Physical Exam   Constitutional: He appears well-developed and well-nourished. No distress.   Neurological: He is alert and oriented to person, place, and time. He is not disoriented.   Psychiatric: He has a normal mood and affect.   Skin:   Areas  Examined (abnormalities noted in diagram):   Scalp / Hair Palpated and Inspected  Head / Face Inspection Performed  Neck Inspection Performed  Chest / Axilla Inspection Performed  Abdomen Inspection Performed  Genitals / Buttocks / Groin Inspection Performed  Back Inspection Performed  RUE Inspected  LUE Inspection Performed  RLE Inspected  LLE Inspection Performed  Nails and Digits Inspection Performed                     Diagram Legend     Erythematous scaling macule/papule c/w actinic keratosis       Vascular papule c/w angioma      Pigmented verrucoid papule/plaque c/w seborrheic keratosis      Yellow umbilicated papule c/w sebaceous hyperplasia      Irregularly shaped tan macule c/w lentigo     1-2 mm smooth white papules consistent with Milia      Movable subcutaneous cyst with punctum c/w epidermal inclusion cyst      Subcutaneous movable cyst c/w pilar cyst      Firm pink to brown papule c/w dermatofibroma      Pedunculated fleshy papule(s) c/w skin tag(s)      Evenly pigmented macule c/w junctional nevus     Mildly variegated pigmented, slightly irregular-bordered macule c/w mildly atypical nevus      Flesh colored to evenly pigmented papule c/w intradermal nevus       Pink pearly papule/plaque c/w basal cell carcinoma      Erythematous hyperkeratotic cursted plaque c/w SCC      Surgical scar with no sign of skin cancer recurrence      Open and closed comedones      Inflammatory papules and pustules      Verrucoid papule consistent consistent with wart     Erythematous eczematous patches and plaques     Dystrophic onycholytic nail with subungual debris c/w onychomycosis     Umbilicated papule    Erythematous-base heme-crusted tan verrucoid plaque consistent with inflamed seborrheic keratosis     Erythematous Silvery Scaling Plaque c/w Psoriasis     See annotation      Assessment / Plan:        Rosacea  -     azelaic acid (AZELEX) 15 % gel; Apply to face BID.  Dispense: 50 g; Refill: 5  Continue sulfur  soap  Add azelaic acid twice daily  [place] no longer helps    Encounter for follow-up surveillance of skin cancer  Area of previous melanoma examined. Site well healed with no signs of recurrence.    Total body skin examination performed today including at least 12 points as noted in physical examination. No lesions suspicious for malignancy noted.    Squamous cell cancer of skin of nasal tip  Post paramedian forehead flap  Still revising scar/asymmetry  To the OR tomorrow with Dr Spivey    Seborrheic keratoses  These are benign inherited growths without a malignant potential. Reassurance given to patient. No treatment is necessary.     Cherry angioma  This is a benign vascular lesion. Reassurance given. No treatment required.     MSH2-related Parada syndrome (HNPCC1)   in [employer], shift work,   Patient with Parada syndrome; MSH2 mutation, dx 2013  H/o colon cancer s/p partial colectomy; annual coloscopy and EGD  Advised to have annual skin checks for cancer screening  Mother with h/o melanoma (dx age 45) and breast CA      Patient instructed in importance in daily broad spectrum sun protection of at least spf 30. Mineral sunscreen ingredients preferred (Zinc +/- Titanium) and can be found OTC.   Recommend Elta MD for daily use on face and neck.  Patient encouraged to wear hat for all outdoor exposure.   Also discussed sun avoidance and use of protective clothing.           No follow-ups on file.

## 2023-08-28 NOTE — H&P
History of Present Illness:   Lyndon Lion is a 53 y.o. year old male evaluated on 8/1/2023, in the Otolaryngology-Head and Neck Surgery Clinic at [facility].  Patient returns about 1 month status post revision of paramedian forehead flap with opening of right vestibule and narrowing of the columella.  He is pleased with the result with more airflow through the right nostril.  He reports that the right ear has not been bothering him as much since last visit.  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  Patient here to discuss further debulking and fine tuning of the flap.  He has some right-sided nasal obstruction.  He reports increased mucus around internal lining.  He also has notching along the membranous columella.  The obstruction and deformity of the right nostril is what concerns him the most.  His only other complaint today is set back of the right ear from the composite graft he reports that because he wears a hard hat he gets sweat that accumulates behind the right ear that is difficult to clean off and he keeps antifungal Cream there.         Past Medical/Surgical History       Past Medical History:   Diagnosis Date    Arthritis      Asthma       AS A CHILD    Colon cancer      Family hx of prostate cancer 11/22/2016    Hx of colon cancer, stage I 07/03/2014    Parada syndrome      Squamous cell carcinoma of skin      Tear of labium 10/2020     left shoulder Dr Molina    Uncontrolled type 2 diabetes mellitus with hyperglycemia 03/04/2021    Vitamin D deficiency      Wears glasses        His  has a past surgical history that includes Gastric bypass (2010); Colon surgery (2008); Knee arthroscopy (Right); Appendectomy;  Colonoscopy (Bilateral, 2012); Epidural Steroid Injection (10/23/15); Colonoscopy (N/A, 8/1/2016); Colonoscopy (N/A, 9/20/2017); Colonoscopy (N/A, 10/9/2017); Esophagogastroduodenoscopy; Colonoscopy (N/A, 11/20/2017); Colonoscopy (N/A, 5/30/2018); Epidural steroid injection into lumbar spine (N/A, 7/27/2018); Caudal epidural steroid injection (N/A, 11/6/2018); Lumbar discectomy (2/13/2019); Facetectomy of vertebra (2/13/2019); Colonoscopy (N/A, 6/10/2019); Esophagogastroduodenoscopy (N/A, 7/22/2020); Colonoscopy (N/A, 7/22/2020); Colonoscopy (N/A, 5/27/2021); Cystoscopy; Colonoscopy (N/A, 6/17/2022); excision or surgical planing, skin, nose, for rhinophyma (Bilateral, 3/7/2023); Nasal reconstruction (N/A, 3/21/2023); Application of cartilage graft (N/A, 3/21/2023); Rotation flap surgery (N/A, 3/21/2023); Revision of flap graft (Bilateral, 4/18/2023); Esophagogastroduodenoscopy (N/A, 6/22/2023); Colonoscopy (N/A, 6/22/2023); and Revision of flap graft (N/A, 6/29/2023).     Past Family/Social History  His family history includes Alcohol abuse in his paternal uncle; Basal cell carcinoma in his father; Breast cancer in his mother; Cancer in his maternal grandfather, maternal uncle, mother, and paternal uncle; Colon cancer in his maternal grandfather and maternal uncle; Dementia in his maternal grandmother; Diabetes in his father and paternal grandmother; Early death in his maternal grandfather; Hearing loss in his father; Heart disease in his father and maternal uncle; Hypertension in his maternal uncle and paternal grandfather; Liver disease in his father; Melanoma in his mother; No Known Problems in his paternal aunt and sister; Polycystic ovary syndrome in his daughter; Prostate cancer in his paternal uncle; Prostatitis in his paternal grandfather.  He  reports that he has never smoked. He has never used smokeless tobacco. He reports current alcohol use. He reports that he does not use drugs.    "  Medications/Allergies/Immunizations  His current medication(s) include:   Current Medications          Current Outpatient Medications   Medication Sig Dispense Refill    ACCU-CHEK GUIDE TEST STRIPS Strp USE TO TEST TWICE A  strip 3    ascorbic acid, vitamin C, (VITAMIN C) 100 MG tablet Take 100 mg by mouth once daily.        aspirin (ECOTRIN) 81 MG EC tablet Take 81 mg by mouth once daily.        ciclopirox (PENLAC) 8 % Soln Apply topically nightly. 6.6 mL 3    cyanocobalamin, vitamin B-12, 2,500 mcg Lozg Place 2 tablets under the tongue once daily. 180 lozenge 3    ferrous gluconate (FERGON) 240 (27 FE) MG tablet TAKE 27 MG BY MOUTH 2 (TWO) TIMES DAILY WITH MEALS.        lancets (ACCU-CHEK SOFTCLIX LANCETS) Misc 1 Units by Misc.(Non-Drug; Combo Route) route 2 (two) times a day. 200 each 0    metFORMIN (GLUCOPHAGE-XR) 500 MG ER 24hr tablet Take 1 tablet (500 mg total) by mouth 2 (two) times daily with meals. 180 tablet 3    pen needle, diabetic (PEN NEEDLE) 32 gauge x 5/32" Ndle 1 each by Misc.(Non-Drug; Combo Route) route once daily. 90 each 3    pravastatin (PRAVACHOL) 20 MG tablet Take 1 tablet (20 mg total) by mouth once daily. 90 tablet 3    RABEprazole (ACIPHEX) 20 mg tablet Take 1 tablet (20 mg total) by mouth before breakfast. 90 tablet 3    semaglutide (OZEMPIC) 1 mg/dose (4 mg/3 mL) Inject 1 mg into the skin every 7 days. 3 mL 11    sod sulf-pot chloride-mag sulf (SUTAB) 1.479-0.188- 0.225 gram tablet Take 12 tablets by mouth once daily. Take according to package instructions with indicated amount of water. 24 tablet 0    sulfacetamide sodium-sulfur 10-5 % (w/w) Clsr Use to wash face daily 170 g 5    tadalafiL (CIALIS) 5 MG tablet Take 1 tablet (5 mg total) by mouth daily as needed for Erectile Dysfunction. 90 tablet 3    testosterone cypionate (DEPOTESTOTERONE CYPIONATE) 200 mg/mL injection Inject 60 mg into the muscle every 14 (fourteen) days. Twice a week        triamcinolone acetonide 0.025% " (KENALOG) 0.025 % Oint Thin film to lips and eczematous plaques BID PRN flare 15 g 1    triamcinolone acetonide 0.1% (KENALOG) 0.1 % cream AAA bid 454 g 3    vitamin A 8000 UNIT capsule Take 1 capsule (8,000 Units total) by mouth once daily. 100 capsule 3    zinc gluconate 50 mg tablet Take 50 mg by mouth once daily.          No current facility-administered medications for this visit.            Allergies: Patient has no known allergies.     Immunizations:        Immunization History   Administered Date(s) Administered    COVID-19, MRNA, LN-S, PF (Pfizer) (Gray Cap) 06/01/2022    COVID-19, MRNA, LN-S, PF (Pfizer) (Purple Cap) 04/08/2021, 04/29/2021, 01/03/2022    Hepatitis A / Hepatitis B 11/22/2016, 12/28/2016, 05/18/2017    Influenza 10/30/2008, 09/29/2009, 12/14/2011    Influenza - Quadrivalent - PF *Preferred* (6 months and older) 09/29/2009, 11/11/2016, 12/08/2017, 11/04/2022    Influenza Split 10/30/2008, 12/14/2011    Pneumococcal Conjugate - 20 Valent 09/19/2022    Td - PF (ADULT) 11/11/2016         Review of Systems   Constitutional: Negative for fever, weight loss and weight gain.  Skin: Negative for rash, itchiness, dryness  HENT:  As per HPI  Cardiovascular: Negative for chest pain and dyspnea on exertion .   Respiratory: Is not experiencing shortness of breath.   Gastrointestinal: Negative for nausea and vomiting.   Neurological: Negative for headaches.   Lymph/Heme: Negative for lymphadenopathy or easy bruising  Musculoskeletal: Negative for joint or muscle pain  Psychiatric: The patient is not nervous/anxious.        All other systems are negative except for that listed in the HPI.      PHYSICAL EXAM:   Vital Signs:  Wt 111.6 kg (246 lb 0.5 oz)   BMI 32.46 kg/m²      General:  Well-developed, well-nourished  Communication and Voice:  Clear pitch and clarity  Hearing: Hearing adequate for verbal communication bilaterally   Inspection:  Normocephalic and atraumatic without mass or lesion  Palpation:   Facial skeleton intact without bony stepoffs  Parotid Glands:  No mass or tenderness  Facial Strength:  Facial motility symmetric and full bilaterally  Pinna:  External ear intact and fully developed  External canal:  Canal is patent with intact skin  Tympanic Membrane:  Clear and mobile  External nose:  Right ala and tip subunit paramedian forehead flap well incorporated with some bulkiness superiorly and to the left of the tip.  He has great take of the septal graft at the anterior septal angle.  Soft tissue triangle on the right is better with less bulkiness to the flap since revision but unfortunately we had the columella he did have re separation of the flap from inferior columella with separation of the flap from mucosa.  Continued deformity of the tip to the left with bulkiness.   Internal Nose:  As described above with some crusting along incorporated composite graft with good epithelialization remainder of Septum intact and midline.  No edema, polyp, or rhinorrhea.  TMJ:  No pain to palpation with full mobility  Oral cavity, Lips, Teeth, and Gums:  Mucosa and teeth intact and viable, No lesions, masses or ulcers  Oropharynx: No erythema or exudate, no masses or ulcerations, non-obstructive tonsils  Nasopharynx:  No mass or lesion with intact mucosa  Hypopharynx:  Not well visualized secondary to gagging  Larynx:  Not well visualized secondary to gagging  Neck, Trachea, Lymphatics:  Midline trachea without mass or lesion, no lymphadenopathy  Thyroid:  No mass or nodularity  Eyes: No nystagmus with equal extraocular motion bilaterally  Neuro/Psych/Balance: Patient oriented and appropriate in interaction;  Appropriate mood and affect;  Gait is intact with no imbalance; Cranial nerves I-XII are intact  Respiratory effort:  Equal inspiration and expiration without stridor  Peripheral Vascular:  Warm extremities with equal pulses            PATHOLOGY REVIEW:   Initial nasal tumor resection with sentinel lymph  node biopsy 03/07/2023   NASAL CAVITY AND PARANASAL SINUSES:    SPECIMEN      Procedure       Excision       Tumor Focality       Unifocal       Tumor Laterality       Midline       Tumor Size       Greatest Dimension (Centimeters) 4.1 cm           Squamous Cell Carcinoma and Variants           Verrucous squamous   cell carcinoma       Histologic Grade       G1: Well differentiated       Lymphovascular Invasion       Not Identified       Perineural Invasion       Not identified       Margins       Uninvolved by invasive tumor; the closest margin is deep   margin         Distance from Closest Margin (Millimeters) At least 0.5 mm       Regional Lymph Node Status:      All regional lymph nodes negative for   tumor       Number of Lymph Nodes Examined:      5       pT Category:           pT1       pN Category:           pN0    Re-resection 03/21/2023   RESECTION DEEP MARGIN FROM NASAL AREA:   MICROSCOPIC FOCUS OF RESIDUAL SQUAMOUS CARCINOMA  FROM THE APPARENT   EDGES OF THE SPECIMEN   MAJOR AREAS OF ACUTE INFLAMMATION   RADIOLOGIC REVIEW:    None     ASSESSMENT:   1. 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 Scar of nose             PLAN:   Patient doing very well after revision of paramedian forehead flap on 06/29/2023.  I will schedule him for another debulking and shaping of the flap for 08/29/2023.  Consent  obtained.   and All Collapse All                                                                                                                                                                                                                                        History of Present Illness:   Lyndon Lion is a 53 y.o. year old male evaluated on 8/1/2023, in the Otolaryngology-Head and Neck Surgery Clinic at [facility].  Patient returns about 1 month status post revision of paramedian forehead flap with opening of right vestibule and narrowing of the columella.  He is pleased with  the result with more airflow through the right nostril.  He reports that the right ear has not been bothering him as much since last visit.  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  Patient here to discuss further debulking and fine tuning of the flap.  He has some right-sided nasal obstruction.  He reports increased mucus around internal lining.  He also has notching along the membranous columella.  The obstruction and deformity of the right nostril is what concerns him the most.  His only other complaint today is set back of the right ear from the composite graft he reports that because he wears a hard hat he gets sweat that accumulates behind the right ear that is difficult to clean off and he keeps antifungal Cream there.         Past Medical/Surgical History       Past Medical History:   Diagnosis Date    Arthritis      Asthma       AS A CHILD    Colon cancer      Family hx of prostate cancer 11/22/2016    Hx of colon cancer, stage I 07/03/2014    Parada syndrome      Squamous cell carcinoma of skin      Tear of labium 10/2020     left shoulder Dr Molina    Uncontrolled type 2 diabetes mellitus with hyperglycemia 03/04/2021    Vitamin D deficiency      Wears glasses        His  has a past surgical history that includes Gastric bypass (2010); Colon surgery (2008); Knee arthroscopy (Right); Appendectomy; Colonoscopy (Bilateral, 2012); Epidural Steroid Injection (10/23/15); Colonoscopy (N/A, 8/1/2016); Colonoscopy (N/A, 9/20/2017); Colonoscopy (N/A, 10/9/2017); Esophagogastroduodenoscopy; Colonoscopy (N/A, 11/20/2017); Colonoscopy (N/A, 5/30/2018); Epidural steroid injection into lumbar spine (N/A, 7/27/2018); Caudal epidural steroid  injection (N/A, 11/6/2018); Lumbar discectomy (2/13/2019); Facetectomy of vertebra (2/13/2019); Colonoscopy (N/A, 6/10/2019); Esophagogastroduodenoscopy (N/A, 7/22/2020); Colonoscopy (N/A, 7/22/2020); Colonoscopy (N/A, 5/27/2021); Cystoscopy; Colonoscopy (N/A, 6/17/2022); excision or surgical planing, skin, nose, for rhinophyma (Bilateral, 3/7/2023); Nasal reconstruction (N/A, 3/21/2023); Application of cartilage graft (N/A, 3/21/2023); Rotation flap surgery (N/A, 3/21/2023); Revision of flap graft (Bilateral, 4/18/2023); Esophagogastroduodenoscopy (N/A, 6/22/2023); Colonoscopy (N/A, 6/22/2023); and Revision of flap graft (N/A, 6/29/2023).     Past Family/Social History  His family history includes Alcohol abuse in his paternal uncle; Basal cell carcinoma in his father; Breast cancer in his mother; Cancer in his maternal grandfather, maternal uncle, mother, and paternal uncle; Colon cancer in his maternal grandfather and maternal uncle; Dementia in his maternal grandmother; Diabetes in his father and paternal grandmother; Early death in his maternal grandfather; Hearing loss in his father; Heart disease in his father and maternal uncle; Hypertension in his maternal uncle and paternal grandfather; Liver disease in his father; Melanoma in his mother; No Known Problems in his paternal aunt and sister; Polycystic ovary syndrome in his daughter; Prostate cancer in his paternal uncle; Prostatitis in his paternal grandfather.  He  reports that he has never smoked. He has never used smokeless tobacco. He reports current alcohol use. He reports that he does not use drugs.     Medications/Allergies/Immunizations  His current medication(s) include:   Current Medications          Current Outpatient Medications   Medication Sig Dispense Refill    ACCU-CHEK GUIDE TEST STRIPS Strp USE TO TEST TWICE A  strip 3    ascorbic acid, vitamin C, (VITAMIN C) 100 MG tablet Take 100 mg by mouth once daily.        aspirin (ECOTRIN) 81  "MG EC tablet Take 81 mg by mouth once daily.        ciclopirox (PENLAC) 8 % Soln Apply topically nightly. 6.6 mL 3    cyanocobalamin, vitamin B-12, 2,500 mcg Lozg Place 2 tablets under the tongue once daily. 180 lozenge 3    ferrous gluconate (FERGON) 240 (27 FE) MG tablet TAKE 27 MG BY MOUTH 2 (TWO) TIMES DAILY WITH MEALS.        lancets (ACCU-CHEK SOFTCLIX LANCETS) Misc 1 Units by Misc.(Non-Drug; Combo Route) route 2 (two) times a day. 200 each 0    metFORMIN (GLUCOPHAGE-XR) 500 MG ER 24hr tablet Take 1 tablet (500 mg total) by mouth 2 (two) times daily with meals. 180 tablet 3    pen needle, diabetic (PEN NEEDLE) 32 gauge x 5/32" Ndle 1 each by Misc.(Non-Drug; Combo Route) route once daily. 90 each 3    pravastatin (PRAVACHOL) 20 MG tablet Take 1 tablet (20 mg total) by mouth once daily. 90 tablet 3    RABEprazole (ACIPHEX) 20 mg tablet Take 1 tablet (20 mg total) by mouth before breakfast. 90 tablet 3    semaglutide (OZEMPIC) 1 mg/dose (4 mg/3 mL) Inject 1 mg into the skin every 7 days. 3 mL 11    sod sulf-pot chloride-mag sulf (SUTAB) 1.479-0.188- 0.225 gram tablet Take 12 tablets by mouth once daily. Take according to package instructions with indicated amount of water. 24 tablet 0    sulfacetamide sodium-sulfur 10-5 % (w/w) Clsr Use to wash face daily 170 g 5    tadalafiL (CIALIS) 5 MG tablet Take 1 tablet (5 mg total) by mouth daily as needed for Erectile Dysfunction. 90 tablet 3    testosterone cypionate (DEPOTESTOTERONE CYPIONATE) 200 mg/mL injection Inject 60 mg into the muscle every 14 (fourteen) days. Twice a week        triamcinolone acetonide 0.025% (KENALOG) 0.025 % Oint Thin film to lips and eczematous plaques BID PRN flare 15 g 1    triamcinolone acetonide 0.1% (KENALOG) 0.1 % cream AAA bid 454 g 3    vitamin A 8000 UNIT capsule Take 1 capsule (8,000 Units total) by mouth once daily. 100 capsule 3    zinc gluconate 50 mg tablet Take 50 mg by mouth once daily.          No current " facility-administered medications for this visit.            Allergies: Patient has no known allergies.     Immunizations:        Immunization History   Administered Date(s) Administered    COVID-19, MRNA, LN-S, PF (Pfizer) (Gray Cap) 06/01/2022    COVID-19, MRNA, LN-S, PF (Pfizer) (Purple Cap) 04/08/2021, 04/29/2021, 01/03/2022    Hepatitis A / Hepatitis B 11/22/2016, 12/28/2016, 05/18/2017    Influenza 10/30/2008, 09/29/2009, 12/14/2011    Influenza - Quadrivalent - PF *Preferred* (6 months and older) 09/29/2009, 11/11/2016, 12/08/2017, 11/04/2022    Influenza Split 10/30/2008, 12/14/2011    Pneumococcal Conjugate - 20 Valent 09/19/2022    Td - PF (ADULT) 11/11/2016         Review of Systems   Constitutional: Negative for fever, weight loss and weight gain.  Skin: Negative for rash, itchiness, dryness  HENT:  As per HPI  Cardiovascular: Negative for chest pain and dyspnea on exertion .   Respiratory: Is not experiencing shortness of breath.   Gastrointestinal: Negative for nausea and vomiting.   Neurological: Negative for headaches.   Lymph/Heme: Negative for lymphadenopathy or easy bruising  Musculoskeletal: Negative for joint or muscle pain  Psychiatric: The patient is not nervous/anxious.        All other systems are negative except for that listed in the HPI.      PHYSICAL EXAM:   Vital Signs:  Wt 111.6 kg (246 lb 0.5 oz)   BMI 32.46 kg/m²      General:  Well-developed, well-nourished  Communication and Voice:  Clear pitch and clarity  Hearing: Hearing adequate for verbal communication bilaterally   Inspection:  Normocephalic and atraumatic without mass or lesion  Palpation:  Facial skeleton intact without bony stepoffs  Parotid Glands:  No mass or tenderness  Facial Strength:  Facial motility symmetric and full bilaterally  Pinna:  External ear intact and fully developed  External canal:  Canal is patent with intact skin  Tympanic Membrane:  Clear and mobile  External nose:  Right ala and tip subunit  paramedian forehead flap well incorporated with some bulkiness superiorly and to the left of the tip.  He has great take of the septal graft at the anterior septal angle.  Soft tissue triangle on the right is better with less bulkiness to the flap since revision but unfortunately we had the columella he did have re separation of the flap from inferior columella with separation of the flap from mucosa.  Continued deformity of the tip to the left with bulkiness.   Internal Nose:  As described above with some crusting along incorporated composite graft with good epithelialization remainder of Septum intact and midline.  No edema, polyp, or rhinorrhea.  TMJ:  No pain to palpation with full mobility  Oral cavity, Lips, Teeth, and Gums:  Mucosa and teeth intact and viable, No lesions, masses or ulcers  Oropharynx: No erythema or exudate, no masses or ulcerations, non-obstructive tonsils  Nasopharynx:  No mass or lesion with intact mucosa  Hypopharynx:  Not well visualized secondary to gagging  Larynx:  Not well visualized secondary to gagging  Neck, Trachea, Lymphatics:  Midline trachea without mass or lesion, no lymphadenopathy  Thyroid:  No mass or nodularity  Eyes: No nystagmus with equal extraocular motion bilaterally  Neuro/Psych/Balance: Patient oriented and appropriate in interaction;  Appropriate mood and affect;  Gait is intact with no imbalance; Cranial nerves I-XII are intact  Respiratory effort:  Equal inspiration and expiration without stridor  Peripheral Vascular:  Warm extremities with equal pulses            PATHOLOGY REVIEW:   Initial nasal tumor resection with sentinel lymph node biopsy 03/07/2023   NASAL CAVITY AND PARANASAL SINUSES:    SPECIMEN      Procedure       Excision       Tumor Focality       Unifocal       Tumor Laterality       Midline       Tumor Size       Greatest Dimension (Centimeters) 4.1 cm           Squamous Cell Carcinoma and Variants           Verrucous squamous   cell carcinoma        Histologic Grade       G1: Well differentiated       Lymphovascular Invasion       Not Identified       Perineural Invasion       Not identified       Margins       Uninvolved by invasive tumor; the closest margin is deep   margin         Distance from Closest Margin (Millimeters) At least 0.5 mm       Regional Lymph Node Status:      All regional lymph nodes negative for   tumor       Number of Lymph Nodes Examined:      5       pT Category:           pT1       pN Category:           pN0    Re-resection 03/21/2023   RESECTION DEEP MARGIN FROM NASAL AREA:   MICROSCOPIC FOCUS OF RESIDUAL SQUAMOUS CARCINOMA  FROM THE APPARENT   EDGES OF THE SPECIMEN   MAJOR AREAS OF ACUTE INFLAMMATION   RADIOLOGIC REVIEW:    None     ASSESSMENT:   1. 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 Scar of nose             PLAN:   Patient doing very well after revision of paramedian forehead flap on 06/29/2023.  I will schedule him for another debulking and shaping of the flap for 08/29/2023.  Consent  obtained.

## 2023-08-28 NOTE — LETTER
August 28, 2023      [facility]  [address], [address]  [place] [address]  Phone: [phone]  Fax: [phone]       Patient: Lyndon Lion   YOB: 1970  Date of Visit: 08/28/2023    To Whom It May Concern:    Ting Lion  was at [facility] on 08/28/2023. The patient may return to work/school on 08/29/2023 with no restrictions. seen on 8/28/2023 for skin cancer check. If you have any questions or concerns, or if I can be of further assistance, please do not hesitate to contact me.    Sincerely,    Brittanie Harman MA

## 2023-08-29 ENCOUNTER — HOSPITAL ENCOUNTER (OUTPATIENT)
Facility: HOSPITAL | Age: 53
Discharge: HOME | End: 2023-08-29
Attending: OTOLARYNGOLOGY | Admitting: OTOLARYNGOLOGY
Payer: COMMERCIAL

## 2023-08-29 ENCOUNTER — ANESTHESIA (OUTPATIENT)
Dept: SURGERY | Facility: HOSPITAL | Age: 53
End: 2023-08-29
Payer: COMMERCIAL

## 2023-08-29 VITALS
RESPIRATION RATE: 15 BRPM | DIASTOLIC BLOOD PRESSURE: 76 MMHG | OXYGEN SATURATION: 94 % | HEART RATE: 63 BPM | SYSTOLIC BLOOD PRESSURE: 129 MMHG | TEMPERATURE: 97 F

## 2023-08-29 DIAGNOSIS — C44.321 SQUAMOUS CELL CANCER OF SKIN OF NASAL TIP: ICD-10-CM

## 2023-08-29 DIAGNOSIS — Z98.890 MOHS DEFECT: ICD-10-CM

## 2023-08-29 DIAGNOSIS — M95.0 ACQUIRED NASAL DEFORMITY: Primary | ICD-10-CM

## 2023-08-29 DIAGNOSIS — L98.8 MOHS DEFECT: ICD-10-CM

## 2023-08-29 DIAGNOSIS — Z85.828 H/O NONMELANOMA SKIN CANCER: ICD-10-CM

## 2023-08-29 DIAGNOSIS — J34.89 OBSTRUCTION OF NASAL VALVE: ICD-10-CM

## 2023-08-29 LAB
GLUCOSE SERPL-MCNC: 83 MG/DL (ref 70–110)
POCT GLUCOSE: 109 MG/DL (ref 70–110)
POCT GLUCOSE: 83 MG/DL (ref 70–110)

## 2023-08-29 PROCEDURE — 25000003 PHARM REV CODE 250: Performed by: OTOLARYNGOLOGY

## 2023-08-29 PROCEDURE — 82962 GLUCOSE BLOOD TEST: CPT | Performed by: OTOLARYNGOLOGY

## 2023-08-29 PROCEDURE — D9220A PRA ANESTHESIA: ICD-10-PCS | Mod: CRNA,,, | Performed by: NURSE ANESTHETIST, CERTIFIED REGISTERED

## 2023-08-29 PROCEDURE — 71000033 HC RECOVERY, INTIAL HOUR: Performed by: OTOLARYNGOLOGY

## 2023-08-29 PROCEDURE — 36000707: Performed by: OTOLARYNGOLOGY

## 2023-08-29 PROCEDURE — 37000008 HC ANESTHESIA 1ST 15 MINUTES: Performed by: OTOLARYNGOLOGY

## 2023-08-29 PROCEDURE — 13131 CMPLX RPR F/C/C/M/N/AX/G/H/F: CPT | Mod: 58,,, | Performed by: OTOLARYNGOLOGY

## 2023-08-29 PROCEDURE — 94761 N-INVAS EAR/PLS OXIMETRY MLT: CPT

## 2023-08-29 PROCEDURE — 13131 PR RECMPL WND HEAD,FAC,HAND 1.1-2.5 CM: ICD-10-PCS | Mod: 58,,, | Performed by: OTOLARYNGOLOGY

## 2023-08-29 PROCEDURE — 71000015 HC POSTOP RECOV 1ST HR: Performed by: OTOLARYNGOLOGY

## 2023-08-29 PROCEDURE — D9220A PRA ANESTHESIA: Mod: CRNA,,, | Performed by: NURSE ANESTHETIST, CERTIFIED REGISTERED

## 2023-08-29 PROCEDURE — 36000706: Performed by: OTOLARYNGOLOGY

## 2023-08-29 PROCEDURE — D9220A PRA ANESTHESIA: ICD-10-PCS | Mod: ANES,,, | Performed by: STUDENT IN AN ORGANIZED HEALTH CARE EDUCATION/TRAINING PROGRAM

## 2023-08-29 PROCEDURE — 37000009 HC ANESTHESIA EA ADD 15 MINS: Performed by: OTOLARYNGOLOGY

## 2023-08-29 PROCEDURE — D9220A PRA ANESTHESIA: Mod: ANES,,, | Performed by: STUDENT IN AN ORGANIZED HEALTH CARE EDUCATION/TRAINING PROGRAM

## 2023-08-29 PROCEDURE — 25000003 PHARM REV CODE 250: Performed by: NURSE ANESTHETIST, CERTIFIED REGISTERED

## 2023-08-29 PROCEDURE — 99900035 HC TECH TIME PER 15 MIN (STAT)

## 2023-08-29 PROCEDURE — 63600175 PHARM REV CODE 636 W HCPCS: Performed by: NURSE ANESTHETIST, CERTIFIED REGISTERED

## 2023-08-29 RX ORDER — SODIUM CHLORIDE 9 MG/ML
INJECTION, SOLUTION INTRAVENOUS CONTINUOUS
Status: DISCONTINUED | OUTPATIENT
Start: 2023-08-29 | End: 2023-08-29 | Stop reason: HOSPADM

## 2023-08-29 RX ORDER — HYDROMORPHONE HYDROCHLORIDE 1 MG/ML
0.5 INJECTION, SOLUTION INTRAMUSCULAR; INTRAVENOUS; SUBCUTANEOUS EVERY 5 MIN PRN
Status: DISCONTINUED | OUTPATIENT
Start: 2023-08-29 | End: 2023-08-29 | Stop reason: HOSPADM

## 2023-08-29 RX ORDER — ACETAMINOPHEN 10 MG/ML
INJECTION, SOLUTION INTRAVENOUS
Status: DISCONTINUED | OUTPATIENT
Start: 2023-08-29 | End: 2023-08-29

## 2023-08-29 RX ORDER — ACETAMINOPHEN 500 MG
500 TABLET ORAL EVERY 6 HOURS PRN
Qty: 50 TABLET | Refills: 0 | Status: SHIPPED | OUTPATIENT
Start: 2023-08-29

## 2023-08-29 RX ORDER — PHENYLEPHRINE HYDROCHLORIDE 10 MG/ML
INJECTION INTRAVENOUS
Status: DISCONTINUED | OUTPATIENT
Start: 2023-08-29 | End: 2023-08-29

## 2023-08-29 RX ORDER — MIDAZOLAM HYDROCHLORIDE 1 MG/ML
INJECTION INTRAMUSCULAR; INTRAVENOUS
Status: DISCONTINUED | OUTPATIENT
Start: 2023-08-29 | End: 2023-08-29

## 2023-08-29 RX ORDER — ONDANSETRON 4 MG/1
4 TABLET, ORALLY DISINTEGRATING ORAL EVERY 8 HOURS PRN
Qty: 5 TABLET | Refills: 0 | Status: SHIPPED | OUTPATIENT
Start: 2023-08-29 | End: 2024-03-05

## 2023-08-29 RX ORDER — LIDOCAINE HYDROCHLORIDE 10 MG/ML
1 INJECTION, SOLUTION EPIDURAL; INFILTRATION; INTRACAUDAL; PERINEURAL ONCE AS NEEDED
Status: DISCONTINUED | OUTPATIENT
Start: 2023-08-29 | End: 2023-08-29 | Stop reason: HOSPADM

## 2023-08-29 RX ORDER — SODIUM CHLORIDE 9 MG/ML
INJECTION, SOLUTION INTRAVENOUS CONTINUOUS PRN
Status: DISCONTINUED | OUTPATIENT
Start: 2023-08-29 | End: 2023-08-29

## 2023-08-29 RX ORDER — LIDOCAINE HYDROCHLORIDE 20 MG/ML
INJECTION INTRAVENOUS
Status: DISCONTINUED | OUTPATIENT
Start: 2023-08-29 | End: 2023-08-29

## 2023-08-29 RX ORDER — IBUPROFEN 600 MG/1
600 TABLET ORAL EVERY 6 HOURS PRN
Qty: 50 TABLET | Refills: 0 | Status: SHIPPED | OUTPATIENT
Start: 2023-08-29 | End: 2024-01-29

## 2023-08-29 RX ORDER — PROCHLORPERAZINE EDISYLATE 5 MG/ML
5 INJECTION INTRAMUSCULAR; INTRAVENOUS EVERY 30 MIN PRN
Status: DISCONTINUED | OUTPATIENT
Start: 2023-08-29 | End: 2023-08-29 | Stop reason: HOSPADM

## 2023-08-29 RX ORDER — ROCURONIUM BROMIDE 10 MG/ML
INJECTION, SOLUTION INTRAVENOUS
Status: DISCONTINUED | OUTPATIENT
Start: 2023-08-29 | End: 2023-08-29

## 2023-08-29 RX ORDER — FENTANYL CITRATE 50 UG/ML
INJECTION, SOLUTION INTRAMUSCULAR; INTRAVENOUS
Status: DISCONTINUED | OUTPATIENT
Start: 2023-08-29 | End: 2023-08-29

## 2023-08-29 RX ORDER — ONDANSETRON 2 MG/ML
4 INJECTION INTRAMUSCULAR; INTRAVENOUS DAILY PRN
Status: DISCONTINUED | OUTPATIENT
Start: 2023-08-29 | End: 2023-08-29 | Stop reason: HOSPADM

## 2023-08-29 RX ORDER — DEXAMETHASONE SODIUM PHOSPHATE 4 MG/ML
INJECTION, SOLUTION INTRA-ARTICULAR; INTRALESIONAL; INTRAMUSCULAR; INTRAVENOUS; SOFT TISSUE
Status: DISCONTINUED | OUTPATIENT
Start: 2023-08-29 | End: 2023-08-29

## 2023-08-29 RX ORDER — PROPOFOL 10 MG/ML
VIAL (ML) INTRAVENOUS
Status: DISCONTINUED | OUTPATIENT
Start: 2023-08-29 | End: 2023-08-29

## 2023-08-29 RX ORDER — ONDANSETRON 2 MG/ML
INJECTION INTRAMUSCULAR; INTRAVENOUS
Status: DISCONTINUED | OUTPATIENT
Start: 2023-08-29 | End: 2023-08-29

## 2023-08-29 RX ORDER — LIDOCAINE HYDROCHLORIDE AND EPINEPHRINE 10; 10 MG/ML; UG/ML
INJECTION, SOLUTION INFILTRATION; PERINEURAL
Status: DISCONTINUED | OUTPATIENT
Start: 2023-08-29 | End: 2023-08-29 | Stop reason: HOSPADM

## 2023-08-29 RX ORDER — CEFAZOLIN SODIUM 1 G/3ML
INJECTION, POWDER, FOR SOLUTION INTRAMUSCULAR; INTRAVENOUS
Status: DISCONTINUED | OUTPATIENT
Start: 2023-08-29 | End: 2023-08-29

## 2023-08-29 RX ADMIN — PHENYLEPHRINE HYDROCHLORIDE 100 MCG: 10 INJECTION INTRAVENOUS at 09:08

## 2023-08-29 RX ADMIN — MIDAZOLAM HYDROCHLORIDE 2 MG: 1 INJECTION INTRAMUSCULAR; INTRAVENOUS at 08:08

## 2023-08-29 RX ADMIN — ACETAMINOPHEN 1000 MG: 10 INJECTION INTRAVENOUS at 08:08

## 2023-08-29 RX ADMIN — FENTANYL CITRATE 100 MCG: 50 INJECTION, SOLUTION INTRAMUSCULAR; INTRAVENOUS at 08:08

## 2023-08-29 RX ADMIN — SODIUM CHLORIDE: 0.9 INJECTION, SOLUTION INTRAVENOUS at 08:08

## 2023-08-29 RX ADMIN — DEXAMETHASONE SODIUM PHOSPHATE 8 MG: 4 INJECTION INTRA-ARTICULAR; INTRALESIONAL; INTRAMUSCULAR; INTRAVENOUS; SOFT TISSUE at 08:08

## 2023-08-29 RX ADMIN — ONDANSETRON 4 MG: 2 INJECTION INTRAMUSCULAR; INTRAVENOUS at 08:08

## 2023-08-29 RX ADMIN — SUGAMMADEX 200 MG: 100 INJECTION, SOLUTION INTRAVENOUS at 09:08

## 2023-08-29 RX ADMIN — LIDOCAINE HYDROCHLORIDE 100 MG: 20 INJECTION INTRAVENOUS at 08:08

## 2023-08-29 RX ADMIN — ROCURONIUM BROMIDE 50 MG: 10 INJECTION, SOLUTION INTRAVENOUS at 08:08

## 2023-08-29 RX ADMIN — PROPOFOL 200 MG: 10 INJECTION, EMULSION INTRAVENOUS at 08:08

## 2023-08-29 RX ADMIN — CEFAZOLIN 2 G: 330 INJECTION, POWDER, FOR SOLUTION INTRAMUSCULAR; INTRAVENOUS at 08:08

## 2023-08-29 NOTE — PLAN OF CARE
Discharge instructions reviewed with patient and wife. Patient verbalized understanding.  PIV removed with catheter intact.  Suture line to nose without drainage, sutures intact.     Patient denies any pain or discomfort.  Patient escorted to automobile via wheelchair without incident.

## 2023-08-29 NOTE — TRANSFER OF CARE
Anesthesia Transfer of Care Note    Patient: Lyndon Lion Jr.    Procedure(s) Performed: Procedure(s) (LRB):  REVISION, PROCEDURE INVOLVING FLAP GRAFT (N/A)    Patient location: PACU    Anesthesia Type: general    Transport from OR: Transported from OR on room air with adequate spontaneous ventilation    Post pain: adequate analgesia    Post assessment: no apparent anesthetic complications and tolerated procedure well    Post vital signs: stable    Level of consciousness: alert and awake    Nausea/Vomiting: no nausea/vomiting    Complications: none    Transfer of care protocol was followed      Last vitals:   Visit Vitals  BP (!) 154/92 (BP Location: Left arm, Patient Position: Lying)   Pulse 66   Temp 36.5 °C (97.7 °F) (Temporal)   Resp 16   SpO2 98%

## 2023-08-29 NOTE — BRIEF OP NOTE
[facility]  Discharge Note  Short Stay    Procedure(s) (LRB):  Procedure(s):  REVISION, PROCEDURE INVOLVING FLAP GRAFT      EBL: 5cc    OUTCOME: Patient tolerated treatment/procedure well without complication and is now ready for discharge.    DISPOSITION: Home or Self Care    FINAL DIAGNOSIS:  same as preop  Pre-Op Diagnosis Codes:     * Scar of nose [L90.5]     FOLLOWUP: In clinic    DISCHARGE INSTRUCTIONS:  See discharge tab for details. Discussed with patient/family preop.    TIME SPENT ON DISCHARGE: 30 minutes

## 2023-08-29 NOTE — OP NOTE
Date of service: 8/29/2023    Pre-operative Diagnosis:   Previous paramedian forehead flap   Flap bulkiness    Post-operative Diagnosis:  Same    Procedures Performed:  Flap debulking with excision of subcutaneous fat excised dimensions 2.5 x 1 cm    Surgeon: Alyx Spivey MD    Assistant(s):  Ida Ojeda MD    Anesthesia: General Endotracheal    EBL:  Minimal less than 7 cc    Specimens:  None    Findings:  Left tip bulkiness as well as supra tip area and ala margin debulked with notching removed at ala margin on the left.    Indications for Procedure:  Mr. 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  He presents for another revision.    Procedure in Detail:  After informed consent had been obtained from patient with risks and benefits reviewed patient was taken back to or 3.  Anesthesia proceeded with general endotracheal anesthesia.  Area around the previous paramedian forehead flap at the prior scar was injected to the tip area especially on the left.  Midface was prepped and draped in usual sterile fashion.      Incision was made with 15 blade at the previous junction of native skin and paramedian forehead flap around the left tip subunit extending to the ala margin with total length being 2.5 cm.  This was continued to the subdermal area with wide undermining immediately below the dermis and exposure of fat under the flap on the left.  Flap fat was debulked with needle-tip Bovie cautery.  After thinning down left tip area dissection was undertaken all the way to the ala margin was soft tissue triangle debulked on the left as well.  This was also extended underneath the incision towards the right side with supra tip area as well as right-sided with the tip debulked of fat.  After adequate contouring excess skin from the paramedian forehead flap was  trimmed with a 15 C blade.  Subdermal layer was closed with 5 0 PDS suture.  Along the ala margin a vertical mattress 5 0 chromic was placed and then skin at the remainder of the incision was closed with a running locking 5 0 fast-absorbing gut suture.  Wounds were cleaned patient was turned over to Anesthesia awakened and taken to recovery in stable condition.    Complications:  None    Attestation:  I was present for the entire procedure    DISCLAIMER: This note was prepared with Lovely voice recognition transcription software. Garbled syntax, mangled pronouns, and other bizarre constructions may be attributed to that software system. While efforts were made to correct any mistakes made by this voice recognition program, some errors and/or omissions may remain in the note that were missed when the note was originally created.

## 2023-08-29 NOTE — ANESTHESIA POSTPROCEDURE EVALUATION
Anesthesia Post Evaluation    Patient: Lyndon Lion Jr.    Procedure(s) Performed: Procedure(s) (LRB):  REVISION, PROCEDURE INVOLVING FLAP GRAFT (N/A)    Final Anesthesia Type: general      Patient location during evaluation: PACU  Patient participation: Yes- Able to Participate  Level of consciousness: awake and alert  Post-procedure vital signs: reviewed and stable  Pain management: adequate  Airway patency: patent    PONV status at discharge: No PONV  Anesthetic complications: no      Cardiovascular status: stable  Respiratory status: room air  Hydration status: euvolemic  Follow-up not needed.          Vitals Value Taken Time   /69 08/29/23 1017   Temp 36.2 °C (97.2 °F) 08/29/23 0945   Pulse 68 08/29/23 1021   Resp 21 08/29/23 1021   SpO2 93 % 08/29/23 1021   Vitals shown include unvalidated device data.      Event Time   Out of Recovery 10:05:00         Pain/Karthikeyan Score: Karthikeyan Score: 10 (8/29/2023 10:05 AM)

## 2023-08-29 NOTE — PATIENT INSTRUCTIONS
Patient Instructions Following Nasal Surgery    General Information  - Please do not lift anything heavier than 10 pounds (2 gallons of milk) for 2 weeks  - Please avoid spicy foods. A soft diet may be helpful if you have soreness in your teeth, which is normal  - No straining or exercising for 2 weeks    Medications/Wound care  - Please take Tylenol as needed for pain. For breakthrough pain, please take the prescribed narcotic if one was given to you  - Wound care: no rubbing or abrasive scratching/soaking, okay to clean crusting with Qtip and diluted peroxide  - Sutures are dissolvable and do not need to be removed, they will absorb in the coming weeks    In the event of issues/emergencies, please contact [facility] and ask to speak to ENT on call.

## 2023-08-29 NOTE — ANESTHESIA PREPROCEDURE EVALUATION
08/29/2023  Lyndon Lion Jr. is a 53 y.o., male.      Pre-op Assessment    I have reviewed the Patient Summary Reports.    I have reviewed the NPO Status.   I have reviewed the Medications.     Review of Systems  Anesthesia Hx:  No problems with previous Anesthesia   Denies Personal Hx of Anesthesia complications.   Social:  Non-Smoker    EENT/Dental:EENT/Dental Normal   Cardiovascular:   Exercise tolerance: good hyperlipidemia    Pulmonary:  Pulmonary Normal    Hepatic/GI:   GERD    Musculoskeletal:   Arthritis     Endocrine:   Diabetes        Physical Exam  General: Well nourished and Cooperative    Airway:  Mallampati: II   Mouth Opening: Normal  TM Distance: Normal  Tongue: Normal  Neck ROM: Normal ROM    Dental:    Chest/Lungs:  Normal Respiratory Rate    Heart:  Rate: Normal        Anesthesia Plan  Type of Anesthesia, risks & benefits discussed:    Anesthesia Type: Gen ETT  Post Op Pain Control Plan: multimodal analgesia  Induction:  IV  Airway Plan: Direct and Video  Informed Consent: Informed consent signed with the Patient and all parties understand the risks and agree with anesthesia plan.  All questions answered.   ASA Score: 2  Day of Surgery Review of History & Physical: H&P Update referred to the surgeon/provider.    Ready For Surgery From Anesthesia Perspective.     .

## 2023-08-29 NOTE — ANESTHESIA PROCEDURE NOTES
Intubation    Date/Time: 8/29/2023 8:41 AM    Performed by: Kamryn Casey CRNA  Authorized by: Arvin Steinberg MD    Intubation:     Induction:  Intravenous    Intubated:  Postinduction    Mask Ventilation:  Easy mask    Attempts:  1    Attempted By:  CRNA    Method of Intubation:  Video laryngoscopy    Laryngeal View Grade: Grade I - full view of cords      Difficult Airway Encountered?: No      Complications:  None    Airway Device:  Oral endotracheal tube and oral albert    Airway Device Size:  8.0    Style/Cuff Inflation:  Cuffed (inflated to minimal occlusive pressure)    Tube secured:  23    Secured at:  The lips    Placement Verified By:  Capnometry    Complicating Factors:  None    Findings Post-Intubation:  BS equal bilateral and atraumatic/condition of teeth unchanged

## 2023-08-30 ENCOUNTER — LAB VISIT (OUTPATIENT)
Dept: LAB | Facility: HOSPITAL | Age: 53
End: 2023-08-30
Attending: NURSE PRACTITIONER
Payer: COMMERCIAL

## 2023-08-30 ENCOUNTER — PATIENT MESSAGE (OUTPATIENT)
Dept: OTOLARYNGOLOGY | Facility: CLINIC | Age: 53
End: 2023-08-30
Payer: COMMERCIAL

## 2023-08-30 DIAGNOSIS — E11.65 TYPE 2 DIABETES MELLITUS WITH HYPERGLYCEMIA, WITHOUT LONG-TERM CURRENT USE OF INSULIN: ICD-10-CM

## 2023-08-30 DIAGNOSIS — Z00.00 ANNUAL PHYSICAL EXAM: ICD-10-CM

## 2023-08-30 DIAGNOSIS — Z98.84 S/P LAPAROSCOPIC SLEEVE GASTRECTOMY: ICD-10-CM

## 2023-08-30 DIAGNOSIS — E11.9 CONTROLLED TYPE 2 DIABETES MELLITUS WITHOUT COMPLICATION, WITHOUT LONG-TERM CURRENT USE OF INSULIN: ICD-10-CM

## 2023-08-30 LAB
ALBUMIN SERPL BCP-MCNC: 3.5 G/DL (ref 3.5–5.2)
ALP SERPL-CCNC: 46 U/L (ref 55–135)
ALT SERPL W/O P-5'-P-CCNC: 19 U/L (ref 10–44)
ANION GAP SERPL CALC-SCNC: 7 MMOL/L (ref 8–16)
AST SERPL-CCNC: 16 U/L (ref 10–40)
BASOPHILS # BLD AUTO: 0.02 K/UL (ref 0–0.2)
BASOPHILS NFR BLD: 0.2 % (ref 0–1.9)
BILIRUB SERPL-MCNC: 1.1 MG/DL (ref 0.1–1)
BUN SERPL-MCNC: 11 MG/DL (ref 6–20)
CALCIUM SERPL-MCNC: 8.6 MG/DL (ref 8.7–10.5)
CHLORIDE SERPL-SCNC: 110 MMOL/L (ref 95–110)
CHOLEST SERPL-MCNC: 165 MG/DL (ref 120–199)
CHOLEST/HDLC SERPL: 4.3 {RATIO} (ref 2–5)
CO2 SERPL-SCNC: 22 MMOL/L (ref 23–29)
CREAT SERPL-MCNC: 1.2 MG/DL (ref 0.5–1.4)
DIFFERENTIAL METHOD: ABNORMAL
EOSINOPHIL # BLD AUTO: 0 K/UL (ref 0–0.5)
EOSINOPHIL NFR BLD: 0.2 % (ref 0–8)
ERYTHROCYTE [DISTWIDTH] IN BLOOD BY AUTOMATED COUNT: 12.9 % (ref 11.5–14.5)
EST. GFR  (NO RACE VARIABLE): >60 ML/MIN/1.73 M^2
ESTIMATED AVG GLUCOSE: 117 MG/DL (ref 68–131)
FERRITIN SERPL-MCNC: 28 NG/ML (ref 20–300)
GLUCOSE SERPL-MCNC: 118 MG/DL (ref 70–110)
HBA1C MFR BLD: 5.7 % (ref 4–5.6)
HCT VFR BLD AUTO: 49.1 % (ref 40–54)
HDLC SERPL-MCNC: 38 MG/DL (ref 40–75)
HDLC SERPL: 23 % (ref 20–50)
HGB BLD-MCNC: 16.3 G/DL (ref 14–18)
IMM GRANULOCYTES # BLD AUTO: 0.01 K/UL (ref 0–0.04)
IMM GRANULOCYTES NFR BLD AUTO: 0.1 % (ref 0–0.5)
IRON SERPL-MCNC: 105 UG/DL (ref 45–160)
LDLC SERPL CALC-MCNC: 110.2 MG/DL (ref 63–159)
LYMPHOCYTES # BLD AUTO: 1.4 K/UL (ref 1–4.8)
LYMPHOCYTES NFR BLD: 16.9 % (ref 18–48)
MCH RBC QN AUTO: 29.7 PG (ref 27–31)
MCHC RBC AUTO-ENTMCNC: 33.2 G/DL (ref 32–36)
MCV RBC AUTO: 89 FL (ref 82–98)
MONOCYTES # BLD AUTO: 0.6 K/UL (ref 0.3–1)
MONOCYTES NFR BLD: 7.3 % (ref 4–15)
NEUTROPHILS # BLD AUTO: 6.4 K/UL (ref 1.8–7.7)
NEUTROPHILS NFR BLD: 75.3 % (ref 38–73)
NONHDLC SERPL-MCNC: 127 MG/DL
NRBC BLD-RTO: 0 /100 WBC
PLATELET # BLD AUTO: 235 K/UL (ref 150–450)
PMV BLD AUTO: 9.1 FL (ref 9.2–12.9)
POTASSIUM SERPL-SCNC: 3.9 MMOL/L (ref 3.5–5.1)
PROT SERPL-MCNC: 6.1 G/DL (ref 6–8.4)
RBC # BLD AUTO: 5.49 M/UL (ref 4.6–6.2)
SATURATED IRON: 26 % (ref 20–50)
SODIUM SERPL-SCNC: 139 MMOL/L (ref 136–145)
TOTAL IRON BINDING CAPACITY: 410 UG/DL (ref 250–450)
TRANSFERRIN SERPL-MCNC: 277 MG/DL (ref 200–375)
TRIGL SERPL-MCNC: 84 MG/DL (ref 30–150)
VIT B12 SERPL-MCNC: 351 PG/ML (ref 210–950)
WBC # BLD AUTO: 8.51 K/UL (ref 3.9–12.7)

## 2023-08-30 PROCEDURE — 84466 ASSAY OF TRANSFERRIN: CPT | Performed by: NURSE PRACTITIONER

## 2023-08-30 PROCEDURE — 82728 ASSAY OF FERRITIN: CPT | Performed by: NURSE PRACTITIONER

## 2023-08-30 PROCEDURE — 80061 LIPID PANEL: CPT | Performed by: NURSE PRACTITIONER

## 2023-08-30 PROCEDURE — 36415 COLL VENOUS BLD VENIPUNCTURE: CPT | Mod: PO | Performed by: NURSE PRACTITIONER

## 2023-08-30 PROCEDURE — 82607 VITAMIN B-12: CPT | Performed by: NURSE PRACTITIONER

## 2023-08-30 PROCEDURE — 83540 ASSAY OF IRON: CPT | Performed by: NURSE PRACTITIONER

## 2023-08-30 PROCEDURE — 80053 COMPREHEN METABOLIC PANEL: CPT | Performed by: NURSE PRACTITIONER

## 2023-08-30 PROCEDURE — 83036 HEMOGLOBIN GLYCOSYLATED A1C: CPT | Performed by: NURSE PRACTITIONER

## 2023-08-30 PROCEDURE — 85025 COMPLETE CBC W/AUTO DIFF WBC: CPT | Performed by: NURSE PRACTITIONER

## 2023-08-30 PROCEDURE — 84590 ASSAY OF VITAMIN A: CPT | Performed by: NURSE PRACTITIONER

## 2023-08-31 ENCOUNTER — PATIENT MESSAGE (OUTPATIENT)
Dept: FAMILY MEDICINE | Facility: CLINIC | Age: 53
End: 2023-08-31
Payer: COMMERCIAL

## 2023-09-01 ENCOUNTER — ANESTHESIA (OUTPATIENT)
Dept: ENDOSCOPY | Facility: HOSPITAL | Age: 53
End: 2023-09-01
Payer: COMMERCIAL

## 2023-09-01 ENCOUNTER — ANESTHESIA EVENT (OUTPATIENT)
Dept: ENDOSCOPY | Facility: HOSPITAL | Age: 53
End: 2023-09-01
Payer: COMMERCIAL

## 2023-09-01 ENCOUNTER — HOSPITAL ENCOUNTER (OUTPATIENT)
Facility: HOSPITAL | Age: 53
Discharge: HOME | End: 2023-09-01
Attending: INTERNAL MEDICINE | Admitting: INTERNAL MEDICINE
Payer: COMMERCIAL

## 2023-09-01 VITALS
RESPIRATION RATE: 17 BRPM | HEIGHT: 73 IN | OXYGEN SATURATION: 96 % | BODY MASS INDEX: 33.13 KG/M2 | SYSTOLIC BLOOD PRESSURE: 131 MMHG | WEIGHT: 250 LBS | DIASTOLIC BLOOD PRESSURE: 78 MMHG | HEART RATE: 61 BPM | TEMPERATURE: 98 F

## 2023-09-01 DIAGNOSIS — K22.10 EROSIVE ESOPHAGITIS: ICD-10-CM

## 2023-09-01 LAB — POCT GLUCOSE: 84 MG/DL (ref 70–110)

## 2023-09-01 PROCEDURE — 37000009 HC ANESTHESIA EA ADD 15 MINS: Performed by: INTERNAL MEDICINE

## 2023-09-01 PROCEDURE — 88342 IMHCHEM/IMCYTCHM 1ST ANTB: CPT | Mod: 26,,, | Performed by: PATHOLOGY

## 2023-09-01 PROCEDURE — E9220 PRA ENDO ANESTHESIA: ICD-10-PCS | Mod: ,,, | Performed by: NURSE ANESTHETIST, CERTIFIED REGISTERED

## 2023-09-01 PROCEDURE — 43239 PR EGD, FLEX, W/BIOPSY, SGL/MULTI: ICD-10-PCS | Mod: ,,, | Performed by: INTERNAL MEDICINE

## 2023-09-01 PROCEDURE — 25000003 PHARM REV CODE 250: Performed by: NURSE ANESTHETIST, CERTIFIED REGISTERED

## 2023-09-01 PROCEDURE — 43239 EGD BIOPSY SINGLE/MULTIPLE: CPT | Performed by: INTERNAL MEDICINE

## 2023-09-01 PROCEDURE — 88305 TISSUE EXAM BY PATHOLOGIST: ICD-10-PCS | Mod: 26,,, | Performed by: PATHOLOGY

## 2023-09-01 PROCEDURE — 88305 TISSUE EXAM BY PATHOLOGIST: CPT | Performed by: PATHOLOGY

## 2023-09-01 PROCEDURE — 88342 CHG IMMUNOCYTOCHEMISTRY: ICD-10-PCS | Mod: 26,,, | Performed by: PATHOLOGY

## 2023-09-01 PROCEDURE — 88305 TISSUE EXAM BY PATHOLOGIST: CPT | Mod: 26,,, | Performed by: PATHOLOGY

## 2023-09-01 PROCEDURE — E9220 PRA ENDO ANESTHESIA: HCPCS | Mod: ,,, | Performed by: NURSE ANESTHETIST, CERTIFIED REGISTERED

## 2023-09-01 PROCEDURE — 43239 EGD BIOPSY SINGLE/MULTIPLE: CPT | Mod: ,,, | Performed by: INTERNAL MEDICINE

## 2023-09-01 PROCEDURE — 63600175 PHARM REV CODE 636 W HCPCS: Performed by: NURSE ANESTHETIST, CERTIFIED REGISTERED

## 2023-09-01 PROCEDURE — 37000008 HC ANESTHESIA 1ST 15 MINUTES: Performed by: INTERNAL MEDICINE

## 2023-09-01 PROCEDURE — 27201012 HC FORCEPS, HOT/COLD, DISP: Performed by: INTERNAL MEDICINE

## 2023-09-01 PROCEDURE — 88342 IMHCHEM/IMCYTCHM 1ST ANTB: CPT | Performed by: PATHOLOGY

## 2023-09-01 RX ORDER — PROPOFOL 10 MG/ML
VIAL (ML) INTRAVENOUS
Status: DISCONTINUED | OUTPATIENT
Start: 2023-09-01 | End: 2023-09-01

## 2023-09-01 RX ORDER — LIDOCAINE HYDROCHLORIDE 20 MG/ML
INJECTION, SOLUTION EPIDURAL; INFILTRATION; INTRACAUDAL; PERINEURAL
Status: DISCONTINUED | OUTPATIENT
Start: 2023-09-01 | End: 2023-09-01

## 2023-09-01 RX ORDER — SODIUM CHLORIDE 9 MG/ML
INJECTION, SOLUTION INTRAVENOUS CONTINUOUS
Status: DISCONTINUED | OUTPATIENT
Start: 2023-09-01 | End: 2023-09-01 | Stop reason: HOSPADM

## 2023-09-01 RX ADMIN — SODIUM CHLORIDE: 0.9 INJECTION, SOLUTION INTRAVENOUS at 07:09

## 2023-09-01 RX ADMIN — LIDOCAINE HYDROCHLORIDE 100 MG: 20 INJECTION, SOLUTION EPIDURAL; INFILTRATION; INTRACAUDAL; PERINEURAL at 07:09

## 2023-09-01 RX ADMIN — PROPOFOL 220 MCG/KG/MIN: 10 INJECTION, EMULSION INTRAVENOUS at 07:09

## 2023-09-01 RX ADMIN — PROPOFOL 100 MG: 10 INJECTION, EMULSION INTRAVENOUS at 07:09

## 2023-09-01 NOTE — TRANSFER OF CARE
"Anesthesia Transfer of Care Note    Patient: Lyndon Lion Jr.    Procedure(s) Performed: Procedure(s) (LRB):  EGD (ESOPHAGOGASTRODUODENOSCOPY) (N/A)    Patient location: PACU    Anesthesia Type: general    Transport from OR: Transported from OR on room air with adequate spontaneous ventilation    Post pain: adequate analgesia    Post assessment: no apparent anesthetic complications    Post vital signs: stable    Level of consciousness: awake    Nausea/Vomiting: no nausea/vomiting    Complications: none    Transfer of care protocol was followed      Last vitals:   Visit Vitals  BP (!) 169/94 (BP Location: Left arm, Patient Position: Lying)   Pulse 64   Temp 36.8 °C (98.2 °F) (Temporal)   Resp 18   Ht 6' 1" (1.854 m)   Wt 113.4 kg (250 lb)   SpO2 98%   BMI 32.98 kg/m²     "

## 2023-09-01 NOTE — PROVATION PATIENT INSTRUCTIONS
Discharge Summary/Instructions after an Endoscopic Procedure  Patient Name: Lyndon Lion  Patient MRN: [id number]  Patient YOB: 1970 Friday, September 1, 2023  Dom Leonardo MD  Dear patient,  As a result of recent federal legislation (The Federal Cures Act), you may   receive lab or pathology results from your procedure in your [facility]   account before your physician is able to contact you. Your physician or   their representative will relay the results to you with their   recommendations at their soonest availability.  Thank you,  RESTRICTIONS:  During your procedure today, you received medications for sedation.  These   medications may affect your judgment, balance and coordination.  Therefore,   for 24 hours, you have the following restrictions:   - DO NOT drive a car, operate machinery, make legal/financial decisions,   sign important papers or drink alcohol.    ACTIVITY:  Today: no heavy lifting, straining or running due to procedural   sedation/anesthesia.  The following day: return to full activity including work.  DIET:  Eat and drink normally unless instructed otherwise.     TREATMENT FOR COMMON SIDE EFFECTS:  - Mild abdominal pain, nausea, belching, bloating or excessive gas:  rest,   eat lightly and use a heating pad.  - Sore Throat: treat with throat lozenges and/or gargle with warm salt   water.  - Because air was used during the procedure, expelling large amounts of air   from your rectum or belching is normal.  - If a bowel prep was taken, you may not have a bowel movement for 1-3 days.    This is normal.  SYMPTOMS TO WATCH FOR AND REPORT TO YOUR PHYSICIAN:  1. Abdominal pain or bloating, other than gas cramps.  2. Chest pain.  3. Back pain.  4. Signs of infection such as: chills or fever occurring within 24 hours   after the procedure.  5. Rectal bleeding, which would show as bright red, maroon, or black stools.   (A tablespoon of blood from the rectum is not serious, especially  if   hemorrhoids are present.)  6. Vomiting.  7. Weakness or dizziness.  GO DIRECTLY TO THE NEAREST EMERGENCY ROOM IF YOU HAVE ANY OF THE FOLLOWING:      Difficulty breathing              Chills and/or fever over 101 F   Persistent vomiting and/or vomiting blood   Severe abdominal pain   Severe chest pain   Black, tarry stools   Bleeding- more than one tablespoon   Any other symptom or condition that you feel may need urgent attention  Your doctor recommends these additional instructions:  If any biopsies were taken, your doctors clinic will contact you in 1 to 2   weeks with any results.  - Discharge patient to home.   - Follow an antireflux regimen indefinitely.   - Continue present medications RABEprazole (ACIPHEX) 20 mg tablet 1 tablet   (20 mg total) by mouth before breakfast.  - Await pathology results.   - Telephone endoscopist for pathology results in 3 weeks.   - Repeat upper endoscopy in 3 years for surveillance based on pathology   results.   - Return to GI clinic at the next available appointment.   - The findings and recommendations were discussed with the patient.  For questions, problems or results please call your physician - Dom Leonardo MD at Work:  [phone].  [facility], EMERGENCY ROOM PHONE NUMBER: [phone]  IF A COMPLICATION OR EMERGENCY SITUATION ARISES AND YOU ARE UNABLE TO REACH   YOUR PHYSICIAN - GO DIRECTLY TO THE EMERGENCY ROOM.  Dom Leonardo MD  9/1/2023 7:49:40 AM  This report has been verified and signed electronically.  Dear patient,  As a result of recent federal legislation (The Federal Cures Act), you may   receive lab or pathology results from your procedure in your [facility]   account before your physician is able to contact you. Your physician or   their representative will relay the results to you with their   recommendations at their soonest availability.  Thank you,  PROVATION

## 2023-09-01 NOTE — H&P
[facility]  History & Physical    Subjective:      Chief Complaint/Reason for Admission:     EGD for follow-up of erosive esophagitis healing patient with MSH2 and history of a sleeve gastrectomy    Lyndon STEWARD Ayad Guy is a 53 y.o. male.    Past Medical History:   Diagnosis Date    Arthritis     Asthma     AS A CHILD    Colon cancer     Family hx of prostate cancer 11/22/2016    Hx of colon cancer, stage I 07/03/2014    Parada syndrome     Squamous cell carcinoma of skin     Tear of labium 10/2020    left shoulder Dr Molina    Uncontrolled type 2 diabetes mellitus with hyperglycemia 03/04/2021    Vitamin D deficiency     Wears glasses      Past Surgical History:   Procedure Laterality Date    APPENDECTOMY      APPLICATION OF CARTILAGE GRAFT N/A 3/21/2023    Procedure: APPLICATION, CARTILAGE GRAFT;  Surgeon: Alyx Spivey MD;  Location: [address];  Service: ENT;  Laterality: N/A;    CAUDAL EPIDURAL STEROID INJECTION N/A 11/6/2018    Procedure: Injection-steroid-epidural-caudal;  Surgeon: Lyndon Brooke Jr., MD;  Location: [facility];  Service: Pain Management;  Laterality: N/A;    COLON SURGERY  2008    PARTIAL COLECTOMY    COLONOSCOPY Bilateral 2012    COLONOSCOPY N/A 8/1/2016    Procedure: COLONOSCOPY;  Surgeon: Blaze Marr MD;  Location: [facility];  Service: Endoscopy;  Laterality: N/A;    COLONOSCOPY N/A 9/20/2017    Procedure: COLONOSCOPY;  Surgeon: Dom Leonardo MD;  Location: [address]);  Service: Endoscopy;  Laterality: N/A;  not given PM prep    COLONOSCOPY N/A 10/9/2017    Procedure: COLONOSCOPY;  Surgeon: RAMON Callahan MD;  [address]  Service: Endoscopy;  Laterality: N/A;  ok for Prepopik per Dr Callahan    COLONOSCOPY N/A 11/20/2017    Procedure: COLONOSCOPY/EMR;  Surgeon: Joseluis Choe MD;  Location: [facility]);  Service: Endoscopy;  Laterality: N/A;  EMR    no pm prep    COLONOSCOPY N/A 5/30/2018    Procedure: COLONOSCOPY;   Surgeon: Dom Leonardo MD;  [facility]  Service: Endoscopy;  Laterality: N/A;  follow up colonoscopy in 5/2018 for Parada Syndrome         COLONOSCOPY N/A 6/10/2019    Procedure: COLONOSCOPY;  Surgeon: Dom Leonardo MD;  Location: [facility] (4TH FLR);  Service: Endoscopy;  Laterality: N/A;  Prepopik ordered per Dr. Leonardo    COLONOSCOPY N/A 7/22/2020    Procedure: COLONOSCOPY;  Surgeon: Dom Leonardo MD;  [facility]  Service: Endoscopy;  Laterality: N/A;    COLONOSCOPY N/A 5/27/2021    Procedure: COLONOSCOPY;  Surgeon: Dom Leonardo MD;  [facility]  Service: Endoscopy;  Laterality: N/A;  covid test 5/24-[place]  pt requests Prepopik    COLONOSCOPY N/A 6/17/2022    Procedure: COLONOSCOPY;  Surgeon: Dom Leonardo MD;  [facility]  Service: Endoscopy;  Laterality: N/A;  He was discovered to have colon cancer and had right hemicolectomy        for stage II on 08/08/2008. MSH2 Parada syndrome.  sutab requested  instructions via the portal -sm  fully vaccinated - sm    COLONOSCOPY N/A 6/22/2023    Procedure: COLONOSCOPY;  Surgeon: Dom Leonardo MD;  [facility]  Service: Endoscopy;  Laterality: N/A;  see telephone encounter dated 5/24/23/ Pt/ Pt wife requested sutab - prep ins. on portal / Ozempic for DM- ERW    CYSTOSCOPY      Epidural Steroid Injection  10/23/15    Lumbar    EPIDURAL STEROID INJECTION INTO LUMBAR SPINE N/A 7/27/2018    Procedure: Injection-steroid-epidural-lumbar;  Surgeon: Lyndon Brooke Jr., MD;  Location: [facility] OR;  Service: Pain Management;  Laterality: N/A;    ESOPHAGOGASTRODUODENOSCOPY      ESOPHAGOGASTRODUODENOSCOPY N/A 7/22/2020    Procedure: EGD (ESOPHAGOGASTRODUODENOSCOPY);  Surgeon: Dom Leonardo MD;  [facility]  Service: Endoscopy;  Laterality: N/A;  Please schedule patient for EGD and colonoscopy with me MSH2 Parada syndrome and anemia evaluation  please make priority 1  covid test 7/20-[place]    ESOPHAGOGASTRODUODENOSCOPY N/A 6/22/2023    Procedure: EGD (ESOPHAGOGASTRODUODENOSCOPY);  Surgeon: Dom Leonardo MD;  Location: [facility] (4TH FLR);  Service: Endoscopy;  Laterality: N/A;  see telephone encounter dated 5/24/23 6/16/23-[place] completed appointment confirmed-[facility]    EXCISION OR SURGICAL PLANING, SKIN, NOSE, FOR RHINOPHYMA Bilateral 3/7/2023    Procedure: EXCISION OR SURGICAL resection nasal skin cancer;  Surgeon: Alyx Spivey MD;  Location: [facility] OR 2ND FLR;  Service: ENT;  Laterality: Bilateral;    FACETECTOMY OF VERTEBRA  2/13/2019    Procedure: MEDIAL FACETECTOMY L5-S1;  Surgeon: Lyndon Brooke Jr., MD;  Location: [facility] OR;  Service: Orthopedics;;    GASTRIC BYPASS  2010    SLEEVE    KNEE ARTHROSCOPY Right     LUMBAR DISCECTOMY  2/13/2019    Procedure: DISCECTOMY, SPINE, LUMBAR L5-S1;  Surgeon: Lyndon Brooke Jr., MD;  Location: [facility] OR;  Service: Orthopedics;;    NASAL RECONSTRUCTION N/A 3/21/2023    Procedure: RECONSTRUCTION, NOSE;  Surgeon: Alyx Spivey MD;  Location: [facility] OR 2ND FLR;  Service: ENT;  Laterality: N/A;    REVISION OF FLAP GRAFT Bilateral 4/18/2023    Procedure: REVISION, PROCEDURE INVOLVING FLAP GRAFT;  Surgeon: Alyx Spivey MD;  [facility]: [facility] OR;  Service: ENT;  Laterality: Bilateral;    REVISION OF FLAP GRAFT N/A 6/29/2023    Procedure: REVISION, PROCEDURE INVOLVING FLAP GRAFT;  Surgeon: Alyx Spivey MD;  Location: [facility] OR 2ND FLR;  Service: ENT;  Laterality: N/A;    REVISION OF FLAP GRAFT N/A 8/29/2023    Procedure: REVISION, PROCEDURE INVOLVING FLAP GRAFT;  Surgeon: Alyx Spivey MD;  Location: [facility] OR;  Service: ENT;  Laterality: N/A;    ROTATION FLAP SURGERY N/A 3/21/2023    Procedure: CREATION, FLAP, ROTATION;  Surgeon: Alyx Spivey MD;  Location: [facility] OR 2ND FLR;  Service: ENT;  Laterality: N/A;     Family History   Problem Relation Age of Onset    Melanoma Mother     Cancer Mother          breast    Breast cancer Mother     Heart disease Father     Diabetes Father     Liver disease Father     Hearing loss Father     Basal cell carcinoma Father     No Known Problems Sister     Polycystic ovary syndrome Daughter     Cancer Maternal Uncle         colon    Colon cancer Maternal Uncle         x2    Heart disease Maternal Uncle     Hypertension Maternal Uncle     No Known Problems Paternal Aunt     Alcohol abuse Paternal Uncle     Prostate cancer Paternal Uncle     Cancer Paternal Uncle     Dementia Maternal Grandmother     Cancer Maternal Grandfather         colon ca    Colon cancer Maternal Grandfather     Early death Maternal Grandfather     Diabetes Paternal Grandmother     Hypertension Paternal Grandfather     Prostatitis Paternal Grandfather     Psoriasis Neg Hx     Lupus Neg Hx     Eczema Neg Hx      Social History     Tobacco Use    Smoking status: Never    Smokeless tobacco: Never   Substance Use Topics    Alcohol use: Yes     Comment: RARELY    Drug use: No       PTA Medications   Medication Sig    ACCU-CHEK GUIDE TEST STRIPS Strp USE TO TEST TWICE A DAY    acetaminophen (TYLENOL) 500 MG tablet Take 1 tablet (500 mg total) by mouth every 6 (six) hours as needed for Pain or Temperature greater than (100.5). Note do not exceed >3000mg tylenol/acetaminophen in 24hr period.    ascorbic acid, vitamin C, (VITAMIN C) 100 MG tablet Take 100 mg by mouth once daily.    aspirin (ECOTRIN) 81 MG EC tablet Take 81 mg by mouth once daily.    azelaic acid (AZELEX) 15 % gel Apply to face BID.    ciclopirox (PENLAC) 8 % Soln Apply topically nightly.    cyanocobalamin, vitamin B-12, 2,500 mcg Lozg Place 2 tablets under the tongue once daily.    ferrous gluconate (FERGON) 240 (27 FE) MG tablet TAKE 27 MG BY MOUTH 2 (TWO) TIMES DAILY WITH MEALS.    ibuprofen (ADVIL,MOTRIN) 600 MG tablet Take 1 tablet (600 mg total) by mouth every 6 (six) hours as needed for Pain. May alternate with tylenol for pain relief as  "detailed in postop instructions.    lancets (ACCU-CHEK SOFTCLIX LANCETS) Misc 1 Units by Misc.(Non-Drug; Combo Route) route 2 (two) times a day.    metFORMIN (GLUCOPHAGE-XR) 500 MG ER 24hr tablet Take 1 tablet (500 mg total) by mouth 2 (two) times daily with meals.    ondansetron (ZOFRAN-ODT) 4 MG TbDL Dissolve 1 tablet (4 mg total) by mouth every 8 (eight) hours as needed (nausea/vomiting).    pen needle, diabetic (PEN NEEDLE) 32 gauge x 5/32" Ndle 1 each by Misc.(Non-Drug; Combo Route) route once daily.    pravastatin (PRAVACHOL) 20 MG tablet Take 1 tablet (20 mg total) by mouth once daily.    RABEprazole (ACIPHEX) 20 mg tablet Take 1 tablet (20 mg total) by mouth before breakfast.    semaglutide (OZEMPIC) 1 mg/dose (4 mg/3 mL) Inject 1 mg into the skin every 7 days.    sulfacetamide sodium-sulfur 10-5 % (w/w) Clsr Use to wash face daily    tadalafiL (CIALIS) 5 MG tablet Take 1 tablet (5 mg total) by mouth daily as needed for Erectile Dysfunction.    testosterone cypionate (DEPOTESTOTERONE CYPIONATE) 200 mg/mL injection Inject 60 mg into the muscle every 14 (fourteen) days. Twice a week    triamcinolone acetonide 0.025% (KENALOG) 0.025 % Oint Thin film to lips and eczematous plaques BID PRN flare    triamcinolone acetonide 0.1% (KENALOG) 0.1 % cream AAA bid    vitamin A 8000 UNIT capsule Take 1 capsule (8,000 Units total) by mouth once daily.    zinc gluconate 50 mg tablet Take 50 mg by mouth once daily.     Review of patient's allergies indicates:  Not on File     Review of Systems   Constitutional:  Negative for fever.       Objective:      Vital Signs (Most Recent)       Vital Signs Range (Last 24H):       Physical Exam  Neurological:      Mental Status: He is alert and oriented to person, place, and time.           Assessment:      EGD for follow-up of erosive esophagitis healing patient with MSH2 and history of a sleeve gastrectomy      Plan:    EGD for follow-up of erosive esophagitis healing patient with " MSH2 and history of a sleeve gastrectomy

## 2023-09-01 NOTE — OR NURSING
Reviewed AVS/ discharge instructions with pt at bedside, written copy given; Dr. Leonardo spoke with pt, answered all questions and reviewed procedure findings. Pt denies any questions or concerns.

## 2023-09-01 NOTE — ANESTHESIA PREPROCEDURE EVALUATION
09/01/2023  Lyndon BIN Lino Jr. is a 53 y.o., male.     Past Medical History:   Diagnosis Date    Arthritis     Asthma     AS A CHILD    Colon cancer     Family hx of prostate cancer 11/22/2016    Hx of colon cancer, stage I 07/03/2014    Parada syndrome     Squamous cell carcinoma of skin     Tear of labium 10/2020    left shoulder Dr Molina    Uncontrolled type 2 diabetes mellitus with hyperglycemia 03/04/2021    Vitamin D deficiency     Wears glasses      Past Surgical History:   Procedure Laterality Date    APPENDECTOMY      APPLICATION OF CARTILAGE GRAFT N/A 3/21/2023    Procedure: APPLICATION, CARTILAGE GRAFT;  Surgeon: Alyx Spivey MD;  Location: [facility] 2ND FLR;  Service: ENT;  Laterality: N/A;    CAUDAL EPIDURAL STEROID INJECTION N/A 11/6/2018    Procedure: Injection-steroid-epidural-caudal;  Surgeon: Lyndon Brooke Jr., MD;  Location: [facility];  Service: Pain Management;  Laterality: N/A;    COLON SURGERY  2008    PARTIAL COLECTOMY    COLONOSCOPY Bilateral 2012    COLONOSCOPY N/A 8/1/2016    Procedure: COLONOSCOPY;  Surgeon: Blaze Marr MD;  Location: [facility];  Service: Endoscopy;  Laterality: N/A;    COLONOSCOPY N/A 9/20/2017    Procedure: COLONOSCOPY;  Surgeon: Dom Leonardo MD;  Location: [facility] (4TH FLR);  Service: Endoscopy;  Laterality: N/A;  not given PM prep    COLONOSCOPY N/A 10/9/2017    Procedure: COLONOSCOPY;  Surgeon: RAMON Callahan MD;  Location: [facility] (4TH FLR);  Service: Endoscopy;  Laterality: N/A;  ok for Prepopik per Dr Callahan    COLONOSCOPY N/A 11/20/2017    Procedure: COLONOSCOPY/EMR;  Surgeon: Joseluis Choe MD;  Location: [facility] (2ND FLR);  Service: Endoscopy;  Laterality: N/A;  EMR    no pm prep    COLONOSCOPY N/A 5/30/2018    Procedure: COLONOSCOPY;  Surgeon: Dom Leonardo MD;  Location: [facility] (4TH FLR);  Service:  Endoscopy;  Laterality: N/A;  follow up colonoscopy in 5/2018 for Parada Syndrome         COLONOSCOPY N/A 6/10/2019    Procedure: COLONOSCOPY;  Surgeon: Dom Leonardo MD;  [facility]  Service: Endoscopy;  Laterality: N/A;  Prepopik ordered per Dr. Leonardo    COLONOSCOPY N/A 7/22/2020    Procedure: COLONOSCOPY;  Surgeon: Dom Leonardo MD;  [facility]  Service: Endoscopy;  Laterality: N/A;    COLONOSCOPY N/A 5/27/2021    Procedure: COLONOSCOPY;  Surgeon: Dom Leonardo MD;  Location: [facility] (4TH FLR);  Service: Endoscopy;  Laterality: N/A;  covid test 5/24-slidell  pt requests Prepopik    COLONOSCOPY N/A 6/17/2022    Procedure: COLONOSCOPY;  Surgeon: Dom Leonardo MD;  [facility]  Service: Endoscopy;  Laterality: N/A;  He was discovered to have colon cancer and had right hemicolectomy        for stage II on 08/08/2008. MSH2 Parada syndrome.  sutab requested  instructions via the portal -sm  fully vaccinated - sm    COLONOSCOPY N/A 6/22/2023    Procedure: COLONOSCOPY;  Surgeon: Dom Leonardo MD;  [facility]  Service: Endoscopy;  Laterality: N/A;  see telephone encounter dated 5/24/23/ Pt/ Pt wife requested sutab - prep ins. on portal / Ozempic for DM- ERW    CYSTOSCOPY      Epidural Steroid Injection  10/23/15    Lumbar    EPIDURAL STEROID INJECTION INTO LUMBAR SPINE N/A 7/27/2018    Procedure: Injection-steroid-epidural-lumbar;  Surgeon: Lyndon Brooke Jr., MD;  Location: [facility] OR;  Service: Pain Management;  Laterality: N/A;    ESOPHAGOGASTRODUODENOSCOPY      ESOPHAGOGASTRODUODENOSCOPY N/A 7/22/2020    Procedure: EGD (ESOPHAGOGASTRODUODENOSCOPY);  Surgeon: Dom Leonardo MD;  Location: [facility] (4TH FLR);  Service: Endoscopy;  Laterality: N/A;  Please schedule patient for EGD and colonoscopy with me MSH2 Parada syndrome and anemia evaluation please make priority 1  covid test 7/20-Jade     ESOPHAGOGASTRODUODENOSCOPY N/A 6/22/2023    Procedure: EGD (ESOPHAGOGASTRODUODENOSCOPY);  Surgeon: Dom Leonardo MD;  Location: [facility] ENDO (4TH FLR);  Service: Endoscopy;  Laterality: N/A;  see telephone encounter dated 5/24/23 6/16/23-Precall completed appointment confirmed-MH    EXCISION OR SURGICAL PLANING, SKIN, NOSE, FOR RHINOPHYMA Bilateral 3/7/2023    Procedure: EXCISION OR SURGICAL resection nasal skin cancer;  Surgeon: Alyx Spivey MD;  Location: [facility] OR 2ND FLR;  Service: ENT;  Laterality: Bilateral;    FACETECTOMY OF VERTEBRA  2/13/2019    Procedure: MEDIAL FACETECTOMY L5-S1;  Surgeon: Lyndon Brooke Jr., MD;  Location: [facility] OR;  Service: Orthopedics;;    GASTRIC BYPASS  2010    SLEEVE    KNEE ARTHROSCOPY Right     LUMBAR DISCECTOMY  2/13/2019    Procedure: DISCECTOMY, SPINE, LUMBAR L5-S1;  Surgeon: Lyndon Brooke Jr., MD;  Location: [facility] OR;  Service: Orthopedics;;    NASAL RECONSTRUCTION N/A 3/21/2023    Procedure: RECONSTRUCTION, NOSE;  Surgeon: Alyx Spivey MD;  Location: [facility] OR 2ND FLR;  Service: ENT;  Laterality: N/A;    REVISION OF FLAP GRAFT Bilateral 4/18/2023    Procedure: REVISION, PROCEDURE INVOLVING FLAP GRAFT;  Surgeon: Alyx Spivey MD;  Location: [facility] OR;  Service: ENT;  Laterality: Bilateral;    REVISION OF FLAP GRAFT N/A 6/29/2023    Procedure: REVISION, PROCEDURE INVOLVING FLAP GRAFT;  Surgeon: Alyx Spivey MD;  Location: [facility] OR 2ND FLR;  Service: ENT;  Laterality: N/A;    REVISION OF FLAP GRAFT N/A 8/29/2023    Procedure: REVISION, PROCEDURE INVOLVING FLAP GRAFT;  Surgeon: Alyx Spivey MD;  Location: [facility] OR;  Service: ENT;  Laterality: N/A;    ROTATION FLAP SURGERY N/A 3/21/2023    Procedure: CREATION, FLAP, ROTATION;  Surgeon: Alyx Spivey MD;  Location: [facility] OR 2ND FLR;  Service: ENT;  Laterality: N/A;     Patient Active Problem List   Diagnosis    Radiculopathy, lumbosacral region    Herniated lumbar intervertebral disc     Lumbar spondylosis    DDD (degenerative disc disease), lumbosacral    Acute medial meniscal tear    Obesity, unspecified    Parada syndrome    Thoracic or lumbosacral neuritis or radiculitis, unspecified    Neural foraminal stenosis of lumbar spine    MSH2-related Parada syndrome (HNPCC1)    Spondylosis of lumbar region without myelopathy or radiculopathy    Spondylolisthesis of lumbar region    Nonalcoholic fatty liver disease    Splenomegaly    Mild vitamin D deficiency    Colon dysplasia    Colon adenoma    Pneumoperitoneum    Postpolypectomy electrocoagulation syndrome    Lumbar radiculopathy    History of colon cancer, stage II    Uncontrolled type 2 diabetes mellitus with hyperglycemia    Type 2 diabetes mellitus with hyperglycemia    Obesity (BMI 30-39.9)    Gastroesophageal reflux disease without esophagitis    Bariatric surgery status    S/P laparoscopic sleeve gastrectomy    Hypovitaminosis D    Hypophosphatemia    Vitamin B12 deficiency    Vitamin A deficiency    Erectile dysfunction    Hypogonadism in male    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     I have reviewed the Nursing Notes. I have reviewed the NPO Status.   I have reviewed the Medications.     Review of Systems  Anesthesia Hx:  No problems with previous Anesthesia    Social:  Alcohol Use, Non-Smoker        Physical Exam  General: Alert and Oriented    Airway:  Mallampati: I   Mouth Opening: Normal  TM Distance: Normal  Tongue: Normal  Neck ROM: Normal ROM    Dental:  Intact        Anesthesia Plan  Type of Anesthesia, risks & benefits discussed:    Anesthesia Type: Gen Natural Airway  Intra-op Monitoring Plan: Standard ASA Monitors  Induction:  IV  Airway Plan: Direct  Informed Consent: Informed consent signed with the Patient and all parties understand the risks  and agree with anesthesia plan.  All questions answered.   ASA Score: 2    Ready For Surgery From Anesthesia Perspective.     .

## 2023-09-01 NOTE — ANESTHESIA POSTPROCEDURE EVALUATION
Anesthesia Post Evaluation    Patient: Lyndon Lion Jr.    Procedure(s) Performed: Procedure(s) (LRB):  EGD (ESOPHAGOGASTRODUODENOSCOPY) (N/A)    Final Anesthesia Type: general      Patient location during evaluation: PACU  Patient participation: Yes- Able to Participate  Level of consciousness: awake and alert and oriented  Post-procedure vital signs: reviewed and stable  Pain management: adequate  Airway patency: patent    PONV status at discharge: No PONV  Anesthetic complications: no      Cardiovascular status: hemodynamically stable  Respiratory status: unassisted, spontaneous ventilation and room air  Hydration status: euvolemic  Follow-up not needed.          Vitals Value Taken Time   /78 09/01/23 0810   Temp 36.8 °C (98.2 °F) 09/01/23 0750   Pulse 61 09/01/23 0810   Resp 17 09/01/23 0810   SpO2 96 % 09/01/23 0810         Event Time   Out of Recovery 08:18:55         Pain/Karthikeyan Score: Karthikeyan Score: 10 (9/1/2023  8:10 AM)

## 2023-09-06 LAB — VIT A SERPL-MCNC: 35 UG/DL (ref 38–106)

## 2023-09-07 LAB
FINAL PATHOLOGIC DIAGNOSIS: NORMAL
GROSS: NORMAL
Lab: NORMAL
MICROSCOPIC EXAM: NORMAL

## 2023-09-11 ENCOUNTER — OFFICE VISIT (OUTPATIENT)
Dept: OTOLARYNGOLOGY | Facility: CLINIC | Age: 53
End: 2023-09-11
Payer: COMMERCIAL

## 2023-09-11 ENCOUNTER — HOSPITAL ENCOUNTER (OUTPATIENT)
Dept: RADIOLOGY | Facility: HOSPITAL | Age: 53
Discharge: HOME | End: 2023-09-11
Attending: NURSE PRACTITIONER
Payer: COMMERCIAL

## 2023-09-11 DIAGNOSIS — Z09 POSTOP CHECK: Primary | ICD-10-CM

## 2023-09-11 DIAGNOSIS — Z85.038 HISTORY OF COLON CANCER, STAGE II: ICD-10-CM

## 2023-09-11 PROCEDURE — 99999 PR PBB SHADOW E&M-EST. PATIENT-LVL III: ICD-10-PCS | Mod: PBBFAC,,, | Performed by: OTOLARYNGOLOGY

## 2023-09-11 PROCEDURE — 99024 PR POST-OP FOLLOW-UP VISIT: ICD-10-PCS | Mod: S$GLB,,, | Performed by: OTOLARYNGOLOGY

## 2023-09-11 PROCEDURE — 99024 POSTOP FOLLOW-UP VISIT: CPT | Mod: S$GLB,,, | Performed by: OTOLARYNGOLOGY

## 2023-09-11 PROCEDURE — 99999 PR PBB SHADOW E&M-EST. PATIENT-LVL III: CPT | Mod: PBBFAC,,, | Performed by: OTOLARYNGOLOGY

## 2023-09-11 PROCEDURE — 71046 X-RAY EXAM CHEST 2 VIEWS: CPT | Mod: TC

## 2023-09-12 NOTE — PROGRESS NOTES
Subjective: 53 y.o. male seen in follow up s/p 2nd revision for paramedian forehead flap done on 08/29/2023.  Patient did well postop with no issues.      Objective:  There were no vitals taken for this visit.    Exam:  General No acute distress  Continued edema at the left debulked area with some skin changes.  Improvement and ala contour at soft tissue triangle with less notching      Assessment / Plan:  Healing well after 2nd revision but with continued edema.  I suspect this will continue to improve.  He will need further onlay graft likely on the right side as well as better contouring of the tip but will defer till after the new year.

## 2023-09-13 ENCOUNTER — PATIENT MESSAGE (OUTPATIENT)
Dept: HEMATOLOGY/ONCOLOGY | Facility: CLINIC | Age: 53
End: 2023-09-13
Payer: COMMERCIAL

## 2023-10-05 ENCOUNTER — OFFICE VISIT (OUTPATIENT)
Dept: HEMATOLOGY/ONCOLOGY | Facility: CLINIC | Age: 53
End: 2023-10-05
Payer: COMMERCIAL

## 2023-10-05 VITALS
HEIGHT: 74 IN | SYSTOLIC BLOOD PRESSURE: 142 MMHG | OXYGEN SATURATION: 98 % | BODY MASS INDEX: 32.79 KG/M2 | RESPIRATION RATE: 18 BRPM | HEART RATE: 65 BPM | DIASTOLIC BLOOD PRESSURE: 89 MMHG | WEIGHT: 255.5 LBS | TEMPERATURE: 98 F

## 2023-10-05 DIAGNOSIS — Z15.09 MSH2-RELATED LYNCH SYNDROME (HNPCC1): ICD-10-CM

## 2023-10-05 DIAGNOSIS — C44.321 SQUAMOUS CELL CANCER OF SKIN OF NASAL TIP: ICD-10-CM

## 2023-10-05 DIAGNOSIS — Z85.038 HISTORY OF COLON CANCER, STAGE II: Primary | ICD-10-CM

## 2023-10-05 PROCEDURE — 99999 PR PBB SHADOW E&M-EST. PATIENT-LVL V: ICD-10-PCS | Mod: PBBFAC,,, | Performed by: NURSE PRACTITIONER

## 2023-10-05 PROCEDURE — 99214 PR OFFICE/OUTPT VISIT, EST, LEVL IV, 30-39 MIN: ICD-10-PCS | Mod: S$GLB,,, | Performed by: NURSE PRACTITIONER

## 2023-10-05 PROCEDURE — 99999 PR PBB SHADOW E&M-EST. PATIENT-LVL V: CPT | Mod: PBBFAC,,, | Performed by: NURSE PRACTITIONER

## 2023-10-05 PROCEDURE — 99214 OFFICE O/P EST MOD 30 MIN: CPT | Mod: S$GLB,,, | Performed by: NURSE PRACTITIONER

## 2023-10-05 NOTE — PROGRESS NOTES
Subjective:       Patient ID: Lyndon Lion Jr. is a 53 y.o. male.    Chief Complaint: Annual surveillance for colon cancer    HPI:  This is a 53-year-old white gentleman known to Dr. Cárdenas for Stage II adenocarcinoma of the colon for which he is status post resection (2008) and 12 cycles of adjuvant FOLFOX.    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      Mr. Lion presents to the clinic today with his wife for his annual evaluation (15 yrs out).    He remains on Metformin & Victoza for DM.    Lab Results   Component Value Date    HGBA1C 5.7 (H) 08/30/2023     Mr. 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  He has had numerous surgeries and grafts.  He was found to have hematuria & in light of hx of Parada - a Cystoscopy was done on 07/26/23:  Negative workup; will f/u in 1 year in clinic with [facility].   He denies any discomfort with fevers, chills, drenching night sweats, changes in the character or caliber of his stool, abdominal discomfort/bloating, nausea, vomiting, constipation, unexplained weight loss, irregular heartbeat, chest pain, rectal bleeding, etc.    No other new complaints or pertinent findings on a 14-point review of systems.     Past Medical History:   Diagnosis Date    Arthritis     Asthma     AS A CHILD    Colon cancer     Family hx of prostate cancer 11/22/2016    Hx of colon cancer, stage I 07/03/2014    Parada syndrome     Squamous cell carcinoma of skin     Tear of labium 10/2020    left shoulder Dr Molina    Uncontrolled type 2 diabetes mellitus with hyperglycemia 03/04/2021    Vitamin D deficiency     Wears glasses      Past Surgical History:   Procedure Laterality Date    APPENDECTOMY      APPLICATION OF CARTILAGE GRAFT N/A 3/21/2023    Procedure: APPLICATION, CARTILAGE GRAFT;  Surgeon: Alyx Spivey MD;  Location: [facility] OR [address] [place] [address];  Service: ENT;  Laterality:  N/A;    CAUDAL EPIDURAL STEROID INJECTION N/A 11/6/2018    Procedure: Injection-steroid-epidural-caudal;  Surgeon: Lyndon Brooke Jr., MD;  Location: [facility] OR;  Service: Pain Management;  Laterality: N/A;    COLON SURGERY  2008    PARTIAL COLECTOMY    COLONOSCOPY Bilateral 2012    COLONOSCOPY N/A 8/1/2016    Procedure: COLONOSCOPY;  Surgeon: Blaze Marr MD;  Location: [facility] ENDO;  Service: Endoscopy;  Laterality: N/A;    COLONOSCOPY N/A 9/20/2017    Procedure: COLONOSCOPY;  Surgeon: Dom Leonardo MD;  Location: [facility] (4TH FLR);  Service: Endoscopy;  Laterality: N/A;  not given PM prep    COLONOSCOPY N/A 10/9/2017    Procedure: COLONOSCOPY;  Surgeon: RAMON Callahan MD;  Location: [facility] (4TH FLR);  Service: Endoscopy;  Laterality: N/A;  ok for Prepopik per Dr Callahan    COLONOSCOPY N/A 11/20/2017    Procedure: COLONOSCOPY/EMR;  Surgeon: Joseluis Choe MD;  Location: [facility] (2ND FLR);  Service: Endoscopy;  Laterality: N/A;  EMR    no pm prep    COLONOSCOPY N/A 5/30/2018    Procedure: COLONOSCOPY;  Surgeon: Dom Leonardo MD;  Location: [facility] (4TH FLR);  Service: Endoscopy;  Laterality: N/A;  follow up colonoscopy in 5/2018 for Parada Syndrome         COLONOSCOPY N/A 6/10/2019    Procedure: COLONOSCOPY;  Surgeon: Dom Leonardo MD;  Location: [facility] (4TH FLR);  Service: Endoscopy;  Laterality: N/A;  Prepopik ordered per Dr. Leonardo    COLONOSCOPY N/A 7/22/2020    Procedure: COLONOSCOPY;  Surgeon: Dom Leonardo MD;  Location: [facility] (4TH FLR);  Service: Endoscopy;  Laterality: N/A;    COLONOSCOPY N/A 5/27/2021    Procedure: COLONOSCOPY;  Surgeon: Dom Leonardo MD;  Location: [facility] (4TH FLR);  Service: Endoscopy;  Laterality: N/A;  covid test 5/24-[place]  pt requests Prepopik    COLONOSCOPY N/A 6/17/2022    Procedure: COLONOSCOPY;  Surgeon: Dom Leonardo MD;  Location: [facility] (4TH FLR);  Service: Endoscopy;  Laterality: N/A;  He was discovered to have colon  cancer and had right hemicolectomy        for stage II on 08/08/2008. MSH2 Parada syndrome.  sutab requested  instructions via the portal -sm  fully vaccinated - sm    COLONOSCOPY N/A 6/22/2023    Procedure: COLONOSCOPY;  Surgeon: Dom Leonardo MD;  Location: [facility] (4TH FLR);  Service: Endoscopy;  Laterality: N/A;  see telephone encounter dated 5/24/23/ Pt/ Pt wife requested sutab - prep ins. on portal / Ozempic for DM- ERW    CYSTOSCOPY      Epidural Steroid Injection  10/23/15    Lumbar    EPIDURAL STEROID INJECTION INTO LUMBAR SPINE N/A 7/27/2018    Procedure: Injection-steroid-epidural-lumbar;  Surgeon: Lyndon Brooke Jr., MD;  Location: [facility] OR;  Service: Pain Management;  Laterality: N/A;    ESOPHAGOGASTRODUODENOSCOPY      ESOPHAGOGASTRODUODENOSCOPY N/A 7/22/2020    Procedure: EGD (ESOPHAGOGASTRODUODENOSCOPY);  Surgeon: Dom Leonardo MD;  Location: [facility] (4TH FLR);  Service: Endoscopy;  Laterality: N/A;  Please schedule patient for EGD and colonoscopy with me MSH2 Parada syndrome and anemia evaluation please make priority 1  covid test 7/20-[place]    ESOPHAGOGASTRODUODENOSCOPY N/A 6/22/2023    Procedure: EGD (ESOPHAGOGASTRODUODENOSCOPY);  Surgeon: Dom Leonardo MD;  Location: [facility] (4TH FLR);  Service: Endoscopy;  Laterality: N/A;  see telephone encounter dated 5/24/23 6/16/23-[place] completed appointment confirmed-    ESOPHAGOGASTRODUODENOSCOPY N/A 9/1/2023    Procedure: EGD (ESOPHAGOGASTRODUODENOSCOPY);  Surgeon: Dom Leonardo MD;  Location: [facility] (4TH FLR);  Service: Endoscopy;  Laterality: N/A;  Instructions sent via portal / Ozempic / Extended Clear Liquid prep    EXCISION OR SURGICAL PLANING, SKIN, NOSE, FOR RHINOPHYMA Bilateral 3/7/2023    Procedure: EXCISION OR SURGICAL resection nasal skin cancer;  Surgeon: Alyx Spivey MD;  [facility];  Service: ENT;  Laterality: Bilateral;    FACETECTOMY OF VERTEBRA  2/13/2019     Procedure: MEDIAL FACETECTOMY L5-S1;  Surgeon: Lyndon Brooke Jr., MD;  Location: [facility] OR;  Service: Orthopedics;;    GASTRIC BYPASS  2010    SLEEVE    KNEE ARTHROSCOPY Right     LUMBAR DISCECTOMY  2/13/2019    Procedure: DISCECTOMY, SPINE, LUMBAR L5-S1;  Surgeon: Lyndon Brooke Jr., MD;  Location: [facility] OR;  Service: Orthopedics;;    NASAL RECONSTRUCTION N/A 3/21/2023    Procedure: RECONSTRUCTION, NOSE;  Surgeon: Alyx Spivey MD;  Location: [facility] OR 2ND FLR;  Service: ENT;  Laterality: N/A;    REVISION OF FLAP GRAFT Bilateral 4/18/2023    Procedure: REVISION, PROCEDURE INVOLVING FLAP GRAFT;  Surgeon: Alyx Spivey MD;  Location: [facility] OR;  Service: ENT;  Laterality: Bilateral;    REVISION OF FLAP GRAFT N/A 6/29/2023    Procedure: REVISION, PROCEDURE INVOLVING FLAP GRAFT;  Surgeon: Alyx Spivey MD;  Location: [facility] OR 2ND FLR;  Service: ENT;  Laterality: N/A;    REVISION OF FLAP GRAFT N/A 8/29/2023    Procedure: REVISION, PROCEDURE INVOLVING FLAP GRAFT;  Surgeon: Alyx Spivey MD;  Location: [facility] OR;  Service: ENT;  Laterality: N/A;    ROTATION FLAP SURGERY N/A 3/21/2023    Procedure: CREATION, FLAP, ROTATION;  Surgeon: Alyx Spivey MD;  Location: [facility] OR 2ND FLR;  Service: ENT;  Laterality: N/A;     Current Outpatient Medications on File Prior to Visit   Medication Sig Dispense Refill    ACCU-CHEK GUIDE TEST STRIPS Strp USE TO TEST TWICE A  strip 3    acetaminophen (TYLENOL) 500 MG tablet Take 1 tablet (500 mg total) by mouth every 6 (six) hours as needed for Pain or Temperature greater than (100.5). Note do not exceed >3000mg tylenol/acetaminophen in 24hr period. 50 tablet 0    ascorbic acid, vitamin C, (VITAMIN C) 100 MG tablet Take 100 mg by mouth once daily.      aspirin (ECOTRIN) 81 MG EC tablet Take 81 mg by mouth once daily.      azelaic acid (AZELEX) 15 % gel Apply to face BID. 50 g 5    ciclopirox (PENLAC) 8 % Soln Apply topically nightly. 6.6 mL 3    cyanocobalamin, vitamin B-12, 2,500 mcg  "Lozg Place 2 tablets under the tongue once daily. 180 lozenge 3    ferrous gluconate (FERGON) 240 (27 FE) MG tablet TAKE 27 MG BY MOUTH 2 (TWO) TIMES DAILY WITH MEALS.      ibuprofen (ADVIL,MOTRIN) 600 MG tablet Take 1 tablet (600 mg total) by mouth every 6 (six) hours as needed for Pain. May alternate with tylenol for pain relief as detailed in postop instructions. 50 tablet 0    lancets (ACCU-CHEK SOFTCLIX LANCETS) Misc 1 Units by Misc.(Non-Drug; Combo Route) route 2 (two) times a day. 200 each 0    ondansetron (ZOFRAN-ODT) 4 MG TbDL Dissolve 1 tablet (4 mg total) by mouth every 8 (eight) hours as needed (nausea/vomiting). 5 tablet 0    pen needle, diabetic (PEN NEEDLE) 32 gauge x 5/32" Ndle 1 each by Misc.(Non-Drug; Combo Route) route once daily. 90 each 3    pravastatin (PRAVACHOL) 20 MG tablet Take 1 tablet (20 mg total) by mouth once daily. 90 tablet 3    RABEprazole (ACIPHEX) 20 mg tablet Take 1 tablet (20 mg total) by mouth before breakfast. 90 tablet 3    semaglutide (OZEMPIC) 1 mg/dose (4 mg/3 mL) Inject 1 mg into the skin every 7 days. 3 mL 11    semaglutide (OZEMPIC) 2 mg/dose (8 mg/3 mL) PnIj Inject 2 mg into the skin every 7 days. (Patient taking differently: Inject 2 mg into the skin every Tuesday.) 3 mL 11    sulfacetamide sodium-sulfur 10-5 % (w/w) Clsr Use to wash face daily 170 g 5    testosterone cypionate (DEPOTESTOTERONE CYPIONATE) 200 mg/mL injection Inject 60 mg into the muscle every 14 (fourteen) days. Twice a week      triamcinolone acetonide 0.025% (KENALOG) 0.025 % Oint Thin film to lips and eczematous plaques BID PRN flare 15 g 1    triamcinolone acetonide 0.1% (KENALOG) 0.1 % cream AAA bid 454 g 3    zinc gluconate 50 mg tablet Take 50 mg by mouth once daily.      metFORMIN (GLUCOPHAGE-XR) 500 MG ER 24hr tablet Take 1 tablet (500 mg total) by mouth 2 (two) times daily with meals. 180 tablet 3    tadalafiL (CIALIS) 5 MG tablet Take 1 tablet (5 mg total) by mouth daily as needed for " Erectile Dysfunction. 90 tablet 3    vitamin A 8000 UNIT capsule Take 1 capsule (8,000 Units total) by mouth once daily. 100 capsule 3     No current facility-administered medications on file prior to visit.       Review of Systems   Constitutional: Negative.    HENT: Negative.     Eyes: Negative.    Respiratory: Negative.     Cardiovascular: Negative.    Gastrointestinal: Negative.    Endocrine: Negative.    Genitourinary: Negative.    Musculoskeletal: Negative.    Allergic/Immunologic: Negative.    Neurological: Negative.    Hematological: Negative.    Psychiatric/Behavioral: Negative.     All other systems reviewed and are negative.        Objective:       Wt Readings from Last 3 Encounters:   10/05/23 115.9 kg (255 lb 8.2 oz)   09/01/23 113.4 kg (250 lb)   08/28/23 115.7 kg (255 lb)     Temp Readings from Last 3 Encounters:   10/05/23 98.4 °F (36.9 °C) (Temporal)   09/01/23 98.2 °F (36.8 °C) (Temporal)   08/29/23 97.2 °F (36.2 °C) (Temporal)     BP Readings from Last 3 Encounters:   10/05/23 (!) 142/89   09/01/23 131/78   08/29/23 129/76     Pulse Readings from Last 3 Encounters:   10/05/23 65   09/01/23 61   08/29/23 63     Physical Exam  Vitals reviewed.   Constitutional:       Appearance: Normal appearance. He is well-developed.   HENT:      Head: Normocephalic and atraumatic.        Mouth/Throat:      Mouth: Mucous membranes are moist.      Pharynx: Oropharynx is clear.   Eyes:      Conjunctiva/sclera: Conjunctivae normal.   Cardiovascular:      Rate and Rhythm: Normal rate and regular rhythm.      Pulses: Normal pulses.      Heart sounds: Normal heart sounds.   Pulmonary:      Effort: Pulmonary effort is normal.      Breath sounds: Normal breath sounds.   Abdominal:      General: Bowel sounds are normal. There is no distension.      Palpations: Abdomen is soft.      Tenderness: There is no abdominal tenderness.   Musculoskeletal:         General: Normal range of motion.      Cervical back: Normal range of  motion and neck supple.      Right lower leg: No edema.      Left lower leg: No edema.   Lymphadenopathy:      Cervical: No cervical adenopathy.      Upper Body:      Right upper body: No supraclavicular or axillary adenopathy.      Left upper body: No supraclavicular or axillary adenopathy.      Lower Body: No right inguinal adenopathy. No left inguinal adenopathy.   Skin:     General: Skin is warm and dry.      Capillary Refill: Capillary refill takes less than 2 seconds.   Neurological:      Mental Status: He is alert and oriented to person, place, and time.   Psychiatric:         Mood and Affect: Mood normal.         Speech: Speech normal.         Behavior: Behavior normal.         Thought Content: Thought content normal.         Judgment: Judgment normal.         LABORATORY:    Lab Results   Component Value Date    WBC 4.54 09/11/2023    RBC 5.82 09/11/2023    HGB 17.5 09/11/2023    HCT 50.0 09/11/2023    MCV 86 09/11/2023    MCH 30.1 09/11/2023    MCHC 35.0 09/11/2023    RDW 12.5 09/11/2023     09/11/2023    MPV 8.7 (L) 09/11/2023    GRAN 2.3 09/11/2023    GRAN 50.2 09/11/2023    LYMPH 1.5 09/11/2023    LYMPH 33.5 09/11/2023    MONO 0.5 09/11/2023    MONO 10.6 09/11/2023    EOS 0.2 09/11/2023    BASO 0.04 09/11/2023    EOSINOPHIL 4.6 09/11/2023    BASOPHIL 0.9 09/11/2023     CMP  Sodium   Date Value Ref Range Status   09/11/2023 138 136 - 145 mmol/L Final     Potassium   Date Value Ref Range Status   09/11/2023 4.3 3.5 - 5.1 mmol/L Final     Chloride   Date Value Ref Range Status   09/11/2023 106 95 - 110 mmol/L Final     CO2   Date Value Ref Range Status   09/11/2023 27 23 - 29 mmol/L Final     Glucose   Date Value Ref Range Status   09/11/2023 101 70 - 110 mg/dL Final     BUN   Date Value Ref Range Status   09/11/2023 17 6 - 20 mg/dL Final     Creatinine   Date Value Ref Range Status   09/11/2023 1.3 0.5 - 1.4 mg/dL Final   05/14/2013 1.3 0.5 - 1.4 mg/dL Final     Calcium   Date Value Ref Range Status    09/11/2023 8.7 8.7 - 10.5 mg/dL Final   05/14/2013 9.8 8.7 - 10.5 mg/dL Final     Total Protein   Date Value Ref Range Status   09/11/2023 7.0 6.0 - 8.4 g/dL Final     Albumin   Date Value Ref Range Status   09/11/2023 3.8 3.5 - 5.2 g/dL Final   02/08/2023 4.5 3.6 - 5.1 g/dL Final     Total Bilirubin   Date Value Ref Range Status   09/11/2023 1.7 (H) 0.1 - 1.0 mg/dL Final     Comment:     For infants and newborns, interpretation of results should be based  on gestational age, weight and in agreement with clinical  observations.    Premature Infant recommended reference ranges:  Up to 24 hours.............<8.0 mg/dL  Up to 48 hours............<12.0 mg/dL  3-5 days..................<15.0 mg/dL  6-29 days.................<15.0 mg/dL       Alkaline Phosphatase   Date Value Ref Range Status   09/11/2023 49 (L) 55 - 135 U/L Final     AST   Date Value Ref Range Status   09/11/2023 20 10 - 40 U/L Final     ALT   Date Value Ref Range Status   09/11/2023 24 10 - 44 U/L Final     Anion Gap   Date Value Ref Range Status   09/11/2023 5 (L) 8 - 16 mmol/L Final   05/14/2013 10 5 - 15 meq/L Final     eGFR if    Date Value Ref Range Status   01/24/2022 >60.0 >60 mL/min/1.73 m^2 Final     eGFR if non    Date Value Ref Range Status   01/24/2022 >60.0 >60 mL/min/1.73 m^2 Final     Comment:     Calculation used to obtain the estimated glomerular filtration  rate (eGFR) is the CKD-EPI equation.        LDH:  123    Vitamin D:  26    Lab Results   Component Value Date    HGBA1C 5.7 (H) 08/30/2023     RADIOLOGY:  CXR dated 09/11/23:  Impression:  No acute cardiopulmonary disease.    UGI:  09/01/23:  Impression:            - Normal examined duodenum.                          - Erythematous mucosa in the antrum and prepyloric                          region of the stomach. Biopsied.                          - 4 cm hiatal hernia. C0M1. Biopsied.   Recommendation:        - Discharge patient to home.                           - Follow an antireflux regimen indefinitely.                          - Continue present medications RABEprazole                          (ACIPHEX) 20 mg tablet 1 tablet (20 mg total) by                          mouth before breakfast.                          - Await pathology results.                          - Telephone endoscopist for pathology results in 3                          weeks.                          - Repeat upper endoscopy in 3 years for                          surveillance based on pathology results.     Colonoscopy:  Dated  06/22/23  Impression:                                     - The examined portion of the ileum was normal.                          - Diverticulosis in the recto-sigmoid colon, in                          the sigmoid colon and in the descending colon.                          - Non-bleeding internal hemorrhoids.                          - No specimens collected.   Recommendation:        - Discharge patient to home.                          - Repeat colonoscopy in 1 year for surveillance.     Assessment:       1. History of colon cancer, stage II    2. Squamous cell cancer of skin of nasal tip    3. MSH2-related Parada syndrome (HNPCC1)        4.  Hx of Vitamin D deficiency - remains low; presently on Vitamin D3 - will change to D2    Plan:       1.  Hx of colon cancer - Follow up in 1 year with interval CBC, CMP, LDH, CEA, Vitamin D prior  2.  Colonoscopy tbd in 06/2024; UGI tbd in 09/2026  3.  FMLA papers to be completed in November/2022; patient/wife Katheryn to drop off.   4.  SCC - follow with Derm.  5.  Vitamin D deficiency - will change to D2  6.  GERD  7.  DM    Assessment/plan reviewed and approved by Dr. Trujillo.    Route Chart for Scheduling    Med Onc Chart Routing      Follow up with physician 1 year. f/u in 1 year to establish with Dr. Trujillo with CBC, CMP, LDH, CEA, VIT D prior   Follow up with [facility]    Infusion scheduling note    Injection scheduling note     Labs    Imaging    Pharmacy appointment    Other referrals               30 minutes were spent in coordination of patient's care, record review and counseling.

## 2023-10-06 ENCOUNTER — TELEPHONE (OUTPATIENT)
Dept: HEMATOLOGY/ONCOLOGY | Facility: CLINIC | Age: 53
End: 2023-10-06
Payer: COMMERCIAL

## 2023-10-06 NOTE — TELEPHONE ENCOUNTER
Spoke to patient wife and explained reason of appt change and that it was ordered by Topher that he establish with dr Trujillo as he specialized in his diagnosis.Wife verbalized understanding of this.   ----- Message from Kath Rosario sent at 10/5/2023  5:03 PM CDT -----  Contact: Pt's spouse @[phone]  Type:  Needs Medical Advice    Who Called: Pt's spouse, Katheryn  Would the patient rather a call back or a response via [facility]? Call pt's spouse   Best Call Back Number: [phone]  Additional Information:  Pt's spouse is confused as to why the pt is scheduled on 10/08/2023 with Dr. Trujillo. Pt's spouse states that pt has been seeing Topher Gudino NP. She doesn't understand why the appt was made with Dr. Trujillo. Pt's spouse will be on a flight tomorrow 10/06/2023 until 11:00 a.m. , and will be available afterwards. Please call pt's spouse back to advise.

## 2023-10-08 ENCOUNTER — PATIENT MESSAGE (OUTPATIENT)
Dept: GASTROENTEROLOGY | Facility: CLINIC | Age: 53
End: 2023-10-08
Payer: COMMERCIAL

## 2023-10-08 DIAGNOSIS — E11.9 CONTROLLED TYPE 2 DIABETES MELLITUS WITHOUT COMPLICATION, WITHOUT LONG-TERM CURRENT USE OF INSULIN: ICD-10-CM

## 2023-10-08 NOTE — TELEPHONE ENCOUNTER
Refill Routing Note   Medication(s) are not appropriate for processing by [facility] for the following reason(s):      Patient not seen by provider within 15 months  Patient seen in ED/Hospital since LOV with provider  Responsible provider unclear    [facility] action(s):  Defer Care Due:  None identified              Appointments  past 12m or future 3m with PCP    Date Provider   Last Visit   9/19/2022 Evan Judd MD   Next Visit   12/12/2023 Evan Judd MD   ED visits in past 90 days: 0        Note composed:2:06 PM 10/08/2023

## 2023-10-08 NOTE — TELEPHONE ENCOUNTER
Refill Routing Note   Medication(s) are not appropriate for processing by [facility] for the following reason(s):      Patient not seen by provider within 15 months  Patient seen in ED/Hospital since LOV with provider  Responsible provider unclear    [facility] action(s):  Defer Care Due:  None identified              Appointments  past 12m or future 3m with PCP    Date Provider   Last Visit   9/19/2022 Evan Judd MD   Next Visit   12/12/2023 Evan Judd MD   ED visits in past 90 days: 0        Note composed:2:12 PM 10/08/2023

## 2023-10-09 RX ORDER — METFORMIN HYDROCHLORIDE 500 MG/1
500 TABLET, EXTENDED RELEASE ORAL 2 TIMES DAILY WITH MEALS
Qty: 180 TABLET | Refills: 3 | Status: SHIPPED | OUTPATIENT
Start: 2023-10-09 | End: 2024-03-19

## 2023-10-09 NOTE — PROGRESS NOTES
Lyndon your EGD pathology was benign no evidence of H pylori no evidence of Barretts esophagus recommend your next EGD 3 years from your last     1.  STOMACH, BIOPSY:   - Gastric mucosa (antral and oxyntic-types) with minimal chronic inflammation   - No evidence of Helicobacter-like organisms (an H. pylori immunohistochemical study is negative)     2.  ESOPHAGUS, DISTAL, BIOPSY:   - Squamous/squamocolumnar mucosa with acute and chronic inflammation and reactive epithelial changes   - No evidence of intestinal metaplasia, dysplasia, or malignancy   Comment: Interp By Alejandro Pena M.D., Signed on 09/07/2023 at 12:32  Microscopic Exam An H. pylori immunohistochemical study with an appropriate corresponding control was examined on block 1A. Multiple deeper levels were examined on block 2A.

## 2023-11-20 ENCOUNTER — PATIENT MESSAGE (OUTPATIENT)
Dept: FAMILY MEDICINE | Facility: CLINIC | Age: 53
End: 2023-11-20
Payer: COMMERCIAL

## 2023-11-24 NOTE — TELEPHONE ENCOUNTER
Patient has scheduled an apt to establish care with Dr. Moran via [facility]. Appt scheduled for 1/4/24

## 2023-12-04 ENCOUNTER — OFFICE VISIT (OUTPATIENT)
Dept: FAMILY MEDICINE | Facility: CLINIC | Age: 53
End: 2023-12-04
Payer: COMMERCIAL

## 2023-12-04 VITALS
TEMPERATURE: 98 F | WEIGHT: 248 LBS | SYSTOLIC BLOOD PRESSURE: 122 MMHG | BODY MASS INDEX: 31.83 KG/M2 | DIASTOLIC BLOOD PRESSURE: 82 MMHG | OXYGEN SATURATION: 95 % | HEIGHT: 74 IN | HEART RATE: 74 BPM

## 2023-12-04 DIAGNOSIS — Z98.890 MOHS DEFECT: ICD-10-CM

## 2023-12-04 DIAGNOSIS — E78.5 HYPERLIPIDEMIA, UNSPECIFIED HYPERLIPIDEMIA TYPE: ICD-10-CM

## 2023-12-04 DIAGNOSIS — Z15.09 LYNCH SYNDROME: ICD-10-CM

## 2023-12-04 DIAGNOSIS — E80.4 GILBERT'S DISEASE: ICD-10-CM

## 2023-12-04 DIAGNOSIS — Z79.82 ASPIRIN LONG-TERM USE: ICD-10-CM

## 2023-12-04 DIAGNOSIS — E55.9 MILD VITAMIN D DEFICIENCY: Chronic | ICD-10-CM

## 2023-12-04 DIAGNOSIS — Z98.84 S/P LAPAROSCOPIC SLEEVE GASTRECTOMY: ICD-10-CM

## 2023-12-04 DIAGNOSIS — M47.816 LUMBAR SPONDYLOSIS: ICD-10-CM

## 2023-12-04 DIAGNOSIS — Z12.5 SCREENING FOR PROSTATE CANCER: ICD-10-CM

## 2023-12-04 DIAGNOSIS — E29.1 HYPOGONADISM MALE: ICD-10-CM

## 2023-12-04 DIAGNOSIS — E11.9 TYPE 2 DIABETES MELLITUS WITHOUT COMPLICATION, WITHOUT LONG-TERM CURRENT USE OF INSULIN: Primary | ICD-10-CM

## 2023-12-04 DIAGNOSIS — K76.0 NONALCOHOLIC FATTY LIVER DISEASE: ICD-10-CM

## 2023-12-04 DIAGNOSIS — R16.1 SPLENOMEGALY: Chronic | ICD-10-CM

## 2023-12-04 DIAGNOSIS — Z90.49 HISTORY OF COLON RESECTION: ICD-10-CM

## 2023-12-04 DIAGNOSIS — L98.8 MOHS DEFECT: ICD-10-CM

## 2023-12-04 DIAGNOSIS — E53.8 VITAMIN B12 DEFICIENCY: ICD-10-CM

## 2023-12-04 DIAGNOSIS — S83.241S ACUTE MEDIAL MENISCUS TEAR OF RIGHT KNEE, SEQUELA: ICD-10-CM

## 2023-12-04 DIAGNOSIS — E66.9 OBESITY (BMI 30-39.9): ICD-10-CM

## 2023-12-04 DIAGNOSIS — Z85.038 HISTORY OF COLON CANCER, STAGE II: ICD-10-CM

## 2023-12-04 DIAGNOSIS — K76.0 FATTY LIVER: ICD-10-CM

## 2023-12-04 DIAGNOSIS — N52.9 ERECTILE DYSFUNCTION, UNSPECIFIED ERECTILE DYSFUNCTION TYPE: ICD-10-CM

## 2023-12-04 PROCEDURE — 99396 PR PREVENTIVE VISIT,EST,40-64: ICD-10-PCS | Mod: S$GLB,,, | Performed by: FAMILY MEDICINE

## 2023-12-04 PROCEDURE — 99396 PREV VISIT EST AGE 40-64: CPT | Mod: S$GLB,,, | Performed by: FAMILY MEDICINE

## 2023-12-04 RX ORDER — PRAVASTATIN SODIUM 20 MG/1
20 TABLET ORAL DAILY
Qty: 90 TABLET | Refills: 3 | Status: SHIPPED | OUTPATIENT
Start: 2023-12-04 | End: 2024-12-03

## 2023-12-04 RX ORDER — TADALAFIL 5 MG/1
5 TABLET ORAL DAILY PRN
Qty: 90 TABLET | Refills: 3 | Status: CANCELLED | OUTPATIENT
Start: 2023-12-04 | End: 2024-03-03

## 2023-12-04 RX ORDER — TADALAFIL 10 MG/1
10 TABLET ORAL DAILY PRN
Qty: 30 TABLET | Refills: 5 | Status: SHIPPED | OUTPATIENT
Start: 2023-12-04 | End: 2023-12-05 | Stop reason: SDUPTHER

## 2023-12-05 ENCOUNTER — PATIENT MESSAGE (OUTPATIENT)
Dept: FAMILY MEDICINE | Facility: CLINIC | Age: 53
End: 2023-12-05
Payer: COMMERCIAL

## 2023-12-05 PROBLEM — E78.5 HYPERLIPIDEMIA: Status: ACTIVE | Noted: 2023-12-05

## 2023-12-05 PROBLEM — E11.9 TYPE 2 DIABETES MELLITUS WITHOUT COMPLICATION, WITHOUT LONG-TERM CURRENT USE OF INSULIN: Status: ACTIVE | Noted: 2023-12-05

## 2023-12-05 PROBLEM — Z79.82 ASPIRIN LONG-TERM USE: Status: ACTIVE | Noted: 2023-12-05

## 2023-12-05 PROBLEM — Z90.49 HISTORY OF COLON RESECTION: Status: ACTIVE | Noted: 2023-12-05

## 2023-12-05 RX ORDER — TADALAFIL 5 MG/1
5 TABLET ORAL DAILY PRN
Qty: 90 TABLET | Refills: 1 | Status: SHIPPED | OUTPATIENT
Start: 2023-12-05

## 2023-12-06 NOTE — PROGRESS NOTES
Subjective:       Patient ID: Lyndon Lion Jr. is a 53 y.o. male.    Chief Complaint: Establish Care    Here for new patient visit.    Social history nonsmoker no alcohol intake.  Does exercise some.  Works at [employer]      Family history Parada syndrome.  Breast cancer in mother.  Father diabetes mellitus type 2 coronary artery disease and cirrhosis.    Past medical history.  Colon resection for colon cancer in 2008.  No nodes were positive 27 negative.  Mohs surgery for skin cancer of the nose.  With flap.  Lumbar L5-S1 surgery.  Meniscus surgery right knee.  Parada syndrome.  EGD every 3 years.  Colonoscopy yearly now.  Dr. Leonardo at [facility].  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  Erectile dysfunction uses 5 mg of Cialis daily.  Diabetes mellitus type 2 for 4 years.  On extended release metformin in Ozempic 2 mg weekly.  He is status post gastric sleeve back in 2011 318 lb down to 248.  Hypogonadism.  On testosterone shots weekly.  Has vitamin-D deficiency.  On aspirin regularly.  Has fatty liver.  And splenomegaly.  And B12 deficiency.      Review of Systems   Constitutional: Negative.    HENT: Negative.     Eyes: Negative.    Respiratory: Negative.     Cardiovascular: Negative.    Gastrointestinal: Negative.    Endocrine: Negative.    Genitourinary: Negative.    Musculoskeletal: Negative.    Skin: Negative.    Neurological: Negative.    Hematological: Negative.    Psychiatric/Behavioral: Negative.         Objective:      Physical Exam  Vitals reviewed.   Constitutional:       Appearance: Normal appearance. He is well-developed and normal weight.   HENT:      Head: Normocephalic and atraumatic.      Right Ear: Tympanic membrane normal.      Left Ear: Tympanic membrane normal.      Nose: Nose normal.      Mouth/Throat:      Mouth: Mucous membranes are moist.      Pharynx: No oropharyngeal exudate.   Eyes:      Conjunctiva/sclera: Conjunctivae normal.      Pupils: Pupils are equal, round, and reactive to light.    Neck:      Vascular: No carotid bruit.   Cardiovascular:      Rate and Rhythm: Normal rate and regular rhythm.      Pulses: Normal pulses.      Heart sounds: Normal heart sounds. No murmur heard.     No gallop.   Pulmonary:      Effort: Pulmonary effort is normal.      Breath sounds: Normal breath sounds.   Abdominal:      General: Bowel sounds are normal.      Palpations: Abdomen is soft. There is no mass.      Tenderness: There is no abdominal tenderness.   Musculoskeletal:         General: Normal range of motion.      Cervical back: Normal range of motion.   Skin:     General: Skin is warm and dry.   Neurological:      General: No focal deficit present.      Mental Status: He is alert and oriented to person, place, and time.   Psychiatric:         Mood and Affect: Mood normal.         Behavior: Behavior normal.         Assessment:       1. Type 2 diabetes mellitus without complication, without long-term current use of insulin    2. Hypogonadism male    3. S/P laparoscopic sleeve gastrectomy    4. Nonalcoholic fatty liver disease    5. History of colon cancer, stage II    6. Mild vitamin D deficiency    7. Erectile dysfunction, unspecified erectile dysfunction type    8. Fatty liver    9. Aspirin long-term use    10. Splenomegaly    11. Vitamin B12 deficiency    12. Gilbert's disease    13. Parada syndrome    14. History of colon resection    15. Mohs defect    16. Lumbar spondylosis    17. Acute medial meniscus tear of right knee, sequela    18. Obesity (BMI 30-39.9)    19. Hyperlipidemia, unspecified hyperlipidemia type    20. Screening for prostate cancer        Plan:       Type 2 diabetes mellitus without complication, without long-term current use of insulin  -     Hemoglobin A1C; Future; Expected date: 03/04/2024    Hypogonadism male  -     TSH; Future; Expected date: 03/04/2024    S/P laparoscopic sleeve gastrectomy  -     Vitamin D; Future; Expected date: 12/04/2023  -     Vitamin B12; Future; Expected  date: 12/04/2023  -     Ferritin; Future; Expected date: 12/04/2023  -     CBC Auto Differential; Future; Expected date: 12/04/2023    Nonalcoholic fatty liver disease    History of colon cancer, stage II    Mild vitamin D deficiency  -     Vitamin D; Future; Expected date: 12/04/2023    Erectile dysfunction, unspecified erectile dysfunction type    Fatty liver  -     Comprehensive Metabolic Panel; Future; Expected date: 03/04/2024    Aspirin long-term use    Splenomegaly    Vitamin B12 deficiency  -     Vitamin B12; Future; Expected date: 12/04/2023    Gilbert's disease    Parada syndrome    History of colon resection    Mohs defect    Lumbar spondylosis    Acute medial meniscus tear of right knee, sequela    Obesity (BMI 30-39.9)    Hyperlipidemia, unspecified hyperlipidemia type  -     Lipid Panel; Future; Expected date: 03/04/2024    Screening for prostate cancer  -     PSA, Screening; Future; Expected date: 12/04/2023    Other orders  -     Discontinue: tadalafiL (CIALIS) 10 MG tablet; Take 1 tablet (10 mg total) by mouth daily as needed for Erectile Dysfunction.  Dispense: 30 tablet; Refill: 5  -     pravastatin (PRAVACHOL) 20 MG tablet; Take 1 tablet (20 mg total) by mouth once daily.  Dispense: 90 tablet; Refill: 3    Declines flu shot.  Recommend shingles vaccine.  Recommend COVID vaccine.  Cialis 10 mg 30 with 5 refills.  Pravastatin 20 mg daily.  Needs statin due to his diabetes.  Vitamin-D level B12 level PSA ferritin and CBC.  Follow-up in 3 months with an A1c CMP lipids and TSH.

## 2023-12-18 ENCOUNTER — OFFICE VISIT (OUTPATIENT)
Dept: FAMILY MEDICINE | Facility: CLINIC | Age: 53
End: 2023-12-18
Payer: COMMERCIAL

## 2023-12-18 VITALS
TEMPERATURE: 98 F | HEART RATE: 79 BPM | SYSTOLIC BLOOD PRESSURE: 124 MMHG | HEIGHT: 74 IN | WEIGHT: 252.31 LBS | BODY MASS INDEX: 32.38 KG/M2 | OXYGEN SATURATION: 96 % | DIASTOLIC BLOOD PRESSURE: 70 MMHG

## 2023-12-18 DIAGNOSIS — R21 RASH ASSOCIATED WITH COVID-19: Primary | ICD-10-CM

## 2023-12-18 DIAGNOSIS — R19.7 DIARRHEA, UNSPECIFIED TYPE: ICD-10-CM

## 2023-12-18 DIAGNOSIS — U07.1 RASH ASSOCIATED WITH COVID-19: Primary | ICD-10-CM

## 2023-12-18 PROCEDURE — 99214 PR OFFICE/OUTPT VISIT, EST, LEVL IV, 30-39 MIN: ICD-10-PCS | Mod: S$GLB,,, | Performed by: PHYSICIAN ASSISTANT

## 2023-12-18 PROCEDURE — 99999 PR PBB SHADOW E&M-EST. PATIENT-LVL III: CPT | Mod: PBBFAC,,, | Performed by: PHYSICIAN ASSISTANT

## 2023-12-18 PROCEDURE — 99214 OFFICE O/P EST MOD 30 MIN: CPT | Mod: S$GLB,,, | Performed by: PHYSICIAN ASSISTANT

## 2023-12-18 PROCEDURE — 99999 PR PBB SHADOW E&M-EST. PATIENT-LVL III: ICD-10-PCS | Mod: PBBFAC,,, | Performed by: PHYSICIAN ASSISTANT

## 2023-12-18 RX ORDER — CELECOXIB 200 MG/1
200 CAPSULE ORAL DAILY
COMMUNITY
Start: 2023-12-05

## 2023-12-18 NOTE — PATIENT INSTRUCTIONS
Wade Haney,     If you are due for any health screening(s) below please notify me so we can arrange them to be ordered and scheduled. Most healthy patients at your age complete them, but you are free to accept or refuse.     If you can't do it, I'll definitely understand. If you can, I'd certainly appreciate it!    All of your core healthy metrics are met.

## 2023-12-18 NOTE — PROGRESS NOTES
Subjective     Patient ID: Lyndon Lion Jr. is a 53 y.o. male.    Chief Complaint: Rash    Patient was diagnosed with COVID earlier in the month and was put on antiviral therapy which he finished on December 10th.  States about 2 or 3 days after completion of the antiviral medication and resolution of COVID he started with a rash on his torso which spread to his entire abdomen and back.  He denies any itching, burning or pain with the rash.  He has not tried anything for the rash as he states it has not bothering him.  Patient has also noticed a significant flare-up of his facial rosacea post COVID.  He is followed by Dermatology for this.  He has had complete resolution of the upper respiratory symptoms and overall feels better.  He also started with significant diarrhea after completing the antiviral therapy.  He states during this time he had fecal incontinence and did not have any formed stool.  He states his last bout of diarrhea was 2 days ago and he had a formed stool this morning.  Denies any abdominal pain or cramping.  He denies any fever.  He denies any recent travel.      Review of Systems   Constitutional:  Negative for activity change, appetite change, chills, fatigue, fever and unexpected weight change.   HENT: Negative.     Eyes:  Negative for photophobia, pain, discharge, redness and visual disturbance.   Respiratory:  Negative for cough, chest tightness and shortness of breath.    Cardiovascular:  Negative for chest pain, palpitations and leg swelling.   Gastrointestinal:  Positive for diarrhea and fecal incontinence. Negative for abdominal distention, abdominal pain, blood in stool, constipation, nausea and vomiting.   Genitourinary:  Negative for decreased urine volume, difficulty urinating, dysuria, frequency, hematuria and urgency.   Musculoskeletal:  Negative for arthralgias, back pain, gait problem and myalgias.   Integumentary:  Positive for rash.   Neurological:  Negative for  "dizziness, weakness, light-headedness, numbness and headaches.          Objective   Vitals:    12/18/23 1331   BP: 124/70   BP Location: Right arm   Patient Position: Sitting   BP Method: Large (Manual)   Pulse: 79   Temp: 97.6 °F (36.4 °C)   TempSrc: Oral   SpO2: 96%   Weight: 114.4 kg (252 lb 5.1 oz)   Height: 6' 1.5" (1.867 m)      Physical Exam  Constitutional:       General: He is not in acute distress.     Appearance: Normal appearance. He is well-developed. He is not diaphoretic.   HENT:      Head: Normocephalic and atraumatic.   Eyes:      Conjunctiva/sclera: Conjunctivae normal.      Pupils: Pupils are equal, round, and reactive to light.   Neck:      Thyroid: No thyromegaly.      Vascular: No JVD.   Cardiovascular:      Rate and Rhythm: Normal rate and regular rhythm.      Heart sounds: No murmur heard.     No friction rub. No gallop.   Pulmonary:      Effort: Pulmonary effort is normal. No respiratory distress.      Breath sounds: No wheezing or rales.   Chest:      Chest wall: No tenderness.   Musculoskeletal:      Cervical back: Normal range of motion and neck supple.   Skin:     Findings: Rash present. Rash is macular and papular.      Comments: There is a petechial rash that encompasses the entire back, chest and abdomen.  It is raised and blanching.  No secondary sign of infection no surrounding erythema.   Neurological:      Mental Status: He is alert and oriented to person, place, and time.            Assessment and Plan     1. Rash associated with COVID-19  Comments:  Patient to take lukewarm showers and avoid any irritants.  He will let us know if the rash worsens immediately.    2. Diarrhea, unspecified type  Comments:  Resolving.  Patient to let us know if diarrhea resumes and will order stool studies      Call if symptoms worsen or do not completely resolve within the next week or 2 with the rash and within the next day or so with diarrhea         No follow-ups on file.    "

## 2023-12-19 ENCOUNTER — LAB VISIT (OUTPATIENT)
Dept: LAB | Facility: HOSPITAL | Age: 53
End: 2023-12-19
Attending: FAMILY MEDICINE
Payer: COMMERCIAL

## 2023-12-19 DIAGNOSIS — E78.5 HYPERLIPIDEMIA, UNSPECIFIED HYPERLIPIDEMIA TYPE: ICD-10-CM

## 2023-12-19 DIAGNOSIS — E55.9 MILD VITAMIN D DEFICIENCY: Chronic | ICD-10-CM

## 2023-12-19 DIAGNOSIS — E11.9 TYPE 2 DIABETES MELLITUS WITHOUT COMPLICATION, WITHOUT LONG-TERM CURRENT USE OF INSULIN: ICD-10-CM

## 2023-12-19 DIAGNOSIS — E29.1 HYPOGONADISM MALE: ICD-10-CM

## 2023-12-19 DIAGNOSIS — K76.0 FATTY LIVER: ICD-10-CM

## 2023-12-19 DIAGNOSIS — Z98.84 S/P LAPAROSCOPIC SLEEVE GASTRECTOMY: ICD-10-CM

## 2023-12-19 DIAGNOSIS — Z12.5 SCREENING FOR PROSTATE CANCER: ICD-10-CM

## 2023-12-19 DIAGNOSIS — E53.8 VITAMIN B12 DEFICIENCY: ICD-10-CM

## 2023-12-19 LAB
25(OH)D3+25(OH)D2 SERPL-MCNC: 22 NG/ML (ref 30–96)
ALBUMIN SERPL BCP-MCNC: 3.5 G/DL (ref 3.5–5.2)
ALP SERPL-CCNC: 61 U/L (ref 55–135)
ALT SERPL W/O P-5'-P-CCNC: 36 U/L (ref 10–44)
ANION GAP SERPL CALC-SCNC: 6 MMOL/L (ref 8–16)
AST SERPL-CCNC: 18 U/L (ref 10–40)
BASOPHILS # BLD AUTO: 0.04 K/UL (ref 0–0.2)
BASOPHILS NFR BLD: 0.7 % (ref 0–1.9)
BILIRUB SERPL-MCNC: 0.9 MG/DL (ref 0.1–1)
BUN SERPL-MCNC: 14 MG/DL (ref 6–20)
CALCIUM SERPL-MCNC: 8.8 MG/DL (ref 8.7–10.5)
CHLORIDE SERPL-SCNC: 108 MMOL/L (ref 95–110)
CHOLEST SERPL-MCNC: 160 MG/DL (ref 120–199)
CHOLEST/HDLC SERPL: 4.2 {RATIO} (ref 2–5)
CO2 SERPL-SCNC: 26 MMOL/L (ref 23–29)
COMPLEXED PSA SERPL-MCNC: 0.77 NG/ML (ref 0–4)
CREAT SERPL-MCNC: 1.1 MG/DL (ref 0.5–1.4)
DIFFERENTIAL METHOD: ABNORMAL
EOSINOPHIL # BLD AUTO: 0.1 K/UL (ref 0–0.5)
EOSINOPHIL NFR BLD: 2 % (ref 0–8)
ERYTHROCYTE [DISTWIDTH] IN BLOOD BY AUTOMATED COUNT: 12.9 % (ref 11.5–14.5)
EST. GFR  (NO RACE VARIABLE): >60 ML/MIN/1.73 M^2
ESTIMATED AVG GLUCOSE: 114 MG/DL (ref 68–131)
FERRITIN SERPL-MCNC: 64 NG/ML (ref 20–300)
GLUCOSE SERPL-MCNC: 113 MG/DL (ref 70–110)
HBA1C MFR BLD: 5.6 % (ref 4–5.6)
HCT VFR BLD AUTO: 49 % (ref 40–54)
HDLC SERPL-MCNC: 38 MG/DL (ref 40–75)
HDLC SERPL: 23.8 % (ref 20–50)
HGB BLD-MCNC: 17.7 G/DL (ref 14–18)
IMM GRANULOCYTES # BLD AUTO: 0.02 K/UL (ref 0–0.04)
IMM GRANULOCYTES NFR BLD AUTO: 0.3 % (ref 0–0.5)
LDLC SERPL CALC-MCNC: 111 MG/DL (ref 63–159)
LYMPHOCYTES # BLD AUTO: 1.1 K/UL (ref 1–4.8)
LYMPHOCYTES NFR BLD: 17.3 % (ref 18–48)
MCH RBC QN AUTO: 30.5 PG (ref 27–31)
MCHC RBC AUTO-ENTMCNC: 36.1 G/DL (ref 32–36)
MCV RBC AUTO: 84 FL (ref 82–98)
MONOCYTES # BLD AUTO: 0.5 K/UL (ref 0.3–1)
MONOCYTES NFR BLD: 8.2 % (ref 4–15)
NEUTROPHILS # BLD AUTO: 4.4 K/UL (ref 1.8–7.7)
NEUTROPHILS NFR BLD: 71.5 % (ref 38–73)
NONHDLC SERPL-MCNC: 122 MG/DL
NRBC BLD-RTO: 0 /100 WBC
PLATELET # BLD AUTO: 236 K/UL (ref 150–450)
PMV BLD AUTO: 8.9 FL (ref 9.2–12.9)
POTASSIUM SERPL-SCNC: 4.5 MMOL/L (ref 3.5–5.1)
PROT SERPL-MCNC: 6.7 G/DL (ref 6–8.4)
RBC # BLD AUTO: 5.81 M/UL (ref 4.6–6.2)
SODIUM SERPL-SCNC: 140 MMOL/L (ref 136–145)
TRIGL SERPL-MCNC: 55 MG/DL (ref 30–150)
TSH SERPL DL<=0.005 MIU/L-ACNC: 1.52 UIU/ML (ref 0.4–4)
VIT B12 SERPL-MCNC: 1496 PG/ML (ref 210–950)
WBC # BLD AUTO: 6.11 K/UL (ref 3.9–12.7)

## 2023-12-19 PROCEDURE — 36415 COLL VENOUS BLD VENIPUNCTURE: CPT | Mod: PO | Performed by: FAMILY MEDICINE

## 2023-12-19 PROCEDURE — 84153 ASSAY OF PSA TOTAL: CPT | Performed by: FAMILY MEDICINE

## 2023-12-19 PROCEDURE — 80061 LIPID PANEL: CPT | Performed by: FAMILY MEDICINE

## 2023-12-19 PROCEDURE — 82728 ASSAY OF FERRITIN: CPT | Performed by: FAMILY MEDICINE

## 2023-12-19 PROCEDURE — 83036 HEMOGLOBIN GLYCOSYLATED A1C: CPT | Performed by: FAMILY MEDICINE

## 2023-12-19 PROCEDURE — 85025 COMPLETE CBC W/AUTO DIFF WBC: CPT | Performed by: FAMILY MEDICINE

## 2023-12-19 PROCEDURE — 84443 ASSAY THYROID STIM HORMONE: CPT | Performed by: FAMILY MEDICINE

## 2023-12-19 PROCEDURE — 82306 VITAMIN D 25 HYDROXY: CPT | Performed by: FAMILY MEDICINE

## 2023-12-19 PROCEDURE — 82607 VITAMIN B-12: CPT | Performed by: FAMILY MEDICINE

## 2023-12-19 PROCEDURE — 80053 COMPREHEN METABOLIC PANEL: CPT | Performed by: FAMILY MEDICINE

## 2023-12-20 ENCOUNTER — TELEPHONE (OUTPATIENT)
Dept: FAMILY MEDICINE | Facility: CLINIC | Age: 53
End: 2023-12-20
Payer: COMMERCIAL

## 2023-12-20 NOTE — TELEPHONE ENCOUNTER
----- Message from Kristian Arndt sent at 12/20/2023 10:51 AM CST -----  Contact: wife  Type:  Patient Returning Call    Who Called:  Katheryn/Wife  Who Left Message for Patient:  Nurse  Does the patient know what this is regarding?:  yes  Best Call Back Number:  [phone]  Additional Information:  States she is ret a call for pt.Please call back

## 2024-01-09 ENCOUNTER — PATIENT MESSAGE (OUTPATIENT)
Dept: OTOLARYNGOLOGY | Facility: CLINIC | Age: 54
End: 2024-01-09
Payer: COMMERCIAL

## 2024-01-15 DIAGNOSIS — L90.5 SCAR OF NOSE: Primary | ICD-10-CM

## 2024-01-23 ENCOUNTER — PATIENT MESSAGE (OUTPATIENT)
Dept: HEMATOLOGY/ONCOLOGY | Facility: CLINIC | Age: 54
End: 2024-01-23
Payer: COMMERCIAL

## 2024-01-26 ENCOUNTER — PATIENT MESSAGE (OUTPATIENT)
Dept: GASTROENTEROLOGY | Facility: CLINIC | Age: 54
End: 2024-01-26
Payer: COMMERCIAL

## 2024-01-26 ENCOUNTER — HOSPITAL ENCOUNTER (EMERGENCY)
Facility: HOSPITAL | Age: 54
Discharge: HOME | End: 2024-01-26
Attending: EMERGENCY MEDICINE
Payer: COMMERCIAL

## 2024-01-26 ENCOUNTER — PATIENT MESSAGE (OUTPATIENT)
Dept: UROLOGY | Facility: CLINIC | Age: 54
End: 2024-01-26
Payer: COMMERCIAL

## 2024-01-26 VITALS
OXYGEN SATURATION: 96 % | WEIGHT: 245 LBS | HEART RATE: 79 BPM | BODY MASS INDEX: 31.89 KG/M2 | RESPIRATION RATE: 18 BRPM | TEMPERATURE: 98 F | DIASTOLIC BLOOD PRESSURE: 82 MMHG | SYSTOLIC BLOOD PRESSURE: 139 MMHG

## 2024-01-26 DIAGNOSIS — R10.9 LEFT SIDED ABDOMINAL PAIN: ICD-10-CM

## 2024-01-26 DIAGNOSIS — N23 RENAL COLIC ON LEFT SIDE: ICD-10-CM

## 2024-01-26 DIAGNOSIS — N13.2 HYDRONEPHROSIS WITH URINARY OBSTRUCTION DUE TO URETERAL CALCULUS: ICD-10-CM

## 2024-01-26 DIAGNOSIS — R11.2 NAUSEA AND VOMITING, UNSPECIFIED VOMITING TYPE: ICD-10-CM

## 2024-01-26 DIAGNOSIS — R31.9 HEMATURIA, UNSPECIFIED TYPE: ICD-10-CM

## 2024-01-26 DIAGNOSIS — N20.1 LEFT URETERAL STONE: Primary | ICD-10-CM

## 2024-01-26 LAB
ALBUMIN SERPL BCP-MCNC: 4.2 G/DL (ref 3.5–5.2)
ALP SERPL-CCNC: 53 U/L (ref 55–135)
ALT SERPL W/O P-5'-P-CCNC: 21 U/L (ref 10–44)
ANION GAP SERPL CALC-SCNC: 9 MMOL/L (ref 8–16)
AST SERPL-CCNC: 16 U/L (ref 10–40)
BACTERIA #/AREA URNS HPF: NEGATIVE /HPF
BASOPHILS # BLD AUTO: 0.04 K/UL (ref 0–0.2)
BASOPHILS NFR BLD: 0.4 % (ref 0–1.9)
BILIRUB SERPL-MCNC: 0.9 MG/DL (ref 0.1–1)
BILIRUB UR QL STRIP: NEGATIVE
BUN SERPL-MCNC: 17 MG/DL (ref 6–20)
CALCIUM SERPL-MCNC: 9 MG/DL (ref 8.7–10.5)
CHLORIDE SERPL-SCNC: 108 MMOL/L (ref 95–110)
CLARITY UR: ABNORMAL
CO2 SERPL-SCNC: 22 MMOL/L (ref 23–29)
COLOR UR: YELLOW
CREAT SERPL-MCNC: 1.5 MG/DL (ref 0.5–1.4)
CREAT SERPL-MCNC: 1.5 MG/DL (ref 0.5–1.4)
DIFFERENTIAL METHOD BLD: ABNORMAL
EOSINOPHIL # BLD AUTO: 0.3 K/UL (ref 0–0.5)
EOSINOPHIL NFR BLD: 3.2 % (ref 0–8)
ERYTHROCYTE [DISTWIDTH] IN BLOOD BY AUTOMATED COUNT: 12.5 % (ref 11.5–14.5)
EST. GFR  (NO RACE VARIABLE): 55.7 ML/MIN/1.73 M^2
GLUCOSE SERPL-MCNC: 185 MG/DL (ref 70–110)
GLUCOSE UR QL STRIP: NEGATIVE
HCT VFR BLD AUTO: 53 % (ref 40–54)
HGB BLD-MCNC: 18.6 G/DL (ref 14–18)
HGB UR QL STRIP: ABNORMAL
HYALINE CASTS #/AREA URNS LPF: 0 /LPF
IMM GRANULOCYTES # BLD AUTO: 0.03 K/UL (ref 0–0.04)
IMM GRANULOCYTES NFR BLD AUTO: 0.3 % (ref 0–0.5)
KETONES UR QL STRIP: ABNORMAL
LACTATE SERPL-SCNC: 1.2 MMOL/L (ref 0.5–1.9)
LEUKOCYTE ESTERASE UR QL STRIP: NEGATIVE
LIPASE SERPL-CCNC: 24 U/L (ref 4–60)
LYMPHOCYTES # BLD AUTO: 1.1 K/UL (ref 1–4.8)
LYMPHOCYTES NFR BLD: 11.7 % (ref 18–48)
MAGNESIUM SERPL-MCNC: 1.8 MG/DL (ref 1.6–2.6)
MCH RBC QN AUTO: 30.6 PG (ref 27–31)
MCHC RBC AUTO-ENTMCNC: 35.1 G/DL (ref 32–36)
MCV RBC AUTO: 87 FL (ref 82–98)
MICROSCOPIC COMMENT: ABNORMAL
MONOCYTES # BLD AUTO: 0.6 K/UL (ref 0.3–1)
MONOCYTES NFR BLD: 6.2 % (ref 4–15)
NEUTROPHILS # BLD AUTO: 7 K/UL (ref 1.8–7.7)
NEUTROPHILS NFR BLD: 78.2 % (ref 38–73)
NITRITE UR QL STRIP: NEGATIVE
NRBC BLD-RTO: 0 /100 WBC
PH UR STRIP: 6 [PH] (ref 5–8)
PLATELET # BLD AUTO: 228 K/UL (ref 150–450)
PMV BLD AUTO: 8.9 FL (ref 9.2–12.9)
POTASSIUM SERPL-SCNC: 4.1 MMOL/L (ref 3.5–5.1)
PROT SERPL-MCNC: 7 G/DL (ref 6–8.4)
PROT UR QL STRIP: ABNORMAL
RBC # BLD AUTO: 6.07 M/UL (ref 4.6–6.2)
RBC #/AREA URNS HPF: >100 /HPF (ref 0–4)
SAMPLE: ABNORMAL
SODIUM SERPL-SCNC: 139 MMOL/L (ref 136–145)
SP GR UR STRIP: 1.02 (ref 1–1.03)
SQUAMOUS #/AREA URNS HPF: 0 /HPF
URN SPEC COLLECT METH UR: ABNORMAL
UROBILINOGEN UR STRIP-ACNC: NEGATIVE EU/DL
WBC # BLD AUTO: 8.97 K/UL (ref 3.9–12.7)
WBC #/AREA URNS HPF: 1 /HPF (ref 0–5)

## 2024-01-26 PROCEDURE — 25000003 PHARM REV CODE 250: Performed by: EMERGENCY MEDICINE

## 2024-01-26 PROCEDURE — 83735 ASSAY OF MAGNESIUM: CPT | Performed by: EMERGENCY MEDICINE

## 2024-01-26 PROCEDURE — 80053 COMPREHEN METABOLIC PANEL: CPT | Performed by: EMERGENCY MEDICINE

## 2024-01-26 PROCEDURE — 96374 THER/PROPH/DIAG INJ IV PUSH: CPT

## 2024-01-26 PROCEDURE — 96376 TX/PRO/DX INJ SAME DRUG ADON: CPT

## 2024-01-26 PROCEDURE — 83605 ASSAY OF LACTIC ACID: CPT | Performed by: EMERGENCY MEDICINE

## 2024-01-26 PROCEDURE — 83690 ASSAY OF LIPASE: CPT | Performed by: EMERGENCY MEDICINE

## 2024-01-26 PROCEDURE — 96361 HYDRATE IV INFUSION ADD-ON: CPT

## 2024-01-26 PROCEDURE — 81001 URINALYSIS AUTO W/SCOPE: CPT | Performed by: EMERGENCY MEDICINE

## 2024-01-26 PROCEDURE — 82565 ASSAY OF CREATININE: CPT | Mod: 59

## 2024-01-26 PROCEDURE — 63600175 PHARM REV CODE 636 W HCPCS: Performed by: EMERGENCY MEDICINE

## 2024-01-26 PROCEDURE — 85025 COMPLETE CBC W/AUTO DIFF WBC: CPT | Performed by: EMERGENCY MEDICINE

## 2024-01-26 PROCEDURE — 96375 TX/PRO/DX INJ NEW DRUG ADDON: CPT

## 2024-01-26 PROCEDURE — 99285 EMERGENCY DEPT VISIT HI MDM: CPT | Mod: 25

## 2024-01-26 RX ORDER — OXYCODONE AND ACETAMINOPHEN 5; 325 MG/1; MG/1
1 TABLET ORAL EVERY 6 HOURS PRN
Qty: 12 TABLET | Refills: 0 | Status: SHIPPED | OUTPATIENT
Start: 2024-01-26 | End: 2024-01-29

## 2024-01-26 RX ORDER — ONDANSETRON HYDROCHLORIDE 2 MG/ML
4 INJECTION, SOLUTION INTRAVENOUS
Status: COMPLETED | OUTPATIENT
Start: 2024-01-26 | End: 2024-01-26

## 2024-01-26 RX ORDER — HYDROMORPHONE HYDROCHLORIDE 1 MG/ML
1 INJECTION, SOLUTION INTRAMUSCULAR; INTRAVENOUS; SUBCUTANEOUS
Status: COMPLETED | OUTPATIENT
Start: 2024-01-26 | End: 2024-01-26

## 2024-01-26 RX ORDER — KETOROLAC TROMETHAMINE 30 MG/ML
15 INJECTION, SOLUTION INTRAMUSCULAR; INTRAVENOUS
Status: COMPLETED | OUTPATIENT
Start: 2024-01-26 | End: 2024-01-26

## 2024-01-26 RX ORDER — TAMSULOSIN HYDROCHLORIDE 0.4 MG/1
0.4 CAPSULE ORAL DAILY
Qty: 14 CAPSULE | Refills: 0 | Status: SHIPPED | OUTPATIENT
Start: 2024-01-26 | End: 2024-02-09

## 2024-01-26 RX ORDER — NAPROXEN 500 MG/1
500 TABLET ORAL 2 TIMES DAILY WITH MEALS
Qty: 20 TABLET | Refills: 0 | Status: SHIPPED | OUTPATIENT
Start: 2024-01-26 | End: 2024-02-05

## 2024-01-26 RX ORDER — MORPHINE SULFATE 4 MG/ML
4 INJECTION, SOLUTION INTRAMUSCULAR; INTRAVENOUS
Status: COMPLETED | OUTPATIENT
Start: 2024-01-26 | End: 2024-01-26

## 2024-01-26 RX ADMIN — HYDROMORPHONE HYDROCHLORIDE 1 MG: 1 INJECTION, SOLUTION INTRAMUSCULAR; INTRAVENOUS; SUBCUTANEOUS at 04:01

## 2024-01-26 RX ADMIN — HYDROMORPHONE HYDROCHLORIDE 1 MG: 1 INJECTION, SOLUTION INTRAMUSCULAR; INTRAVENOUS; SUBCUTANEOUS at 07:01

## 2024-01-26 RX ADMIN — SODIUM CHLORIDE 1000 ML: 9 INJECTION, SOLUTION INTRAVENOUS at 03:01

## 2024-01-26 RX ADMIN — MORPHINE SULFATE 4 MG: 4 INJECTION, SOLUTION INTRAMUSCULAR; INTRAVENOUS at 03:01

## 2024-01-26 RX ADMIN — KETOROLAC TROMETHAMINE 15 MG: 30 INJECTION, SOLUTION INTRAMUSCULAR; INTRAVENOUS at 04:01

## 2024-01-26 RX ADMIN — ONDANSETRON 4 MG: 2 INJECTION INTRAMUSCULAR; INTRAVENOUS at 03:01

## 2024-01-26 NOTE — Clinical Note
"Lyndon"Rodolfo Lion was seen and treated in our emergency department on 1/26/2024.  He may return to work on 01/29/2024.       If you have any questions or concerns, please don't hesitate to call.      Kerline Escalante MD"

## 2024-01-26 NOTE — Clinical Note
"Lyndon Potter" Ayad was seen and treated in our emergency department on 1/26/2024.  He may return to work on 01/29/2024.  Patient diagnosed with left ureteral stone with obstruction     If you have any questions or concerns, please don't hesitate to call.      ALEX Melendrez RN    "

## 2024-01-26 NOTE — ED PROVIDER NOTES
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 yesterday reported pain waxed and waned but became severe last night.  Nausea and vomiting.  No blood in emesis.  Patient reports he was having difficulty urinating only able to urinate small amounts but no blood in the urine no burning.  Has a attempted to have bowel movements throughout the day but has had only small stools.  No blood in stool  Does not remember when he last passed gas.  No flank pain.  No radiation of pain to the right.  No fevers but positive chills and sweats no chest pain or shortness of breath he took Pepto-Bismol without improvement      Review of patient's allergies indicates:  No Known Allergies  Past Medical History:   Diagnosis Date    Arthritis     Asthma     AS A CHILD    Colon cancer     Family hx of prostate cancer 11/22/2016    Hx of colon cancer, stage I 07/03/2014    Parada syndrome     Squamous cell carcinoma of skin     Tear of labium 10/2020    left shoulder Dr Molina    Uncontrolled type 2 diabetes mellitus with hyperglycemia 03/04/2021    Vitamin D deficiency     Wears glasses      Past Surgical History:   Procedure Laterality Date    APPENDECTOMY      APPLICATION OF CARTILAGE GRAFT N/A 3/21/2023    Procedure: APPLICATION, CARTILAGE GRAFT;  Surgeon: Alyx Spivey MD;  Location: [facility] OR [address] [place] [address];  Service: ENT;  Laterality: N/A;    CAUDAL EPIDURAL STEROID INJECTION N/A 11/6/2018    Procedure: Injection-steroid-epidural-caudal;  Surgeon: Lyndon Brooke Jr., MD;  Location: [facility] OR;  Service: Pain  Management;  Laterality: N/A;    COLON SURGERY  2008    PARTIAL COLECTOMY    COLONOSCOPY Bilateral 2012    COLONOSCOPY N/A 8/1/2016    Procedure: COLONOSCOPY;  Surgeon: Blaze Marr MD;  Location: [facility] ENDO;  Service: Endoscopy;  Laterality: N/A;    COLONOSCOPY N/A 9/20/2017    Procedure: COLONOSCOPY;  Surgeon: Dom Leonardo MD;  Location: [facility] (4TH FLR);  Service: Endoscopy;  Laterality: N/A;  not given PM prep    COLONOSCOPY N/A 10/9/2017    Procedure: COLONOSCOPY;  Surgeon: RAMON Callahan MD;  Location: [facility] (4TH FLR);  Service: Endoscopy;  Laterality: N/A;  ok for Prepopik per Dr Callahan    COLONOSCOPY N/A 11/20/2017    Procedure: COLONOSCOPY/EMR;  Surgeon: Joseluis Choe MD;  Location: [facility] (2ND FLR);  Service: Endoscopy;  Laterality: N/A;  EMR    no pm prep    COLONOSCOPY N/A 5/30/2018    Procedure: COLONOSCOPY;  Surgeon: Dom Leonardo MD;  Location: [facility] (4TH FLR);  Service: Endoscopy;  Laterality: N/A;  follow up colonoscopy in 5/2018 for Parada Syndrome         COLONOSCOPY N/A 6/10/2019    Procedure: COLONOSCOPY;  Surgeon: Dom Leonardo MD;  Location: [facility] (4TH FLR);  Service: Endoscopy;  Laterality: N/A;  Prepopik ordered per Dr. Leonardo    COLONOSCOPY N/A 7/22/2020    Procedure: COLONOSCOPY;  Surgeon: Dom Leonardo MD;  Location: [facility] (4TH FLR);  Service: Endoscopy;  Laterality: N/A;    COLONOSCOPY N/A 5/27/2021    Procedure: COLONOSCOPY;  Surgeon: Dom Leonardo MD;  Location: [facility] (4TH FLR);  Service: Endoscopy;  Laterality: N/A;  covid test 5/24-[place]  pt requests Prepopik    COLONOSCOPY N/A 6/17/2022    Procedure: COLONOSCOPY;  Surgeon: Dom Leonardo MD;  Location: [facility] (4TH FLR);  Service: Endoscopy;  Laterality: N/A;  He was discovered to have colon cancer and had right hemicolectomy        for stage II on 08/08/2008. MSH2 Parada syndrome.  sutab requested  instructions via the portal -sm  fully vaccinated - sm    COLONOSCOPY  N/A 6/22/2023    Procedure: COLONOSCOPY;  Surgeon: Dom Leonardo MD;  Location: [facility] (4TH FLR);  Service: Endoscopy;  Laterality: N/A;  see telephone encounter dated 5/24/23/ Pt/ Pt wife requested sutab - prep ins. on portal / Ozempic for DM- ERW    CYSTOSCOPY      Epidural Steroid Injection  10/23/15    Lumbar    EPIDURAL STEROID INJECTION INTO LUMBAR SPINE N/A 7/27/2018    Procedure: Injection-steroid-epidural-lumbar;  Surgeon: Lyndon Brooke Jr., MD;  Location: [facility] OR;  Service: Pain Management;  Laterality: N/A;    ESOPHAGOGASTRODUODENOSCOPY      ESOPHAGOGASTRODUODENOSCOPY N/A 7/22/2020    Procedure: EGD (ESOPHAGOGASTRODUODENOSCOPY);  Surgeon: Dom Leonardo MD;  Location: [facility] (4TH FLR);  Service: Endoscopy;  Laterality: N/A;  Please schedule patient for EGD and colonoscopy with me MSH2 Parada syndrome and anemia evaluation please make priority 1  covid test 7/20-[place]    ESOPHAGOGASTRODUODENOSCOPY N/A 6/22/2023    Procedure: EGD (ESOPHAGOGASTRODUODENOSCOPY);  Surgeon: Dom Leonardo MD;  Location: [facility] (4TH FLR);  Service: Endoscopy;  Laterality: N/A;  see telephone encounter dated 5/24/23 6/16/23-[place] completed appointment confirmed-    ESOPHAGOGASTRODUODENOSCOPY N/A 9/1/2023    Procedure: EGD (ESOPHAGOGASTRODUODENOSCOPY);  Surgeon: Dom Leonardo MD;  Location: [facility] (4TH FLR);  Service: Endoscopy;  Laterality: N/A;  Instructions sent via portal / Ozempic / Extended Clear Liquid prep    EXCISION OR SURGICAL PLANING, SKIN, NOSE, FOR RHINOPHYMA Bilateral 3/7/2023    Procedure: EXCISION OR SURGICAL resection nasal skin cancer;  Surgeon: Alyx Spivey MD;  [address]  Service: ENT;  Laterality: Bilateral;    FACETECTOMY OF VERTEBRA  2/13/2019    Procedure: MEDIAL FACETECTOMY L5-S1;  Surgeon: Lyndon Brooke Jr., MD;  Location: [facility] OR;  Service: Orthopedics;;    GASTRIC BYPASS  2010    SLEEVE    KNEE ARTHROSCOPY Right      LUMBAR DISCECTOMY  2/13/2019    Procedure: DISCECTOMY, SPINE, LUMBAR L5-S1;  Surgeon: Lyndon Brooke Jr., MD;  Location: [facility] OR;  Service: Orthopedics;;    NASAL RECONSTRUCTION N/A 3/21/2023    Procedure: RECONSTRUCTION, NOSE;  Surgeon: Alyx Spivey MD;  Location: [facility] OR 2ND FLR;  Service: ENT;  Laterality: N/A;    REVISION OF FLAP GRAFT Bilateral 4/18/2023    Procedure: REVISION, PROCEDURE INVOLVING FLAP GRAFT;  Surgeon: Alyx Spivey MD;  Location: [facility] OR;  Service: ENT;  Laterality: Bilateral;    REVISION OF FLAP GRAFT N/A 6/29/2023    Procedure: REVISION, PROCEDURE INVOLVING FLAP GRAFT;  Surgeon: Alyx Spivey MD;  Location: [facility] OR 2ND FLR;  Service: ENT;  Laterality: N/A;    REVISION OF FLAP GRAFT N/A 8/29/2023    Procedure: REVISION, PROCEDURE INVOLVING FLAP GRAFT;  Surgeon: Alyx Spivey MD;  Location: [facility] OR;  Service: ENT;  Laterality: N/A;    ROTATION FLAP SURGERY N/A 3/21/2023    Procedure: CREATION, FLAP, ROTATION;  Surgeon: Alyx Spivey MD;  Location: [facility] OR 2ND FLR;  Service: ENT;  Laterality: N/A;     Family History   Problem Relation Age of Onset    Melanoma Mother     Cancer Mother         breast    Breast cancer Mother     Heart disease Father     Diabetes Father     Liver disease Father     Hearing loss Father     Basal cell carcinoma Father     No Known Problems Sister     Polycystic ovary syndrome Daughter     Cancer Maternal Uncle         colon    Colon cancer Maternal Uncle         x2    Heart disease Maternal Uncle     Hypertension Maternal Uncle     No Known Problems Paternal Aunt     Alcohol abuse Paternal Uncle     Prostate cancer Paternal Uncle     Cancer Paternal Uncle     Dementia Maternal Grandmother     Cancer Maternal Grandfather         colon ca    Colon cancer Maternal Grandfather     Early death Maternal Grandfather     Diabetes Paternal Grandmother     Hypertension Paternal Grandfather     Prostatitis Paternal Grandfather     Psoriasis Neg Hx     Lupus Neg Hx      Eczema Neg Hx      Social History     Tobacco Use    Smoking status: Never    Smokeless tobacco: Never   Substance Use Topics    Alcohol use: Yes     Comment: RARELY    Drug use: No     Review of Systems   Constitutional:  Positive for activity change, chills and diaphoresis. Negative for appetite change, fatigue and fever.   Respiratory:  Negative for cough, chest tightness, shortness of breath and wheezing.    Cardiovascular:  Negative for chest pain and palpitations.   Gastrointestinal:  Positive for abdominal pain, nausea and vomiting. Negative for anal bleeding, blood in stool, constipation and diarrhea.   Genitourinary:  Positive for decreased urine volume, difficulty urinating and frequency. Negative for dysuria, flank pain, hematuria and urgency.   Musculoskeletal:  Negative for neck pain and neck stiffness.   Neurological:  Negative for dizziness, weakness, light-headedness, numbness and headaches.   All other systems reviewed and are negative.      Physical Exam     Initial Vitals [01/26/24 0213]   BP Pulse Resp Temp SpO2   (!) 178/104 78 20 97.6 °F (36.4 °C) 96 %      MAP       --         Physical Exam    Nursing note and vitals reviewed.  Constitutional: He appears well-developed and well-nourished. He is not diaphoretic. He appears distressed.   HENT:   Head: Normocephalic and atraumatic.   Right Ear: External ear normal.   Left Ear: External ear normal.   Nose: Nose normal.   Mouth/Throat: Oropharynx is clear and moist.   Eyes: Conjunctivae and EOM are normal. Pupils are equal, round, and reactive to light.   Neck: Neck supple. No tracheal deviation present.   Normal range of motion.  Cardiovascular:  Normal rate, regular rhythm, normal heart sounds and intact distal pulses.     Exam reveals no gallop and no friction rub.       No murmur heard.  Blood pressure 178/104 pulse 78   Pulmonary/Chest: Breath sounds normal. No stridor. No respiratory distress. He has no wheezes. He has no rhonchi. He has no  rales. He exhibits no tenderness.   Sats 96% on room air respirations 22   Abdominal: Abdomen is soft and flat. Bowel sounds are normal. He exhibits no shifting dullness, no distension, no fluid wave and no mass. There is no splenomegaly or hepatomegaly. There is abdominal tenderness in the left upper quadrant and left lower quadrant. No hernia. There is no rebound and no guarding.   Musculoskeletal:         General: No edema. Normal range of motion.      Cervical back: Normal range of motion and neck supple.     Neurological: He is alert and oriented to person, place, and time. He has normal strength. No cranial nerve deficit or sensory deficit.   Skin: Skin is warm and dry. No rash noted. No erythema. No pallor.   Psychiatric: He has a normal mood and affect. His behavior is normal. Judgment and thought content normal.         ED Course   Procedures  Labs Reviewed   CBC W/ AUTO DIFFERENTIAL - Abnormal; Notable for the following components:       Result Value    Hemoglobin 18.6 (*)     MPV 8.9 (*)     Gran % 78.2 (*)     Lymph % 11.7 (*)     All other components within normal limits   COMPREHENSIVE METABOLIC PANEL - Abnormal; Notable for the following components:    CO2 22 (*)     Glucose 185 (*)     Creatinine 1.5 (*)     Alkaline Phosphatase 53 (*)     eGFR 55.7 (*)     All other components within normal limits   URINALYSIS, REFLEX TO URINE CULTURE - Abnormal; Notable for the following components:    Appearance, UA Hazy (*)     Protein, UA Trace (*)     Ketones, UA Trace (*)     Occult Blood UA 3+ (*)     All other components within normal limits    Narrative:     Specimen Source->Urine   URINALYSIS MICROSCOPIC - Abnormal; Notable for the following components:    RBC, UA >100 (*)     All other components within normal limits    Narrative:     Specimen Source->Urine   ISTAT CREATININE - Abnormal; Notable for the following components:    POC Creatinine 1.5 (*)     All other components within normal limits   LIPASE    LACTIC ACID, PLASMA   MAGNESIUM   POCT CREATININE          Imaging Results              CT Renal Stone Study ABD Pelvis WO (Final result)  Result time 01/26/24 06:44:57      Final result by Derick Cook DO (01/26/24 06:44:57)                   Narrative:    EXAM:  CT Abdomen and Pelvis Without Intravenous Contrast    FACILITY:  [facility]    REFERRING:  AAAREFERRAL SELF    CLINICAL HISTORY:  53 years, Male; Flank pain, kidney stone suspected    TECHNIQUE:  Axial computed tomography images of the abdomen and pelvis without intravenous contrast.  This CT exam was performed using one or more of the following dose reduction techniques:  automated exposure control, adjustment of the mA and/or kV according to patient size, and/or use of iterative reconstruction technique.    COMPARISON:  6/23/2023    FINDINGS:  Lung bases:  Unremarkable.  No mass.  No consolidation.  Mediastinum:  There is a small hiatal hernia. Gastric sleeve postoperative changes are present.    ABDOMEN:  Liver:  Unremarkable.  Gallbladder and bile ducts:  Unremarkable.  No calcified stones.  No ductal dilation.  Pancreas:  Unremarkable.  No ductal dilation.  Spleen:  Unremarkable.  No splenomegaly.  Adrenals:  Unremarkable.  No mass.  Kidneys and ureters:  There is evidence of mild degree of left hydronephrosis with moderate perinephric stranding/edema secondary to an obstructing 2 mm distal left ureteral calculus which is approximately 1 cm proximal to the ureterovesical junction.  Stomach and bowel:  There is a suggestion of mild degree of focal annular wall thickening and narrowing within the mid-distal transverse colon (series #3, image 48). While this could be associated with transient peristalsis, elective follow-up colonoscopy or barium enema evaluation would be recommended to exclude the possibility of true mass. There is evidence of hyperdense particulate debris within the bowel which is most consistent with ingested  medication.  Right hemicolectomy changes are evident with ileocolic anastomosis in the right upper quadrant. No definite inflammatory bowel wall thickening.    PELVIS:  Appendix: Surgically absent.  Bladder:  Unremarkable.  No stones.  Reproductive:  Prostate gland is mildly enlarged.    ABDOMEN and PELVIS:  Intraperitoneal space:  Unremarkable.  No free air.  No significant fluid collection.  Bones/joints:  No acute fracture.  No dislocation.  Soft tissues:  Unremarkable.  Vasculature:  Unremarkable.  No abdominal aortic aneurysm.  Lymph nodes:  Unremarkable.  No enlarged lymph nodes.  Other findings:  Superior S1 endplate Schmorl's node is noted.    IMPRESSION:    Mild degree of left hydronephrosis with moderate perinephric stranding/edema secondary to an obstructing 2 mm distal left ureteral calculus which is approximately 1 cm proximal to the ureterovesical junction.    Suggestion of mild degree of focal annular wall thickening and narrowing within the mid-distal transverse colon. While this could be associated with transient peristalsis, elective follow-up colonoscopy or barium enema evaluation would be recommended to exclude the possibility of true mass.    Right hemicolectomy changes with ileocolic anastomosis in the right upper quadrant.    Small hiatal hernia. Gastric sleeve postoperative changes.    Mildly enlarged prostate gland.    Electronically signed by:  Derick Cook DO  01/26/2024 06:44 AM CST Workstation: [id number]                                     Medications   HYDROmorphone injection 1 mg (has no administration in time range)   sodium chloride 0.9% bolus 1,000 mL 1,000 mL (0 mLs Intravenous Stopped 1/26/24 0450)   ondansetron injection 4 mg (4 mg Intravenous Given 1/26/24 0313)   morphine injection 4 mg (4 mg Intravenous Given 1/26/24 0313)   morphine injection 4 mg (4 mg Intravenous Given 1/26/24 0344)   ketorolac injection 15 mg (15 mg Intravenous Given 1/26/24 0409)   HYDROmorphone  injection 1 mg (1 mg Intravenous Given 1/26/24 9768)     Medical Decision Making  Emergent evaluation of a 53-year-old male with history of colon cancer status post colon resection cancer free at this time, history of diverticulosis, reese syndrome, 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 yesterday reported pain waxed and waned but became severe last night.  Nausea and vomiting.  No blood in emesis.  Patient reports he was having difficulty urinating only able to urinate small amounts but no blood in the urine no burning.  Has a attempted to have bowel movements throughout the day but has had only small stools.  No blood in stool  Does not remember when he last passed gas.  No flank pain.  No radiation of pain to the right.  No fevers but positive chills and sweats no chest pain or shortness of breath he took Pepto-Bismol without improvement  On physical exam patient was hypertensive reports no history of hypertension blood pressure 178/104 pulse 78 respirations 22 sats 96% on room air temp 97.8° very uncomfortable in appearance tenderness in the left mid abdomen and left lower quadrant with voluntary guarding no flank pain no pain in the right abdomen no CVA tenderness normal cardiac and lung exam  MDM    Patient presents for emergent evaluation of acute acute onset of left-sided abdominal pain around 11 waxing and waning in severity severe at this time with nausea vomiting difficulty urinating and difficulty having bowel movements that poses a threat to life and/or bodily function.   Differential diagnosis includes but was not limited to acute pancreatitis, acute cholecystitis and cholangitis, colitis, acute appendicitis, urinary tract infection, testicular torsion, epididymitis and orchitis, prostatitis, pyelonephritis, kidney stone, volvulus, small bowel obstruction, enteritis,  "gastritis, colitis, mesenteric ischemia, AAA, AAA rupture, aortic dissection, constipation, upper or lower gi bleeding, traumatic injury.   .   In the ED patient found to have acute left-sided ureteral stone with mild hydronephrosis and moderate perinephric stranding, acute kidney injury, hematuria   I ordered labs and personally reviewed them.  Labs significant for see below     I ordered CT scan and personally reviewed it and reviewed the radiologist interpretation.  CT significant for IMPRESSION:    Mild degree of left hydronephrosis with moderate perinephric stranding/edema secondary to an obstructing 2 mm distal left ureteral calculus which is approximately 1 cm proximal to the ureterovesical junction.    Suggestion of mild degree of focal annular wall thickening and narrowing within the mid-distal transverse colon. While this could be associated with transient peristalsis, elective follow-up colonoscopy or barium enema evaluation would be recommended to exclude the possibility of true mass.    Right hemicolectomy changes with ileocolic anastomosis in the right upper quadrant.    Small hiatal hernia. Gastric sleeve postoperative changes.    Mildly enlarged prostate gland.      Discharge MDM     Patient was managed in the ED with IV morphine 4 mg x 2, 4 mg of Zofran, 1 L normal saline  The response to treatment was good.  Patient be discharged home with Flomax, Percocet, and Naprosyn follow up with Urology return to the ER for new or worsening symptoms also needs to follow up with his gastroenterologist for colonoscopy due to CT showing "mild degree of focal annular wall thickening and narrowing within the mid-distal transverse colon. While this could be associated with transient peristalsis, elective follow-up colonoscopy or barium enema evaluation would be recommended to exclude the possibility of true mass."  Patient was discharged in stable condition.  Detailed return precautions discussed.  Patient was told " to follow up with primary care physician or specialist based on their diagnosis  Kerline Escalante MD  Patient was then given Toradol 15 mg IV this did not improve his pain he was given 1 mg of Dilaudid and pain is now controlled he will go for CT scan renal stone study.  Kerline Escalante M.D.  5:16 AM 1/26/2024  Will get 1 dose of Dilaudid once stable for discharge via full drive him home he will follow up with his urologist at [facility].  Kerline Escalante M.D.  6:59 AM 1/26/2024        Amount and/or Complexity of Data Reviewed  Labs: ordered.     Details: Point of care creatinine 1.5  Bicarb 22 glucose 185 creatinine 1.5 BUN 17 GFR 55.7    Lipase 24  Lactate 1.2  Mag 1.8  Urine is hazy trace protein trace ketones 3+ blood more than 100 red blood cells  Hemoglobin 18.6 hematocrit 53  Radiology: ordered.    Risk  Prescription drug management.                                      Clinical Impression:  Final diagnoses:  [R10.9] Left sided abdominal pain  [R31.9] Hematuria, unspecified type  [R11.2] Nausea and vomiting, unspecified vomiting type  [N13.2] Hydronephrosis with urinary obstruction due to ureteral calculus  [N23] Renal colic on left side  [N20.1] Left ureteral stone (Primary)          ED Disposition Condition    Discharge Stable          ED Prescriptions       Medication Sig Dispense Start Date End Date Auth. Provider    tamsulosin (FLOMAX) 0.4 mg Cap Take 1 capsule (0.4 mg total) by mouth once daily. for 14 days 14 capsule 1/26/2024 2/9/2024 Kerline Escalante MD    naproxen (NAPROSYN) 500 MG tablet Take 1 tablet (500 mg total) by mouth 2 (two) times daily with meals. for 10 days 20 tablet 1/26/2024 2/5/2024 Kerline Escalante MD    oxyCODONE-acetaminophen (PERCOCET) 5-325 mg per tablet Take 1 tablet by mouth every 6 (six) hours as needed for Pain. 12 tablet 1/26/2024 1/29/2024 Kerline Escalante MD          Follow-up Information       Follow up With Specialties Details Why Contact Info  Additional Information    Stepan Moran III, MD Family Medicine Schedule an appointment as soon as possible for a visit   [address]  [address]  [facility] [address]  [phone]       [facility] - Emergency Dept Emergency Medicine Go to  If symptoms worsen [address] [facility] [id number] [phone] 1st floor    Guerita Stovall MD Urology Schedule an appointment as soon as possible for a visit  This week [address]  [place] [address]  [phone]                Kerline Escalante MD  01/26/24 0659

## 2024-01-29 ENCOUNTER — OFFICE VISIT (OUTPATIENT)
Dept: UROLOGY | Facility: CLINIC | Age: 54
End: 2024-01-29
Payer: COMMERCIAL

## 2024-01-29 ENCOUNTER — TELEPHONE (OUTPATIENT)
Dept: ENDOSCOPY | Facility: HOSPITAL | Age: 54
End: 2024-01-29
Payer: COMMERCIAL

## 2024-01-29 VITALS
SYSTOLIC BLOOD PRESSURE: 150 MMHG | HEART RATE: 80 BPM | WEIGHT: 253.19 LBS | DIASTOLIC BLOOD PRESSURE: 86 MMHG | HEIGHT: 74 IN | BODY MASS INDEX: 32.49 KG/M2

## 2024-01-29 DIAGNOSIS — Z15.09 LYNCH SYNDROME: ICD-10-CM

## 2024-01-29 DIAGNOSIS — Z85.038 HISTORY OF COLON CANCER, STAGE II: ICD-10-CM

## 2024-01-29 DIAGNOSIS — N20.0 NEPHROLITHIASIS: ICD-10-CM

## 2024-01-29 DIAGNOSIS — Z12.11 COLON CANCER SCREENING: Primary | ICD-10-CM

## 2024-01-29 DIAGNOSIS — Z15.09 MSH2-RELATED LYNCH SYNDROME (HNPCC1): ICD-10-CM

## 2024-01-29 DIAGNOSIS — R93.3 ABNORMAL FINDING ON GI TRACT IMAGING: Primary | ICD-10-CM

## 2024-01-29 DIAGNOSIS — R31.29 MICROHEMATURIA: Primary | ICD-10-CM

## 2024-01-29 PROCEDURE — 99999 PR PBB SHADOW E&M-EST. PATIENT-LVL V: CPT | Mod: PBBFAC,,, | Performed by: UROLOGY

## 2024-01-29 PROCEDURE — 99214 OFFICE O/P EST MOD 30 MIN: CPT | Mod: S$GLB,,, | Performed by: UROLOGY

## 2024-01-29 PROCEDURE — 87086 URINE CULTURE/COLONY COUNT: CPT | Performed by: UROLOGY

## 2024-01-29 RX ORDER — KETOROLAC TROMETHAMINE 10 MG/1
10 TABLET, FILM COATED ORAL EVERY 6 HOURS PRN
Qty: 10 TABLET | Refills: 0 | Status: SHIPPED | OUTPATIENT
Start: 2024-01-29 | End: 2024-02-03

## 2024-01-29 RX ORDER — OXYCODONE AND ACETAMINOPHEN 5; 325 MG/1; MG/1
1 TABLET ORAL EVERY 4 HOURS PRN
Qty: 12 TABLET | Refills: 0 | Status: SHIPPED | OUTPATIENT
Start: 2024-01-29 | End: 2024-02-01

## 2024-01-29 RX ORDER — SOD SULF/POT CHLORIDE/MAG SULF 1.479 G
12 TABLET ORAL DAILY
Qty: 24 TABLET | Refills: 0 | Status: SHIPPED | OUTPATIENT
Start: 2024-01-29 | End: 2024-03-05

## 2024-01-29 NOTE — TELEPHONE ENCOUNTER
"----- Message from Dom Leonardo MD sent at 2024  3:39 PM CST -----  Procedure: Colonoscopy    Diagnosis: Abnormal finding on GI tract imaging.  Syndrome MSH2 history of colon cancer.  Suggestion of mild degree of focal annular wall thickening and narrowing within the mid-distal transverse colon. While this could be associated with transient peristalsis, elective follow-up colonoscopy or barium enema evaluation would be recommended to exclude the possibility of true mass.    Procedure Timin-4 weeks    #If within 4 weeks selected, please deb as high priority#    #If greater than 12 weeks, please select "5-12 weeks" and delay sending until 3 months prior to requested date#     Provider: Myself    Location: [address]    Additional Scheduling Information:  Urgent colonoscopy with me    Prep Specifications:Standard prep    Is the patient taking a GLP-1 Agonist:no    Have you attached a patient to this message: yes     "

## 2024-01-29 NOTE — TELEPHONE ENCOUNTER
Spoke to [place] to schedule procedure(s) Colonoscopy       Physician to perform procedure(s) Dr. ANALILIA Leonardo  Date of Procedure (s) 02/06/24  Arrival Time 9:50 AM  Time of Procedure(s) 10:50 AM   Location of Procedure(s) [facility] 4th Floor  Type of Rx Prep sent to patient: Sutab  Instructions provided to patient via [facility]    Patient was informed on the following information and verbalized understanding. Screening questionnaire reviewed with patient and complete. If procedure requires anesthesia, a responsible adult needs to be present to accompany the patient home, patient cannot drive after receiving anesthesia. Appointment details are tentative, especially check-in time. Patient will receive a prep-op call 7 days prior to confirm check-in time for procedure. If applicable the patient should contact their pharmacy to verify Rx for procedure prep is ready for pick-up. Patient was advised to call the scheduling department at [phone] if pharmacy states no Rx is available. Patient was advised to call the endoscopy scheduling department if any questions or concerns arise.      [facility] Scheduling Department

## 2024-01-30 ENCOUNTER — TELEPHONE (OUTPATIENT)
Dept: UROLOGY | Facility: CLINIC | Age: 54
End: 2024-01-30
Payer: COMMERCIAL

## 2024-01-30 ENCOUNTER — ANESTHESIA EVENT (OUTPATIENT)
Dept: SURGERY | Facility: HOSPITAL | Age: 54
End: 2024-01-30
Payer: COMMERCIAL

## 2024-01-30 ENCOUNTER — PATIENT MESSAGE (OUTPATIENT)
Dept: UROLOGY | Facility: CLINIC | Age: 54
End: 2024-01-30
Payer: COMMERCIAL

## 2024-01-30 ENCOUNTER — HOSPITAL ENCOUNTER (OUTPATIENT)
Facility: HOSPITAL | Age: 54
Discharge: HOME | End: 2024-01-31
Attending: EMERGENCY MEDICINE | Admitting: UROLOGY
Payer: COMMERCIAL

## 2024-01-30 DIAGNOSIS — N20.0 NEPHROLITHIASIS: ICD-10-CM

## 2024-01-30 DIAGNOSIS — N20.0 KIDNEY STONE ON LEFT SIDE: Primary | ICD-10-CM

## 2024-01-30 DIAGNOSIS — N20.1 LEFT URETERAL STONE: Primary | ICD-10-CM

## 2024-01-30 LAB
ALBUMIN SERPL BCP-MCNC: 3.8 G/DL (ref 3.5–5.2)
ALP SERPL-CCNC: 55 U/L (ref 55–135)
ALT SERPL W/O P-5'-P-CCNC: 16 U/L (ref 10–44)
ANION GAP SERPL CALC-SCNC: 6 MMOL/L (ref 8–16)
ANION GAP SERPL CALC-SCNC: 8 MMOL/L (ref 8–16)
AST SERPL-CCNC: 16 U/L (ref 10–40)
BACTERIA #/AREA URNS AUTO: ABNORMAL /HPF
BACTERIA UR CULT: NORMAL
BASOPHILS # BLD AUTO: 0.03 K/UL (ref 0–0.2)
BASOPHILS NFR BLD: 0.3 % (ref 0–1.9)
BILIRUB SERPL-MCNC: 2.3 MG/DL (ref 0.1–1)
BILIRUB UR QL STRIP: ABNORMAL
BUN SERPL-MCNC: 19 MG/DL (ref 6–20)
BUN SERPL-MCNC: 19 MG/DL (ref 6–20)
CALCIUM SERPL-MCNC: 8.8 MG/DL (ref 8.7–10.5)
CALCIUM SERPL-MCNC: 9.3 MG/DL (ref 8.7–10.5)
CHLORIDE SERPL-SCNC: 106 MMOL/L (ref 95–110)
CHLORIDE SERPL-SCNC: 107 MMOL/L (ref 95–110)
CLARITY UR REFRACT.AUTO: CLEAR
CO2 SERPL-SCNC: 21 MMOL/L (ref 23–29)
CO2 SERPL-SCNC: 25 MMOL/L (ref 23–29)
COLOR UR AUTO: ABNORMAL
CREAT SERPL-MCNC: 1.7 MG/DL (ref 0.5–1.4)
CREAT SERPL-MCNC: 1.7 MG/DL (ref 0.5–1.4)
DIFFERENTIAL METHOD BLD: ABNORMAL
EOSINOPHIL # BLD AUTO: 0.1 K/UL (ref 0–0.5)
EOSINOPHIL NFR BLD: 0.9 % (ref 0–8)
ERYTHROCYTE [DISTWIDTH] IN BLOOD BY AUTOMATED COUNT: 12 % (ref 11.5–14.5)
EST. GFR  (NO RACE VARIABLE): 47.9 ML/MIN/1.73 M^2
EST. GFR  (NO RACE VARIABLE): 47.9 ML/MIN/1.73 M^2
GLUCOSE SERPL-MCNC: 132 MG/DL (ref 70–110)
GLUCOSE SERPL-MCNC: 139 MG/DL (ref 70–110)
GLUCOSE SERPL-MCNC: 96 MG/DL (ref 70–110)
GLUCOSE UR QL STRIP: ABNORMAL
HCT VFR BLD AUTO: 51.4 % (ref 40–54)
HGB BLD-MCNC: 18.1 G/DL (ref 14–18)
HGB UR QL STRIP: ABNORMAL
HYALINE CASTS UR QL AUTO: 0 /LPF
IMM GRANULOCYTES # BLD AUTO: 0.03 K/UL (ref 0–0.04)
IMM GRANULOCYTES NFR BLD AUTO: 0.3 % (ref 0–0.5)
KETONES UR QL STRIP: ABNORMAL
LEUKOCYTE ESTERASE UR QL STRIP: NEGATIVE
LYMPHOCYTES # BLD AUTO: 0.6 K/UL (ref 1–4.8)
LYMPHOCYTES NFR BLD: 6.4 % (ref 18–48)
MCH RBC QN AUTO: 30.4 PG (ref 27–31)
MCHC RBC AUTO-ENTMCNC: 35.2 G/DL (ref 32–36)
MCV RBC AUTO: 86 FL (ref 82–98)
MICROSCOPIC COMMENT: ABNORMAL
MONOCYTES # BLD AUTO: 0.8 K/UL (ref 0.3–1)
MONOCYTES NFR BLD: 8.9 % (ref 4–15)
NEUTROPHILS # BLD AUTO: 7.8 K/UL (ref 1.8–7.7)
NEUTROPHILS NFR BLD: 83.2 % (ref 38–73)
NITRITE UR QL STRIP: NEGATIVE
NRBC BLD-RTO: 0 /100 WBC
PH UR STRIP: 6 [PH] (ref 5–8)
PLATELET # BLD AUTO: 212 K/UL (ref 150–450)
PMV BLD AUTO: 8.9 FL (ref 9.2–12.9)
POCT GLUCOSE: 139 MG/DL (ref 70–110)
POTASSIUM SERPL-SCNC: 4 MMOL/L (ref 3.5–5.1)
POTASSIUM SERPL-SCNC: 4.1 MMOL/L (ref 3.5–5.1)
PROT SERPL-MCNC: 7.4 G/DL (ref 6–8.4)
PROT UR QL STRIP: ABNORMAL
RBC # BLD AUTO: 5.96 M/UL (ref 4.6–6.2)
RBC #/AREA URNS AUTO: 5 /HPF (ref 0–4)
SODIUM SERPL-SCNC: 135 MMOL/L (ref 136–145)
SODIUM SERPL-SCNC: 138 MMOL/L (ref 136–145)
SP GR UR STRIP: >1.03 (ref 1–1.03)
SQUAMOUS #/AREA URNS AUTO: 0 /HPF
URN SPEC COLLECT METH UR: ABNORMAL
WBC # BLD AUTO: 9.36 K/UL (ref 3.9–12.7)
WBC #/AREA URNS AUTO: 5 /HPF (ref 0–5)

## 2024-01-30 PROCEDURE — 96374 THER/PROPH/DIAG INJ IV PUSH: CPT

## 2024-01-30 PROCEDURE — 25000003 PHARM REV CODE 250

## 2024-01-30 PROCEDURE — 81001 URINALYSIS AUTO W/SCOPE: CPT

## 2024-01-30 PROCEDURE — 80053 COMPREHEN METABOLIC PANEL: CPT

## 2024-01-30 PROCEDURE — 82962 GLUCOSE BLOOD TEST: CPT

## 2024-01-30 PROCEDURE — 96375 TX/PRO/DX INJ NEW DRUG ADDON: CPT

## 2024-01-30 PROCEDURE — 99285 EMERGENCY DEPT VISIT HI MDM: CPT

## 2024-01-30 PROCEDURE — 99223 1ST HOSP IP/OBS HIGH 75: CPT | Mod: AI,,, | Performed by: UROLOGY

## 2024-01-30 PROCEDURE — G0378 HOSPITAL OBSERVATION PER HR: HCPCS

## 2024-01-30 PROCEDURE — 63600175 PHARM REV CODE 636 W HCPCS

## 2024-01-30 PROCEDURE — 80048 BASIC METABOLIC PNL TOTAL CA: CPT | Mod: XB

## 2024-01-30 PROCEDURE — 96376 TX/PRO/DX INJ SAME DRUG ADON: CPT

## 2024-01-30 PROCEDURE — 85025 COMPLETE CBC W/AUTO DIFF WBC: CPT

## 2024-01-30 PROCEDURE — 96361 HYDRATE IV INFUSION ADD-ON: CPT

## 2024-01-30 RX ORDER — MORPHINE SULFATE 4 MG/ML
4 INJECTION, SOLUTION INTRAMUSCULAR; INTRAVENOUS
Status: COMPLETED | OUTPATIENT
Start: 2024-01-30 | End: 2024-01-30

## 2024-01-30 RX ORDER — MORPHINE SULFATE 2 MG/ML
6 INJECTION, SOLUTION INTRAMUSCULAR; INTRAVENOUS
Status: COMPLETED | OUTPATIENT
Start: 2024-01-30 | End: 2024-01-30

## 2024-01-30 RX ORDER — TAMSULOSIN HYDROCHLORIDE 0.4 MG/1
0.8 CAPSULE ORAL
Status: COMPLETED | OUTPATIENT
Start: 2024-01-30 | End: 2024-01-30

## 2024-01-30 RX ORDER — TAMSULOSIN HYDROCHLORIDE 0.4 MG/1
0.4 CAPSULE ORAL NIGHTLY
Status: DISCONTINUED | OUTPATIENT
Start: 2024-01-30 | End: 2024-01-31 | Stop reason: HOSPADM

## 2024-01-30 RX ORDER — ONDANSETRON HYDROCHLORIDE 2 MG/ML
4 INJECTION, SOLUTION INTRAVENOUS
Status: COMPLETED | OUTPATIENT
Start: 2024-01-30 | End: 2024-01-30

## 2024-01-30 RX ORDER — ONDANSETRON HYDROCHLORIDE 2 MG/ML
4 INJECTION, SOLUTION INTRAVENOUS EVERY 6 HOURS PRN
Status: DISCONTINUED | OUTPATIENT
Start: 2024-01-30 | End: 2024-01-31 | Stop reason: HOSPADM

## 2024-01-30 RX ORDER — SODIUM CHLORIDE 0.9 % (FLUSH) 0.9 %
10 SYRINGE (ML) INJECTION
Status: DISCONTINUED | OUTPATIENT
Start: 2024-01-30 | End: 2024-01-31 | Stop reason: HOSPADM

## 2024-01-30 RX ORDER — OXYCODONE HYDROCHLORIDE 5 MG/1
5 TABLET ORAL EVERY 4 HOURS PRN
Status: DISCONTINUED | OUTPATIENT
Start: 2024-01-30 | End: 2024-01-31 | Stop reason: HOSPADM

## 2024-01-30 RX ORDER — HYDROMORPHONE HYDROCHLORIDE 1 MG/ML
0.2 INJECTION, SOLUTION INTRAMUSCULAR; INTRAVENOUS; SUBCUTANEOUS
Status: DISCONTINUED | OUTPATIENT
Start: 2024-01-30 | End: 2024-01-31 | Stop reason: HOSPADM

## 2024-01-30 RX ORDER — PROCHLORPERAZINE EDISYLATE 5 MG/ML
5 INJECTION INTRAMUSCULAR; INTRAVENOUS EVERY 6 HOURS PRN
Status: DISCONTINUED | OUTPATIENT
Start: 2024-01-30 | End: 2024-01-31 | Stop reason: HOSPADM

## 2024-01-30 RX ORDER — OXYCODONE HYDROCHLORIDE 5 MG/1
10 TABLET ORAL EVERY 4 HOURS PRN
Status: DISCONTINUED | OUTPATIENT
Start: 2024-01-30 | End: 2024-01-31 | Stop reason: HOSPADM

## 2024-01-30 RX ORDER — KETOROLAC TROMETHAMINE 30 MG/ML
15 INJECTION, SOLUTION INTRAMUSCULAR; INTRAVENOUS
Status: COMPLETED | OUTPATIENT
Start: 2024-01-30 | End: 2024-01-30

## 2024-01-30 RX ORDER — SODIUM CHLORIDE, SODIUM LACTATE, POTASSIUM CHLORIDE, CALCIUM CHLORIDE 600; 310; 30; 20 MG/100ML; MG/100ML; MG/100ML; MG/100ML
INJECTION, SOLUTION INTRAVENOUS CONTINUOUS
Status: DISCONTINUED | OUTPATIENT
Start: 2024-01-30 | End: 2024-01-31 | Stop reason: HOSPADM

## 2024-01-30 RX ORDER — SODIUM CHLORIDE, SODIUM LACTATE, POTASSIUM CHLORIDE, CALCIUM CHLORIDE 600; 310; 30; 20 MG/100ML; MG/100ML; MG/100ML; MG/100ML
1000 INJECTION, SOLUTION INTRAVENOUS
Status: COMPLETED | OUTPATIENT
Start: 2024-01-30 | End: 2024-01-30

## 2024-01-30 RX ADMIN — HYDROMORPHONE HYDROCHLORIDE 0.2 MG: 1 INJECTION, SOLUTION INTRAMUSCULAR; INTRAVENOUS; SUBCUTANEOUS at 07:01

## 2024-01-30 RX ADMIN — ONDANSETRON 4 MG: 2 INJECTION INTRAMUSCULAR; INTRAVENOUS at 01:01

## 2024-01-30 RX ADMIN — TAMSULOSIN HYDROCHLORIDE 0.4 MG: 0.4 CAPSULE ORAL at 09:01

## 2024-01-30 RX ADMIN — OXYCODONE HYDROCHLORIDE 5 MG: 5 TABLET ORAL at 12:01

## 2024-01-30 RX ADMIN — SODIUM CHLORIDE, POTASSIUM CHLORIDE, SODIUM LACTATE AND CALCIUM CHLORIDE: 600; 310; 30; 20 INJECTION, SOLUTION INTRAVENOUS at 08:01

## 2024-01-30 RX ADMIN — SODIUM CHLORIDE, POTASSIUM CHLORIDE, SODIUM LACTATE AND CALCIUM CHLORIDE: 600; 310; 30; 20 INJECTION, SOLUTION INTRAVENOUS at 02:01

## 2024-01-30 RX ADMIN — TAMSULOSIN HYDROCHLORIDE 0.8 MG: 0.4 CAPSULE ORAL at 05:01

## 2024-01-30 RX ADMIN — SODIUM CHLORIDE, POTASSIUM CHLORIDE, SODIUM LACTATE AND CALCIUM CHLORIDE 1000 ML: 600; 310; 30; 20 INJECTION, SOLUTION INTRAVENOUS at 02:01

## 2024-01-30 RX ADMIN — ONDANSETRON 4 MG: 2 INJECTION INTRAMUSCULAR; INTRAVENOUS at 02:01

## 2024-01-30 RX ADMIN — SODIUM CHLORIDE, POTASSIUM CHLORIDE, SODIUM LACTATE AND CALCIUM CHLORIDE 1000 ML: 600; 310; 30; 20 INJECTION, SOLUTION INTRAVENOUS at 05:01

## 2024-01-30 RX ADMIN — MORPHINE SULFATE 6 MG: 2 INJECTION, SOLUTION INTRAMUSCULAR; INTRAVENOUS at 02:01

## 2024-01-30 RX ADMIN — OXYCODONE HYDROCHLORIDE 10 MG: 10 TABLET ORAL at 06:01

## 2024-01-30 RX ADMIN — HYDROMORPHONE HYDROCHLORIDE 0.2 MG: 1 INJECTION, SOLUTION INTRAMUSCULAR; INTRAVENOUS; SUBCUTANEOUS at 01:01

## 2024-01-30 RX ADMIN — MORPHINE SULFATE 4 MG: 4 INJECTION INTRAVENOUS at 04:01

## 2024-01-30 RX ADMIN — KETOROLAC TROMETHAMINE 15 MG: 30 INJECTION, SOLUTION INTRAMUSCULAR; INTRAVENOUS at 02:01

## 2024-01-30 NOTE — SUBJECTIVE & OBJECTIVE
Past Medical History:   Diagnosis Date    Arthritis     Asthma     AS A CHILD    Colon cancer     Family hx of prostate cancer 11/22/2016    Hx of colon cancer, stage I 07/03/2014    Parada syndrome     Squamous cell carcinoma of skin     Tear of labium 10/2020    left shoulder Dr Molina    Uncontrolled type 2 diabetes mellitus with hyperglycemia 03/04/2021    Vitamin D deficiency     Wears glasses        Past Surgical History:   Procedure Laterality Date    APPENDECTOMY      APPLICATION OF CARTILAGE GRAFT N/A 3/21/2023    Procedure: APPLICATION, CARTILAGE GRAFT;  Surgeon: Alyx Spivey MD;  Location: [facility] 2ND FLR;  Service: ENT;  Laterality: N/A;    CAUDAL EPIDURAL STEROID INJECTION N/A 11/6/2018    Procedure: Injection-steroid-epidural-caudal;  Surgeon: Lyndon Brooke Jr., MD;  Location: [facility];  Service: Pain Management;  Laterality: N/A;    COLON SURGERY  2008    PARTIAL COLECTOMY    COLONOSCOPY Bilateral 2012    COLONOSCOPY N/A 8/1/2016    Procedure: COLONOSCOPY;  Surgeon: Blaze Marr MD;  Location: [facility];  Service: Endoscopy;  Laterality: N/A;    COLONOSCOPY N/A 9/20/2017    Procedure: COLONOSCOPY;  Surgeon: Dom Leonardo MD;  Location: [facility] (4TH FLR);  Service: Endoscopy;  Laterality: N/A;  not given PM prep    COLONOSCOPY N/A 10/9/2017    Procedure: COLONOSCOPY;  Surgeon: RAMON Callahan MD;  Location: [facility] (4TH FLR);  Service: Endoscopy;  Laterality: N/A;  ok for Prepopik per Dr Callahan    COLONOSCOPY N/A 11/20/2017    Procedure: COLONOSCOPY/EMR;  Surgeon: Joseluis Choe MD;  Location: [facility] (2ND FLR);  Service: Endoscopy;  Laterality: N/A;  EMR    no pm prep    COLONOSCOPY N/A 5/30/2018    Procedure: COLONOSCOPY;  Surgeon: Dom Leonardo MD;  Location: [facility] (4TH FLR);  Service: Endoscopy;  Laterality: N/A;  follow up colonoscopy in 5/2018 for Parada Syndrome         COLONOSCOPY N/A 6/10/2019    Procedure: COLONOSCOPY;  Surgeon: Dom Leonardo MD;  Location: [facility] (4TH F[facility]);  Service: Endoscopy;  Laterality: N/A;  Prepopik ordered per Dr. Leonardo    COLONOSCOPY N/A 7/22/2020    Procedure: COLONOSCOPY;  Surgeon: Dom Leonardo MD;  [facility]  Service: Endoscopy;  Laterality: N/A;    COLONOSCOPY N/A 5/27/2021    Procedure: COLONOSCOPY;  Surgeon: Dom Leonardo MD;  [facility]  Service: Endoscopy;  Laterality: N/A;  covid test 5/24-[place]  pt requests Prepopik    COLONOSCOPY N/A 6/17/2022    Procedure: COLONOSCOPY;  Surgeon: Dom Leonardo MD;  [facility]  Service: Endoscopy;  Laterality: N/A;  He was discovered to have colon cancer and had right hemicolectomy        for stage II on 08/08/2008. MSH2 Parada syndrome.  sutab requested  instructions via the portal -sm  fully vaccinated - sm    COLONOSCOPY N/A 6/22/2023    Procedure: COLONOSCOPY;  Surgeon: Dom Leonardo MD;  [facility]  Service: Endoscopy;  Laterality: N/A;  see telephone encounter dated 5/24/23/ Pt/ Pt wife requested sutab - prep ins. on portal / Ozempic for DM- ERW    CYSTOSCOPY      Epidural Steroid Injection  10/23/15    Lumbar    EPIDURAL STEROID INJECTION INTO LUMBAR SPINE N/A 7/27/2018    Procedure: Injection-steroid-epidural-lumbar;  Surgeon: Lyndon Brooke Jr., MD;  Location: [facility] OR;  Service: Pain Management;  Laterality: N/A;    ESOPHAGOGASTRODUODENOSCOPY      ESOPHAGOGASTRODUODENOSCOPY N/A 7/22/2020    Procedure: EGD (ESOPHAGOGASTRODUODENOSCOPY);  Surgeon: Dom Leonardo MD;  [facility]  Service: Endoscopy;  Laterality: N/A;  Please schedule patient for EGD and colonoscopy with me MSH2 Parada syndrome and anemia evaluation please make priority 1  covid test 7/20-[place]    ESOPHAGOGASTRODUODENOSCOPY N/A 6/22/2023    Procedure: EGD (ESOPHAGOGASTRODUODENOSCOPY);  Surgeon: Dom Leonardo MD;  Location: [facility] ([facility]);  Service: Endoscopy;  Laterality: N/A;  see telephone  encounter dated 5/24/23 6/16/23-[place] completed appointment confirmed-    ESOPHAGOGASTRODUODENOSCOPY N/A 9/1/2023    Procedure: EGD (ESOPHAGOGASTRODUODENOSCOPY);  Surgeon: Dom Leonardo MD;  Location: [facility] (4TH FLR);  Service: Endoscopy;  Laterality: N/A;  Instructions sent via portal / Ozempic / Extended Clear Liquid prep    EXCISION OR SURGICAL PLANING, SKIN, NOSE, FOR RHINOPHYMA Bilateral 3/7/2023    Procedure: EXCISION OR SURGICAL resection nasal skin cancer;  Surgeon: Alyx Spivey MD;  Location: [facility] OR 2ND FLR;  Service: ENT;  Laterality: Bilateral;    FACETECTOMY OF VERTEBRA  2/13/2019    Procedure: MEDIAL FACETECTOMY L5-S1;  Surgeon: Lyndon Brooke Jr., MD;  Location: [facility] OR;  Service: Orthopedics;;    GASTRIC BYPASS  2010    SLEEVE    KNEE ARTHROSCOPY Right     LUMBAR DISCECTOMY  2/13/2019    Procedure: DISCECTOMY, SPINE, LUMBAR L5-S1;  Surgeon: Lyndon Brooke Jr., MD;  Location: [facility] OR;  Service: Orthopedics;;    NASAL RECONSTRUCTION N/A 3/21/2023    Procedure: RECONSTRUCTION, NOSE;  Surgeon: Alyx Spivey MD;  Location: [facility] OR 2ND FLR;  Service: ENT;  Laterality: N/A;    REVISION OF FLAP GRAFT Bilateral 4/18/2023    Procedure: REVISION, PROCEDURE INVOLVING FLAP GRAFT;  Surgeon: Alyx Spivey MD;  Location: [facility] OR;  Service: ENT;  Laterality: Bilateral;    REVISION OF FLAP GRAFT N/A 6/29/2023    Procedure: REVISION, PROCEDURE INVOLVING FLAP GRAFT;  Surgeon: Alyx Spivey MD;  Location: [facility] OR 2ND FLR;  Service: ENT;  Laterality: N/A;    REVISION OF FLAP GRAFT N/A 8/29/2023    Procedure: REVISION, PROCEDURE INVOLVING FLAP GRAFT;  Surgeon: Alyx Spivey MD;  Location: [facility] OR;  Service: ENT;  Laterality: N/A;    ROTATION FLAP SURGERY N/A 3/21/2023    Procedure: CREATION, FLAP, ROTATION;  Surgeon: Alyx Spivey MD;  Location: [facility] OR 2ND FLR;  Service: ENT;  Laterality: N/A;       Review of patient's allergies indicates:  No Known Allergies    Family History        Problem Relation (Age of Onset)    Alcohol abuse Paternal Uncle    Basal cell carcinoma Father    Breast cancer Mother    Cancer Mother, Maternal Uncle, Paternal Uncle, Maternal Grandfather    Colon cancer Maternal Uncle, Maternal Grandfather    Dementia Maternal Grandmother    Diabetes Father, Paternal Grandmother    Early death Maternal Grandfather    Hearing loss Father    Heart disease Father, Maternal Uncle    Hypertension Maternal Uncle, Paternal Grandfather    Liver disease Father    Melanoma Mother    No Known Problems Sister, Paternal Aunt    Polycystic ovary syndrome Daughter    Prostate cancer Paternal Uncle    Prostatitis Paternal Grandfather            Tobacco Use    Smoking status: Never    Smokeless tobacco: Never   Substance and Sexual Activity    Alcohol use: Yes     Comment: RARELY    Drug use: No    Sexual activity: Yes     Partners: Female       Review of Systems  See HPI  Objective:     Temp:  [98.2 °F (36.8 °C)-98.5 °F (36.9 °C)] 98.2 °F (36.8 °C)  Pulse:  [] 84  Resp:  [16-18] 16  SpO2:  [95 %-97 %] 97 %  BP: (150-173)/() 151/83     There is no height or weight on file to calculate BMI.           Drains       None                    Physical Exam  Constitutional:       General: He is not in acute distress.     Appearance: Normal appearance. He is not ill-appearing.   HENT:      Head: Normocephalic and atraumatic.      Mouth/Throat:      Mouth: Mucous membranes are moist.   Eyes:      Extraocular Movements: Extraocular movements intact.   Cardiovascular:      Rate and Rhythm: Normal rate.   Pulmonary:      Effort: Pulmonary effort is normal.   Abdominal:      Palpations: Abdomen is soft.      Tenderness: There is no right CVA tenderness or left CVA tenderness.   Musculoskeletal:      Cervical back: Normal range of motion.   Skin:     General: Skin is warm and dry.   Neurological:      Mental Status: He is alert and oriented to person, place, and time.   Psychiatric:          Mood and Affect: Mood normal.         Behavior: Behavior normal.          Significant Labs:    BMP:  Recent Labs   Lab 01/26/24 0315 01/30/24 0209    135*   K 4.1 4.0    106   CO2 22* 21*   BUN 17 19   CREATININE 1.5* 1.7*   CALCIUM 9.0 9.3       CBC:  Recent Labs   Lab 01/26/24 0315 01/30/24 0209   WBC 8.97 9.36   HGB 18.6* 18.1*   HCT 53.0 51.4    212       Urine Studies:   Recent Labs   Lab 01/26/24  0227 01/30/24  0217   COLORU Yellow [place]*   APPEARANCEUA Hazy* Clear   PHUR 6.0 6.0   SPECGRAV 1.025 >1.030*   PROTEINUA Trace* 1+*   GLUCUA Negative 2+*   KETONESU Trace* 3+*   BILIRUBINUA Negative 1+*   OCCULTUA 3+* 1+*   NITRITE Negative Negative   UROBILINOGEN Negative  --    LEUKOCYTESUR Negative Negative   RBCUA >100* 5*   WBCUA 1 5   BACTERIA Negative Rare   SQUAMEPITHEL 0 0   HYALINECASTS 0 0     All pertinent labs results from the past 24 hours have been reviewed.    Significant Imaging:  All pertinent imaging results/findings from the past 24 hours have been reviewed.

## 2024-01-30 NOTE — PROGRESS NOTES
"Urology - [facility]  Clinic Note    SUBJECTIVE:     Chief Complaint: follow up    History of Present Illness:  Lyndon Lion Jr. is a 53 y.o. male who presents to clinic for follow up. He is established to our clinic.   evaluation of 2mm left distal ureteral stones.   Has flank pain. Was severe. Doesn't think he can try to pass it.   No fevers. No dysuria.     He has h/o Parada Syndrome.   Recent skin cancer.   Recently diagnosed with diabetes.   Family hx of prostate cancer - grandfather  father had BPH    Recent back surgery.   He does snore. Doesn't stop breathing.     H/o colon cancer. Has frequent bowels.   2008 was the colon surgery.   He is on a surveillance protocol with Dr. Leonardo.     No ED.    Mother had breast cancer and skin cancer. Mom is 61.     Anticoagulation:  No    OBJECTIVE:     Estimated body mass index is 32.95 kg/m² as calculated from the following:    Height as of this encounter: 6' 1.5" (1.867 m).    Weight as of this encounter: 114.8 kg (253 lb 3.2 oz).    Vital Signs (Most Recent)  Pulse: 80 (24 1016)  BP: (!) 150/86 (24 1016)    Physical Exam  Vitals reviewed.   Pulmonary:      Effort: Pulmonary effort is normal.   Genitourinary:     Penis: Normal.          Lab Results   Component Value Date    BUN 19 2024    CREATININE 1.7 (H) 2024    WBC 9.36 2024    HGB 18.1 (H) 2024    HCT 51.4 2024     2024    AST 16 2024    ALT 16 2024    ALKPHOS 55 2024    ALBUMIN 3.8 2024    HGBA1C 5.6 2023        Lab Results   Component Value Date    PSA 0.77 2023    PSA 0.45 2019    PSA 0.49 2016    PSADIAG 0.42 2020    PSAFREE 0.20 2023    PSAFREE 0.20 2022    PSAFREE 0.19 2021    PSAFREEPCT 44.44 2023    PSAFREEPCT 50.00 2022    PSAFREEPCT 52.78 2021   PSA  - 0.9 22  PSA  - 0.4 22    Imagin24   Mild degree of left " hydronephrosis with moderate perinephric stranding/edema secondary to an obstructing 2 mm distal left ureteral calculus which is approximately 1 cm proximal to the ureterovesical junction.    Suggestion of mild degree of focal annular wall thickening and narrowing within the mid-distal transverse colon. While this could be associated with transient peristalsis, elective follow-up colonoscopy or barium enema evaluation would be recommended to exclude the possibility of true mass.    Right hemicolectomy changes with ileocolic anastomosis in the right upper quadrant.    Small hiatal hernia. Gastric sleeve postoperative changes.    Mildly enlarged prostate gland.  ASSESSMENT     1. Microhematuria    2. Nephrolithiasis    3. MSH2-related Parada syndrome (HNPCC1)    4. Parada syndrome    5. History of colon cancer, stage II      PLAN:   Lyndon was seen today for nephrolithiasis.    Diagnoses and all orders for this visit:    Microhematuria  -     Urine culture; Future  -     Urine culture  -     Urine culture    Nephrolithiasis    MSH2-related Parada syndrome (HNPCC1)    Parada syndrome    History of colon cancer, stage II    Other orders  -     ketorolac (TORADOL) 10 mg tablet; Take 1 tablet (10 mg total) by mouth every 6 (six) hours as needed for Pain.  -     oxyCODONE-acetaminophen (PERCOCET) 5-325 mg per tablet; Take 1 tablet by mouth every 4 (four) hours as needed for Pain.    Will schedule for ureteroscopy, urine culture.     Guerita Stovall MD

## 2024-01-30 NOTE — HPI
Lyndon Lion Jr is a 53 y.o. 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 He was seen by Dr. Stovall in clinic 01/30/2024 and although documentation is not yet available, patient reports he is being scheduled for surgical stone removal. Urology consulted for ureterolithiasis with intractable pain/nausea/vomiting.    CTRSS from 01/26/2024 mild left hydronephrosis with a 2 mm left distal ureteral stone near the UVJ.     On assessment, AF, mild tachycardia, hypertension. WBC 9, Hgb 18.1, Cr 1.7 (baseline 1.2). UA nitrite negative, 5 RBCs, 5 WBCs, rare bacteria.     He reports one episode of vomiting overnight and poor PO intake for the last 2 days. Also reports temp of 99.7 at home. Denies chills, hematuria, dysuria, frequency or urgency.

## 2024-01-30 NOTE — ASSESSMENT & PLAN NOTE
Lyndon Lino was a 53 y.o. male with a 2 mm left distal ureteral stone.    - Admit to urology observation  - mIVF  - Flomax  - PRN pain/nausea control  - strain all urine  - PM BMP  - Possible left ureteral stent tomorrow

## 2024-01-30 NOTE — ED TRIAGE NOTES
Lyndon Lion Jr., an 53 y.o. male presents to the ED with complaints of L sided abdominal and flank pain. Known hx of kidney stones. Pt states his provider told him to come to the ED if a fever developed. Pt in 7/10 pain.      Chief Complaint   Patient presents with    Fever     Patient with known obstructing stone in the urethra, and told to come to the ED for fever. Patient wife states patient with temp of 99.8 at home. Toradol at 9 pm. Percocet an hour prior to arrival, but vomited up. Supposed to have surgery to remove, but unsure when.     Review of patient's allergies indicates:  No Known Allergies  Past Medical History:   Diagnosis Date    Arthritis     Asthma     AS A CHILD    Colon cancer     Family hx of prostate cancer 11/22/2016    Hx of colon cancer, stage I 07/03/2014    Parada syndrome     Squamous cell carcinoma of skin     Tear of labium 10/2020    left shoulder Dr Molina    Uncontrolled type 2 diabetes mellitus with hyperglycemia 03/04/2021    Vitamin D deficiency     Wears glasses

## 2024-01-30 NOTE — ED NOTES
Assumed care for pt after recieving report from RONNELL Cormier. Pt resting in bed in NAD, RR e/u. Vital signs stable and within desired limits at this time of assessment. Pt offered bathroom assistance and denies need at this time. Explanation of care/wait provided. Pt verbalizes no needs at this time. Bed in low, locked position with rails up. Pt's wife @ bedside.    Patient identifiers for Lyndon Lion Jr. 53 y.o. male checked and correct.  Chief Complaint   Patient presents with    Fever     Patient with known obstructing stone in the urethra, and told to come to the ED for fever. Patient wife states patient with temp of 99.8 at home. Toradol at 9 pm. Percocet an hour prior to arrival, but vomited up. Supposed to have surgery to remove, but unsure when.       LOC: Patient is awake, alert, and aware of environment with an appropriate affect. Patient is oriented x 4 and speaking appropriately.  APPEARANCE: Patient resting comfortably and in no acute distress. Patient is clean and well groomed, patient's clothing is properly fastened.  HEENT: WDL  SKIN: The skin is warm and dry. Patient has normal skin turgor and moist mucus membranes.   MUSKULOSKELETAL: Patient is moving all extremities well, no obvious deformities noted. Pulses intact.   RESPIRATORY: Airway is open and patent. Respirations are spontaneous and non-labored with normal effort and rate. 97% on RA.  CARDIAC: Patient has a normal rate and rhythm 75. No peripheral edema noted.   ABDOMEN: No distention noted. Soft and non-tender upon palpation. Pt denies any left flank pain or abdominal at this time of assessment. Denies nausea at this time.  NEUROLOGICAL: pupils 3 mm, PERRL. Facial expression is symmetrical. Hand grasps are equal bilaterally. Normal sensation in all extremities when touched with finger.

## 2024-01-30 NOTE — ED PROVIDER NOTES
Encounter Date: 1/30/2024       History     Chief Complaint   Patient presents with    Fever     Patient with known obstructing stone in the urethra, and told to come to the ED for fever. Patient wife states patient with temp of 99.8 at home. Toradol at 9 pm. Percocet an hour prior to arrival, but vomited up. Supposed to have surgery to remove, but unsure when.     53M with PMHx of asthma, type 2 diabetes, colon cancer and renal stones presenting to [facility] ED with chief complaint of worsening abdominal pain, dysuria, hematuria and left flank pain.  Patient endorses nausea and vomiting and is unable to keep medications down.  He reports a increasing temperature at home however is afebrile.  Patient was recently seen you [facility] urology clinic yesterday reports that there was preoperative planning for renal stone management of the left ureter.    The history is provided by the patient, medical records and the spouse. No  was used.     Review of patient's allergies indicates:  No Known Allergies  Past Medical History:   Diagnosis Date    Arthritis     Asthma     AS A CHILD    Colon cancer     Family hx of prostate cancer 11/22/2016    Hx of colon cancer, stage I 07/03/2014    Parada syndrome     Squamous cell carcinoma of skin     Tear of labium 10/2020    left shoulder Dr Molina    Uncontrolled type 2 diabetes mellitus with hyperglycemia 03/04/2021    Vitamin D deficiency     Wears glasses      Past Surgical History:   Procedure Laterality Date    APPENDECTOMY      APPLICATION OF CARTILAGE GRAFT N/A 3/21/2023    Procedure: APPLICATION, CARTILAGE GRAFT;  Surgeon: Alyx Spivey MD;  Location: [address];  Service: ENT;  Laterality: N/A;    CAUDAL EPIDURAL STEROID INJECTION N/A 11/6/2018    Procedure: Injection-steroid-epidural-caudal;  Surgeon: Lyndon Brooke Jr., MD;  Location: [facility] OR;  Service: Pain Management;  Laterality: N/A;    COLON SURGERY  2008    PARTIAL COLECTOMY    COLONOSCOPY Bilateral  2012    COLONOSCOPY N/A 8/1/2016    Procedure: COLONOSCOPY;  Surgeon: Blaze Marr MD;  Location: [facility] ENDO;  Service: Endoscopy;  Laterality: N/A;    COLONOSCOPY N/A 9/20/2017    Procedure: COLONOSCOPY;  Surgeon: Dom Leonardo MD;  Location: [facility] ENDO (4TH FLR);  Service: Endoscopy;  Laterality: N/A;  not given PM prep    COLONOSCOPY N/A 10/9/2017    Procedure: COLONOSCOPY;  Surgeon: RAMON Callahan MD;  Location: [facility] ENDO (4TH FLR);  Service: Endoscopy;  Laterality: N/A;  ok for Prepopik per Dr Callahan    COLONOSCOPY N/A 11/20/2017    Procedure: COLONOSCOPY/EMR;  Surgeon: Joseluis Choe MD;  Location: [facility] ENDO (2ND FLR);  Service: Endoscopy;  Laterality: N/A;  EMR    no pm prep    COLONOSCOPY N/A 5/30/2018    Procedure: COLONOSCOPY;  Surgeon: Dom Leonardo MD;  Location: [facility] (4TH FLR);  Service: Endoscopy;  Laterality: N/A;  follow up colonoscopy in 5/2018 for Parada Syndrome         COLONOSCOPY N/A 6/10/2019    Procedure: COLONOSCOPY;  Surgeon: Dom Leonardo MD;  Location: [facility] (4TH FLR);  Service: Endoscopy;  Laterality: N/A;  Prepopik ordered per Dr. Leonardo    COLONOSCOPY N/A 7/22/2020    Procedure: COLONOSCOPY;  Surgeon: Dom Leonardo MD;  Location: [facility] (4TH FLR);  Service: Endoscopy;  Laterality: N/A;    COLONOSCOPY N/A 5/27/2021    Procedure: COLONOSCOPY;  Surgeon: Dom Leonardo MD;  Location: [facility] (4TH FLR);  Service: Endoscopy;  Laterality: N/A;  covid test 5/24-[place]  pt requests Prepopik    COLONOSCOPY N/A 6/17/2022    Procedure: COLONOSCOPY;  Surgeon: Dom Leonardo MD;  Location: [facility] (4TH FLR);  Service: Endoscopy;  Laterality: N/A;  He was discovered to have colon cancer and had right hemicolectomy        for stage II on 08/08/2008. MSH2 Parada syndrome.  sutab requested  instructions via the portal -sm  fully vaccinated - sm    COLONOSCOPY N/A 6/22/2023    Procedure: COLONOSCOPY;  Surgeon: Dom Leonardo MD;  Location: [address]  FLR);  Service: Endoscopy;  Laterality: N/A;  see telephone encounter dated 5/24/23/ Pt/ Pt wife requested sutab - prep ins. on portal / Ozempic for DM- ERW    CYSTOSCOPY      Epidural Steroid Injection  10/23/15    Lumbar    EPIDURAL STEROID INJECTION INTO LUMBAR SPINE N/A 7/27/2018    Procedure: Injection-steroid-epidural-lumbar;  Surgeon: Lyndon Brooke Jr., MD;  Location: [facility] OR;  Service: Pain Management;  Laterality: N/A;    ESOPHAGOGASTRODUODENOSCOPY      ESOPHAGOGASTRODUODENOSCOPY N/A 7/22/2020    Procedure: EGD (ESOPHAGOGASTRODUODENOSCOPY);  Surgeon: Dom Leonardo MD;  Location: [facility] (4TH FLR);  Service: Endoscopy;  Laterality: N/A;  Please schedule patient for EGD and colonoscopy with me MSH2 Parada syndrome and anemia evaluation please make priority 1  covid test 7/20-[place]    ESOPHAGOGASTRODUODENOSCOPY N/A 6/22/2023    Procedure: EGD (ESOPHAGOGASTRODUODENOSCOPY);  Surgeon: Dom Leonardo MD;  Location: [facility] (4TH FLR);  Service: Endoscopy;  Laterality: N/A;  see telephone encounter dated 5/24/23 6/16/23-Precall completed appointment confirmed-    ESOPHAGOGASTRODUODENOSCOPY N/A 9/1/2023    Procedure: EGD (ESOPHAGOGASTRODUODENOSCOPY);  Surgeon: Dom Leonardo MD;  Location: [facility] (4TH FLR);  Service: Endoscopy;  Laterality: N/A;  Instructions sent via portal / Ozempic / Extended Clear Liquid prep    EXCISION OR SURGICAL PLANING, SKIN, NOSE, FOR RHINOPHYMA Bilateral 3/7/2023    Procedure: EXCISION OR SURGICAL resection nasal skin cancer;  Surgeon: Alyx Spivey MD;  Location: [facility] 2ND FLR;  Service: ENT;  Laterality: Bilateral;    FACETECTOMY OF VERTEBRA  2/13/2019    Procedure: MEDIAL FACETECTOMY L5-S1;  Surgeon: Lyndon Brooke Jr., MD;  Location: [facility];  Service: Orthopedics;;    GASTRIC BYPASS  2010    SLEEVE    KNEE ARTHROSCOPY Right     LUMBAR DISCECTOMY  2/13/2019    Procedure: DISCECTOMY, SPINE, LUMBAR L5-S1;  Surgeon: Lyndon Brooke  MD Blanka;  Location: [facility] OR;  Service: Orthopedics;;    NASAL RECONSTRUCTION N/A 3/21/2023    Procedure: RECONSTRUCTION, NOSE;  Surgeon: Alyx Spivey MD;  Location: [facility] OR 2ND FLR;  Service: ENT;  Laterality: N/A;    REVISION OF FLAP GRAFT Bilateral 4/18/2023    Procedure: REVISION, PROCEDURE INVOLVING FLAP GRAFT;  Surgeon: Alyx Spivey MD;  Location: [facility] OR;  Service: ENT;  Laterality: Bilateral;    REVISION OF FLAP GRAFT N/A 6/29/2023    Procedure: REVISION, PROCEDURE INVOLVING FLAP GRAFT;  Surgeon: Alyx Spivey MD;  Location: [facility] OR 2ND FLR;  Service: ENT;  Laterality: N/A;    REVISION OF FLAP GRAFT N/A 8/29/2023    Procedure: REVISION, PROCEDURE INVOLVING FLAP GRAFT;  Surgeon: Alyx Spivey MD;  Location: [facility] OR;  Service: ENT;  Laterality: N/A;    ROTATION FLAP SURGERY N/A 3/21/2023    Procedure: CREATION, FLAP, ROTATION;  Surgeon: Alyx Spivey MD;  Location: [facility] OR 2ND FLR;  Service: ENT;  Laterality: N/A;     Family History   Problem Relation Age of Onset    Melanoma Mother     Cancer Mother         breast    Breast cancer Mother     Heart disease Father     Diabetes Father     Liver disease Father     Hearing loss Father     Basal cell carcinoma Father     No Known Problems Sister     Polycystic ovary syndrome Daughter     Cancer Maternal Uncle         colon    Colon cancer Maternal Uncle         x2    Heart disease Maternal Uncle     Hypertension Maternal Uncle     No Known Problems Paternal Aunt     Alcohol abuse Paternal Uncle     Prostate cancer Paternal Uncle     Cancer Paternal Uncle     Dementia Maternal Grandmother     Cancer Maternal Grandfather         colon ca    Colon cancer Maternal Grandfather     Early death Maternal Grandfather     Diabetes Paternal Grandmother     Hypertension Paternal Grandfather     Prostatitis Paternal Grandfather     Psoriasis Neg Hx     Lupus Neg Hx     Eczema Neg Hx      Social History     Tobacco Use    Smoking status: Never    Smokeless tobacco: Never    Substance Use Topics    Alcohol use: Yes     Comment: RARELY    Drug use: No     Review of Systems    Physical Exam     Initial Vitals [01/30/24 0125]   BP Pulse Resp Temp SpO2   (!) 173/103 103 18 98.5 °F (36.9 °C) 95 %      MAP       --         Physical Exam    Nursing note and vitals reviewed.  Constitutional: He appears well-developed and well-nourished.   Patient is ill-appearing but nontoxic or distressed.   HENT:   Head: Normocephalic and atraumatic.   Eyes: Conjunctivae and EOM are normal. Pupils are equal, round, and reactive to light.   Neck: Neck supple.   Normal range of motion.  Cardiovascular:  Normal rate, regular rhythm, normal heart sounds and intact distal pulses.           Pulmonary/Chest: Breath sounds normal.   Abdominal: Abdomen is soft. Bowel sounds are normal.   Left lower quadrant tenderness to palpation, nonrigid or peritonitic.   Musculoskeletal:         General: Normal range of motion.      Cervical back: Normal range of motion and neck supple.      Comments: Left CVA tenderness elicited on palpation.     Neurological: He is alert and oriented to person, place, and time. He has normal strength. GCS score is 15. GCS eye subscore is 4. GCS verbal subscore is 5. GCS motor subscore is 6.   Skin: Skin is warm and dry. Capillary refill takes less than 2 seconds.   Psychiatric: He has a normal mood and affect. His behavior is normal. Judgment and thought content normal.         ED Course   Procedures  Labs Reviewed   CBC W/ AUTO DIFFERENTIAL - Abnormal; Notable for the following components:       Result Value    Hemoglobin 18.1 (*)     MPV 8.9 (*)     Gran # (ANC) 7.8 (*)     Lymph # 0.6 (*)     Gran % 83.2 (*)     Lymph % 6.4 (*)     All other components within normal limits   COMPREHENSIVE METABOLIC PANEL - Abnormal; Notable for the following components:    Sodium 135 (*)     CO2 21 (*)     Glucose 132 (*)     Creatinine 1.7 (*)     Total Bilirubin 2.3 (*)     eGFR 47.9 (*)     All other  components within normal limits   URINALYSIS, REFLEX TO URINE CULTURE - Abnormal; Notable for the following components:    Color, UA Orange (*)     Specific Gravity, UA >1.030 (*)     Protein, UA 1+ (*)     Glucose, UA 2+ (*)     Ketones, UA 3+ (*)     Bilirubin (UA) 1+ (*)     Occult Blood UA 1+ (*)     All other components within normal limits    Narrative:     Specimen Source->Urine   URINALYSIS MICROSCOPIC - Abnormal; Notable for the following components:    RBC, UA 5 (*)     All other components within normal limits    Narrative:     Specimen Source->Urine   POCT GLUCOSE MONITORING CONTINUOUS - Abnormal; Notable for the following components:    POC Glucose 139 (*)     All other components within normal limits   POCT GLUCOSE - Abnormal; Notable for the following components:    POCT Glucose 139 (*)     All other components within normal limits     EKG Readings: (Independently Interpreted)   Initial Reading: No STEMI. Rhythm: Normal Sinus Rhythm. Ectopy: No Ectopy. Conduction: Normal. ST Segments: Normal ST Segments. T Waves: Normal. Clinical Impression: Normal Sinus Rhythm       Imaging Results    None          Medications   sodium chloride 0.9% flush 10 mL (has no administration in time range)   lactated ringers infusion (has no administration in time range)   oxyCODONE immediate release tablet 5 mg (has no administration in time range)   oxyCODONE immediate release tablet 10 mg (has no administration in time range)   HYDROmorphone injection 0.2 mg (has no administration in time range)   ondansetron injection 4 mg (has no administration in time range)   prochlorperazine injection Soln 5 mg (has no administration in time range)   tamsulosin 24 hr capsule 0.4 mg (has no administration in time range)   morphine injection 6 mg (6 mg Intravenous Given 1/30/24 0211)   ondansetron injection 4 mg (4 mg Intravenous Given 1/30/24 0212)   ketorolac injection 15 mg (15 mg Intravenous Given 1/30/24 0238)   lactated ringers  bolus 1,000 mL (0 mLs Intravenous Stopped 1/30/24 0026)   morphine injection 4 mg (4 mg Intravenous Given 1/30/24 5955)   tamsulosin 24 hr capsule 0.8 mg (0.8 mg Oral Given 1/30/24 6756)   lactated ringers infusion (1,000 mLs Intravenous New Bag 1/30/24 0556)     Medical Decision Making  53-year-old male presenting with left flank pain that radiates to the front abdomen 8/10 severity.  Associated nausea and vomiting with dysuria.  P.o. intolerance secondary to nausea and vomiting.  Lab studies demonstrating hematuria without evidence of infection.  Rising creatinine at 1.7.  CBC demonstrating no systemic signs of infection or inflammatory process no leukocytosis or left shift.  LR bolus given for rehydration.  Multimodal pain control with morphine, ketorolac and ondansetron with adequate pain control.  Discussed the case with urology who agreed to see the patient and adm servus. 125ml/hr LR given and Flomax as per Urology.   Patient was updated on the plan and was in agreement. VSS/HDS and afebrile on reassessment.     Amount and/or Complexity of Data Reviewed  External Data Reviewed: radiology.     Details: I reviewed the prior CT study from the 26th which was recent enough to not require additional study.  Labs: ordered. Decision-making details documented in ED Course.  ECG/medicine tests:  Decision-making details documented in ED Course.  Discussion of management or test interpretation with external provider(s): I discussed the case with urology who agreed to see the patient and evaluate the case.  Appreciate recs    Risk  Prescription drug management.  Parenteral controlled substances.                                      Clinical Impression:  Final diagnoses:  [N20.0] Kidney stone on left side (Primary)         ED Disposition Condition    Observation                 Carlie Vasquez MD  Resident  01/30/24 4715

## 2024-01-30 NOTE — CONSULTS
[facility]  Urology  Consult Note    Patient Name: Lyndon Lion Jr.  MRN: [id number]  Admission Date: 1/30/2024  Hospital Length of Stay: 0   Code Status: Prior   Attending Provider: Jasmine Moe Jr., *   Consulting Provider: Andres Pihllips MD  Primary Care Physician: Stepan Moran III, MD  Principal Problem:<principal problem not specified>    Inpatient consult to Urology  Consult performed by: Andres Phillips MD  Consult ordered by: Carlie Vasquez MD  Reason for consult: left ureteral stone          Subjective:     HPI:  Lyndon Lion Jr is a 53 y.o. male with history of parada syndrome and a known left ureteral stone who presented to the ED due to fever at home along with nausea/vomiting. He was seen by Dr. Stovall in clinic 01/30/2024 and although documentation is not yet available, patient reports he is being scheduled for surgical stone removal. Urology consulted for ureterolithiasis with intractable pain/nausea/vomiting.    CTRSS from 01/26/2024 mild left hydronephrosis with a 2 mm left distal ureteral stone near the UVJ.     On assessment, AF, mild tachycardia, hypertension. WBC 9, Hgb 18.1, Cr 1.7 (baseline 1.2). UA nitrite negative, 5 RBCs, 5 WBCs, rare bacteria.     He reports one episode of vomiting overnight and poor PO intake for the last 2 days. Also reports temp of 99.7 at home. Denies chills, hematuria, dysuria, frequency or urgency.      Past Medical History:   Diagnosis Date    Arthritis     Asthma     AS A CHILD    Colon cancer     Family hx of prostate cancer 11/22/2016    Hx of colon cancer, stage I 07/03/2014    Parada syndrome     Squamous cell carcinoma of skin     Tear of labium 10/2020    left shoulder Dr Molina    Uncontrolled type 2 diabetes mellitus with hyperglycemia 03/04/2021    Vitamin D deficiency     Wears glasses        Past Surgical History:   Procedure Laterality Date    APPENDECTOMY      APPLICATION OF CARTILAGE GRAFT N/A 3/21/2023     Procedure: APPLICATION, CARTILAGE GRAFT;  Surgeon: Alyx Spivey MD;  Location: [facility] OR 2ND FLR;  Service: ENT;  Laterality: N/A;    CAUDAL EPIDURAL STEROID INJECTION N/A 11/6/2018    Procedure: Injection-steroid-epidural-caudal;  Surgeon: Lyndon Brooke Jr., MD;  Location: [facility] OR;  Service: Pain Management;  Laterality: N/A;    COLON SURGERY  2008    PARTIAL COLECTOMY    COLONOSCOPY Bilateral 2012    COLONOSCOPY N/A 8/1/2016    Procedure: COLONOSCOPY;  Surgeon: Blaze Marr MD;  Location: [facility] ENDO;  Service: Endoscopy;  Laterality: N/A;    COLONOSCOPY N/A 9/20/2017    Procedure: COLONOSCOPY;  Surgeon: Dom Leonardo MD;  Location: [facility] (4TH FLR);  Service: Endoscopy;  Laterality: N/A;  not given PM prep    COLONOSCOPY N/A 10/9/2017    Procedure: COLONOSCOPY;  Surgeon: RAMON Callahan MD;  Location: [facility] (4TH FLR);  Service: Endoscopy;  Laterality: N/A;  ok for Prepopik per Dr Callahan    COLONOSCOPY N/A 11/20/2017    Procedure: COLONOSCOPY/EMR;  Surgeon: Joseluis Choe MD;  Location: [facility] (2ND FLR);  Service: Endoscopy;  Laterality: N/A;  EMR    no pm prep    COLONOSCOPY N/A 5/30/2018    Procedure: COLONOSCOPY;  Surgeon: Dom Leonardo MD;  Location: [facility] (4TH FLR);  Service: Endoscopy;  Laterality: N/A;  follow up colonoscopy in 5/2018 for Parada Syndrome         COLONOSCOPY N/A 6/10/2019    Procedure: COLONOSCOPY;  Surgeon: Dom Leonardo MD;  Location: [facility] (4TH FLR);  Service: Endoscopy;  Laterality: N/A;  Prepopik ordered per Dr. Leonardo    COLONOSCOPY N/A 7/22/2020    Procedure: COLONOSCOPY;  Surgeon: Dom Leonardo MD;  Location: [facility] (4TH FLR);  Service: Endoscopy;  Laterality: N/A;    COLONOSCOPY N/A 5/27/2021    Procedure: COLONOSCOPY;  Surgeon: Dom Leonardo MD;  Location: [facility] ENDO (4TH FLR);  Service: Endoscopy;  Laterality: N/A;  covid test 5/24-[place]  pt requests Prepopik    COLONOSCOPY N/A 6/17/2022    Procedure: COLONOSCOPY;  Surgeon: Dom  MEHUL Leonardo MD;  Location: [facility] (4TH FLR);  Service: Endoscopy;  Laterality: N/A;  He was discovered to have colon cancer and had right hemicolectomy        for stage II on 08/08/2008. MSH2 Parada syndrome.  sutab requested  instructions via the portal -sm  fully vaccinated - sm    COLONOSCOPY N/A 6/22/2023    Procedure: COLONOSCOPY;  Surgeon: Dom Leonardo MD;  Location: [facility] (4TH FLR);  Service: Endoscopy;  Laterality: N/A;  see telephone encounter dated 5/24/23/ Pt/ Pt wife requested sutab - prep ins. on portal / Ozempic for DM- ERW    CYSTOSCOPY      Epidural Steroid Injection  10/23/15    Lumbar    EPIDURAL STEROID INJECTION INTO LUMBAR SPINE N/A 7/27/2018    Procedure: Injection-steroid-epidural-lumbar;  Surgeon: Lyndon Brooke Jr., MD;  Location: [facility] OR;  Service: Pain Management;  Laterality: N/A;    ESOPHAGOGASTRODUODENOSCOPY      ESOPHAGOGASTRODUODENOSCOPY N/A 7/22/2020    Procedure: EGD (ESOPHAGOGASTRODUODENOSCOPY);  Surgeon: Dom Leonardo MD;  Location: [facility] (4TH FLR);  Service: Endoscopy;  Laterality: N/A;  Please schedule patient for EGD and colonoscopy with me MSH2 Parada syndrome and anemia evaluation please make priority 1  covid test 7/20-[place]    ESOPHAGOGASTRODUODENOSCOPY N/A 6/22/2023    Procedure: EGD (ESOPHAGOGASTRODUODENOSCOPY);  Surgeon: Dom Leonardo MD;  Location: [facility] (4TH FLR);  Service: Endoscopy;  Laterality: N/A;  see telephone encounter dated 5/24/23 6/16/23-[place] completed appointment confirmed-    ESOPHAGOGASTRODUODENOSCOPY N/A 9/1/2023    Procedure: EGD (ESOPHAGOGASTRODUODENOSCOPY);  Surgeon: Dom Leonardo MD;  Location: [facility] (4TH FLR);  Service: Endoscopy;  Laterality: N/A;  Instructions sent via portal / Ozempic / Extended Clear Liquid prep    EXCISION OR SURGICAL PLANING, SKIN, NOSE, FOR RHINOPHYMA Bilateral 3/7/2023    Procedure: EXCISION OR SURGICAL resection nasal skin cancer;  Surgeon: Alyx Spivey,  MD;  Location: [facility] OR 2ND FLR;  Service: ENT;  Laterality: Bilateral;    FACETECTOMY OF VERTEBRA  2/13/2019    Procedure: MEDIAL FACETECTOMY L5-S1;  Surgeon: Lyndon Brooke Jr., MD;  Location: [facility] OR;  Service: Orthopedics;;    GASTRIC BYPASS  2010    SLEEVE    KNEE ARTHROSCOPY Right     LUMBAR DISCECTOMY  2/13/2019    Procedure: DISCECTOMY, SPINE, LUMBAR L5-S1;  Surgeon: Lyndon Brooke Jr., MD;  Location: [facility] OR;  Service: Orthopedics;;    NASAL RECONSTRUCTION N/A 3/21/2023    Procedure: RECONSTRUCTION, NOSE;  Surgeon: Alyx Spivey MD;  Location: [facility] OR 2ND FLR;  Service: ENT;  Laterality: N/A;    REVISION OF FLAP GRAFT Bilateral 4/18/2023    Procedure: REVISION, PROCEDURE INVOLVING FLAP GRAFT;  Surgeon: Alyx Spivey MD;  Location: [facility] OR;  Service: ENT;  Laterality: Bilateral;    REVISION OF FLAP GRAFT N/A 6/29/2023    Procedure: REVISION, PROCEDURE INVOLVING FLAP GRAFT;  Surgeon: Alyx Spivey MD;  Location: [facility] OR 2ND FLR;  Service: ENT;  Laterality: N/A;    REVISION OF FLAP GRAFT N/A 8/29/2023    Procedure: REVISION, PROCEDURE INVOLVING FLAP GRAFT;  Surgeon: Alyx Spivey MD;  Location: [facility] OR;  Service: ENT;  Laterality: N/A;    ROTATION FLAP SURGERY N/A 3/21/2023    Procedure: CREATION, FLAP, ROTATION;  Surgeon: Alyx Spivey MD;  Location: [facility] OR 2ND FLR;  Service: ENT;  Laterality: N/A;       Review of patient's allergies indicates:  No Known Allergies    Family History       Problem Relation (Age of Onset)    Alcohol abuse Paternal Uncle    Basal cell carcinoma Father    Breast cancer Mother    Cancer Mother, Maternal Uncle, Paternal Uncle, Maternal Grandfather    Colon cancer Maternal Uncle, Maternal Grandfather    Dementia Maternal Grandmother    Diabetes Father, Paternal Grandmother    Early death Maternal Grandfather    Hearing loss Father    Heart disease Father, Maternal Uncle    Hypertension Maternal Uncle, Paternal Grandfather    Liver disease Father    Melanoma Mother    No Known  Problems Sister, Paternal Aunt    Polycystic ovary syndrome Daughter    Prostate cancer Paternal Uncle    Prostatitis Paternal Grandfather            Tobacco Use    Smoking status: Never    Smokeless tobacco: Never   Substance and Sexual Activity    Alcohol use: Yes     Comment: RARELY    Drug use: No    Sexual activity: Yes     Partners: Female       Review of Systems  See HPI  Objective:     Temp:  [98.2 °F (36.8 °C)-98.5 °F (36.9 °C)] 98.2 °F (36.8 °C)  Pulse:  [] 84  Resp:  [16-18] 16  SpO2:  [95 %-97 %] 97 %  BP: (150-173)/() 151/83     There is no height or weight on file to calculate BMI.           Drains       None                    Physical Exam  Constitutional:       General: He is not in acute distress.     Appearance: Normal appearance. He is not ill-appearing.   HENT:      Head: Normocephalic and atraumatic.      Mouth/Throat:      Mouth: Mucous membranes are moist.   Eyes:      Extraocular Movements: Extraocular movements intact.   Cardiovascular:      Rate and Rhythm: Normal rate.   Pulmonary:      Effort: Pulmonary effort is normal.   Abdominal:      Palpations: Abdomen is soft.      Tenderness: There is no right CVA tenderness or left CVA tenderness.   Musculoskeletal:      Cervical back: Normal range of motion.   Skin:     General: Skin is warm and dry.   Neurological:      Mental Status: He is alert and oriented to person, place, and time.   Psychiatric:         Mood and Affect: Mood normal.         Behavior: Behavior normal.          Significant Labs:    BMP:  Recent Labs   Lab 01/26/24 0315 01/30/24  0209    135*   K 4.1 4.0    106   CO2 22* 21*   BUN 17 19   CREATININE 1.5* 1.7*   CALCIUM 9.0 9.3       CBC:  Recent Labs   Lab 01/26/24 0315 01/30/24  0209   WBC 8.97 9.36   HGB 18.6* 18.1*   HCT 53.0 51.4    212       Urine Studies:   Recent Labs   Lab 01/26/24 0227 01/30/24 0217   COLORU Yellow [place]*   APPEARANCEUA Hazy* Clear   PHUR 6.0 6.0   SPECGRAV 1.025  >1.030*   PROTEINUA Trace* 1+*   GLUCUA Negative 2+*   KETONESU Trace* 3+*   BILIRUBINUA Negative 1+*   OCCULTUA 3+* 1+*   NITRITE Negative Negative   UROBILINOGEN Negative  --    LEUKOCYTESUR Negative Negative   RBCUA >100* 5*   WBCUA 1 5   BACTERIA Negative Rare   SQUAMEPITHEL 0 0   HYALINECASTS 0 0     All pertinent labs results from the past 24 hours have been reviewed.    Significant Imaging:  All pertinent imaging results/findings from the past 24 hours have been reviewed.                    Assessment and Plan:     Nephrolithiasis  Lyndon Lion was a 53 y.o. male with a 2 mm left distal ureteral stone.    - Admit to urology observation  - mIVF  - Flomax  - PRN pain/nausea control  - strain all urine  - PM BMP  - Possible left ureteral stent tomorrow          Andres Phillips MD  Urology  [facility]

## 2024-01-31 ENCOUNTER — ANESTHESIA (OUTPATIENT)
Dept: SURGERY | Facility: HOSPITAL | Age: 54
End: 2024-01-31
Payer: COMMERCIAL

## 2024-01-31 VITALS
SYSTOLIC BLOOD PRESSURE: 149 MMHG | RESPIRATION RATE: 19 BRPM | DIASTOLIC BLOOD PRESSURE: 79 MMHG | TEMPERATURE: 98 F | HEART RATE: 78 BPM | OXYGEN SATURATION: 94 %

## 2024-01-31 LAB
ANION GAP SERPL CALC-SCNC: 7 MMOL/L (ref 8–16)
BASOPHILS # BLD AUTO: 0.03 K/UL (ref 0–0.2)
BASOPHILS NFR BLD: 0.6 % (ref 0–1.9)
BUN SERPL-MCNC: 14 MG/DL (ref 6–20)
CALCIUM SERPL-MCNC: 7.6 MG/DL (ref 8.7–10.5)
CHLORIDE SERPL-SCNC: 109 MMOL/L (ref 95–110)
CO2 SERPL-SCNC: 20 MMOL/L (ref 23–29)
CREAT SERPL-MCNC: 1.1 MG/DL (ref 0.5–1.4)
DIFFERENTIAL METHOD BLD: ABNORMAL
EOSINOPHIL # BLD AUTO: 0.3 K/UL (ref 0–0.5)
EOSINOPHIL NFR BLD: 5 % (ref 0–8)
ERYTHROCYTE [DISTWIDTH] IN BLOOD BY AUTOMATED COUNT: 11.8 % (ref 11.5–14.5)
EST. GFR  (NO RACE VARIABLE): >60 ML/MIN/1.73 M^2
GLUCOSE SERPL-MCNC: 74 MG/DL (ref 70–110)
HCT VFR BLD AUTO: 42.6 % (ref 40–54)
HGB BLD-MCNC: 14.9 G/DL (ref 14–18)
IMM GRANULOCYTES # BLD AUTO: 0.02 K/UL (ref 0–0.04)
IMM GRANULOCYTES NFR BLD AUTO: 0.4 % (ref 0–0.5)
LYMPHOCYTES # BLD AUTO: 1.1 K/UL (ref 1–4.8)
LYMPHOCYTES NFR BLD: 21.2 % (ref 18–48)
MCH RBC QN AUTO: 30.9 PG (ref 27–31)
MCHC RBC AUTO-ENTMCNC: 35 G/DL (ref 32–36)
MCV RBC AUTO: 88 FL (ref 82–98)
MONOCYTES # BLD AUTO: 0.7 K/UL (ref 0.3–1)
MONOCYTES NFR BLD: 12.9 % (ref 4–15)
NEUTROPHILS # BLD AUTO: 3.1 K/UL (ref 1.8–7.7)
NEUTROPHILS NFR BLD: 59.9 % (ref 38–73)
NRBC BLD-RTO: 0 /100 WBC
PLATELET # BLD AUTO: 185 K/UL (ref 150–450)
PMV BLD AUTO: 8.9 FL (ref 9.2–12.9)
POCT GLUCOSE: 75 MG/DL (ref 70–110)
POTASSIUM SERPL-SCNC: 3.4 MMOL/L (ref 3.5–5.1)
RBC # BLD AUTO: 4.82 M/UL (ref 4.6–6.2)
SODIUM SERPL-SCNC: 136 MMOL/L (ref 136–145)
WBC # BLD AUTO: 5.2 K/UL (ref 3.9–12.7)

## 2024-01-31 PROCEDURE — 85025 COMPLETE CBC W/AUTO DIFF WBC: CPT

## 2024-01-31 PROCEDURE — 36000706: Performed by: UROLOGY

## 2024-01-31 PROCEDURE — 82962 GLUCOSE BLOOD TEST: CPT | Performed by: UROLOGY

## 2024-01-31 PROCEDURE — 74420 UROGRAPHY RTRGR +-KUB: CPT | Mod: 26,,, | Performed by: UROLOGY

## 2024-01-31 PROCEDURE — C2617 STENT, NON-COR, TEM W/O DEL: HCPCS | Performed by: UROLOGY

## 2024-01-31 PROCEDURE — 71000015 HC POSTOP RECOV 1ST HR: Performed by: UROLOGY

## 2024-01-31 PROCEDURE — C1769 GUIDE WIRE: HCPCS | Performed by: UROLOGY

## 2024-01-31 PROCEDURE — 63600175 PHARM REV CODE 636 W HCPCS

## 2024-01-31 PROCEDURE — 25000003 PHARM REV CODE 250: Performed by: NURSE ANESTHETIST, CERTIFIED REGISTERED

## 2024-01-31 PROCEDURE — 96376 TX/PRO/DX INJ SAME DRUG ADON: CPT

## 2024-01-31 PROCEDURE — 27201423 OPTIME MED/SURG SUP & DEVICES STERILE SUPPLY: Performed by: UROLOGY

## 2024-01-31 PROCEDURE — 52332 CYSTOSCOPY AND TREATMENT: CPT | Mod: 51,LT,, | Performed by: UROLOGY

## 2024-01-31 PROCEDURE — D9220A PRA ANESTHESIA: Mod: ANES,,, | Performed by: ANESTHESIOLOGY

## 2024-01-31 PROCEDURE — 63600175 PHARM REV CODE 636 W HCPCS: Performed by: NURSE ANESTHETIST, CERTIFIED REGISTERED

## 2024-01-31 PROCEDURE — D9220A PRA ANESTHESIA: Mod: CRNA,,, | Performed by: NURSE ANESTHETIST, CERTIFIED REGISTERED

## 2024-01-31 PROCEDURE — 37000009 HC ANESTHESIA EA ADD 15 MINS: Performed by: UROLOGY

## 2024-01-31 PROCEDURE — 25000003 PHARM REV CODE 250

## 2024-01-31 PROCEDURE — G0378 HOSPITAL OBSERVATION PER HR: HCPCS

## 2024-01-31 PROCEDURE — 25000003 PHARM REV CODE 250: Performed by: UROLOGY

## 2024-01-31 PROCEDURE — 82365 CALCULUS SPECTROSCOPY: CPT | Performed by: UROLOGY

## 2024-01-31 PROCEDURE — C1758 CATHETER, URETERAL: HCPCS | Performed by: UROLOGY

## 2024-01-31 PROCEDURE — 52352 CYSTOURETERO W/STONE REMOVE: CPT | Mod: LT,,, | Performed by: UROLOGY

## 2024-01-31 PROCEDURE — 25500020 PHARM REV CODE 255: Performed by: UROLOGY

## 2024-01-31 PROCEDURE — 36000707: Performed by: UROLOGY

## 2024-01-31 PROCEDURE — 37000008 HC ANESTHESIA 1ST 15 MINUTES: Performed by: UROLOGY

## 2024-01-31 PROCEDURE — 80048 BASIC METABOLIC PNL TOTAL CA: CPT

## 2024-01-31 PROCEDURE — 71000044 HC DOSC ROUTINE RECOVERY FIRST HOUR: Performed by: UROLOGY

## 2024-01-31 DEVICE — STENT URETERAL UNIV 6FR 30CM
Type: IMPLANTABLE DEVICE | Site: URETER | Status: NON-FUNCTIONAL
Removed: 2024-02-12

## 2024-01-31 RX ORDER — ACETAMINOPHEN 10 MG/ML
INJECTION, SOLUTION INTRAVENOUS
Status: DISCONTINUED | OUTPATIENT
Start: 2024-01-31 | End: 2024-01-31

## 2024-01-31 RX ORDER — CALCIUM CARBONATE 200(500)MG
1000 TABLET,CHEWABLE ORAL ONCE
Status: COMPLETED | OUTPATIENT
Start: 2024-01-31 | End: 2024-01-31

## 2024-01-31 RX ORDER — DEXAMETHASONE SODIUM PHOSPHATE 4 MG/ML
INJECTION, SOLUTION INTRA-ARTICULAR; INTRALESIONAL; INTRAMUSCULAR; INTRAVENOUS; SOFT TISSUE
Status: DISCONTINUED | OUTPATIENT
Start: 2024-01-31 | End: 2024-01-31

## 2024-01-31 RX ORDER — FENTANYL CITRATE 50 UG/ML
INJECTION, SOLUTION INTRAMUSCULAR; INTRAVENOUS
Status: DISCONTINUED | OUTPATIENT
Start: 2024-01-31 | End: 2024-01-31

## 2024-01-31 RX ORDER — PROCHLORPERAZINE EDISYLATE 5 MG/ML
5 INJECTION INTRAMUSCULAR; INTRAVENOUS EVERY 30 MIN PRN
Status: CANCELLED | OUTPATIENT
Start: 2024-01-31

## 2024-01-31 RX ORDER — OXYBUTYNIN CHLORIDE 5 MG/1
5 TABLET ORAL 3 TIMES DAILY
Qty: 30 TABLET | Refills: 0 | Status: SHIPPED | OUTPATIENT
Start: 2024-01-31 | End: 2024-02-19

## 2024-01-31 RX ORDER — POTASSIUM CHLORIDE 750 MG/1
10 CAPSULE, EXTENDED RELEASE ORAL ONCE
Status: COMPLETED | OUTPATIENT
Start: 2024-01-31 | End: 2024-01-31

## 2024-01-31 RX ORDER — CEFAZOLIN SODIUM 1 G/3ML
INJECTION, POWDER, FOR SOLUTION INTRAMUSCULAR; INTRAVENOUS
Status: DISCONTINUED | OUTPATIENT
Start: 2024-01-31 | End: 2024-01-31

## 2024-01-31 RX ORDER — MIDAZOLAM HYDROCHLORIDE 1 MG/ML
INJECTION, SOLUTION INTRAMUSCULAR; INTRAVENOUS
Status: DISCONTINUED | OUTPATIENT
Start: 2024-01-31 | End: 2024-01-31

## 2024-01-31 RX ORDER — IBUPROFEN 800 MG/1
800 TABLET ORAL 3 TIMES DAILY PRN
Qty: 30 TABLET | Refills: 0 | Status: SHIPPED | OUTPATIENT
Start: 2024-01-31 | End: 2024-06-12

## 2024-01-31 RX ORDER — LIDOCAINE HYDROCHLORIDE 20 MG/ML
INJECTION INTRAVENOUS
Status: DISCONTINUED | OUTPATIENT
Start: 2024-01-31 | End: 2024-01-31

## 2024-01-31 RX ORDER — PROPOFOL 10 MG/ML
VIAL (ML) INTRAVENOUS
Status: DISCONTINUED | OUTPATIENT
Start: 2024-01-31 | End: 2024-01-31

## 2024-01-31 RX ORDER — TAMSULOSIN HYDROCHLORIDE 0.4 MG/1
0.4 CAPSULE ORAL DAILY
Qty: 14 CAPSULE | Refills: 0 | Status: SHIPPED | OUTPATIENT
Start: 2024-01-31 | End: 2024-02-19

## 2024-01-31 RX ORDER — SODIUM CHLORIDE 9 MG/ML
INJECTION, SOLUTION INTRAVENOUS CONTINUOUS
Status: DISCONTINUED | OUTPATIENT
Start: 2024-01-31 | End: 2024-01-31 | Stop reason: HOSPADM

## 2024-01-31 RX ORDER — PHENAZOPYRIDINE HYDROCHLORIDE 100 MG/1
100 TABLET, FILM COATED ORAL 3 TIMES DAILY PRN
Qty: 30 TABLET | Refills: 0 | Status: SHIPPED | OUTPATIENT
Start: 2024-01-31 | End: 2024-02-10

## 2024-01-31 RX ORDER — HYDROMORPHONE HYDROCHLORIDE 1 MG/ML
0.2 INJECTION, SOLUTION INTRAMUSCULAR; INTRAVENOUS; SUBCUTANEOUS EVERY 5 MIN PRN
Status: CANCELLED | OUTPATIENT
Start: 2024-01-31

## 2024-01-31 RX ORDER — ONDANSETRON HYDROCHLORIDE 2 MG/ML
INJECTION, SOLUTION INTRAVENOUS
Status: DISCONTINUED | OUTPATIENT
Start: 2024-01-31 | End: 2024-01-31

## 2024-01-31 RX ORDER — ROCURONIUM BROMIDE 10 MG/ML
INJECTION, SOLUTION INTRAVENOUS
Status: DISCONTINUED | OUTPATIENT
Start: 2024-01-31 | End: 2024-01-31

## 2024-01-31 RX ORDER — ONDANSETRON HYDROCHLORIDE 2 MG/ML
4 INJECTION, SOLUTION INTRAVENOUS DAILY PRN
Status: CANCELLED | OUTPATIENT
Start: 2024-01-31

## 2024-01-31 RX ADMIN — ONDANSETRON 4 MG: 2 INJECTION INTRAMUSCULAR; INTRAVENOUS at 04:01

## 2024-01-31 RX ADMIN — CALCIUM CARBONATE (ANTACID) CHEW TAB 500 MG 1000 MG: 500 CHEW TAB at 07:01

## 2024-01-31 RX ADMIN — ACETAMINOPHEN 1000 MG: 10 INJECTION, SOLUTION INTRAVENOUS at 05:01

## 2024-01-31 RX ADMIN — FENTANYL CITRATE 100 MCG: 50 INJECTION, SOLUTION INTRAMUSCULAR; INTRAVENOUS at 04:01

## 2024-01-31 RX ADMIN — CEFAZOLIN 2 G: 330 INJECTION, POWDER, FOR SOLUTION INTRAMUSCULAR; INTRAVENOUS at 04:01

## 2024-01-31 RX ADMIN — OXYCODONE HYDROCHLORIDE 5 MG: 5 TABLET ORAL at 06:01

## 2024-01-31 RX ADMIN — ROCURONIUM BROMIDE 50 MG: 10 INJECTION, SOLUTION INTRAVENOUS at 04:01

## 2024-01-31 RX ADMIN — MIDAZOLAM HYDROCHLORIDE 2 MG: 1 INJECTION, SOLUTION INTRAMUSCULAR; INTRAVENOUS at 04:01

## 2024-01-31 RX ADMIN — HYDROMORPHONE HYDROCHLORIDE 0.2 MG: 1 INJECTION, SOLUTION INTRAMUSCULAR; INTRAVENOUS; SUBCUTANEOUS at 12:01

## 2024-01-31 RX ADMIN — OXYCODONE HYDROCHLORIDE 10 MG: 10 TABLET ORAL at 01:01

## 2024-01-31 RX ADMIN — LIDOCAINE HYDROCHLORIDE 100 MG: 20 INJECTION INTRAVENOUS at 04:01

## 2024-01-31 RX ADMIN — SUGAMMADEX 200 MG: 100 INJECTION, SOLUTION INTRAVENOUS at 05:01

## 2024-01-31 RX ADMIN — PROPOFOL 190 MG: 10 INJECTION, EMULSION INTRAVENOUS at 04:01

## 2024-01-31 RX ADMIN — POTASSIUM CHLORIDE 10 MEQ: 10 CAPSULE, COATED, EXTENDED RELEASE ORAL at 07:01

## 2024-01-31 RX ADMIN — SODIUM CHLORIDE: 0.9 INJECTION, SOLUTION INTRAVENOUS at 03:01

## 2024-01-31 RX ADMIN — SODIUM CHLORIDE: 9 INJECTION, SOLUTION INTRAVENOUS at 03:01

## 2024-01-31 RX ADMIN — DEXAMETHASONE SODIUM PHOSPHATE 4 MG: 4 INJECTION, SOLUTION INTRAMUSCULAR; INTRAVENOUS at 04:01

## 2024-01-31 RX ADMIN — FENTANYL CITRATE 50 MCG: 50 INJECTION, SOLUTION INTRAMUSCULAR; INTRAVENOUS at 04:01

## 2024-01-31 NOTE — ED NOTES
Nurses Note -- 4 Eyes      1/31/2024   7:53 AM      Skin assessed during: Q Shift Change      [x] No Altered Skin Integrity Present    []Prevention Measures Documented      [] Yes- Altered Skin Integrity Present or Discovered   [] LDA Added if Not in Epic (Describe Wound)   [] New Altered Skin Integrity was Present on Admit and Documented in LDA   [] Wound Image Taken    Wound Care Consulted? No    Attending Nurse:  Jesus Mack RN/Staff Member:  Courtney

## 2024-01-31 NOTE — OP NOTE
[facility] ([facility])  Urology Department  Operative Note    Date: 01/31/2024    Pre-Op Diagnosis:   Left ureteral stone  Patient Active Problem List    Diagnosis Date Noted    Nephrolithiasis 01/30/2024    Aspirin long-term use 12/05/2023    Type 2 diabetes mellitus without complication, without long-term current use of insulin 12/05/2023    History of colon resection 12/05/2023    Hyperlipidemia 12/05/2023    Acquired nasal deformity 07/03/2023    Obstruction of nasal valve 07/03/2023    Mohs defect 06/29/2023    Squamous cell cancer of skin of nasal tip 03/07/2023    Erectile dysfunction 06/25/2022    Hypogonadism male 06/25/2022    Vitamin A deficiency 03/28/2022    Vitamin B12 deficiency 03/23/2022    Hypophosphatemia 03/21/2022    Hypovitaminosis D 01/24/2022    Type 2 diabetes mellitus with hyperglycemia 04/23/2021    Obesity (BMI 30-39.9) 04/23/2021    Gastroesophageal reflux disease without esophagitis 04/23/2021    Bariatric surgery status 04/23/2021    S/P laparoscopic sleeve gastrectomy 04/23/2021    Uncontrolled type 2 diabetes mellitus with hyperglycemia 03/04/2021    History of colon cancer, stage II 09/10/2018    Lumbar radiculopathy 07/27/2018    Postpolypectomy electrocoagulation syndrome 11/21/2017    Colon adenoma 11/20/2017    Pneumoperitoneum 11/20/2017    Colon dysplasia 10/09/2017    Mild vitamin D deficiency 08/14/2017    Spondylolisthesis of lumbar region 05/18/2017    Fatty liver 05/18/2017    Splenomegaly 05/18/2017    Spondylosis of lumbar region without myelopathy or radiculopathy 12/16/2016    MSH2-related Parada syndrome (HNPCC1) 10/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  Left ureteroscopy  2.  Cystoscopy  3.  Left ureteral stone extraction  4.  Fluoro < 1 h    Specimen(s): stone for analysis    Staff Surgeon: Eron Llanes MD    Assistant Surgeon: Andres Phillips MD    Anesthesia: General endotracheal anesthesia    Indications: Lyndon Lion Jr. is a 53 y.o. male with a left ureteral stone, presenting for definitive stone management.  He currently does not have a JJ ureteral stent in place.      Findings:   Left distal ureteral stone basket extracted in its entirety  Left retrograde pyelogram without hydronephrosis or filling defects  Left ureteral stent without strings placed     1 baskets were used throughout the case.      Estimated Blood Loss: min    Drains: 6 Fr x 30 cm JJ ureteral stent without strings    Procedure in detail:  After informed consent was obtained, the patient was brought the the cystoscopy suite and placed in the supine position.  SCDs were applied and working.  Anesthesia was administered.  The patient was then placed in the dorsal lithotomy position and prepped and draped in the usual sterile fashion.      A rigid cystoscope in a 22 Fr sheath was introduced into the patient's urethra.  This passed easily.  The entire urethra was visualized which showed no strictures or masses.  Formal cystoscopy was performed which revealed no masses or lesions suspicious for malignancy, no bladder stones, no bladder diverticula, no trabeculations.  The ureteral orifices were visualized in the normal anatomic position bilaterally.     A motion wire was passed up the left ureteral orifice and up into the kidney.  This passed easily and placement was confirmed using fluoro.  The cystoscope was removed keeping the wire in place.     A semirigid ureteroscope was passed into the patient's bladder alongside the wire under direct vision.  It  was then passed through the left ureteral orifice alongside the wire.  A stone was encountered at the level of the distal ureter and basket extracted in its entirety.    A 6 Fr x 30 cm JJ ureteral stent without strings was passed over the wire and up into the renal pelvis using fluoro.  When the coil appeared to be in good position in the kidney, the wire was removed under continuous fluoro.  Good coils were seen in the kidney and the bladder using fluoro.      The patient tolerated the procedure well and was transferred to the recovery room in stable condition.      Disposition:  The patient will follow up with Dr. Llanes in 2 weeks for a stent removal.     Andres Phillips MD

## 2024-01-31 NOTE — SUBJECTIVE & OBJECTIVE
Interval History: NAOE, AFVSS, resting comfortably in bed this AM pain well controlled, reports good UOP passed small sediment in urine, WBC 5.2 and Cr 1.1 from 1.7 this AM, has been NPO since midnight plan for OR today for stent placement      Objective:     Temp:  [97.9 °F (36.6 °C)-98.4 °F (36.9 °C)] 97.9 °F (36.6 °C)  Pulse:  [66-80] 80  Resp:  [16-22] 18  SpO2:  [95 %-98 %] 98 %  BP: (135-154)/(66-89) 138/75     There is no height or weight on file to calculate BMI.           Drains       None                    Physical Exam  Constitutional:       General: He is not in acute distress.     Appearance: Normal appearance. He is not ill-appearing.   HENT:      Head: Normocephalic and atraumatic.      Nose: Nose normal.      Mouth/Throat:      Mouth: Mucous membranes are moist.   Eyes:      Extraocular Movements: Extraocular movements intact.      Conjunctiva/sclera: Conjunctivae normal.   Cardiovascular:      Rate and Rhythm: Normal rate.   Pulmonary:      Effort: Pulmonary effort is normal. No respiratory distress.   Abdominal:      Palpations: Abdomen is soft.      Tenderness: There is no right CVA tenderness or left CVA tenderness.   Musculoskeletal:         General: No deformity.      Cervical back: Normal range of motion.   Skin:     General: Skin is warm and dry.   Neurological:      General: No focal deficit present.      Mental Status: He is alert and oriented to person, place, and time.   Psychiatric:         Mood and Affect: Mood normal.         Behavior: Behavior normal.           Significant Labs:    BMP:  Recent Labs   Lab 01/30/24  0209 01/30/24  1419 01/31/24  0552   * 138 136   K 4.0 4.1 3.4*    107 109   CO2 21* 25 20*   BUN 19 19 14   CREATININE 1.7* 1.7* 1.1   CALCIUM 9.3 8.8 7.6*       CBC:   Recent Labs   Lab 01/26/24  0315 01/30/24  0209 01/31/24  0552   WBC 8.97 9.36 5.20   HGB 18.6* 18.1* 14.9   HCT 53.0 51.4 42.6    212 185       All pertinent labs results from the past  24 hours have been reviewed.    Significant Imaging:  All pertinent imaging results/findings from the past 24 hours have been reviewed.

## 2024-01-31 NOTE — ASSESSMENT & PLAN NOTE
Lyndon Lion was a 53 y.o. male with a 2 mm left distal ureteral stone.    - mIVF  - Flomax  - PRN pain/nausea control  - strain all urine  - NPO  - Plan for left ureteral stent today, possible stone extraction  - follow up urine cx

## 2024-01-31 NOTE — PROGRESS NOTES
[facility]  Urology  Progress Note    Patient Name: Lyndon Lion Jr.  MRN: [id number]  Admission Date: 1/30/2024  Hospital Length of Stay: 0 days  Code Status: Full Code   Attending Provider: Eron Llanes MD   Primary Care Physician: Stepan Moran III, MD    Subjective:     HPI:  Lyndon Lion Jr is a 53 y.o. 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 He was seen by Dr. Stovall in clinic 01/30/2024 and although documentation is not yet available, patient reports he is being scheduled for surgical stone removal. Urology consulted for ureterolithiasis with intractable pain/nausea/vomiting.    CTRSS from 01/26/2024 mild left hydronephrosis with a 2 mm left distal ureteral stone near the UVJ.     On assessment, AF, mild tachycardia, hypertension. WBC 9, Hgb 18.1, Cr 1.7 (baseline 1.2). UA nitrite negative, 5 RBCs, 5 WBCs, rare bacteria.     He reports one episode of vomiting overnight and poor PO intake for the last 2 days. Also reports temp of 99.7 at home. Denies chills, hematuria, dysuria, frequency or urgency.      Interval History: NAOE, AFVSS, resting comfortably in bed this AM pain well controlled, reports good UOP passed small sediment in urine, WBC 5.2 and Cr 1.1 from 1.7 this AM, has been NPO since midnight plan for OR today for stent placement      Objective:     Temp:  [97.9 °F (36.6 °C)-98.4 °F (36.9 °C)] 97.9 °F (36.6 °C)  Pulse:  [66-80] 80  Resp:  [16-22] 18  SpO2:  [95 %-98 %] 98 %  BP: (135-154)/(66-89) 138/75     There is no height or weight on file to calculate BMI.           Drains       None                    Physical Exam  Constitutional:       General: He is not in acute distress.     Appearance: Normal appearance. He is not ill-appearing.   HENT:      Head: Normocephalic and atraumatic.      Nose: Nose normal.      Mouth/Throat:      Mouth: Mucous membranes are moist.    Eyes:      Extraocular Movements: Extraocular movements intact.      Conjunctiva/sclera: Conjunctivae normal.   Cardiovascular:      Rate and Rhythm: Normal rate.   Pulmonary:      Effort: Pulmonary effort is normal. No respiratory distress.   Abdominal:      Palpations: Abdomen is soft.      Tenderness: There is no right CVA tenderness or left CVA tenderness.   Musculoskeletal:         General: No deformity.      Cervical back: Normal range of motion.   Skin:     General: Skin is warm and dry.   Neurological:      General: No focal deficit present.      Mental Status: He is alert and oriented to person, place, and time.   Psychiatric:         Mood and Affect: Mood normal.         Behavior: Behavior normal.           Significant Labs:    BMP:  Recent Labs   Lab 01/30/24  0209 01/30/24  1419 01/31/24  0552   * 138 136   K 4.0 4.1 3.4*    107 109   CO2 21* 25 20*   BUN 19 19 14   CREATININE 1.7* 1.7* 1.1   CALCIUM 9.3 8.8 7.6*       CBC:   Recent Labs   Lab 01/26/24  0315 01/30/24  0209 01/31/24  0552   WBC 8.97 9.36 5.20   HGB 18.6* 18.1* 14.9   HCT 53.0 51.4 42.6    212 185       All pertinent labs results from the past 24 hours have been reviewed.    Significant Imaging:  All pertinent imaging results/findings from the past 24 hours have been reviewed.                  Assessment/Plan:     Nephrolithiasis  Lyndon Lion was a 53 y.o. male with a 2 mm left distal ureteral stone.    - mIVF  - Flomax  - PRN pain/nausea control  - strain all urine  - NPO  - Plan for left ureteral stent today, possible stone extraction  - follow up urine cx          VTE Risk Mitigation (From admission, onward)           Ordered     IP VTE HIGH RISK PATIENT  Once         01/30/24 0726     Place sequential compression device  Until discontinued         01/30/24 0726                    Zachary Contreras MD  Urology  [facility]

## 2024-01-31 NOTE — ANESTHESIA PROCEDURE NOTES
Intubation    Date/Time: 1/31/2024 4:35 PM    Performed by: Jeremy Andrade CRNA  Authorized by: Alton Montoya MD    Intubation:     Induction:  Intravenous    Intubated:  Postinduction    Mask Ventilation:  Easy mask    Attempts:  1    Attempted By:  CRNA    Method of Intubation:  Video laryngoscopy    Blade:  Mcfarlane 3    Laryngeal View Grade: Grade I - full view of cords      Difficult Airway Encountered?: No      Complications:  None    Airway Device:  Oral endotracheal tube    Airway Device Size:  8.0    Style/Cuff Inflation:  Cuffed    Tube secured:  24    Secured at:  The lips    Placement Verified By:  Capnometry    Complicating Factors:  None    Findings Post-Intubation:  BS equal bilateral and atraumatic/condition of teeth unchanged

## 2024-01-31 NOTE — BRIEF OP NOTE
Robin Nieves - Surgery ([facility])  Brief Operative Note    Surgery Date: 1/31/2024     Surgeon(s) and Role:     * Eron Llanes MD - Primary     * Andres Phillips MD - Resident - Assisting        Pre-op Diagnosis:  Kidney stone on left side [N20.0]    Post-op Diagnosis:  Post-Op Diagnosis Codes:     * Kidney stone on left side [N20.0]    Procedure(s) (LRB):  CYSTOSCOPY (N/A)  URETEROSCOPY (Left)  EXTRACTION - STONE (Left)  PLACEMENT-STENT (Left)  PYELOGRAM, RETROGRADE (Left)    Anesthesia: General    Operative Findings:   Uncomplicated removal or left ureteral stone and stent placement    Estimated Blood Loss: * No values recorded between 1/31/2024  4:39 PM and 1/31/2024  5:09 PM *         Specimens:   Specimen (24h ago, onward)      None              Discharge Note    OUTCOME: Patient tolerated treatment/procedure well without complication and is now ready for discharge.    DISPOSITION: Home or Self Care    FINAL DIAGNOSIS:  Nephrolithiasis    FOLLOWUP: In clinic    DISCHARGE INSTRUCTIONS:  No discharge procedures on file.

## 2024-01-31 NOTE — ANESTHESIA PREPROCEDURE EVALUATION
[facility]  Anesthesia Pre-Operative Evaluation         Patient Name: Lyndon Lion Jr.  YOB: 1970  MRN: [id number]    SUBJECTIVE:     Pre-operative evaluation for Procedure(s) (LRB):  CYSTOURETEROSCOPY,WITH HOLMIUM LASER LITHOTRIPSY OF URETERAL CALCULUS (Left)     01/30/2024    Lyndon Lion Jr. is a 53 y.o. 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 Now with kidney stone & unable to pass spontaneously.     Patient now presents for the above procedure(s).      LDA:        Peripheral IV - Single Lumen 01/30/24 0210 Anterior;Proximal;Right Forearm (Active)   Number of days: 0       Prev airway: Intubation:     Induction:  Intravenous    Intubated:  Postinduction    Mask Ventilation:  Easy mask    Attempts:  1    Attempted By:  CRNA    Method of Intubation:  Video laryngoscopy    Laryngeal View Grade: Grade I - full view of cords      Difficult Airway Encountered?: No      Complications:  None    Airway Device:  Oral endotracheal tube and oral albert    Airway Device Size:  8.0    Style/Cuff Inflation:  Cuffed (inflated to minimal occlusive pressure)    Tube secured:  23    Secured at:  The lips    Placement Verified By:  Capnometry    Complicating Factors:  None    Findings Post-Intubation:  BS equal bilateral and atraumatic/condition of teeth unchanged    Drips:    lactated ringers 125 mL/hr at 01/30/24 1426       Patient Active Problem List   Diagnosis    Radiculopathy, lumbosacral region    Herniated lumbar intervertebral disc    Lumbar spondylosis    DDD (degenerative disc disease), lumbosacral    Acute medial meniscal tear    Obesity, unspecified    Parada syndrome    Thoracic or lumbosacral neuritis or radiculitis, unspecified    Neural foraminal stenosis of lumbar spine    MSH2-related Parada syndrome (HNPCC1)    Spondylosis of lumbar region without myelopathy or radiculopathy    Spondylolisthesis  of lumbar region    Fatty liver    Splenomegaly    Mild vitamin D deficiency    Colon dysplasia    Colon adenoma    Pneumoperitoneum    Postpolypectomy electrocoagulation syndrome    Lumbar radiculopathy    History of colon cancer, stage II    Uncontrolled type 2 diabetes mellitus with hyperglycemia    Type 2 diabetes mellitus with hyperglycemia    Obesity (BMI 30-39.9)    Gastroesophageal reflux disease without esophagitis    Bariatric surgery status    S/P laparoscopic sleeve gastrectomy    Hypovitaminosis D    Hypophosphatemia    Vitamin B12 deficiency    Vitamin A deficiency    Erectile dysfunction    Hypogonadism male    Squamous cell cancer of skin of nasal tip    Mohs defect    Acquired nasal deformity    Obstruction of nasal valve    Aspirin long-term use    Type 2 diabetes mellitus without complication, without long-term current use of insulin    History of colon resection    Hyperlipidemia    Nephrolithiasis       Review of patient's allergies indicates:  No Known Allergies    Current Inpatient Medications:   tamsulosin  0.4 mg Oral QHS       No current facility-administered medications on file prior to encounter.     Current Outpatient Medications on File Prior to Encounter   Medication Sig Dispense Refill    ACCU-CHEK GUIDE TEST STRIPS Strp USE TO TEST TWICE A  strip 3    acetaminophen (TYLENOL) 500 MG tablet Take 1 tablet (500 mg total) by mouth every 6 (six) hours as needed for Pain or Temperature greater than (100.5). Note do not exceed >3000mg tylenol/acetaminophen in 24hr period. 50 tablet 0    ascorbic acid, vitamin C, (VITAMIN C) 100 MG tablet Take 100 mg by mouth once daily.      aspirin (ECOTRIN) 81 MG EC tablet Take 81 mg by mouth once daily.      azelaic acid (AZELEX) 15 % gel Apply to face BID. 50 g 5    celecoxib (CELEBREX) 200 MG capsule Take 200 mg by mouth once daily.      ciclopirox (PENLAC) 8 % Soln Apply topically nightly. 6.6 mL 3    cyanocobalamin, vitamin B-12, 2,500 mcg Lozg  "Place 2 tablets under the tongue once daily. 180 lozenge 3    ferrous gluconate (FERGON) 240 (27 FE) MG tablet TAKE 27 MG BY MOUTH 2 (TWO) TIMES DAILY WITH MEALS.      ketorolac (TORADOL) 10 mg tablet Take 1 tablet (10 mg total) by mouth every 6 (six) hours as needed for Pain. 10 tablet 0    lancets (ACCU-CHEK SOFTCLIX LANCETS) Misc 1 Units by Misc.(Non-Drug; Combo Route) route 2 (two) times a day. 200 each 0    metFORMIN (GLUCOPHAGE-XR) 500 MG ER 24hr tablet TAKE 1 TABLET BY MOUTH TWICE A DAY WITH MEALS (Patient taking differently: Take 500 mg by mouth once daily.) 180 tablet 3    naproxen (NAPROSYN) 500 MG tablet Take 1 tablet (500 mg total) by mouth 2 (two) times daily with meals. for 10 days 20 tablet 0    ondansetron (ZOFRAN-ODT) 4 MG TbDL Dissolve 1 tablet (4 mg total) by mouth every 8 (eight) hours as needed (nausea/vomiting). 5 tablet 0    oxyCODONE-acetaminophen (PERCOCET) 5-325 mg per tablet Take 1 tablet by mouth every 4 (four) hours as needed for Pain. 12 tablet 0    pen needle, diabetic (PEN NEEDLE) 32 gauge x 5/32" Ndle 1 each by Misc.(Non-Drug; Combo Route) route once daily. 90 each 3    pravastatin (PRAVACHOL) 20 MG tablet Take 1 tablet (20 mg total) by mouth once daily. 90 tablet 3    RABEprazole (ACIPHEX) 20 mg tablet Take 1 tablet (20 mg total) by mouth before breakfast. 90 tablet 3    semaglutide (OZEMPIC) 2 mg/dose (8 mg/3 mL) PnIj Inject 2 mg into the skin every 7 days. (Patient taking differently: Inject 2 mg into the skin every Tuesday.) 3 mL 11    sod sulf-pot chloride-mag sulf (SUTAB) 1.479-0.188- 0.225 gram tablet Take 12 tablets by mouth once daily. Take as directed by provider office 24 tablet 0    sulfacetamide sodium-sulfur 10-5 % (w/w) Clsr Use to wash face daily 170 g 5    tadalafiL (CIALIS) 5 MG tablet Take 1 tablet (5 mg total) by mouth daily as needed for Erectile Dysfunction. 90 tablet 1    tamsulosin (FLOMAX) 0.4 mg Cap Take 1 capsule (0.4 mg total) by mouth once daily. for 14 " days 14 capsule 0    testosterone cypionate (DEPOTESTOTERONE CYPIONATE) 200 mg/mL injection Inject 60 mg into the muscle every 14 (fourteen) days. Twice a week      triamcinolone acetonide 0.025% (KENALOG) 0.025 % Oint Thin film to lips and eczematous plaques BID PRN flare 15 g 1    triamcinolone acetonide 0.1% (KENALOG) 0.1 % cream AAA bid 454 g 3    vitamin A 8000 UNIT capsule Take 1 capsule (8,000 Units total) by mouth once daily. 100 capsule 3    zinc gluconate 50 mg tablet Take 50 mg by mouth once daily.         Past Surgical History:   Procedure Laterality Date    APPENDECTOMY      APPLICATION OF CARTILAGE GRAFT N/A 3/21/2023    Procedure: APPLICATION, CARTILAGE GRAFT;  Surgeon: Alyx Spivey MD;  [address]  Service: ENT;  Laterality: N/A;    CAUDAL EPIDURAL STEROID INJECTION N/A 11/6/2018    Procedure: Injection-steroid-epidural-caudal;  Surgeon: Lyndon Brooke Jr., MD;  Location: [facility];  Service: Pain Management;  Laterality: N/A;    COLON SURGERY  2008    PARTIAL COLECTOMY    COLONOSCOPY Bilateral 2012    COLONOSCOPY N/A 8/1/2016    Procedure: COLONOSCOPY;  Surgeon: Blaze Marr MD;  Location: [facility];  Service: Endoscopy;  Laterality: N/A;    COLONOSCOPY N/A 9/20/2017    Procedure: COLONOSCOPY;  Surgeon: Dom Leonardo MD;  Location: [facility]);  Service: Endoscopy;  Laterality: N/A;  not given PM prep    COLONOSCOPY N/A 10/9/2017    Procedure: COLONOSCOPY;  Surgeon: RAMON Callahan MD;  Location: [facility] (4TH FLR);  Service: Endoscopy;  Laterality: N/A;  ok for Prepopik per Dr Callahan    COLONOSCOPY N/A 11/20/2017    Procedure: COLONOSCOPY/EMR;  Surgeon: Joseluis Choe MD;  Location: [facility] (2ND FLR);  Service: Endoscopy;  Laterality: N/A;  EMR    no pm prep    COLONOSCOPY N/A 5/30/2018    Procedure: COLONOSCOPY;  Surgeon: Dom Leonardo MD;  Location: [facility] (4TH FLR);  Service: Endoscopy;  Laterality: N/A;  follow up colonoscopy in 5/2018 for Parada  Syndrome         COLONOSCOPY N/A 6/10/2019    Procedure: COLONOSCOPY;  Surgeon: Dom Leonardo MD;  [facility]  Service: Endoscopy;  Laterality: N/A;  Prepopik ordered per Dr. Leonardo    COLONOSCOPY N/A 7/22/2020    Procedure: COLONOSCOPY;  Surgeon: Dom Leonardo MD;  [facility]  Service: Endoscopy;  Laterality: N/A;    COLONOSCOPY N/A 5/27/2021    Procedure: COLONOSCOPY;  Surgeon: Dom Leonardo MD;  [facility]  Service: Endoscopy;  Laterality: N/A;  covid test 5/24-[place]  pt requests Prepopik    COLONOSCOPY N/A 6/17/2022    Procedure: COLONOSCOPY;  Surgeon: Dom Leonardo MD;  [facility]  Service: Endoscopy;  Laterality: N/A;  He was discovered to have colon cancer and had right hemicolectomy        for stage II on 08/08/2008. MSH2 Parada syndrome.  sutab requested  instructions via the portal -sm  fully vaccinated - sm    COLONOSCOPY N/A 6/22/2023    Procedure: COLONOSCOPY;  Surgeon: Dom Leonardo MD;  [facility]  Service: Endoscopy;  Laterality: N/A;  see telephone encounter dated 5/24/23/ Pt/ Pt wife requested sutab - prep ins. on portal / Ozempic for DM- ERW    CYSTOSCOPY      Epidural Steroid Injection  10/23/15    Lumbar    EPIDURAL STEROID INJECTION INTO LUMBAR SPINE N/A 7/27/2018    Procedure: Injection-steroid-epidural-lumbar;  Surgeon: Lyndon Brooke Jr., MD;  Location: [facility] OR;  Service: Pain Management;  Laterality: N/A;    ESOPHAGOGASTRODUODENOSCOPY      ESOPHAGOGASTRODUODENOSCOPY N/A 7/22/2020    Procedure: EGD (ESOPHAGOGASTRODUODENOSCOPY);  Surgeon: Dom Leonardo MD;  [facility]  Service: Endoscopy;  Laterality: N/A;  Please schedule patient for EGD and colonoscopy with me MSH2 Parada syndrome and anemia evaluation please make priority 1  covid test 7/20-[place]    ESOPHAGOGASTRODUODENOSCOPY N/A 6/22/2023    Procedure: EGD (ESOPHAGOGASTRODUODENOSCOPY);   Surgeon: Dom Leonardo MD;  Location: [facility] ENDO (4TH FLR);  Service: Endoscopy;  Laterality: N/A;  see telephone encounter dated 5/24/23 6/16/23-Prec[place] completed appointment confirmed-    ESOPHAGOGASTRODUODENOSCOPY N/A 9/1/2023    Procedure: EGD (ESOPHAGOGASTRODUODENOSCOPY);  Surgeon: Dom Leonardo MD;  Location: [facility] ENDO (4TH FLR);  Service: Endoscopy;  Laterality: N/A;  Instructions sent via portal / Ozempic / Extended Clear Liquid prep    EXCISION OR SURGICAL PLANING, SKIN, NOSE, FOR RHINOPHYMA Bilateral 3/7/2023    Procedure: EXCISION OR SURGICAL resection nasal skin cancer;  Surgeon: Alyx Spivey MD;  Location: [facility] OR 2ND FLR;  Service: ENT;  Laterality: Bilateral;    FACETECTOMY OF VERTEBRA  2/13/2019    Procedure: MEDIAL FACETECTOMY L5-S1;  Surgeon: Lyndon Brooke Jr., MD;  Location: [facility] OR;  Service: Orthopedics;;    GASTRIC BYPASS  2010    SLEEVE    KNEE ARTHROSCOPY Right     LUMBAR DISCECTOMY  2/13/2019    Procedure: DISCECTOMY, SPINE, LUMBAR L5-S1;  Surgeon: Lyndon Brooke Jr., MD;  Location: [facility] OR;  Service: Orthopedics;;    NASAL RECONSTRUCTION N/A 3/21/2023    Procedure: RECONSTRUCTION, NOSE;  Surgeon: Alyx Spivey MD;  Location: [facility] OR 2ND FLR;  Service: ENT;  Laterality: N/A;    REVISION OF FLAP GRAFT Bilateral 4/18/2023    Procedure: REVISION, PROCEDURE INVOLVING FLAP GRAFT;  Surgeon: Alyx Spivey MD;  Location: [facility] OR;  Service: ENT;  Laterality: Bilateral;    REVISION OF FLAP GRAFT N/A 6/29/2023    Procedure: REVISION, PROCEDURE INVOLVING FLAP GRAFT;  Surgeon: Alyx Spivey MD;  Location: [facility] OR 2ND FLR;  Service: ENT;  Laterality: N/A;    REVISION OF FLAP GRAFT N/A 8/29/2023    Procedure: REVISION, PROCEDURE INVOLVING FLAP GRAFT;  Surgeon: Alyx Spivey MD;  Location: [facility] OR;  Service: ENT;  Laterality: N/A;    ROTATION FLAP SURGERY N/A 3/21/2023    Procedure: CREATION, FLAP, ROTATION;  Surgeon: Alyx Spivey MD;  Location: [facility] OR 2ND FLR;   Service: ENT;  Laterality: N/A;       Social History     Socioeconomic History    Marital status:    Occupational History     Employer: [employer]   Tobacco Use    Smoking status: Never    Smokeless tobacco: Never   Substance and Sexual Activity    Alcohol use: Yes     Comment: RARELY    Drug use: No    Sexual activity: Yes     Partners: Female     Social Determinants of Health     Financial Resource Strain: Low Risk  (12/9/2021)    Overall Financial Resource Strain (CARDIA)     Difficulty of Paying Living Expenses: Not hard at all   Food Insecurity: No Food Insecurity (12/9/2021)    Hunger Vital Sign     Worried About Running Out of Food in the Last Year: Never true     Ran Out of Food in the Last Year: Never true   Transportation Needs: No Transportation Needs (12/9/2021)    PRAPARE - Transportation     Lack of Transportation (Medical): No     Lack of Transportation (Non-Medical): No   Physical Activity: Insufficiently Active (12/9/2021)    Exercise Vital Sign     Days of Exercise per Week: 2 days     Minutes of Exercise per Session: 20 min   Stress: No Stress Concern Present (12/9/2021)    [employer] - Occupational Stress Questionnaire     Feeling of Stress : Only a little   Social Connections: Unknown (12/9/2021)    Social Connection and Isolation Panel [NHANES]     Frequency of Communication with Friends and Family: More than three times a week     Frequency of Social Gatherings with Friends and Family: Once a week     Active Member of Clubs or Organizations: No     Attends Club or Organization Meetings: Never     Marital Status:    Housing Stability: Low Risk  (12/9/2021)    Housing Stability Vital Sign     Unable to Pay for Housing in the Last Year: No     Number of Places Lived in the Last Year: 1     Unstable Housing in the Last Year: No       OBJECTIVE:     Vital Signs Range (Last 24H):  Temp:  [36.7 °C (98 °F)-36.9 °C (98.5 °F)]   Pulse:  []   Resp:   [16-18]   BP: (135-173)/()   SpO2:  [95 %-97 %]       Significant Labs:  Lab Results   Component Value Date    WBC 9.36 01/30/2024    HGB 18.1 (H) 01/30/2024    HCT 51.4 01/30/2024     01/30/2024    CHOL 160 12/19/2023    TRIG 55 12/19/2023    HDL 38 (L) 12/19/2023    ALT 16 01/30/2024    AST 16 01/30/2024     01/30/2024    K 4.1 01/30/2024     01/30/2024    CREATININE 1.7 (H) 01/30/2024    BUN 19 01/30/2024    CO2 25 01/30/2024    TSH 1.525 12/19/2023    PSA 0.77 12/19/2023    INR 1.1 06/26/2023    HGBA1C 5.6 12/19/2023       Diagnostic Studies: No relevant studies.    EKG:   Results for orders placed or performed during the hospital encounter of 02/06/19   EKG 12-lead    Collection Time: 02/06/19  1:20 PM    Narrative    Test Reason : M54.16,M47.816,M51.26,M51.37,M99.83,M43.16,Z01.818,    Vent. Rate : 068 BPM     Atrial Rate : 068 BPM     P-R Int : 170 ms          QRS Dur : 092 ms      QT Int : 394 ms       P-R-T Axes : 053 093 001 degrees     QTc Int : 418 ms    Normal sinus rhythm  Rightward axis  Borderline Abnormal ECG  When compared with ECG of 14-MAY-2013 12:59,  Questionable change in The axis  Confirmed by Mike King MD ([id number]) on 2/7/2019 12:17:31 PM    Referred By: RENETTA DAVIS           Confirmed By:Mike King MD       2D ECHO:  TTE:  No results found. However, due to the size of the patient record, not all encounters were searched. Please check Results Review for a complete set of results.    LINDA:  No results found. However, due to the size of the patient record, not all encounters were searched. Please check Results Review for a complete set of results.    ASSESSMENT/PLAN:         Pre-op Assessment    I have reviewed the Patient Summary Reports.     I have reviewed the Nursing Notes. I have reviewed the NPO Status.   I have reviewed the Medications.     Review of Systems  Anesthesia Hx:  No problems with previous Anesthesia   History of prior surgery of interest to airway  management or planning:          Denies Family Hx of Anesthesia complications.    Denies Personal Hx of Anesthesia complications.                    Hematology/Oncology:                      Current/Recent Cancer.  --  Cancer in past history:                     EENT/Dental:  EENT/Dental Normal           Cardiovascular:  Cardiovascular Normal                                            Pulmonary:    Asthma       Asthma:               Renal/:   renal calculi       Kidney Function/Disease             Hepatic/GI:     GERD Liver Disease,     Gerd       Liver Disease        Musculoskeletal:  Arthritis        Arthritis     Spine Disorders: thoracic and lumbar Disc disease           Neurological:  Neurology Normal              Arthritis                         Neuromuscular Disease   Endocrine:  Diabetes, type 2    Diabetes                    Obesity / BMI > 30      Physical Exam  General: Well nourished, Cooperative, Alert and Oriented    Airway:  Mallampati: III / II  Mouth Opening: Normal  TM Distance: Normal  Tongue: Normal  Neck ROM: Normal ROM    Dental:  Intact        Anesthesia Plan  Type of Anesthesia, risks & benefits discussed:    Anesthesia Type: Gen ETT  Intra-op Monitoring Plan: Standard ASA Monitors  Post Op Pain Control Plan: multimodal analgesia and IV/PO Opioids PRN  Induction:  IV  Airway Plan: Video, Post-Induction  Informed Consent: Informed consent signed with the Patient and all parties understand the risks and agree with anesthesia plan.  All questions answered.   ASA Score: 3  Day of Surgery Review of History & Physical: H&P Update referred to the surgeon/provider.    Ready For Surgery From Anesthesia Perspective.     .

## 2024-01-31 NOTE — PLAN OF CARE
[facility]  Initial Discharge Assessment       Primary Care Provider: Stepan Moran III, MD    Admission Diagnosis: Kidney stone on left side [N20.0]    Admission Date: 1/30/2024  Expected Discharge Date:     Pt stated he was in too much pain and asked SW to complete assessment with spouse Katheryn who was at bedside.      As per spouse pt is independent with his ambulation and ADL's and does not require assistance or equipment.      Pt to d/c with no needs when ready    Transition of Care Barriers: (P) None    Payor: AETNA / Plan: [employer]ET[employer] CHOICE POS / Product Type: Commercial /     Extended Emergency Contact Information  Primary Emergency Contact: AyadKatheryn  Address: [address] [place] [address] [facility]  Home Phone: [phone]  Mobile Phone: [phone]  Relation: Spouse  Preferred language: English   needed? No    Discharge Plan A: (P) Home, Home with family  Discharge Plan B: (P) Home      [id number] - [place] - [address]  [address]  [place] [address]  Phone: [phone] Fax: [phone]    [id number] - [place] - [address]  [address]  [place] [address]  Phone: [phone] Fax: [phone]    [facility] - [place], [place] - [address]  [address]  [address]  [facility] [address]  Phone: [phone] Fax: [phone]    [facility] - [place] - [address]  [address]  [place] [address]  Phone: [phone] Fax: [phone]      Initial Assessment (most recent)       Adult Discharge Assessment - 01/30/24 1930          Discharge Assessment    Assessment Type Discharge Planning Assessment (P)      Confirmed/corrected address, phone number and insurance Yes (P)      Confirmed Demographics Correct on Facesheet (P)      Source of Information patient (P)      People in Home spouse (P)      Facility Arrived From: home (P)       Do you expect to return to your current living situation? Yes (P)      Do you have help at home or someone to help you manage your care at home? No (P)      Prior to hospitilization cognitive status: Alert/Oriented;No Deficits (P)      Current cognitive status: No Deficits;Alert/Oriented (P)      Walking or Climbing Stairs Difficulty no (P)      Dressing/Bathing Difficulty no (P)      Home Accessibility not wheelchair accessible;stairs within home (P)      Number of Stairs, Within Home, Primary ten (P)      Home Layout Able to live on 1st floor;Bathroom on 2nd floor;Bedroom on 2nd floor (P)      Equipment Currently Used at Home none (P)      Patient currently being followed by outpatient case management? No (P)      Do you currently have service(s) that help you manage your care at home? No (P)      Do you have any problems affording any of your prescribed medications? No (P)      Is the patient taking medications as prescribed? yes (P)      Who is going to help you get home at discharge? family/friends (P)      How do you get to doctors appointments? car, drives self (P)      Are you on dialysis? No (P)      Do you take coumadin? No (P)      Discharge Plan A Home;Home with family (P)      Discharge Plan B Home (P)      DME Needed Upon Discharge  none (P)      Discharge Plan discussed with: Patient (P)      Transition of Care Barriers None (P)         Physical Activity    On average, how many days per week do you engage in moderate to strenuous exercise (like a brisk walk)? 0 days (P)      On average, how many minutes do you engage in exercise at this level? 0 min (P)         Financial Resource Strain    How hard is it for you to pay for the very basics like food, housing, medical care, and heating? Not very hard (P)         Housing Stability    In the last 12 months, was there a time when you were not able to pay the mortgage or rent on time? No (P)      In the last 12 months, was there a time when you did not  have a steady place to sleep or slept in a shelter (including now)? No (P)         Transportation Needs    In the past 12 months, has lack of transportation kept you from medical appointments or from getting medications? No (P)      In the past 12 months, has lack of transportation kept you from meetings, work, or from getting things needed for daily living? No (P)         Food Insecurity    Within the past 12 months, you worried that your food would run out before you got the money to buy more. Never true (P)      Within the past 12 months, the food you bought just didn't last and you didn't have money to get more. Never true (P)         Stress    Do you feel stress - tense, restless, nervous, or anxious, or unable to sleep at night because your mind is troubled all the time - these days? To some extent (P)         Social Connections    In a typical week, how many times do you talk on the phone with family, friends, or neighbors? More than three times a week (P)      How often do you get together with friends or relatives? More than three times a week (P)      How often do you attend Episcopal or Sikhism services? Never (P)      Do you belong to any clubs or organizations such as Episcopal groups, unions, fraternal or athletic groups, or school groups? Yes (P)      How often do you attend meetings of the clubs or organizations you belong to? More than 4 times per year (P)      Are you , , , , never , or living with a partner?  (P)         Alcohol Use    Q1: How often do you have a drink containing alcohol? Never (P)      Q2: How many drinks containing alcohol do you have on a typical day when you are drinking? Patient does not drink (P)      Q3: How often do you have six or more drinks on one occasion? Never (P)         OTHER    Name(s) of People in Home spouse Katheryn (P)                         Narcisa Doshi CD, MSW, LMSW, RSW   Case Management  [facility]  [place]  Email: [email or web address]

## 2024-01-31 NOTE — TRANSFER OF CARE
Anesthesia Transfer of Care Note    Patient: Lyndon Lion Jr.    Procedure(s) Performed: Procedure(s) (LRB):  CYSTOSCOPY (N/A)  URETEROSCOPY (Left)  EXTRACTION - STONE (Left)  PLACEMENT-STENT (Left)  PYELOGRAM, RETROGRADE (Left)    Patient location: PACU    Anesthesia Type: general    Transport from OR: Transported from OR on 6-10 L/min O2 by face mask with adequate spontaneous ventilation    Post pain: adequate analgesia    Post assessment: no apparent anesthetic complications    Post vital signs: stable    Level of consciousness: awake    Nausea/Vomiting: no nausea/vomiting    Complications: none    Transfer of care protocol was followed      Last vitals: Visit Vitals  BP (!) 174/87 (BP Location: Left arm, Patient Position: Lying)   Pulse 76   Temp 36.4 °C (97.6 °F) (Temporal)   Resp 16   SpO2 96%

## 2024-01-31 NOTE — DISCHARGE INSTRUCTIONS
Postoperative Instructions for Stone Surgery    1. Ureteral Stent:     - You have a ureteral stent in place. You will be contacted for a separate office appointment to have this stent removed. It is typically removed in a short office-based procedure by placing a small camera into the bladder and grasping the stent to remove it. This procedure typically lasts 1-2 minutes.    2. Hydration and Fluid Intake:     - It is essential to drink plenty of fluids following the ureteroscopy procedure to promote flushing of the urinary system and prevent dehydration.     - Aim to drink at least 8 to 10 glasses of water or other non-caffeinated, non-alcoholic beverages daily, unless instructed otherwise by your healthcare provider.    3. Pain Management:     - You may experience mild to moderate discomfort or pain after the ureteroscopy procedure. This is usually manageable with over-the-counter pain relievers such as acetaminophen or nonsteroidal anti-inflammatory drugs (NSAIDs).     - You may experience mild bladder and flank discomfort especially when voiding due to your ureteral stent. This may be alleviated with medications that were prescribed to you such as oxybutynin and/or pyridium. You may take these as needed for any urinary discomfort while a stent. Please note this discomfort is temporary and should subside once the stent is removed.     - If prescribed pain medication, take it as instructed, following the prescribed dosage and frequency.    4. Urinary Symptoms:     - It is common to experience some urinary symptoms after the procedure, such as urgency, frequency, burning sensation, or blood in the urine (hematuria). These symptoms should gradually improve within a few days.     - If the symptoms worsen or if you notice significant blood clots or inability to urinate, contact your healthcare provider.    5. Activity and Rest:     - It is advisable to take it easy and get plenty of rest for the first few days  following the procedure. Avoid strenuous activities, heavy lifting, or vigorous exercise for 1-2 days.     - Gradually resume normal activities as you feel comfortable, but listen to your body and avoid overexertion.    6. Diet and Nutrition:     - Follow any dietary recommendations provided by your healthcare provider. In general, maintain a balanced diet with adequate fiber intake to prevent constipation.     - Avoid excessive consumption of salt, spicy foods, and caffeinated or carbonated beverages, as these may irritate the urinary system.    7. Follow-up Appointments:     - Attend all scheduled follow-up appointments with your healthcare provider to monitor your recovery and assess the success of the stone removal.     - You will be contacted via phone or the patient portal about any follow up appointments and tests/imaging in about 1-2 weeks.     - Additional imaging or laboratory tests may be ordered to evaluate the effectiveness of the procedure.    8. Signs of Complications:     - Be aware of potential complications and contact your healthcare provider if you experience any of the following: persistent or worsening pain, fever, chills, excessive fatigue, severe bleeding, inability to pass urine, or signs of infection.    9. Medication Compliance:     - If prescribed any medications, such as antibiotics or medications to prevent stone recurrence, take them as instructed by your healthcare provider. Follow the recommended dosage and complete the full course of medication.    If you have any additional questions, call [phone] and ask for your provider. If after hours (night or weekends) ask for urology on call and you will be directed to the appropriate provider.

## 2024-02-01 ENCOUNTER — PATIENT MESSAGE (OUTPATIENT)
Dept: UROLOGY | Facility: CLINIC | Age: 54
End: 2024-02-01
Payer: COMMERCIAL

## 2024-02-01 DIAGNOSIS — N20.1 LEFT URETERAL STONE: Primary | ICD-10-CM

## 2024-02-01 NOTE — PLAN OF CARE
Discharge instructions given and explained to patient and family with verbalized understanding. VSS.  Voiding spontaneously. Denies N/V, tolerating PO fluids. Rates pain level as tolerable. IV removed. Pt left floor via W/C, stable for transport, responsible person available for transport home.

## 2024-02-01 NOTE — ANESTHESIA POSTPROCEDURE EVALUATION
Anesthesia Post Evaluation    Patient: Lyndon Lion Jr.    Procedure(s) Performed: Procedure(s) (LRB):  CYSTOSCOPY (N/A)  URETEROSCOPY (Left)  EXTRACTION - STONE (Left)  PLACEMENT-STENT (Left)  PYELOGRAM, RETROGRADE (Left)    Final Anesthesia Type: general      Patient location during evaluation: PACU  Patient participation: Yes- Able to Participate  Level of consciousness: awake and alert  Post-procedure vital signs: reviewed and stable  Pain management: adequate  Airway patency: patent    PONV status at discharge: No PONV  Anesthetic complications: no      Cardiovascular status: blood pressure returned to baseline  Respiratory status: unassisted  Hydration status: euvolemic  Follow-up not needed.              Vitals Value Taken Time   /79 01/31/24 1800   Temp 36.7 °C (98 °F) 01/31/24 1730   Pulse 78 01/31/24 1800   Resp 19 01/31/24 1800   SpO2 94 % 01/31/24 1800         No case tracking events are documented in the log.      Pain/Karthikeyan Score: Pain Rating Prior to Med Admin: 3 (1/31/2024  6:28 AM)  Pain Rating Post Med Admin: 2 (1/31/2024  2:59 AM)  Karthikeyan Score: 10 (1/31/2024  5:45 PM)

## 2024-02-01 NOTE — DISCHARGE SUMMARY
[facility]  Urology  Discharge Summary      Patient Name: Lyndon Lion Jr.  MRN: [id number]  Admission Date: (Not on file)  Hospital Length of Stay: 0 days  Discharge Date and Time:  02/01/2024 12:38 PM  Attending Physician: Dom Leonardo MD   Discharging Provider: Andres Phillips MD  Primary Care Physician: Stepan Moran III, MD    HPI:   No notes on file    Procedure(s) (LRB):  COLONOSCOPY (N/A)     Indwelling Lines/Drains at time of discharge:   Lines/Drains/Airways       None                   Hospital Course (synopsis of major diagnoses, care, treatment, and services provided during the course of the hospital stay):     Lyndon Lion is a 53-year-old male who presented to the [facility] ED due to left flank pain consistent with his known kidney stone.  He was found to have an acute kidney injury and was admitted to the hospital for further management.  I went hospital day 2 his creatinine improved and returned to his baseline.  He underwent left ureteroscopy with stone extraction and left ureteral stent placement.  He will follow up with Dr. Llanes for removal of his stent and was Dr. Stovall in 3 months for ultrasound.    Goals of Care Treatment Preferences:  Code Status: Full Code      Consults: none    Significant Diagnostic Studies:     Pending Diagnostic Studies:       None            There are no hospital problems to display for this patient.        Discharged Condition: good    Disposition: home    Follow Up: see above    Patient Instructions:   No discharge procedures on file.  Medications:  Reconciled Home Medications:      Medication List         Notice    Cannot display discharge medications because the patient has not yet been admitted.         Time spent on the discharge of patient: 15 minutes    Andres Phillips MD  Urology  [facility]

## 2024-02-02 ENCOUNTER — PATIENT MESSAGE (OUTPATIENT)
Dept: OTOLARYNGOLOGY | Facility: CLINIC | Age: 54
End: 2024-02-02
Payer: COMMERCIAL

## 2024-02-05 ENCOUNTER — PATIENT MESSAGE (OUTPATIENT)
Dept: UROLOGY | Facility: CLINIC | Age: 54
End: 2024-02-05
Payer: COMMERCIAL

## 2024-02-05 RX ORDER — DOXYCYCLINE 100 MG/1
100 CAPSULE ORAL 2 TIMES DAILY
Qty: 28 CAPSULE | Refills: 2 | Status: SHIPPED | OUTPATIENT
Start: 2024-02-05 | End: 2024-02-19

## 2024-02-06 ENCOUNTER — HOSPITAL ENCOUNTER (OUTPATIENT)
Facility: HOSPITAL | Age: 54
Discharge: HOME | End: 2024-02-06
Attending: INTERNAL MEDICINE | Admitting: INTERNAL MEDICINE
Payer: COMMERCIAL

## 2024-02-06 ENCOUNTER — ANESTHESIA (OUTPATIENT)
Dept: ENDOSCOPY | Facility: HOSPITAL | Age: 54
End: 2024-02-06
Payer: COMMERCIAL

## 2024-02-06 ENCOUNTER — ANESTHESIA EVENT (OUTPATIENT)
Dept: ENDOSCOPY | Facility: HOSPITAL | Age: 54
End: 2024-02-06
Payer: COMMERCIAL

## 2024-02-06 VITALS
OXYGEN SATURATION: 99 % | BODY MASS INDEX: 30.8 KG/M2 | RESPIRATION RATE: 16 BRPM | WEIGHT: 240 LBS | DIASTOLIC BLOOD PRESSURE: 68 MMHG | TEMPERATURE: 98 F | HEIGHT: 74 IN | SYSTOLIC BLOOD PRESSURE: 106 MMHG | HEART RATE: 62 BPM

## 2024-02-06 DIAGNOSIS — Z15.09 LYNCH SYNDROME: ICD-10-CM

## 2024-02-06 LAB
COMPN STONE: NORMAL
LABORATORY COMMENT REPORT: NORMAL
POCT GLUCOSE: 112 MG/DL (ref 70–110)
SPECIMEN SOURCE: NORMAL
STONE ANALYSIS IR-IMP: NORMAL

## 2024-02-06 PROCEDURE — 37000009 HC ANESTHESIA EA ADD 15 MINS: Performed by: INTERNAL MEDICINE

## 2024-02-06 PROCEDURE — 27201028 HC NEEDLE, SCLERO: Performed by: INTERNAL MEDICINE

## 2024-02-06 PROCEDURE — 27201089 HC SNARE, DISP (ANY): Performed by: INTERNAL MEDICINE

## 2024-02-06 PROCEDURE — 88305 TISSUE EXAM BY PATHOLOGIST: CPT | Performed by: PATHOLOGY

## 2024-02-06 PROCEDURE — 63600175 PHARM REV CODE 636 W HCPCS: Performed by: NURSE ANESTHETIST, CERTIFIED REGISTERED

## 2024-02-06 PROCEDURE — 88305 TISSUE EXAM BY PATHOLOGIST: CPT | Mod: 26,,, | Performed by: PATHOLOGY

## 2024-02-06 PROCEDURE — 37000008 HC ANESTHESIA 1ST 15 MINUTES: Performed by: INTERNAL MEDICINE

## 2024-02-06 PROCEDURE — 45381 COLONOSCOPY SUBMUCOUS NJX: CPT | Mod: PT | Performed by: INTERNAL MEDICINE

## 2024-02-06 PROCEDURE — 25000003 PHARM REV CODE 250: Performed by: NURSE ANESTHETIST, CERTIFIED REGISTERED

## 2024-02-06 PROCEDURE — 27200997: Performed by: INTERNAL MEDICINE

## 2024-02-06 PROCEDURE — 45381 COLONOSCOPY SUBMUCOUS NJX: CPT | Mod: 33,51,, | Performed by: INTERNAL MEDICINE

## 2024-02-06 PROCEDURE — 45385 COLONOSCOPY W/LESION REMOVAL: CPT | Mod: 33,,, | Performed by: INTERNAL MEDICINE

## 2024-02-06 PROCEDURE — 25000003 PHARM REV CODE 250: Performed by: INTERNAL MEDICINE

## 2024-02-06 PROCEDURE — E9220 PRA ENDO ANESTHESIA: HCPCS | Mod: 33,,, | Performed by: NURSE ANESTHETIST, CERTIFIED REGISTERED

## 2024-02-06 PROCEDURE — 45385 COLONOSCOPY W/LESION REMOVAL: CPT | Mod: PT | Performed by: INTERNAL MEDICINE

## 2024-02-06 RX ORDER — LIDOCAINE HYDROCHLORIDE 20 MG/ML
INJECTION INTRAVENOUS
Status: DISCONTINUED | OUTPATIENT
Start: 2024-02-06 | End: 2024-02-06

## 2024-02-06 RX ORDER — PROPOFOL 10 MG/ML
VIAL (ML) INTRAVENOUS CONTINUOUS PRN
Status: DISCONTINUED | OUTPATIENT
Start: 2024-02-06 | End: 2024-02-06

## 2024-02-06 RX ORDER — SODIUM CHLORIDE 9 MG/ML
INJECTION, SOLUTION INTRAVENOUS CONTINUOUS
Status: DISCONTINUED | OUTPATIENT
Start: 2024-02-06 | End: 2024-02-06 | Stop reason: HOSPADM

## 2024-02-06 RX ORDER — PROPOFOL 10 MG/ML
VIAL (ML) INTRAVENOUS
Status: DISCONTINUED | OUTPATIENT
Start: 2024-02-06 | End: 2024-02-06

## 2024-02-06 RX ADMIN — PROPOFOL 100 MG: 10 INJECTION, EMULSION INTRAVENOUS at 11:02

## 2024-02-06 RX ADMIN — LIDOCAINE HYDROCHLORIDE 50 MG: 20 INJECTION INTRAVENOUS at 11:02

## 2024-02-06 RX ADMIN — SODIUM CHLORIDE: 0.9 INJECTION, SOLUTION INTRAVENOUS at 11:02

## 2024-02-06 RX ADMIN — PROPOFOL 175 MCG/KG/MIN: 10 INJECTION, EMULSION INTRAVENOUS at 11:02

## 2024-02-06 NOTE — H&P
[facility]  History & Physical    Subjective:      Chief Complaint/Reason for Admission:   Surveillance colonoscopy  He was discovered to have colon cancer and had right hemicolecton        for stage II on 08/08/2008. MSH2 Parada syndrome.     Lyndon Lion Jr. is a 53 y.o. male.    Past Medical History:   Diagnosis Date    Arthritis     Asthma     AS A CHILD    Colon cancer     Family hx of prostate cancer 11/22/2016    Hx of colon cancer, stage I 07/03/2014    Parada syndrome     Squamous cell carcinoma of skin     Tear of labium 10/2020    left shoulder Dr Molina    Uncontrolled type 2 diabetes mellitus with hyperglycemia 03/04/2021    Vitamin D deficiency     Wears glasses      Past Surgical History:   Procedure Laterality Date    APPENDECTOMY      APPLICATION OF CARTILAGE GRAFT N/A 3/21/2023    Procedure: APPLICATION, CARTILAGE GRAFT;  Surgeon: Alyx Spivey MD;  Location: [address];  Service: ENT;  Laterality: N/A;    CAUDAL EPIDURAL STEROID INJECTION N/A 11/6/2018    Procedure: Injection-steroid-epidural-caudal;  Surgeon: Lyndon Brooke Jr., MD;  Location: [facility];  Service: Pain Management;  Laterality: N/A;    COLON SURGERY  2008    PARTIAL COLECTOMY    COLONOSCOPY Bilateral 2012    COLONOSCOPY N/A 8/1/2016    Procedure: COLONOSCOPY;  Surgeon: Blaze Marr MD;  Location: [facility];  Service: Endoscopy;  Laterality: N/A;    COLONOSCOPY N/A 9/20/2017    Procedure: COLONOSCOPY;  Surgeon: Dom Leonardo MD;  Location: [address]);  Service: Endoscopy;  Laterality: N/A;  not given PM prep    COLONOSCOPY N/A 10/9/2017    Procedure: COLONOSCOPY;  Surgeon: RAMON Callahan MD;  [address]);  Service: Endoscopy;  Laterality: N/A;  ok for Prepopik per Dr Callahan    COLONOSCOPY N/A 11/20/2017    Procedure: COLONOSCOPY/EMR;  Surgeon: Joseluis Choe MD;  Location: [address]);  Service: Endoscopy;  Laterality: N/A;  EMR    no pm prep     COLONOSCOPY N/A 5/30/2018    Procedure: COLONOSCOPY;  Surgeon: Dom Leonardo MD;  Location: [facility] (4TH FLR);  Service: Endoscopy;  Laterality: N/A;  follow up colonoscopy in 5/2018 for Parada Syndrome         COLONOSCOPY N/A 6/10/2019    Procedure: COLONOSCOPY;  Surgeon: Dom Leonardo MD;  Location: [facility] (4TH FLR);  Service: Endoscopy;  Laterality: N/A;  Prepopik ordered per Dr. Leonardo    COLONOSCOPY N/A 7/22/2020    Procedure: COLONOSCOPY;  Surgeon: Dom Leonardo MD;  Location: [facility] (4TH FLR);  Service: Endoscopy;  Laterality: N/A;    COLONOSCOPY N/A 5/27/2021    Procedure: COLONOSCOPY;  Surgeon: Dom Leonardo MD;  Location: [facility] (4TH FLR);  Service: Endoscopy;  Laterality: N/A;  covid test 5/24-[place]  pt requests Prepopik    COLONOSCOPY N/A 6/17/2022    Procedure: COLONOSCOPY;  Surgeon: Dom Leonardo MD;  Location: [facility] (4TH FLR);  Service: Endoscopy;  Laterality: N/A;  He was discovered to have colon cancer and had right hemicolectomy        for stage II on 08/08/2008. MSH2 Parada syndrome.  sutab requested  instructions via the portal -sm  fully vaccinated - sm    COLONOSCOPY N/A 6/22/2023    Procedure: COLONOSCOPY;  Surgeon: Dom Leonardo MD;  Location: [facility] (4TH FLR);  Service: Endoscopy;  Laterality: N/A;  see telephone encounter dated 5/24/23/ Pt/ Pt wife requested sutab - prep ins. on portal / Ozempic for DM- ERW    CYSTOSCOPY      CYSTOSCOPY N/A 1/31/2024    Procedure: CYSTOSCOPY;  Surgeon: Eron Llanes MD;  Location: [address] FLR;  Service: Urology;  Laterality: N/A;    Epidural Steroid Injection  10/23/15    Lumbar    EPIDURAL STEROID INJECTION INTO LUMBAR SPINE N/A 7/27/2018    Procedure: Injection-steroid-epidural-lumbar;  Surgeon: Lyndon Brooke Jr., MD;  Location: [facility] OR;  Service: Pain Management;  Laterality: N/A;    ESOPHAGOGASTRODUODENOSCOPY      ESOPHAGOGASTRODUODENOSCOPY N/A 7/22/2020    Procedure: EGD (ESOPHAGOGASTRODUODENOSCOPY);   Surgeon: Dom Leonardo MD;  Location: [facility] (4TH FLR);  Service: Endoscopy;  Laterality: N/A;  Please schedule patient for EGD and colonoscopy with me MSH2 Parada syndrome and anemia evaluation please make priority 1  covid test 7/20-[place]    ESOPHAGOGASTRODUODENOSCOPY N/A 6/22/2023    Procedure: EGD (ESOPHAGOGASTRODUODENOSCOPY);  Surgeon: Dom Leonardo MD;  Location: [facility] ENDO (4TH FLR);  Service: Endoscopy;  Laterality: N/A;  see telephone encounter dated 5/24/23 6/16/23-[place] completed appointment confirmed-    ESOPHAGOGASTRODUODENOSCOPY N/A 9/1/2023    Procedure: EGD (ESOPHAGOGASTRODUODENOSCOPY);  Surgeon: Dom Leonardo MD;  Location: [facility] (4TH FLR);  Service: Endoscopy;  Laterality: N/A;  Instructions sent via portal / Ozempic / Extended Clear Liquid prep    EXCISION OR SURGICAL PLANING, SKIN, NOSE, FOR RHINOPHYMA Bilateral 3/7/2023    Procedure: EXCISION OR SURGICAL resection nasal skin cancer;  Surgeon: Alyx Spivey MD;  Location: [facility] OR 2ND FLR;  Service: ENT;  Laterality: Bilateral;    EXTRACTION - STONE Left 1/31/2024    Procedure: EXTRACTION - STONE;  Surgeon: Eron Llanes MD;  Location: [facility] OR 1ST FLR;  Service: Urology;  Laterality: Left;    FACETECTOMY OF VERTEBRA  2/13/2019    Procedure: MEDIAL FACETECTOMY L5-S1;  Surgeon: Lyndon Brooke Jr., MD;  Location: [facility] OR;  Service: Orthopedics;;    GASTRIC BYPASS  2010    SLEEVE    KNEE ARTHROSCOPY Right     LUMBAR DISCECTOMY  2/13/2019    Procedure: DISCECTOMY, SPINE, LUMBAR L5-S1;  Surgeon: Lyndon Brooke Jr., MD;  Location: [facility] OR;  Service: Orthopedics;;    NASAL RECONSTRUCTION N/A 3/21/2023    Procedure: RECONSTRUCTION, NOSE;  Surgeon: Alyx Spivey MD;  Location: [facility] OR 2ND FLR;  Service: ENT;  Laterality: N/A;    PLACEMENT-STENT Left 1/31/2024    Procedure: PLACEMENT-STENT;  Surgeon: Eron Llanes MD;  Location: [facility] OR [address] [place] [address];  Service: Urology;  Laterality: Left;    RETROGRADE  PYELOGRAPHY Left 1/31/2024    Procedure: PYELOGRAM, RETROGRADE;  Surgeon: Eron Llanes MD;  Location: [facility] OR 1ST FLR;  Service: Urology;  Laterality: Left;    REVISION OF FLAP GRAFT Bilateral 4/18/2023    Procedure: REVISION, PROCEDURE INVOLVING FLAP GRAFT;  Surgeon: Alyx Spivey MD;  Location: [facility] OR;  Service: ENT;  Laterality: Bilateral;    REVISION OF FLAP GRAFT N/A 6/29/2023    Procedure: REVISION, PROCEDURE INVOLVING FLAP GRAFT;  Surgeon: Alyx Spivey MD;  Location: [facility] OR 2ND FLR;  Service: ENT;  Laterality: N/A;    REVISION OF FLAP GRAFT N/A 8/29/2023    Procedure: REVISION, PROCEDURE INVOLVING FLAP GRAFT;  Surgeon: Alyx Spivey MD;  Location: [facility] OR;  Service: ENT;  Laterality: N/A;    ROTATION FLAP SURGERY N/A 3/21/2023    Procedure: CREATION, FLAP, ROTATION;  Surgeon: Alyx Spivey MD;  Location: [facility] OR 2ND FLR;  Service: ENT;  Laterality: N/A;    URETEROSCOPY Left 1/31/2024    Procedure: URETEROSCOPY;  Surgeon: Eron Llanes MD;  Location: [facility] OR 1ST FLR;  Service: Urology;  Laterality: Left;     Social History     Tobacco Use    Smoking status: Never    Smokeless tobacco: Never   Substance Use Topics    Alcohol use: Yes     Comment: RARELY    Drug use: No       PTA Medications   Medication Sig    ACCU-CHEK GUIDE TEST STRIPS Strp USE TO TEST TWICE A DAY    acetaminophen (TYLENOL) 500 MG tablet Take 1 tablet (500 mg total) by mouth every 6 (six) hours as needed for Pain or Temperature greater than (100.5). Note do not exceed >3000mg tylenol/acetaminophen in 24hr period.    ascorbic acid, vitamin C, (VITAMIN C) 100 MG tablet Take 100 mg by mouth once daily.    aspirin (ECOTRIN) 81 MG EC tablet Take 81 mg by mouth once daily.    azelaic acid (AZELEX) 15 % gel Apply to face BID.    celecoxib (CELEBREX) 200 MG capsule Take 200 mg by mouth once daily.    ciclopirox (PENLAC) 8 % Soln Apply topically nightly.    cyanocobalamin, vitamin B-12, 2,500 mcg Lozg Place 2 tablets under the tongue once  "daily.    doxycycline (VIBRAMYCIN) 100 MG Cap Take 1 capsule (100 mg total) by mouth 2 (two) times daily. for 14 days    ferrous gluconate (FERGON) 240 (27 FE) MG tablet TAKE 27 MG BY MOUTH 2 (TWO) TIMES DAILY WITH MEALS.    ibuprofen (ADVIL,MOTRIN) 800 MG tablet Take 1 tablet (800 mg total) by mouth 3 (three) times daily as needed for Pain.    lancets (ACCU-CHEK SOFTCLIX LANCETS) Misc 1 Units by Misc.(Non-Drug; Combo Route) route 2 (two) times a day.    metFORMIN (GLUCOPHAGE-XR) 500 MG ER 24hr tablet TAKE 1 TABLET BY MOUTH TWICE A DAY WITH MEALS (Patient taking differently: Take 500 mg by mouth once daily.)    ondansetron (ZOFRAN-ODT) 4 MG TbDL Dissolve 1 tablet (4 mg total) by mouth every 8 (eight) hours as needed (nausea/vomiting).    oxybutynin (DITROPAN) 5 MG Tab Take 1 tablet (5 mg total) by mouth 3 (three) times daily. for 10 days    pen needle, diabetic (PEN NEEDLE) 32 gauge x 5/32" Ndle 1 each by Misc.(Non-Drug; Combo Route) route once daily.    phenazopyridine (PYRIDIUM) 100 MG tablet Take 1 tablet (100 mg total) by mouth 3 (three) times daily as needed for Pain.    pravastatin (PRAVACHOL) 20 MG tablet Take 1 tablet (20 mg total) by mouth once daily.    RABEprazole (ACIPHEX) 20 mg tablet Take 1 tablet (20 mg total) by mouth before breakfast.    sod sulf-pot chloride-mag sulf (SUTAB) 1.479-0.188- 0.225 gram tablet Take 12 tablets by mouth once daily. Take as directed by provider office    sulfacetamide sodium-sulfur 10-5 % (w/w) Clsr Use to wash face daily    tadalafiL (CIALIS) 5 MG tablet Take 1 tablet (5 mg total) by mouth daily as needed for Erectile Dysfunction.    tamsulosin (FLOMAX) 0.4 mg Cap Take 1 capsule (0.4 mg total) by mouth once daily. for 14 days    tamsulosin (FLOMAX) 0.4 mg Cap Take 1 capsule (0.4 mg total) by mouth once daily. for 14 days    testosterone cypionate (DEPOTESTOTERONE CYPIONATE) 200 mg/mL injection Inject 60 mg into the muscle every 14 (fourteen) days. Twice a week    " triamcinolone acetonide 0.025% (KENALOG) 0.025 % Oint Thin film to lips and eczematous plaques BID PRN flare    triamcinolone acetonide 0.1% (KENALOG) 0.1 % cream AAA bid    zinc gluconate 50 mg tablet Take 50 mg by mouth once daily.    [] naproxen (NAPROSYN) 500 MG tablet Take 1 tablet (500 mg total) by mouth 2 (two) times daily with meals. for 10 days    semaglutide (OZEMPIC) 2 mg/dose (8 mg/3 mL) PnIj Inject 2 mg into the skin every 7 days. (Patient taking differently: Inject 2 mg into the skin every Tuesday.)    vitamin A 8000 UNIT capsule Take 1 capsule (8,000 Units total) by mouth once daily.     Review of patient's allergies indicates:  No Known Allergies     Review of Systems   Constitutional:  Negative for fever.   Cardiovascular:  Negative for chest pain.       Objective:      Vital Signs (Most Recent)  Temp: 98.2 °F (36.8 °C) (24 1005)  Pulse: 83 (24 1005)  Resp: 15 (24 1005)  BP: (!) 157/94 (24 1005)  SpO2: 97 % (24 1005)    Vital Signs Range (Last 24H):  Temp:  [98.2 °F (36.8 °C)]   Pulse:  [83]   Resp:  [15]   BP: (157)/(94)   SpO2:  [97 %]     Physical Exam  Neurological:      Mental Status: He is alert and oriented to person, place, and time.             Assessment:      Surveillance colonoscopy  He was discovered to have colon cancer and had right hemicolecton        for stage II on 2008. MSH2 Parada syndrome.       Plan:      Surveillance colonoscopy  He was discovered to have colon cancer and had right hemicolecton        for stage II on 2008. MSH2 Parada syndrome.

## 2024-02-06 NOTE — ANESTHESIA PREPROCEDURE EVALUATION
02/06/2024  Lyndon Lion Jr. is a 53 y.o., male.  Patient Active Problem List   Diagnosis    Radiculopathy, lumbosacral region    Herniated lumbar intervertebral disc    Lumbar spondylosis    DDD (degenerative disc disease), lumbosacral    Acute medial meniscal tear    Obesity, unspecified    Parada syndrome    Thoracic or lumbosacral neuritis or radiculitis, unspecified    Neural foraminal stenosis of lumbar spine    MSH2-related Parada syndrome (HNPCC1)    Spondylosis of lumbar region without myelopathy or radiculopathy    Spondylolisthesis of lumbar region    Fatty liver    Splenomegaly    Mild vitamin D deficiency    Colon dysplasia    Colon adenoma    Pneumoperitoneum    Postpolypectomy electrocoagulation syndrome    Lumbar radiculopathy    History of colon cancer, stage II    Uncontrolled type 2 diabetes mellitus with hyperglycemia    Type 2 diabetes mellitus with hyperglycemia    Obesity (BMI 30-39.9)    Gastroesophageal reflux disease without esophagitis    Bariatric surgery status    S/P laparoscopic sleeve gastrectomy    Hypovitaminosis D    Hypophosphatemia    Vitamin B12 deficiency    Vitamin A deficiency    Erectile dysfunction    Hypogonadism male    Squamous cell cancer of skin of nasal tip    Mohs defect    Acquired nasal deformity    Obstruction of nasal valve    Aspirin long-term use    Type 2 diabetes mellitus without complication, without long-term current use of insulin    History of colon resection    Hyperlipidemia    Nephrolithiasis           Pre-op Assessment    I have reviewed the Patient Summary Reports.       I have reviewed the Medications.     Review of Systems  Anesthesia Hx:  No problems with previous Anesthesia               Denies Personal Hx of Anesthesia complications.                    Pulmonary:    Asthma       Asthma:               Renal/:  Chronic Renal Disease         Kidney Function/Disease             Hepatic/GI:     GERD Liver Disease,     Gerd       Liver Disease        Musculoskeletal:  Arthritis        Arthritis          Neurological:    Neuromuscular Disease,           Arthritis                         Neuromuscular Disease   Endocrine:  Diabetes    Diabetes                          Physical Exam    Airway:  No airway management difficulties anticipated  Dental:  No active dental issues noted  Chest/Lungs:  Clear to auscultation    Heart:  Rate: Normal  Rhythm: Regular Rhythm  Sounds: Normal        Anesthesia Plan  Type of Anesthesia, risks & benefits discussed:    Anesthesia Type: Gen Natural Airway  Informed Consent: Informed consent signed with the Patient and all parties understand the risks and agree with anesthesia plan.  All questions answered.   ASA Score: 3  Anesthesia Plan Notes: Chart reviewed. Patient seen and examined. Anesthesia plan discussed and questions answered. E-consent signed. Reji العراقي MD    Ready For Surgery From Anesthesia Perspective.     .

## 2024-02-06 NOTE — PROVATION PATIENT INSTRUCTIONS
Discharge Summary/Instructions after an Endoscopic Procedure  Patient Name: Lyndon Lion  Patient MRN: [id number]  Patient YOB: 1970 Tuesday, February 6, 2024  Dom Leonardo MD  Dear patient,  As a result of recent federal legislation (The Federal Cures Act), you may   receive lab or pathology results from your procedure in your [facility]   account before your physician is able to contact you. Your physician or   their representative will relay the results to you with their   recommendations at their soonest availability.  Thank you,  RESTRICTIONS:  During your procedure today, you received medications for sedation.  These   medications may affect your judgment, balance and coordination.  Therefore,   for 24 hours, you have the following restrictions:   - DO NOT drive a car, operate machinery, make legal/financial decisions,   sign important papers or drink alcohol.    ACTIVITY:  Today: no heavy lifting, straining or running due to procedural   sedation/anesthesia.  The following day: return to full activity including work.  DIET:  Eat and drink normally unless instructed otherwise.     TREATMENT FOR COMMON SIDE EFFECTS:  - Mild abdominal pain, nausea, belching, bloating or excessive gas:  rest,   eat lightly and use a heating pad.  - Sore Throat: treat with throat lozenges and/or gargle with warm salt   water.  - Because air was used during the procedure, expelling large amounts of air   from your rectum or belching is normal.  - If a bowel prep was taken, you may not have a bowel movement for 1-3 days.    This is normal.  SYMPTOMS TO WATCH FOR AND REPORT TO YOUR PHYSICIAN:  1. Abdominal pain or bloating, other than gas cramps.  2. Chest pain.  3. Back pain.  4. Signs of infection such as: chills or fever occurring within 24 hours   after the procedure.  5. Rectal bleeding, which would show as bright red, maroon, or black stools.   (A tablespoon of blood from the rectum is not serious, especially  if   hemorrhoids are present.)  6. Vomiting.  7. Weakness or dizziness.  GO DIRECTLY TO THE NEAREST EMERGENCY ROOM IF YOU HAVE ANY OF THE FOLLOWING:      Difficulty breathing              Chills and/or fever over 101 F   Persistent vomiting and/or vomiting blood   Severe abdominal pain   Severe chest pain   Black, tarry stools   Bleeding- more than one tablespoon   Any other symptom or condition that you feel may need urgent attention  Your doctor recommends these additional instructions:  If any biopsies were taken, your doctors clinic will contact you in 1 to 2   weeks with any results.  - Discharge patient to home.   - Await pathology results.   - Telephone endoscopist for pathology results in 3 weeks.   - Repeat colonoscopy in 6 months for surveillance of multiple polyps.   - Return to GI clinic at the next available appointment.   - The findings and recommendations were discussed with the patient.  For questions, problems or results please call your physician - Dom Leonardo MD at Work:  [phone].  [facility] [place], EMERGENCY ROOM PHONE NUMBER: [phone]  IF A COMPLICATION OR EMERGENCY SITUATION ARISES AND YOU ARE UNABLE TO REACH   YOUR PHYSICIAN - GO DIRECTLY TO THE EMERGENCY ROOM.  Dom Leonardo MD  2/6/2024 12:09:00 PM  This report has been verified and signed electronically.  Dear patient,  As a result of recent federal legislation (The Federal Cures Act), you may   receive lab or pathology results from your procedure in your [facility]   account before your physician is able to contact you. Your physician or   their representative will relay the results to you with their   recommendations at their soonest availability.  Thank you,  PROVATION

## 2024-02-06 NOTE — ANESTHESIA POSTPROCEDURE EVALUATION
Anesthesia Post Evaluation    Patient: Lyndon Lion Jr.    Procedure(s) Performed: Procedure(s) (LRB):  COLONOSCOPY (N/A)    Final Anesthesia Type: general      Level of consciousness: awake and alert  Post-procedure vital signs: reviewed and stable  Pain control: Pain has been treated.  Airway patency: patent    PONV status: Absent or treated.  Anesthetic complications: no      Cardiovascular status: hemodynamically stable  Respiratory status: unassisted  Hydration status: euvolemic                Vitals Value Taken Time   BP 90/55 02/06/24 1200   Temp 36.7 °C (98 °F) 02/06/24 1200   Pulse 75 02/06/24 1200   Resp 16 02/06/24 1200   SpO2 96 % 02/06/24 1200         No case tracking events are documented in the log.      Pain/Karthikeyan Score: Karthikeyan Score: 9 (2/6/2024 12:00 PM)

## 2024-02-06 NOTE — TRANSFER OF CARE
"Anesthesia Transfer of Care Note    Patient: Lyndon Lion Jr.    Procedure(s) Performed: Procedure(s) (LRB):  COLONOSCOPY (N/A)    Patient location: GI    Anesthesia Type: general    Transport from OR: Transported from OR on room air with adequate spontaneous ventilation    Post pain: adequate analgesia    Post assessment: no apparent anesthetic complications    Post vital signs: stable    Level of consciousness: sedated    Nausea/Vomiting: no nausea/vomiting    Complications: none    Transfer of care protocol was followed      Last vitals: Visit Vitals  BP (!) 90/55   Pulse 80   Temp 36.8 °C (98.2 °F)   Resp 16   Ht 6' 2" (1.88 m)   Wt 108.9 kg (240 lb)   SpO2 95%   BMI 30.81 kg/m²     "

## 2024-02-08 ENCOUNTER — PATIENT MESSAGE (OUTPATIENT)
Dept: GASTROENTEROLOGY | Facility: CLINIC | Age: 54
End: 2024-02-08
Payer: COMMERCIAL

## 2024-02-08 LAB
FINAL PATHOLOGIC DIAGNOSIS: NORMAL
GROSS: NORMAL
Lab: NORMAL

## 2024-02-08 NOTE — PROGRESS NOTES
Lyndon your colonoscopy pathology was benign but your colon polyps were the precancerous type of colon polyps.  Recommend your next surveillance colonoscopy in 6 months from your last 1      1. TRANSVERSE COLON NEAR ANASTOMOSIS, BIOPSY:  Benign small bowel and colonic mucosa with reactive/regenerative changes  No evidence of active inflammation, granuloma, dysplasia or malignancy    2. DESCENDING COLON, POLYPECTOMY:  Tubulovillous adenoma  Negative for high-grade dysplasia or malignancy  Margins negative for neoplasia    3. RECTUM, POLYPECTOMY:  Sessile serrated lesion with no dysplasia   Comment: Interp By Claudia Toscano M.D., Signed on 02/08/2024

## 2024-02-12 ENCOUNTER — PATIENT MESSAGE (OUTPATIENT)
Dept: UROLOGY | Facility: CLINIC | Age: 54
End: 2024-02-12
Payer: COMMERCIAL

## 2024-02-12 ENCOUNTER — PROCEDURE VISIT (OUTPATIENT)
Dept: UROLOGY | Facility: CLINIC | Age: 54
End: 2024-02-12
Payer: COMMERCIAL

## 2024-02-12 VITALS
TEMPERATURE: 99 F | HEART RATE: 70 BPM | BODY MASS INDEX: 31.99 KG/M2 | DIASTOLIC BLOOD PRESSURE: 82 MMHG | WEIGHT: 249.13 LBS | RESPIRATION RATE: 18 BRPM | SYSTOLIC BLOOD PRESSURE: 140 MMHG

## 2024-02-12 DIAGNOSIS — N20.0 KIDNEY STONE ON LEFT SIDE: ICD-10-CM

## 2024-02-12 PROCEDURE — 52310 CYSTOSCOPY AND TREATMENT: CPT | Mod: S$GLB,,, | Performed by: UROLOGY

## 2024-02-12 RX ORDER — LIDOCAINE HYDROCHLORIDE 20 MG/ML
JELLY TOPICAL
Status: COMPLETED | OUTPATIENT
Start: 2024-02-12 | End: 2024-02-12

## 2024-02-12 RX ADMIN — LIDOCAINE HYDROCHLORIDE: 20 JELLY TOPICAL at 01:02

## 2024-02-12 NOTE — PATIENT INSTRUCTIONS
What to Expect After a Cystoscopy with Stent Removal  For the next 24-48 hours, you may feel a mild burning when you urinate. This burning is normal and expected. Drink plenty of water to dilute the urine to help relieve the burning sensation. You may also see a small amount of blood in your urine after the procedure.    Unless you are already taking antibiotics, you may be given an antibiotic after the test to prevent infection.    Signs and Symptoms to Report  Call the [facility] at [phone] if you develop any of the following:  Fever of 101 degrees or higher  Chills or persistent bleeding  Inability to urinate    After hours or on weekends, you may reach a urology resident on call at this number: [phone].

## 2024-02-12 NOTE — PROCEDURES
CYSTOSCOPY W/ STENT REMOVAL    Date/Time: 2/12/2024 1:30 PM    Performed by: Eron Llanes MD  Authorized by: Andres Phillips MD        Office Cystoscopy with Stent Removal Procedure Note    Date of Procedure: 02/12/2024    Indication:  Indwelling Ureteral Stent     Informed consent:  The risks, benefits, complications, treatment options, and expected outcomes were discussed with the patient. The patient concurred with the proposed plan and provided informed consent.     Prior to the procedure, I reviewed pertinent imaging and operative notes, confirming that the patient has One ureteral stent    Anesthesia: Lidocaine jelly 2%     Antibiotic prophylaxis: Bactrim    Procedure:  The patient was placed in the lithotomy position, was prepped and draped in the usual manner using sterile technique, and 2% lidocaine jelly instilled into the urethra.  A procedural timeout was performed identifying the patient, all in attendance were in agreement, including the patient. A 17 F flexible cystoscope was then inserted into the urethra and the urethra and bladder carefully examined.  The findings below were noted. The stent was grasped using flexible gasping forceps, and removed intact through the meatus. The stent was examined on the backtable to confirm no fragmentation, and that the stent was removed in its entirety.    Findings:   1. Normal anterior urethra without evidence of strictures or tumors   2. Prostatic urethra open without signs of obstruction  3. Bladder free of masses, tumors, lesions.  Ureteral orifices in orthotopic position bilaterally        Specimens: None   Complications: None; patient tolerated the procedure well          Disposition: Home after brief observation   Condition: Stable     Assessment: 53M s/p URS/LL/Stent    Plan: follow up in 6 weeks with AMELIE prior    Attending Attestation:      I personally performed the procedure.     Eron Llanes  1:23 PM  02/12/2024

## 2024-02-19 ENCOUNTER — ANESTHESIA EVENT (OUTPATIENT)
Dept: SURGERY | Facility: HOSPITAL | Age: 54
End: 2024-02-19
Payer: COMMERCIAL

## 2024-02-19 NOTE — PRE-PROCEDURE INSTRUCTIONS
Pre-op instructions given. Patient verbalized understanding.   Arrival time 0630, surgery time 0830

## 2024-02-19 NOTE — ANESTHESIA PREPROCEDURE EVALUATION
[facility]  Anesthesia Pre-Operative Evaluation         Patient Name: Lyndon Lion Jr.  YOB: 1970  MRN: [id number]    SUBJECTIVE:     02/20/2024    Procedure(s) (LRB):  REVISION, PROCEDURE INVOLVING FLAP GRAFT (Bilateral)    Lyndon Lion Jr. is a 53 y.o. male here for Procedure(s) (LRB):  REVISION, PROCEDURE INVOLVING FLAP GRAFT (Bilateral)    Drips:     Patient Active Problem List   Diagnosis    Radiculopathy, lumbosacral region    Herniated lumbar intervertebral disc    Lumbar spondylosis    DDD (degenerative disc disease), lumbosacral    Acute medial meniscal tear    Obesity, unspecified    Parada syndrome    Thoracic or lumbosacral neuritis or radiculitis, unspecified    Neural foraminal stenosis of lumbar spine    MSH2-related Parada syndrome (HNPCC1)    Spondylosis of lumbar region without myelopathy or radiculopathy    Spondylolisthesis of lumbar region    Fatty liver    Splenomegaly    Mild vitamin D deficiency    Colon dysplasia    Colon adenoma    Pneumoperitoneum    Postpolypectomy electrocoagulation syndrome    Lumbar radiculopathy    History of colon cancer, stage II    Uncontrolled type 2 diabetes mellitus with hyperglycemia    Type 2 diabetes mellitus with hyperglycemia    Obesity (BMI 30-39.9)    Gastroesophageal reflux disease without esophagitis    Bariatric surgery status    S/P laparoscopic sleeve gastrectomy    Hypovitaminosis D    Hypophosphatemia    Vitamin B12 deficiency    Vitamin A deficiency    Erectile dysfunction    Hypogonadism male    Squamous cell cancer of skin of nasal tip    Mohs defect    Acquired nasal deformity    Obstruction of nasal valve    Aspirin long-term use    Type 2 diabetes mellitus without complication, without long-term current use of insulin    History of colon resection    Hyperlipidemia    Nephrolithiasis       Review of patient's allergies indicates:  No Known Allergies    No current facility-administered medications on file prior  to encounter.     Current Outpatient Medications on File Prior to Encounter   Medication Sig Dispense Refill    acetaminophen (TYLENOL) 500 MG tablet Take 1 tablet (500 mg total) by mouth every 6 (six) hours as needed for Pain or Temperature greater than (100.5). Note do not exceed >3000mg tylenol/acetaminophen in 24hr period. 50 tablet 0    ascorbic acid, vitamin C, (VITAMIN C) 100 MG tablet Take 100 mg by mouth once daily.      aspirin (ECOTRIN) 81 MG EC tablet Take 81 mg by mouth once daily.      azelaic acid (AZELEX) 15 % gel Apply to face BID. 50 g 5    ciclopirox (PENLAC) 8 % Soln Apply topically nightly. 6.6 mL 3    cyanocobalamin, vitamin B-12, 2,500 mcg Lozg Place 2 tablets under the tongue once daily. 180 lozenge 3    ferrous gluconate (FERGON) 240 (27 FE) MG tablet TAKE 27 MG BY MOUTH 2 (TWO) TIMES DAILY WITH MEALS.      ondansetron (ZOFRAN-ODT) 4 MG TbDL Dissolve 1 tablet (4 mg total) by mouth every 8 (eight) hours as needed (nausea/vomiting). 5 tablet 0    pravastatin (PRAVACHOL) 20 MG tablet Take 1 tablet (20 mg total) by mouth once daily. 90 tablet 3    RABEprazole (ACIPHEX) 20 mg tablet Take 1 tablet (20 mg total) by mouth before breakfast. 90 tablet 3    sulfacetamide sodium-sulfur 10-5 % (w/w) Clsr Use to wash face daily 170 g 5    triamcinolone acetonide 0.025% (KENALOG) 0.025 % Oint Thin film to lips and eczematous plaques BID PRN flare 15 g 1    triamcinolone acetonide 0.1% (KENALOG) 0.1 % cream AAA bid 454 g 3    vitamin A 8000 UNIT capsule Take 1 capsule (8,000 Units total) by mouth once daily. 100 capsule 3    zinc gluconate 50 mg tablet Take 50 mg by mouth once daily.      ACCU-CHEK GUIDE TEST STRIPS Strp USE TO TEST TWICE A  strip 3    celecoxib (CELEBREX) 200 MG capsule Take 200 mg by mouth once daily.      lancets (ACCU-CHEK SOFTCLIX LANCETS) Misc 1 Units by Misc.(Non-Drug; Combo Route) route 2 (two) times a day. 200 each 0    metFORMIN (GLUCOPHAGE-XR) 500 MG ER 24hr tablet TAKE 1  "TABLET BY MOUTH TWICE A DAY WITH MEALS (Patient taking differently: Take 500 mg by mouth once daily.) 180 tablet 3    pen needle, diabetic (PEN NEEDLE) 32 gauge x 5/32" Ndle 1 each by Misc.(Non-Drug; Combo Route) route once daily. 90 each 3    semaglutide (OZEMPIC) 2 mg/dose (8 mg/3 mL) PnIj Inject 2 mg into the skin every 7 days. (Patient taking differently: Inject 2 mg into the skin every Tuesday.) 3 mL 11    tadalafiL (CIALIS) 5 MG tablet Take 1 tablet (5 mg total) by mouth daily as needed for Erectile Dysfunction. 90 tablet 1    testosterone cypionate (DEPOTESTOTERONE CYPIONATE) 200 mg/mL injection Inject 60 mg into the muscle every 14 (fourteen) days. Twice a week         Past Surgical History:   Procedure Laterality Date    APPENDECTOMY      APPLICATION OF CARTILAGE GRAFT N/A 3/21/2023    Procedure: APPLICATION, CARTILAGE GRAFT;  Surgeon: Alyx Spivey MD;  [address]  Service: ENT;  Laterality: N/A;    CAUDAL EPIDURAL STEROID INJECTION N/A 11/6/2018    Procedure: Injection-steroid-epidural-caudal;  Surgeon: Lyndon Brooke Jr., MD;  Location: [facility] OR;  Service: Pain Management;  Laterality: N/A;    COLON SURGERY  2008    PARTIAL COLECTOMY    COLONOSCOPY Bilateral 2012    COLONOSCOPY N/A 8/1/2016    Procedure: COLONOSCOPY;  Surgeon: Blaze Marr MD;  Location: [facility];  Service: Endoscopy;  Laterality: N/A;    COLONOSCOPY N/A 9/20/2017    Procedure: COLONOSCOPY;  Surgeon: Dom Leonardo MD;  Location: [facility] (4TH FLR);  Service: Endoscopy;  Laterality: N/A;  not given PM prep    COLONOSCOPY N/A 10/9/2017    Procedure: COLONOSCOPY;  Surgeon: RAMON Callahan MD;  Location: [facility] (4TH FLR);  Service: Endoscopy;  Laterality: N/A;  ok for Prepopik per Dr Callahan    COLONOSCOPY N/A 11/20/2017    Procedure: COLONOSCOPY/EMR;  Surgeon: Joseluis Choe MD;  Location: [facility] (2ND FLR);  Service: Endoscopy;  Laterality: N/A;  EMR    no pm prep    COLONOSCOPY N/A 5/30/2018    " Procedure: COLONOSCOPY;  Surgeon: Dom Leonardo MD;  Location: [facility] (4TH FLR);  Service: Endoscopy;  Laterality: N/A;  follow up colonoscopy in 5/2018 for Parada Syndrome         COLONOSCOPY N/A 6/10/2019    Procedure: COLONOSCOPY;  Surgeon: Dom Leonardo MD;  Location: [facility] ENDO (4TH FLR);  Service: Endoscopy;  Laterality: N/A;  Prepopik ordered per Dr. Leonardo    COLONOSCOPY N/A 7/22/2020    Procedure: COLONOSCOPY;  Surgeon: Dom Leonardo MD;  Location: [facility] ENDO (4TH FLR);  Service: Endoscopy;  Laterality: N/A;    COLONOSCOPY N/A 5/27/2021    Procedure: COLONOSCOPY;  Surgeon: Dom Leonardo MD;  Location: [facility] (4TH FLR);  Service: Endoscopy;  Laterality: N/A;  covid test 5/24-[place]  pt requests Prepopik    COLONOSCOPY N/A 6/17/2022    Procedure: COLONOSCOPY;  Surgeon: Dom Leonardo MD;  Location: [facility] (4TH FLR);  Service: Endoscopy;  Laterality: N/A;  He was discovered to have colon cancer and had right hemicolectomy        for stage II on 08/08/2008. MSH2 Parada syndrome.  sutab requested  instructions via the portal -sm  fully vaccinated - sm    COLONOSCOPY N/A 6/22/2023    Procedure: COLONOSCOPY;  Surgeon: Dom Leonardo MD;  Location: [facility] (4TH FLR);  Service: Endoscopy;  Laterality: N/A;  see telephone encounter dated 5/24/23/ Pt/ Pt wife requested sutab - prep ins. on portal / Ozempic for DM- ERW    COLONOSCOPY N/A 2/6/2024    Procedure: COLONOSCOPY;  Surgeon: Dom Leonardo MD;  Location: [facility] ENDO (4TH FLR);  Service: Endoscopy;  Laterality: N/A;  sutab/pt diabetic/ozempic/referral dr feng    CYSTOSCOPY      CYSTOSCOPY N/A 1/31/2024    Procedure: CYSTOSCOPY;  Surgeon: Eron Llanes MD;  Location: [facility] OR [facility] FLR;  Service: Urology;  Laterality: N/A;    Epidural Steroid Injection  10/23/15    Lumbar    EPIDURAL STEROID INJECTION INTO LUMBAR SPINE N/A 7/27/2018    Procedure: Injection-steroid-epidural-lumbar;  Surgeon: Lyndon Brooke Jr., MD;  Location:  [facility] OR;  Service: Pain Management;  Laterality: N/A;    ESOPHAGOGASTRODUODENOSCOPY      ESOPHAGOGASTRODUODENOSCOPY N/A 7/22/2020    Procedure: EGD (ESOPHAGOGASTRODUODENOSCOPY);  Surgeon: Dom Leonardo MD;  Location: [facility] (4TH FLR);  Service: Endoscopy;  Laterality: N/A;  Please schedule patient for EGD and colonoscopy with me MSH2 Parada syndrome and anemia evaluation please make priority 1  covid test 7/20-[place]    ESOPHAGOGASTRODUODENOSCOPY N/A 6/22/2023    Procedure: EGD (ESOPHAGOGASTRODUODENOSCOPY);  Surgeon: Dom Leonardo MD;  Location: [facility] (4TH FLR);  Service: Endoscopy;  Laterality: N/A;  see telephone encounter dated 5/24/23 6/16/23-[place] completed appointment confirmed-    ESOPHAGOGASTRODUODENOSCOPY N/A 9/1/2023    Procedure: EGD (ESOPHAGOGASTRODUODENOSCOPY);  Surgeon: Dom Leonardo MD;  Location: [facility] (4TH FLR);  Service: Endoscopy;  Laterality: N/A;  Instructions sent via portal / Ozempic / Extended Clear Liquid prep    EXCISION OR SURGICAL PLANING, SKIN, NOSE, FOR RHINOPHYMA Bilateral 3/7/2023    Procedure: EXCISION OR SURGICAL resection nasal skin cancer;  Surgeon: Alyx Spivey MD;  Location: [facility] 2ND FLR;  Service: ENT;  Laterality: Bilateral;    EXTRACTION - STONE Left 1/31/2024    Procedure: EXTRACTION - STONE;  Surgeon: Eron Llanes MD;  Location: [facility] 1ST FLR;  Service: Urology;  Laterality: Left;    FACETECTOMY OF VERTEBRA  2/13/2019    Procedure: MEDIAL FACETECTOMY L5-S1;  Surgeon: Lyndon Brooke Jr., MD;  Location: [facility] OR;  Service: Orthopedics;;    GASTRIC BYPASS  2010    SLEEVE    KNEE ARTHROSCOPY Right     LUMBAR DISCECTOMY  2/13/2019    Procedure: DISCECTOMY, SPINE, LUMBAR L5-S1;  Surgeon: Lyndon Brooke Jr., MD;  Location: [facility] OR;  Service: Orthopedics;;    NASAL RECONSTRUCTION N/A 3/21/2023    Procedure: RECONSTRUCTION, NOSE;  Surgeon: Alyx Spivey MD;  Location: [facility] OR [address] [place] [address];  Service: ENT;  Laterality: N/A;     PLACEMENT-STENT Left 1/31/2024    Procedure: PLACEMENT-STENT;  Surgeon: Eron Llanes MD;  Location: [facility] OR 1ST FLR;  Service: Urology;  Laterality: Left;    RETROGRADE PYELOGRAPHY Left 1/31/2024    Procedure: PYELOGRAM, RETROGRADE;  Surgeon: Eron Llanes MD;  Location: [facility] OR 1ST FLR;  Service: Urology;  Laterality: Left;    REVISION OF FLAP GRAFT Bilateral 4/18/2023    Procedure: REVISION, PROCEDURE INVOLVING FLAP GRAFT;  Surgeon: Alyx Spivey MD;  Location: [facility] OR;  Service: ENT;  Laterality: Bilateral;    REVISION OF FLAP GRAFT N/A 6/29/2023    Procedure: REVISION, PROCEDURE INVOLVING FLAP GRAFT;  Surgeon: Alyx Spivey MD;  Location: [facility] OR 2ND FLR;  Service: ENT;  Laterality: N/A;    REVISION OF FLAP GRAFT N/A 8/29/2023    Procedure: REVISION, PROCEDURE INVOLVING FLAP GRAFT;  Surgeon: Alyx Spivey MD;  Location: [facility] OR;  Service: ENT;  Laterality: N/A;    ROTATION FLAP SURGERY N/A 3/21/2023    Procedure: CREATION, FLAP, ROTATION;  Surgeon: Alyx Spivey MD;  Location: [facility] OR 2ND FLR;  Service: ENT;  Laterality: N/A;    URETEROSCOPY Left 1/31/2024    Procedure: URETEROSCOPY;  Surgeon: Eron Llanes MD;  Location: [facility] OR 1ST FLR;  Service: Urology;  Laterality: Left;       Social History     Socioeconomic History    Marital status:    Occupational History     Employer: [employer]   Tobacco Use    Smoking status: Never    Smokeless tobacco: Never   Substance and Sexual Activity    Alcohol use: Yes     Comment: RARELY    Drug use: No    Sexual activity: Yes     Partners: Female     Social Determinants of Health     Financial Resource Strain: Low Risk  (1/30/2024)    Overall Financial Resource Strain (CARDIA)     Difficulty of Paying Living Expenses: Not very hard   Food Insecurity: No Food Insecurity (1/30/2024)    Hunger Vital Sign     Worried About Running Out of Food in the Last Year: Never true     Ran Out of Food in the Last Year: Never true   Transportation Needs: No Transportation  Needs (1/30/2024)    PRAPARE - Transportation     Lack of Transportation (Medical): No     Lack of Transportation (Non-Medical): No   Physical Activity: Inactive (1/30/2024)    Exercise Vital Sign     Days of Exercise per Week: 0 days     Minutes of Exercise per Session: 0 min   Stress: Stress Concern Present (1/30/2024)    [employer] [place] [employer] - Occupational Stress Questionnaire     Feeling of Stress : To some extent   Social Connections: Moderately Integrated (1/30/2024)    Social Connection and Isolation Panel [NHANES]     Frequency of Communication with Friends and Family: More than three times a week     Frequency of Social Gatherings with Friends and Family: More than three times a week     Attends [employer] Services: Never     Active Member of Clubs or Organizations: Yes     Attends Club or Organization Meetings: More than 4 times per year     Marital Status:    Housing Stability: Unknown (1/30/2024)    Housing Stability Vital Sign     Unable to Pay for Housing in the Last Year: No     Unstable Housing in the Last Year: No         OBJECTIVE:     Vital Signs Range (Last 24H):  Temp:  [37.1 °C (98.8 °F)] 37.1 °C (98.8 °F)  Pulse:  [74] 74  Resp:  [18] 18  SpO2:  [98 %] 98 %  BP: (146)/(84) 146/84    Significant Labs:  Lab Results   Component Value Date    WBC 5.20 01/31/2024    HGB 14.9 01/31/2024    HCT 42.6 01/31/2024     01/31/2024    CHOL 160 12/19/2023    TRIG 55 12/19/2023    HDL 38 (L) 12/19/2023    ALT 16 01/30/2024    AST 16 01/30/2024     01/31/2024    K 3.4 (L) 01/31/2024     01/31/2024    CREATININE 1.1 01/31/2024    BUN 14 01/31/2024    CO2 20 (L) 01/31/2024    TSH 1.525 12/19/2023    PSA 0.77 12/19/2023    INR 1.1 06/26/2023    HGBA1C 5.6 12/19/2023       Diagnostic Studies:    EKG:   Results for orders placed or performed during the hospital encounter of 02/06/19   EKG 12-lead    Collection Time: 02/06/19  1:20 PM    Narrative    Test Reason :  M54.16,M47.816,M51.26,M51.37,M99.83,M43.16,Z01.818,    Vent. Rate : 068 BPM     Atrial Rate : 068 BPM     P-R Int : 170 ms          QRS Dur : 092 ms      QT Int : 394 ms       P-R-T Axes : 053 093 001 degrees     QTc Int : 418 ms    Normal sinus rhythm  Rightward axis  Borderline Abnormal ECG  When compared with ECG of 14-MAY-2013 12:59,  Questionable change in The axis  Confirmed by Mike King MD ([id number]) on 2/7/2019 12:17:31 PM    Referred By: RENETTA DAVIS           Confirmed By:Mike King MD       Previous airway:     8/29/2023 8:41 AM     Induction:  Intravenous    Intubated:  Postinduction    Mask Ventilation:  Easy mask    Attempts:  1    Method of Intubation:  Video laryngoscopy    Laryngeal View Grade: Grade I - full view of cords     Oral albert    Airway Device Size:  8.0    Style/Cuff Inflation:  Cuffed (inflated to minimal occlusive pressure)    Tube secured:  23    Secured at:  The lips    Pre-op Assessment    I have reviewed the Patient Summary Reports.     I have reviewed the Nursing Notes. I have reviewed the NPO Status.   I have reviewed the Medications.     Review of Systems  Anesthesia Hx:  No problems with previous Anesthesia   History of prior surgery of interest to airway management or planning:            Denies Personal Hx of Anesthesia complications.                    Hematology/Oncology:                        --  Cancer in past history:                 Oncology Comments: Nasal squamous cell carcinoma     Parada syndrome      Cardiovascular:  Cardiovascular Normal Exercise tolerance: good    Denies Hypertension.  Denies Valvular problems/Murmurs.  Denies MI.                                         Pulmonary:    Asthma asymptomatic    Denies Sleep Apnea.   Asthma:               Renal/:  Chronic Renal Disease renal calculi  Recent cysto with stent placement and removal     Kidney Function/Disease             Hepatic/GI:     GERD Liver Disease, (ADAN)         Liver Disease         Musculoskeletal:  Arthritis               Neurological:  Denies TIA.  Denies CVA. Neuromuscular Disease,   Denies Seizures.                              Neuromuscular Disease   Endocrine:  Diabetes Denies Hypothyroidism.  Denies Hyperthyroidism.  Diabetes                    Obesity / BMI > 30      Physical Exam  General: Well nourished, Cooperative, Alert and Oriented    Airway:  Mallampati: II / I  Mouth Opening: Normal  TM Distance: Normal  Tongue: Normal    Dental:  Intact    Chest/Lungs:  Clear to auscultation, Normal Respiratory Rate    Heart:  Rate: Normal  Rhythm: Regular Rhythm        Anesthesia Plan  Type of Anesthesia, risks & benefits discussed:    Anesthesia Type: Gen ETT  Intra-op Monitoring Plan: Standard ASA Monitors  Post Op Pain Control Plan: multimodal analgesia and IV/PO Opioids PRN  Induction:  IV  Airway Plan: Video, Post-Induction  Informed Consent: Informed consent signed with the Patient and all parties understand the risks and agree with anesthesia plan.  All questions answered.   ASA Score: 2  Day of Surgery Review of History & Physical: H&P Update referred to the surgeon/provider.    Ready For Surgery From Anesthesia Perspective.     .

## 2024-02-20 ENCOUNTER — HOSPITAL ENCOUNTER (OUTPATIENT)
Facility: HOSPITAL | Age: 54
Discharge: HOME | End: 2024-02-20
Attending: OTOLARYNGOLOGY | Admitting: OTOLARYNGOLOGY
Payer: COMMERCIAL

## 2024-02-20 ENCOUNTER — ANESTHESIA (OUTPATIENT)
Dept: SURGERY | Facility: HOSPITAL | Age: 54
End: 2024-02-20
Payer: COMMERCIAL

## 2024-02-20 VITALS
TEMPERATURE: 99 F | OXYGEN SATURATION: 93 % | SYSTOLIC BLOOD PRESSURE: 134 MMHG | HEIGHT: 74 IN | DIASTOLIC BLOOD PRESSURE: 70 MMHG | HEART RATE: 73 BPM | BODY MASS INDEX: 31.44 KG/M2 | RESPIRATION RATE: 18 BRPM | WEIGHT: 245 LBS

## 2024-02-20 DIAGNOSIS — L90.5 SCAR OF NOSE: ICD-10-CM

## 2024-02-20 DIAGNOSIS — M95.0 ACQUIRED NASAL DEFORMITY: Primary | ICD-10-CM

## 2024-02-20 LAB
POCT GLUCOSE: 104 MG/DL (ref 70–110)
POCT GLUCOSE: 132 MG/DL (ref 70–110)

## 2024-02-20 PROCEDURE — 82962 GLUCOSE BLOOD TEST: CPT | Performed by: OTOLARYNGOLOGY

## 2024-02-20 PROCEDURE — 21235 EAR CARTILAGE GRAFT: CPT | Mod: 51,,, | Performed by: OTOLARYNGOLOGY

## 2024-02-20 PROCEDURE — 37000008 HC ANESTHESIA 1ST 15 MINUTES: Performed by: OTOLARYNGOLOGY

## 2024-02-20 PROCEDURE — 94761 N-INVAS EAR/PLS OXIMETRY MLT: CPT

## 2024-02-20 PROCEDURE — 25000003 PHARM REV CODE 250: Performed by: NURSE ANESTHETIST, CERTIFIED REGISTERED

## 2024-02-20 PROCEDURE — 36000707: Performed by: OTOLARYNGOLOGY

## 2024-02-20 PROCEDURE — 25000003 PHARM REV CODE 250: Performed by: STUDENT IN AN ORGANIZED HEALTH CARE EDUCATION/TRAINING PROGRAM

## 2024-02-20 PROCEDURE — 71000033 HC RECOVERY, INTIAL HOUR: Performed by: OTOLARYNGOLOGY

## 2024-02-20 PROCEDURE — 71000016 HC POSTOP RECOV ADDL HR: Performed by: OTOLARYNGOLOGY

## 2024-02-20 PROCEDURE — 99900035 HC TECH TIME PER 15 MIN (STAT)

## 2024-02-20 PROCEDURE — 36000706: Performed by: OTOLARYNGOLOGY

## 2024-02-20 PROCEDURE — 25000003 PHARM REV CODE 250: Performed by: OTOLARYNGOLOGY

## 2024-02-20 PROCEDURE — 71000015 HC POSTOP RECOV 1ST HR: Performed by: OTOLARYNGOLOGY

## 2024-02-20 PROCEDURE — D9220A PRA ANESTHESIA: Mod: ,,, | Performed by: NURSE ANESTHETIST, CERTIFIED REGISTERED

## 2024-02-20 PROCEDURE — 14060 TIS TRNFR E/N/E/L 10 SQ CM/<: CPT | Mod: ,,, | Performed by: OTOLARYNGOLOGY

## 2024-02-20 PROCEDURE — 63600175 PHARM REV CODE 636 W HCPCS: Performed by: NURSE ANESTHETIST, CERTIFIED REGISTERED

## 2024-02-20 PROCEDURE — 37000009 HC ANESTHESIA EA ADD 15 MINS: Performed by: OTOLARYNGOLOGY

## 2024-02-20 RX ORDER — PHENYLEPHRINE HCL IN 0.9% NACL 1 MG/10 ML
SYRINGE (ML) INTRAVENOUS
Status: DISCONTINUED | OUTPATIENT
Start: 2024-02-20 | End: 2024-02-20

## 2024-02-20 RX ORDER — FAMOTIDINE 10 MG/ML
INJECTION INTRAVENOUS
Status: DISCONTINUED | OUTPATIENT
Start: 2024-02-20 | End: 2024-02-20

## 2024-02-20 RX ORDER — OXYCODONE HYDROCHLORIDE 5 MG/1
10 TABLET ORAL EVERY 4 HOURS PRN
Status: DISCONTINUED | OUTPATIENT
Start: 2024-02-20 | End: 2024-02-20 | Stop reason: HOSPADM

## 2024-02-20 RX ORDER — ROCURONIUM BROMIDE 10 MG/ML
INJECTION, SOLUTION INTRAVENOUS
Status: DISCONTINUED | OUTPATIENT
Start: 2024-02-20 | End: 2024-02-20

## 2024-02-20 RX ORDER — FENTANYL CITRATE 50 UG/ML
INJECTION, SOLUTION INTRAMUSCULAR; INTRAVENOUS
Status: DISCONTINUED | OUTPATIENT
Start: 2024-02-20 | End: 2024-02-20

## 2024-02-20 RX ORDER — LIDOCAINE HYDROCHLORIDE 10 MG/ML
1 INJECTION, SOLUTION EPIDURAL; INFILTRATION; INTRACAUDAL; PERINEURAL ONCE AS NEEDED
Status: DISCONTINUED | OUTPATIENT
Start: 2024-02-20 | End: 2024-02-20 | Stop reason: HOSPADM

## 2024-02-20 RX ORDER — SODIUM CHLORIDE 9 MG/ML
INJECTION, SOLUTION INTRAVENOUS CONTINUOUS
Status: DISCONTINUED | OUTPATIENT
Start: 2024-02-20 | End: 2024-02-20 | Stop reason: HOSPADM

## 2024-02-20 RX ORDER — LIDOCAINE HYDROCHLORIDE 20 MG/ML
INJECTION INTRAVENOUS
Status: DISCONTINUED | OUTPATIENT
Start: 2024-02-20 | End: 2024-02-20

## 2024-02-20 RX ORDER — HYDROCODONE BITARTRATE AND ACETAMINOPHEN 5; 325 MG/1; MG/1
1 TABLET ORAL EVERY 6 HOURS PRN
Qty: 7 TABLET | Refills: 0 | Status: SHIPPED | OUTPATIENT
Start: 2024-02-20 | End: 2024-03-05

## 2024-02-20 RX ORDER — IPRATROPIUM BROMIDE AND ALBUTEROL SULFATE 2.5; .5 MG/3ML; MG/3ML
3 SOLUTION RESPIRATORY (INHALATION) EVERY 4 HOURS PRN
Status: DISCONTINUED | OUTPATIENT
Start: 2024-02-20 | End: 2024-02-20 | Stop reason: HOSPADM

## 2024-02-20 RX ORDER — OXYCODONE HYDROCHLORIDE 5 MG/1
5 TABLET ORAL
Status: DISCONTINUED | OUTPATIENT
Start: 2024-02-20 | End: 2024-02-20 | Stop reason: HOSPADM

## 2024-02-20 RX ORDER — PROCHLORPERAZINE EDISYLATE 5 MG/ML
5 INJECTION INTRAMUSCULAR; INTRAVENOUS EVERY 30 MIN PRN
Status: DISCONTINUED | OUTPATIENT
Start: 2024-02-20 | End: 2024-02-20 | Stop reason: HOSPADM

## 2024-02-20 RX ORDER — MIDAZOLAM HYDROCHLORIDE 1 MG/ML
INJECTION, SOLUTION INTRAMUSCULAR; INTRAVENOUS
Status: DISCONTINUED | OUTPATIENT
Start: 2024-02-20 | End: 2024-02-20

## 2024-02-20 RX ORDER — CEFAZOLIN SODIUM 1 G/3ML
INJECTION, POWDER, FOR SOLUTION INTRAMUSCULAR; INTRAVENOUS
Status: DISCONTINUED | OUTPATIENT
Start: 2024-02-20 | End: 2024-02-20

## 2024-02-20 RX ORDER — DEXAMETHASONE SODIUM PHOSPHATE 4 MG/ML
INJECTION, SOLUTION INTRA-ARTICULAR; INTRALESIONAL; INTRAMUSCULAR; INTRAVENOUS; SOFT TISSUE
Status: DISCONTINUED | OUTPATIENT
Start: 2024-02-20 | End: 2024-02-20

## 2024-02-20 RX ORDER — LIDOCAINE HYDROCHLORIDE AND EPINEPHRINE 10; 10 MG/ML; UG/ML
INJECTION, SOLUTION INFILTRATION; PERINEURAL
Status: DISCONTINUED | OUTPATIENT
Start: 2024-02-20 | End: 2024-02-20 | Stop reason: HOSPADM

## 2024-02-20 RX ORDER — ONDANSETRON HYDROCHLORIDE 2 MG/ML
INJECTION, SOLUTION INTRAVENOUS
Status: DISCONTINUED | OUTPATIENT
Start: 2024-02-20 | End: 2024-02-20

## 2024-02-20 RX ORDER — ACETAMINOPHEN 500 MG
1000 TABLET ORAL ONCE
Status: COMPLETED | OUTPATIENT
Start: 2024-02-20 | End: 2024-02-20

## 2024-02-20 RX ORDER — HYDROMORPHONE HYDROCHLORIDE 1 MG/ML
0.2 INJECTION, SOLUTION INTRAMUSCULAR; INTRAVENOUS; SUBCUTANEOUS EVERY 5 MIN PRN
Status: DISCONTINUED | OUTPATIENT
Start: 2024-02-20 | End: 2024-02-20 | Stop reason: HOSPADM

## 2024-02-20 RX ORDER — PROPOFOL 10 MG/ML
VIAL (ML) INTRAVENOUS
Status: DISCONTINUED | OUTPATIENT
Start: 2024-02-20 | End: 2024-02-20

## 2024-02-20 RX ADMIN — Medication 50 MCG: at 09:02

## 2024-02-20 RX ADMIN — MIDAZOLAM 2 MG: 1 INJECTION INTRAMUSCULAR; INTRAVENOUS at 08:02

## 2024-02-20 RX ADMIN — ROCURONIUM 50 MG: 50 INJECTION, SOLUTION INTRAVENOUS at 08:02

## 2024-02-20 RX ADMIN — CEFAZOLIN 2 G: 330 INJECTION, POWDER, FOR SOLUTION INTRAMUSCULAR; INTRAVENOUS at 09:02

## 2024-02-20 RX ADMIN — DEXAMETHASONE SODIUM PHOSPHATE 8 MG: 4 INJECTION INTRA-ARTICULAR; INTRALESIONAL; INTRAMUSCULAR; INTRAVENOUS; SOFT TISSUE at 08:02

## 2024-02-20 RX ADMIN — PROPOFOL 200 MG: 10 INJECTION, EMULSION INTRAVENOUS at 08:02

## 2024-02-20 RX ADMIN — FENTANYL CITRATE 100 MCG: 50 INJECTION INTRAMUSCULAR; INTRAVENOUS at 08:02

## 2024-02-20 RX ADMIN — SODIUM CHLORIDE: 9 INJECTION, SOLUTION INTRAVENOUS at 07:02

## 2024-02-20 RX ADMIN — LIDOCAINE HYDROCHLORIDE 100 MG: 20 INJECTION INTRAVENOUS at 08:02

## 2024-02-20 RX ADMIN — SODIUM CHLORIDE: 9 INJECTION, SOLUTION INTRAVENOUS at 09:02

## 2024-02-20 RX ADMIN — GLYCOPYRROLATE 0.1 MG: 0.2 INJECTION INTRAMUSCULAR; INTRAVENOUS at 08:02

## 2024-02-20 RX ADMIN — Medication 50 MCG: at 10:02

## 2024-02-20 RX ADMIN — ACETAMINOPHEN 1000 MG: 500 TABLET ORAL at 07:02

## 2024-02-20 RX ADMIN — SUGAMMADEX 150 MG: 100 INJECTION, SOLUTION INTRAVENOUS at 10:02

## 2024-02-20 RX ADMIN — FAMOTIDINE 20 MG: 10 INJECTION, SOLUTION INTRAVENOUS at 08:02

## 2024-02-20 RX ADMIN — ONDANSETRON 4 MG: 2 INJECTION INTRAMUSCULAR; INTRAVENOUS at 08:02

## 2024-02-20 NOTE — OP NOTE
Date of service: 2/20/2024    Pre-operative Diagnosis:   History of large squamous cell carcinoma of nasal skin  History of reconstruction with paramedian forehead flap  Nasal scarring with ala retraction    Post-operative Diagnosis:  Same    Procedures Performed:  1. Advancement rotation flap of the nose to correct ala retraction with advanced area being 3.5 x 1.5 cm  2. Left auricular cartilage graft for use in ala lengthening in the nose     Surgeon: Alyx Spivey MD    Assistant(s):  None    Anesthesia: General Endotracheal    EBL:  Minimal less than 15 cc    Specimens:  None    Findings:  Patient had ala retraction with decreased ala width.  All previous scarring was released from prior auricular graft down to the ala margin with large pocket created.  Ear cartilage was placed and stacked with resultant skin deficiency superior to prior incision.  Rotational flap was used to advance and recruit tissue from upper sidewall.    Indications for Procedure:  Mr. Lion is a 53-year-old gentleman well known to me status post multiple revisions after paramedian forehead flap for reconstruction of a large nasal defect.    Procedure in Detail:  After informed consent was obtained from patient in holding area the risks and benefits reviewed patient was taken back to OR 3.  Anesthesia proceeded with excellent general endotracheal anesthesia via oral Kerri tube.  Patient was turned 90° and time-out was undertaken with verification of patient and correct procedure.  I injected area along the previous upper scar of the paramedian forehead flap as well as the left ear with 1% lidocaine with [id number] of epinephrine with 5 cc used.  Midface as well as ears was prepped and draped in usual sterile fashion.      I turned my attention 1st to the nose incision was made along previous incision line at the ala boundaries superiorly with the flap.  This was extended a little past onto the native nasal skin just above the ala crease with  incision extended approximately 1.8 cm.  Elevation was undertaken and sub cutaneous plane over prior auricular graft and this was continued to the ala margin with release of prior scarring of the paramedian forehead flap to the lining distally.  Pocket was also created over towards the tip and then laterally towards the ala base.    Attention at this point was turned to the left ear were incision was made along the antihelix with elevation in a sub perichondrial plane over the nusrat bowl.  Area measured on the ala was proximally 3 cm by 0.5 cm and a corresponding strip of nusrat bowl cartilage was marked out from the nusrat cavum to the nusrat Cymba.  Fifteen blade was used to make cartilaginous cut with care to leave vertical portion at the antihelix.  This was freed from perichondrium on the contralateral posterior aspect in cartilage was delivered.  Hemostasis was obtained with bipolar cautery and auricular incision was closed with a running locking 5 0 fast-absorbing gut suture.  Telfa was placed and bolster was created with 4-0 nylon suture.    Attention was turned to the back table were cartilage was stripped of perichondrium and trimmed to fit the pocket created underneath the prior paramedian forehead flap at the ala. this was further developed in order to obtain more augmentation at the ala margin with extra piece of cartilage layered inferiorly at the area of maximal scarring.  Cartilage was inset in the pocket and due to gap at the superior incision decision was made to make a rotational flap in order to provide extra skin for the supra ala crease area to prevent re retraction of the ala. back cut was created from previous cut along the ala crease going along the sidewall between the cheek and the nose subunit.  This was extended 1.3 cm.  Undermining was undertaken towards the cheek and underneath the rotation flap and flap was inset with deep 5 0 PDS suture and then 6 0 Prolene for the skin.  Wounds  were cleaned patient was turned over to Anesthesia awakened and taken to recovery in stable condition      Complications:  None    Attestation:  I was present for the entire procedure    DISCLAIMER: This note was prepared with [employer] voice recognition transcription software. Garbled syntax, mangled pronouns, and other bizarre constructions may be attributed to that software system. While efforts were made to correct any mistakes made by this voice recognition program, some errors and/or omissions may remain in the note that were missed when the note was originally created.

## 2024-02-20 NOTE — ANESTHESIA POSTPROCEDURE EVALUATION
Anesthesia Post Evaluation    Patient: Lyndon Lion Jr.    Procedure(s) Performed: Procedure(s) (LRB):  REVISION, PROCEDURE INVOLVING FLAP GRAFT (Bilateral)    Final Anesthesia Type: general      Patient location during evaluation: PACU  Patient participation: Yes- Able to Participate  Level of consciousness: awake  Post-procedure vital signs: reviewed and stable  Pain management: adequate  Airway patency: patent    PONV status at discharge: No PONV  Anesthetic complications: no      Cardiovascular status: blood pressure returned to baseline  Respiratory status: unassisted  Hydration status: euvolemic  Follow-up not needed.              Vitals Value Taken Time   /70 02/20/24 1132   Temp 37.1 °C (98.8 °F) 02/20/24 1035   Pulse 74 02/20/24 1143   Resp 21 02/20/24 1143   SpO2 94 % 02/20/24 1143   Vitals shown include unvalidated device data.      Event Time   Out of Recovery 11:00:00         Pain/Karthikeyan Score: Pain Rating Prior to Med Admin: 0 (2/20/2024  7:08 AM)  Karthikeyan Score: 10 (2/20/2024 10:50 AM)

## 2024-02-20 NOTE — H&P
History of Present Illness:   Lyndon Lion is a 53 y.o. year old male evaluated on 8/1/2023, in the Otolaryngology-Head and Neck Surgery Clinic at [facility].  Patient returns about 1 month status post revision of paramedian forehead flap with opening of right vestibule and narrowing of the columella.  He is pleased with the result with more airflow through the right nostril.  He reports that the right ear has not been bothering him as much since last visit.  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  Patient here to discuss further debulking and fine tuning of the flap.  He has some right-sided nasal obstruction.  He reports increased mucus around internal lining.  He also has notching along the membranous columella.  The obstruction and deformity of the right nostril is what concerns him the most.  His only other complaint today is set back of the right ear from the composite graft he reports that because he wears a hard hat he gets sweat that accumulates behind the right ear that is difficult to clean off and he keeps antifungal Cream there.         Past Medical/Surgical History       Past Medical History:   Diagnosis Date    Arthritis      Asthma       AS A CHILD    Colon cancer      Family hx of prostate cancer 11/22/2016    Hx of colon cancer, stage I 07/03/2014    Parada syndrome      Squamous cell carcinoma of skin      Tear of labium 10/2020     left shoulder Dr Molina    Uncontrolled type 2 diabetes mellitus with hyperglycemia 03/04/2021    Vitamin D deficiency      Wears glasses        His  has a past surgical history that includes Gastric bypass (2010); Colon surgery (2008); Knee arthroscopy (Right); Appendectomy;  Colonoscopy (Bilateral, 2012); Epidural Steroid Injection (10/23/15); Colonoscopy (N/A, 8/1/2016); Colonoscopy (N/A, 9/20/2017); Colonoscopy (N/A, 10/9/2017); Esophagogastroduodenoscopy; Colonoscopy (N/A, 11/20/2017); Colonoscopy (N/A, 5/30/2018); Epidural steroid injection into lumbar spine (N/A, 7/27/2018); Caudal epidural steroid injection (N/A, 11/6/2018); Lumbar discectomy (2/13/2019); Facetectomy of vertebra (2/13/2019); Colonoscopy (N/A, 6/10/2019); Esophagogastroduodenoscopy (N/A, 7/22/2020); Colonoscopy (N/A, 7/22/2020); Colonoscopy (N/A, 5/27/2021); Cystoscopy; Colonoscopy (N/A, 6/17/2022); excision or surgical planing, skin, nose, for rhinophyma (Bilateral, 3/7/2023); Nasal reconstruction (N/A, 3/21/2023); Application of cartilage graft (N/A, 3/21/2023); Rotation flap surgery (N/A, 3/21/2023); Revision of flap graft (Bilateral, 4/18/2023); Esophagogastroduodenoscopy (N/A, 6/22/2023); Colonoscopy (N/A, 6/22/2023); and Revision of flap graft (N/A, 6/29/2023).     Past Family/Social History  His family history includes Alcohol abuse in his paternal uncle; Basal cell carcinoma in his father; Breast cancer in his mother; Cancer in his maternal grandfather, maternal uncle, mother, and paternal uncle; Colon cancer in his maternal grandfather and maternal uncle; Dementia in his maternal grandmother; Diabetes in his father and paternal grandmother; Early death in his maternal grandfather; Hearing loss in his father; Heart disease in his father and maternal uncle; Hypertension in his maternal uncle and paternal grandfather; Liver disease in his father; Melanoma in his mother; No Known Problems in his paternal aunt and sister; Polycystic ovary syndrome in his daughter; Prostate cancer in his paternal uncle; Prostatitis in his paternal grandfather.  He  reports that he has never smoked. He has never used smokeless tobacco. He reports current alcohol use. He reports that he does not use drugs.    "  Medications/Allergies/Immunizations  His current medication(s) include:   Current Medications          Current Outpatient Medications   Medication Sig Dispense Refill    ACCU-CHEK GUIDE TEST STRIPS Strp USE TO TEST TWICE A  strip 3    ascorbic acid, vitamin C, (VITAMIN C) 100 MG tablet Take 100 mg by mouth once daily.        aspirin (ECOTRIN) 81 MG EC tablet Take 81 mg by mouth once daily.        ciclopirox (PENLAC) 8 % Soln Apply topically nightly. 6.6 mL 3    cyanocobalamin, vitamin B-12, 2,500 mcg Lozg Place 2 tablets under the tongue once daily. 180 lozenge 3    ferrous gluconate (FERGON) 240 (27 FE) MG tablet TAKE 27 MG BY MOUTH 2 (TWO) TIMES DAILY WITH MEALS.        lancets (ACCU-CHEK SOFTCLIX LANCETS) Misc 1 Units by Misc.(Non-Drug; Combo Route) route 2 (two) times a day. 200 each 0    metFORMIN (GLUCOPHAGE-XR) 500 MG ER 24hr tablet Take 1 tablet (500 mg total) by mouth 2 (two) times daily with meals. 180 tablet 3    pen needle, diabetic (PEN NEEDLE) 32 gauge x 5/32" Ndle 1 each by Misc.(Non-Drug; Combo Route) route once daily. 90 each 3    pravastatin (PRAVACHOL) 20 MG tablet Take 1 tablet (20 mg total) by mouth once daily. 90 tablet 3    RABEprazole (ACIPHEX) 20 mg tablet Take 1 tablet (20 mg total) by mouth before breakfast. 90 tablet 3    semaglutide (OZEMPIC) 1 mg/dose (4 mg/3 mL) Inject 1 mg into the skin every 7 days. 3 mL 11    sod sulf-pot chloride-mag sulf (SUTAB) 1.479-0.188- 0.225 gram tablet Take 12 tablets by mouth once daily. Take according to package instructions with indicated amount of water. 24 tablet 0    sulfacetamide sodium-sulfur 10-5 % (w/w) Clsr Use to wash face daily 170 g 5    tadalafiL (CIALIS) 5 MG tablet Take 1 tablet (5 mg total) by mouth daily as needed for Erectile Dysfunction. 90 tablet 3    testosterone cypionate (DEPOTESTOTERONE CYPIONATE) 200 mg/mL injection Inject 60 mg into the muscle every 14 (fourteen) days. Twice a week        triamcinolone acetonide 0.025% " (KENALOG) 0.025 % Oint Thin film to lips and eczematous plaques BID PRN flare 15 g 1    triamcinolone acetonide 0.1% (KENALOG) 0.1 % cream AAA bid 454 g 3    vitamin A 8000 UNIT capsule Take 1 capsule (8,000 Units total) by mouth once daily. 100 capsule 3    zinc gluconate 50 mg tablet Take 50 mg by mouth once daily.          No current facility-administered medications for this visit.            Allergies: Patient has no known allergies.     Immunizations:        Immunization History   Administered Date(s) Administered    COVID-19, MRNA, LN-S, PF (Pfizer) (Gray Cap) 06/01/2022    COVID-19, MRNA, LN-S, PF (Pfizer) (Purple Cap) 04/08/2021, 04/29/2021, 01/03/2022    Hepatitis A / Hepatitis B 11/22/2016, 12/28/2016, 05/18/2017    Influenza 10/30/2008, 09/29/2009, 12/14/2011    Influenza - Quadrivalent - PF *Preferred* (6 months and older) 09/29/2009, 11/11/2016, 12/08/2017, 11/04/2022    Influenza Split 10/30/2008, 12/14/2011    Pneumococcal Conjugate - 20 Valent 09/19/2022    Td - PF (ADULT) 11/11/2016         Review of Systems   Constitutional: Negative for fever, weight loss and weight gain.  Skin: Negative for rash, itchiness, dryness  HENT:  As per HPI  Cardiovascular: Negative for chest pain and dyspnea on exertion .   Respiratory: Is not experiencing shortness of breath.   Gastrointestinal: Negative for nausea and vomiting.   Neurological: Negative for headaches.   Lymph/Heme: Negative for lymphadenopathy or easy bruising  Musculoskeletal: Negative for joint or muscle pain  Psychiatric: The patient is not nervous/anxious.        All other systems are negative except for that listed in the HPI.      PHYSICAL EXAM:   Vital Signs:  Wt 111.6 kg (246 lb 0.5 oz)   BMI 32.46 kg/m²      General:  Well-developed, well-nourished  Communication and Voice:  Clear pitch and clarity  Hearing: Hearing adequate for verbal communication bilaterally   Inspection:  Normocephalic and atraumatic without mass or lesion  Palpation:   Facial skeleton intact without bony stepoffs  Parotid Glands:  No mass or tenderness  Facial Strength:  Facial motility symmetric and full bilaterally  Pinna:  External ear intact and fully developed  External canal:  Canal is patent with intact skin  Tympanic Membrane:  Clear and mobile  External nose:  Right ala and tip subunit paramedian forehead flap well incorporated with some bulkiness superiorly and to the left of the tip.  He has great take of the septal graft at the anterior septal angle.  Soft tissue triangle on the right is better with less bulkiness to the flap since revision but unfortunately we had the columella he did have re separation of the flap from inferior columella with separation of the flap from mucosa.  Continued deformity of the tip to the left with bulkiness.   Internal Nose:  As described above with some crusting along incorporated composite graft with good epithelialization remainder of Septum intact and midline.  No edema, polyp, or rhinorrhea.  TMJ:  No pain to palpation with full mobility  Oral cavity, Lips, Teeth, and Gums:  Mucosa and teeth intact and viable, No lesions, masses or ulcers  Oropharynx: No erythema or exudate, no masses or ulcerations, non-obstructive tonsils  Nasopharynx:  No mass or lesion with intact mucosa  Hypopharynx:  Not well visualized secondary to gagging  Larynx:  Not well visualized secondary to gagging  Neck, Trachea, Lymphatics:  Midline trachea without mass or lesion, no lymphadenopathy  Thyroid:  No mass or nodularity  Eyes: No nystagmus with equal extraocular motion bilaterally  Neuro/Psych/Balance: Patient oriented and appropriate in interaction;  Appropriate mood and affect;  Gait is intact with no imbalance; Cranial nerves I-XII are intact  Respiratory effort:  Equal inspiration and expiration without stridor  Peripheral Vascular:  Warm extremities with equal pulses            PATHOLOGY REVIEW:   Initial nasal tumor resection with sentinel lymph  node biopsy 03/07/2023   NASAL CAVITY AND PARANASAL SINUSES:    SPECIMEN      Procedure       Excision       Tumor Focality       Unifocal       Tumor Laterality       Midline       Tumor Size       Greatest Dimension (Centimeters) 4.1 cm           Squamous Cell Carcinoma and Variants           Verrucous squamous   cell carcinoma       Histologic Grade       G1: Well differentiated       Lymphovascular Invasion       Not Identified       Perineural Invasion       Not identified       Margins       Uninvolved by invasive tumor; the closest margin is deep   margin         Distance from Closest Margin (Millimeters) At least 0.5 mm       Regional Lymph Node Status:      All regional lymph nodes negative for   tumor       Number of Lymph Nodes Examined:      5       pT Category:           pT1       pN Category:           pN0    Re-resection 03/21/2023   RESECTION DEEP MARGIN FROM NASAL AREA:   MICROSCOPIC FOCUS OF RESIDUAL SQUAMOUS CARCINOMA  FROM THE APPARENT   EDGES OF THE SPECIMEN   MAJOR AREAS OF ACUTE INFLAMMATION   RADIOLOGIC REVIEW:    None     ASSESSMENT:   1. 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 Scar of nose             PLAN:   Patient doing very well after revision of paramedian forehead flap on 08/29/2023.   Here for revision with additional ear cartilage to right ala.

## 2024-02-20 NOTE — DISCHARGE SUMMARY
[facility]  Discharge Note  Short Stay    Procedure(s) (LRB):  REVISION, PROCEDURE INVOLVING FLAP GRAFT (Bilateral)      OUTCOME: Patient tolerated treatment/procedure well without complication and is now ready for discharge.    DISPOSITION: Home or Self Care    FINAL DIAGNOSIS:  Scar of nose    FOLLOWUP: In clinic    DISCHARGE INSTRUCTIONS:  No discharge procedures on file.     TIME SPENT ON DISCHARGE: 4 minutes

## 2024-02-20 NOTE — TRANSFER OF CARE
"Anesthesia Transfer of Care Note    Patient: Lyndon Lion Jr.    Procedure(s) Performed: Procedure(s) (LRB):  REVISION, PROCEDURE INVOLVING FLAP GRAFT (Bilateral)    Patient location: PACU    Anesthesia Type: general    Transport from OR: Transported from OR on room air with adequate spontaneous ventilation    Post pain: adequate analgesia    Post assessment: no apparent anesthetic complications    Post vital signs: stable    Level of consciousness: sedated    Nausea/Vomiting: no nausea/vomiting    Complications: none    Transfer of care protocol was followed      Last vitals: Visit Vitals  BP (!) 146/84 (BP Location: Right arm, Patient Position: Lying)   Pulse 70   Temp 37.1 °C (98.8 °F) (Temporal)   Resp 16   Ht 6' 2" (1.88 m)   Wt 111.1 kg (245 lb)   SpO2 98%   BMI 31.46 kg/m²     "

## 2024-02-20 NOTE — PLAN OF CARE
Chart reviewed. Preop nursing care completed per orders. Safe surgery checklist complete. Pt denies any open wounds cuts or sores. Pt denies any metal in body. Belongings secured in PACU locker 10. Waiting for admit order, H&P update, anesthesia consent, procedure consent, prior to surgery. Pt AAOX3, VSS on room air. Bed locked in lowest position, Call light within reach. Pt denies any needs at this time. Will continue to monitor.

## 2024-02-26 ENCOUNTER — OFFICE VISIT (OUTPATIENT)
Dept: OTOLARYNGOLOGY | Facility: CLINIC | Age: 54
End: 2024-02-26
Payer: COMMERCIAL

## 2024-02-26 VITALS
WEIGHT: 242.75 LBS | HEART RATE: 80 BPM | BODY MASS INDEX: 31.15 KG/M2 | DIASTOLIC BLOOD PRESSURE: 76 MMHG | HEIGHT: 74 IN | SYSTOLIC BLOOD PRESSURE: 106 MMHG

## 2024-02-26 DIAGNOSIS — L90.5 SCAR OF NOSE: Primary | ICD-10-CM

## 2024-02-26 DIAGNOSIS — Z98.890 POST-OPERATIVE STATE: ICD-10-CM

## 2024-02-26 DIAGNOSIS — Z48.02 VISIT FOR SUTURE REMOVAL: ICD-10-CM

## 2024-02-26 PROCEDURE — 99024 POSTOP FOLLOW-UP VISIT: CPT | Mod: S$GLB,,, | Performed by: PHYSICIAN ASSISTANT

## 2024-02-26 PROCEDURE — 99999 PR PBB SHADOW E&M-EST. PATIENT-LVL V: CPT | Mod: PBBFAC,,, | Performed by: PHYSICIAN ASSISTANT

## 2024-02-26 NOTE — PROGRESS NOTES
HEAD AND NECK SURGICAL ONCOLOGY CLINIC NOTE    CC: Follow-up for suture removal s/p flap revision    TREATMENT HISTORY:  1. 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 Reconstruction, nose - March 21, 2023 - Dr Spivey  3. Division and inset of paramedian forehead flap at the nose  - April 18, 2023 - Dr Spivey  4. Adjacent tissue transfer for repair and revision of prior paramedian forehead flap with advanced area 1.5 x 1 cm with opening of vestibular stenosis on the right  - July 3, 2023 - Dr Spivey  5. Flap debulking with excision of subcutaneous fat excised dimensions 2.5 x 1 cm - August 29, 2023 - Dr Spivey  6.  Advancement rotation flap of the nose to correct ala retraction with advanced area being 3.5 x 1.5 cm and Left auricular cartilage graft for use in ala lengthening in the nose - February 20, 2024 - Dr Spivey    INTERVAL HISTORY:  Lyndon Lion  returns to clinic today without complaints. He denies perioral or digital paresthesias or dysesthesias. There is no redness or drainage from the wound. He has no fever or chills.  Denies sore throat, dysphagia, shortness of breath.  Post op pain improving.      PHYSICAL EXAM:  Healing surgical incision to the R nose with Prolene sutures in place. Some darkening of the tip of the nose but good sensation. No bleeding, drainage, erythema, warmth, fluctuance, tenderness.   Bloster in place to the L ear. No cellulitis, bleeding, drainage noted.        PROCEDURE:  Sutures removed from the R nose without complication.   Bolster removed from the L ear without complication.   Patient tolerated well. No bleeding, drainage, erythema noted. Mild tenderness to the L ear.      PLAN:  Follow up with Dr Spivey - next surgery booked for April 9 (flap creation). Case discussed with and he was examined by Dr Spivey.   Provided with a return to work note - will return March  20.

## 2024-03-05 ENCOUNTER — OFFICE VISIT (OUTPATIENT)
Dept: FAMILY MEDICINE | Facility: CLINIC | Age: 54
End: 2024-03-05
Payer: COMMERCIAL

## 2024-03-05 VITALS
DIASTOLIC BLOOD PRESSURE: 84 MMHG | SYSTOLIC BLOOD PRESSURE: 118 MMHG | HEART RATE: 67 BPM | BODY MASS INDEX: 31.95 KG/M2 | HEIGHT: 74 IN | OXYGEN SATURATION: 96 % | TEMPERATURE: 99 F | WEIGHT: 249 LBS

## 2024-03-05 DIAGNOSIS — E78.5 HYPERLIPIDEMIA, UNSPECIFIED HYPERLIPIDEMIA TYPE: ICD-10-CM

## 2024-03-05 DIAGNOSIS — E55.9 VITAMIN D DEFICIENCY: ICD-10-CM

## 2024-03-05 DIAGNOSIS — Z79.82 ASPIRIN LONG-TERM USE: ICD-10-CM

## 2024-03-05 DIAGNOSIS — N20.0 KIDNEY STONE: ICD-10-CM

## 2024-03-05 DIAGNOSIS — K63.5 POLYP OF COLON, UNSPECIFIED PART OF COLON, UNSPECIFIED TYPE: ICD-10-CM

## 2024-03-05 DIAGNOSIS — Z15.09 LYNCH SYNDROME: Primary | ICD-10-CM

## 2024-03-05 DIAGNOSIS — Z90.49 H/O RIGHT HEMICOLECTOMY: ICD-10-CM

## 2024-03-05 DIAGNOSIS — Z98.84 S/P LAPAROSCOPIC SLEEVE GASTRECTOMY: ICD-10-CM

## 2024-03-05 DIAGNOSIS — E11.42 TYPE 2 DIABETES MELLITUS WITH DIABETIC POLYNEUROPATHY, WITHOUT LONG-TERM CURRENT USE OF INSULIN: ICD-10-CM

## 2024-03-05 PROCEDURE — 99999 PR PBB SHADOW E&M-EST. PATIENT-LVL V: CPT | Mod: PBBFAC,,, | Performed by: FAMILY MEDICINE

## 2024-03-05 PROCEDURE — 99214 OFFICE O/P EST MOD 30 MIN: CPT | Mod: S$GLB,,, | Performed by: FAMILY MEDICINE

## 2024-03-07 PROBLEM — K63.5 POLYP OF COLON: Status: ACTIVE | Noted: 2024-03-07

## 2024-03-08 NOTE — PROGRESS NOTES
Subjective:       Patient ID: Lyndon Lion Jr. is a 53 y.o. male.    Chief Complaint: Follow-up (Vit d 1500 IU, nose surg, kidney stones , 5 polyp s colonoscopy every 6 months, reese syndrome dr rosa)    Vitamin-D deficiency on large amount.  Fifteen thousand per day levels still only 22 has stated around this level from several years now.  No GI issues.  No nausea vomiting diarrhea.  BMI of 32.  History of colon polyps.  Has Reese syndrome.  Kidney stones stent removal done.  Colonoscopy being done yearly.  Hyperlipidemia.  Okay on statin.  Using aspirin.  Diabetes mellitus type 2 with polyneuropathy.  Status post sleeve gastrectomy.  Status post right colectomy.      Physical examination.  Vital signs noted.  Neck without bruit.  Chest clear.  Heart regular rate rhythm.  Abdomen bowel sounds are positive soft nontender.  Extremities without edema positive pulses 2+.  Light touch is decreased present only 7 of 10 spots tested in the feet.        Objective:        Assessment:       1. Reese syndrome    2. Vitamin D deficiency    3. BMI 32.0-32.9,adult    4. Polyp of colon, unspecified part of colon, unspecified type    5. Kidney stone    6. Hyperlipidemia, unspecified hyperlipidemia type    7. Aspirin long-term use    8. Type 2 diabetes mellitus with diabetic polyneuropathy, without long-term current use of insulin    9. S/P laparoscopic sleeve gastrectomy    10. H/O right hemicolectomy        Plan:       Reese syndrome    Vitamin D deficiency  -     Vitamin D; Future; Expected date: 06/05/2024  -     Hemoglobin A1C; Future; Expected date: 06/05/2024  -     Comprehensive Metabolic Panel; Future; Expected date: 06/05/2024  -     Lipid Panel; Future; Expected date: 06/05/2024  -     CBC Auto Differential; Future; Expected date: 06/05/2024  -     Microalbumin/Creatinine Ratio, Urine; Future; Expected date: 03/05/2024    BMI 32.0-32.9,adult    Polyp of colon, unspecified part of colon, unspecified  type    Kidney stone    Hyperlipidemia, unspecified hyperlipidemia type  -     Vitamin D; Future; Expected date: 06/05/2024  -     Hemoglobin A1C; Future; Expected date: 06/05/2024  -     Comprehensive Metabolic Panel; Future; Expected date: 06/05/2024  -     Lipid Panel; Future; Expected date: 06/05/2024  -     CBC Auto Differential; Future; Expected date: 06/05/2024  -     Microalbumin/Creatinine Ratio, Urine; Future; Expected date: 03/05/2024    Aspirin long-term use    Type 2 diabetes mellitus with diabetic polyneuropathy, without long-term current use of insulin  -     Vitamin D; Future; Expected date: 06/05/2024  -     Hemoglobin A1C; Future; Expected date: 06/05/2024  -     Comprehensive Metabolic Panel; Future; Expected date: 06/05/2024  -     Lipid Panel; Future; Expected date: 06/05/2024  -     CBC Auto Differential; Future; Expected date: 06/05/2024  -     Microalbumin/Creatinine Ratio, Urine; Future; Expected date: 03/05/2024    S/P laparoscopic sleeve gastrectomy    H/O right hemicolectomy    Recommend they spread the vitamin-D out and t.i.d. dosing.  Sleeve gastrectomy maybe affecting absorption.  Microalbumin ordered.  Follow-up in 3 months with vitamin-D A1c CMP lipids and CBC.

## 2024-03-19 ENCOUNTER — OFFICE VISIT (OUTPATIENT)
Dept: GASTROENTEROLOGY | Facility: CLINIC | Age: 54
End: 2024-03-19
Payer: COMMERCIAL

## 2024-03-19 DIAGNOSIS — K44.9 HIATAL HERNIA: ICD-10-CM

## 2024-03-19 DIAGNOSIS — D37.4 POLYP OF COLON, VILLOUS ADENOMA: ICD-10-CM

## 2024-03-19 DIAGNOSIS — K22.10 EROSIVE ESOPHAGITIS: ICD-10-CM

## 2024-03-19 DIAGNOSIS — Z85.038 HISTORY OF COLON CANCER, STAGE II: ICD-10-CM

## 2024-03-19 DIAGNOSIS — Z15.09 MSH2-RELATED LYNCH SYNDROME (HNPCC1): Primary | ICD-10-CM

## 2024-03-19 DIAGNOSIS — Z79.899 ENCOUNTER FOR MONITORING LONG-TERM PROTON PUMP INHIBITOR THERAPY: ICD-10-CM

## 2024-03-19 DIAGNOSIS — Z51.81 ENCOUNTER FOR MONITORING LONG-TERM PROTON PUMP INHIBITOR THERAPY: ICD-10-CM

## 2024-03-19 PROCEDURE — 99214 OFFICE O/P EST MOD 30 MIN: CPT | Mod: 95,,, | Performed by: INTERNAL MEDICINE

## 2024-03-19 RX ORDER — RABEPRAZOLE SODIUM 20 MG/1
20 TABLET, DELAYED RELEASE ORAL
Qty: 90 TABLET | Refills: 3 | Status: SHIPPED | OUTPATIENT
Start: 2024-03-19 | End: 2025-03-19

## 2024-03-19 NOTE — Clinical Note
Referral placed to endoscopy schedulers for colonoscopy in August 2024  Return GI clinic 1 year for telemedicine video visit for Parada syndrome

## 2024-03-19 NOTE — PROGRESS NOTES
The patient location is:  At home in [place]  The chief complaint leading to consultation is:  Parada syndrome history of colon polyps    Visit type: audiovisual    Face to Face time with patient: 30 minutes of total time spent on the encounter, which includes face to face time and non-face to face time preparing to see the patient (eg, review of tests), Obtaining and/or reviewing separately obtained history, Documenting clinical information in the electronic or other health record, Independently interpreting results (not separately reported) and communicating results to the patient/family/caregiver, or Care coordination (not separately reported).       Each patient to whom he or she provides medical services by telemedicine is:  (1) informed of the relationship between the physician and patient and the respective role of any other health care provider with respect to management of the patient; and (2) notified that he or she may decline to receive medical services by telemedicine and may withdraw from such care at any time.    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 Diagnoses of History of colon cancer, stage II and Polyp of colon, villous adenoma were also pertinent to this visit.    PCP:   Stepan Moran III       Referring MD:  No referring provider defined for this encounter.    Initial History of Present Illness (HPI):  This is a 53 y.o. male here for telemedicine visit for Parada syndrome. He was discovered to have colon cancer and had right hemicolectomy for stage II.  He had no colon cancer on 08/08/2008.He has been in a surveillance program.  Since then, he is followed by Heme/Onc.  He ended up getting chemotherapy every two weeks for six months and he is in remission.  He is followed by Dr. Alfonso Cárdenas in Heme/Onc.  The patient states his mom did not want to do the genetic  screen, but she has got   MSH2 as well.  Likely, mom's dad with colorectal cancer, mom's dad's brother with colorectal cancer, mom's dad's dad with colorectal cancer and mom's brothers, two of them with colorectal cancer.  The youngest family member with colon cancer is at age 37.  The patient was 39 with colon cancer.  He has two biological children, a daughter 31.  She was screened, she is negative.  Son 27 is positive for MSH2.  He started screening at age 21, every one year.  He had an EGD and colonoscopy in August 2016.  It was complete, no polyps, no adenomatous found.  He has no family history of any other GI malignancies other than breast cancer in the mom.  No other Parada cancers.  There is no ovarian or uterine cancer.  There is no kidney or bladder or ureter cancer.  No renal pelvis cancer.  No small bowel or stomach cancer.  No esophageal cancer.  No brain cancers, no pancreas and no hepatobiliary cancers in the family.  He does have a history of low vitamin-D in he is on a patch for followed by his primary care.  His last colonoscopy was done in February 2024 had some benign but precancerous colon polyps found in removed.  Recommend next surveillance colonoscopy 6 months from his last which would be August 2024.  Overall he is doing well currently.  No GI bleeding no change in bowel habits no chest pain no shortness of breath no dysphagia no odynophagia no unintentional weight loss no early satiety.      Abdominal pain - no  Reflux - no  Dysphagia - no   Bowel habits - normal  GI bleeding - none  NSAID usage - none    Interval HPI 03/19/2024:  The patient's last visit with me was on 4/30/2020.      ROS:  Constitutional: No fevers, chills, No weight loss  ENT:  No heartburn no dysphagia no odynophagia no hoarseness  CV: No chest pain, no palpitation  Pulm: No cough, No shortness of breath, no wheezing  Ophtho: No vision changes  GI: see HPI  Derm:  He has history of skin cancer undergoing a flap in the  near future  Heme: No lymphadenopathy, No easy bruising  MSK: No significant arthritis  : No dysuria, No hematuria  Endo: No hot or cold intolerance  Neuro: No syncope, No seizure, no strokes  Psych: No uncontrolled anxiety, No uncontrolled depression    Medical History:  has a past medical history of Arthritis, Asthma, Colon cancer, Family hx of prostate cancer (11/22/2016), colon cancer, stage I (07/03/2014), Parada syndrome, Squamous cell carcinoma of skin, Tear of labium (10/2020), Uncontrolled type 2 diabetes mellitus with hyperglycemia (03/04/2021), Vitamin D deficiency, and Wears glasses.    Surgical History:  has a past surgical history that includes Gastric bypass (2010); Colon surgery (2008); Knee arthroscopy (Right); Appendectomy; Colonoscopy (Bilateral, 2012); Epidural Steroid Injection (10/23/15); Colonoscopy (N/A, 8/1/2016); Colonoscopy (N/A, 9/20/2017); Colonoscopy (N/A, 10/9/2017); Esophagogastroduodenoscopy; Colonoscopy (N/A, 11/20/2017); Colonoscopy (N/A, 5/30/2018); Epidural steroid injection into lumbar spine (N/A, 7/27/2018); Caudal epidural steroid injection (N/A, 11/6/2018); Lumbar discectomy (2/13/2019); Facetectomy of vertebra (2/13/2019); Colonoscopy (N/A, 6/10/2019); Esophagogastroduodenoscopy (N/A, 7/22/2020); Colonoscopy (N/A, 7/22/2020); Colonoscopy (N/A, 5/27/2021); Cystoscopy; Colonoscopy (N/A, 6/17/2022); excision or surgical planing, skin, nose, for rhinophyma (Bilateral, 3/7/2023); Nasal reconstruction (N/A, 3/21/2023); Application of cartilage graft (N/A, 3/21/2023); Rotation flap surgery (N/A, 3/21/2023); Revision of flap graft (Bilateral, 4/18/2023); Esophagogastroduodenoscopy (N/A, 6/22/2023); Colonoscopy (N/A, 6/22/2023); Revision of flap graft (N/A, 6/29/2023); Revision of flap graft (N/A, 8/29/2023); Esophagogastroduodenoscopy (N/A, 9/1/2023); Colonoscopy (N/A, 2/6/2024); Cystoscopy (N/A, 1/31/2024); Ureteroscopy (Left, 1/31/2024); extraction - stone (Left, 1/31/2024);  placement-stent (Left, 1/31/2024); Retrograde pyelography (Left, 1/31/2024); and Revision of flap graft (Bilateral, 2/20/2024).    Family History: family history includes Alcohol abuse in his paternal uncle; Basal cell carcinoma in his father; Breast cancer in his mother; Cancer in his maternal grandfather, maternal uncle, mother, and paternal uncle; Colon cancer in his maternal grandfather and maternal uncle; Dementia in his maternal grandmother; Diabetes in his father and paternal grandmother; Early death in his maternal grandfather; Hearing loss in his father; Heart disease in his father and maternal uncle; Hypertension in his maternal uncle and paternal grandfather; Liver disease in his father; Melanoma in his mother; No Known Problems in his paternal aunt and sister; Polycystic ovary syndrome in his daughter; Prostate cancer in his paternal uncle; Prostatitis in his paternal grandfather..     Social History:  reports that he has never smoked. He has never used smokeless tobacco. He reports current alcohol use. He reports that he does not use drugs.    Review of patient's allergies indicates:  No Known Allergies    Medication List with Changes/Refills   Current Medications    ACCU-CHEK GUIDE TEST STRIPS STRP    USE TO TEST TWICE A DAY    ACETAMINOPHEN (TYLENOL) 500 MG TABLET    Take 1 tablet (500 mg total) by mouth every 6 (six) hours as needed for Pain or Temperature greater than (100.5). Note do not exceed >3000mg tylenol/acetaminophen in 24hr period.    ASCORBIC ACID, VITAMIN C, (VITAMIN C) 100 MG TABLET    Take 100 mg by mouth once daily.    ASPIRIN (ECOTRIN) 81 MG EC TABLET    Take by mouth once daily. Aspirin 300 mg bid coded aspirin    AZELAIC ACID (AZELEX) 15 % GEL    Apply to face BID.    CELECOXIB (CELEBREX) 200 MG CAPSULE    Take 200 mg by mouth once daily.    CICLOPIROX (PENLAC) 8 % SOLN    Apply topically nightly.    CYANOCOBALAMIN, VITAMIN B-12, 2,500 MCG LOZG    Place 2 tablets under the tongue  "once daily.    FERROUS GLUCONATE (FERGON) 240 (27 FE) MG TABLET    TAKE 27 MG BY MOUTH 2 (TWO) TIMES DAILY WITH MEALS.    IBUPROFEN (ADVIL,MOTRIN) 800 MG TABLET    Take 1 tablet (800 mg total) by mouth 3 (three) times daily as needed for Pain.    LANCETS (ACCU-CHEK SOFTCLIX LANCETS) MISC    1 Units by Misc.(Non-Drug; Combo Route) route 2 (two) times a day.    PEN NEEDLE, DIABETIC (PEN NEEDLE) 32 GAUGE X 5/32" NDLE    1 each by Misc.(Non-Drug; Combo Route) route once daily.    PRAVASTATIN (PRAVACHOL) 20 MG TABLET    Take 1 tablet (20 mg total) by mouth once daily.    RABEPRAZOLE (ACIPHEX) 20 MG TABLET    Take 1 tablet (20 mg total) by mouth before breakfast.    SEMAGLUTIDE (OZEMPIC) 2 MG/DOSE (8 MG/3 ML) PNIJ    Inject 2 mg into the skin every 7 days.    SULFACETAMIDE SODIUM-SULFUR 10-5 % (W/W) CLSR    Use to wash face daily    TADALAFIL (CIALIS) 5 MG TABLET    Take 1 tablet (5 mg total) by mouth daily as needed for Erectile Dysfunction.    TESTOSTERONE CYPIONATE (DEPOTESTOTERONE CYPIONATE) 200 MG/ML INJECTION    Inject 60 mg into the muscle every 14 (fourteen) days. Twice a week    TRIAMCINOLONE ACETONIDE 0.1% (KENALOG) 0.1 % CREAM    AAA bid    VITAMIN A 8000 UNIT CAPSULE    Take 1 capsule (8,000 Units total) by mouth once daily.   Discontinued Medications    METFORMIN (GLUCOPHAGE-XR) 500 MG ER 24HR TABLET    TAKE 1 TABLET BY MOUTH TWICE A DAY WITH MEALS    TRIAMCINOLONE ACETONIDE 0.025% (KENALOG) 0.025 % OINT    Thin film to lips and eczematous plaques BID PRN flare         Objective Findings:    Vital Signs:  There were no vitals taken for this visit.  There is no height or weight on file to calculate BMI.    Physical Exam:  Telemedicine video visit  General Appearance: Well appearing in no acute distress  Neurologic:  Alert and oriented x4  Psychiatric:  Normal speech mentation and affect    Labs:  Lab Results   Component Value Date    WBC 5.20 01/31/2024    HGB 14.9 01/31/2024    HCT 42.6 01/31/2024     " 01/31/2024    CHOL 160 12/19/2023    TRIG 55 12/19/2023    HDL 38 (L) 12/19/2023    ALT 16 01/30/2024    AST 16 01/30/2024     01/31/2024    K 3.4 (L) 01/31/2024     01/31/2024    CREATININE 1.1 01/31/2024    BUN 14 01/31/2024    CO2 20 (L) 01/31/2024    TSH 1.525 12/19/2023    PSA 0.77 12/19/2023    INR 1.1 06/26/2023    HGBA1C 5.6 12/19/2023       Medical Decision Making:  The Olympus scope [id number] ([id number]) was                          introduced through the anus and advanced to the                          terminal ileum. The colonoscopy was performed                          without difficulty. The patient tolerated the                          procedure well. The quality of the bowel                          preparation was excellent. Scope insertion time                          was 2 minutes. Scope withdrawal time was 22                          minutes. The total duration of the procedure was                          24 minutes. Good look. The terminal ileum and the                          rectum were photographed.   Findings:       The terminal ileum appeared normal.        Three sessile, non-bleeding polyps were found in the anastomosis.        The polyps were 1 mm in size. These polyps were removed with a cold        snare. Resection and retrieval were complete. Estimated blood loss        was minimal.        A 5 mm polyp was found in the descending colon. The polyp was        sessile. Area was successfully injected with 5 mL saline for a lift        polypectomy. The polyp was removed with a hot snare. Resection and        retrieval were complete. To close a defect after polypectomy, one        hemostatic clip was successfully placed. Clip : Tutor Assignment. There was no bleeding during the procedure.        The perianal and digital rectal examinations were normal.        The retroflexed view of the distal rectum and anal verge was normal        and showed no anal or  rectal abnormalities.        Diverticula were found in the recto-sigmoid colon, sigmoid colon and        descending colon. There was no evidence of diverticular bleeding.        Non-bleeding internal hemorrhoids were found during retroflexion.        The hemorrhoids were mild.        A 2 mm polyp was found in the rectum. The polyp was sessile. The        polyp was removed with a cold snare. Resection and retrieval were        complete. Estimated blood loss was minimal.   Impression:            - The examined portion of the ileum was normal.                          - Three 1 mm, non-bleeding polyps at the                          anastomosis, removed with a cold snare. Resected                          and retrieved.                          - One 5 mm polyp in the descending colon, removed                          with a hot snare. Resected and retrieved.                          Injected. Clip was placed. Clip :                          Mgv.                          - Diverticulosis in the recto-sigmoid colon, in                          the sigmoid colon and in the descending colon.                          There was no evidence of diverticular bleeding.                          - Non-bleeding internal hemorrhoids.                          - One 2 mm polyp in the rectum, removed with a                          cold snare. Resected and retrieved.   Recommendation:        - Discharge patient to home.                          - Await pathology results.                          - Telephone endoscopist for pathology results in 3                          weeks.                          - Repeat colonoscopy in 6 months for surveillance                          of multiple polyps.                          - Return to GI clinic at the next available                          appointment.                          - The findings and recommendations were discussed                          with the  patient.   Attending Participation:        I personally performed the entire procedure.   Dom Leonardo MD   2/6/2024     1. TRANSVERSE COLON NEAR ANASTOMOSIS, BIOPSY:  Benign small bowel and colonic mucosa with reactive/regenerative changes  No evidence of active inflammation, granuloma, dysplasia or malignancy    2. DESCENDING COLON, POLYPECTOMY:  Tubulovillous adenoma  Negative for high-grade dysplasia or malignancy  Margins negative for neoplasia    3. RECTUM, POLYPECTOMY:  Sessile serrated lesion with no dysplasia    Comment: Interp By Claudia Toscano M.D., Signed on 02/08/2024     The Olympus scope GIF-                          ([id number]) was introduced through the mouth, and                          advanced to the third part of duodenum. The upper                          GI endoscopy was accomplished without difficulty.                          The patient tolerated the procedure well.   Findings:       The examined duodenum was normal.        Patchy mildly erythematous mucosa without bleeding was found in the        gastric antrum and in the prepyloric region of the stomach. Biopsies        were taken with a cold forceps for Helicobacter pylori testing.        Biopsies were taken with a cold forceps for histology. Estimated        blood loss was minimal. Post surgical anatomy of a sleeve        gastrectomy.        The cardia and gastric fundus were normal on retroflexion.        A 4 cm hiatal hernia was found. The proximal extent of the gastric        folds (end of tubular esophagus) was 36 cm from the incisors. The        hiatal narrowing was 40 cm from the incisors. The Z-line was 35 cm        from the incisors. C0M1.Biopsies were taken with a cold forceps for        histology. C0M1 tongues of columnar appearing distal esophageal        mucosa bxed. No other lesions seen with NBI or white light.   Impression:            - Normal examined duodenum.                          - Erythematous mucosa in  the antrum and prepyloric                          region of the stomach. Biopsied.                          - 4 cm hiatal hernia. C0M1. Biopsied.   Recommendation:        - Discharge patient to home.                          - Follow an antireflux regimen indefinitely.                          - Continue present medications RABEprazole                          (ACIPHEX) 20 mg tablet 1 tablet (20 mg total) by                          mouth before breakfast.                          - Await pathology results.                          - Telephone endoscopist for pathology results in 3                          weeks.                          - Repeat upper endoscopy in 3 years for                          surveillance based on pathology results.                          - Return to GI clinic at the next available                          appointment.                          - The findings and recommendations were discussed                          with the patient.   Attending Participation:        I personally performed the entire procedure.   Dom Leonardo MD   9/1/2023       1.  STOMACH, BIOPSY:  - Gastric mucosa (antral and oxyntic-types) with minimal chronic inflammation  - No evidence of Helicobacter-like organisms (an H. pylori immunohistochemical study is negative)    2.  ESOPHAGUS, DISTAL, BIOPSY:  - Squamous/squamocolumnar mucosa with acute and chronic inflammation and reactive epithelial changes  - No evidence of intestinal metaplasia, dysplasia, or malignancy    Comment: Interp By Alejandro Pena M.D., Signed on 09/07/2023     Assessment:  1. MSH2-related Parada syndrome (HNPCC1)    2. History of colon cancer, stage II    3. Polyp of colon, villous adenoma    4.      Hiatal hernia and long-term PPI use      Recommendations:  1. Next surveillance colonoscopy 6 months from his last which would be August 2024 referral placed  2. Hiatal hernia long-term proton pump inhibitor use no H pylori next  surveillance EGD 3 years from his last which would be around 9/2026  3. Return GI clinic once yearly for follow-up    No follow-ups on file.      Order summary:         Thank you so much for allowing me to participate in the care of Lyndon Leonardo MD    DISCLAIMER: This note was prepared with [place] voice recognition transcription software. Garbled syntax, mangled or inadvertent pronouns, and other bizarre constructions may be attributed to that software system. While efforts were made to correct any mistakes made by this voice recognition program, some errors and/or omissions may remain in the note that were missed when the note was originally created.

## 2024-03-19 NOTE — PATIENT INSTRUCTIONS
"Procedure: Colonoscopy    Diagnosis: Surveillance colonoscopy - Hx of colon polyps (villous adenomas)  He was discovered to have colon cancer and had right hemicolecton or stage II on 08/08/2008. MSH2 Parada syndrome.     Procedure Timing:  August 2024 with me    *If within 4 weeks selected, please deb as high priority*    *If greater than 12 weeks, please select "5-12 weeks" and delay sending until 3 months prior to requested date*    Provider: Myself    Location: [address]    Additional Scheduling Information: No scheduling concerns    Prep Specifications:Standard prep    Is the patient taking a GLP-1 Agonist:yes    Have you attached a patient to this message: yes   "

## 2024-03-20 ENCOUNTER — PATIENT MESSAGE (OUTPATIENT)
Dept: FAMILY MEDICINE | Facility: CLINIC | Age: 54
End: 2024-03-20
Payer: COMMERCIAL

## 2024-03-20 RX ORDER — METFORMIN HYDROCHLORIDE 500 MG/1
500 TABLET, EXTENDED RELEASE ORAL
Qty: 90 TABLET | Refills: 3 | Status: SHIPPED | OUTPATIENT
Start: 2024-03-20 | End: 2025-03-20

## 2024-03-20 NOTE — TELEPHONE ENCOUNTER
No care due was identified.  [facility] Embedded Care Due Messages. Reference number: [id number].   3/20/2024 10:07:00 AM CDT

## 2024-03-21 ENCOUNTER — PATIENT MESSAGE (OUTPATIENT)
Dept: GASTROENTEROLOGY | Facility: CLINIC | Age: 54
End: 2024-03-21
Payer: COMMERCIAL

## 2024-04-04 NOTE — CARE UPDATE
Spoke with patient regarding Ozempic hold x 1wk prior to surgery. He is aware and has held this medication.

## 2024-04-09 ENCOUNTER — ANESTHESIA (OUTPATIENT)
Dept: SURGERY | Facility: HOSPITAL | Age: 54
End: 2024-04-09
Payer: COMMERCIAL

## 2024-04-09 ENCOUNTER — ANESTHESIA EVENT (OUTPATIENT)
Dept: SURGERY | Facility: HOSPITAL | Age: 54
End: 2024-04-09
Payer: COMMERCIAL

## 2024-04-09 ENCOUNTER — HOSPITAL ENCOUNTER (OUTPATIENT)
Facility: HOSPITAL | Age: 54
Discharge: HOME | End: 2024-04-09
Attending: OTOLARYNGOLOGY | Admitting: OTOLARYNGOLOGY
Payer: COMMERCIAL

## 2024-04-09 ENCOUNTER — HOSPITAL ENCOUNTER (EMERGENCY)
Facility: HOSPITAL | Age: 54
Discharge: HOME | End: 2024-04-09
Attending: STUDENT IN AN ORGANIZED HEALTH CARE EDUCATION/TRAINING PROGRAM
Payer: COMMERCIAL

## 2024-04-09 VITALS
WEIGHT: 245 LBS | BODY MASS INDEX: 31.46 KG/M2 | TEMPERATURE: 98 F | HEART RATE: 64 BPM | SYSTOLIC BLOOD PRESSURE: 139 MMHG | OXYGEN SATURATION: 96 % | DIASTOLIC BLOOD PRESSURE: 84 MMHG | RESPIRATION RATE: 18 BRPM

## 2024-04-09 VITALS
TEMPERATURE: 98 F | WEIGHT: 245 LBS | OXYGEN SATURATION: 96 % | DIASTOLIC BLOOD PRESSURE: 72 MMHG | HEART RATE: 69 BPM | HEIGHT: 74 IN | RESPIRATION RATE: 18 BRPM | BODY MASS INDEX: 31.44 KG/M2 | SYSTOLIC BLOOD PRESSURE: 122 MMHG

## 2024-04-09 DIAGNOSIS — S05.02XA ABRASION OF LEFT CORNEA, INITIAL ENCOUNTER: Primary | ICD-10-CM

## 2024-04-09 DIAGNOSIS — L98.9 BENIGN SKIN LESION OF FACE: ICD-10-CM

## 2024-04-09 DIAGNOSIS — M95.0 ACQUIRED NASAL DEFORMITY: ICD-10-CM

## 2024-04-09 DIAGNOSIS — L90.5 SCAR OF NOSE: Primary | ICD-10-CM

## 2024-04-09 LAB — POCT GLUCOSE: 93 MG/DL (ref 70–110)

## 2024-04-09 PROCEDURE — 71000033 HC RECOVERY, INTIAL HOUR: Performed by: OTOLARYNGOLOGY

## 2024-04-09 PROCEDURE — 25000003 PHARM REV CODE 250: Performed by: PHYSICIAN ASSISTANT

## 2024-04-09 PROCEDURE — 99282 EMERGENCY DEPT VISIT SF MDM: CPT

## 2024-04-09 PROCEDURE — D9220A PRA ANESTHESIA: Mod: ,,, | Performed by: NURSE ANESTHETIST, CERTIFIED REGISTERED

## 2024-04-09 PROCEDURE — 71000015 HC POSTOP RECOV 1ST HR: Performed by: OTOLARYNGOLOGY

## 2024-04-09 PROCEDURE — 27201423 OPTIME MED/SURG SUP & DEVICES STERILE SUPPLY: Performed by: OTOLARYNGOLOGY

## 2024-04-09 PROCEDURE — 36000707: Performed by: OTOLARYNGOLOGY

## 2024-04-09 PROCEDURE — 36000706: Performed by: OTOLARYNGOLOGY

## 2024-04-09 PROCEDURE — 94761 N-INVAS EAR/PLS OXIMETRY MLT: CPT

## 2024-04-09 PROCEDURE — 99900035 HC TECH TIME PER 15 MIN (STAT)

## 2024-04-09 PROCEDURE — 37000008 HC ANESTHESIA 1ST 15 MINUTES: Performed by: OTOLARYNGOLOGY

## 2024-04-09 PROCEDURE — 25000003 PHARM REV CODE 250: Performed by: OTOLARYNGOLOGY

## 2024-04-09 PROCEDURE — 25000003 PHARM REV CODE 250: Performed by: NURSE ANESTHETIST, CERTIFIED REGISTERED

## 2024-04-09 PROCEDURE — 14061 TIS TRNFR E/N/E/L10.1-30SQCM: CPT | Mod: 58,,, | Performed by: OTOLARYNGOLOGY

## 2024-04-09 PROCEDURE — 82962 GLUCOSE BLOOD TEST: CPT | Performed by: OTOLARYNGOLOGY

## 2024-04-09 PROCEDURE — 63600175 PHARM REV CODE 636 W HCPCS: Mod: JZ,JG | Performed by: NURSE ANESTHETIST, CERTIFIED REGISTERED

## 2024-04-09 PROCEDURE — 37000009 HC ANESTHESIA EA ADD 15 MINS: Performed by: OTOLARYNGOLOGY

## 2024-04-09 RX ORDER — PHENYLEPHRINE HYDROCHLORIDE 10 MG/ML
INJECTION INTRAVENOUS
Status: DISCONTINUED | OUTPATIENT
Start: 2024-04-09 | End: 2024-04-09

## 2024-04-09 RX ORDER — ROCURONIUM BROMIDE 10 MG/ML
INJECTION, SOLUTION INTRAVENOUS
Status: DISCONTINUED | OUTPATIENT
Start: 2024-04-09 | End: 2024-04-09

## 2024-04-09 RX ORDER — LIDOCAINE HYDROCHLORIDE 10 MG/ML
1 INJECTION, SOLUTION EPIDURAL; INFILTRATION; INTRACAUDAL; PERINEURAL ONCE AS NEEDED
Status: DISCONTINUED | OUTPATIENT
Start: 2024-04-09 | End: 2024-04-09 | Stop reason: HOSPADM

## 2024-04-09 RX ORDER — CEFAZOLIN SODIUM 1 G/3ML
INJECTION, POWDER, FOR SOLUTION INTRAMUSCULAR; INTRAVENOUS
Status: DISCONTINUED | OUTPATIENT
Start: 2024-04-09 | End: 2024-04-09

## 2024-04-09 RX ORDER — FAMOTIDINE 10 MG/ML
INJECTION INTRAVENOUS
Status: DISCONTINUED | OUTPATIENT
Start: 2024-04-09 | End: 2024-04-09

## 2024-04-09 RX ORDER — TETRACAINE HYDROCHLORIDE 5 MG/ML
2 SOLUTION OPHTHALMIC
Status: DISCONTINUED | OUTPATIENT
Start: 2024-04-09 | End: 2024-04-10 | Stop reason: HOSPADM

## 2024-04-09 RX ORDER — HYDROMORPHONE HYDROCHLORIDE 1 MG/ML
0.5 INJECTION, SOLUTION INTRAMUSCULAR; INTRAVENOUS; SUBCUTANEOUS EVERY 5 MIN PRN
Status: DISCONTINUED | OUTPATIENT
Start: 2024-04-09 | End: 2024-04-09 | Stop reason: HOSPADM

## 2024-04-09 RX ORDER — LIDOCAINE HYDROCHLORIDE 20 MG/ML
INJECTION INTRAVENOUS
Status: DISCONTINUED | OUTPATIENT
Start: 2024-04-09 | End: 2024-04-09

## 2024-04-09 RX ORDER — ONDANSETRON HYDROCHLORIDE 2 MG/ML
4 INJECTION, SOLUTION INTRAVENOUS DAILY PRN
Status: DISCONTINUED | OUTPATIENT
Start: 2024-04-09 | End: 2024-04-09 | Stop reason: HOSPADM

## 2024-04-09 RX ORDER — ONDANSETRON HYDROCHLORIDE 2 MG/ML
INJECTION, SOLUTION INTRAVENOUS
Status: DISCONTINUED | OUTPATIENT
Start: 2024-04-09 | End: 2024-04-09

## 2024-04-09 RX ORDER — PROPOFOL 10 MG/ML
VIAL (ML) INTRAVENOUS
Status: DISCONTINUED | OUTPATIENT
Start: 2024-04-09 | End: 2024-04-09

## 2024-04-09 RX ORDER — MIDAZOLAM HYDROCHLORIDE 1 MG/ML
INJECTION, SOLUTION INTRAMUSCULAR; INTRAVENOUS
Status: DISCONTINUED | OUTPATIENT
Start: 2024-04-09 | End: 2024-04-09

## 2024-04-09 RX ORDER — OXYCODONE AND ACETAMINOPHEN 5; 325 MG/1; MG/1
1 TABLET ORAL EVERY 4 HOURS PRN
Qty: 20 TABLET | Refills: 0 | Status: SHIPPED | OUTPATIENT
Start: 2024-04-09

## 2024-04-09 RX ORDER — LIDOCAINE HYDROCHLORIDE AND EPINEPHRINE 10; 10 MG/ML; UG/ML
INJECTION, SOLUTION INFILTRATION; PERINEURAL
Status: DISCONTINUED | OUTPATIENT
Start: 2024-04-09 | End: 2024-04-09 | Stop reason: HOSPADM

## 2024-04-09 RX ORDER — OXYCODONE AND ACETAMINOPHEN 5; 325 MG/1; MG/1
1 TABLET ORAL EVERY 8 HOURS PRN
Qty: 10 TABLET | Refills: 0 | Status: SHIPPED | OUTPATIENT
Start: 2024-04-09 | End: 2024-06-12

## 2024-04-09 RX ORDER — ACETAMINOPHEN 10 MG/ML
INJECTION, SOLUTION INTRAVENOUS
Status: DISCONTINUED | OUTPATIENT
Start: 2024-04-09 | End: 2024-04-09

## 2024-04-09 RX ORDER — FENTANYL CITRATE 50 UG/ML
INJECTION, SOLUTION INTRAMUSCULAR; INTRAVENOUS
Status: DISCONTINUED | OUTPATIENT
Start: 2024-04-09 | End: 2024-04-09

## 2024-04-09 RX ORDER — DEXAMETHASONE SODIUM PHOSPHATE 4 MG/ML
INJECTION, SOLUTION INTRA-ARTICULAR; INTRALESIONAL; INTRAMUSCULAR; INTRAVENOUS; SOFT TISSUE
Status: DISCONTINUED | OUTPATIENT
Start: 2024-04-09 | End: 2024-04-09

## 2024-04-09 RX ORDER — HALOPERIDOL 5 MG/ML
INJECTION INTRAMUSCULAR
Status: DISCONTINUED | OUTPATIENT
Start: 2024-04-09 | End: 2024-04-09

## 2024-04-09 RX ORDER — SODIUM CHLORIDE 9 MG/ML
INJECTION, SOLUTION INTRAVENOUS CONTINUOUS
Status: DISCONTINUED | OUTPATIENT
Start: 2024-04-09 | End: 2024-04-09 | Stop reason: HOSPADM

## 2024-04-09 RX ORDER — HYDROCODONE BITARTRATE AND ACETAMINOPHEN 5; 325 MG/1; MG/1
1 TABLET ORAL EVERY 6 HOURS PRN
Qty: 25 TABLET | Refills: 0 | Status: SHIPPED | OUTPATIENT
Start: 2024-04-09 | End: 2024-04-09 | Stop reason: HOSPADM

## 2024-04-09 RX ORDER — OXYCODONE HYDROCHLORIDE 5 MG/1
5 TABLET ORAL
Status: DISCONTINUED | OUTPATIENT
Start: 2024-04-09 | End: 2024-04-09 | Stop reason: HOSPADM

## 2024-04-09 RX ORDER — ONDANSETRON 4 MG/1
4 TABLET, ORALLY DISINTEGRATING ORAL EVERY 6 HOURS PRN
Qty: 20 TABLET | Refills: 0 | Status: SHIPPED | OUTPATIENT
Start: 2024-04-09

## 2024-04-09 RX ORDER — ERYTHROMYCIN 5 MG/G
OINTMENT OPHTHALMIC
Qty: 3.5 G | Refills: 0 | Status: SHIPPED | OUTPATIENT
Start: 2024-04-09 | End: 2024-06-12

## 2024-04-09 RX ADMIN — GLYCOPYRROLATE 0.1 MCG: 0.2 INJECTION, SOLUTION INTRAMUSCULAR; INTRAVENOUS at 08:04

## 2024-04-09 RX ADMIN — LIDOCAINE HYDROCHLORIDE 100 MG: 20 INJECTION INTRAVENOUS at 09:04

## 2024-04-09 RX ADMIN — FENTANYL CITRATE 100 MCG: 50 INJECTION, SOLUTION INTRAMUSCULAR; INTRAVENOUS at 09:04

## 2024-04-09 RX ADMIN — PHENYLEPHRINE HYDROCHLORIDE 50 MCG: 10 INJECTION INTRAVENOUS at 10:04

## 2024-04-09 RX ADMIN — SODIUM CHLORIDE, SODIUM ACETATE ANHYDROUS, SODIUM GLUCONATE, POTASSIUM CHLORIDE, AND MAGNESIUM CHLORIDE: 526; 222; 502; 37; 30 INJECTION, SOLUTION INTRAVENOUS at 09:04

## 2024-04-09 RX ADMIN — ROCURONIUM BROMIDE 30 MG: 10 INJECTION INTRAVENOUS at 10:04

## 2024-04-09 RX ADMIN — CEFAZOLIN 2 G: 330 INJECTION, POWDER, FOR SOLUTION INTRAMUSCULAR; INTRAVENOUS at 09:04

## 2024-04-09 RX ADMIN — SUGAMMADEX 400 MG: 100 INJECTION, SOLUTION INTRAVENOUS at 11:04

## 2024-04-09 RX ADMIN — HALOPERIDOL LACTATE 1 MG: 5 INJECTION, SOLUTION INTRAMUSCULAR at 09:04

## 2024-04-09 RX ADMIN — SODIUM CHLORIDE: 0.9 INJECTION, SOLUTION INTRAVENOUS at 08:04

## 2024-04-09 RX ADMIN — MIDAZOLAM HYDROCHLORIDE 2 MG: 1 INJECTION, SOLUTION INTRAMUSCULAR; INTRAVENOUS at 08:04

## 2024-04-09 RX ADMIN — ACETAMINOPHEN 1000 MG: 10 INJECTION INTRAVENOUS at 09:04

## 2024-04-09 RX ADMIN — ONDANSETRON 4 MG: 2 INJECTION INTRAMUSCULAR; INTRAVENOUS at 10:04

## 2024-04-09 RX ADMIN — DEXAMETHASONE SODIUM PHOSPHATE 4 MG: 4 INJECTION INTRA-ARTICULAR; INTRALESIONAL; INTRAMUSCULAR; INTRAVENOUS; SOFT TISSUE at 09:04

## 2024-04-09 RX ADMIN — ROCURONIUM BROMIDE 50 MG: 10 INJECTION INTRAVENOUS at 09:04

## 2024-04-09 RX ADMIN — PROPOFOL 200 MG: 10 INJECTION, EMULSION INTRAVENOUS at 09:04

## 2024-04-09 RX ADMIN — FAMOTIDINE 20 MG: 10 INJECTION, SOLUTION INTRAVENOUS at 09:04

## 2024-04-09 RX ADMIN — ROCURONIUM BROMIDE 15 MG: 10 INJECTION INTRAVENOUS at 10:04

## 2024-04-09 NOTE — BRIEF OP NOTE
[facility]  Brief Operative Note    Surgery Date: 4/9/2024     Surgeon(s) and Role:     * Alyx Spivey MD - Primary    Assisting Surgeon: Albertina Mccall MD - Resident     Pre-op Diagnosis:  Scar of nose [L90.5]    Post-op Diagnosis:  Post-Op Diagnosis Codes:     * Scar of nose [L90.5]    Procedure(s) (LRB):  CREATION, FLAP, ROTATION (Right)    Anesthesia: General    Operative Findings:   Revision of nasal reconstruction with a melolabial flap     Estimated Blood Loss: * No values recorded between 4/9/2024  9:33 AM and 4/9/2024 11:18 AM *         Specimens:   Specimen (24h ago, onward)      None              Discharge Note    OUTCOME: Patient tolerated treatment/procedure well without complication and is now ready for discharge.    DISPOSITION: Home or Self Care    FINAL DIAGNOSIS:  Scar of nose    FOLLOWUP: In clinic    DISCHARGE INSTRUCTIONS:    Discharge Procedure Orders   Other restrictions (specify):   Order Comments: See discharge instructions     Discharge Instructions:   - OK to shower in 48 hours  - Avoid submerging incision   - Avoid scrubbing at incision  - Can clean incision sites gently with hydrogen peroxide on a Q-tip if crusting develops   - A small strip of Surgicel is placed at the site for hemostasis. It may fall off before follow up.   - All incisions are absorbable and do not need removal   - No heavy lifting, nothing greater than 10 pounds for 2 weeks   - No nose blowing   - Avoid sleeping on your stomach/activities with pressure over the flap site

## 2024-04-09 NOTE — ANESTHESIA POSTPROCEDURE EVALUATION
Anesthesia Post Evaluation    Patient: Lyndon Lion Jr.    Procedure(s) Performed: Procedure(s) (LRB):  CREATION, FLAP, ROTATION (Right)    Final Anesthesia Type: general      Patient location during evaluation: PACU  Patient participation: Yes- Able to Participate  Level of consciousness: awake and alert  Post-procedure vital signs: reviewed and stable  Pain management: adequate  Airway patency: patent    PONV status at discharge: No PONV  Anesthetic complications: no      Cardiovascular status: blood pressure returned to baseline  Respiratory status: unassisted  Hydration status: euvolemic                Vitals Value Taken Time   /72 04/09/24 1202   Temp 36.7 °C (98.1 °F) 04/09/24 1121   Pulse 71 04/09/24 1205   Resp 18 04/09/24 1205   SpO2 96 % 04/09/24 1205   Vitals shown include unvalidated device data.      Event Time   Out of Recovery 11:36:00         Pain/Karthikeyan Score: Karthikeyan Score: 9 (4/9/2024 11:21 AM)

## 2024-04-09 NOTE — OP NOTE
Date of service: 4/9/2024    Pre-operative Diagnosis:   1. Acquired nasal deformity  2. Nasal scarring   3. Columella deficiency      Post-operative Diagnosis:  Same status post reconstruction    Procedures Performed:  Adjacent tissue transfer with interpolated melolabial flap with mobilize tissue area 5.5 by 2 cm    Surgeon: Alyx Spivey MD    Assistant(s):  Albertina Sanchez MD    Anesthesia: General Endotracheal    EBL:  15 cc    Specimens:  None    Findings:  Columellar retraction corrected with melolabial flap.  Flap was attached to the left-sided septum as well as the base of the columella and prior paramedian forehead flap superiorly with pedicle crossing over right nasal aperture and vestibule.    Indications for Procedure:  Mr. Lion is a 53-year-old gentleman very familiar to me with multiple revisions after partial rhinectomy and paramedian forehead flap for coverage of nasal defect.  He presents for addressing the columella today    Procedure in Detail:  After informed consent was obtained from patient in holding area the risks and benefits reviewed patient was taken back to OR 3.  Anesthesia proceeded with general endotracheal anesthesia with intubation with an oral Kerri tube.  Patient was turned 90° and time-out was undertaken after verification of patient and correct procedure.  Midface was prepped and draped in usual sterile fashion.    Scarring at the columella was injected with 1% lidocaine with [id number] as well as prior paramedian forehead flap at the nasal tip.  I turned my attention to revision of the columella 1st with debridement and removal of the scar prior to harvest and inset of the flap.  Incision was made to the left side of the columella extending superiorly to the soft tissue triangle.  This was then also extended underneath the prior paramedian forehead flap to the scarring on the right at the junction of the flap with the septum.  This was then extended to the right side of the  columella mucosa down to the base.  Skin scar was removed with sub cutaneous tissue kept intact.  Submucosal elevation was undertaken on either side of the septum with exposure of prior cartilage graft.  Left soft tissue triangle was at the dressed 1st with notching.  Subdermal soft tissue was removed including trimming of portion of the lower lateral that was jutting into the margin of the ala. after wound was adequately prepared with removal of scarring at the right junction of the paramedian forehead flap with the septum attention was turned to designed in harvest of the flap.      Superiorly based melolabial flap was designed extending proximally 5.5 cm with base width of 2.5 cm and tip width of 1.7 cm.  This was tapered into a ellipse inferiorly with planned medial incision in the melolabial crease.  After assuring adequate reach into the columella area around the flap was injected with 1% lidocaine with [id number] of epinephrine.  Flap harvest was commenced.  Incision was made laterally into the planned pedicle up to mid cheek and then medially along the melolabial crease.  Elevation was undertaken initially starting in mid subdermal fat and then extending to a deeper level just above the orbicularis.  Care was taken to not violate the fascia and facial nerve branches.  Bipolar was used to address the base of the pedicle which was kept thicker and small vein from facial vein was controlled with bipolar cautery going to the upper lip.  After assuring adequate mobilization of the flap attention was turned to inset and then closure of the donor site.    Flap was inset with deep 4-0 PDS into the columella with most of the inset done to the left side due to the bulk of the flap obscuring the right.  Mucosa was closed with 5 0 chromic suture.  After adequate inset with at least 270° around the skin pedicle of the flap to the left and superior aspect and inferior aspect of the columella attention was turned to closure  of the donor site.  Donor site was closed with combination of deep 4-0 and 5 0 PDS up to just below the base of the pedicle of the flap in order to not compress the pedicle.  Skin was closed with a running locking 5 0 fast-absorbing gut suture.  Wounds were dressed with Vaseline patient was turned over to Anesthesia awakened and taken to recovery in stable condition          Complications:  None    Attestation:  I was present for the entire procedure    DISCLAIMER: This note was prepared with [employer] voice recognition transcription software. Garbled syntax, mangled pronouns, and other bizarre constructions may be attributed to that software system. While efforts were made to correct any mistakes made by this voice recognition program, some errors and/or omissions may remain in the note that were missed when the note was originally created.

## 2024-04-09 NOTE — H&P
History and Physical:   Below is the prior HPI. Patient presents today for scheduled procedure. No significant changes since last evaluation. Proceed to OR.     TREATMENT HISTORY:  1. 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 Reconstruction, nose - March 21, 2023 - Dr Spivey  3. Division and inset of paramedian forehead flap at the nose  - April 18, 2023 - Dr Spivey  4. Adjacent tissue transfer for repair and revision of prior paramedian forehead flap with advanced area 1.5 x 1 cm with opening of vestibular stenosis on the right  - July 3, 2023 - Dr Spivey  5. Flap debulking with excision of subcutaneous fat excised dimensions 2.5 x 1 cm - August 29, 2023 - Dr Spivey  6.  Advancement rotation flap of the nose to correct ala retraction with advanced area being 3.5 x 1.5 cm and Left auricular cartilage graft for use in ala lengthening in the nose - February 20, 2024 - Dr Spivey    INTERVAL HISTORY:  Lyndon Lion  returns to clinic today without complaints. He denies perioral or digital paresthesias or dysesthesias. There is no redness or drainage from the wound. He has no fever or chills.  Denies sore throat, dysphagia, shortness of breath.  Post op pain improving.      PHYSICAL EXAM:  Healing surgical incision to the R nose with Prolene sutures in place. Some darkening of the tip of the nose but good sensation. No bleeding, drainage, erythema, warmth, fluctuance, tenderness.   Bloster in place to the L ear. No cellulitis, bleeding, drainage noted.        PROCEDURE:  Sutures removed from the R nose without complication.   Bolster removed from the L ear without complication.   Patient tolerated well. No bleeding, drainage, erythema noted. Mild tenderness to the L ear.      PLAN:  Follow up with Dr Spivey - next surgery booked for April 9 (flap creation). Case discussed with and he was examined by Dr Spivey.    Provided with a return to work note - will return March 20.

## 2024-04-09 NOTE — ANESTHESIA PREPROCEDURE EVALUATION
04/09/2024    Lyndon BIN Lion Jr. is a 53 y.o., male.    Past Medical History:   Diagnosis Date    Arthritis     Asthma     AS A CHILD    Colon cancer     Family hx of prostate cancer 11/22/2016    Hx of colon cancer, stage I 07/03/2014    Parada syndrome     Squamous cell carcinoma of skin     Tear of labium 10/2020    left shoulder Dr Molina    Uncontrolled type 2 diabetes mellitus with hyperglycemia 03/04/2021    Vitamin D deficiency     Wears glasses      Past Surgical History:   Procedure Laterality Date    APPENDECTOMY      APPLICATION OF CARTILAGE GRAFT N/A 3/21/2023    Procedure: APPLICATION, CARTILAGE GRAFT;  Surgeon: Alyx Spivey MD;  Location: [facility] 2ND FLR;  Service: ENT;  Laterality: N/A;    CAUDAL EPIDURAL STEROID INJECTION N/A 11/6/2018    Procedure: Injection-steroid-epidural-caudal;  Surgeon: Lyndon Brooke Jr., MD;  Location: [facility];  Service: Pain Management;  Laterality: N/A;    COLON SURGERY  2008    PARTIAL COLECTOMY    COLONOSCOPY Bilateral 2012    COLONOSCOPY N/A 8/1/2016    Procedure: COLONOSCOPY;  Surgeon: Blaze Marr MD;  Location: [facility];  Service: Endoscopy;  Laterality: N/A;    COLONOSCOPY N/A 9/20/2017    Procedure: COLONOSCOPY;  Surgeon: Dom Leonardo MD;  Location: [facility] (4TH FLR);  Service: Endoscopy;  Laterality: N/A;  not given PM prep    COLONOSCOPY N/A 10/9/2017    Procedure: COLONOSCOPY;  Surgeon: RAMON Callahan MD;  Location: [facility] (4TH FLR);  Service: Endoscopy;  Laterality: N/A;  ok for Prepopik per Dr Callahan    COLONOSCOPY N/A 11/20/2017    Procedure: COLONOSCOPY/EMR;  Surgeon: Joseluis Choe MD;  Location: [facility] (2ND FLR);  Service: Endoscopy;  Laterality: N/A;  EMR    no pm prep    COLONOSCOPY N/A 5/30/2018    Procedure: COLONOSCOPY;  Surgeon: Dom Leonardo MD;  Location: [facility] (4TH FLR);  Service: Endoscopy;  Laterality:  N/A;  follow up colonoscopy in 5/2018 for Parada Syndrome         COLONOSCOPY N/A 6/10/2019    Procedure: COLONOSCOPY;  Surgeon: Dom Leonardo MD;  Location: [facility] (4TH FLR);  Service: Endoscopy;  Laterality: N/A;  Prepopik ordered per Dr. Leonardo    COLONOSCOPY N/A 7/22/2020    Procedure: COLONOSCOPY;  Surgeon: Dom Leonardo MD;  Location: [facility] (4TH FLR);  Service: Endoscopy;  Laterality: N/A;    COLONOSCOPY N/A 5/27/2021    Procedure: COLONOSCOPY;  Surgeon: Dom Leonardo MD;  Location: [facility] (4TH FLR);  Service: Endoscopy;  Laterality: N/A;  covid test 5/24-[place]  pt requests Prepopik    COLONOSCOPY N/A 6/17/2022    Procedure: COLONOSCOPY;  Surgeon: Dom Leonardo MD;  Location: [facility] (4TH FLR);  Service: Endoscopy;  Laterality: N/A;  He was discovered to have colon cancer and had right hemicolectomy        for stage II on 08/08/2008. MSH2 Parada syndrome.  sutab requested  instructions via the portal -sm  fully vaccinated - sm    COLONOSCOPY N/A 6/22/2023    Procedure: COLONOSCOPY;  Surgeon: Dom Leonardo MD;  Location: [facility] (4TH FLR);  Service: Endoscopy;  Laterality: N/A;  see telephone encounter dated 5/24/23/ Pt/ Pt wife requested sutab - prep ins. on portal / Ozempic for DM- ERW    COLONOSCOPY N/A 2/6/2024    Procedure: COLONOSCOPY;  Surgeon: Dom Leonardo MD;  Location: [facility] (4TH FLR);  Service: Endoscopy;  Laterality: N/A;  sutab/pt diabetic/ozempic/referral dr feng    CYSTOSCOPY      CYSTOSCOPY N/A 1/31/2024    Procedure: CYSTOSCOPY;  Surgeon: Eron Llanes MD;  [address];  Service: Urology;  Laterality: N/A;    Epidural Steroid Injection  10/23/15    Lumbar    EPIDURAL STEROID INJECTION INTO LUMBAR SPINE N/A 7/27/2018    Procedure: Injection-steroid-epidural-lumbar;  Surgeon: Lyndon Brooke Jr., MD;  Location: [facility] OR;  Service: Pain Management;  Laterality: N/A;    ESOPHAGOGASTRODUODENOSCOPY      ESOPHAGOGASTRODUODENOSCOPY N/A  7/22/2020    Procedure: EGD (ESOPHAGOGASTRODUODENOSCOPY);  Surgeon: Dom Leonardo MD;  Location: [facility] ENDO (4TH FLR);  Service: Endoscopy;  Laterality: N/A;  Please schedule patient for EGD and colonoscopy with me MSH2 Parada syndrome and anemia evaluation please make priority 1  covid test 7/20-[place]    ESOPHAGOGASTRODUODENOSCOPY N/A 6/22/2023    Procedure: EGD (ESOPHAGOGASTRODUODENOSCOPY);  Surgeon: Dom Leonardo MD;  Location: [facility] ENDO (4TH FLR);  Service: Endoscopy;  Laterality: N/A;  see telephone encounter dated 5/24/23 6/16/23-[place] completed appointment confirmed-    ESOPHAGOGASTRODUODENOSCOPY N/A 9/1/2023    Procedure: EGD (ESOPHAGOGASTRODUODENOSCOPY);  Surgeon: Dom Leonardo MD;  Location: [facility] ENDO (4TH FLR);  Service: Endoscopy;  Laterality: N/A;  Instructions sent via portal / Ozempic / Extended Clear Liquid prep    EXCISION OR SURGICAL PLANING, SKIN, NOSE, FOR RHINOPHYMA Bilateral 3/7/2023    Procedure: EXCISION OR SURGICAL resection nasal skin cancer;  Surgeon: Alyx Spivey MD;  Location: [facility] OR 2ND FLR;  Service: ENT;  Laterality: Bilateral;    EXTRACTION - STONE Left 1/31/2024    Procedure: EXTRACTION - STONE;  Surgeon: Eron Llanes MD;  Location: [facility] OR 1ST FLR;  Service: Urology;  Laterality: Left;    FACETECTOMY OF VERTEBRA  2/13/2019    Procedure: MEDIAL FACETECTOMY L5-S1;  Surgeon: Lyndon Brooke Jr., MD;  Location: [facility] OR;  Service: Orthopedics;;    GASTRIC BYPASS  2010    SLEEVE    KNEE ARTHROSCOPY Right     LUMBAR DISCECTOMY  2/13/2019    Procedure: DISCECTOMY, SPINE, LUMBAR L5-S1;  Surgeon: Lyndon Brooke Jr., MD;  Location: [facility] OR;  Service: Orthopedics;;    NASAL RECONSTRUCTION N/A 3/21/2023    Procedure: RECONSTRUCTION, NOSE;  Surgeon: Alyx Spivey MD;  Location: [facility] OR 2ND FLR;  Service: ENT;  Laterality: N/A;    PLACEMENT-STENT Left 1/31/2024    Procedure: PLACEMENT-STENT;  Surgeon: Eron Llanes MD;  Location: [address]  FLR;  Service: Urology;  Laterality: Left;    RETROGRADE PYELOGRAPHY Left 1/31/2024    Procedure: PYELOGRAM, RETROGRADE;  Surgeon: Eron Llanes MD;  Location: [facility] OR 1ST FLR;  Service: Urology;  Laterality: Left;    REVISION OF FLAP GRAFT Bilateral 4/18/2023    Procedure: REVISION, PROCEDURE INVOLVING FLAP GRAFT;  Surgeon: Alyx Spivey MD;  Location: [facility] OR;  Service: ENT;  Laterality: Bilateral;    REVISION OF FLAP GRAFT N/A 6/29/2023    Procedure: REVISION, PROCEDURE INVOLVING FLAP GRAFT;  Surgeon: Alyx Spivey MD;  Location: [facility] OR 2ND FLR;  Service: ENT;  Laterality: N/A;    REVISION OF FLAP GRAFT N/A 8/29/2023    Procedure: REVISION, PROCEDURE INVOLVING FLAP GRAFT;  Surgeon: Alyx Spivey MD;  Location: [facility] OR;  Service: ENT;  Laterality: N/A;    REVISION OF FLAP GRAFT Bilateral 2/20/2024    Procedure: REVISION, PROCEDURE INVOLVING FLAP GRAFT;  Surgeon: Alyx Spivey MD;  Location: [facility] OR;  Service: ENT;  Laterality: Bilateral;    ROTATION FLAP SURGERY N/A 3/21/2023    Procedure: CREATION, FLAP, ROTATION;  Surgeon: Alyx Spivey MD;  Location: [facility] OR 2ND FLR;  Service: ENT;  Laterality: N/A;    URETEROSCOPY Left 1/31/2024    Procedure: URETEROSCOPY;  Surgeon: Eron Llanes MD;  Location: [facility] OR 1ST FLR;  Service: Urology;  Laterality: Left;           Pre-op Assessment    I have reviewed the Patient Summary Reports.     I have reviewed the Nursing Notes. I have reviewed the NPO Status.      Review of Systems  Anesthesia Hx:   History of prior surgery of interest to airway management or planning:            Denies Personal Hx of Anesthesia complications.                    Cardiovascular:  Exercise tolerance: good                                           Pulmonary:    Asthma                    Renal/:  Chronic Renal Disease                Hepatic/GI:     GERD Liver Disease,            Musculoskeletal:  Arthritis               Neurological:    Neuromuscular Disease,                                    Endocrine:  Diabetes               Physical Exam  General: Well nourished    Airway:  Mallampati: II   Mouth Opening: Normal  Neck ROM: Normal ROM    Dental:  Intact        Anesthesia Plan  Type of Anesthesia, risks & benefits discussed:    Anesthesia Type: Gen Supraglottic Airway, Gen ETT  Informed Consent: Informed consent signed with the Patient and all parties understand the risks and agree with anesthesia plan.  All questions answered.   ASA Score: 2    Ready For Surgery From Anesthesia Perspective.     .

## 2024-04-09 NOTE — PLAN OF CARE
Handoff report received from procedural team. VS stable on transfer and while in recovery. Family/friend brought to bedside. Discharge instructions reviewed with patient and family/friend. Pt verbalizes understanding. Questions encouraged and answered. PIV removed with catheter intact. Escorted to vehicle via wheelchair. Handoff report received from procedural team. VS stable on transfer and while in recovery. Family/friend brought to bedside. Discharge instructions reviewed with patient and family/friend. Pt verbalizes understanding. Questions encouraged and answered. PIV removed with catheter intact. Escorted to vehicle via wheelchair.

## 2024-04-09 NOTE — PLAN OF CARE
Chart reviewed. Preop nursing care completed per orders. Safe surgery checklist complete. Pt denies any open wounds cuts or sores. Pt denies any metal in body. Belongings secured in PACU locker_8_. Waiting for admit order, H&P, surgical consent prior to surgery. Pt AAOX3, VSS on room air. Pt toileted, Bed locked in lowest position, Call light within reach. Pt denies any needs at this time. Will continue to monitor.

## 2024-04-09 NOTE — TRANSFER OF CARE
"Anesthesia Transfer of Care Note    Patient: Lyndon Lion Jr.    Procedure(s) Performed: Procedure(s) (LRB):  CREATION, FLAP, ROTATION (Right)    Patient location: PACU    Anesthesia Type: general    Transport from OR: Transported from OR on 2-3 L/min O2 by NC with adequate spontaneous ventilation    Post pain: adequate analgesia    Post assessment: no apparent anesthetic complications    Post vital signs: stable    Level of consciousness: awake and lethargic    Nausea/Vomiting: no nausea/vomiting    Complications: none    Transfer of care protocol was followed      Last vitals: Visit Vitals  /84 (BP Location: Right arm, Patient Position: Lying)   Pulse 71   Temp 37.1 °C (98.8 °F) (Temporal)   Resp 14   Ht 6' 2" (1.88 m)   Wt 111.1 kg (245 lb)   SpO2 97%   BMI 31.46 kg/m²     "

## 2024-04-09 NOTE — ANESTHESIA PROCEDURE NOTES
Intubation    Date/Time: 4/9/2024 9:11 AM    Performed by: Janine Dickey CRNA  Authorized by: Catalino Soto MD    Intubation:     Induction:  Intravenous    Intubated:  Postinduction    Mask Ventilation:  Easy mask    Attempts:  1    Attempted By:  CRNA    Method of Intubation:  Video laryngoscopy    Blade:  [place]ane 4    Laryngeal View Grade: Grade IIA - cords partially seen      Difficult Airway Encountered?: No      Complications:  None    Airway Device:  Oral albert    Airway Device Size:  8.0    Style/Cuff Inflation:  Cuffed    Inflation Amount (mL):  7    Tube secured:  24    Secured at:  The lips    Placement Verified By:  Capnometry    Complicating Factors:  Small mouth, large/floppy epiglottis and long mandible    Findings Post-Intubation:  BS equal bilateral and atraumatic/condition of teeth unchanged

## 2024-04-09 NOTE — DISCHARGE INSTRUCTIONS
Discharge Instructions:   - OK to shower in 48 hours  - Avoid submerging incision   - Avoid scrubbing at incision  - Can clean incision sites gently with hydrogen peroxide on a Q-tip if crusting develops   - A small strip of Surgicel is placed at the site for hemostasis. It may fall off before follow up.   - All incisions are absorbable and do not need removal   - No heavy lifting, nothing greater than 10 pounds for 2 weeks   - No nose blowing   - Avoid sleeping on your stomach/activities with pressure over the flap site

## 2024-04-10 NOTE — ED PROVIDER NOTES
Encounter Date: 4/9/2024       History     Chief Complaint   Patient presents with    Eye Problem     Left eye, red and painful. Started after procedure on nose today.     53-year-old male woke up from surgery today with left eye pain.  The eye is irritated, watery, and painful.  He can see out of the left eye.  Patient does not wear contact lenses, has no other issues.  And felt well prior to surgery.  Symptoms feels similar to when he had a foreign body in his left eye many years ago.      Review of patient's allergies indicates:   Allergen Reactions    Hydrocod-cpm-pe-acetaminophen Other (See Comments)     Irritability      Past Medical History:   Diagnosis Date    Arthritis     Asthma     AS A CHILD    Colon cancer     Family hx of prostate cancer 11/22/2016    Hx of colon cancer, stage I 07/03/2014    Parada syndrome     Squamous cell carcinoma of skin     Tear of labium 10/2020    left shoulder Dr Molina    Uncontrolled type 2 diabetes mellitus with hyperglycemia 03/04/2021    Vitamin D deficiency     Wears glasses      Past Surgical History:   Procedure Laterality Date    APPENDECTOMY      APPLICATION OF CARTILAGE GRAFT N/A 3/21/2023    Procedure: APPLICATION, CARTILAGE GRAFT;  Surgeon: Alyx Spivey MD;  Location: [facility] 2ND FLR;  Service: ENT;  Laterality: N/A;    CAUDAL EPIDURAL STEROID INJECTION N/A 11/6/2018    Procedure: Injection-steroid-epidural-caudal;  Surgeon: Lyndon Brooke Jr., MD;  Location: [facility];  Service: Pain Management;  Laterality: N/A;    COLON SURGERY  2008    PARTIAL COLECTOMY    COLONOSCOPY Bilateral 2012    COLONOSCOPY N/A 8/1/2016    Procedure: COLONOSCOPY;  Surgeon: Blaze Marr MD;  Location: [facility];  Service: Endoscopy;  Laterality: N/A;    COLONOSCOPY N/A 9/20/2017    Procedure: COLONOSCOPY;  Surgeon: Dom Leonardo MD;  Location: [facility] (4TH FLR);  Service: Endoscopy;  Laterality: N/A;  not given PM prep    COLONOSCOPY N/A 10/9/2017    Procedure: COLONOSCOPY;   Surgeon: RAMON Callahan MD;  Location: [facility] ENDO (4TH FLR);  Service: Endoscopy;  Laterality: N/A;  ok for Prepopik per Dr Callahan    COLONOSCOPY N/A 11/20/2017    Procedure: COLONOSCOPY/EMR;  Surgeon: Joseluis Choe MD;  Location: [facility] ENDO (2ND FLR);  Service: Endoscopy;  Laterality: N/A;  EMR    no pm prep    COLONOSCOPY N/A 5/30/2018    Procedure: COLONOSCOPY;  Surgeon: Dom Leonardo MD;  Location: [facility] ENDO (4TH FLR);  Service: Endoscopy;  Laterality: N/A;  follow up colonoscopy in 5/2018 for Parada Syndrome         COLONOSCOPY N/A 6/10/2019    Procedure: COLONOSCOPY;  Surgeon: Dom Leonardo MD;  Location: [facility] ENDO (4TH FLR);  Service: Endoscopy;  Laterality: N/A;  Prepopik ordered per Dr. Leonardo    COLONOSCOPY N/A 7/22/2020    Procedure: COLONOSCOPY;  Surgeon: Dom Leonardo MD;  Location: [facility] (4TH FLR);  Service: Endoscopy;  Laterality: N/A;    COLONOSCOPY N/A 5/27/2021    Procedure: COLONOSCOPY;  Surgeon: Dom Leonardo MD;  Location: [facility] ENDO (4TH FLR);  Service: Endoscopy;  Laterality: N/A;  covid test 5/24-[place]  pt requests Prepopik    COLONOSCOPY N/A 6/17/2022    Procedure: COLONOSCOPY;  Surgeon: Dom Leonardo MD;  Location: [facility] (4TH FLR);  Service: Endoscopy;  Laterality: N/A;  He was discovered to have colon cancer and had right hemicolectomy        for stage II on 08/08/2008. MSH2 Parada syndrome.  sutab requested  instructions via the portal -sm  fully vaccinated - sm    COLONOSCOPY N/A 6/22/2023    Procedure: COLONOSCOPY;  Surgeon: Dom Leonardo MD;  Location: [facility] (4TH FLR);  Service: Endoscopy;  Laterality: N/A;  see telephone encounter dated 5/24/23/ Pt/ Pt wife requested sutab - prep ins. on portal / Ozempic for DM- ERW    COLONOSCOPY N/A 2/6/2024    Procedure: COLONOSCOPY;  Surgeon: Dom Leonardo MD;  Location: [facility] (4TH FLR);  Service: Endoscopy;  Laterality: N/A;  sutab/pt diabetic/ozempic/referral dr feng    CYSTOSCOPY      CYSTOSCOPY  N/A 1/31/2024    Procedure: CYSTOSCOPY;  Surgeon: Eron Llanes MD;  Location: [facility] OR 1ST FLR;  Service: Urology;  Laterality: N/A;    Epidural Steroid Injection  10/23/15    Lumbar    EPIDURAL STEROID INJECTION INTO LUMBAR SPINE N/A 7/27/2018    Procedure: Injection-steroid-epidural-lumbar;  Surgeon: Lyndon Brooke Jr., MD;  Location: [facility];  Service: Pain Management;  Laterality: N/A;    ESOPHAGOGASTRODUODENOSCOPY      ESOPHAGOGASTRODUODENOSCOPY N/A 7/22/2020    Procedure: EGD (ESOPHAGOGASTRODUODENOSCOPY);  Surgeon: Dom Leonardo MD;  Location: [facility] (4TH FLR);  Service: Endoscopy;  Laterality: N/A;  Please schedule patient for EGD and colonoscopy with me MSH2 Parada syndrome and anemia evaluation please make priority 1  covid test 7/20-[place]    ESOPHAGOGASTRODUODENOSCOPY N/A 6/22/2023    Procedure: EGD (ESOPHAGOGASTRODUODENOSCOPY);  Surgeon: Dom Leonardo MD;  Location: [facility] (4TH FLR);  Service: Endoscopy;  Laterality: N/A;  see telephone encounter dated 5/24/23 6/16/23-[place] completed appointment confirmed-    ESOPHAGOGASTRODUODENOSCOPY N/A 9/1/2023    Procedure: EGD (ESOPHAGOGASTRODUODENOSCOPY);  Surgeon: Dom Leonardo MD;  Location: [facility] (4TH FLR);  Service: Endoscopy;  Laterality: N/A;  Instructions sent via portal / Ozempic / Extended Clear Liquid prep    EXCISION OR SURGICAL PLANING, SKIN, NOSE, FOR RHINOPHYMA Bilateral 3/7/2023    Procedure: EXCISION OR SURGICAL resection nasal skin cancer;  Surgeon: Alyx Spivey MD;  Location: [facility] OR 2ND FLR;  Service: ENT;  Laterality: Bilateral;    EXTRACTION - STONE Left 1/31/2024    Procedure: EXTRACTION - STONE;  Surgeon: Eron Llanes MD;  Location: [facility] OR 1ST FLR;  Service: Urology;  Laterality: Left;    FACETECTOMY OF VERTEBRA  2/13/2019    Procedure: MEDIAL FACETECTOMY L5-S1;  Surgeon: Lyndon Brooke Jr., MD;  Location: [facility] OR;  Service: Orthopedics;;    GASTRIC BYPASS  2010    SLEEVE    KNEE  ARTHROSCOPY Right     LUMBAR DISCECTOMY  2/13/2019    Procedure: DISCECTOMY, SPINE, LUMBAR L5-S1;  Surgeon: Lyndon Brooke Jr., MD;  Location: [facility] OR;  Service: Orthopedics;;    NASAL RECONSTRUCTION N/A 3/21/2023    Procedure: RECONSTRUCTION, NOSE;  Surgeon: Alyx Spivey MD;  Location: [facility] OR 2ND FLR;  Service: ENT;  Laterality: N/A;    PLACEMENT-STENT Left 1/31/2024    Procedure: PLACEMENT-STENT;  Surgeon: Eron Llanes MD;  Location: [facility] OR 1ST FLR;  Service: Urology;  Laterality: Left;    RETROGRADE PYELOGRAPHY Left 1/31/2024    Procedure: PYELOGRAM, RETROGRADE;  Surgeon: Eron Llanes MD;  Location: [facility] OR 1ST FLR;  Service: Urology;  Laterality: Left;    REVISION OF FLAP GRAFT Bilateral 4/18/2023    Procedure: REVISION, PROCEDURE INVOLVING FLAP GRAFT;  Surgeon: Alyx Spivey MD;  Location: [facility] OR;  Service: ENT;  Laterality: Bilateral;    REVISION OF FLAP GRAFT N/A 6/29/2023    Procedure: REVISION, PROCEDURE INVOLVING FLAP GRAFT;  Surgeon: Alyx Spivey MD;  Location: [facility] OR 2ND FLR;  Service: ENT;  Laterality: N/A;    REVISION OF FLAP GRAFT N/A 8/29/2023    Procedure: REVISION, PROCEDURE INVOLVING FLAP GRAFT;  Surgeon: Alyx Spivey MD;  Location: [facility] OR;  Service: ENT;  Laterality: N/A;    REVISION OF FLAP GRAFT Bilateral 2/20/2024    Procedure: REVISION, PROCEDURE INVOLVING FLAP GRAFT;  Surgeon: Alyx Spivey MD;  Location: [facility] OR;  Service: ENT;  Laterality: Bilateral;    ROTATION FLAP SURGERY N/A 3/21/2023    Procedure: CREATION, FLAP, ROTATION;  Surgeon: Alyx Spivey MD;  Location: [facility] OR 2ND FLR;  Service: ENT;  Laterality: N/A;    URETEROSCOPY Left 1/31/2024    Procedure: URETEROSCOPY;  Surgeon: Eron Llanes MD;  Location: [facility] OR 1ST FLR;  Service: Urology;  Laterality: Left;     Family History   Problem Relation Age of Onset    Melanoma Mother     Cancer Mother         breast    Breast cancer Mother     Heart disease Father     Diabetes Father     Liver disease Father     Hearing  loss Father     Basal cell carcinoma Father     No Known Problems Sister     Polycystic ovary syndrome Daughter     Cancer Maternal Uncle         colon    Colon cancer Maternal Uncle         x2    Heart disease Maternal Uncle     Hypertension Maternal Uncle     No Known Problems Paternal Aunt     Alcohol abuse Paternal Uncle     Prostate cancer Paternal Uncle     Cancer Paternal Uncle     Dementia Maternal Grandmother     Cancer Maternal Grandfather         colon ca    Colon cancer Maternal Grandfather     Early death Maternal Grandfather     Diabetes Paternal Grandmother     Hypertension Paternal Grandfather     Prostatitis Paternal Grandfather     Psoriasis Neg Hx     Lupus Neg Hx     Eczema Neg Hx      Social History     Tobacco Use    Smoking status: Never    Smokeless tobacco: Never   Substance Use Topics    Alcohol use: Yes     Comment: RARELY    Drug use: No     Review of Systems   Constitutional:  Negative for activity change and appetite change.   HENT:  Negative for congestion and drooling.    Eyes:  Positive for pain and redness. Negative for discharge and itching.   Respiratory:  Negative for cough and chest tightness.    Cardiovascular:  Negative for chest pain and leg swelling.   Gastrointestinal:  Negative for abdominal distention and abdominal pain.   Genitourinary:  Negative for difficulty urinating and dysuria.   Musculoskeletal:  Negative for arthralgias.   Skin:  Negative for color change and pallor.   Neurological:  Negative for dizziness and facial asymmetry.   Psychiatric/Behavioral:  Negative for agitation and behavioral problems.        Physical Exam     Initial Vitals   BP Pulse Resp Temp SpO2   04/09/24 2101 04/09/24 2058 04/09/24 2058 04/09/24 2058 04/09/24 2058   (!) 172/98 88 18 97.9 °F (36.6 °C) 96 %      MAP       --                Physical Exam    Nursing note and vitals reviewed.  Constitutional: He appears well-developed and well-nourished.   HENT:   Head: Normocephalic and  atraumatic.   Mouth/Throat: Oropharynx is clear and moist.   Eyes: EOM are normal. Pupils are equal, round, and reactive to light. Left eye exhibits discharge.       Left eye corneal abrasion   Neck: No thyromegaly present.   Normal range of motion.  Cardiovascular:  Normal rate, regular rhythm and intact distal pulses.           Pulmonary/Chest: Breath sounds normal. No respiratory distress. He has no wheezes.   Abdominal: Abdomen is soft. Bowel sounds are normal. He exhibits no distension. There is no abdominal tenderness.   Musculoskeletal:         General: No tenderness or edema. Normal range of motion.      Cervical back: Normal range of motion.     Neurological: He is alert and oriented to person, place, and time. He has normal strength. No cranial nerve deficit.   Skin: Skin is warm and dry. No rash noted.   Psychiatric: He has a normal mood and affect. His behavior is normal. Thought content normal.         ED Course   Procedures  Labs Reviewed - No data to display       Imaging Results    None          Medications - No data to display    Medical Decision Making  Eye exam consistent with left eye corneal abrasion, likely suffered during general anesthesia given patient did not have pain prior to surgery and woke up with left eye pain.  He does not wear contact lenses.  Will discharge with supportive care, erythromycin ointment, outpatient follow-up and return precautions.    Risk  Prescription drug management.                                      Clinical Impression:  Final diagnoses:  [S05.02XA] Abrasion of left cornea, initial encounter (Primary)          ED Disposition Condition    Discharge Stable          ED Prescriptions       Medication Sig Dispense Start Date End Date Auth. Provider    erythromycin (ROMYCIN) ophthalmic ointment Place a 1/2 inch ribbon of ointment into the lower eyelid. 3.5 g 4/9/2024 -- Mesfin Caceres MD          Follow-up Information       Follow up With Specialties Details Why  Contact Info    Stepan Moran III, MD Family Medicine Go in 1 week To set up a follow-up appointment, To recheck today's symptoms [address]  [address]  [place] [id number]  [phone]               Mesfin Caceres MD  04/10/24 0244

## 2024-04-10 NOTE — DISCHARGE INSTRUCTIONS

## 2024-04-10 NOTE — FIRST PROVIDER EVALUATION
Emergency Department TeleTriage Encounter Note      CHIEF COMPLAINT    Chief Complaint   Patient presents with    Eye Problem     Left eye, red and painful. Started after procedure on nose today.       VITAL SIGNS   Initial Vitals   BP Pulse Resp Temp SpO2   04/09/24 2101 04/09/24 2058 04/09/24 2058 04/09/24 2058 04/09/24 2058   (!) 172/98 88 18 97.9 °F (36.6 °C) 96 %      MAP       --                   ALLERGIES    Review of patient's allergies indicates:   Allergen Reactions    Hydrocod-cpm-pe-acetaminophen Other (See Comments)     Irritability        PROVIDER TRIAGE NOTE  This is a teletriage evaluation of a 53 y.o. male presenting to the ED complaining of eye discomfort postop - likely corneal abrasion.     Initial orders will be placed and care will be transferred to an alternate provider when patient is roomed for a full evaluation. Any additional orders and the final disposition will be determined by that provider.         ORDERS  Labs Reviewed - No data to display    ED Orders (720h ago, onward)      Start Ordered     Status Ordering Provider    04/09/24 2130 04/09/24 2123  fluorescein ophthalmic strip 1 each  ED 1 Time         Ordered GUR[facility]ET [facility]    04/09/24 2130 04/09/24 2123  TETRAcaine HCl (PF) 0.5 % Drop 2 drop  ED 1 Time         Ordered GUR[facility]ET [facility]ARD[facility]              Virtual Visit Note: The provider triage portion of this emergency department evaluation and documentation was performed via [employer], a HIPAA-compliant telemedicine application, in concert with a tele-presenter in the room. A face to face patient evaluation with one of my colleagues will occur once the patient is placed in an emergency department room.      DISCLAIMER: This note was prepared with [employer] voice recognition transcription software. Garbled syntax, mangled pronouns, and other bizarre constructions may be attributed to that software system.

## 2024-04-16 ENCOUNTER — PATIENT MESSAGE (OUTPATIENT)
Dept: OTOLARYNGOLOGY | Facility: CLINIC | Age: 54
End: 2024-04-16
Payer: COMMERCIAL

## 2024-04-18 DIAGNOSIS — E11.65 UNCONTROLLED TYPE 2 DIABETES MELLITUS WITH HYPERGLYCEMIA: ICD-10-CM

## 2024-04-18 RX ORDER — SEMAGLUTIDE 2.68 MG/ML
2 INJECTION, SOLUTION SUBCUTANEOUS
Qty: 4 ML | Refills: 1 | Status: SHIPPED | OUTPATIENT
Start: 2024-04-18 | End: 2024-05-10 | Stop reason: SDUPTHER

## 2024-04-24 ENCOUNTER — OFFICE VISIT (OUTPATIENT)
Dept: OTOLARYNGOLOGY | Facility: CLINIC | Age: 54
End: 2024-04-24
Payer: COMMERCIAL

## 2024-04-24 VITALS
WEIGHT: 251.75 LBS | DIASTOLIC BLOOD PRESSURE: 85 MMHG | SYSTOLIC BLOOD PRESSURE: 136 MMHG | BODY MASS INDEX: 32.32 KG/M2 | HEART RATE: 67 BPM

## 2024-04-24 DIAGNOSIS — Z09 POSTOP CHECK: Primary | ICD-10-CM

## 2024-04-24 PROCEDURE — 99999 PR PBB SHADOW E&M-EST. PATIENT-LVL IV: CPT | Mod: PBBFAC,,, | Performed by: OTOLARYNGOLOGY

## 2024-04-24 PROCEDURE — 99024 POSTOP FOLLOW-UP VISIT: CPT | Mod: S$GLB,,, | Performed by: OTOLARYNGOLOGY

## 2024-04-24 NOTE — PROGRESS NOTES
Subjective: 53 y.o. male seen in follow up s/p melolabial flap for coverage of columellar defect 04/09/2024.  With history of prior multiple revisions for nasal defect from squamous cell carcinoma.  He is here for flap check prior to 2nd stage.  I briefly discussed with wife placement of Cadaveric rib.      Objective:  /85 (BP Location: Right arm, Patient Position: Sitting)   Pulse 67   Wt 114.2 kg (251 lb 12.3 oz)   BMI 32.32 kg/m²     Exam:  General No acute distress  Melolabial flap fully viable with good attachment to contralateral left side of the columella      Assessment / Plan:  Plan on division and inset of melolabial flap on 05/09/24.  This is set up at main for possible that a very clip graft.  This may have to be in a separate 2nd stage we will assess the day of surgery

## 2024-04-30 ENCOUNTER — TELEPHONE (OUTPATIENT)
Dept: PREADMISSION TESTING | Facility: HOSPITAL | Age: 54
End: 2024-04-30
Payer: COMMERCIAL

## 2024-04-30 DIAGNOSIS — Z01.818 PRE-OP TESTING: Primary | ICD-10-CM

## 2024-05-01 ENCOUNTER — HOSPITAL ENCOUNTER (OUTPATIENT)
Dept: RADIOLOGY | Facility: HOSPITAL | Age: 54
Discharge: HOME | End: 2024-05-01
Payer: COMMERCIAL

## 2024-05-01 DIAGNOSIS — N20.1 LEFT URETERAL STONE: ICD-10-CM

## 2024-05-01 PROCEDURE — 76770 US EXAM ABDO BACK WALL COMP: CPT | Mod: 26,,, | Performed by: RADIOLOGY

## 2024-05-01 PROCEDURE — 76770 US EXAM ABDO BACK WALL COMP: CPT | Mod: TC

## 2024-05-06 ENCOUNTER — PATIENT MESSAGE (OUTPATIENT)
Dept: UROLOGY | Facility: CLINIC | Age: 54
End: 2024-05-06
Payer: COMMERCIAL

## 2024-05-06 DIAGNOSIS — L90.5 SCAR OF NOSE: Primary | ICD-10-CM

## 2024-05-07 ENCOUNTER — HOSPITAL ENCOUNTER (OUTPATIENT)
Facility: HOSPITAL | Age: 54
Discharge: HOME | End: 2024-05-07
Attending: OTOLARYNGOLOGY | Admitting: OTOLARYNGOLOGY
Payer: COMMERCIAL

## 2024-05-07 ENCOUNTER — ANESTHESIA EVENT (OUTPATIENT)
Dept: SURGERY | Facility: HOSPITAL | Age: 54
End: 2024-05-07
Payer: COMMERCIAL

## 2024-05-07 ENCOUNTER — TELEPHONE (OUTPATIENT)
Dept: OTOLARYNGOLOGY | Facility: CLINIC | Age: 54
End: 2024-05-07
Payer: COMMERCIAL

## 2024-05-07 ENCOUNTER — ANESTHESIA (OUTPATIENT)
Dept: SURGERY | Facility: HOSPITAL | Age: 54
End: 2024-05-07
Payer: COMMERCIAL

## 2024-05-07 VITALS
RESPIRATION RATE: 18 BRPM | OXYGEN SATURATION: 95 % | HEIGHT: 74 IN | BODY MASS INDEX: 32.08 KG/M2 | TEMPERATURE: 98 F | DIASTOLIC BLOOD PRESSURE: 64 MMHG | WEIGHT: 250 LBS | HEART RATE: 76 BPM | SYSTOLIC BLOOD PRESSURE: 126 MMHG

## 2024-05-07 DIAGNOSIS — M95.0 NASAL DEFECT: Primary | ICD-10-CM

## 2024-05-07 LAB
GLUCOSE SERPL-MCNC: 121 MG/DL (ref 70–110)
POCT GLUCOSE: 121 MG/DL (ref 70–110)
POCT GLUCOSE: 89 MG/DL (ref 70–110)

## 2024-05-07 PROCEDURE — 71000033 HC RECOVERY, INTIAL HOUR: Performed by: OTOLARYNGOLOGY

## 2024-05-07 PROCEDURE — 63600175 PHARM REV CODE 636 W HCPCS: Performed by: NURSE ANESTHETIST, CERTIFIED REGISTERED

## 2024-05-07 PROCEDURE — 37000008 HC ANESTHESIA 1ST 15 MINUTES: Performed by: OTOLARYNGOLOGY

## 2024-05-07 PROCEDURE — 37000009 HC ANESTHESIA EA ADD 15 MINS: Performed by: OTOLARYNGOLOGY

## 2024-05-07 PROCEDURE — 94761 N-INVAS EAR/PLS OXIMETRY MLT: CPT

## 2024-05-07 PROCEDURE — 25000003 PHARM REV CODE 250: Performed by: NURSE ANESTHETIST, CERTIFIED REGISTERED

## 2024-05-07 PROCEDURE — 82962 GLUCOSE BLOOD TEST: CPT | Performed by: OTOLARYNGOLOGY

## 2024-05-07 PROCEDURE — 36000706: Performed by: OTOLARYNGOLOGY

## 2024-05-07 PROCEDURE — 99900035 HC TECH TIME PER 15 MIN (STAT)

## 2024-05-07 PROCEDURE — 25000003 PHARM REV CODE 250: Performed by: OTOLARYNGOLOGY

## 2024-05-07 PROCEDURE — 15630 DELAY FLAP EYE/NOS/EAR/LIP: CPT | Mod: 58,,, | Performed by: OTOLARYNGOLOGY

## 2024-05-07 PROCEDURE — 71000015 HC POSTOP RECOV 1ST HR: Performed by: OTOLARYNGOLOGY

## 2024-05-07 PROCEDURE — D9220A PRA ANESTHESIA: Mod: ,,, | Performed by: NURSE ANESTHETIST, CERTIFIED REGISTERED

## 2024-05-07 PROCEDURE — 36000707: Performed by: OTOLARYNGOLOGY

## 2024-05-07 RX ORDER — DEXAMETHASONE SODIUM PHOSPHATE 4 MG/ML
INJECTION, SOLUTION INTRA-ARTICULAR; INTRALESIONAL; INTRAMUSCULAR; INTRAVENOUS; SOFT TISSUE
Status: DISCONTINUED | OUTPATIENT
Start: 2024-05-07 | End: 2024-05-07

## 2024-05-07 RX ORDER — CEFAZOLIN SODIUM 1 G/3ML
INJECTION, POWDER, FOR SOLUTION INTRAMUSCULAR; INTRAVENOUS
Status: DISCONTINUED | OUTPATIENT
Start: 2024-05-07 | End: 2024-05-07

## 2024-05-07 RX ORDER — PROPOFOL 10 MG/ML
VIAL (ML) INTRAVENOUS
Status: DISCONTINUED | OUTPATIENT
Start: 2024-05-07 | End: 2024-05-07

## 2024-05-07 RX ORDER — SODIUM CHLORIDE 0.9 % (FLUSH) 0.9 %
10 SYRINGE (ML) INJECTION
Status: DISCONTINUED | OUTPATIENT
Start: 2024-05-07 | End: 2024-05-07 | Stop reason: HOSPADM

## 2024-05-07 RX ORDER — MIDAZOLAM HYDROCHLORIDE 1 MG/ML
INJECTION INTRAMUSCULAR; INTRAVENOUS
Status: DISCONTINUED | OUTPATIENT
Start: 2024-05-07 | End: 2024-05-07

## 2024-05-07 RX ORDER — ONDANSETRON HYDROCHLORIDE 2 MG/ML
4 INJECTION, SOLUTION INTRAVENOUS ONCE AS NEEDED
Status: DISCONTINUED | OUTPATIENT
Start: 2024-05-07 | End: 2024-05-07 | Stop reason: HOSPADM

## 2024-05-07 RX ORDER — LIDOCAINE HYDROCHLORIDE 10 MG/ML
1 INJECTION, SOLUTION EPIDURAL; INFILTRATION; INTRACAUDAL; PERINEURAL ONCE AS NEEDED
Status: DISCONTINUED | OUTPATIENT
Start: 2024-05-07 | End: 2024-05-07 | Stop reason: HOSPADM

## 2024-05-07 RX ORDER — FENTANYL CITRATE 50 UG/ML
INJECTION, SOLUTION INTRAMUSCULAR; INTRAVENOUS
Status: DISCONTINUED | OUTPATIENT
Start: 2024-05-07 | End: 2024-05-07

## 2024-05-07 RX ORDER — ACETAMINOPHEN 10 MG/ML
INJECTION, SOLUTION INTRAVENOUS
Status: DISCONTINUED | OUTPATIENT
Start: 2024-05-07 | End: 2024-05-07

## 2024-05-07 RX ORDER — FAMOTIDINE 10 MG/ML
INJECTION INTRAVENOUS
Status: DISCONTINUED | OUTPATIENT
Start: 2024-05-07 | End: 2024-05-07

## 2024-05-07 RX ORDER — SODIUM CHLORIDE 9 MG/ML
INJECTION, SOLUTION INTRAVENOUS CONTINUOUS
Status: DISCONTINUED | OUTPATIENT
Start: 2024-05-07 | End: 2024-05-07 | Stop reason: HOSPADM

## 2024-05-07 RX ORDER — SUCCINYLCHOLINE CHLORIDE 20 MG/ML
INJECTION INTRAMUSCULAR; INTRAVENOUS
Status: DISCONTINUED | OUTPATIENT
Start: 2024-05-07 | End: 2024-05-07

## 2024-05-07 RX ORDER — LIDOCAINE HYDROCHLORIDE AND EPINEPHRINE 10; 10 MG/ML; UG/ML
INJECTION, SOLUTION INFILTRATION; PERINEURAL
Status: DISCONTINUED | OUTPATIENT
Start: 2024-05-07 | End: 2024-05-07 | Stop reason: HOSPADM

## 2024-05-07 RX ORDER — ONDANSETRON HYDROCHLORIDE 2 MG/ML
INJECTION, SOLUTION INTRAVENOUS
Status: DISCONTINUED | OUTPATIENT
Start: 2024-05-07 | End: 2024-05-07

## 2024-05-07 RX ORDER — HYDROMORPHONE HYDROCHLORIDE 1 MG/ML
0.5 INJECTION, SOLUTION INTRAMUSCULAR; INTRAVENOUS; SUBCUTANEOUS EVERY 5 MIN PRN
Status: DISCONTINUED | OUTPATIENT
Start: 2024-05-07 | End: 2024-05-07 | Stop reason: HOSPADM

## 2024-05-07 RX ORDER — LIDOCAINE HYDROCHLORIDE 20 MG/ML
INJECTION INTRAVENOUS
Status: DISCONTINUED | OUTPATIENT
Start: 2024-05-07 | End: 2024-05-07

## 2024-05-07 RX ORDER — PHENYLEPHRINE HYDROCHLORIDE 10 MG/ML
INJECTION INTRAVENOUS
Status: DISCONTINUED | OUTPATIENT
Start: 2024-05-07 | End: 2024-05-07

## 2024-05-07 RX ORDER — EPHEDRINE SULFATE 50 MG/ML
INJECTION, SOLUTION INTRAVENOUS
Status: DISCONTINUED | OUTPATIENT
Start: 2024-05-07 | End: 2024-05-07

## 2024-05-07 RX ADMIN — PROPOFOL 180 MG: 10 INJECTION, EMULSION INTRAVENOUS at 07:05

## 2024-05-07 RX ADMIN — PHENYLEPHRINE HYDROCHLORIDE 100 MCG: 10 INJECTION INTRAVENOUS at 07:05

## 2024-05-07 RX ADMIN — ACETAMINOPHEN 1000 MG: 10 INJECTION INTRAVENOUS at 07:05

## 2024-05-07 RX ADMIN — SUCCINYLCHOLINE CHLORIDE 120 MG: 20 INJECTION, SOLUTION INTRAMUSCULAR; INTRAVENOUS at 07:05

## 2024-05-07 RX ADMIN — PHENYLEPHRINE HYDROCHLORIDE 100 MCG: 10 INJECTION INTRAVENOUS at 08:05

## 2024-05-07 RX ADMIN — EPHEDRINE SULFATE 5 MG: 50 INJECTION INTRAVENOUS at 07:05

## 2024-05-07 RX ADMIN — DEXAMETHASONE SODIUM PHOSPHATE 4 MG: 4 INJECTION INTRA-ARTICULAR; INTRALESIONAL; INTRAMUSCULAR; INTRAVENOUS; SOFT TISSUE at 07:05

## 2024-05-07 RX ADMIN — FAMOTIDINE 20 MG: 10 INJECTION, SOLUTION INTRAVENOUS at 07:05

## 2024-05-07 RX ADMIN — PROPOFOL 30 MG: 10 INJECTION, EMULSION INTRAVENOUS at 09:05

## 2024-05-07 RX ADMIN — SODIUM CHLORIDE: 0.9 INJECTION, SOLUTION INTRAVENOUS at 07:05

## 2024-05-07 RX ADMIN — SODIUM CHLORIDE: 9 INJECTION, SOLUTION INTRAVENOUS at 05:05

## 2024-05-07 RX ADMIN — LIDOCAINE HYDROCHLORIDE 100 MG: 20 INJECTION INTRAVENOUS at 07:05

## 2024-05-07 RX ADMIN — EPHEDRINE SULFATE 10 MG: 50 INJECTION INTRAVENOUS at 07:05

## 2024-05-07 RX ADMIN — ONDANSETRON 4 MG: 2 INJECTION INTRAMUSCULAR; INTRAVENOUS at 07:05

## 2024-05-07 RX ADMIN — GLYCOPYRROLATE 0.2 MG: 0.2 INJECTION, SOLUTION INTRAMUSCULAR; INTRAVENOUS at 07:05

## 2024-05-07 RX ADMIN — MIDAZOLAM HYDROCHLORIDE 2 MG: 1 INJECTION, SOLUTION INTRAMUSCULAR; INTRAVENOUS at 07:05

## 2024-05-07 RX ADMIN — FENTANYL CITRATE 50 MCG: 50 INJECTION, SOLUTION INTRAMUSCULAR; INTRAVENOUS at 09:05

## 2024-05-07 RX ADMIN — PHENYLEPHRINE HYDROCHLORIDE 200 MCG: 10 INJECTION INTRAVENOUS at 08:05

## 2024-05-07 RX ADMIN — CEFAZOLIN 2 G: 330 INJECTION, POWDER, FOR SOLUTION INTRAMUSCULAR; INTRAVENOUS at 07:05

## 2024-05-07 RX ADMIN — FENTANYL CITRATE 100 MCG: 50 INJECTION, SOLUTION INTRAMUSCULAR; INTRAVENOUS at 07:05

## 2024-05-07 NOTE — OP NOTE
Date of service: 5/7/2024    Pre-operative Diagnosis:   History of recent melolabial flap    Post-operative Diagnosis:  Same    Procedures Performed:  Division and inset of melolabial flap at nose    Surgeon: Alyx Spivey MD    Assistant(s):  Albertina Haider MD    Anesthesia: General Endotracheal    EBL:  Minimal less than 15 cc    Specimens:  None    Findings:  Viable melolabial flap trimmed and inset with posterior portion used to add tissue for ala notching    Indications for Procedure:  Mr. 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  He presents today for division and inset of melolabial flap for the columella.    Procedure in Detail:  After informed consent was obtained from patient in holding area the risks and benefits reviewed he was taken back to OR 3.  Anesthesia proceeded with general endotracheal anesthesia and patient was turned 90° with time-out undertaken verification of patient and correct procedure.  I am filler treated the area around the base of the pedicle at the cheek as well as native tissue in the ala and previous paramedian forehead flap in the vestibule.  Midface was prepped and draped in usual sterile fashion.      Pedicle of melolabial flap was divided at the lateral edge of the ala. base of the pedicle was trimmed with excess sub cutaneous in granulation tissue removed.  Bipolar cautery was used to control bleeding below the base of the pedicle.  Triangular base was then inset into the cheek at the melolabial crease with deep 5 0 PDS and 5 0 fast-absorbing gut for the skin.    Attention was turned to inset at the nose.  Flap was debulked of fat and granulation tissue with combination of iris scissors and bipolar cautery.  Skin was trimmed with posterior lateral skin used to add tissue to the ala. incision was made at previous junction of the paramedian forehead flap with  native vestibule skin and composite graft.  This was widened along the length of the ala. posterior part of the melolabial flap was inset to this with deep 5 0 PDS suture.  Combination of simple interrupted and vertical mattress sutures were used with 5 0 fast-absorbing gut for the skin.  The superior deep aspect of the melolabial flap was then inset into the posterior vestibular skin with interrupted 5 0 chromic suture as well as to the junction of the ala to the nasal floor medially.  Wounds were cleaned Vaseline was placed patient was turned over to Anesthesia awakened and taken to recovery in stable condition.  Complications:  None    Attestation:  I was present for the entire procedure    DISCLAIMER: This note was prepared with [employer] voice recognition transcription software. Garbled syntax, mangled pronouns, and other bizarre constructions may be attributed to that software system. While efforts were made to correct any mistakes made by this voice recognition program, some errors and/or omissions may remain in the note that were missed when the note was originally created.

## 2024-05-07 NOTE — ANESTHESIA PROCEDURE NOTES
Intubation    Date/Time: 5/7/2024 7:17 AM    Performed by: Celina Beltran CRNA  Authorized by: Jesus Leger MD    Intubation:     Induction:  Rapid sequence induction    Intubated:  Postinduction    Mask Ventilation:  N/a    Attempts:  1    Attempted By:  CRNA    Method of Intubation:  Video laryngoscopy    Blade:  Mcfarlane 3    Laryngeal View Grade: Grade I - full view of cords      Difficult Airway Encountered?: No      Complications:  None    Airway Device:  Oral albert    Airway Device Size:  7.5    Style/Cuff Inflation:  Cuffed (inflated to minimal occlusive pressure)    Tube secured:  22    Secured at:  The lips    Placement Verified By:  Capnometry    Complicating Factors:  None    Findings Post-Intubation:  BS equal bilateral and atraumatic/condition of teeth unchanged

## 2024-05-07 NOTE — DISCHARGE INSTRUCTIONS
Discharge Instructions:   - OK to shower in 48 hours  - Avoid submerging incision   - Avoid scrubbing at incision  - Can clean incision sites gently with hydrogen peroxide on a Q-tip if crusting develops   - All incisions are absorbable and do not need removal   - No heavy lifting, nothing greater than 10 pounds for 2 weeks   - No nose blowing   - Avoid sleeping on your stomach/activities with pressure over the flap site

## 2024-05-07 NOTE — TELEPHONE ENCOUNTER
----- Message from Amisha Oneil RN sent at 5/7/2024  9:13 AM CDT -----    ----- Message -----  From: Melina Galvan  Sent: 5/7/2024   9:05 AM CDT  To: Patric Wisdom Staff    .Type:  Patient Call Back    Who Called: PT WIFE NAINA       Does the patient know what this is regarding?: PT WIFE CALLED STATING SHE WAS TOLD PT POST OP WAS SCHEDULED ON 5/22/2024. I DON'T SEE IT SCHEDULED OR DOES PT SEE IT IN PT PORTAL.  PLEASE REACH OUT TO HER ON SCHEDULING THE VISIT    Would the patient rather a call back  YES     Best Call Back Number: [phone]    Additional Information: Thank You

## 2024-05-07 NOTE — PLAN OF CARE
Pt arrived to recovery dosc via stretcher per OR team. Bedside report received. Pt attached to bedside monitor. VSS. Pt responds to voice___ post procedure. Pt on __0__L of oxygen via _RA__; oxygen sats _96___%. Pt IV access infusing with NS. Will continue to monitor.

## 2024-05-07 NOTE — ANESTHESIA POSTPROCEDURE EVALUATION
Anesthesia Post Evaluation    Patient: Lyndon Lion Jr.    Procedure(s) Performed: Procedure(s) (LRB):  REVISION, PROCEDURE INVOLVING FLAP GRAFT (N/A)    Final Anesthesia Type: general      Patient location during evaluation: PACU  Patient participation: Yes- Able to Participate  Level of consciousness: awake and alert  Post-procedure vital signs: reviewed and stable  Pain management: adequate  Airway patency: patent    PONV status at discharge: No PONV  Anesthetic complications: no      Cardiovascular status: blood pressure returned to baseline and hemodynamically stable  Respiratory status: unassisted  Hydration status: euvolemic  Follow-up not needed.              Vitals Value Taken Time   /64 05/07/24 0947   Temp 36.6 °C (97.9 °F) 05/07/24 0915   Pulse 73 05/07/24 0948   Resp 8 05/07/24 0947   SpO2 95 % 05/07/24 0948   Vitals shown include unfiled device data.      Event Time   Out of Recovery 09:45:00         Pain/Karthikeyan Score: Karthikeyan Score: 10 (5/7/2024  9:45 AM)          
DISCHARGE

## 2024-05-07 NOTE — ANESTHESIA PREPROCEDURE EVALUATION
05/07/2024  Lyndon Lion Jr. is a 54 y.o., male.      Pre-op Assessment     I have reviewed the Nursing Notes.    I have reviewed the Medications.     Review of Systems  Anesthesia Hx:  No problems with previous Anesthesia             Denies Family Hx of Anesthesia complications.     Social:  Non-Smoker, No Alcohol Use       Hematology/Oncology:  Hematology Normal   Oncology Normal                                   EENT/Dental:  EENT/Dental Normal           Cardiovascular:  Cardiovascular Normal Exercise tolerance: good                                           Pulmonary:    Asthma       Asthma:               Renal/:  Chronic Renal Disease        Kidney Function/Disease             Hepatic/GI:     GERD Liver Disease,     Gerd       Liver Disease        Musculoskeletal:  Arthritis        Arthritis          Neurological:    Neuromuscular Disease,           Arthritis                         Neuromuscular Disease   Endocrine:  Diabetes    Diabetes                          Physical Exam  General: Well nourished    Airway:  Mallampati: II / II  Mouth Opening: Normal  TM Distance: Normal  Tongue: Normal  Neck ROM: Normal ROM    Dental:  Intact        Anesthesia Plan  Type of Anesthesia, risks & benefits discussed:    Anesthesia Type: Gen ETT  Intra-op Monitoring Plan: Standard ASA Monitors  Post Op Pain Control Plan: multimodal analgesia  Induction:  IV  Airway Plan: Direct, Post-Induction  Informed Consent: Informed consent signed with the Patient and all parties understand the risks and agree with anesthesia plan.  All questions answered.   ASA Score: 2    Ready For Surgery From Anesthesia Perspective.     .

## 2024-05-07 NOTE — BRIEF OP NOTE
[facility]  Brief Operative Note    Surgery Date: 5/7/2024     Surgeons and Role:     * Alyx Spivey MD - Primary    Assisting Surgeon: None    Pre-op Diagnosis:  Scar of nose [L90.5]    Post-op Diagnosis:  Post-Op Diagnosis Codes:     * Scar of nose [L90.5]    Procedure(s) (LRB):  REVISION, PROCEDURE INVOLVING FLAP GRAFT (N/A)    Anesthesia: General    Operative Findings:   Division and inset of melolabial flap     Estimated Blood Loss: 20 cc         Specimens:   Specimen (24h ago, onward)      None              Discharge Note    OUTCOME: Patient tolerated treatment/procedure well without complication and is now ready for discharge.    DISPOSITION: Home or Self Care    FINAL DIAGNOSIS:  Nasal defect    FOLLOWUP: In clinic    DISCHARGE INSTRUCTIONS:    Discharge Procedure Orders   Other restrictions (specify):   Order Comments: See discharge instructions       Discharge Instructions:   - OK to shower in 48 hours  - Avoid submerging incision   - Avoid scrubbing at incision  - Can clean incision sites gently with hydrogen peroxide on a Q-tip if crusting develops   - All incisions are absorbable and do not need removal   - No heavy lifting, nothing greater than 10 pounds for 2 weeks   - No nose blowing   - Avoid sleeping on your stomach/activities with pressure over the flap site

## 2024-05-07 NOTE — PLAN OF CARE
Discharge instructions given to patient and _wife_ with verbalization of understanding all instructions. VSS, denies n/v and tolerating PO, rates pain level tolerable, IV removed, and family available for patient discharge home.

## 2024-05-07 NOTE — LETTER
May 7, 2024         [address] [facility]  [place] [address]       Patient: Lyndon Lion   YOB: 1970  Date of Visit: 05/07/2024    To Whom It May Concern:    Lyndon Lion  was at [facility] on 05/07/2024. The patient may return to work/school on 5/23/2024 with no restrictions. If you have any questions or concerns, or if I can be of further assistance, please do not hesitate to contact me.    Sincerely,    Alyx Spivey MD

## 2024-05-07 NOTE — TRANSFER OF CARE
"Anesthesia Transfer of Care Note    Patient: Lyndon Lion Jr.    Procedure(s) Performed: Procedure(s) (LRB):  REVISION, PROCEDURE INVOLVING FLAP GRAFT (N/A)    Patient location: PACU    Anesthesia Type: general    Transport from OR: Transported from OR on room air with adequate spontaneous ventilation    Post pain: adequate analgesia    Post assessment: no apparent anesthetic complications and tolerated procedure well    Post vital signs: stable    Level of consciousness: awake    Nausea/Vomiting: no nausea/vomiting    Complications: none    Transfer of care protocol was followed      Last vitals: Visit Vitals  BP (!) 124/58 (BP Location: Left arm, Patient Position: Lying)   Pulse 68   Temp 36.6 °C (97.9 °F) (Temporal)   Resp 16   Ht 6' 2" (1.88 m)   Wt 113.4 kg (250 lb)   SpO2 96%   BMI 32.10 kg/m²     "

## 2024-05-07 NOTE — PLAN OF CARE
Chart reviewed. Preop nursing care completed per orders. Safe surgery checklist complete. Pt denies any open wounds cuts or sores. Pt denies any metal in body. Belongings secured in PACU locker 5. Waiting for H&P update, anesthesia consent, procedure consent prior to surgery. Pt AAOX3, VSS on room air. Pt toileted, Bed locked in lowest position, Call light within reach. Pt denies any needs at this time. Will continue to monitor.

## 2024-05-10 ENCOUNTER — PATIENT MESSAGE (OUTPATIENT)
Dept: DERMATOLOGY | Facility: CLINIC | Age: 54
End: 2024-05-10
Payer: COMMERCIAL

## 2024-05-10 ENCOUNTER — PATIENT MESSAGE (OUTPATIENT)
Dept: FAMILY MEDICINE | Facility: CLINIC | Age: 54
End: 2024-05-10
Payer: COMMERCIAL

## 2024-05-10 DIAGNOSIS — E11.65 UNCONTROLLED TYPE 2 DIABETES MELLITUS WITH HYPERGLYCEMIA: ICD-10-CM

## 2024-05-10 RX ORDER — SEMAGLUTIDE 2.68 MG/ML
2 INJECTION, SOLUTION SUBCUTANEOUS
Qty: 4 ML | Refills: 1 | Status: SHIPPED | OUTPATIENT
Start: 2024-05-10 | End: 2024-06-12 | Stop reason: SDUPTHER

## 2024-05-10 RX ORDER — QUINIDINE GLUCONATE 324 MG
240 TABLET, EXTENDED RELEASE ORAL DAILY
Qty: 100 EACH | Refills: 0 | Status: SHIPPED | OUTPATIENT
Start: 2024-05-10

## 2024-05-16 ENCOUNTER — TELEPHONE (OUTPATIENT)
Dept: ENDOSCOPY | Facility: HOSPITAL | Age: 54
End: 2024-05-16
Payer: COMMERCIAL

## 2024-05-16 NOTE — TELEPHONE ENCOUNTER
Returned pts call to schedule colonoscopy procedure. Message left for pt on v/m.  
(3) no apparent problem

## 2024-05-20 ENCOUNTER — TELEPHONE (OUTPATIENT)
Dept: ENDOSCOPY | Facility: HOSPITAL | Age: 54
End: 2024-05-20
Payer: COMMERCIAL

## 2024-05-20 NOTE — TELEPHONE ENCOUNTER
Received Message: MRN [id number] wife is calling to schedule him a procedure please call wife NAINA at [phone]     Spoke with wife and informed her Dr Leonardo schedule is pretty full at this time and to call back this week to get schedule once the August schedule open

## 2024-05-21 ENCOUNTER — OFFICE VISIT (OUTPATIENT)
Dept: UROLOGY | Facility: CLINIC | Age: 54
End: 2024-05-21
Payer: COMMERCIAL

## 2024-05-21 ENCOUNTER — TELEPHONE (OUTPATIENT)
Dept: DERMATOLOGY | Facility: CLINIC | Age: 54
End: 2024-05-21

## 2024-05-21 ENCOUNTER — OFFICE VISIT (OUTPATIENT)
Dept: DERMATOLOGY | Facility: CLINIC | Age: 54
End: 2024-05-21
Payer: COMMERCIAL

## 2024-05-21 VITALS
DIASTOLIC BLOOD PRESSURE: 84 MMHG | HEART RATE: 72 BPM | WEIGHT: 244 LBS | HEIGHT: 74 IN | BODY MASS INDEX: 31.32 KG/M2 | SYSTOLIC BLOOD PRESSURE: 131 MMHG

## 2024-05-21 VITALS — WEIGHT: 250 LBS | HEIGHT: 74 IN | BODY MASS INDEX: 32.08 KG/M2

## 2024-05-21 DIAGNOSIS — Z15.09 MSH2-RELATED LYNCH SYNDROME (HNPCC1): ICD-10-CM

## 2024-05-21 DIAGNOSIS — C44.321 SQUAMOUS CELL CANCER OF SKIN OF NASAL TIP: ICD-10-CM

## 2024-05-21 DIAGNOSIS — Z12.5 PROSTATE CANCER SCREENING: ICD-10-CM

## 2024-05-21 DIAGNOSIS — Z85.828 ENCOUNTER FOR FOLLOW-UP SURVEILLANCE OF SKIN CANCER: ICD-10-CM

## 2024-05-21 DIAGNOSIS — E29.1 HYPOGONADISM MALE: ICD-10-CM

## 2024-05-21 DIAGNOSIS — R31.29 MICROHEMATURIA: ICD-10-CM

## 2024-05-21 DIAGNOSIS — L82.1 SEBORRHEIC KERATOSES: ICD-10-CM

## 2024-05-21 DIAGNOSIS — E11.65 TYPE 2 DIABETES MELLITUS WITH HYPERGLYCEMIA, WITHOUT LONG-TERM CURRENT USE OF INSULIN: ICD-10-CM

## 2024-05-21 DIAGNOSIS — D48.5 NEOPLASM OF UNCERTAIN BEHAVIOR OF SKIN: Primary | ICD-10-CM

## 2024-05-21 DIAGNOSIS — Z08 ENCOUNTER FOR FOLLOW-UP SURVEILLANCE OF SKIN CANCER: ICD-10-CM

## 2024-05-21 DIAGNOSIS — Z15.09 LYNCH SYNDROME: Primary | ICD-10-CM

## 2024-05-21 DIAGNOSIS — L71.9 ROSACEA: ICD-10-CM

## 2024-05-21 DIAGNOSIS — Z85.038 HISTORY OF COLON CANCER, STAGE II: ICD-10-CM

## 2024-05-21 PROCEDURE — 88305 TISSUE EXAM BY PATHOLOGIST: CPT | Mod: 26,,, | Performed by: DERMATOLOGY

## 2024-05-21 PROCEDURE — 99213 OFFICE O/P EST LOW 20 MIN: CPT | Mod: S$GLB,,, | Performed by: UROLOGY

## 2024-05-21 PROCEDURE — 11104 PUNCH BX SKIN SINGLE LESION: CPT | Mod: S$GLB,,, | Performed by: DERMATOLOGY

## 2024-05-21 PROCEDURE — 99214 OFFICE O/P EST MOD 30 MIN: CPT | Mod: 25,S$GLB,, | Performed by: DERMATOLOGY

## 2024-05-21 PROCEDURE — 88305 TISSUE EXAM BY PATHOLOGIST: CPT | Performed by: DERMATOLOGY

## 2024-05-21 PROCEDURE — 99999 PR PBB SHADOW E&M-EST. PATIENT-LVL V: CPT | Mod: PBBFAC,,, | Performed by: UROLOGY

## 2024-05-21 RX ORDER — DOXYCYCLINE HYCLATE 20 MG
TABLET ORAL
Qty: 180 TABLET | Refills: 3 | Status: SHIPPED | OUTPATIENT
Start: 2024-05-21 | End: 2024-05-21 | Stop reason: SDUPTHER

## 2024-05-21 RX ORDER — DOXYCYCLINE HYCLATE 20 MG
TABLET ORAL
Qty: 180 TABLET | Refills: 3 | Status: SHIPPED | OUTPATIENT
Start: 2024-05-21

## 2024-05-21 NOTE — PROGRESS NOTES
"Urology - [facility]  Clinic Note    SUBJECTIVE:     Chief Complaint: follow up    History of Present Illness:  Lyndon Lion Jr. is a 54 y.o. male who presents to clinic for follow up. He is established to our clinic.   S/p ureteroscopy left (by Dr. Llanes as he had to move it up urgently) - stent removed - for distal ureteral stone  Here for follow up ultrasound.     100% Calcium oxalate monohydrate       He has h/o Parada Syndrome.   H/o skin cancer.   H/o colon cancer. 2008. Follows up with Dr. Leonardo.    Recently diagnosed with diabetes.   Family hx of prostate cancer - grandfather  father had BPH    H/o back surgery.   He does snore. Doesn't stop breathing.     No ED.    Mother had breast cancer and skin cancer. Mom is 61.     Anticoagulation:  No    OBJECTIVE:     Estimated body mass index is 32.1 kg/m² as calculated from the following:    Height as of an earlier encounter on 24: 6' 2" (1.88 m).    Weight as of an earlier encounter on 24: 113.4 kg (250 lb).    Vital Signs (Most Recent)       Physical Exam  Vitals reviewed.   Pulmonary:      Effort: Pulmonary effort is normal.   Genitourinary:     Penis: Normal.      : no skin lesions. Testicles normal.     Lab Results   Component Value Date    BUN 20 2024    CREATININE 1.3 2024    WBC 4.88 2024    HGB 17.5 2024    HCT 49.9 2024     2024    AST 16 2024    ALT 20 2024    ALKPHOS 52 (L) 2024    ALBUMIN 4.0 2024    HGBA1C 5.6 2023        Lab Results   Component Value Date    PSA 0.77 2023    PSA 0.45 2019    PSA 0.49 2016    PSADIAG 0.42 2020    PSAFREE 0.20 2023    PSAFREE 0.20 2022    PSAFREE 0.19 2021    PSAFREEPCT 44.44 2023    PSAFREEPCT 50.00 2022    PSAFREEPCT 52.78 2021   PSA  - 0.9 22  PSA  - 0.4 22  Psa 2024 - 0.48     Imagin2024  Right kidney: The right kidney measures " 11.2 cm. No cortical thinning. No loss of corticomedullary distinction. Resistive index measures 0.6.  No mass. No renal stone. No hydronephrosis.     Left kidney: The left kidney measures 11.8 cm. No cortical thinning. No loss of corticomedullary distinction. Resistive index measures 0.64.  No mass. No renal stone. No hydronephrosis.     The bladder is partially distended at the time of scanning and has an unremarkable appearance.     Impression:     No obstructive uropathy.  ASSESSMENT     1. Parada syndrome    2. History of colon cancer, stage II    3. Type 2 diabetes mellitus with hyperglycemia, without long-term current use of insulin    4. Hypogonadism male    5. 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    Diagnoses and all orders for this visit:    Parada syndrome    History of colon cancer, stage II    Type 2 diabetes mellitus with hyperglycemia, without long-term current use of insulin    Hypogonadism male    Squamous cell cancer of skin of nasal tip    Prostate cancer screening  -     PSA, Screening; Future    F/u 1year psa and ua.   Will drop off ua home collect for this year.     Guerita Stovall MD

## 2024-05-21 NOTE — PATIENT INSTRUCTIONS
Punch Biopsy Wound Care    Your doctor has performed a punch biopsy today.  A band aid and antibiotic ointment has been placed over the site.  This should remain in place for 24 hours.  It is recommended that you keep the area dry for the first 24 hours.  After 24 hours, you may remove the band aid and wash the area with warm soap and water and apply Vaseline jelly.  Many patients prefer to use Neosporin or Bacitracin ointment.  This is acceptable; however know that you can develop an allergy to this medication even if you have used it safely for years.  It is important to keep the area moist.  Letting it dry out and get air slows healing time, will worsen the scar, and make it more difficult to remove the stitches if they were placed.  Band aid is optional after first 24 hours.      If you notice increasing redness, tenderness, pain, or yellow drainage at the biopsy or surgical site, please notify your doctor.  These are signs of an infection.    If your biopsy/surgical site is bleeding, apply firm pressure for 15 minutes straight.  Repeat for another 15 minutes, if it is still bleeding.   If the surgical site continues to bleed, then please contact your doctor.     [facility] - DERMATOLOGY  [address], [address]  [place] [address]  Dept: [phone]  Dept Fax: [phone]

## 2024-05-21 NOTE — TELEPHONE ENCOUNTER
----- Message from Ligia Marivel sent at 5/21/2024  9:21 AM CDT -----  Regarding: Pharmacy Authorization  Contact: Akin  Type:  Pharmacy Calling to Clarify an RX    Name of Caller: Akin    Pharmacy Name: [facility]     Prescription Name: doxycycline (PERIOSTAT) 20 MG tablet     What do they need to clarify?: Unable to fill because they only do compounding at the location.     Best Call Back Number: [phone]     Additional Information: Sts that he can do something else but sts it would be better to send the prescription locally instead.     Have a great day. Thank you!

## 2024-05-21 NOTE — PROGRESS NOTES
Subjective:      Patient ID:  Lyndon Lion Jr. is a 54 y.o. male who presents for   Chief Complaint   Patient presents with    Spot     Left shoulder blade    Skin Check     TBSE     LOV: 8/28/23 - rosacea, SK, angioma, SCC of nasal tip, MSH@ related lunch syndrome    Skin Check - TBSE    C/o spot on left shoulder blade  Changing per patient, changed in size, pus filled    Derm Hx:  SCC right nasal tip- 2/2023 , s/p PFF Dr Spivey, still revising scar  Fhx of MM- mom    Current Outpatient Medications:   ·  ACCU-CHEK GUIDE TEST STRIPS Strp, USE TO TEST TWICE A DAY, Disp: 200 strip, Rfl: 3  ·  ascorbic acid, vitamin C, (VITAMIN C) 100 MG tablet, Take 100 mg by mouth once daily., Disp: , Rfl:   ·  aspirin (ECOTRIN) 81 MG EC tablet, Take by mouth once daily. Aspirin 300 mg bid coded aspirin, Disp: , Rfl:   ·  azelaic acid (AZELEX) 15 % gel, Apply to face BID., Disp: 50 g, Rfl: 5  ·  celecoxib (CELEBREX) 200 MG capsule, Take 200 mg by mouth once daily., Disp: , Rfl:   ·  ciclopirox (PENLAC) 8 % Soln, Apply topically nightly., Disp: 6.6 mL, Rfl: 3  ·  cyanocobalamin, vitamin B-12, 2,500 mcg Lozg, Place 2 tablets under the tongue once daily., Disp: 180 lozenge, Rfl: 3  ·  erythromycin (ROMYCIN) ophthalmic ointment, Place a 1/2 inch ribbon of ointment into the lower eyelid., Disp: 3.5 g, Rfl: 0  ·  ferrous gluconate (FERGON) 240 (27 FE) MG tablet, Take 1 tablet (240 mg total) by mouth Daily., Disp: 100 each, Rfl: 0  ·  ibuprofen (ADVIL,MOTRIN) 800 MG tablet, Take 1 tablet (800 mg total) by mouth 3 (three) times daily as needed for Pain., Disp: 30 tablet, Rfl: 0  ·  lancets (ACCU-CHEK SOFTCLIX LANCETS) Misc, 1 Units by Misc.(Non-Drug; Combo Route) route 2 (two) times a day., Disp: 200 each, Rfl: 0  ·  metFORMIN (GLUCOPHAGE-XR) 500 MG ER 24hr tablet, Take 1 tablet (500 mg total) by mouth daily with breakfast., Disp: 90 tablet, Rfl: 3  ·  ondansetron (ZOFRAN-ODT) 4 MG TbDL, Dissolve 1 tablet (4 mg total) on the  "tongue  every 6 (six) hours as needed (Nausea)., Disp: 20 tablet, Rfl: 0  ·  oxyCODONE-acetaminophen (PERCOCET) 5-325 mg per tablet, Take 1 tablet by mouth every 4 (four) hours as needed for Pain., Disp: 20 tablet, Rfl: 0  ·  oxyCODONE-acetaminophen (PERCOCET) 5-325 mg per tablet, Take 1 tablet by mouth every 8 (eight) hours as needed for Pain., Disp: 10 tablet, Rfl: 0  ·  pen needle, diabetic (PEN NEEDLE) 32 gauge x 5/32" Ndle, 1 each by Misc.(Non-Drug; Combo Route) route once daily., Disp: 90 each, Rfl: 3  ·  pravastatin (PRAVACHOL) 20 MG tablet, Take 1 tablet (20 mg total) by mouth once daily., Disp: 90 tablet, Rfl: 3  ·  RABEprazole (ACIPHEX) 20 mg tablet, Take 1 tablet (20 mg total) by mouth before breakfast., Disp: 90 tablet, Rfl: 3  ·  semaglutide (OZEMPIC) 2 mg/dose (8 mg/3 mL) PnIj, Inject 2 mg into the skin every 7 days., Disp: 4 mL, Rfl: 1  ·  sulfacetamide sodium-sulfur 10-5 % (w/w) Clsr, Use to wash face daily, Disp: 170 g, Rfl: 5  ·  tadalafiL (CIALIS) 5 MG tablet, Take 1 tablet (5 mg total) by mouth daily as needed for Erectile Dysfunction., Disp: 90 tablet, Rfl: 1  ·  testosterone cypionate (DEPOTESTOTERONE CYPIONATE) 200 mg/mL injection, Inject 60 mg into the muscle every 14 (fourteen) days. Twice a week, Disp: , Rfl:   ·  triamcinolone acetonide 0.1% (KENALOG) 0.1 % cream, AAA bid, Disp: 454 g, Rfl: 3  ·  vitamin A 8000 UNIT capsule, Take 1 capsule (8,000 Units total) by mouth once daily., Disp: 100 capsule, Rfl: 3          Review of Systems   Constitutional:  Negative for fever, chills and fatigue.   HENT:  Negative for nosebleeds and headaches.    Respiratory:  Negative for cough and shortness of breath.    Gastrointestinal:  Negative for nausea, vomiting, abdominal pain and diarrhea.   Musculoskeletal:  Negative for myalgias and arthralgias.   Skin:  Positive for daily sunscreen use, activity-related sunscreen use and wears hat. Negative for itching, rash, dry skin, sun sensitivity, recent " sunburn and dry lips.   Neurological:  Negative for headaches.   Psychiatric/Behavioral:  Negative for depressed mood.    Hematologic/Lymphatic: Does not bruise/bleed easily.       Objective:   Physical Exam   Constitutional: He appears well-developed and well-nourished. No distress.   Neurological: He is alert and oriented to person, place, and time. He is not disoriented.   Psychiatric: He has a normal mood and affect.   Skin:   Areas Examined (abnormalities noted in diagram):   Scalp / Hair Palpated and Inspected  Head / Face Inspection Performed  Neck Inspection Performed  Chest / Axilla Inspection Performed  Abdomen Inspection Performed  Genitals / Buttocks / Groin Inspection Performed  Back Inspection Performed  RUE Inspected  LUE Inspection Performed  RLE Inspected  LLE Inspection Performed  Nails and Digits Inspection Performed                     Diagram Legend     Erythematous scaling macule/papule c/w actinic keratosis       Vascular papule c/w angioma      Pigmented verrucoid papule/plaque c/w seborrheic keratosis      Yellow umbilicated papule c/w sebaceous hyperplasia      Irregularly shaped tan macule c/w lentigo     1-2 mm smooth white papules consistent with Milia      Movable subcutaneous cyst with punctum c/w epidermal inclusion cyst      Subcutaneous movable cyst c/w pilar cyst      Firm pink to brown papule c/w dermatofibroma      Pedunculated fleshy papule(s) c/w skin tag(s)      Evenly pigmented macule c/w junctional nevus     Mildly variegated pigmented, slightly irregular-bordered macule c/w mildly atypical nevus      Flesh colored to evenly pigmented papule c/w intradermal nevus       Pink pearly papule/plaque c/w basal cell carcinoma      Erythematous hyperkeratotic cursted plaque c/w SCC      Surgical scar with no sign of skin cancer recurrence      Open and closed comedones      Inflammatory papules and pustules      Verrucoid papule consistent consistent with wart     Erythematous  eczematous patches and plaques     Dystrophic onycholytic nail with subungual debris c/w onychomycosis     Umbilicated papule    Erythematous-base heme-crusted tan verrucoid plaque consistent with inflamed seborrheic keratosis     Erythematous Silvery Scaling Plaque c/w Psoriasis     See annotation      Assessment / Plan:      Pathology Orders:       Normal Orders This Visit    Specimen to Pathology, Dermatology     Questions:    Procedure Type: Dermatology and skin neoplasms    Number of Specimens: 1    ------------------------: -------------------------    Spec 1 Procedure: Biopsy    Spec 1 Clinical Impression: Erythematous friable nodule, SCC vs PG over EIC vs other    Spec 1 Source: left posterior shoulder    Release to patient:           Neoplasm of uncertain behavior of skin  -     Specimen to Pathology, Dermatology  Punch biopsy procedure note:  Punch biopsy performed after verbal consent obtained. Area marked and prepped with alcohol. Approximately 1cc of 1% lidocaine with epinephrine injected. 6 mm disposable punch used to remove lesion. Hemostasis obtained and biopsy site closed with 3 Prolene sutures. Wound care instructions reviewed with patient and handout given.    Rosacea, marked inflammatory component,  -     metroNIDAZOLE (NORITATE) 1 % cream; Compound azelaic acid 15% + ivermectin 1% + metronidazole 1% cream. Apply to face once or twice daily.  Dispense: 30 g; Refill: 5  -     Discontinue: doxycycline (PERIOSTAT) 20 MG tablet; Take 2 pill po qday  Dispense: 180 tablet; Refill: 3  -     doxycycline (PERIOSTAT) 20 MG tablet; Take 2 pill po qday  Dispense: 180 tablet; Refill: 3    Encounter for follow-up surveillance of skin cancer  Area of previous NMSCs examined. Site well healed with no signs of recurrence.    Total body skin examination performed today including at least 12 points as noted in physical examination. No lesions suspicious for malignancy noted.    Seborrheic keratoses  These are  benign inherited growths without a malignant potential. Reassurance given to patient. No treatment is necessary.     MSH2-related Parada syndrome (HNPCC1)  Patient with Parada syndrome; MSH2 mutation, dx 2013  H/o colon cancer s/p partial colectomy; annual coloscopy and EGD  Advised to have annual skin checks for cancer screening  Mother with h/o melanoma (dx age 45) and breast CA  Strict sun protection  Skin check q 6mo  Self exam    Patient instructed in importance in daily broad spectrum sun protection of at least spf 30. Mineral sunscreen ingredients preferred (Zinc +/- Titanium) and can be found OTC.   Recommend Elta MD for daily use on face and neck.  Patient encouraged to wear hat for all outdoor exposure.   Also discussed sun avoidance and use of protective clothing.             Follow up in about 3 months (around 8/21/2024).

## 2024-05-22 ENCOUNTER — OFFICE VISIT (OUTPATIENT)
Dept: OTOLARYNGOLOGY | Facility: CLINIC | Age: 54
End: 2024-05-22
Payer: COMMERCIAL

## 2024-05-22 VITALS
SYSTOLIC BLOOD PRESSURE: 147 MMHG | DIASTOLIC BLOOD PRESSURE: 90 MMHG | BODY MASS INDEX: 31.39 KG/M2 | WEIGHT: 244.5 LBS | HEART RATE: 69 BPM

## 2024-05-22 DIAGNOSIS — L90.5 SCAR OF NOSE: Primary | ICD-10-CM

## 2024-05-22 PROCEDURE — 99024 POSTOP FOLLOW-UP VISIT: CPT | Mod: S$GLB,,, | Performed by: OTOLARYNGOLOGY

## 2024-05-22 PROCEDURE — 99999 PR PBB SHADOW E&M-EST. PATIENT-LVL V: CPT | Mod: PBBFAC,,, | Performed by: OTOLARYNGOLOGY

## 2024-05-23 ENCOUNTER — PATIENT MESSAGE (OUTPATIENT)
Dept: UROLOGY | Facility: CLINIC | Age: 54
End: 2024-05-23
Payer: COMMERCIAL

## 2024-05-23 ENCOUNTER — TELEPHONE (OUTPATIENT)
Dept: ENDOSCOPY | Facility: HOSPITAL | Age: 54
End: 2024-05-23
Payer: COMMERCIAL

## 2024-05-23 NOTE — PROGRESS NOTES
Subjective: 54 y.o. male seen in follow up s/p melolabial flap for coverage of columellar defect 04/09/2024 and now status post division and inset of melolabial flap 05/09/2024.  With history of prior multiple revisions for nasal defect from squamous cell carcinoma.   Patient reports no issues with the flap with ability to breathe out of the right nostril somewhat.  He is here to discuss further debulking and soft tissue rearrangement for the columella with possible cartilage grafting.      Objective:  BP (!) 147/90 (BP Location: Right arm, Patient Position: Sitting)   Pulse 69   Wt 110.9 kg (244 lb 7.8 oz)   BMI 31.39 kg/m²     Exam:  General No acute distress  Melolabial flap fully viable with good attachment to  Ala and good lengthening of the ala with restoration of volume.  There was small scabbing with partial skin loss but viable flap otherwise.  Septal and columellar attachment also intact.  There is clefting between superior portion of the flap and prior paramedian forehead flap.      Assessment / Plan:    Patient doing well after melolabial flap for columellar defect.  I discussed with him that I would rather address the ala and the columella in separate surgeries.  His next revision would be to detached midportion of the flap to inset for columella and then following stage would be to harvest septal cartilage and revise superior attachment of melolabial flap to smooth out the columella.  I went ahead and scheduled him for the 1st surgery June 11 24

## 2024-05-23 NOTE — TELEPHONE ENCOUNTER
Returned Katheryn's call from . Pt is due 8/6/24 for repeat c-scope with Dr. Leonardo due to f/u needed. We are still awaiting August schedule to be opened and I updated Katheryn of this. Pt will call us back and I will keep an eye to f/u as well.    Orders as follows:  Dom Leonardo MD  [facility] Endoscopist [facility] Patients  Caller: Unspecified (2 days ago,  2:34 PM)  Procedure: Colonoscopy     Diagnosis: Surveillance colonoscopy - Hx of colon polyps (villous adenomas)  He was discovered to have colon cancer and had right hemicolecton or stage II on 08/08/2008. MSH2 Parada syndrome.     Procedure Timing:  August 2024 with me     Provider: Myself     Location: [address]     Additional Scheduling Information: No scheduling concerns     Prep Specifications:Standard prep     Is the patient taking a GLP-1 Agonist:yes     Have you attached a patient to this message: yes    Electronically signed by Dom Leonardo MD at 3/19/2024

## 2024-05-24 VITALS — HEIGHT: 74 IN | BODY MASS INDEX: 31.44 KG/M2 | WEIGHT: 245 LBS

## 2024-05-24 DIAGNOSIS — Z12.11 COLON CANCER SCREENING: Primary | ICD-10-CM

## 2024-05-24 DIAGNOSIS — Z12.11 SPECIAL SCREENING FOR MALIGNANT NEOPLASMS, COLON: Primary | ICD-10-CM

## 2024-05-24 LAB
FINAL PATHOLOGIC DIAGNOSIS: NORMAL
GROSS: NORMAL
Lab: NORMAL
MICROSCOPIC EXAM: NORMAL

## 2024-05-24 NOTE — TELEPHONE ENCOUNTER
Spoke to Lyndon to schedule procedure(s) Colonoscopy       Physician to perform procedure(s) Dr. ANALILIA Leonardo  Date of Procedure (s) 8/6/24  Arrival Time 6:30 AM  Time of Procedure(s) 7:30 AM   Location of Procedure(s) [facility]  Type of Rx Prep sent to patient: Sutab  Instructions provided to [facility] via [facility]    Patient was informed on the following information and verbalized understanding. Screening questionnaire reviewed with patient and complete. If procedure requires anesthesia, a responsible adult needs to be present to accompany the patient home, patient cannot drive after receiving anesthesia. Appointment details are tentative, especially check-in time. Patient will receive a prep-op call 7 days prior to confirm check-in time for procedure. If applicable the patient should contact their pharmacy to verify Rx for procedure prep is ready for pick-up. Patient was advised to call the scheduling department at [phone] if pharmacy states no Rx is available. Patient was advised to call the endoscopy scheduling department if any questions or concerns arise.      [facility] Scheduling Department

## 2024-05-24 NOTE — TELEPHONE ENCOUNTER
"----- Message from Nikki Mendoza sent at 5/1/2024  9:18 AM CDT -----    ----- Message -----  From: Dom Leonardo MD  Sent: 5/1/2024   8:00 AM CDT  To: [facility] Endoscopist [facility] Patients    Procedure: Colonoscopy    Diagnosis: Surveillance colonoscopy - Hx of colon polyps (villous adenomas)  He was discovered to have colon cancer and had right hemicolecton or stage II on 08/08/2008. MSH2 Parada syndrome.     Procedure Timing:  August 2024 with me    #If within 4 weeks selected, please deb as high priority#    #If greater than 12 weeks, please select "5-12 weeks" and delay sending until 3 months prior to requested date#    Provider: Myself    Location: [address]    Additional Scheduling Information: No scheduling concerns    Prep Specifications:Standard prep    Is the patient taking a GLP-1 Agonist:yes    Have you attached a patient to this message: yes  "

## 2024-05-28 RX ORDER — SOD SULF/POT CHLORIDE/MAG SULF 1.479 G
12 TABLET ORAL DAILY
Qty: 24 TABLET | Refills: 0 | Status: SHIPPED | OUTPATIENT
Start: 2024-05-28

## 2024-05-29 NOTE — PLAN OF CARE
PATIENT REVIEWED, OKAY TO PROCEED AT [facility]. PATIENT AWARE TO HOLD OZEMPIC 1 WEEK PRIOR TO SURGERY.

## 2024-05-31 ENCOUNTER — CLINICAL SUPPORT (OUTPATIENT)
Dept: DERMATOLOGY | Facility: CLINIC | Age: 54
End: 2024-05-31
Payer: COMMERCIAL

## 2024-05-31 DIAGNOSIS — D48.5 NEOPLASM OF UNCERTAIN BEHAVIOR OF SKIN: Primary | ICD-10-CM

## 2024-05-31 PROCEDURE — 99024 POSTOP FOLLOW-UP VISIT: CPT | Mod: S$GLB,,, | Performed by: DERMATOLOGY

## 2024-06-10 ENCOUNTER — PATIENT MESSAGE (OUTPATIENT)
Dept: OTOLARYNGOLOGY | Facility: CLINIC | Age: 54
End: 2024-06-10
Payer: COMMERCIAL

## 2024-06-10 ENCOUNTER — TELEPHONE (OUTPATIENT)
Dept: UROLOGY | Facility: CLINIC | Age: 54
End: 2024-06-10
Payer: COMMERCIAL

## 2024-06-10 ENCOUNTER — PATIENT MESSAGE (OUTPATIENT)
Dept: UROLOGY | Facility: CLINIC | Age: 54
End: 2024-06-10
Payer: COMMERCIAL

## 2024-06-10 DIAGNOSIS — Z15.09 LYNCH SYNDROME: Primary | ICD-10-CM

## 2024-06-10 NOTE — PRE-PROCEDURE INSTRUCTIONS
The following was discussed with pt's wife and primary contact via phone and sent to pt portal. Pt verbalized understanding. Pt to be accompanied by wife Katheryn.    Dear Lyndon ,     You are scheduled for a procedure with Dr. Spivey on 6/11/2024. Your scheduled arrival time is 8:30 am.  This arrival time is roughly 2 hours before your anticipated procedure time to allow sufficient time for pre-op.  Please wear comfortable clothing  This procedure will take place at the [facility] at the corner of [facility] and [facility].  It is in the [facility] next to [facility]. The address is:     [address] [place] [address].  [place] [address]     After entering the building, proceed to the second floor and check in with registration. You should take any medications that you routinely take for blood pressure (other than those listed below), heart medications, thyroid, cholesterol, etc.      If you wear contact lenses, please wear glasses to your procedure.     Your fasting instructions are as follow:     Nothing to eat after midnight the evening before your surgery.   You may drink clear liquids up until 2 hours prior to your arrival time.      You MUST have a responsible adult to bring you home.     The evening before and morning of your procedure, please hold the following medications:  Aspirin and Aspirin-containing products (Goody's powder, Excedrin)  NSAIDs (Advil, Ibuprofen, Aleve, Diclofenac)  Vitamins/Supplements  Herbal remedies/Teas  Stimulants (Adderall, Vyvanse, Adipex)  Diabetic medication (Please bring with you day of procedure)  Prescription creams/gels/lotions        *May take Tylenol        The evening before and morning of your procedure, take a shower using antibacterial soap (ex: Hibiclens or Dial antibacterial soap). DO NOT apply deodorant, lotion, cologne, or anything else to the skin. Do not wear jewelry or bring any valuables with you. If you wear dentures please bring  your case with you or leave them at home. Use and bring any inhalers that you may have.     If you have any procedure-specific questions, please call your surgeon's office. Any other questions, don't hesitate to call at [phone]     Thanks,  Pre-Admit Testing  Anesthesia Dept [facility]

## 2024-06-10 NOTE — TELEPHONE ENCOUNTER
----- Message from Hoda Tran MA sent at 6/10/2024  9:40 AM CDT -----  Regarding: UA  Can you please add in a UA for this patient?

## 2024-06-11 ENCOUNTER — HOSPITAL ENCOUNTER (OUTPATIENT)
Facility: HOSPITAL | Age: 54
Discharge: HOME | End: 2024-06-11
Attending: OTOLARYNGOLOGY | Admitting: OTOLARYNGOLOGY
Payer: COMMERCIAL

## 2024-06-11 ENCOUNTER — ANESTHESIA EVENT (OUTPATIENT)
Dept: SURGERY | Facility: HOSPITAL | Age: 54
End: 2024-06-11
Payer: COMMERCIAL

## 2024-06-11 ENCOUNTER — PATIENT MESSAGE (OUTPATIENT)
Dept: FAMILY MEDICINE | Facility: CLINIC | Age: 54
End: 2024-06-11
Payer: COMMERCIAL

## 2024-06-11 ENCOUNTER — ANESTHESIA (OUTPATIENT)
Dept: SURGERY | Facility: HOSPITAL | Age: 54
End: 2024-06-11
Payer: COMMERCIAL

## 2024-06-11 VITALS
RESPIRATION RATE: 17 BRPM | BODY MASS INDEX: 31.44 KG/M2 | HEART RATE: 79 BPM | HEIGHT: 74 IN | TEMPERATURE: 98 F | DIASTOLIC BLOOD PRESSURE: 70 MMHG | SYSTOLIC BLOOD PRESSURE: 132 MMHG | OXYGEN SATURATION: 94 % | WEIGHT: 245 LBS

## 2024-06-11 DIAGNOSIS — Z98.890 MOHS DEFECT: ICD-10-CM

## 2024-06-11 DIAGNOSIS — M95.0 NASAL DEFECT: Primary | ICD-10-CM

## 2024-06-11 DIAGNOSIS — L98.8 MOHS DEFECT: ICD-10-CM

## 2024-06-11 LAB — POCT GLUCOSE: 103 MG/DL (ref 70–110)

## 2024-06-11 PROCEDURE — 14060 TIS TRNFR E/N/E/L 10 SQ CM/<: CPT | Mod: 58,,, | Performed by: OTOLARYNGOLOGY

## 2024-06-11 PROCEDURE — 37000009 HC ANESTHESIA EA ADD 15 MINS: Performed by: OTOLARYNGOLOGY

## 2024-06-11 PROCEDURE — D9220A PRA ANESTHESIA: Mod: ,,, | Performed by: NURSE ANESTHETIST, CERTIFIED REGISTERED

## 2024-06-11 PROCEDURE — 20912 REMOVE CARTILAGE FOR GRAFT: CPT | Mod: 58,51,, | Performed by: OTOLARYNGOLOGY

## 2024-06-11 PROCEDURE — 82962 GLUCOSE BLOOD TEST: CPT | Performed by: OTOLARYNGOLOGY

## 2024-06-11 PROCEDURE — 71000033 HC RECOVERY, INTIAL HOUR: Performed by: OTOLARYNGOLOGY

## 2024-06-11 PROCEDURE — 63600175 PHARM REV CODE 636 W HCPCS: Performed by: ANESTHESIOLOGY

## 2024-06-11 PROCEDURE — 25000003 PHARM REV CODE 250: Performed by: OTOLARYNGOLOGY

## 2024-06-11 PROCEDURE — 25000003 PHARM REV CODE 250: Performed by: NURSE ANESTHETIST, CERTIFIED REGISTERED

## 2024-06-11 PROCEDURE — 25000003 PHARM REV CODE 250: Performed by: ANESTHESIOLOGY

## 2024-06-11 PROCEDURE — 99900035 HC TECH TIME PER 15 MIN (STAT)

## 2024-06-11 PROCEDURE — 94761 N-INVAS EAR/PLS OXIMETRY MLT: CPT

## 2024-06-11 PROCEDURE — 71000015 HC POSTOP RECOV 1ST HR: Performed by: OTOLARYNGOLOGY

## 2024-06-11 PROCEDURE — 37000008 HC ANESTHESIA 1ST 15 MINUTES: Performed by: OTOLARYNGOLOGY

## 2024-06-11 PROCEDURE — 36000707: Performed by: OTOLARYNGOLOGY

## 2024-06-11 PROCEDURE — 36000706: Performed by: OTOLARYNGOLOGY

## 2024-06-11 RX ORDER — LIDOCAINE HYDROCHLORIDE 20 MG/ML
INJECTION INTRAVENOUS
Status: DISCONTINUED | OUTPATIENT
Start: 2024-06-11 | End: 2024-06-11

## 2024-06-11 RX ORDER — SODIUM CHLORIDE 9 MG/ML
INJECTION, SOLUTION INTRAVENOUS CONTINUOUS
Status: DISCONTINUED | OUTPATIENT
Start: 2024-06-11 | End: 2024-06-11 | Stop reason: HOSPADM

## 2024-06-11 RX ORDER — OXYCODONE AND ACETAMINOPHEN 5; 325 MG/1; MG/1
1 TABLET ORAL
Status: DISCONTINUED | OUTPATIENT
Start: 2024-06-11 | End: 2024-06-11 | Stop reason: HOSPADM

## 2024-06-11 RX ORDER — DEXAMETHASONE SODIUM PHOSPHATE 4 MG/ML
INJECTION, SOLUTION INTRA-ARTICULAR; INTRALESIONAL; INTRAMUSCULAR; INTRAVENOUS; SOFT TISSUE
Status: DISCONTINUED | OUTPATIENT
Start: 2024-06-11 | End: 2024-06-11

## 2024-06-11 RX ORDER — MIDAZOLAM HYDROCHLORIDE 1 MG/ML
INJECTION, SOLUTION INTRAMUSCULAR; INTRAVENOUS
Status: DISCONTINUED | OUTPATIENT
Start: 2024-06-11 | End: 2024-06-11

## 2024-06-11 RX ORDER — ONDANSETRON HYDROCHLORIDE 2 MG/ML
4 INJECTION, SOLUTION INTRAVENOUS DAILY PRN
Status: DISCONTINUED | OUTPATIENT
Start: 2024-06-11 | End: 2024-06-11 | Stop reason: HOSPADM

## 2024-06-11 RX ORDER — LIDOCAINE HYDROCHLORIDE 10 MG/ML
1 INJECTION, SOLUTION EPIDURAL; INFILTRATION; INTRACAUDAL; PERINEURAL ONCE AS NEEDED
Status: DISCONTINUED | OUTPATIENT
Start: 2024-06-11 | End: 2024-06-11 | Stop reason: HOSPADM

## 2024-06-11 RX ORDER — FENTANYL CITRATE 50 UG/ML
25 INJECTION, SOLUTION INTRAMUSCULAR; INTRAVENOUS EVERY 5 MIN PRN
Status: DISCONTINUED | OUTPATIENT
Start: 2024-06-11 | End: 2024-06-11 | Stop reason: HOSPADM

## 2024-06-11 RX ORDER — ROCURONIUM BROMIDE 10 MG/ML
INJECTION, SOLUTION INTRAVENOUS
Status: DISCONTINUED | OUTPATIENT
Start: 2024-06-11 | End: 2024-06-11

## 2024-06-11 RX ORDER — HYDROMORPHONE HYDROCHLORIDE 1 MG/ML
0.2 INJECTION, SOLUTION INTRAMUSCULAR; INTRAVENOUS; SUBCUTANEOUS EVERY 5 MIN PRN
Status: DISCONTINUED | OUTPATIENT
Start: 2024-06-11 | End: 2024-06-11 | Stop reason: HOSPADM

## 2024-06-11 RX ORDER — PROCHLORPERAZINE EDISYLATE 5 MG/ML
5 INJECTION INTRAMUSCULAR; INTRAVENOUS EVERY 30 MIN PRN
Status: DISCONTINUED | OUTPATIENT
Start: 2024-06-11 | End: 2024-06-11 | Stop reason: HOSPADM

## 2024-06-11 RX ORDER — FENTANYL CITRATE 50 UG/ML
INJECTION, SOLUTION INTRAMUSCULAR; INTRAVENOUS
Status: DISCONTINUED | OUTPATIENT
Start: 2024-06-11 | End: 2024-06-11

## 2024-06-11 RX ORDER — PHENYLEPHRINE HYDROCHLORIDE 10 MG/ML
INJECTION INTRAVENOUS
Status: DISCONTINUED | OUTPATIENT
Start: 2024-06-11 | End: 2024-06-11

## 2024-06-11 RX ORDER — ONDANSETRON HYDROCHLORIDE 2 MG/ML
INJECTION, SOLUTION INTRAVENOUS
Status: DISCONTINUED | OUTPATIENT
Start: 2024-06-11 | End: 2024-06-11

## 2024-06-11 RX ORDER — ONDANSETRON 4 MG/1
4 TABLET, ORALLY DISINTEGRATING ORAL EVERY 6 HOURS PRN
Qty: 20 TABLET | Refills: 0 | Status: SHIPPED | OUTPATIENT
Start: 2024-06-11 | End: 2024-06-12

## 2024-06-11 RX ORDER — EPHEDRINE SULFATE 50 MG/ML
INJECTION, SOLUTION INTRAVENOUS
Status: DISCONTINUED | OUTPATIENT
Start: 2024-06-11 | End: 2024-06-11

## 2024-06-11 RX ORDER — PROPOFOL 10 MG/ML
VIAL (ML) INTRAVENOUS
Status: DISCONTINUED | OUTPATIENT
Start: 2024-06-11 | End: 2024-06-11

## 2024-06-11 RX ORDER — OXYCODONE AND ACETAMINOPHEN 5; 325 MG/1; MG/1
1 TABLET ORAL EVERY 4 HOURS PRN
Qty: 5 TABLET | Refills: 0 | Status: SHIPPED | OUTPATIENT
Start: 2024-06-11 | End: 2024-06-12

## 2024-06-11 RX ORDER — OXYMETAZOLINE HCL 0.05 %
SPRAY, NON-AEROSOL (ML) NASAL
Status: DISCONTINUED | OUTPATIENT
Start: 2024-06-11 | End: 2024-06-11 | Stop reason: HOSPADM

## 2024-06-11 RX ORDER — DEXMEDETOMIDINE HYDROCHLORIDE 100 UG/ML
INJECTION, SOLUTION INTRAVENOUS
Status: DISCONTINUED | OUTPATIENT
Start: 2024-06-11 | End: 2024-06-11

## 2024-06-11 RX ORDER — CEFAZOLIN SODIUM 1 G/3ML
INJECTION, POWDER, FOR SOLUTION INTRAMUSCULAR; INTRAVENOUS
Status: DISCONTINUED | OUTPATIENT
Start: 2024-06-11 | End: 2024-06-11

## 2024-06-11 RX ORDER — LIDOCAINE HYDROCHLORIDE AND EPINEPHRINE 10; 10 MG/ML; UG/ML
INJECTION, SOLUTION INFILTRATION; PERINEURAL
Status: DISCONTINUED | OUTPATIENT
Start: 2024-06-11 | End: 2024-06-11 | Stop reason: HOSPADM

## 2024-06-11 RX ADMIN — DEXAMETHASONE SODIUM PHOSPHATE 4 MG: 4 INJECTION INTRA-ARTICULAR; INTRALESIONAL; INTRAMUSCULAR; INTRAVENOUS; SOFT TISSUE at 12:06

## 2024-06-11 RX ADMIN — GLYCOPYRROLATE 0.1 MG: 0.2 INJECTION, SOLUTION INTRAMUSCULAR; INTRAVENOUS at 12:06

## 2024-06-11 RX ADMIN — SUGAMMADEX 200 MG: 100 INJECTION, SOLUTION INTRAVENOUS at 01:06

## 2024-06-11 RX ADMIN — LIDOCAINE HYDROCHLORIDE 100 MG: 20 INJECTION INTRAVENOUS at 11:06

## 2024-06-11 RX ADMIN — EPHEDRINE SULFATE 10 MG: 50 INJECTION INTRAVENOUS at 01:06

## 2024-06-11 RX ADMIN — ONDANSETRON 4 MG: 2 INJECTION INTRAMUSCULAR; INTRAVENOUS at 12:06

## 2024-06-11 RX ADMIN — PHENYLEPHRINE HYDROCHLORIDE 100 MCG: 10 INJECTION INTRAVENOUS at 12:06

## 2024-06-11 RX ADMIN — ROCURONIUM BROMIDE 50 MG: 10 INJECTION INTRAVENOUS at 11:06

## 2024-06-11 RX ADMIN — SODIUM CHLORIDE: 0.9 INJECTION, SOLUTION INTRAVENOUS at 07:06

## 2024-06-11 RX ADMIN — CEFAZOLIN 2 G: 330 INJECTION, POWDER, FOR SOLUTION INTRAMUSCULAR; INTRAVENOUS at 12:06

## 2024-06-11 RX ADMIN — PROPOFOL 200 MG: 10 INJECTION, EMULSION INTRAVENOUS at 11:06

## 2024-06-11 RX ADMIN — MIDAZOLAM HYDROCHLORIDE 2 MG: 1 INJECTION, SOLUTION INTRAMUSCULAR; INTRAVENOUS at 11:06

## 2024-06-11 RX ADMIN — DEXMEDETOMIDINE HYDROCHLORIDE 12 MCG: 100 INJECTION, SOLUTION INTRAVENOUS at 01:06

## 2024-06-11 RX ADMIN — FENTANYL CITRATE 100 MCG: 50 INJECTION, SOLUTION INTRAMUSCULAR; INTRAVENOUS at 11:06

## 2024-06-11 RX ADMIN — SODIUM CHLORIDE: 9 INJECTION, SOLUTION INTRAVENOUS at 08:06

## 2024-06-11 RX ADMIN — EPHEDRINE SULFATE 15 MG: 50 INJECTION INTRAVENOUS at 12:06

## 2024-06-11 NOTE — ANESTHESIA POSTPROCEDURE EVALUATION
Anesthesia Post Evaluation    Patient: Lyndon Lion Jr.    Procedure(s) Performed: Procedure(s) (LRB):  RECONSTRUCTION, NOSE (N/A)    Final Anesthesia Type: general      Patient location during evaluation: PACU  Patient participation: Yes- Able to Participate  Level of consciousness: awake and alert  Post-procedure vital signs: reviewed and stable  Pain management: adequate  Airway patency: patent    PONV status at discharge: No PONV  Anesthetic complications: no      Cardiovascular status: stable  Respiratory status: spontaneous ventilation  Hydration status: euvolemic  Follow-up not needed.          Vitals Value Taken Time   /70 06/11/24 1402   Temp 36.6 °C (97.9 °F) 06/11/24 1327   Pulse 80 06/11/24 1410   Resp 15 06/11/24 1410   SpO2 94 % 06/11/24 1410   Vitals shown include unfiled device data.      No case tracking events are documented in the log.      Pain/Karthikeyan Score: Karthikeyan Score: 10 (6/11/2024  2:00 PM)

## 2024-06-11 NOTE — LETTER
June 11, 2024         [address] [facility] [address]       Patient: Lyndon Lion   YOB: 1970  Date of Visit: 06/11/2024    To Whom It May Concern:    Ting Lion  was at [facility] on 06/11/2024. Please excuse his absence from 6/11-6/12. If you have any questions or concerns, or if I can be of further assistance, please do not hesitate to contact me.    Sincerely,    Edita Acosta RN

## 2024-06-11 NOTE — TRANSFER OF CARE
"Anesthesia Transfer of Care Note    Patient: Lyndon Lion Jr.    Procedure(s) Performed: Procedure(s) (LRB):  RECONSTRUCTION, NOSE (N/A)    Patient location: PACU    Anesthesia Type: general    Transport from OR: Transported from OR on 100% O2 by closed face mask with adequate spontaneous ventilation    Post pain: adequate analgesia    Post assessment: no apparent anesthetic complications    Post vital signs: stable    Level of consciousness: awake    Nausea/Vomiting: no nausea/vomiting    Complications: none    Transfer of care protocol was followed    Last vitals: Visit Vitals  /80 (BP Location: Left arm, Patient Position: Lying)   Pulse 69   Temp 36.7 °C (98.1 °F) (Temporal)   Resp 20   Ht 6' 2" (1.88 m)   Wt 111.1 kg (245 lb)   SpO2 99%   BMI 31.46 kg/m²     "

## 2024-06-11 NOTE — ANESTHESIA PROCEDURE NOTES
Intubation    Date/Time: 6/11/2024 11:56 AM    Performed by: Tono Jernigan MD  Authorized by: Tono Jernigan MD    Intubation:     Induction:  Intravenous    Intubated:  Postinduction    Mask Ventilation:  Easy mask    Attempts:  1    Attempted By:  CRNA    Method of Intubation:  Video laryngoscopy    Blade:  Mcfarlane 3    Laryngeal View Grade: Grade I - full view of cords      Difficult Airway Encountered?: No      Complications:  None    Airway Device:  Oral albert    Airway Device Size:  7.5    Style/Cuff Inflation:  Cuffed    Inflation Amount (mL):  10    Secured at:  The lips

## 2024-06-11 NOTE — ANESTHESIA PREPROCEDURE EVALUATION
06/11/2024  Lyndon Lion Jr. is a 54 y.o., male.      Pre-op Assessment    I have reviewed the Patient Summary Reports.     I have reviewed the Nursing Notes. I have reviewed the NPO Status.   I have reviewed the Medications.     Review of Systems  Anesthesia Hx:             Denies Family Hx of Anesthesia complications.    Denies Personal Hx of Anesthesia complications.                    Endocrine:  Diabetes             Physical Exam  General: Well nourished    Airway:  Mallampati: II   Mouth Opening: Normal  TM Distance: Normal    Chest/Lungs:  Normal Respiratory Rate    Anesthesia Plan  Type of Anesthesia, risks & benefits discussed:    Anesthesia Type: Gen ETT  Intra-op Monitoring Plan: Standard ASA Monitors  Post Op Pain Control Plan: multimodal analgesia  Induction:  IV  Airway Plan: Video  Informed Consent: Informed consent signed with the Patient and all parties understand the risks and agree with anesthesia plan.  All questions answered.   ASA Score: 2  Day of Surgery Review of History & Physical: H&P Update referred to the surgeon/provider.    Ready For Surgery From Anesthesia Perspective.   .

## 2024-06-11 NOTE — DISCHARGE INSTRUCTIONS
Discharge Instructions:   - OK to shower in 48 hours  - Avoid submerging incision   - Avoid scrubbing at incision  - Can clean incision sites gently with hydrogen peroxide on a Q-tip if crusting develops   - All incisions are absorbable and do not need removal   - No heavy lifting, nothing greater than 10 pounds for 2 weeks   - No nose blowing

## 2024-06-11 NOTE — PLAN OF CARE
Chart reviewed. Preop nursing care completed per orders. Safe surgery checklist complete. Pt denies any open wounds cuts or sores. Pt denies any metal in body. Family at bedside and belongings in PACU locker 8. Waiting for H&P; admission order; surgical consent; and anesthesia consent prior to surgery. Pt AAOX4, VSS on room air. Pt toileted, Bed locked in lowest position, Call light within reach. Pt denies any needs at this time.

## 2024-06-11 NOTE — LETTER
June 11, 2024         [address] [facility] [address]       Patient: Lyndon Lion   YOB: 1970  Date of Visit: 06/11/2024    To Whom It May Concern:    Ting Lion  was at [facility] on 06/11/2024. The patient may return to work/school on 6/17/2024. If you have any questions or concerns, or if I can be of further assistance, please do not hesitate to contact me.    Sincerely,    Edita Acosta RN

## 2024-06-11 NOTE — LETTER
June 11, 2024          No Recipients                [address] [facility] [address]   Patient: Lyndon Lion Jr.   MR Number: [id number]   YOB: 1970   Date of Visit: 5/22/2024       Dear Dr. Alaniz Recipients:    Thank you for referring Lyndon Lion to me for evaluation. Below are the relevant portions of my assessment and plan of care.            If you have questions, please do not hesitate to call me. I look forward to following Lyndon along with you.    Sincerely,      Edita Acosta, RN           CC    No Recipients

## 2024-06-11 NOTE — PLAN OF CARE
Discharge instructions given and explained to patient and family with verbalization of understanding all instructions. Patient is AAOx3,v/s stable, denies n/v and tolerating po, rates pain level tolerable, IV removed, and family at bedside. Patient discharged to home. Patient transported off unit via wheelchair to private vehicle with family.

## 2024-06-12 ENCOUNTER — OFFICE VISIT (OUTPATIENT)
Dept: FAMILY MEDICINE | Facility: CLINIC | Age: 54
End: 2024-06-12
Payer: COMMERCIAL

## 2024-06-12 VITALS
SYSTOLIC BLOOD PRESSURE: 140 MMHG | HEIGHT: 74 IN | TEMPERATURE: 100 F | WEIGHT: 252.31 LBS | OXYGEN SATURATION: 94 % | DIASTOLIC BLOOD PRESSURE: 92 MMHG | HEART RATE: 72 BPM | BODY MASS INDEX: 32.38 KG/M2 | RESPIRATION RATE: 18 BRPM

## 2024-06-12 DIAGNOSIS — N52.9 ERECTILE DYSFUNCTION, UNSPECIFIED ERECTILE DYSFUNCTION TYPE: Primary | ICD-10-CM

## 2024-06-12 DIAGNOSIS — E11.65 UNCONTROLLED TYPE 2 DIABETES MELLITUS WITH HYPERGLYCEMIA: ICD-10-CM

## 2024-06-12 PROCEDURE — 99999 PR PBB SHADOW E&M-EST. PATIENT-LVL V: CPT | Mod: PBBFAC,,, | Performed by: NURSE PRACTITIONER

## 2024-06-12 PROCEDURE — 99214 OFFICE O/P EST MOD 30 MIN: CPT | Mod: S$GLB,,, | Performed by: NURSE PRACTITIONER

## 2024-06-12 RX ORDER — TADALAFIL 5 MG/1
5 TABLET ORAL DAILY PRN
Qty: 90 TABLET | Refills: 1 | Status: SHIPPED | OUTPATIENT
Start: 2024-06-12

## 2024-06-12 RX ORDER — SEMAGLUTIDE 2.68 MG/ML
2 INJECTION, SOLUTION SUBCUTANEOUS
Qty: 3 EACH | Refills: 1 | Status: SHIPPED | OUTPATIENT
Start: 2024-06-12 | End: 2025-06-12

## 2024-06-12 NOTE — PROGRESS NOTES
SUBJECTIVE:      Patient ID: Lyndon Lion Jr. is a 54 y.o. male.    Chief Complaint: Follow-up and Medication Refill    HPI  Is here for follow up  Had partial nose reconstruction surgery from cancer yesterday  Is doing well  Will get fasting labs tomorrow  Bp is elevated  - is in some pain    Is on cialis- for ed  Is on ozempic- 2.0 mg - doing well  Is on testosterone  Is on acpihex for gerd- managing well  Is on pravastatin for chol  Is on metformin for dm2  Is on iron and b 12 as well    Vs- bp is elevated- is in pain      Past Surgical History:   Procedure Laterality Date    APPENDECTOMY      APPLICATION OF CARTILAGE GRAFT N/A 3/21/2023    Procedure: APPLICATION, CARTILAGE GRAFT;  Surgeon: Alyx Spivey MD;  Location: [facility] OR 2ND FLR;  Service: ENT;  Laterality: N/A;    CAUDAL EPIDURAL STEROID INJECTION N/A 11/6/2018    Procedure: Injection-steroid-epidural-caudal;  Surgeon: Lyndon Brooke Jr., MD;  [facility];  Service: Pain Management;  Laterality: N/A;    COLON SURGERY  2008    PARTIAL COLECTOMY    COLONOSCOPY Bilateral 2012    COLONOSCOPY N/A 8/1/2016    Procedure: COLONOSCOPY;  Surgeon: Blaze Marr MD;  Location: [facility];  Service: Endoscopy;  Laterality: N/A;    COLONOSCOPY N/A 9/20/2017    Procedure: COLONOSCOPY;  Surgeon: Dom Leonardo MD;  Location: [facility] (4TH FLR);  Service: Endoscopy;  Laterality: N/A;  not given PM prep    COLONOSCOPY N/A 10/9/2017    Procedure: COLONOSCOPY;  Surgeon: RAMON Callahan MD;  Location: [facility] (4TH FLR);  Service: Endoscopy;  Laterality: N/A;  ok for Prepopik per Dr Callahan    COLONOSCOPY N/A 11/20/2017    Procedure: COLONOSCOPY/EMR;  Surgeon: Joseluis Choe MD;  Location: [facility] (2ND FLR);  Service: Endoscopy;  Laterality: N/A;  EMR    no pm prep    COLONOSCOPY N/A 5/30/2018    Procedure: COLONOSCOPY;  Surgeon: Dom Leonardo MD;  Location: [facility] (4TH FLR);  Service: Endoscopy;  Laterality: N/A;  follow up  colonoscopy in 5/2018 for Parada Syndrome         COLONOSCOPY N/A 6/10/2019    Procedure: COLONOSCOPY;  Surgeon: Dom Leonardo MD;  Location: [facility] (4TH FLR);  Service: Endoscopy;  Laterality: N/A;  Prepopik ordered per Dr. Leonardo    COLONOSCOPY N/A 7/22/2020    Procedure: COLONOSCOPY;  Surgeon: Dom Leonardo MD;  Location: [facility] (4TH FLR);  Service: Endoscopy;  Laterality: N/A;    COLONOSCOPY N/A 5/27/2021    Procedure: COLONOSCOPY;  Surgeon: Dom Leonardo MD;  Location: [facility] (4TH FLR);  Service: Endoscopy;  Laterality: N/A;  covid test 5/24-[place]  pt requests Prepopik    COLONOSCOPY N/A 6/17/2022    Procedure: COLONOSCOPY;  Surgeon: Dom Leonardo MD;  Location: [facility] (4TH FLR);  Service: Endoscopy;  Laterality: N/A;  He was discovered to have colon cancer and had right hemicolectomy        for stage II on 08/08/2008. MSH2 Parada syndrome.  sutab requested  instructions via the portal -sm  fully vaccinated - sm    COLONOSCOPY N/A 6/22/2023    Procedure: COLONOSCOPY;  Surgeon: Dom Leonardo MD;  Location: [facility] (4TH FLR);  Service: Endoscopy;  Laterality: N/A;  see telephone encounter dated 5/24/23/ Pt/ Pt wife requested sutab - prep ins. on portal / Ozempic for DM- ERW    COLONOSCOPY N/A 2/6/2024    Procedure: COLONOSCOPY;  Surgeon: Dom Leonardo MD;  Location: [facility] (4TH FLR);  Service: Endoscopy;  Laterality: N/A;  sutab/pt diabetic/ozempic/referral dr feng    CYSTOSCOPY      CYSTOSCOPY N/A 1/31/2024    Procedure: CYSTOSCOPY;  Surgeon: Eron Llanes MD;  Location: [address] FLR;  Service: Urology;  Laterality: N/A;    Epidural Steroid Injection  10/23/15    Lumbar    EPIDURAL STEROID INJECTION INTO LUMBAR SPINE N/A 7/27/2018    Procedure: Injection-steroid-epidural-lumbar;  Surgeon: Lyndon Brooke Jr., MD;  Location: [facility] OR;  Service: Pain Management;  Laterality: N/A;    ESOPHAGOGASTRODUODENOSCOPY      ESOPHAGOGASTRODUODENOSCOPY N/A  7/22/2020    Procedure: EGD (ESOPHAGOGASTRODUODENOSCOPY);  Surgeon: Dom Leonardo MD;  Location: [facility] ENDO (4TH FLR);  Service: Endoscopy;  Laterality: N/A;  Please schedule patient for EGD and colonoscopy with me MSH2 Parada syndrome and anemia evaluation please make priority 1  covid test 7/20-[place]    ESOPHAGOGASTRODUODENOSCOPY N/A 6/22/2023    Procedure: EGD (ESOPHAGOGASTRODUODENOSCOPY);  Surgeon: Dom Leonardo MD;  Location: [facility] ENDO (4TH FLR);  Service: Endoscopy;  Laterality: N/A;  see telephone encounter dated 5/24/23 6/16/23-Precall completed appointment confirmed-    ESOPHAGOGASTRODUODENOSCOPY N/A 9/1/2023    Procedure: EGD (ESOPHAGOGASTRODUODENOSCOPY);  Surgeon: Dom Leonardo MD;  Location: [facility] ENDO (4TH FLR);  Service: Endoscopy;  Laterality: N/A;  Instructions sent via portal / Ozempic / Extended Clear Liquid prep    EXCISION OR SURGICAL PLANING, SKIN, NOSE, FOR RHINOPHYMA Bilateral 3/7/2023    Procedure: EXCISION OR SURGICAL resection nasal skin cancer;  Surgeon: Alyx Spivey MD;  Location: [facility] OR 2ND FLR;  Service: ENT;  Laterality: Bilateral;    EXTRACTION - STONE Left 1/31/2024    Procedure: EXTRACTION - STONE;  Surgeon: Eron Llanes MD;  Location: [facility] OR 1ST FLR;  Service: Urology;  Laterality: Left;    FACETECTOMY OF VERTEBRA  2/13/2019    Procedure: MEDIAL FACETECTOMY L5-S1;  Surgeon: Lyndon Brooke Jr., MD;  Location: [facility] OR;  Service: Orthopedics;;    GASTRIC BYPASS  2010    SLEEVE    KNEE ARTHROSCOPY Right     LUMBAR DISCECTOMY  2/13/2019    Procedure: DISCECTOMY, SPINE, LUMBAR L5-S1;  Surgeon: Lyndon Brooke Jr., MD;  Location: [facility] OR;  Service: Orthopedics;;    NASAL RECONSTRUCTION N/A 3/21/2023    Procedure: RECONSTRUCTION, NOSE;  Surgeon: Alyx Spivey MD;  Location: [facility] OR 2ND FLR;  Service: ENT;  Laterality: N/A;    NASAL RECONSTRUCTION N/A 6/11/2024    Procedure: RECONSTRUCTION, NOSE;  Surgeon: Alyx Spivey MD;   Location: [facility] OR;  Service: ENT;  Laterality: N/A;    PLACEMENT-STENT Left 1/31/2024    Procedure: PLACEMENT-STENT;  Surgeon: Eron Llanes MD;  Location: [facility] OR 1ST FLR;  Service: Urology;  Laterality: Left;    RETROGRADE PYELOGRAPHY Left 1/31/2024    Procedure: PYELOGRAM, RETROGRADE;  Surgeon: Eron Llanes MD;  Location: [facility] OR 1ST FLR;  Service: Urology;  Laterality: Left;    REVISION OF FLAP GRAFT Bilateral 4/18/2023    Procedure: REVISION, PROCEDURE INVOLVING FLAP GRAFT;  Surgeon: Alyx Spivey MD;  Location: [facility] OR;  Service: ENT;  Laterality: Bilateral;    REVISION OF FLAP GRAFT N/A 6/29/2023    Procedure: REVISION, PROCEDURE INVOLVING FLAP GRAFT;  Surgeon: Alyx Spivey MD;  Location: [facility] OR 2ND FLR;  Service: ENT;  Laterality: N/A;    REVISION OF FLAP GRAFT N/A 8/29/2023    Procedure: REVISION, PROCEDURE INVOLVING FLAP GRAFT;  Surgeon: Alyx Spivey MD;  Location: [facility] OR;  Service: ENT;  Laterality: N/A;    REVISION OF FLAP GRAFT Bilateral 2/20/2024    Procedure: REVISION, PROCEDURE INVOLVING FLAP GRAFT;  Surgeon: Alyx Spivey MD;  Location: [facility] OR;  Service: ENT;  Laterality: Bilateral;    REVISION OF FLAP GRAFT N/A 5/7/2024    Procedure: REVISION, PROCEDURE INVOLVING FLAP GRAFT;  Surgeon: Alyx Spivey MD;  Location: [facility] OR;  Service: ENT;  Laterality: N/A;    ROTATION FLAP SURGERY N/A 3/21/2023    Procedure: CREATION, FLAP, ROTATION;  Surgeon: Alyx Spivey MD;  Location: [facility] OR 2ND FLR;  Service: ENT;  Laterality: N/A;    ROTATION FLAP SURGERY Right 4/9/2024    Procedure: CREATION, FLAP, ROTATION;  Surgeon: Alyx Spivey MD;  Location: [facility] OR;  Service: ENT;  Laterality: Right;    URETEROSCOPY Left 1/31/2024    Procedure: URETEROSCOPY;  Surgeon: Eron Llanes MD;  Location: [facility] OR 1ST FLR;  Service: Urology;  Laterality: Left;     Family History   Problem Relation Name Age of Onset    Melanoma Mother      Cancer Mother          breast    Breast cancer Mother      Heart  disease Father      Diabetes Father      Liver disease Father      Hearing loss Father      Basal cell carcinoma Father      No Known Problems Sister 2     Polycystic ovary syndrome Daughter 1     Cancer Maternal Uncle          colon    Colon cancer Maternal Uncle          x2    Heart disease Maternal Uncle      Hypertension Maternal Uncle      No Known Problems Paternal Aunt 1     Alcohol abuse Paternal Uncle 2     Prostate cancer Paternal Uncle 2     Cancer Paternal Uncle 2     Dementia Maternal Grandmother      Cancer Maternal Grandfather          colon ca    Colon cancer Maternal Grandfather      Early death Maternal Grandfather      Diabetes Paternal Grandmother      Hypertension Paternal Grandfather      Prostatitis Paternal Grandfather      Psoriasis Neg Hx      Lupus Neg Hx      Eczema Neg Hx        Social History     Socioeconomic History    Marital status:    Occupational History     Employer: [employer]   Tobacco Use    Smoking status: Never    Smokeless tobacco: Never   Substance and Sexual Activity    Alcohol use: Yes     Comment: RARELY    Drug use: No    Sexual activity: Yes     Partners: Female     Social Determinants of Health     Financial Resource Strain: Low Risk  (3/19/2024)    Overall Financial Resource Strain (CARDIA)     Difficulty of Paying Living Expenses: Not hard at all   Food Insecurity: No Food Insecurity (3/19/2024)    Hunger Vital Sign     Worried About Running Out of Food in the Last Year: Never true     Ran Out of Food in the Last Year: Never true   Transportation Needs: No Transportation Needs (3/19/2024)    PRAPARE - Transportation     Lack of Transportation (Medical): No     Lack of Transportation (Non-Medical): No   Physical Activity: Inactive (3/19/2024)    Exercise Vital Sign     Days of Exercise per Week: 0 days     Minutes of Exercise per Session: 0 min   Stress: No Stress Concern Present (3/19/2024)    [place] [employer]  "Occupational Health - Occupational Stress Questionnaire     Feeling of Stress : Not at all   Recent Concern: Stress - Stress Concern Present (1/30/2024)    [employer] - Occupational Stress Questionnaire     Feeling of Stress : To some extent   Housing Stability: Low Risk  (3/19/2024)    Housing Stability Vital Sign     Unable to Pay for Housing in the Last Year: No     Number of Places Lived in the Last Year: 1     Unstable Housing in the Last Year: No     Current Outpatient Medications   Medication Sig Dispense Refill    ACCU-CHEK GUIDE TEST STRIPS Strp USE TO TEST TWICE A  strip 3    ascorbic acid, vitamin C, (VITAMIN C) 100 MG tablet Take 100 mg by mouth once daily.      aspirin (ECOTRIN) 81 MG EC tablet Take by mouth once daily. Aspirin 300 mg bid coded aspirin      azelaic acid (AZELEX) 15 % gel Apply to face BID. 50 g 5    cyanocobalamin, vitamin B-12, 2,500 mcg Lozg Place 2 tablets under the tongue once daily. 180 lozenge 3    doxycycline (PERIOSTAT) 20 MG tablet Take 2 pill po qday 180 tablet 3    ferrous gluconate (FERGON) 240 (27 FE) MG tablet Take 1 tablet (240 mg total) by mouth Daily. 100 each 0    lancets (ACCU-CHEK SOFTCLIX LANCETS) Misc 1 Units by Misc.(Non-Drug; Combo Route) route 2 (two) times a day. 200 each 0    metFORMIN (GLUCOPHAGE-XR) 500 MG ER 24hr tablet Take 1 tablet (500 mg total) by mouth daily with breakfast. 90 tablet 3    metroNIDAZOLE (NORITATE) 1 % cream Compound azelaic acid 15% + ivermectin 1% + metronidazole 1% cream. Apply to face once or twice daily. 30 g 5    ondansetron (ZOFRAN-ODT) 4 MG TbDL Dissolve 1 tablet (4 mg total) on the tongue  every 6 (six) hours as needed (Nausea). 20 tablet 0    oxyCODONE-acetaminophen (PERCOCET) 5-325 mg per tablet Take 1 tablet by mouth every 4 (four) hours as needed for Pain. 20 tablet 0    pen needle, diabetic (PEN NEEDLE) 32 gauge x 5/32" Ndle 1 each by Misc.(Non-Drug; Combo Route) route " once daily. 90 each 3    pravastatin (PRAVACHOL) 20 MG tablet Take 1 tablet (20 mg total) by mouth once daily. 90 tablet 3    RABEprazole (ACIPHEX) 20 mg tablet Take 1 tablet (20 mg total) by mouth before breakfast. 90 tablet 3    sod sulf-pot chloride-mag sulf (SUTAB) 1.479-0.188- 0.225 gram tablet Take 12 tablets by mouth once daily. Take as directed by provider office 24 tablet 0    sulfacetamide sodium-sulfur 10-5 % (w/w) Clsr Use to wash face daily 170 g 5    testosterone cypionate (DEPOTESTOTERONE CYPIONATE) 200 mg/mL injection Inject 60 mg into the muscle every 14 (fourteen) days. Twice a week      vitamin A 8000 UNIT capsule Take 1 capsule (8,000 Units total) by mouth once daily. 100 capsule 3    semaglutide (OZEMPIC) 2 mg/dose (8 mg/3 mL) PnIj Inject 2 mg into the skin every 7 days. 4 mL 1    tadalafiL (CIALIS) 5 MG tablet Take 1 tablet (5 mg total) by mouth daily as needed for Erectile Dysfunction. 90 tablet 1     No current facility-administered medications for this visit.     Review of patient's allergies indicates:   Allergen Reactions    Hydrocod-cpm-pe-acetaminophen Other (See Comments)     Irritability       Past Medical History:   Diagnosis Date    Arthritis     Asthma     AS A CHILD    Colon cancer     Family hx of prostate cancer 11/22/2016    Hx of colon cancer, stage I 07/03/2014    Parada syndrome     Squamous cell carcinoma of skin     Tear of labium 10/2020    left shoulder Dr Molina    Uncontrolled type 2 diabetes mellitus with hyperglycemia 03/04/2021    Vitamin D deficiency     Wears glasses      Past Surgical History:   Procedure Laterality Date    APPENDECTOMY      APPLICATION OF CARTILAGE GRAFT N/A 3/21/2023    Procedure: APPLICATION, CARTILAGE GRAFT;  Surgeon: Alyx Spivey MD;  Location: [facility] OR [address] [place] [address];  Service: ENT;  Laterality: N/A;    CAUDAL EPIDURAL STEROID INJECTION N/A 11/6/2018    Procedure: Injection-steroid-epidural-caudal;  Surgeon: Lyndon Brooke  MD [place];  [facility];  Service: Pain Management;  Laterality: N/A;    COLON SURGERY  2008    PARTIAL COLECTOMY    COLONOSCOPY Bilateral 2012    COLONOSCOPY N/A 8/1/2016    Procedure: COLONOSCOPY;  Surgeon: Blaze Marr MD;  Location: [facility] ENDO;  Service: Endoscopy;  Laterality: N/A;    COLONOSCOPY N/A 9/20/2017    Procedure: COLONOSCOPY;  Surgeon: Dom Leonardo MD;  Location: [facility] (4TH FLR);  Service: Endoscopy;  Laterality: N/A;  not given PM prep    COLONOSCOPY N/A 10/9/2017    Procedure: COLONOSCOPY;  Surgeon: RAMON Callahan MD;  Location: [facility] (4TH FLR);  Service: Endoscopy;  Laterality: N/A;  ok for Prepopik per Dr Callahan    COLONOSCOPY N/A 11/20/2017    Procedure: COLONOSCOPY/EMR;  Surgeon: Joseluis Choe MD;  Location: [facility] (2ND FLR);  Service: Endoscopy;  Laterality: N/A;  EMR    no pm prep    COLONOSCOPY N/A 5/30/2018    Procedure: COLONOSCOPY;  Surgeon: Dom Leonardo MD;  Location: [facility] (4TH FLR);  Service: Endoscopy;  Laterality: N/A;  follow up colonoscopy in 5/2018 for Parada Syndrome         COLONOSCOPY N/A 6/10/2019    Procedure: COLONOSCOPY;  Surgeon: Dom Leonardo MD;  Location: [facility] (4TH FLR);  Service: Endoscopy;  Laterality: N/A;  Prepopik ordered per Dr. Leonardo    COLONOSCOPY N/A 7/22/2020    Procedure: COLONOSCOPY;  Surgeon: Dom Leonardo MD;  Location: [facility] (4TH FLR);  Service: Endoscopy;  Laterality: N/A;    COLONOSCOPY N/A 5/27/2021    Procedure: COLONOSCOPY;  Surgeon: Dom Leonardo MD;  Location: [facility] (4TH FLR);  Service: Endoscopy;  Laterality: N/A;  covid test 5/24-[place]  pt requests Prepopik    COLONOSCOPY N/A 6/17/2022    Procedure: COLONOSCOPY;  Surgeon: Dom Leonardo MD;  Location: [facility] (4TH FLR);  Service: Endoscopy;  Laterality: N/A;  He was discovered to have colon cancer and had right hemicolectomy        for stage II on 08/08/2008. MSH2 Parada syndrome.  sutab requested  instructions via  the portal -sm  fully vaccinated - sm    COLONOSCOPY N/A 6/22/2023    Procedure: COLONOSCOPY;  Surgeon: Dom Leonardo MD;  Location: [facility] (4TH FLR);  Service: Endoscopy;  Laterality: N/A;  see telephone encounter dated 5/24/23/ Pt/ Pt wife requested sutab - prep ins. on portal / Ozempic for DM- ERW    COLONOSCOPY N/A 2/6/2024    Procedure: COLONOSCOPY;  Surgeon: Dom Leonardo MD;  Location: [facility] (4TH FLR);  Service: Endoscopy;  Laterality: N/A;  sutab/pt diabetic/ozempic/referral dr feng    CYSTOSCOPY      CYSTOSCOPY N/A 1/31/2024    Procedure: CYSTOSCOPY;  Surgeon: Eron Llanes MD;  Location: [facility] 1ST FLR;  Service: Urology;  Laterality: N/A;    Epidural Steroid Injection  10/23/15    Lumbar    EPIDURAL STEROID INJECTION INTO LUMBAR SPINE N/A 7/27/2018    Procedure: Injection-steroid-epidural-lumbar;  Surgeon: Lyndon Brooke Jr., MD;  [facility];  Service: Pain Management;  Laterality: N/A;    ESOPHAGOGASTRODUODENOSCOPY      ESOPHAGOGASTRODUODENOSCOPY N/A 7/22/2020    Procedure: EGD (ESOPHAGOGASTRODUODENOSCOPY);  Surgeon: Dom Leonardo MD;  Location: [facility] (4TH FLR);  Service: Endoscopy;  Laterality: N/A;  Please schedule patient for EGD and colonoscopy with me MSH2 Parada syndrome and anemia evaluation please make priority 1  covid test 7/20-[place]    ESOPHAGOGASTRODUODENOSCOPY N/A 6/22/2023    Procedure: EGD (ESOPHAGOGASTRODUODENOSCOPY);  Surgeon: Dom Leonardo MD;  Location: [facility] (4TH FLR);  Service: Endoscopy;  Laterality: N/A;  see telephone encounter dated 5/24/23 6/16/23-Precall completed appointment confirmed-    ESOPHAGOGASTRODUODENOSCOPY N/A 9/1/2023    Procedure: EGD (ESOPHAGOGASTRODUODENOSCOPY);  Surgeon: Dom Leonardo MD;  Location: [facility] (4TH FLR);  Service: Endoscopy;  Laterality: N/A;  Instructions sent via portal / Ozempic / Extended Clear Liquid prep    EXCISION OR SURGICAL PLANING, SKIN, NOSE, FOR  RHINOPHYMA Bilateral 3/7/2023    Procedure: EXCISION OR SURGICAL resection nasal skin cancer;  Surgeon: Alyx Spivey MD;  Location: [facility] OR 2ND FLR;  Service: ENT;  Laterality: Bilateral;    EXTRACTION - STONE Left 1/31/2024    Procedure: EXTRACTION - STONE;  Surgeon: Eron Llanes MD;  Location: [facility] OR 1ST FLR;  Service: Urology;  Laterality: Left;    FACETECTOMY OF VERTEBRA  2/13/2019    Procedure: MEDIAL FACETECTOMY L5-S1;  Surgeon: Lyndon Brooke Jr., MD;  Location: [facility] OR;  Service: Orthopedics;;    GASTRIC BYPASS  2010    SLEEVE    KNEE ARTHROSCOPY Right     LUMBAR DISCECTOMY  2/13/2019    Procedure: DISCECTOMY, SPINE, LUMBAR L5-S1;  Surgeon: Lyndon Brooke Jr., MD;  Location: [facility] OR;  Service: Orthopedics;;    NASAL RECONSTRUCTION N/A 3/21/2023    Procedure: RECONSTRUCTION, NOSE;  Surgeon: Alyx Spivey MD;  Location: [facility] OR 2ND FLR;  Service: ENT;  Laterality: N/A;    NASAL RECONSTRUCTION N/A 6/11/2024    Procedure: RECONSTRUCTION, NOSE;  Surgeon: Alyx Spivey MD;  Location: [facility] OR;  Service: ENT;  Laterality: N/A;    PLACEMENT-STENT Left 1/31/2024    Procedure: PLACEMENT-STENT;  Surgeon: Eron Llanes MD;  Location: [facility] OR 1ST FLR;  Service: Urology;  Laterality: Left;    RETROGRADE PYELOGRAPHY Left 1/31/2024    Procedure: PYELOGRAM, RETROGRADE;  Surgeon: Eron Llanes MD;  Location: [facility] OR 1ST FLR;  Service: Urology;  Laterality: Left;    REVISION OF FLAP GRAFT Bilateral 4/18/2023    Procedure: REVISION, PROCEDURE INVOLVING FLAP GRAFT;  Surgeon: Alyx Spivey MD;  Location: [facility] OR;  Service: ENT;  Laterality: Bilateral;    REVISION OF FLAP GRAFT N/A 6/29/2023    Procedure: REVISION, PROCEDURE INVOLVING FLAP GRAFT;  Surgeon: Alyx Spivey MD;  Location: [facility] OR 2ND FLR;  Service: ENT;  Laterality: N/A;    REVISION OF FLAP GRAFT N/A 8/29/2023    Procedure: REVISION, PROCEDURE INVOLVING FLAP GRAFT;  Surgeon: Alyx Spivey MD;  Location: [facility];  Service: ENT;  Laterality:  "N/A;    REVISION OF FLAP GRAFT Bilateral 2/20/2024    Procedure: REVISION, PROCEDURE INVOLVING FLAP GRAFT;  Surgeon: Alyx Spivey MD;  Location: [facility] OR;  Service: ENT;  Laterality: Bilateral;    REVISION OF FLAP GRAFT N/A 5/7/2024    Procedure: REVISION, PROCEDURE INVOLVING FLAP GRAFT;  Surgeon: Alyx Spivey MD;  Location: [facility] OR;  Service: ENT;  Laterality: N/A;    ROTATION FLAP SURGERY N/A 3/21/2023    Procedure: CREATION, FLAP, ROTATION;  Surgeon: Alyx Spivey MD;  Location: [facility] OR 2ND FLR;  Service: ENT;  Laterality: N/A;    ROTATION FLAP SURGERY Right 4/9/2024    Procedure: CREATION, FLAP, ROTATION;  Surgeon: Alyx Spivey MD;  Location: [facility] OR;  Service: ENT;  Laterality: Right;    URETEROSCOPY Left 1/31/2024    Procedure: URETEROSCOPY;  Surgeon: Eron Llanes MD;  Location: [facility] OR 1ST FLR;  Service: Urology;  Laterality: Left;       Review of Systems   All other systems reviewed and are negative.   OBJECTIVE:      Vitals:    06/12/24 1118   BP: (!) 140/92   BP Location: Right arm   Patient Position: Sitting   BP Method: Medium (Manual)   Pulse: 72   Resp: 18   Temp: 99.5 °F (37.5 °C)   SpO2: (!) 94%   Weight: 114.4 kg (252 lb 5.1 oz)   Height: 6' 2" (1.88 m)     Physical Exam  Vitals and nursing note reviewed.   Constitutional:       Appearance: Normal appearance. He is obese.   HENT:      Head: Normocephalic and atraumatic.   Eyes:      Pupils: Pupils are equal, round, and reactive to light.   Neck:      Comments: No thrills no bruits  No abnormal neck masses felt  Cardiovascular:      Rate and Rhythm: Normal rate and regular rhythm.      Pulses: Normal pulses.      Heart sounds: Normal heart sounds.      Comments: S1 s2 nsr  No edema noted on bilateral lower ext  Pulmonary:      Effort: Pulmonary effort is normal.      Breath sounds: Normal breath sounds.   Musculoskeletal:      Cervical back: Normal range of motion.   Skin:     Comments: Nasal surgery with suture seen   Neurological:      " General: No focal deficit present.      Mental Status: He is alert and oriented to person, place, and time. Mental status is at baseline.   Psychiatric:         Mood and Affect: Mood normal.         Behavior: Behavior normal.         Thought Content: Thought content normal.         Judgment: Judgment normal.      Comments: nonsuicidal      Assessment:       1. Erectile dysfunction, unspecified erectile dysfunction type    2. Uncontrolled type 2 diabetes mellitus with hyperglycemia        Plan:       Erectile dysfunction, unspecified erectile dysfunction type  -     tadalafiL (CIALIS) 5 MG tablet; Take 1 tablet (5 mg total) by mouth daily as needed for Erectile Dysfunction.  Dispense: 90 tablet; Refill: 1    Uncontrolled type 2 diabetes mellitus with hyperglycemia  -     semaglutide (OZEMPIC) 2 mg/dose (8 mg/3 mL) PnIj; Inject 2 mg into the skin every 7 days.  Dispense: 4 mL; Refill: 1      Take medications as ordered  Get fasting blood work as previously ordered  Follow up in 3 months or sooner if needed  No follow-ups on file.      6/12/2024 Krissy Raza, KILO, FNP

## 2024-06-12 NOTE — OP NOTE
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 Septal cartilage harvest for reconstruction of columella  2. Debulking and soft tissue rearrangement for prior melolabial flap with placement of columellar strut with area of adjacent tissue transfer 2 x 1 cm     Surgeon: Alyx Spivey MD    Assistant(s):  Albertina Sanchez MD    Anesthesia: General Endotracheal    EBL:  Minimal less than 10 cc    Specimens:  None    Findings:  Patient had previous scarring of his septum and mucoperichondrial flaps.  There was remaining septal cartilage superiorly in excess of 1 cm and the piece of cartilage a little over 2 cm x 1 cm was harvested for grafting in the columella.  Pocket was created in the columella with debulking of previous melolabial flap with re draping of the tip skin and vertical mattress sutures to laura over the placed columellar strut graft.     Indications for Procedure:  Mr. 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  He presents for addressing columella and nasal tip projection.  Procedure in Detail:  After informed consent was obtained in holding area the risks and benefits reviewed patient was taken back to OR 3.  Anesthesia proceeded with general endotracheal anesthesia with intubation with an oral Kerri and patient was turned 90° with time-out undertaken verification of patient and correct procedure.  I injected the area of the previous melolabial flap as well as nasal tip where previous paramedian forehead flap was with 1% lidocaine with [id number] of epinephrine.  Midface was prepped and draped in usual sterile fashion.      Incision was made at the cleft between the previous paramedian forehead flap in the melolabial flap in the infra tip area.  Scar was excised with tunnel created down  in the melolabial flap down to the upper lip.  This was widened all the way to the previous septal cartilage placed more posteriorly.  After pocket was created attention was turned to obtaining in harvesting septal cartilage.      Left side of the mucoperichondrial layer was injected with 1% lidocaine with [id number] epinephrine.  [place] incision was made past scarring at the anterior portion of reconstructed septum.  This was carried down to septal cartilage with cross hatching with a 15 blade and submucoperichondrial layer was identified.  This was elevated with only couple mm of remaining cartilage anteriorly with remainder of flap being bilateral mucoperichondrial layers adherent in the middle.  This was lysed with [place] and [place] down to the floor for access.  Elevation was then widened superiorly at the remaining cartilage all the way to the bony cartilaginous junction.  Some back elevation to the right was then undertaken with a caudal along the inferior edge of the cartilage.  After wide elevation towards the dorsum there was definitely more than 1 cm of cartilage remaining from the dorsum.  15 blade was used to make incision into the remaining cartilage with care to leave a good strut superiorly.  Incision was completed with a [place] and contralateral right-sided mucoperichondrial leaflet was elevated to the bony cartilaginous junction.  Cartilage was then freed from inferior previous scarred attachments and posteriorly at the bony cartilaginous junction and delivered to the back table for carving.    On the back table cartilage was carved into a proximally 2.3 x 0.7 cm columellar strut cartilage graft.  This was brought in and after widening of the pocket secured underneath the previous melolabial flap.  Wide elevation was then undertaken under the previous paramedian forehead flap at the tip with re draping of the skin over the placed cartilage graft.  The skin was then closed with 4-0 plain gut vertical  mattress sutures for eversion of the skin over the strut graft.  This resulted in good projection of the nasal tip with support of previous auricular cartilage graft.  Wound was cleaned patient was turned over to Anesthesia awakened and taken to recovery in stable condition.    Complications:  None    Attestation:  I was present for the entire procedure    DISCLAIMER: This note was prepared with [employer] voice recognition transcription software. Garbled syntax, mangled pronouns, and other bizarre constructions may be attributed to that software system. While efforts were made to correct any mistakes made by this voice recognition program, some errors and/or omissions may remain in the note that were missed when the note was originally created.

## 2024-06-13 ENCOUNTER — LAB VISIT (OUTPATIENT)
Dept: LAB | Facility: HOSPITAL | Age: 54
End: 2024-06-13
Attending: FAMILY MEDICINE
Payer: COMMERCIAL

## 2024-06-13 DIAGNOSIS — E78.5 HYPERLIPIDEMIA, UNSPECIFIED HYPERLIPIDEMIA TYPE: ICD-10-CM

## 2024-06-13 DIAGNOSIS — E55.9 VITAMIN D DEFICIENCY: ICD-10-CM

## 2024-06-13 DIAGNOSIS — E11.42 TYPE 2 DIABETES MELLITUS WITH DIABETIC POLYNEUROPATHY, WITHOUT LONG-TERM CURRENT USE OF INSULIN: ICD-10-CM

## 2024-06-13 LAB
25(OH)D3+25(OH)D2 SERPL-MCNC: 36 NG/ML (ref 30–96)
ALBUMIN SERPL BCP-MCNC: 4 G/DL (ref 3.5–5.2)
ALP SERPL-CCNC: 52 U/L (ref 55–135)
ALT SERPL W/O P-5'-P-CCNC: 26 U/L (ref 10–44)
ANION GAP SERPL CALC-SCNC: 9 MMOL/L (ref 8–16)
AST SERPL-CCNC: 19 U/L (ref 10–40)
BASOPHILS # BLD AUTO: 0.04 K/UL (ref 0–0.2)
BASOPHILS NFR BLD: 0.7 % (ref 0–1.9)
BILIRUB SERPL-MCNC: 1.5 MG/DL (ref 0.1–1)
BUN SERPL-MCNC: 13 MG/DL (ref 6–20)
CALCIUM SERPL-MCNC: 9 MG/DL (ref 8.7–10.5)
CHLORIDE SERPL-SCNC: 104 MMOL/L (ref 95–110)
CHOLEST SERPL-MCNC: 162 MG/DL (ref 120–199)
CHOLEST/HDLC SERPL: 4.2 {RATIO} (ref 2–5)
CO2 SERPL-SCNC: 24 MMOL/L (ref 23–29)
CREAT SERPL-MCNC: 1.4 MG/DL (ref 0.5–1.4)
DIFFERENTIAL METHOD BLD: NORMAL
EOSINOPHIL # BLD AUTO: 0.2 K/UL (ref 0–0.5)
EOSINOPHIL NFR BLD: 3.6 % (ref 0–8)
ERYTHROCYTE [DISTWIDTH] IN BLOOD BY AUTOMATED COUNT: 12.9 % (ref 11.5–14.5)
EST. GFR  (NO RACE VARIABLE): >60 ML/MIN/1.73 M^2
ESTIMATED AVG GLUCOSE: 108 MG/DL (ref 68–131)
GLUCOSE SERPL-MCNC: 97 MG/DL (ref 70–110)
HBA1C MFR BLD: 5.4 % (ref 4–5.6)
HCT VFR BLD AUTO: 52.8 % (ref 40–54)
HDLC SERPL-MCNC: 39 MG/DL (ref 40–75)
HDLC SERPL: 24.1 % (ref 20–50)
HGB BLD-MCNC: 17.7 G/DL (ref 14–18)
IMM GRANULOCYTES # BLD AUTO: 0.02 K/UL (ref 0–0.04)
IMM GRANULOCYTES NFR BLD AUTO: 0.4 % (ref 0–0.5)
LDLC SERPL CALC-MCNC: 99.2 MG/DL (ref 63–159)
LYMPHOCYTES # BLD AUTO: 1.5 K/UL (ref 1–4.8)
LYMPHOCYTES NFR BLD: 26.1 % (ref 18–48)
MCH RBC QN AUTO: 30.3 PG (ref 27–31)
MCHC RBC AUTO-ENTMCNC: 33.5 G/DL (ref 32–36)
MCV RBC AUTO: 90 FL (ref 82–98)
MONOCYTES # BLD AUTO: 0.5 K/UL (ref 0.3–1)
MONOCYTES NFR BLD: 9 % (ref 4–15)
NEUTROPHILS # BLD AUTO: 3.3 K/UL (ref 1.8–7.7)
NEUTROPHILS NFR BLD: 60.2 % (ref 38–73)
NONHDLC SERPL-MCNC: 123 MG/DL
NRBC BLD-RTO: 0 /100 WBC
PLATELET # BLD AUTO: 230 K/UL (ref 150–450)
PMV BLD AUTO: 9.2 FL (ref 9.2–12.9)
POTASSIUM SERPL-SCNC: 3.7 MMOL/L (ref 3.5–5.1)
PROT SERPL-MCNC: 6.9 G/DL (ref 6–8.4)
RBC # BLD AUTO: 5.84 M/UL (ref 4.6–6.2)
SODIUM SERPL-SCNC: 137 MMOL/L (ref 136–145)
TRIGL SERPL-MCNC: 119 MG/DL (ref 30–150)
WBC # BLD AUTO: 5.55 K/UL (ref 3.9–12.7)

## 2024-06-13 PROCEDURE — 80061 LIPID PANEL: CPT | Performed by: FAMILY MEDICINE

## 2024-06-13 PROCEDURE — 83036 HEMOGLOBIN GLYCOSYLATED A1C: CPT | Performed by: FAMILY MEDICINE

## 2024-06-13 PROCEDURE — 80053 COMPREHEN METABOLIC PANEL: CPT | Performed by: FAMILY MEDICINE

## 2024-06-13 PROCEDURE — 82306 VITAMIN D 25 HYDROXY: CPT | Performed by: FAMILY MEDICINE

## 2024-06-13 PROCEDURE — 85025 COMPLETE CBC W/AUTO DIFF WBC: CPT | Performed by: FAMILY MEDICINE

## 2024-06-26 ENCOUNTER — OFFICE VISIT (OUTPATIENT)
Dept: OTOLARYNGOLOGY | Facility: CLINIC | Age: 54
End: 2024-06-26
Payer: COMMERCIAL

## 2024-06-26 VITALS
HEART RATE: 81 BPM | SYSTOLIC BLOOD PRESSURE: 156 MMHG | WEIGHT: 254.63 LBS | BODY MASS INDEX: 32.69 KG/M2 | DIASTOLIC BLOOD PRESSURE: 84 MMHG

## 2024-06-26 DIAGNOSIS — L90.5 SCAR OF NOSE: Primary | ICD-10-CM

## 2024-06-26 PROCEDURE — 99024 POSTOP FOLLOW-UP VISIT: CPT | Mod: S$GLB,,, | Performed by: OTOLARYNGOLOGY

## 2024-06-26 PROCEDURE — 99999 PR PBB SHADOW E&M-EST. PATIENT-LVL V: CPT | Mod: PBBFAC,,, | Performed by: OTOLARYNGOLOGY

## 2024-06-26 NOTE — PROGRESS NOTES
Subjective: 54 y.o. male seen in follow up s/p melolabial flap for coverage of columellar defect 04/09/2024 and now status post division and inset of melolabial flap 05/09/2024 with subsequent septal cartilage harvest and columellar strut on 06/11/2024..  With history of prior multiple revisions for nasal defect from squamous cell carcinoma.     Doing well since last visit he has had some crusting with some recurrent indentation below the tip.      Objective:  BP (!) 156/84   Pulse 81   Wt 115.5 kg (254 lb 10.1 oz)   BMI 32.69 kg/m²     Exam:  General No acute distress  Melolabial flap fully viable with good attachment to  Ala and good lengthening of the ala with restoration of volume.    Columellar strut has held up with increased projection however there is partial scabbing and skin loss at the infra tip area.        Assessment / Plan:    Patient doing well after melolabial flap for columellar defect  With subsequent columellar strut after division and inset in June.    Given small sloughing off of the area underneath the tip I would like to hold off on hopefully last stage of his reconstruction with division of the ala part of the flap.  I will set him up for September 3, 2024.

## 2024-07-05 ENCOUNTER — PATIENT MESSAGE (OUTPATIENT)
Dept: DERMATOLOGY | Facility: CLINIC | Age: 54
End: 2024-07-05
Payer: COMMERCIAL

## 2024-07-05 DIAGNOSIS — L73.8 PITYROSPORUM FOLLICULITIS: Primary | ICD-10-CM

## 2024-07-05 RX ORDER — KETOCONAZOLE 20 MG/ML
SHAMPOO, SUSPENSION TOPICAL
Qty: 120 ML | Refills: 1 | Status: SHIPPED | OUTPATIENT
Start: 2024-07-05

## 2024-07-08 ENCOUNTER — TELEPHONE (OUTPATIENT)
Dept: ENDOSCOPY | Facility: HOSPITAL | Age: 54
End: 2024-07-08
Payer: COMMERCIAL

## 2024-07-08 NOTE — TELEPHONE ENCOUNTER
"----- Message from Nikki Mendoza sent at 2024  9:05 AM CDT -----    ----- Message -----  From: Dom Leonardo MD  Sent: 2024   8:00 AM CDT  To: [facility]    Procedure: Colonoscopy    Diagnosis: Surveillance colonoscopy - Hx of colon polyps, History of colon cancer and history of Parada syndrome.    Procedure Timin-12 weeks    #If within 4 weeks selected, please deb as high priority#    #If greater than 12 weeks, please select "5-12 weeks" and delay sending until 3 months prior to requested date#     Provider: Myself    Location: [address]    Additional Scheduling Information: No scheduling concerns    Prep Specifications:Standard prep    Is the patient taking a GLP-1 Agonist:yes    Have you attached a patient to this message: yes  "

## 2024-07-09 ENCOUNTER — PATIENT OUTREACH (OUTPATIENT)
Dept: ADMINISTRATIVE | Facility: HOSPITAL | Age: 54
End: 2024-07-09
Payer: COMMERCIAL

## 2024-07-09 NOTE — PROGRESS NOTES
Population Health Chart Review & Patient Outreach Details      Additional [facility] Health Notes:               Updates Requested / Reviewed:      Updated Care Coordination Note         Health Maintenance Topics Overdue:      VB Score: 0     Patient is not due for any topics at this time.                       Health Maintenance Topic(s) Outreach Outcomes & Actions Taken:    Eye Exam - Outreach Outcomes & Actions Taken  : Diabetic Eye External Records Uploaded, Care Team & History Updated if Applicable

## 2024-07-28 RX ORDER — QUINIDINE GLUCONATE 324 MG
240 TABLET, EXTENDED RELEASE ORAL DAILY
Qty: 100 EACH | Refills: 0 | Status: SHIPPED | OUTPATIENT
Start: 2024-07-28

## 2024-08-02 ENCOUNTER — TELEPHONE (OUTPATIENT)
Dept: ENDOSCOPY | Facility: HOSPITAL | Age: 54
End: 2024-08-02
Payer: COMMERCIAL

## 2024-08-02 ENCOUNTER — PATIENT MESSAGE (OUTPATIENT)
Dept: ENDOSCOPY | Facility: HOSPITAL | Age: 54
End: 2024-08-02
Payer: COMMERCIAL

## 2024-08-02 NOTE — TELEPHONE ENCOUNTER
Spoke to wife for pre-call to confirm scheduled Colonoscopy and patient verbalized understanding of the following:       Date of Procedure (s)  verified 8/8/24  Arrival Time 7:30 AM verified.  Location of Procedure(s) [facility] 4th Floor verified.  NPO status reinforced. Ok to continue clear liquids up until 4 hours prior to the Endoscopy procedure.   Confirmed Pt is currently taking Ozempic (Semaglutide), Pt instructed to stop stop taking Ozempic (Semaglutide) on 731/24.     .   Pt confirmed receipt of prep instructions and Rx prep (if applicable).  Instructions provided to patient via [facility]  Pt confirmed ride home after procedure if procedure requires anesthesia.   Pre-call screening questionnaire reviewed and completed with patient.   Appointment details are tentative, including check-in time.  If the patient begins taking any blood thinning medications, injectable weight loss/diabetes medications (other than insulin), or Adipex (phentermine) patient was instructed to contact the endoscopy scheduling department as soon as possible.  Patient was advised to call the endoscopy scheduling department if any questions or concerns arise.       [facility]oscopy Scheduling Department

## 2024-08-08 ENCOUNTER — HOSPITAL ENCOUNTER (OUTPATIENT)
Facility: HOSPITAL | Age: 54
Discharge: HOME | End: 2024-08-08
Attending: INTERNAL MEDICINE | Admitting: INTERNAL MEDICINE
Payer: COMMERCIAL

## 2024-08-08 ENCOUNTER — ANESTHESIA EVENT (OUTPATIENT)
Dept: ENDOSCOPY | Facility: HOSPITAL | Age: 54
End: 2024-08-08
Payer: COMMERCIAL

## 2024-08-08 ENCOUNTER — ANESTHESIA (OUTPATIENT)
Dept: ENDOSCOPY | Facility: HOSPITAL | Age: 54
End: 2024-08-08
Payer: COMMERCIAL

## 2024-08-08 ENCOUNTER — PATIENT MESSAGE (OUTPATIENT)
Dept: ENDOSCOPY | Facility: HOSPITAL | Age: 54
End: 2024-08-08
Payer: COMMERCIAL

## 2024-08-08 VITALS
TEMPERATURE: 98 F | BODY MASS INDEX: 32.08 KG/M2 | DIASTOLIC BLOOD PRESSURE: 83 MMHG | OXYGEN SATURATION: 98 % | RESPIRATION RATE: 16 BRPM | HEIGHT: 74 IN | HEART RATE: 66 BPM | WEIGHT: 250 LBS | SYSTOLIC BLOOD PRESSURE: 133 MMHG

## 2024-08-08 DIAGNOSIS — Z12.11 COLON CANCER SCREENING: ICD-10-CM

## 2024-08-08 DIAGNOSIS — Z85.038 HISTORY OF COLON CANCER: ICD-10-CM

## 2024-08-08 DIAGNOSIS — Z15.09 LYNCH SYNDROME: Primary | ICD-10-CM

## 2024-08-08 PROCEDURE — 88305 TISSUE EXAM BY PATHOLOGIST: CPT | Mod: 59 | Performed by: PATHOLOGY

## 2024-08-08 PROCEDURE — 45385 COLONOSCOPY W/LESION REMOVAL: CPT | Mod: 33,,, | Performed by: INTERNAL MEDICINE

## 2024-08-08 PROCEDURE — 27201089 HC SNARE, DISP (ANY): Performed by: INTERNAL MEDICINE

## 2024-08-08 PROCEDURE — 25000003 PHARM REV CODE 250: Performed by: NURSE ANESTHETIST, CERTIFIED REGISTERED

## 2024-08-08 PROCEDURE — 63600175 PHARM REV CODE 636 W HCPCS: Performed by: NURSE ANESTHETIST, CERTIFIED REGISTERED

## 2024-08-08 PROCEDURE — 37000008 HC ANESTHESIA 1ST 15 MINUTES: Performed by: INTERNAL MEDICINE

## 2024-08-08 PROCEDURE — 45385 COLONOSCOPY W/LESION REMOVAL: CPT | Mod: 33 | Performed by: INTERNAL MEDICINE

## 2024-08-08 PROCEDURE — 37000009 HC ANESTHESIA EA ADD 15 MINS: Performed by: INTERNAL MEDICINE

## 2024-08-08 RX ORDER — LIDOCAINE HYDROCHLORIDE 20 MG/ML
INJECTION INTRAVENOUS
Status: DISCONTINUED | OUTPATIENT
Start: 2024-08-08 | End: 2024-08-08

## 2024-08-08 RX ORDER — SODIUM CHLORIDE 9 MG/ML
INJECTION, SOLUTION INTRAVENOUS CONTINUOUS
Status: DISCONTINUED | OUTPATIENT
Start: 2024-08-08 | End: 2024-08-08 | Stop reason: HOSPADM

## 2024-08-08 RX ORDER — PROPOFOL 10 MG/ML
VIAL (ML) INTRAVENOUS
Status: DISCONTINUED | OUTPATIENT
Start: 2024-08-08 | End: 2024-08-08

## 2024-08-08 RX ADMIN — PROPOFOL 100 MG: 10 INJECTION, EMULSION INTRAVENOUS at 07:08

## 2024-08-08 RX ADMIN — SODIUM CHLORIDE: 9 INJECTION, SOLUTION INTRAVENOUS at 07:08

## 2024-08-08 RX ADMIN — LIDOCAINE HYDROCHLORIDE 100 MG: 20 INJECTION INTRAVENOUS at 07:08

## 2024-08-08 NOTE — H&P
[facility] 4th [facility]  History & Physical    Subjective:      Chief Complaint/Reason for Admission:     Surveillance colonoscopy for history of advanced colon polyps and right hemicolecton        for stage II on 08/08/2008. MSH2 Parada syndrome.     Lyndon Lion Jr. is a 54 y.o. male.    Past Medical History:   Diagnosis Date    Arthritis     Asthma     AS A CHILD    Colon cancer     Family hx of prostate cancer 11/22/2016    Hx of colon cancer, stage I 07/03/2014    Parada syndrome     Squamous cell carcinoma of skin     Tear of labium 10/2020    left shoulder Dr Molina    Uncontrolled type 2 diabetes mellitus with hyperglycemia 03/04/2021    Vitamin D deficiency     Wears glasses      Past Surgical History:   Procedure Laterality Date    APPENDECTOMY      APPLICATION OF CARTILAGE GRAFT N/A 3/21/2023    Procedure: APPLICATION, CARTILAGE GRAFT;  Surgeon: Alyx Spivey MD;  Location: [address];  Service: ENT;  Laterality: N/A;    CAUDAL EPIDURAL STEROID INJECTION N/A 11/6/2018    Procedure: Injection-steroid-epidural-caudal;  Surgeon: Lyndon Brooke Jr., MD;  [facility];  Service: Pain Management;  Laterality: N/A;    COLON SURGERY  2008    PARTIAL COLECTOMY    COLONOSCOPY Bilateral 2012    COLONOSCOPY N/A 8/1/2016    Procedure: COLONOSCOPY;  Surgeon: Blaze Marr MD;  Location: [facility];  Service: Endoscopy;  Laterality: N/A;    COLONOSCOPY N/A 9/20/2017    Procedure: COLONOSCOPY;  Surgeon: Dom Leonardo MD;  Location: [address]);  Service: Endoscopy;  Laterality: N/A;  not given PM prep    COLONOSCOPY N/A 10/9/2017    Procedure: COLONOSCOPY;  Surgeon: RAMON Callahan MD;  Location: [address]);  Service: Endoscopy;  Laterality: N/A;  ok for Prepopik per Dr Callahan    COLONOSCOPY N/A 11/20/2017    Procedure: COLONOSCOPY/EMR;  Surgeon: Joseluis Choe MD;  Location: [address]);  Service: Endoscopy;  Laterality: N/A;  EMR    no pm prep    COLONOSCOPY  N/A 5/30/2018    Procedure: COLONOSCOPY;  Surgeon: Dom Leonardo MD;  Location: [facility] (4TH FLR);  Service: Endoscopy;  Laterality: N/A;  follow up colonoscopy in 5/2018 for Parada Syndrome         COLONOSCOPY N/A 6/10/2019    Procedure: COLONOSCOPY;  Surgeon: Dom Leonardo MD;  Location: [facility] (4TH FLR);  Service: Endoscopy;  Laterality: N/A;  Prepopik ordered per Dr. Leonardo    COLONOSCOPY N/A 7/22/2020    Procedure: COLONOSCOPY;  Surgeon: Dom Leonardo MD;  Location: [facility] (4TH FLR);  Service: Endoscopy;  Laterality: N/A;    COLONOSCOPY N/A 5/27/2021    Procedure: COLONOSCOPY;  Surgeon: Dom Leonardo MD;  Location: [facility] (4TH FLR);  Service: Endoscopy;  Laterality: N/A;  covid test 5/24-[place]  pt requests Prepopik    COLONOSCOPY N/A 6/17/2022    Procedure: COLONOSCOPY;  Surgeon: Dom Leonardo MD;  Location: [facility] (4TH FLR);  Service: Endoscopy;  Laterality: N/A;  He was discovered to have colon cancer and had right hemicolectomy        for stage II on 08/08/2008. MSH2 Parada syndrome.  sutab requested  instructions via the portal -sm  fully vaccinated - sm    COLONOSCOPY N/A 6/22/2023    Procedure: COLONOSCOPY;  Surgeon: Dom Leonardo MD;  Location: [facility] (4TH FLR);  Service: Endoscopy;  Laterality: N/A;  see telephone encounter dated 5/24/23/ Pt/ Pt wife requested sutab - prep ins. on portal / Ozempic for DM- ERW    COLONOSCOPY N/A 2/6/2024    Procedure: COLONOSCOPY;  Surgeon: Dom Leonardo MD;  Location: [facility] (4TH FLR);  Service: Endoscopy;  Laterality: N/A;  sutab/pt diabetic/ozempic/referral dr feng    CYSTOSCOPY      CYSTOSCOPY N/A 1/31/2024    Procedure: CYSTOSCOPY;  Surgeon: Eron Llanes MD;  Location: [facility] OR [facility] FLR;  Service: Urology;  Laterality: N/A;    Epidural Steroid Injection  10/23/15    Lumbar    EPIDURAL STEROID INJECTION INTO LUMBAR SPINE N/A 7/27/2018    Procedure: Injection-steroid-epidural-lumbar;  Surgeon: Lyndon Brooke  MD [place];  Location: [facility];  Service: Pain Management;  Laterality: N/A;    ESOPHAGOGASTRODUODENOSCOPY      ESOPHAGOGASTRODUODENOSCOPY N/A 7/22/2020    Procedure: EGD (ESOPHAGOGASTRODUODENOSCOPY);  Surgeon: Dom Leonardo MD;  Location: [facility] (4TH FLR);  Service: Endoscopy;  Laterality: N/A;  Please schedule patient for EGD and colonoscopy with me MSH2 Parada syndrome and anemia evaluation please make priority 1  covid test 7/20-[place]    ESOPHAGOGASTRODUODENOSCOPY N/A 6/22/2023    Procedure: EGD (ESOPHAGOGASTRODUODENOSCOPY);  Surgeon: Dom Leonardo MD;  Location: [facility] (4TH FLR);  Service: Endoscopy;  Laterality: N/A;  see telephone encounter dated 5/24/23 6/16/23-[place] completed appointment confirmed-    ESOPHAGOGASTRODUODENOSCOPY N/A 9/1/2023    Procedure: EGD (ESOPHAGOGASTRODUODENOSCOPY);  Surgeon: Dom Leonardo MD;  Location: [facility] (4TH FLR);  Service: Endoscopy;  Laterality: N/A;  Instructions sent via portal / Ozempic / Extended Clear Liquid prep    EXCISION OR SURGICAL PLANING, SKIN, NOSE, FOR RHINOPHYMA Bilateral 3/7/2023    Procedure: EXCISION OR SURGICAL resection nasal skin cancer;  Surgeon: Alyx Spivey MD;  Location: [facility] OR 2ND FLR;  Service: ENT;  Laterality: Bilateral;    EXTRACTION - STONE Left 1/31/2024    Procedure: EXTRACTION - STONE;  Surgeon: Eron Llanes MD;  Location: [facility] 1ST FLR;  Service: Urology;  Laterality: Left;    FACETECTOMY OF VERTEBRA  2/13/2019    Procedure: MEDIAL FACETECTOMY L5-S1;  Surgeon: Lyndon Brooke Jr., MD;  Location: [facility] OR;  Service: Orthopedics;;    GASTRIC BYPASS  2010    SLEEVE    KNEE ARTHROSCOPY Right     LUMBAR DISCECTOMY  2/13/2019    Procedure: DISCECTOMY, SPINE, LUMBAR L5-S1;  Surgeon: Lyndon Brooke Jr., MD;  Location: [facility] OR;  Service: Orthopedics;;    NASAL RECONSTRUCTION N/A 3/21/2023    Procedure: RECONSTRUCTION, NOSE;  Surgeon: Alyx Spivey MD;  Location: [facility] OR [address] [place] [address];  Service:  ENT;  Laterality: N/A;    NASAL RECONSTRUCTION N/A 6/11/2024    Procedure: RECONSTRUCTION, NOSE;  Surgeon: Alyx Spivey MD;  Location: [facility] OR;  Service: ENT;  Laterality: N/A;    PLACEMENT-STENT Left 1/31/2024    Procedure: PLACEMENT-STENT;  Surgeon: Eron Llanes MD;  Location: [facility] OR 1ST FLR;  Service: Urology;  Laterality: Left;    RETROGRADE PYELOGRAPHY Left 1/31/2024    Procedure: PYELOGRAM, RETROGRADE;  Surgeon: Eron Llanes MD;  Location: [facility] OR 1ST FLR;  Service: Urology;  Laterality: Left;    REVISION OF FLAP GRAFT Bilateral 4/18/2023    Procedure: REVISION, PROCEDURE INVOLVING FLAP GRAFT;  Surgeon: Alyx Spivey MD;  Location: [facility] OR;  Service: ENT;  Laterality: Bilateral;    REVISION OF FLAP GRAFT N/A 6/29/2023    Procedure: REVISION, PROCEDURE INVOLVING FLAP GRAFT;  Surgeon: Alyx Spivey MD;  Location: [facility] OR 2ND FLR;  Service: ENT;  Laterality: N/A;    REVISION OF FLAP GRAFT N/A 8/29/2023    Procedure: REVISION, PROCEDURE INVOLVING FLAP GRAFT;  Surgeon: Alyx Spivey MD;  Location: [facility] OR;  Service: ENT;  Laterality: N/A;    REVISION OF FLAP GRAFT Bilateral 2/20/2024    Procedure: REVISION, PROCEDURE INVOLVING FLAP GRAFT;  Surgeon: Alyx Spivey MD;  Location: [facility] OR;  Service: ENT;  Laterality: Bilateral;    REVISION OF FLAP GRAFT N/A 5/7/2024    Procedure: REVISION, PROCEDURE INVOLVING FLAP GRAFT;  Surgeon: Alyx Spivey MD;  Location: [facility] OR;  Service: ENT;  Laterality: N/A;    ROTATION FLAP SURGERY N/A 3/21/2023    Procedure: CREATION, FLAP, ROTATION;  Surgeon: Alyx Spivey MD;  Location: [facility] OR 2ND FLR;  Service: ENT;  Laterality: N/A;    ROTATION FLAP SURGERY Right 4/9/2024    Procedure: CREATION, FLAP, ROTATION;  Surgeon: Alyx Spivey MD;  Location: [facility] OR;  Service: ENT;  Laterality: Right;    URETEROSCOPY Left 1/31/2024    Procedure: URETEROSCOPY;  Surgeon: Eron Llanes MD;  Location: [facility] OR [address] [place] [address];  Service: Urology;  Laterality: Left;     Social History     Tobacco  Use    Smoking status: Never    Smokeless tobacco: Never   Substance Use Topics    Alcohol use: Yes     Comment: RARELY    Drug use: No       PTA Medications   Medication Sig    ACCU-CHEK GUIDE TEST STRIPS Strp USE TO TEST TWICE A DAY    ascorbic acid, vitamin C, (VITAMIN C) 100 MG tablet Take 100 mg by mouth once daily.    aspirin (ECOTRIN) 81 MG EC tablet Take by mouth once daily. Aspirin 300 mg bid coded aspirin    azelaic acid (AZELEX) 15 % gel Apply to face BID.    cyanocobalamin, vitamin B-12, 2,500 mcg Lozg Place 2 tablets under the tongue once daily.    doxycycline (PERIOSTAT) 20 MG tablet Take 2 pill po qday    ferrous gluconate (FERGON) 240 (27 FE) MG tablet Take 1 tablet (240 mg total) by mouth Daily.    ketoconazole (NIZORAL) 2 % shampoo Apply topically every 7 days.    lancets (ACCU-CHEK SOFTCLIX LANCETS) Misc 1 Units by Misc.(Non-Drug; Combo Route) route 2 (two) times a day.    metFORMIN (GLUCOPHAGE-XR) 500 MG ER 24hr tablet Take 1 tablet (500 mg total) by mouth daily with breakfast.    metroNIDAZOLE (NORITATE) 1 % cream Compound azelaic acid 15% + ivermectin 1% + metronidazole 1% cream. Apply to face once or twice daily.    pravastatin (PRAVACHOL) 20 MG tablet Take 1 tablet (20 mg total) by mouth once daily.    RABEprazole (ACIPHEX) 20 mg tablet Take 1 tablet (20 mg total) by mouth before breakfast.    semaglutide (OZEMPIC) 2 mg/dose (8 mg/3 mL) PnIj Inject 2 mg into the skin every 7 days.    tadalafiL (CIALIS) 5 MG tablet Take 1 tablet (5 mg total) by mouth daily as needed for Erectile Dysfunction.    testosterone cypionate (DEPOTESTOTERONE CYPIONATE) 200 mg/mL injection Inject 60 mg into the muscle every 14 (fourteen) days. Twice a week    ondansetron (ZOFRAN-ODT) 4 MG TbDL Dissolve 1 tablet (4 mg total) on the tongue  every 6 (six) hours as needed (Nausea). (Patient not taking: Reported on 6/26/2024)    oxyCODONE-acetaminophen (PERCOCET) 5-325 mg per tablet Take 1 tablet by mouth every 4 (four)  "hours as needed for Pain. (Patient not taking: Reported on 6/26/2024)    pen needle, diabetic (PEN NEEDLE) 32 gauge x 5/32" Ndle 1 each by Misc.(Non-Drug; Combo Route) route once daily.    sod sulf-pot chloride-mag sulf (SUTAB) 1.479-0.188- 0.225 gram tablet Take 12 tablets by mouth once daily. Take as directed by provider office    sulfacetamide sodium-sulfur 10-5 % (w/w) Clsr Use to wash face daily    vitamin A 8000 UNIT capsule Take 1 capsule (8,000 Units total) by mouth once daily.     Review of patient's allergies indicates:   Allergen Reactions    Hydrocod-cpm-pe-acetaminophen Other (See Comments)     Irritability         Review of Systems   Constitutional:  Negative for fever.       Objective:      Vital Signs (Most Recent)       Vital Signs Range (Last 24H):       Physical Exam  Neurological:      Mental Status: He is alert and oriented to person, place, and time.             Assessment:      Surveillance colonoscopy for history of advanced colon polyps and right hemicolecton        for stage II on 08/08/2008. MSH2 Parada syndrome.       Plan:    Surveillance colonoscopy for history of advanced colon polyps and right hemicolecton        for stage II on 08/08/2008. MSH2 Parada syndrome.       "

## 2024-08-08 NOTE — PLAN OF CARE
ID band verified with patient applied fall and allergy patient with patient. Reviewed procedure pre/post care verbalizes understanding.

## 2024-08-08 NOTE — PROVATION PATIENT INSTRUCTIONS
Discharge Summary/Instructions after an Endoscopic Procedure  Patient Name: Lyndon Lion  Patient MRN: [id number]  Patient YOB: 1970 Thursday, August 8, 2024  Dom Leonardo MD  Dear patient,  As a result of recent federal legislation (The Federal Cures Act), you may   receive lab or pathology results from your procedure in your [facility]   account before your physician is able to contact you. Your physician or   their representative will relay the results to you with their   recommendations at their soonest availability.  Thank you,  RESTRICTIONS:  During your procedure today, you received medications for sedation.  These   medications may affect your judgment, balance and coordination.  Therefore,   for 24 hours, you have the following restrictions:   - DO NOT drive a car, operate machinery, make legal/financial decisions,   sign important papers or drink alcohol.    ACTIVITY:  Today: no heavy lifting, straining or running due to procedural   sedation/anesthesia.  The following day: return to full activity including work.  DIET:  Eat and drink normally unless instructed otherwise.     TREATMENT FOR COMMON SIDE EFFECTS:  - Mild abdominal pain, nausea, belching, bloating or excessive gas:  rest,   eat lightly and use a heating pad.  - Sore Throat: treat with throat lozenges and/or gargle with warm salt   water.  - Because air was used during the procedure, expelling large amounts of air   from your rectum or belching is normal.  - If a bowel prep was taken, you may not have a bowel movement for 1-3 days.    This is normal.  SYMPTOMS TO WATCH FOR AND REPORT TO YOUR PHYSICIAN:  1. Abdominal pain or bloating, other than gas cramps.  2. Chest pain.  3. Back pain.  4. Signs of infection such as: chills or fever occurring within 24 hours   after the procedure.  5. Rectal bleeding, which would show as bright red, maroon, or black stools.   (A tablespoon of blood from the rectum is not serious, especially  if   hemorrhoids are present.)  6. Vomiting.  7. Weakness or dizziness.  GO DIRECTLY TO THE NEAREST EMERGENCY ROOM IF YOU HAVE ANY OF THE FOLLOWING:      Difficulty breathing              Chills and/or fever over 101 F   Persistent vomiting and/or vomiting blood   Severe abdominal pain   Severe chest pain   Black, tarry stools   Bleeding- more than one tablespoon   Any other symptom or condition that you feel may need urgent attention  Your doctor recommends these additional instructions:  If any biopsies were taken, your doctors clinic will contact you in 1 to 2   weeks with any results.  - Discharge patient to home.   - Await pathology results.   - Telephone endoscopist for pathology results in 3 weeks.   - Repeat colonoscopy in 6 months for surveillance of multiple polyps.   - Return to primary care physician.   - The findings and recommendations were discussed with the patient.  For questions, problems or results please call your physician - Dom Leonardo MD at Work:  [phone].  [facility], EMERGENCY ROOM PHONE NUMBER: [phone]  IF A COMPLICATION OR EMERGENCY SITUATION ARISES AND YOU ARE UNABLE TO REACH   YOUR PHYSICIAN - GO DIRECTLY TO THE EMERGENCY ROOM.  Dom Leonardo MD  8/8/2024 8:42:38 AM  This report has been verified and signed electronically.  Dear patient,  As a result of recent federal legislation (The Federal Cures Act), you may   receive lab or pathology results from your procedure in your [facility]   account before your physician is able to contact you. Your physician or   their representative will relay the results to you with their   recommendations at their soonest availability.  Thank you,  PROVATION

## 2024-08-08 NOTE — H&P
Short Stay Endoscopy History and Physical    PCP - Stepan Moran III, MD    Procedure - Colonoscopy  ASA - per anesthesia  Mallampati - per anesthesia  History of Anesthesia problems - no  Family history Anesthesia problems - no   Plan of anesthesia - General    HPI:  This is a 54 y.o. 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       ROS:  Constitutional: No fevers, chills, No weight loss  CV: No chest pain  Pulm: No cough, No shortness of breath  GI: see HPI  Derm: No rash    Medical History:  has a past medical history of Arthritis, Asthma, Colon cancer, Family hx of prostate cancer (11/22/2016), colon cancer, stage I (07/03/2014), Parada syndrome, Squamous cell carcinoma of skin, Tear of labium (10/2020), Uncontrolled type 2 diabetes mellitus with hyperglycemia (03/04/2021), Vitamin D deficiency, and Wears glasses.    Surgical History:  has a past surgical history that includes Gastric bypass (2010); Colon surgery (2008); Knee arthroscopy (Right); Appendectomy; Colonoscopy (Bilateral, 2012); Epidural Steroid Injection (10/23/15); Colonoscopy (N/A, 8/1/2016); Colonoscopy (N/A, 9/20/2017); Colonoscopy (N/A, 10/9/2017); Esophagogastroduodenoscopy; Colonoscopy (N/A, 11/20/2017); Colonoscopy (N/A, 5/30/2018); Epidural steroid injection into lumbar spine (N/A, 7/27/2018); Caudal epidural steroid injection (N/A, 11/6/2018); Lumbar discectomy (2/13/2019); Facetectomy of vertebra (2/13/2019); Colonoscopy (N/A, 6/10/2019); Esophagogastroduodenoscopy (N/A, 7/22/2020); Colonoscopy (N/A, 7/22/2020); Colonoscopy (N/A, 5/27/2021); Cystoscopy; Colonoscopy (N/A, 6/17/2022); excision or surgical planing, skin, nose, for rhinophyma (Bilateral, 3/7/2023); Nasal reconstruction (N/A, 3/21/2023); Application of cartilage graft (N/A, 3/21/2023); Rotation flap surgery (N/A, 3/21/2023); Revision of flap graft (Bilateral, 4/18/2023); Esophagogastroduodenoscopy (N/A,  6/22/2023); Colonoscopy (N/A, 6/22/2023); Revision of flap graft (N/A, 6/29/2023); Revision of flap graft (N/A, 8/29/2023); Esophagogastroduodenoscopy (N/A, 9/1/2023); Colonoscopy (N/A, 2/6/2024); Cystoscopy (N/A, 1/31/2024); Ureteroscopy (Left, 1/31/2024); extraction - stone (Left, 1/31/2024); placement-stent (Left, 1/31/2024); Retrograde pyelography (Left, 1/31/2024); Revision of flap graft (Bilateral, 2/20/2024); Rotation flap surgery (Right, 4/9/2024); Revision of flap graft (N/A, 5/7/2024); and Nasal reconstruction (N/A, 6/11/2024).    Family History: family history includes Alcohol abuse in his paternal uncle; Basal cell carcinoma in his father; Breast cancer in his mother; Cancer in his maternal grandfather, maternal uncle, mother, and paternal uncle; Colon cancer in his maternal grandfather and maternal uncle; Dementia in his maternal grandmother; Diabetes in his father and paternal grandmother; Early death in his maternal grandfather; Hearing loss in his father; Heart disease in his father and maternal uncle; Hypertension in his maternal uncle and paternal grandfather; Liver disease in his father; Melanoma in his mother; No Known Problems in his paternal aunt and sister; Polycystic ovary syndrome in his daughter; Prostate cancer in his paternal uncle; Prostatitis in his paternal grandfather.. Otherwise no colon cancer, inflammatory bowel disease, or GI malignancies.    Social History:  reports that he has never smoked. He has never used smokeless tobacco. He reports current alcohol use. He reports that he does not use drugs.    Review of patient's allergies indicates:   Allergen Reactions    Hydrocod-cpm-pe-acetaminophen Other (See Comments)     Irritability        Medications:   Medications Prior to Admission   Medication Sig Dispense Refill Last Dose    ACCU-CHEK GUIDE TEST STRIPS Strp USE TO TEST TWICE A  strip 3     ascorbic acid, vitamin C, (VITAMIN C) 100 MG tablet Take 100 mg by mouth once  "daily.       aspirin (ECOTRIN) 81 MG EC tablet Take by mouth once daily. Aspirin 300 mg bid coded aspirin       azelaic acid (AZELEX) 15 % gel Apply to face BID. 50 g 5     cyanocobalamin, vitamin B-12, 2,500 mcg Lozg Place 2 tablets under the tongue once daily. 180 lozenge 3     doxycycline (PERIOSTAT) 20 MG tablet Take 2 pill po qday 180 tablet 3     ferrous gluconate (FERGON) 240 (27 FE) MG tablet Take 1 tablet (240 mg total) by mouth Daily. 100 each 0     ketoconazole (NIZORAL) 2 % shampoo Apply topically every 7 days. 120 mL 1     lancets (ACCU-CHEK SOFTCLIX LANCETS) Misc 1 Units by Misc.(Non-Drug; Combo Route) route 2 (two) times a day. 200 each 0     metFORMIN (GLUCOPHAGE-XR) 500 MG ER 24hr tablet Take 1 tablet (500 mg total) by mouth daily with breakfast. 90 tablet 3     metroNIDAZOLE (NORITATE) 1 % cream Compound azelaic acid 15% + ivermectin 1% + metronidazole 1% cream. Apply to face once or twice daily. 30 g 5     ondansetron (ZOFRAN-ODT) 4 MG TbDL Dissolve 1 tablet (4 mg total) on the tongue  every 6 (six) hours as needed (Nausea). (Patient not taking: Reported on 6/26/2024) 20 tablet 0     oxyCODONE-acetaminophen (PERCOCET) 5-325 mg per tablet Take 1 tablet by mouth every 4 (four) hours as needed for Pain. (Patient not taking: Reported on 6/26/2024) 20 tablet 0     pen needle, diabetic (PEN NEEDLE) 32 gauge x 5/32" Ndle 1 each by Misc.(Non-Drug; Combo Route) route once daily. 90 each 3     pravastatin (PRAVACHOL) 20 MG tablet Take 1 tablet (20 mg total) by mouth once daily. 90 tablet 3     RABEprazole (ACIPHEX) 20 mg tablet Take 1 tablet (20 mg total) by mouth before breakfast. 90 tablet 3     semaglutide (OZEMPIC) 2 mg/dose (8 mg/3 mL) PnIj Inject 2 mg into the skin every 7 days. 3 each 1     sod sulf-pot chloride-mag sulf (SUTAB) 1.479-0.188- 0.225 gram tablet Take 12 tablets by mouth once daily. Take as directed by provider office 24 tablet 0     sulfacetamide sodium-sulfur 10-5 % (w/w) Clsr Use to " wash face daily 170 g 5     tadalafiL (CIALIS) 5 MG tablet Take 1 tablet (5 mg total) by mouth daily as needed for Erectile Dysfunction. 90 tablet 1     testosterone cypionate (DEPOTESTOTERONE CYPIONATE) 200 mg/mL injection Inject 60 mg into the muscle every 14 (fourteen) days. Twice a week       vitamin A 8000 UNIT capsule Take 1 capsule (8,000 Units total) by mouth once daily. 100 capsule 3          Physical Exam:    Vital Signs: There were no vitals filed for this visit.    General Appearance: Well appearing in no acute distress  Eyes:    No scleral icterus  ENT: Neck supple, Lips, mucosa, and tongue normal; teeth and gums normal  Abdomen: Soft, non tender, non distended with positive bowel sounds. No hepatosplenomegaly, ascites, or mass.  Extremities: 2+ pulses, no clubbing, cyanosis or edema  Skin: No rash      Labs:  Lab Results   Component Value Date    WBC 5.55 06/13/2024    HGB 17.7 06/13/2024    HCT 52.8 06/13/2024     06/13/2024    CHOL 162 06/13/2024    TRIG 119 06/13/2024    HDL 39 (L) 06/13/2024    ALT 26 06/13/2024    AST 19 06/13/2024     06/13/2024    K 3.7 06/13/2024     06/13/2024    CREATININE 1.4 06/13/2024    BUN 13 06/13/2024    CO2 24 06/13/2024    TSH 1.525 12/19/2023    PSA 0.42 06/13/2024    INR 1.0 05/01/2024    HGBA1C 5.4 06/13/2024       I have explained the risks and benefits of endoscopy procedures to the patient including but not limited to bleeding, perforation, infection, and death.  The patient was asked if they understand and allowed to ask any further questions to their satisfaction.    Genia Shine MD

## 2024-08-12 ENCOUNTER — TELEPHONE (OUTPATIENT)
Dept: HEMATOLOGY/ONCOLOGY | Facility: CLINIC | Age: 54
End: 2024-08-12
Payer: COMMERCIAL

## 2024-08-12 ENCOUNTER — PATIENT MESSAGE (OUTPATIENT)
Dept: GASTROENTEROLOGY | Facility: CLINIC | Age: 54
End: 2024-08-12
Payer: COMMERCIAL

## 2024-08-12 LAB
FINAL PATHOLOGIC DIAGNOSIS: NORMAL
GROSS: NORMAL
Lab: NORMAL

## 2024-08-12 NOTE — PROGRESS NOTES
Lyndon your colonoscopy pathology was benign but all of your colon polyps were adenomas recommend your next surveillance colonoscopy in 6 months which would be around February 2025    1. Descending colon, polyps x4, biopsy:  Tubular adenomas, multiple fragments.    2. Rectum, polyps, biopsy:  Tubular adenoma, one fragment.    Separate fragments with surface hyperplastic change.   Comment: Interp By Otto Wang M.D., Signed on 08/12/2024

## 2024-08-12 NOTE — TELEPHONE ENCOUNTER
Left message to let the pt know that labs and md appt time has changed but still on the same day.

## 2024-08-12 NOTE — TELEPHONE ENCOUNTER
Spoke with the pt and got the pt rescheduled with labs and md appt. Pt verbalized and understanding.  ----- Message from Trini Trevino sent at 8/12/2024 10:34 AM CDT -----  Type: Needs Medical Advice  Who Called: Katheryn (spouse)  Symptoms (please be specific):    How long has patient had these symptoms:    Pharmacy name and phone #:    Best Call Back Number:   Additional Information: Katheryn is requesting a call back from Purnima regarding her  appt. Being rescheduled on 10/7 at 1:20.

## 2024-08-12 NOTE — PROGRESS NOTES
Procedure: Colonoscopy    Diagnosis:  Parada syndrome, history of colon cancer, and history of colon adenomas polyps    Procedure (needs to be schedule in February 2025)    Location: Any Site    Additional Scheduling Information:      Prep Specifications:  Constipation bowel prep protocol    Is the patient taking a GLP-1 Agonist:yes    Have you attached a patient to this message: yes

## 2024-08-19 ENCOUNTER — PATIENT MESSAGE (OUTPATIENT)
Dept: DERMATOLOGY | Facility: CLINIC | Age: 54
End: 2024-08-19
Payer: COMMERCIAL

## 2024-08-20 ENCOUNTER — TELEPHONE (OUTPATIENT)
Dept: GASTROENTEROLOGY | Facility: CLINIC | Age: 54
End: 2024-08-20
Payer: COMMERCIAL

## 2024-08-20 NOTE — TELEPHONE ENCOUNTER
----- Message from Alisson Hedrick sent at 8/20/2024 10:06 AM CDT -----  Name of Who is Calling: RENETTA MEJIA JR. [[id number]] Katheryn ( spouse )       What is the request in detail: pt is requesting to set up his 6 month colonoscopy f/u . Please advise       Can the clinic reply by [facility]: No       What Number to Call Back if not in [facility]: Telephone Information:  [phone]

## 2024-08-27 ENCOUNTER — OFFICE VISIT (OUTPATIENT)
Dept: DERMATOLOGY | Facility: CLINIC | Age: 54
End: 2024-08-27
Payer: COMMERCIAL

## 2024-08-27 VITALS — HEIGHT: 74 IN | BODY MASS INDEX: 32.08 KG/M2 | WEIGHT: 250 LBS

## 2024-08-27 DIAGNOSIS — D48.5 NEOPLASM OF UNCERTAIN BEHAVIOR OF SKIN: Primary | ICD-10-CM

## 2024-08-27 PROCEDURE — 99499 UNLISTED E&M SERVICE: CPT | Mod: S$GLB,,, | Performed by: DERMATOLOGY

## 2024-08-27 PROCEDURE — 11102 TANGNTL BX SKIN SINGLE LES: CPT | Mod: S$GLB,,, | Performed by: DERMATOLOGY

## 2024-08-27 PROCEDURE — 11103 TANGNTL BX SKIN EA SEP/ADDL: CPT | Mod: S$GLB,,, | Performed by: DERMATOLOGY

## 2024-08-27 NOTE — PROGRESS NOTES
"  Subjective:      Patient ID:  Lyndon Lion Jr. is a 54 y.o. male who presents for   Chief Complaint   Patient presents with    Spot     Left neck, left forehead     LOV: 5/21/24 - NUB, rosacea, SK    Skin, left posterior shoulder, biopsy:    - SEBACEOUS ADENOMA     C/o spot on left ant neck  3 weeks, getting larger    C/o spot on left forehead    Derm Hx:  SCC right nasal tip- 2/2023 , s/p PFF Dr Spivey, still revising scar  Fhx of MM- mom    Current Outpatient Medications:   ·  ACCU-CHEK GUIDE TEST STRIPS Strp, USE TO TEST TWICE A DAY, Disp: 200 strip, Rfl: 3  ·  ascorbic acid, vitamin C, (VITAMIN C) 100 MG tablet, Take 100 mg by mouth once daily., Disp: , Rfl:   ·  aspirin (ECOTRIN) 81 MG EC tablet, Take by mouth once daily. Aspirin 300 mg bid coded aspirin, Disp: , Rfl:   ·  azelaic acid (AZELEX) 15 % gel, Apply to face BID., Disp: 50 g, Rfl: 5  ·  cyanocobalamin, vitamin B-12, 2,500 mcg Lozg, Place 2 tablets under the tongue once daily., Disp: 180 lozenge, Rfl: 3  ·  doxycycline (PERIOSTAT) 20 MG tablet, Take 2 pill po qday, Disp: 180 tablet, Rfl: 3  ·  ferrous gluconate (FERATE) 240 (27 FE) MG tablet, Take 1 tablet (240 mg total) by mouth Daily., Disp: 100 each, Rfl: 0  ·  ketoconazole (NIZORAL) 2 % shampoo, Apply topically every 7 days., Disp: 120 mL, Rfl: 1  ·  lancets (ACCU-CHEK SOFTCLIX LANCETS) Misc, 1 Units by Misc.(Non-Drug; Combo Route) route 2 (two) times a day., Disp: 200 each, Rfl: 0  ·  metFORMIN (GLUCOPHAGE-XR) 500 MG ER 24hr tablet, Take 1 tablet (500 mg total) by mouth daily with breakfast., Disp: 90 tablet, Rfl: 3  ·  metroNIDAZOLE (NORITATE) 1 % cream, Compound azelaic acid 15% + ivermectin 1% + metronidazole 1% cream. Apply to face once or twice daily., Disp: 30 g, Rfl: 5  ·  pen needle, diabetic (PEN NEEDLE) 32 gauge x 5/32" Ndle, 1 each by Misc.(Non-Drug; Combo Route) route once daily., Disp: 90 each, Rfl: 3  ·  pravastatin (PRAVACHOL) 20 MG tablet, Take 1 tablet (20 mg " total) by mouth once daily., Disp: 90 tablet, Rfl: 3  ·  RABEprazole (ACIPHEX) 20 mg tablet, Take 1 tablet (20 mg total) by mouth before breakfast., Disp: 90 tablet, Rfl: 3  ·  semaglutide (OZEMPIC) 2 mg/dose (8 mg/3 mL) PnIj, Inject 2 mg into the skin every 7 days., Disp: 3 each, Rfl: 1  ·  sod sulf-pot chloride-mag sulf (SUTAB) 1.479-0.188- 0.225 gram tablet, Take 12 tablets by mouth once daily. Take as directed by provider office, Disp: 24 tablet, Rfl: 0  ·  sulfacetamide sodium-sulfur 10-5 % (w/w) Clsr, Use to wash face daily, Disp: 170 g, Rfl: 5  ·  tadalafiL (CIALIS) 5 MG tablet, Take 1 tablet (5 mg total) by mouth daily as needed for Erectile Dysfunction., Disp: 90 tablet, Rfl: 1  ·  testosterone cypionate (DEPOTESTOTERONE CYPIONATE) 200 mg/mL injection, Inject 60 mg into the muscle every 14 (fourteen) days. Twice a week, Disp: , Rfl:   ·  vitamin A 8000 UNIT capsule, Take 1 capsule (8,000 Units total) by mouth once daily., Disp: 100 capsule, Rfl: 3          Review of Systems   Constitutional:  Negative for fever, chills and fatigue.   HENT:  Negative for nosebleeds and headaches.    Respiratory:  Negative for cough and shortness of breath.    Gastrointestinal:  Negative for nausea, vomiting, abdominal pain and diarrhea.   Musculoskeletal:  Negative for myalgias and arthralgias.   Skin:  Positive for daily sunscreen use, activity-related sunscreen use and wears hat. Negative for itching, rash, dry skin, sun sensitivity, recent sunburn and dry lips.   Neurological:  Negative for headaches.   Psychiatric/Behavioral:  Negative for depressed mood.    Hematologic/Lymphatic: Does not bruise/bleed easily.       Objective:   Physical Exam   Constitutional: He appears well-developed and well-nourished. No distress.   Neurological: He is alert and oriented to person, place, and time. He is not disoriented.   Psychiatric: He has a normal mood and affect.   Skin:   Areas Examined (abnormalities noted in diagram):   Head  / Face Inspection Performed  Neck Inspection Performed                     Diagram Legend     Erythematous scaling macule/papule c/w actinic keratosis       Vascular papule c/w angioma      Pigmented verrucoid papule/plaque c/w seborrheic keratosis      Yellow umbilicated papule c/w sebaceous hyperplasia      Irregularly shaped tan macule c/w lentigo     1-2 mm smooth white papules consistent with Milia      Movable subcutaneous cyst with punctum c/w epidermal inclusion cyst      Subcutaneous movable cyst c/w pilar cyst      Firm pink to brown papule c/w dermatofibroma      Pedunculated fleshy papule(s) c/w skin tag(s)      Evenly pigmented macule c/w junctional nevus     Mildly variegated pigmented, slightly irregular-bordered macule c/w mildly atypical nevus      Flesh colored to evenly pigmented papule c/w intradermal nevus       Pink pearly papule/plaque c/w basal cell carcinoma      Erythematous hyperkeratotic cursted plaque c/w SCC      Surgical scar with no sign of skin cancer recurrence      Open and closed comedones      Inflammatory papules and pustules      Verrucoid papule consistent consistent with wart     Erythematous eczematous patches and plaques     Dystrophic onycholytic nail with subungual debris c/w onychomycosis     Umbilicated papule    Erythematous-base heme-crusted tan verrucoid plaque consistent with inflamed seborrheic keratosis     Erythematous Silvery Scaling Plaque c/w Psoriasis     See annotation            Assessment / Plan:      Pathology Orders:       Normal Orders This Visit    Specimen to Pathology, Dermatology     Questions:    Procedure Type: Dermatology and skin neoplasms    Number of Specimens: 2    ------------------------: -------------------------    Spec 1 Procedure: Biopsy    Spec 1 Clinical Impression: 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, angioma vs inflammatory vs other    Spec 1 Source: left lateral forehead    ------------------------:  -------------------------    Spec 2 Procedure: Biopsy    Spec 2 Clinical Impression: new papule in patient with reese syndrome, and h/o multiple NMSCs, sebaceaous neoplasm vs other    Spec 2 Source: L supraclavicular area    Release to patient:           Neoplasm of uncertain behavior of skin  -     Specimen to Pathology, Dermatology    Shave biopsy procedure note:x2    Shave biopsy performed after verbal consent including risk of infection, scar, recurrence, need for additional treatment of site. Area prepped with alcohol, anesthetized with approximately 1.0cc of 1% lidocaine with epinephrine. Lesional tissue shaved with razor blade. Hemostasis achieved with application of aluminum chloride followed by hyfrecation. No complications. Dressing applied. Wound care explained.           Follow up in about 3 months (around 11/27/2024).

## 2024-08-27 NOTE — PATIENT INSTRUCTIONS
Shave Biopsy Wound Care    Your doctor has performed a shave biopsy today.  A band aid and vaseline ointment has been placed over the site.  This should remain in place for 24 hours.  It is recommended that you keep the area dry for the first 24 hours.  After 24 hours, you may remove the band aid and wash the area with warm soap and water and apply Vaseline jelly.  Many patients prefer to use Neosporin or Bacitracin ointment.  This is acceptable; however, know that you can develop an allergy to this medication even if you have used it safely for years.  It is important to keep the area moist.  Letting it dry out and get air slows healing time, and will worsen the scar.  Band aid is optional after first 24 hours.      If you notice increasing redness, tenderness, pain, or yellow drainage at the biopsy site, please notify your doctor.  These are signs of an infection.    If your biopsy site is bleeding, apply firm pressure for 15 minutes straight.  Repeat for another 15 minutes, if it is still bleeding.   If the surgical site continues to bleed, then please contact your doctor.       [facility] - DERMATOLOGY  [address]  [facility] [address]  Dept: [phone]  Dept Fax: [phone]

## 2024-08-30 NOTE — PRE-PROCEDURE INSTRUCTIONS
Spoke with patient's wife Katheryn who confirmed arrival time and pre procedure instructions. Pt's wife confirmed medication instructions and reports patient has not taking Ozempic, aspirin or NSAID's for 7 days.    Dear Lyndon ,     You are scheduled for a procedure with Dr. Spivey on 9/3/2024. Your scheduled arrival time is 5:15 am.  This arrival time is roughly 2 hours before your anticipated procedure time to allow sufficient time for pre-op.  Please wear comfortable clothing  This procedure will take place at the [facility] at the corner of [facility].  It is in the [place] [facility] [place] next to [facility]. The address is:     [address] [place] [address].  [place] [address]     After entering the building, proceed to the second floor and check in with registration.      Your fasting instructions are as follows:     Nothing to eat after 11:00 pm the evening before your surgery.   You may drink clear liquids up until 2 hours prior to your arrival time.      You MUST have a responsible adult to bring you home.     The evening before and morning of your procedure please hold (do not take) the following medications:  -Aspirin and Aspirin-containing products (Goody's powder, Excedrin)  -NSAIDs (Advil, Ibuprofen, Aleve, Diclofenac)  -Vitamins/Supplements   -Herbal remedies/Teas  -Stimulants (Adderall, Vyvanse, Adipex)  -Diabetic medication metFORMIN (GLUCOPHAGE-XR) 500 MG ER 24hr tablet   -Prescription creams/gels/lotions           *May take Tylenol if needed         The evening before and morning of your procedure, take a shower using antibacterial soap (ex: Hibiclens or Dial antibacterial soap). DO NOT apply deodorant, lotion, cologne, or anything else to the skin. Do not wear jewelry or bring any valuables with you.  .     Please do not wear contact lenses the day of your procedure.   Bring any inhalers that you may need.     If you have any procedure-specific questions, please  call your surgeon's office. Any other questions, don't hesitate to call at [phone]     Thanks,  Pre-Admit Testing  Anesthesia Dept [facility]

## 2024-09-03 ENCOUNTER — PATIENT MESSAGE (OUTPATIENT)
Dept: SURGERY | Facility: HOSPITAL | Age: 54
End: 2024-09-03
Payer: COMMERCIAL

## 2024-09-03 ENCOUNTER — TELEPHONE (OUTPATIENT)
Dept: OTOLARYNGOLOGY | Facility: CLINIC | Age: 54
End: 2024-09-03
Payer: COMMERCIAL

## 2024-09-03 ENCOUNTER — ANESTHESIA (OUTPATIENT)
Dept: SURGERY | Facility: HOSPITAL | Age: 54
End: 2024-09-03
Payer: COMMERCIAL

## 2024-09-03 ENCOUNTER — HOSPITAL ENCOUNTER (OUTPATIENT)
Facility: HOSPITAL | Age: 54
Discharge: HOME | End: 2024-09-03
Attending: OTOLARYNGOLOGY | Admitting: OTOLARYNGOLOGY
Payer: COMMERCIAL

## 2024-09-03 ENCOUNTER — ANESTHESIA EVENT (OUTPATIENT)
Dept: SURGERY | Facility: HOSPITAL | Age: 54
End: 2024-09-03
Payer: COMMERCIAL

## 2024-09-03 VITALS
BODY MASS INDEX: 32.08 KG/M2 | HEIGHT: 74 IN | WEIGHT: 250 LBS | SYSTOLIC BLOOD PRESSURE: 143 MMHG | RESPIRATION RATE: 13 BRPM | OXYGEN SATURATION: 95 % | DIASTOLIC BLOOD PRESSURE: 82 MMHG | TEMPERATURE: 98 F | HEART RATE: 75 BPM

## 2024-09-03 DIAGNOSIS — M95.0 NASAL DEFECT: Primary | ICD-10-CM

## 2024-09-03 DIAGNOSIS — Z98.890 MOHS DEFECT: ICD-10-CM

## 2024-09-03 DIAGNOSIS — L98.8 MOHS DEFECT: ICD-10-CM

## 2024-09-03 DIAGNOSIS — M95.0 ACQUIRED NASAL DEFORMITY: ICD-10-CM

## 2024-09-03 LAB
GLUCOSE SERPL-MCNC: 127 MG/DL (ref 70–110)
POCT GLUCOSE: 100 MG/DL (ref 70–110)

## 2024-09-03 PROCEDURE — 71000033 HC RECOVERY, INTIAL HOUR: Performed by: OTOLARYNGOLOGY

## 2024-09-03 PROCEDURE — 11442 EXC FACE-MM B9+MARG 1.1-2 CM: CPT | Mod: 59,58,, | Performed by: OTOLARYNGOLOGY

## 2024-09-03 PROCEDURE — 36000706: Performed by: OTOLARYNGOLOGY

## 2024-09-03 PROCEDURE — 82962 GLUCOSE BLOOD TEST: CPT | Performed by: OTOLARYNGOLOGY

## 2024-09-03 PROCEDURE — 25000003 PHARM REV CODE 250: Performed by: NURSE ANESTHETIST, CERTIFIED REGISTERED

## 2024-09-03 PROCEDURE — 14060 TIS TRNFR E/N/E/L 10 SQ CM/<: CPT | Mod: 58,,, | Performed by: OTOLARYNGOLOGY

## 2024-09-03 PROCEDURE — 63600175 PHARM REV CODE 636 W HCPCS: Mod: JZ,JG | Performed by: NURSE ANESTHETIST, CERTIFIED REGISTERED

## 2024-09-03 PROCEDURE — 63600175 PHARM REV CODE 636 W HCPCS: Performed by: NURSE ANESTHETIST, CERTIFIED REGISTERED

## 2024-09-03 PROCEDURE — 71000015 HC POSTOP RECOV 1ST HR: Performed by: OTOLARYNGOLOGY

## 2024-09-03 PROCEDURE — 99900035 HC TECH TIME PER 15 MIN (STAT)

## 2024-09-03 PROCEDURE — 37000008 HC ANESTHESIA 1ST 15 MINUTES: Performed by: OTOLARYNGOLOGY

## 2024-09-03 PROCEDURE — 25000003 PHARM REV CODE 250: Performed by: OTOLARYNGOLOGY

## 2024-09-03 PROCEDURE — 94761 N-INVAS EAR/PLS OXIMETRY MLT: CPT

## 2024-09-03 PROCEDURE — 36000707: Performed by: OTOLARYNGOLOGY

## 2024-09-03 PROCEDURE — 37000009 HC ANESTHESIA EA ADD 15 MINS: Performed by: OTOLARYNGOLOGY

## 2024-09-03 RX ORDER — DEXAMETHASONE SODIUM PHOSPHATE 4 MG/ML
INJECTION, SOLUTION INTRA-ARTICULAR; INTRALESIONAL; INTRAMUSCULAR; INTRAVENOUS; SOFT TISSUE
Status: DISCONTINUED | OUTPATIENT
Start: 2024-09-03 | End: 2024-09-03

## 2024-09-03 RX ORDER — CEFAZOLIN SODIUM 1 G/3ML
INJECTION, POWDER, FOR SOLUTION INTRAMUSCULAR; INTRAVENOUS
Status: DISCONTINUED | OUTPATIENT
Start: 2024-09-03 | End: 2024-09-03

## 2024-09-03 RX ORDER — EPHEDRINE SULFATE 50 MG/ML
INJECTION, SOLUTION INTRAVENOUS
Status: DISCONTINUED | OUTPATIENT
Start: 2024-09-03 | End: 2024-09-03

## 2024-09-03 RX ORDER — HALOPERIDOL 5 MG/ML
INJECTION INTRAMUSCULAR
Status: DISCONTINUED | OUTPATIENT
Start: 2024-09-03 | End: 2024-09-03

## 2024-09-03 RX ORDER — LIDOCAINE HYDROCHLORIDE 20 MG/ML
INJECTION INTRAVENOUS
Status: DISCONTINUED | OUTPATIENT
Start: 2024-09-03 | End: 2024-09-03

## 2024-09-03 RX ORDER — ONDANSETRON 4 MG/1
4 TABLET, ORALLY DISINTEGRATING ORAL EVERY 8 HOURS PRN
Qty: 10 TABLET | Refills: 0 | Status: SHIPPED | OUTPATIENT
Start: 2024-09-03 | End: 2024-09-12

## 2024-09-03 RX ORDER — CHOLECALCIFEROL (VITAMIN D3) 25 MCG
5000 TABLET ORAL 3 TIMES DAILY
COMMUNITY

## 2024-09-03 RX ORDER — SODIUM CHLORIDE 0.9 % (FLUSH) 0.9 %
10 SYRINGE (ML) INJECTION
Status: DISCONTINUED | OUTPATIENT
Start: 2024-09-03 | End: 2024-09-03 | Stop reason: HOSPADM

## 2024-09-03 RX ORDER — OXYCODONE AND ACETAMINOPHEN 10; 325 MG/1; MG/1
1 TABLET ORAL EVERY 6 HOURS PRN
Qty: 6 TABLET | Refills: 0 | Status: SHIPPED | OUTPATIENT
Start: 2024-09-03 | End: 2024-09-12

## 2024-09-03 RX ORDER — ROCURONIUM BROMIDE 10 MG/ML
INJECTION, SOLUTION INTRAVENOUS
Status: DISCONTINUED | OUTPATIENT
Start: 2024-09-03 | End: 2024-09-03

## 2024-09-03 RX ORDER — MIDAZOLAM HYDROCHLORIDE 1 MG/ML
INJECTION INTRAMUSCULAR; INTRAVENOUS
Status: DISCONTINUED | OUTPATIENT
Start: 2024-09-03 | End: 2024-09-03

## 2024-09-03 RX ORDER — FENTANYL CITRATE 50 UG/ML
INJECTION, SOLUTION INTRAMUSCULAR; INTRAVENOUS
Status: DISCONTINUED | OUTPATIENT
Start: 2024-09-03 | End: 2024-09-03

## 2024-09-03 RX ORDER — LIDOCAINE HYDROCHLORIDE AND EPINEPHRINE 10; 10 MG/ML; UG/ML
INJECTION, SOLUTION INFILTRATION; PERINEURAL
Status: DISCONTINUED | OUTPATIENT
Start: 2024-09-03 | End: 2024-09-03 | Stop reason: HOSPADM

## 2024-09-03 RX ORDER — HYDROMORPHONE HYDROCHLORIDE 1 MG/ML
0.2 INJECTION, SOLUTION INTRAMUSCULAR; INTRAVENOUS; SUBCUTANEOUS EVERY 5 MIN PRN
Status: DISCONTINUED | OUTPATIENT
Start: 2024-09-03 | End: 2024-09-03 | Stop reason: HOSPADM

## 2024-09-03 RX ORDER — OXYCODONE HYDROCHLORIDE 5 MG/1
5 TABLET ORAL
Status: DISCONTINUED | OUTPATIENT
Start: 2024-09-03 | End: 2024-09-03 | Stop reason: HOSPADM

## 2024-09-03 RX ORDER — ACETAMINOPHEN 10 MG/ML
INJECTION, SOLUTION INTRAVENOUS
Status: DISCONTINUED | OUTPATIENT
Start: 2024-09-03 | End: 2024-09-03

## 2024-09-03 RX ORDER — PROCHLORPERAZINE EDISYLATE 5 MG/ML
5 INJECTION INTRAMUSCULAR; INTRAVENOUS EVERY 30 MIN PRN
Status: DISCONTINUED | OUTPATIENT
Start: 2024-09-03 | End: 2024-09-03 | Stop reason: HOSPADM

## 2024-09-03 RX ORDER — PROPOFOL 10 MG/ML
VIAL (ML) INTRAVENOUS
Status: DISCONTINUED | OUTPATIENT
Start: 2024-09-03 | End: 2024-09-03

## 2024-09-03 RX ADMIN — HALOPERIDOL LACTATE 1 MG: 5 INJECTION, SOLUTION INTRAMUSCULAR at 07:09

## 2024-09-03 RX ADMIN — ROCURONIUM BROMIDE 50 MG: 10 INJECTION INTRAVENOUS at 07:09

## 2024-09-03 RX ADMIN — FENTANYL CITRATE 50 MCG: 50 INJECTION, SOLUTION INTRAMUSCULAR; INTRAVENOUS at 09:09

## 2024-09-03 RX ADMIN — SUGAMMADEX 200 MG: 100 INJECTION, SOLUTION INTRAVENOUS at 09:09

## 2024-09-03 RX ADMIN — FENTANYL CITRATE 25 MCG: 50 INJECTION, SOLUTION INTRAMUSCULAR; INTRAVENOUS at 08:09

## 2024-09-03 RX ADMIN — SODIUM CHLORIDE, SODIUM GLUCONATE, SODIUM ACETATE, POTASSIUM CHLORIDE, MAGNESIUM CHLORIDE, SODIUM PHOSPHATE, DIBASIC, AND POTASSIUM PHOSPHATE: .53; .5; .37; .037; .03; .012; .00082 INJECTION, SOLUTION INTRAVENOUS at 08:09

## 2024-09-03 RX ADMIN — CEFAZOLIN 2 G: 330 INJECTION, POWDER, FOR SOLUTION INTRAMUSCULAR; INTRAVENOUS at 07:09

## 2024-09-03 RX ADMIN — DEXAMETHASONE SODIUM PHOSPHATE 4 MG: 4 INJECTION INTRA-ARTICULAR; INTRALESIONAL; INTRAMUSCULAR; INTRAVENOUS; SOFT TISSUE at 07:09

## 2024-09-03 RX ADMIN — LIDOCAINE HYDROCHLORIDE 100 MG: 20 INJECTION INTRAVENOUS at 07:09

## 2024-09-03 RX ADMIN — PHENYLEPHRINE HYDROCHLORIDE 0.5 MCG/KG/MIN: 10 INJECTION INTRAVENOUS at 08:09

## 2024-09-03 RX ADMIN — FENTANYL CITRATE 100 MCG: 50 INJECTION, SOLUTION INTRAMUSCULAR; INTRAVENOUS at 07:09

## 2024-09-03 RX ADMIN — SODIUM CHLORIDE: 0.9 INJECTION, SOLUTION INTRAVENOUS at 07:09

## 2024-09-03 RX ADMIN — ACETAMINOPHEN 1000 MG: 10 INJECTION INTRAVENOUS at 08:09

## 2024-09-03 RX ADMIN — EPHEDRINE SULFATE 5 MG: 50 INJECTION INTRAVENOUS at 08:09

## 2024-09-03 RX ADMIN — EPHEDRINE SULFATE 10 MG: 50 INJECTION INTRAVENOUS at 08:09

## 2024-09-03 RX ADMIN — MIDAZOLAM HYDROCHLORIDE 2 MG: 1 INJECTION, SOLUTION INTRAMUSCULAR; INTRAVENOUS at 07:09

## 2024-09-03 RX ADMIN — PROPOFOL 200 MG: 10 INJECTION, EMULSION INTRAVENOUS at 07:09

## 2024-09-03 NOTE — ANESTHESIA PROCEDURE NOTES
Intubation    Date/Time: 9/3/2024 7:31 AM    Performed by: Celina Beltran CRNA  Authorized by: Celina Beltran CRNA    Intubation:     Induction:  Intravenous    Intubated:  Postinduction    Mask Ventilation:  Easy mask    Attempts:  1    Attempted By:  CRNA    Method of Intubation:  Video laryngoscopy    Blade:  Mcfarlane 3    Laryngeal View Grade: Grade I - full view of cords      Difficult Airway Encountered?: No      Complications:  None    Airway Device:  Oral albert    Airway Device Size:  7.5    Style/Cuff Inflation:  Cuffed (inflated to minimal occlusive pressure)    Tube secured:  22    Secured at:  The lips    Placement Verified By:  Capnometry    Complicating Factors:  None and long mandible    Findings Post-Intubation:  BS equal bilateral and atraumatic/condition of teeth unchanged

## 2024-09-03 NOTE — BRIEF OP NOTE
[facility]  Brief Operative Note    Surgery Date: 9/3/2024     Surgeons and Role:     * Alyx Spivey MD - Primary    Assisting Surgeon: None    Pre-op Diagnosis:  Scar of nose [L90.5]    Post-op Diagnosis:  Post-Op Diagnosis Codes:     * Scar of nose [L90.5]    Procedure(s) (LRB):  RECONSTRUCTION, NOSE (N/A)    Anesthesia: General    Operative Findings: soft tissue debulking of left nasal ala and columella, 28Fr trumpet placed    Estimated Blood Loss: 5 ml         Specimens:   Specimen (24h ago, onward)      None              Discharge Note    OUTCOME: Patient tolerated treatment/procedure well without complication and is now ready for discharge.    DISPOSITION: Home or Self Care    FINAL DIAGNOSIS:  <principal problem not specified>    FOLLOWUP: In clinic    DISCHARGE INSTRUCTIONS:    Discharge Procedure Orders   Diet Adult Regular     No dressing needed     Activity as tolerated

## 2024-09-03 NOTE — OP NOTE
Date of service: 9/3/2024    Pre-operative Diagnosis:   History of paramedian forehead flap   History of melolabial flap to the nose  Acquired nasal deformity    Post-operative Diagnosis:  Same status post debulking of ala and advancement rotation flap for the vestibule    Procedures Performed:  1. Advancement rotation flap for tissue rearrangement in right nasal vestibule arranged area was 2 x 1 cm  2. Excision of scar and debulking of prior paramedian forehead flap and auricular cartilage with excised area being 1.5 x 1 cm        Surgeon: Alyx Spivey MD    Assistant(s):  Joey Horowitz MD    Anesthesia: General Endotracheal    EBL:  Minimal less than 15 cc    Specimens:  None    Findings:  Previous melolabial forehead flap debulked with skin excision and rotational flap for separation of the columella from the ala unit inside the vestibule.  Externally prior superior edge of the paramedian forehead flap opened up to 1.5 cm incision with debulking of sub cutaneous tissue and some cartilage with recreation of supra ala sulcus and through and through mattress sutures with 4-0 chromic.    Indications for Procedure:  Mr. Lion is very well known to me status post multiple surgeries of which this will be 11.  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    Procedure in Detail:  After informed consent was obtained in holding area the risks and benefits reviewed patient was taken back to OR 3.  Anesthesia proceeded with general endotracheal anesthesia.  Patient was turned 90° with time-out undertaken with verification of patient and correct procedure.  After injection with 4 cc of 1% lidocaine with [id number] of epinephrine along the ala and into the vestibule at previous melolabial flap patient's midface was prepped and draped in usual sterile fashion and a proceeded with vestibule work.    Incision was made with a 15 blade  through the melolabial flap just anterior to inset deep into the vestibular skin.  Undermining was undertaken towards the ala margin and then towards the nasal vault.  Debulking was undertaken with removal of subcutaneous tissue with care to protect the skin.  After wide removal on additional incision was made onto the columella with also removal of sub cutaneous tissue and re draping of the tissue with rearrangement with melolabial flap rotated with excess skin removed to fit into the vestibule.  Columellar portion was closed with horizontal mattress 5 0 Vicryl suture.  The rotation flap into the vestibule was closed with combination of 5 0 Vicryl and 5 0 chromic.    Attention was turned to the external work on the ala. at previous superior aspect of the paramedian forehead flap incision was made extending proximally 1.5 cm.  Subcutaneous elevation was undertaken towards the ala margin.  Portion of sub cutaneous scar and cartilage was removed with scoring of the cartilage with needle-tip cautery.  This was undertaken widely.  Skin was then closed with running locking 5 0 fast-absorbing gut suture.  In order to reinforce the supra ala crease through and through mattress sutures with 4-0 chromic on an SH needle was done to tack down the remaining paramedian forehead flap skin.  In order to reinforced the vestibule as well a nasal trumpet 28 Iraqi was placed and secured to the ala with 2-0 silk suture.  Patient was turned over to Anesthesia awakened and taken to recovery in stable condition  Complications:  None    Attestation:  I was present for the entire procedure    DISCLAIMER: This note was prepared with [employer] voice recognition transcription software. Garbled syntax, mangled pronouns, and other bizarre constructions may be attributed to that software system. While efforts were made to correct any mistakes made by this voice recognition program, some errors and/or omissions may remain in the note that were  missed when the note was originally created.

## 2024-09-03 NOTE — TRANSFER OF CARE
"Anesthesia Transfer of Care Note    Patient: Lyndon Lion Jr.    Procedure(s) Performed: Procedure(s) (LRB):  RECONSTRUCTION, NOSE (N/A)    Patient location: PACU    Anesthesia Type: general    Transport from OR: Transported from OR on 6-10 L/min O2 by face mask with adequate spontaneous ventilation    Post pain: adequate analgesia    Post assessment: no apparent anesthetic complications and tolerated procedure well    Post vital signs: stable    Level of consciousness: awake and alert    Nausea/Vomiting: no nausea/vomiting    Complications: none    Transfer of care protocol was followed      Last vitals: Visit Vitals  BP (!) 162/89 (BP Location: Left arm, Patient Position: Lying)   Pulse 76   Temp 37 °C (98.6 °F) (Temporal)   Resp 16   Ht 6' 2" (1.88 m)   Wt 113.4 kg (250 lb)   SpO2 97%   BMI 32.10 kg/m²     "

## 2024-09-03 NOTE — TELEPHONE ENCOUNTER
Called the patient wife to set up POV visit with Dr. Spivey.     ----- Message from Yinka Horowitz MD sent at 9/3/2024  9:27 AM CDT -----  Regardin week Clinic f/u  Hello,    This patient had surgery with Dr. Spivey today. He would like to see him back in 2 weeks at [facility] for post op visit. Thank you!    Best,    Joey

## 2024-09-03 NOTE — ANESTHESIA POSTPROCEDURE EVALUATION
Anesthesia Post Evaluation    Patient: Lyndon Lion Jr.    Procedure(s) Performed: Procedure(s) (LRB):  RECONSTRUCTION, NOSE (N/A)    Final Anesthesia Type: general      Patient location during evaluation: PACU  Patient participation: Yes- Able to Participate  Level of consciousness: awake and alert, oriented and awake  Post-procedure vital signs: reviewed and stable  Pain management: adequate  Airway patency: patent    PONV status at discharge: No PONV  Anesthetic complications: no      Cardiovascular status: stable  Respiratory status: unassisted and room air  Hydration status: euvolemic  Follow-up not needed.              Vitals Value Taken Time   /82 09/03/24 1001   Temp 36.7 °C (98 °F) 09/03/24 1000   Pulse 76 09/03/24 1009   Resp 20 09/03/24 1009   SpO2 94 % 09/03/24 1009   Vitals shown include unfiled device data.      Event Time   Out of Recovery 09:35:00         Pain/Karthikeyan Score: Karthikeyan Score: 10 (9/3/2024  9:35 AM)

## 2024-09-03 NOTE — H&P
"To OR for revision of prior melolabial flap, nasal ala division and inset, cartilage scoring.    Subjective: 54 y.o. male seen in follow up s/p melolabial flap for coverage of columellar defect 04/09/2024 and now status post division and inset of melolabial flap 05/09/2024 with subsequent septal cartilage harvest and columellar strut on 06/11/2024..  With history of prior multiple revisions for nasal defect from squamous cell carcinoma.     Doing well since last visit he has had some crusting with some recurrent indentation below the tip.      Objective:  BP (!) 162/89 (BP Location: Left arm, Patient Position: Lying)   Pulse 76   Temp 98.6 °F (37 °C) (Temporal)   Resp 16   Ht 6' 2" (1.88 m)   Wt 113.4 kg (250 lb)   SpO2 97%   BMI 32.10 kg/m²     Exam:  General No acute distress  Melolabial flap fully viable with good attachment to  Ala and good lengthening of the ala with restoration of volume.    Columellar strut has held up with increased projection however there is partial scabbing and skin loss at the infra tip area.    Assessment / Plan:    Patient doing well after melolabial flap for columellar defect  With subsequent columellar strut after division and inset in June.    Given small sloughing off of the area underneath the tip I would like to hold off on hopefully last stage of his reconstruction with division of the ala part of the flap.  I will set him up for September 3, 2024.      "

## 2024-09-03 NOTE — ANESTHESIA PREPROCEDURE EVALUATION
09/03/2024  Lyndon Lion Jr. is a 54 y.o., male here for nasal reconstruction.       Pre-op Assessment    I have reviewed the Patient Summary Reports.    I have reviewed the NPO Status.   I have reviewed the Medications.     Review of Systems  Anesthesia Hx:  No problems with previous Anesthesia               Denies Personal Hx of Anesthesia complications.                    Social:  Non-Smoker       Pulmonary:    Asthma                    Renal/:  Chronic Renal Disease, CKD                Hepatic/GI:     GERD Liver Disease,            Musculoskeletal:  Arthritis          Spine Disorders: lumbar Disc disease and Degenerative disease           Neurological:    Neuromuscular Disease,                                   Endocrine:  Diabetes         Obesity / BMI > 30      Physical Exam  General: Well nourished, Cooperative, Alert and Oriented    Airway:  Mallampati: II   Mouth Opening: Normal  TM Distance: Normal  Tongue: Normal  Neck ROM: Normal ROM    Dental:  Intact        Anesthesia Plan  Type of Anesthesia, risks & benefits discussed:    Anesthesia Type: Gen ETT  Intra-op Monitoring Plan: Standard ASA Monitors  Post Op Pain Control Plan: multimodal analgesia  Induction:  IV  Airway Plan: Video and Direct, Post-Induction  Informed Consent: Informed consent signed with the Patient and all parties understand the risks and agree with anesthesia plan.  All questions answered.   ASA Score: 3    Ready For Surgery From Anesthesia Perspective.     .

## 2024-09-04 ENCOUNTER — PATIENT MESSAGE (OUTPATIENT)
Dept: GASTROENTEROLOGY | Facility: CLINIC | Age: 54
End: 2024-09-04
Payer: COMMERCIAL

## 2024-09-04 LAB — POCT GLUCOSE: 127 MG/DL (ref 70–110)

## 2024-09-05 ENCOUNTER — PATIENT MESSAGE (OUTPATIENT)
Dept: OTOLARYNGOLOGY | Facility: CLINIC | Age: 54
End: 2024-09-05
Payer: COMMERCIAL

## 2024-09-09 ENCOUNTER — TELEPHONE (OUTPATIENT)
Dept: HEMATOLOGY/ONCOLOGY | Facility: CLINIC | Age: 54
End: 2024-09-09
Payer: COMMERCIAL

## 2024-09-09 ENCOUNTER — LAB VISIT (OUTPATIENT)
Dept: LAB | Facility: HOSPITAL | Age: 54
End: 2024-09-09
Attending: FAMILY MEDICINE
Payer: COMMERCIAL

## 2024-09-09 DIAGNOSIS — Z85.038 HISTORY OF COLON CANCER, STAGE II: Primary | ICD-10-CM

## 2024-09-09 DIAGNOSIS — Z15.09 LYNCH SYNDROME: ICD-10-CM

## 2024-09-09 DIAGNOSIS — Z85.038 HISTORY OF COLON CANCER, STAGE II: ICD-10-CM

## 2024-09-09 LAB
ALBUMIN SERPL BCP-MCNC: 4.1 G/DL (ref 3.5–5.2)
ALP SERPL-CCNC: 60 U/L (ref 55–135)
ALT SERPL W/O P-5'-P-CCNC: 30 U/L (ref 10–44)
ANION GAP SERPL CALC-SCNC: 8 MMOL/L (ref 8–16)
AST SERPL-CCNC: 19 U/L (ref 10–40)
BASOPHILS # BLD AUTO: 0.03 K/UL (ref 0–0.2)
BASOPHILS NFR BLD: 0.7 % (ref 0–1.9)
BILIRUB SERPL-MCNC: 1.1 MG/DL (ref 0.1–1)
BUN SERPL-MCNC: 16 MG/DL (ref 6–20)
CALCIUM SERPL-MCNC: 9.1 MG/DL (ref 8.7–10.5)
CEA SERPL-MCNC: 1.2 NG/ML (ref 0–5)
CHLORIDE SERPL-SCNC: 106 MMOL/L (ref 95–110)
CO2 SERPL-SCNC: 26 MMOL/L (ref 23–29)
CREAT SERPL-MCNC: 1.3 MG/DL (ref 0.5–1.4)
DIFFERENTIAL METHOD BLD: ABNORMAL
EOSINOPHIL # BLD AUTO: 0.1 K/UL (ref 0–0.5)
EOSINOPHIL NFR BLD: 2.4 % (ref 0–8)
ERYTHROCYTE [DISTWIDTH] IN BLOOD BY AUTOMATED COUNT: 12 % (ref 11.5–14.5)
EST. GFR  (NO RACE VARIABLE): >60 ML/MIN/1.73 M^2
GLUCOSE SERPL-MCNC: 111 MG/DL (ref 70–110)
HCT VFR BLD AUTO: 50.1 % (ref 40–54)
HGB BLD-MCNC: 18.1 G/DL (ref 14–18)
IMM GRANULOCYTES # BLD AUTO: 0.02 K/UL (ref 0–0.04)
IMM GRANULOCYTES NFR BLD AUTO: 0.4 % (ref 0–0.5)
LDH SERPL L TO P-CCNC: 165 U/L (ref 110–260)
LYMPHOCYTES # BLD AUTO: 1 K/UL (ref 1–4.8)
LYMPHOCYTES NFR BLD: 21.7 % (ref 18–48)
MCH RBC QN AUTO: 31.1 PG (ref 27–31)
MCHC RBC AUTO-ENTMCNC: 36.1 G/DL (ref 32–36)
MCV RBC AUTO: 86 FL (ref 82–98)
MONOCYTES # BLD AUTO: 0.6 K/UL (ref 0.3–1)
MONOCYTES NFR BLD: 13.2 % (ref 4–15)
NEUTROPHILS # BLD AUTO: 2.8 K/UL (ref 1.8–7.7)
NEUTROPHILS NFR BLD: 61.6 % (ref 38–73)
NRBC BLD-RTO: 0 /100 WBC
PLATELET # BLD AUTO: 226 K/UL (ref 150–450)
PMV BLD AUTO: 8.6 FL (ref 9.2–12.9)
POTASSIUM SERPL-SCNC: 4.1 MMOL/L (ref 3.5–5.1)
PROT SERPL-MCNC: 7.2 G/DL (ref 6–8.4)
RBC # BLD AUTO: 5.82 M/UL (ref 4.6–6.2)
SODIUM SERPL-SCNC: 140 MMOL/L (ref 136–145)
WBC # BLD AUTO: 4.56 K/UL (ref 3.9–12.7)

## 2024-09-09 PROCEDURE — 85025 COMPLETE CBC W/AUTO DIFF WBC: CPT | Mod: PO | Performed by: NURSE PRACTITIONER

## 2024-09-09 PROCEDURE — 80053 COMPREHEN METABOLIC PANEL: CPT | Performed by: NURSE PRACTITIONER

## 2024-09-09 PROCEDURE — 83615 LACTATE (LD) (LDH) ENZYME: CPT | Performed by: NURSE PRACTITIONER

## 2024-09-09 PROCEDURE — 82378 CARCINOEMBRYONIC ANTIGEN: CPT | Performed by: NURSE PRACTITIONER

## 2024-09-09 PROCEDURE — 36415 COLL VENOUS BLD VENIPUNCTURE: CPT | Mod: PO | Performed by: NURSE PRACTITIONER

## 2024-09-09 NOTE — TELEPHONE ENCOUNTER
----- Message from Jackeline Hutchins, Patient Care Assistant sent at 9/9/2024  1:19 PM CDT -----  Type: Needs Medical Advice  Who Called:  umesh Johnson Call Back Number: [phone]    Additional Information: umesh states he was suppose to have a x ray order  with labs , before  he sees dr dickerson on 9/17 , patient is doing lab work today , and states orders was there  before the first appointment was canceled , please call to further discuss thank you .

## 2024-09-10 ENCOUNTER — HOSPITAL ENCOUNTER (OUTPATIENT)
Dept: RADIOLOGY | Facility: CLINIC | Age: 54
Discharge: HOME | End: 2024-09-10
Attending: NURSE PRACTITIONER
Payer: COMMERCIAL

## 2024-09-10 DIAGNOSIS — Z85.038 HISTORY OF COLON CANCER, STAGE II: ICD-10-CM

## 2024-09-10 DIAGNOSIS — Z15.09 LYNCH SYNDROME: ICD-10-CM

## 2024-09-10 PROCEDURE — 71046 X-RAY EXAM CHEST 2 VIEWS: CPT | Mod: 26,,, | Performed by: RADIOLOGY

## 2024-09-10 PROCEDURE — 71046 X-RAY EXAM CHEST 2 VIEWS: CPT | Mod: TC,FY,PO

## 2024-09-12 ENCOUNTER — OFFICE VISIT (OUTPATIENT)
Dept: FAMILY MEDICINE | Facility: CLINIC | Age: 54
End: 2024-09-12
Payer: COMMERCIAL

## 2024-09-12 VITALS
SYSTOLIC BLOOD PRESSURE: 128 MMHG | DIASTOLIC BLOOD PRESSURE: 84 MMHG | HEART RATE: 72 BPM | WEIGHT: 251.56 LBS | OXYGEN SATURATION: 97 % | BODY MASS INDEX: 32.29 KG/M2 | TEMPERATURE: 98 F | HEIGHT: 74 IN

## 2024-09-12 DIAGNOSIS — Z15.09 LYNCH SYNDROME: ICD-10-CM

## 2024-09-12 DIAGNOSIS — Z79.82 ASPIRIN LONG-TERM USE: ICD-10-CM

## 2024-09-12 DIAGNOSIS — E78.5 HYPERLIPIDEMIA, UNSPECIFIED HYPERLIPIDEMIA TYPE: Primary | ICD-10-CM

## 2024-09-12 DIAGNOSIS — Z98.84 S/P LAPAROSCOPIC SLEEVE GASTRECTOMY: ICD-10-CM

## 2024-09-12 DIAGNOSIS — E11.42 TYPE 2 DIABETES MELLITUS WITH DIABETIC POLYNEUROPATHY, WITHOUT LONG-TERM CURRENT USE OF INSULIN: ICD-10-CM

## 2024-09-12 DIAGNOSIS — N52.9 ERECTILE DYSFUNCTION, UNSPECIFIED ERECTILE DYSFUNCTION TYPE: ICD-10-CM

## 2024-09-12 DIAGNOSIS — Z85.038 HISTORY OF COLON CANCER, STAGE II: ICD-10-CM

## 2024-09-12 DIAGNOSIS — C44.321 SQUAMOUS CELL CANCER OF SKIN OF NASAL TIP: ICD-10-CM

## 2024-09-12 DIAGNOSIS — E29.1 HYPOGONADISM MALE: ICD-10-CM

## 2024-09-12 PROCEDURE — 99214 OFFICE O/P EST MOD 30 MIN: CPT | Mod: S$GLB,,, | Performed by: FAMILY MEDICINE

## 2024-09-12 PROCEDURE — 99999 PR PBB SHADOW E&M-EST. PATIENT-LVL V: CPT | Mod: PBBFAC,,, | Performed by: FAMILY MEDICINE

## 2024-09-12 RX ORDER — SEMAGLUTIDE 2.68 MG/ML
2 INJECTION, SOLUTION SUBCUTANEOUS
Qty: 3 EACH | Refills: 1 | Status: SHIPPED | OUTPATIENT
Start: 2024-09-12

## 2024-09-12 RX ORDER — TADALAFIL 5 MG/1
5 TABLET ORAL DAILY PRN
Qty: 90 TABLET | Refills: 1 | Status: SHIPPED | OUTPATIENT
Start: 2024-09-12

## 2024-09-13 ENCOUNTER — TELEPHONE (OUTPATIENT)
Dept: HEMATOLOGY/ONCOLOGY | Facility: CLINIC | Age: 54
End: 2024-09-13
Payer: COMMERCIAL

## 2024-09-13 NOTE — TELEPHONE ENCOUNTER
Spoke with the pt wife and they decided to keep the appt on 09/17. Pt wife verbalized and understanding.  ----- Message from Trini Trevino sent at 9/12/2024  5:00 PM CDT -----  Type: Needs Medical Advice  Who Called:  Katheryn (spouse)  Symptoms (please be specific):    How long has patient had these symptoms:    Pharmacy name and phone #:    Best Call Back Number:   Additional Information: Katheryn is requesting a call back to reschedule her  appt. on 9/17.

## 2024-09-13 NOTE — PROGRESS NOTES
Subjective:       Patient ID: Lyndon Lion Jr. is a 54 y.o. male.    Chief Complaint: Follow-up (Ck up , rx refills)    Type 2 diabetes with some polyneuropathy.  Morning sugars are running around 100.  On Ozempic 2 mg.  No chest pain no palpitations.  ED Cialis 5 mg prescription.  Polycythemia hemoglobin is 18.1 he is receiving testosterone with last level checked about 3 months ago.  He is getting this from nurse practitioner.  Using 0.6 mL 2 times weekly of a 200 milligram/mL preparation.  PSA is current was 0.42 fasting sugar 111 liver functions normal.  GFR over 60.  His LDL is 99.  Using aspirin regularly.  Has Parada syndrome.  Colon cancer follow-up current.  Going colonoscopy every 6 months.  Has had frequent polyps.  Squamous cell carcinoma of the nose.  Recent surgery on this.  Status post gastric sleeve back in 2011.  Hypogonadism.    Physical examination.  Vital signs noted.  Neck without bruit no adenopathy.  Chest clear.  Heart regular rate rhythm.  Abdomen bowel sounds are positive soft nontender no guarding or rebound.  Extremities without edema positive pulses.          Objective:        Assessment:       1. Hyperlipidemia, unspecified hyperlipidemia type    2. Erectile dysfunction, unspecified erectile dysfunction type    3. Type 2 diabetes mellitus with diabetic polyneuropathy, without long-term current use of insulin    4. Aspirin long-term use    5. History of colon cancer, stage II    6. Squamous cell cancer of skin of nasal tip    7. Parada syndrome    8. S/P laparoscopic sleeve gastrectomy    9. Hypogonadism male        Plan:       Hyperlipidemia, unspecified hyperlipidemia type    Erectile dysfunction, unspecified erectile dysfunction type  -     tadalafiL (CIALIS) 5 MG tablet; Take 1 tablet (5 mg total) by mouth daily as needed for Erectile Dysfunction.  Dispense: 90 tablet; Refill: 1    Type 2 diabetes mellitus with diabetic polyneuropathy, without long-term current use of insulin  -      semaglutide (OZEMPIC) 2 mg/dose (8 mg/3 mL) PnIj; Inject 2 mg into the skin every 7 days.  Dispense: 3 each; Refill: 1  -     Hemoglobin A1C; Future; Expected date: 09/12/2024    Aspirin long-term use    History of colon cancer, stage II    Squamous cell cancer of skin of nasal tip    Parada syndrome    S/P laparoscopic sleeve gastrectomy  -     Vitamin B12; Future; Expected date: 09/12/2024  -     Ferritin; Future; Expected date: 09/12/2024    Hypogonadism male  -     Testosterone; Future; Expected date: 09/12/2024    Discussed testosterone replacement therapy need to get a trough level.  Check B12 and ferritin also.  Ozempic refilled 8 mg pen 2 mg weekly 3 pens with a refill.  Six-month follow-up.  Continue colonoscopies regularly.  Warned of the dangers and complications of excessive testosterone therapy.

## 2024-09-16 ENCOUNTER — OFFICE VISIT (OUTPATIENT)
Dept: OTOLARYNGOLOGY | Facility: CLINIC | Age: 54
End: 2024-09-16
Payer: COMMERCIAL

## 2024-09-16 VITALS
WEIGHT: 248.25 LBS | HEART RATE: 65 BPM | DIASTOLIC BLOOD PRESSURE: 91 MMHG | BODY MASS INDEX: 31.87 KG/M2 | SYSTOLIC BLOOD PRESSURE: 142 MMHG

## 2024-09-16 DIAGNOSIS — L90.5 SCAR OF NOSE: Primary | ICD-10-CM

## 2024-09-16 DIAGNOSIS — Z09 POSTOP CHECK: ICD-10-CM

## 2024-09-16 PROCEDURE — 99024 POSTOP FOLLOW-UP VISIT: CPT | Mod: S$GLB,,, | Performed by: OTOLARYNGOLOGY

## 2024-09-16 PROCEDURE — 99999 PR PBB SHADOW E&M-EST. PATIENT-LVL IV: CPT | Mod: PBBFAC,,, | Performed by: OTOLARYNGOLOGY

## 2024-09-16 NOTE — PROGRESS NOTES
Subjective: 54 y.o. male seen in follow up s/p revision and debulking of melolabial flap and creation of ala crease 09/03/2024 with prior melolabial flap for coverage of columellar defect 04/09/2024 and division and inset of melolabial flap 05/09/2024 with subsequent septal cartilage harvest and columellar strut on 06/11/2024.  Nasal trumpet that was placed came out with an about 4-5 days postoperatively.  He has some skin breakdown of the ala crease and he is placing Bactroban.  He reports improvement in breathing out of the nose.  He does have some whistling when he breathes on the right side overall pleased with cosmesis.  He would like any further revision to be done prior to the end of the year.    Objective:  BP (!) 142/91 (BP Location: Right arm, Patient Position: Sitting)   Pulse 65   Wt 112.6 kg (248 lb 3.8 oz)   BMI 31.87 kg/m²     Exam:  General No acute distress  Ala crease incision with continued edema and some skin breakdown with crusting.  Improved thickness of ala with melolabial flap thinned out but continued edema in the vestibule.  Columellar portion well healed.  Good projection of the tip with some rounding but overall improved      Assessment / Plan:  Patient is status post multiple revisions for nasal reconstruction.  He is doing well and we are approaching a good point 4 no further revisions.  I would like to see him again end of November to discuss any further revision before the end of the year.

## 2024-09-16 NOTE — PROGRESS NOTES
PATIENT: Lyndon Lion Jr.  MRN: [id number]  DATE: 9/17/2024      Diagnosis:   1. History of colon cancer, stage II    2. Parada syndrome    3. 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 Sebaceous adenoma    5. Polycythemia        Chief Complaint: History of colon cancer, stage II  (1 year follow up )      Oncologic History:      Oncologic History     Oncologic Treatment     Pathology           Subjective:    Initial History: Mr. iLon is a 54 y.o. male with Arthritis, DMII, Vitamin D deficiency, Skin cancer who presents for Parada syndrome and colon cancer.   Pt previously underwent colonic resection for Stage II adenocarcinoma of the colon.  PT received 12 cycles of FOLFOX under the care of Dr Cárdenas.  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  Pt previously seen by genetecist whom felt the patient had Parada Syndrome.  Pt follows with Dr. Leonardo in GI for surveillance and had colonoscopy 8/08/24 with multiple polyps removed and recs for repeat in 6 months.  Pt sees dermatologist Dr Rubio with prior SCC and Sebaceous adenoma removed.  Pt follows with Dr Stovall in Urology for prostate cancer surveilllance.  Dr andres with ENt following the patient for SCC of the nose.     The patient denies CP, cough, SOB, abdominal pain, nausea, vomiting, constipation, diarrhea.  The patient denies fever, chills, night sweats, weight loss, new lumps or bumps, easy bruising or bleeding.    Past Medical History:   Past Medical History:   Diagnosis Date    Arthritis     Asthma     AS A CHILD    Colon cancer     Family hx of prostate cancer 11/22/2016    Hx of colon cancer, stage I 07/03/2014    Parada syndrome     Squamous cell carcinoma of skin     Tear of labium 10/2020    left shoulder Dr Molina    Uncontrolled type 2 diabetes mellitus with hyperglycemia 03/04/2021    Vitamin D deficiency     Wears glasses        Past Surgical HIstory:   Past Surgical History:   Procedure Laterality Date    APPENDECTOMY       APPLICATION OF CARTILAGE GRAFT N/A 3/21/2023    Procedure: APPLICATION, CARTILAGE GRAFT;  Surgeon: Alyx Spivey MD;  Location: [facility] OR 2ND FLR;  Service: ENT;  Laterality: N/A;    CAUDAL EPIDURAL STEROID INJECTION N/A 11/6/2018    Procedure: Injection-steroid-epidural-caudal;  Surgeon: Lyndon Brooke Jr., MD;  Location: [facility] OR;  Service: Pain Management;  Laterality: N/A;    COLON SURGERY  2008    PARTIAL COLECTOMY    COLONOSCOPY Bilateral 2012    COLONOSCOPY N/A 8/1/2016    Procedure: COLONOSCOPY;  Surgeon: Blaze Marr MD;  Location: [facility] ENDO;  Service: Endoscopy;  Laterality: N/A;    COLONOSCOPY N/A 9/20/2017    Procedure: COLONOSCOPY;  Surgeon: Dom Leonardo MD;  Location: [facility] ENDO (4TH FLR);  Service: Endoscopy;  Laterality: N/A;  not given PM prep    COLONOSCOPY N/A 10/9/2017    Procedure: COLONOSCOPY;  Surgeon: RAMON Callahan MD;  Location: [facility] ENDO (4TH FLR);  Service: Endoscopy;  Laterality: N/A;  ok for Prepopik per Dr Callahan    COLONOSCOPY N/A 11/20/2017    Procedure: COLONOSCOPY/EMR;  Surgeon: Joseluis Choe MD;  Location: [facility] ENDO (2ND FLR);  Service: Endoscopy;  Laterality: N/A;  EMR    no pm prep    COLONOSCOPY N/A 5/30/2018    Procedure: COLONOSCOPY;  Surgeon: Dom Leonardo MD;  Location: [facility] ENDO (4TH FLR);  Service: Endoscopy;  Laterality: N/A;  follow up colonoscopy in 5/2018 for Parada Syndrome         COLONOSCOPY N/A 6/10/2019    Procedure: COLONOSCOPY;  Surgeon: Dom Leonardo MD;  Location: [facility] ENDO (4TH FLR);  Service: Endoscopy;  Laterality: N/A;  Prepopik ordered per Dr. Leonardo    COLONOSCOPY N/A 7/22/2020    Procedure: COLONOSCOPY;  Surgeon: Dom Leonardo MD;  Location: [facility] ENDO (4TH FLR);  Service: Endoscopy;  Laterality: N/A;    COLONOSCOPY N/A 5/27/2021    Procedure: COLONOSCOPY;  Surgeon: Dom Leonardo MD;  Location: [facility] ENDO (4TH FLR);  Service: Endoscopy;  Laterality: N/A;  covid test 5/24-liudmila  pt requests Prepopik    COLONOSCOPY N/A  6/17/2022    Procedure: COLONOSCOPY;  Surgeon: Dom Leonardo MD;  Location: [facility] (4TH FLR);  Service: Endoscopy;  Laterality: N/A;  He was discovered to have colon cancer and had right hemicolectomy        for stage II on 08/08/2008. MSH2 Parada syndrome.  sutab requested  instructions via the portal -sm  fully vaccinated - sm    COLONOSCOPY N/A 6/22/2023    Procedure: COLONOSCOPY;  Surgeon: Dom Leonardo MD;  Location: [facility] (4TH FLR);  Service: Endoscopy;  Laterality: N/A;  see telephone encounter dated 5/24/23/ Pt/ Pt wife requested sutab - prep ins. on portal / Ozempic for DM- ERW    COLONOSCOPY N/A 2/6/2024    Procedure: COLONOSCOPY;  Surgeon: Dom Leonardo MD;  Location: [facility] (4TH FLR);  Service: Endoscopy;  Laterality: N/A;  sutab/pt diabetic/ozempic/referral dr feng    COLONOSCOPY N/A 8/8/2024    Procedure: COLONOSCOPY;  Surgeon: Dom Leonardo MD;  Location: [facility] (4TH FLR);  Service: Endoscopy;  Laterality: N/A;  8/2-pre call complete-sutab-ozempic last dose 7/30/24-tb    CYSTOSCOPY      CYSTOSCOPY N/A 1/31/2024    Procedure: CYSTOSCOPY;  Surgeon: Eron Llanes MD;  Location: [address] FLR;  Service: Urology;  Laterality: N/A;    Epidural Steroid Injection  10/23/15    Lumbar    EPIDURAL STEROID INJECTION INTO LUMBAR SPINE N/A 7/27/2018    Procedure: Injection-steroid-epidural-lumbar;  Surgeon: Lyndon Brooke Jr., MD;  [facility];  Service: Pain Management;  Laterality: N/A;    ESOPHAGOGASTRODUODENOSCOPY      ESOPHAGOGASTRODUODENOSCOPY N/A 7/22/2020    Procedure: EGD (ESOPHAGOGASTRODUODENOSCOPY);  Surgeon: Dom Leonardo MD;  Location: [facility] ENDO (4TH FLR);  Service: Endoscopy;  Laterality: N/A;  Please schedule patient for EGD and colonoscopy with me MSH2 Parada syndrome and anemia evaluation please make priority 1  covid test 7/20-[place]    ESOPHAGOGASTRODUODENOSCOPY N/A 6/22/2023    Procedure: EGD (ESOPHAGOGASTRODUODENOSCOPY);  Surgeon: Dom CARVER  MD Malissa;  Location: [facility] ENDO (4TH FLR);  Service: Endoscopy;  Laterality: N/A;  see telephone encounter dated 5/24/23 6/16/23-Precall completed appointment confirmed-    ESOPHAGOGASTRODUODENOSCOPY N/A 9/1/2023    Procedure: EGD (ESOPHAGOGASTRODUODENOSCOPY);  Surgeon: Dom Leonardo MD;  Location: [facility] ENDO (4TH FLR);  Service: Endoscopy;  Laterality: N/A;  Instructions sent via portal / Ozempic / Extended Clear Liquid prep    EXCISION OR SURGICAL PLANING, SKIN, NOSE, FOR RHINOPHYMA Bilateral 3/7/2023    Procedure: EXCISION OR SURGICAL resection nasal skin cancer;  Surgeon: Alyx Spivey MD;  Location: [facility] OR 2ND FLR;  Service: ENT;  Laterality: Bilateral;    EXTRACTION - STONE Left 1/31/2024    Procedure: EXTRACTION - STONE;  Surgeon: Eron Llanes MD;  Location: [facility] OR 1ST FLR;  Service: Urology;  Laterality: Left;    FACETECTOMY OF VERTEBRA  2/13/2019    Procedure: MEDIAL FACETECTOMY L5-S1;  Surgeon: Lyndon Brooke Jr., MD;  Location: [facility] OR;  Service: Orthopedics;;    GASTRIC BYPASS  2010    SLEEVE    KNEE ARTHROSCOPY Right     LUMBAR DISCECTOMY  2/13/2019    Procedure: DISCECTOMY, SPINE, LUMBAR L5-S1;  Surgeon: Lyndon Brooke Jr., MD;  Location: [facility] OR;  Service: Orthopedics;;    NASAL RECONSTRUCTION N/A 3/21/2023    Procedure: RECONSTRUCTION, NOSE;  Surgeon: Alyx Spivey MD;  Location: [facility] OR 2ND FLR;  Service: ENT;  Laterality: N/A;    NASAL RECONSTRUCTION N/A 6/11/2024    Procedure: RECONSTRUCTION, NOSE;  Surgeon: Alyx Spivey MD;  Location: [facility] OR;  Service: ENT;  Laterality: N/A;    NASAL RECONSTRUCTION N/A 9/3/2024    Procedure: RECONSTRUCTION, NOSE;  Surgeon: Alyx Spivey MD;  [facility];  Service: ENT;  Laterality: N/A;    PLACEMENT-STENT Left 1/31/2024    Procedure: PLACEMENT-STENT;  Surgeon: Eron Llanes MD;  Location: [facility] OR 1ST FLR;  Service: Urology;  Laterality: Left;    RETROGRADE PYELOGRAPHY Left 1/31/2024    Procedure: PYELOGRAM,  RETROGRADE;  Surgeon: Eron Llanes MD;  Location: [facility] OR 1ST FLR;  Service: Urology;  Laterality: Left;    REVISION OF FLAP GRAFT Bilateral 4/18/2023    Procedure: REVISION, PROCEDURE INVOLVING FLAP GRAFT;  Surgeon: Alyx Spivey MD;  Location: [facility] OR;  Service: ENT;  Laterality: Bilateral;    REVISION OF FLAP GRAFT N/A 6/29/2023    Procedure: REVISION, PROCEDURE INVOLVING FLAP GRAFT;  Surgeon: Alyx Spviey MD;  Location: [facility] OR 2ND FLR;  Service: ENT;  Laterality: N/A;    REVISION OF FLAP GRAFT N/A 8/29/2023    Procedure: REVISION, PROCEDURE INVOLVING FLAP GRAFT;  Surgeon: Alyx Spivey MD;  Location: [facility] OR;  Service: ENT;  Laterality: N/A;    REVISION OF FLAP GRAFT Bilateral 2/20/2024    Procedure: REVISION, PROCEDURE INVOLVING FLAP GRAFT;  Surgeon: Alyx Spivey MD;  Location: [facility] OR;  Service: ENT;  Laterality: Bilateral;    REVISION OF FLAP GRAFT N/A 5/7/2024    Procedure: REVISION, PROCEDURE INVOLVING FLAP GRAFT;  Surgeon: Alyx Spivey MD;  Location: [facility] OR;  Service: ENT;  Laterality: N/A;    ROTATION FLAP SURGERY N/A 3/21/2023    Procedure: CREATION, FLAP, ROTATION;  Surgeon: Alyx Spivey MD;  Location: [facility] OR 2ND FLR;  Service: ENT;  Laterality: N/A;    ROTATION FLAP SURGERY Right 4/9/2024    Procedure: CREATION, FLAP, ROTATION;  Surgeon: Alyx Spivey MD;  Location: [facility] OR;  Service: ENT;  Laterality: Right;    URETEROSCOPY Left 1/31/2024    Procedure: URETEROSCOPY;  Surgeon: Eron Llanes MD;  Location: [facility] OR 1ST FLR;  Service: Urology;  Laterality: Left;       Family History:   Family History   Problem Relation Name Age of Onset    Melanoma Mother      Cancer Mother          breast    Breast cancer Mother      Heart disease Father      Diabetes Father      Liver disease Father      Hearing loss Father      Basal cell carcinoma Father      No Known Problems Sister 2     Polycystic ovary syndrome Daughter 1     Cancer Maternal Uncle          colon    Colon cancer Maternal Uncle           x2    Heart disease Maternal Uncle      Hypertension Maternal Uncle      No Known Problems Paternal Aunt 1     Alcohol abuse Paternal Uncle 2     Prostate cancer Paternal Uncle 2     Cancer Paternal Uncle 2     Dementia Maternal Grandmother      Cancer Maternal Grandfather          colon ca    Colon cancer Maternal Grandfather      Early death Maternal Grandfather      Diabetes Paternal Grandmother      Hypertension Paternal Grandfather      Prostatitis Paternal Grandfather      Psoriasis Neg Hx      Lupus Neg Hx      Eczema Neg Hx         Social History:  reports that he has never smoked. He has never used smokeless tobacco. He reports that he does not currently use alcohol. He reports that he does not use drugs.    Allergies:  Review of patient's allergies indicates:   Allergen Reactions    Hydrocod-cpm-pe-acetaminophen Other (See Comments)     Irritability        Medications:  Current Outpatient Medications   Medication Sig Dispense Refill    ACCU-CHEK GUIDE TEST STRIPS Strp USE TO TEST TWICE A  strip 3    ascorbic acid, vitamin C, (VITAMIN C) 100 MG tablet Take 100 mg by mouth once daily.      aspirin (ECOTRIN) 81 MG EC tablet Take by mouth once daily. Aspirin 300 mg bid coded aspirin      azelaic acid (AZELEX) 15 % gel Apply to face BID. 50 g 5    cyanocobalamin, vitamin B-12, 2,500 mcg Lozg Place 2 tablets under the tongue once daily. 180 lozenge 3    doxycycline (PERIOSTAT) 20 MG tablet Take 2 pill po qday 180 tablet 3    ferrous gluconate (FERATE) 240 (27 FE) MG tablet Take 1 tablet (240 mg total) by mouth Daily. 100 each 0    ketoconazole (NIZORAL) 2 % shampoo Apply topically every 7 days. 120 mL 1    lancets (ACCU-CHEK SOFTCLIX LANCETS) Misc 1 Units by Misc.(Non-Drug; Combo Route) route 2 (two) times a day. 200 each 0    metFORMIN (GLUCOPHAGE-XR) 500 MG ER 24hr tablet Take 1 tablet (500 mg total) by mouth daily with breakfast. 90 tablet 3    metroNIDAZOLE (NORITATE) 1 % cream Compound azelaic acid  "15% + ivermectin 1% + metronidazole 1% cream. Apply to face once or twice daily. 30 g 5    pen needle, diabetic (PEN NEEDLE) 32 gauge x 5/32" Ndle 1 each by Misc.(Non-Drug; Combo Route) route once daily. 90 each 3    pravastatin (PRAVACHOL) 20 MG tablet Take 1 tablet (20 mg total) by mouth once daily. 90 tablet 3    RABEprazole (ACIPHEX) 20 mg tablet Take 1 tablet (20 mg total) by mouth before breakfast. 90 tablet 3    semaglutide (OZEMPIC) 2 mg/dose (8 mg/3 mL) PnIj Inject 2 mg into the skin every 7 days. 3 each 1    sod sulf-pot chloride-mag sulf (SUTAB) 1.479-0.188- 0.225 gram tablet Take 12 tablets by mouth once daily. Take as directed by provider office 24 tablet 0    sulfacetamide sodium-sulfur 10-5 % (w/w) Clsr Use to wash face daily 170 g 5    tadalafiL (CIALIS) 5 MG tablet Take 1 tablet (5 mg total) by mouth daily as needed for Erectile Dysfunction. 90 tablet 1    testosterone cypionate (DEPOTESTOTERONE CYPIONATE) 200 mg/mL injection Inject 60 mg into the muscle twice a week.      vitamin D (VITAMIN D3) 1000 units Tab Take 5,000 Units by mouth 3 (three) times daily.      vitamin A 8000 UNIT capsule Take 1 capsule (8,000 Units total) by mouth once daily. 100 capsule 3     No current facility-administered medications for this visit.       Review of Systems   Constitutional:  Negative for appetite change, chills, diaphoresis, fatigue, fever and unexpected weight change.   HENT:  Negative for mouth sores.    Eyes:  Negative for visual disturbance.   Respiratory:  Negative for cough and shortness of breath.    Cardiovascular:  Negative for chest pain and palpitations.   Gastrointestinal:  Negative for abdominal pain, constipation, diarrhea, nausea and vomiting.   Genitourinary:  Negative for frequency.   Musculoskeletal:  Negative for back pain.   Skin:  Negative for color change and rash.   Neurological:  Negative for headaches.   Hematological:  Negative for adenopathy. Does not bruise/bleed easily. " "  Psychiatric/Behavioral:  The patient is not nervous/anxious.        ECOG Performance Status: 0   Objective:      Vitals:   Vitals:    09/17/24 1458   BP: 132/74   BP Location: Right arm   Patient Position: Sitting   BP Method: Large (Manual)   Pulse: 74   Resp: 16   Temp: 98.4 °F (36.9 °C)   TempSrc: Temporal   SpO2: 97%   Weight: 114.1 kg (251 lb 8.7 oz)   Height: 6' 2" (1.88 m)       Physical Exam  Constitutional:       General: He is not in acute distress.     Appearance: He is well-developed. He is not diaphoretic.   HENT:      Head: Normocephalic and atraumatic.      Nose:      Comments: Pt with surgical revision of nose  Cardiovascular:      Rate and Rhythm: Normal rate and regular rhythm.      Heart sounds: Normal heart sounds. No murmur heard.     No friction rub. No gallop.   Pulmonary:      Effort: Pulmonary effort is normal. No respiratory distress.      Breath sounds: Normal breath sounds. No wheezing or rales.   Chest:      Chest wall: No tenderness.   Abdominal:      General: Bowel sounds are normal. There is no distension.      Palpations: Abdomen is soft. There is no mass.      Tenderness: There is no abdominal tenderness. There is no rebound.   Musculoskeletal:      Cervical back: Normal range of motion.   Lymphadenopathy:      Cervical: No cervical adenopathy.      Upper Body:      Right upper body: No supraclavicular or axillary adenopathy.      Left upper body: No supraclavicular or axillary adenopathy.   Skin:     General: Skin is warm and dry.      Findings: No erythema or rash.   Neurological:      Mental Status: He is alert and oriented to person, place, and time.   Psychiatric:         Behavior: Behavior normal.         Laboratory Data:  No visits with results within 1 Week(s) from this visit.   Latest known visit with results is:   Lab Visit on 09/09/2024   Component Date Value Ref Range Status    WBC 09/09/2024 4.56  3.90 - 12.70 K/uL Final    RBC 09/09/2024 5.82  4.60 - 6.20 M/uL Final "    Hemoglobin 09/09/2024 18.1 (H)  14.0 - 18.0 g/dL Final    Hematocrit 09/09/2024 50.1  40.0 - 54.0 % Final    MCV 09/09/2024 86  82 - 98 fL Final    MCH 09/09/2024 31.1 (H)  27.0 - 31.0 pg Final    MCHC 09/09/2024 36.1 (H)  32.0 - 36.0 g/dL Final    RDW 09/09/2024 12.0  11.5 - 14.5 % Final    Platelets 09/09/2024 226  150 - 450 K/uL Final    MPV 09/09/2024 8.6 (L)  9.2 - 12.9 fL Final    Immature Granulocytes 09/09/2024 0.4  0.0 - 0.5 % Final    Gran # (ANC) 09/09/2024 2.8  1.8 - 7.7 K/uL Final    Immature Grans (Abs) 09/09/2024 0.02  0.00 - 0.04 K/uL Final    Comment: Mild elevation in immature granulocytes is non specific and   can be seen in a variety of conditions including stress response,   acute inflammation, trauma and pregnancy. Correlation with other   laboratory and clinical findings is essential.      Lymph # 09/09/2024 1.0  1.0 - 4.8 K/uL Final    Mono # 09/09/2024 0.6  0.3 - 1.0 K/uL Final    Eos # 09/09/2024 0.1  0.0 - 0.5 K/uL Final    Baso # 09/09/2024 0.03  0.00 - 0.20 K/uL Final    nRBC 09/09/2024 0  0 /100 WBC Final    Gran % 09/09/2024 61.6  38.0 - 73.0 % Final    Lymph % 09/09/2024 21.7  18.0 - 48.0 % Final    Mono % 09/09/2024 13.2  4.0 - 15.0 % Final    Eosinophil % 09/09/2024 2.4  0.0 - 8.0 % Final    Basophil % 09/09/2024 0.7  0.0 - 1.9 % Final    Differential Method 09/09/2024 Automated   Final    Sodium 09/09/2024 140  136 - 145 mmol/L Final    Potassium 09/09/2024 4.1  3.5 - 5.1 mmol/L Final    Chloride 09/09/2024 106  95 - 110 mmol/L Final    CO2 09/09/2024 26  23 - 29 mmol/L Final    Glucose 09/09/2024 111 (H)  70 - 110 mg/dL Final    BUN 09/09/2024 16  6 - 20 mg/dL Final    Creatinine 09/09/2024 1.3  0.5 - 1.4 mg/dL Final    Calcium 09/09/2024 9.1  8.7 - 10.5 mg/dL Final    Total Protein 09/09/2024 7.2  6.0 - 8.4 g/dL Final    Albumin 09/09/2024 4.1  3.5 - 5.2 g/dL Final    Total Bilirubin 09/09/2024 1.1 (H)  0.1 - 1.0 mg/dL Final    Comment: For infants and newborns, interpretation  of results should be based  on gestational age, weight and in agreement with clinical  observations.    Premature Infant recommended reference ranges:  Up to 24 hours.............<8.0 mg/dL  Up to 48 hours............<12.0 mg/dL  3-5 days..................<15.0 mg/dL  6-29 days.................<15.0 mg/dL      Alkaline Phosphatase 09/09/2024 60  55 - 135 U/L Final    AST 09/09/2024 19  10 - 40 U/L Final    ALT 09/09/2024 30  10 - 44 U/L Final    eGFR 09/09/2024 >60  >60 mL/min/1.73 m^2 Final    Anion Gap 09/09/2024 8  8 - 16 mmol/L Final    LD 09/09/2024 165  110 - 260 U/L Final    Results are increased in hemolyzed samples.    CEA 09/09/2024 1.2  0.0 - 5.0 ng/mL Final    Comment: CEA Normal Range:  Non-Smokers: 0-3.0 ng/mL  Smokers:     0-5.0 ng/mL    The testing method is a chemiluminescent immunoassay manufactured by   [employer] and performed on the [employer] Immunoassay Systems. Values   obtained with different assay manufacturers for methods may be   different and   cannot be used interchangeably           Imaging:     Reviewed       Assessment:       1. History of colon cancer, stage II    2. Parada syndrome    3. 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 Sebaceous adenoma    5. Polycythemia           Plan:     H/O Colon Cancer - pt with prior stage II colon cancer s/p resection and 12 cycles of FOLFOX  -Pt seeing Dr Leonardo for repeat colonoscopies every 6 months with last 8/08/24  -Will monitor    Parada Syndrome - Pt previously seen to have MSH2 mutation in lab work 6/26/24  -Pt to see genetecist for recommendations on cancer screenings  -Pt follows DR Leonardo in GI, Dr Stovall in Urology, Dr Rubio in dermatology    Sebaceous Adenoma - seen on biopsy of skin left supraclavicular area 8/27/24  -Likely from Parada  -Dermatology following    SCC of the Skin - pt following with dermatology and ENT    Polycythemia - unclear etiology  -Will monitor    Route Chart for Scheduling    Med Onc  Chart Routing      Follow up with physician Other. PT needs an appt with genetecist.  Pt needs a return appt with me once completed.   Follow up with EUGENE    Infusion scheduling note    Injection scheduling note    Labs    Imaging    Pharmacy appointment    Other referrals                     Mitch Trujillo MD  [facility]  Hematology and Oncology  [facility]   [address] [place] [address]   O: [phone]  F: [phone]

## 2024-09-17 ENCOUNTER — OFFICE VISIT (OUTPATIENT)
Dept: HEMATOLOGY/ONCOLOGY | Facility: CLINIC | Age: 54
End: 2024-09-17
Payer: COMMERCIAL

## 2024-09-17 ENCOUNTER — TELEPHONE (OUTPATIENT)
Dept: HEMATOLOGY/ONCOLOGY | Facility: CLINIC | Age: 54
End: 2024-09-17
Payer: COMMERCIAL

## 2024-09-17 VITALS
HEIGHT: 74 IN | WEIGHT: 251.56 LBS | SYSTOLIC BLOOD PRESSURE: 132 MMHG | TEMPERATURE: 98 F | OXYGEN SATURATION: 97 % | DIASTOLIC BLOOD PRESSURE: 74 MMHG | HEART RATE: 74 BPM | BODY MASS INDEX: 32.29 KG/M2 | RESPIRATION RATE: 16 BRPM

## 2024-09-17 DIAGNOSIS — D23.9 SEBACEOUS ADENOMA: ICD-10-CM

## 2024-09-17 DIAGNOSIS — Z15.09 LYNCH SYNDROME: ICD-10-CM

## 2024-09-17 DIAGNOSIS — C44.321 SQUAMOUS CELL CANCER OF SKIN OF NASAL TIP: ICD-10-CM

## 2024-09-17 DIAGNOSIS — D75.1 POLYCYTHEMIA: ICD-10-CM

## 2024-09-17 DIAGNOSIS — Z85.038 HISTORY OF COLON CANCER, STAGE II: Primary | ICD-10-CM

## 2024-09-17 PROCEDURE — 99213 OFFICE O/P EST LOW 20 MIN: CPT | Mod: S$GLB,,, | Performed by: INTERNAL MEDICINE

## 2024-09-17 PROCEDURE — 99999 PR PBB SHADOW E&M-EST. PATIENT-LVL V: CPT | Mod: PBBFAC,,, | Performed by: INTERNAL MEDICINE

## 2024-09-17 NOTE — TELEPHONE ENCOUNTER
Appt scheduled for pt ----- Message from Caryn Quiros MA sent at 9/17/2024  3:44 PM CDT -----  Follow up with physician Other. PT needs an appt with genetecist.  Pt needs a return appt with me once completed.    Can you please call this number to schedule the appointment: [phone] Katheryn

## 2024-09-19 ENCOUNTER — LAB VISIT (OUTPATIENT)
Dept: LAB | Facility: HOSPITAL | Age: 54
End: 2024-09-19
Attending: FAMILY MEDICINE
Payer: COMMERCIAL

## 2024-09-19 DIAGNOSIS — E11.42 TYPE 2 DIABETES MELLITUS WITH DIABETIC POLYNEUROPATHY, WITHOUT LONG-TERM CURRENT USE OF INSULIN: ICD-10-CM

## 2024-09-19 DIAGNOSIS — Z98.84 S/P LAPAROSCOPIC SLEEVE GASTRECTOMY: ICD-10-CM

## 2024-09-19 DIAGNOSIS — E29.1 HYPOGONADISM MALE: ICD-10-CM

## 2024-09-19 LAB
ESTIMATED AVG GLUCOSE: 108 MG/DL (ref 68–131)
FERRITIN SERPL-MCNC: 151 NG/ML (ref 20–300)
HBA1C MFR BLD: 5.4 % (ref 4–5.6)
TESTOST SERPL-MCNC: 441 NG/DL (ref 304–1227)
VIT B12 SERPL-MCNC: 479 PG/ML (ref 210–950)

## 2024-09-19 PROCEDURE — 82728 ASSAY OF FERRITIN: CPT | Performed by: FAMILY MEDICINE

## 2024-09-19 PROCEDURE — 36415 COLL VENOUS BLD VENIPUNCTURE: CPT | Mod: PO | Performed by: FAMILY MEDICINE

## 2024-09-19 PROCEDURE — 84403 ASSAY OF TOTAL TESTOSTERONE: CPT | Performed by: FAMILY MEDICINE

## 2024-09-19 PROCEDURE — 82607 VITAMIN B-12: CPT | Performed by: FAMILY MEDICINE

## 2024-09-19 PROCEDURE — 83036 HEMOGLOBIN GLYCOSYLATED A1C: CPT | Performed by: FAMILY MEDICINE

## 2024-09-23 ENCOUNTER — TELEPHONE (OUTPATIENT)
Dept: FAMILY MEDICINE | Facility: CLINIC | Age: 54
End: 2024-09-23
Payer: COMMERCIAL

## 2024-09-23 PROBLEM — Z15.09: Status: ACTIVE | Noted: 2024-09-23

## 2024-09-23 PROBLEM — D23.9: Status: ACTIVE | Noted: 2024-09-23

## 2024-09-23 PROBLEM — D48.5 MUIR-TORRE SYNDROME: Status: ACTIVE | Noted: 2024-09-23

## 2024-09-23 NOTE — TELEPHONE ENCOUNTER
Called patient- spoke with wife (Katheryn) states patient works nights and she is authorized to discuss his medical information. Advised on labs.

## 2024-09-23 NOTE — TELEPHONE ENCOUNTER
----- Message from Stepan Moran III, MD sent at 9/20/2024 10:09 PM CDT -----  NORMAL followup as needed.

## 2024-09-23 NOTE — PROGRESS NOTES
Cancer Genetics  Hereditary and High-Risk Clinic  Department of Hematology and Oncology  [facility]    Date of Service:  24  Visit Provider:  Elizabeth Larson MS, [facility] [place]    Patient ID  Name: Lyndon Lion Jr.    : 1970    MRN: [id number]      Referring Provider  Mitch Trujillo MD  [address] [place] [address]    Face-to-face time with patient:  Approximately 30 minutes.    Approximately 115  minutes in total were spent on this encounter, which includes face-to-face time and non-face-to-face time preparing to see the patient (e.g., review of records and tests), obtaining and/or reviewing separately obtained history, documenting clinical information in the electronic or other health record, independently interpreting results (not separately reported) and communicating results to the patient/family/caregiver, or care coordination (not separately reported).      IMPRESSION      Lyndon Lion Jr. Is a pleasant 53yo male patient seen today for an updated genetic evaluation and screening recommendations. He is accompanied today by his wife Katheryn and his granddaughter. Lyndon has a history of colon cancer diagnosed at age 39, as well as an extensive maternal family history of colon cancer. He underwent genetic testing through [facility] in . 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 Kathy-Shane syndrome (MTS) is a rare genetic disorder that is caused by mismatch repair (MMR) protein mutations. MTS is considered a variant of Parada Sydrome, and most commonly caused by pathogenic mutations in the MSH2 gene. Of patients with Parada syndrome, approximately 1 in 300  "patients have the [place]-Shane variant. Affected individuals have Parada syndrome and Parada syndrome-associated skin findings of sebaceous adenomas/carcinomas and/or keratoacanthomas. Lyndon's particular mutation has been seen previously in multiple families and is a suspected  variant. Not all people with Lyndon's mutation will have [place]rist[place]-Shane syndrome variant of Parada syndrome. Therefore, Lyndon's sebaceous adenomas are likely the result of his underlying MSH2 mutation and no additional genetic testing for him is recommended at this time. We reviewed his family history and screening recommendations, of which Lyndon is currently compliant. I would like to see Lyndon again in 2 years to check-in and update any screening recommendations.       FOCUSED PERSONAL HISTORY     Chief Complaint: Genetic Evaluation (MSH2+ Parada Syndrome, Kathy-Shane Variant)    History of Present Illness (HPI):  Lyndon Lion Jr. ("Lyndon"), 54 y.o., assigned male sex at birth, is established with the [facility] of [facility]ology and On[facility] but new to me.  He was referred by Dr. Mitch Trujillo with Hematology and Oncology department for hereditary cancer risk assessment given his personal history of Parada syndrome, as well as 3 sebaceous adenomas.    Cancer History  Lyndon Lion Jr. previously underwent colonic resection for Stage II adenocarcinoma of the colon. Mr. Lion was diagnosed with stage II adenocarcinoma of the colon at age 39. Pathology showed a moderately well-differentiated adenocarcinoma with mucinous features. IHC and MSI were not done. He did undergo a right hemicolectomy. Fer received 12 cycles of FOLFOX under the care of Dr Cárdenas. He saw genetic counselor, Natalia Manzo back in 2014 and testing was ordered through [facility]. 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      Lyndon Lion Jr. follows with Dr. Leonardo " "in GI for surveillance and had colonoscopy 8/08/24 with multiple polyps removed and was recommended for repeat in 6 months. He has had numerous polyps over the years and has had a colonoscopy at least yearly since his diagnosis. His las EGD was 9/1/2023 which was normal. He sees dermatologist Dr. Rubio with prior SCC (February 2023, on tip of nose) and Sebaceous adenomas removed. He follows with Dr. Stovall in Urology for prostate cancer surveilllance. Dr. Spivey with ENT following the patient for SCC of the nose.     Dermatology Pathology History:    8/27/2024: Skin, left supraclavicular area, shave biopsy:   -SEBACEOUS ADENOMA, see comment     5/21/2024: Skin, left posterior shoulder, biopsy:    - SEBACEOUS ADENOMA     4/11/2023: Skin, right clavicle, shave biopsy:   -SEBACEOUS ADENOMA     Per Pathology, "The histological findings are most consistent with sebaceous adenoma, which is a benign lesion.  However, it may be a marker for Zanoni-Shane syndrome which is characterized by multiple cutaneous sebaceous adenomas and potential visceral malignancies, often involving the gastrointestinal tract. Clinical correlation is recommended."    Focused Medical History  Previous germline cancer genetic testing:  Yes  Colonoscopy: Yes  Most recent colonoscopy: 08/08/2024  Colon polyp:  Yes  Pancreatitis:  No    Tobacco Use  Tobacco Use: Low Risk  (9/17/2024)    Patient History     Smoking Tobacco Use: Never     Smokeless Tobacco Use: Never     Passive Exposure: Not on file     Review of Systems  See HPI.    Patient's Stress Score today was 3/10 (scale of 0-10 on which 10=worst).     FAMILY HISTORY     Cancer Pedigree     *For larger image, see copy saved in media     Son is MSH2+ and currently asymptomatic  Daughter is negative for MSH2    Maternal:  Mother is MSH2+. She has diagnosed with breast cancer around 65 and passed away at age 68. She had a melanoma of the lip at age 45  Uncle with colon cancer, unknown if " tested  Uncle is MSH2+, with reportedly 4 colon cancers during lifetime  Cousin (son of MSH2+ uncle) is MSH2+ and asymptomatic   Grandfather with colon cancer diagnosed and passed away at age 37  Great-grandfather with colon cancer (father of grandfather)  Great-uncle (brother of grandfather) with colon cancer    Paternal:  Father with basal cell carcinoma  Uncle with prostate cancer at age 60  Cousin with esophageal cancer at age 57    A family history of birth defects, intellectual disability, SIDS, sudden early death, multiple miscarriages and consanguinity were denied. Please refer to above pedigree for further details. A larger copy has been scanned into the Media tab.    DISCUSSION     GENETIC TEST RESULTS    Lyndon Lion Jr.  had a sample submitted to [email or web address] on 6/26/2014 for Parada Syndrome/Colon Cancer panel testing. This panel includes sequencing and rearrangement testing for the following genes known to be associated with hereditary colon cancer:     APC, EPCAM, MLH1, MSH2, MSH6, MUTYH, PMS2    The results of this testing revealed a mutation in MSH2. This particular mutation is described as pathogenic which results in the the Deletion of Exons 1-6. 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 However, given the combination of Lyndon's Parada syndrome and multiple sebaceous adenomas, he meets clinical criteria for Kathy-Shane syndrome. Kathy-Shane syndrome (MTS) is a rare genetic disorder that is caused by mismatch repair (MMR) protein mutations. MTS is considered a variant of Parada Sydrome, and most commonly caused by pathogenic mutations in the MSH2 gene. Of patients with Parada syndrome, approximately 1 in 300 patients have the MTS. Affected individuals have Parada syndrome and Parada syndrome-associated skin findings of sebaceous adenomas/carcinomas and/or keratoacanthomas. Lyndon's particular mutation has been seen previously in multiple  families and is a suspected  variant. Not all people with Lyndon's mutation will have the MTS variant of Parada syndrome. Therefore, Lyndon's sebaceous adenomas are likely the result of his underlying MSH2 mutation and no additional genetic testing for him is recommended at this time. We reviewed the following screening recommendations, of which Lyndon is currently compliant.     Colonoscopy and EGD: Lyndon follows Dr. Leonardo in GI for surveillance. He is currently doing colonoscopies ~6 months and EGD every 3 years. We discussed that at a minimum he should be getting yearly colonoscopies, but he and Dr. Leonardo can discuss more frequent intervals depending on his results.   Skin Exam: He is followed by Dr. Rubio in dermatology. We discussed that he should continue to see her annually and have a low threshold for contacting dermatology for any concerns.  PSA: We discussed that he should continue getting yearly PSAs. His most recent was 6/13/2024 which was stable.  Urinalysis: We discussed that he should be getting a yearly urinalysis done. His most recent was 6/13/2024 which was normal.  Pancreatic Screening: Currently, he does not need any pancreatic cancer screenings per the NCCN guidelines. However, if one of Lyndon's first or second degree relatives is diagnosed with pancreatic cancer we could consider adding on pancreatic cancer screening. Lyndon plans to reach out if a relative receives a pancreatic cancer diagnosis in the future.     *See below for more detailed MSH2 screening information    Lyndon reports that information regarding his MSH2 variant was previously shared through a family letter to his maternal relatives. His son had undergone a colonoscopy. We discussed encouraging his son to get a baseline skin exam. He stated that his son and daughter-in-law were aware of the reproductive risks related to Parada syndrome and Constitutional MMR deficiency when they chose to have children. He does  not feel that any additional support or resources are needed at this time.         MSH2    Background information  MSH2 is a gene that, when functioning normally, helps protect against cancer. However, mutations (harmful differences) in the gene can prevent it from working properly, leading to a higher risk of certain types of cancer. Individuals with a mutation in MSH2 are said to have Parada syndrome (LS). However, mutations in other genes can also cause Parada syndrome and different gene mutations lead to different risks and recommendations.     Cancer Risks  An individual's risk may vary based on personal or family history of cancer as well as other personal risk factors.       Estimated lifetime risks:   Cancer Type General Population MSH2+   Colorectal 4% 33-52%   Endometrial 3% 21-57%   Ovarian 1% 8-38%   Gastric 0.8% Up to 9%   Small bowel 0.3% Up to 10%   Urothelial: Renal pelvis/Ureter/Bladder 2.3% Up to 28%   Pancreas 1.7% Increased   Biliary tract <0.5% Up to 1.7%   Prostate 12.6% Up to 23.8%   Brain 0.5% 2.5-7.7%     People with LS may also have an increased risk for cancerous and noncancerous skin tumors such as sebaceous adenocarcinoma, sebaceous adenomas, and keratoacanthomas, but the overall risk is unknown. Some studies have suggested a link between LS and breast cancer. However, there is not enough evidence to make management recommendations.    Management Recommendations  Individuals with a MSH2 mutation who are diagnosed with a related cancer may be eligible for treatment specific to their mutation. Treatment options as well as benefits and limitations should be discussed with their oncologist.     Colorectal  High quality colonoscopy every 1-2 years starting at age 20-25 or 2-5 years earlier than the youngest age of colorectal cancer diagnosis in the family.  Consider daily aspirin after discussion of risks, benefits, adverse effects, and family planning. Dosing should be  individualized.    Endometrial  Keep track of menstrual cycle and report any irregularities or symptoms (abnormal uterine or postmenopausal bleeding) to a provider promptly. Endometrial cancer can often be detected early based on symptoms.   May consider risk reducing total hysterectomy. Timing should be individualized based on family planning, family history, comorbidities, and risk of endometrial cancer. Decision making should include discussion of cancer risk, degree of protection, reproductive desires, and psychosocial and quality of life aspects. While surgery can reduce the chance of cancer, it has not been shown to reduce cancer related mortality.  May consider endometrial biopsy every 1-2 years starting at 30-35. Endometrial biopsy is highly sensitive and specific but has not been a proven benefit in individuals with LS.  Transvaginal ultrasound may be considered for postmenopausal individuals at clinician's discretion but has not been shown to be sufficiently sensitive or specific. It is not recommended in premenopausal individuals.    Ovarian  Consider risk reducing salpingo-oophorectomy (RRSO, surgery to remove the ovaries and fallopian tubes) after discussion of reproductive options, risk reduction, management of menopausal symptoms, bone health, cardiovascular health, neurologic health, sexual health, and psychosocial and quality-of-life aspects. Timing should be individualized based on family planning, menopause status, family history, comorbidities, and risk of ovarian cancer.  Estrogen replacement may be considered.  Data do not support routine ovarian cancer screening. Transvaginal ultrasound and CA-125 have not been shown to be sufficiently sensitive or specific but may be considered at the clinician's discretion.  Combination contraception to stop ovulation can also be considered.    Gastric/Small Bowel  Esophagogastroduodenoscopy (EGD or upper endoscopy) every 2-4 years starting at 30-40,  preferably in conjunction with colonoscopy. Random biopsy of stomach should be performed, at least on the initial endoscopy to assess for H. pylori (with treatment if detected), autoimmune gastritis, and intestinal metaplasia. Screening may begin younger or be performed more often based on family history or high-risk findings.  Push enteroscopy can be considered in place of EGD.  Individuals who decline EGD should have one-time noninvasive testing for H. pylori (with treatment if detected).     Pancreatic  Individuals who have a first- or second- degree relative with pancreatic cancer:   Consider pancreatic cancer screening (yearly contrast-enhanced MRI/magnetic resonance cholangiopancreatography and/or endoscopic ultrasound) beginning at age 50 (or 10 years earlier than the youngest diagnosis of pancreatic cancer). National Comprehensive Cancer Network Guidelines recommend that screening be performed in an experienced high-volume center only after an in-depth discussion about the potential limitations of screening.     Prostate:  Consider shared decision-making about prostate cancer screening (PSA with or without digital rectal exam) at age 40 and consider yearly screening rather than every other year.    Urothelial  Annual urinalysis starting at age 30-35   Skin  Annual skin exam with provider skilled in identifying LS-associated skin manifestations    Brain  Promptly report abnormal symptoms to physician. This may include (but is not limited to): headaches, vomiting, weakness, memory loss, difficulty walking or doing other normal activities, changes in taste, smell, or vision, tiredness or increased sleeping, seizures, changes in personality, problems with bladder control.    Risk to Relatives  Individuals typically have two copies of the MSH2 gene - one copy from each biological parent. If a parent has a mutation in the MSH2 gene, there is a 50% chance each child will inherit the mutation. It is likely that this  mutation was inherited from one parent (although we cannot tell from testing which parent it was inherited from). As such, the siblings and children of someone with an MSH2 mutation are each expected to have a 50% chance of having the same MSH2 mutation. More distant relatives are also at risk of having the familial MSH2 mutation.  Relatives of someone with a MSH2 mutation should consider meeting with a genetic specialist to discuss testing for the familial mutation.     It is rare but possible that an individual has a new (de don) mutation not inherited from either parent. If a mutation is de don, the chance that siblings and more distant relatives would have the familial mutation is much lower.     Reproductive Information  CMMRD  People who have mutations in both copies of the MSH2 gene have a condition called constitutional mismatch repair deficiency (CMMRD). This rare condition can happen if both parents have a mutation in one copy of the MSH2 gene. If both parents do, then the chance to have a child with CMMRD is 25%. It can also happen if one parent has a mutation in one copy of the MSH2 gene, the other parent has a mutation in one copy of the EPCAM gene, and the child inherits both mutations. If someone has a mutation in MSH2, their partner can be tested to determine if there is a chance their child could have CMMRD. This testing is sometimes called carrier screening.    CMMRD increases the risk for a wide range of cancers that develop at a young age. The most common cancers associated with CMMRD include blood cancers, brain tumors, digestive tract cancers, uterine cancer, and ovarian cancer. Blood cancers or brain tumors may occur in childhood. Individuals with CMMRD may also have a weakened immune system, brain differences, and differences in the color of their skin, such as lighter or darker patches.    Pre-Implantation Genetic Testing  Some people who have a mutation in MSH2 want to reduce the chance  of passing on that mutation to a child. If an individual uses in-vitro fertilization to get pregnant, genetic testing can be conducted on embryos to determine if they have the familial mutation(s). Embryos that do not can then be selected for use, reducing the chance of having a child who is affected. To get more information about this process, individuals with a MSH2 mutation can speak with a reproductive genetic counselor.     REFERENCES   National Comprehensive Cancer Network (NCCN). (2023). Genetic/familial high-risk assessment: Colorectal. NCCN Clinical Practice Guidelines in Oncology (NCCN Guidelines), Version 2.2023.  National Comprehensive Cancer Network (NCCN). (2024). Genetic/familial high-risk assessment: Breast, ovarian, and pancreatic. NCCN Clinical Practice Guidelines in Oncology (NCCN Guidelines), Version 3.2024.  National Comprehensive Cancer Network (NCCN). (2024). Prostate Cancer Early Detection. NCCN Clinical Practice Guidelines in Oncology (NCCN Guidelines), Version 2.2024.   Jhonathan N, Jhonathan N, Keshawn A, Дмитрий S, Alexander RA, Samantha OP. Kathy-Shane syndrome and recent updates on screening guidelines: the link between colorectal tumors and sebaceous adenomas in unusual locations. J Surg Oncol. 2023; 128: 0977-8715. doi:10.1002/jso.56812       Lyndon Lion Jr.  received comprehensive counseling regarding updates to his screening recommendations. Lyndon was given ample opportunity to ask questions and all of his concerns were addressed.     ASSESSMENT / PLAN      Lyndon Lion Jr. Will continue to follow-up with his providers for his screenings. We discussed further encouraging his son to get a baseline skin exam. He did not feel as though he needed additional support or resources at this time. I will plan on seeing Lyndon again in 2 years to see if there needs to be any updates to his screening plan.       ICD-10-CM ICD-9-CM   1. [place]-Shane syndrome  D48.5 238.2   2. Parada syndrome  Z15.09  V84.09   3. MSH2-related Parada syndrome (HNPCC1)  Z15.09 V84.09   4. History of colon cancer, stage II  Z85.038 V10.05   5. Shane-Kathy syndrome with benign sebaceous neoplasm  D23.9 706.2    Z15.09 216.9     1. Parada syndrome    2. MSH2-related Parada syndrome (HNPCC1)    3. History of colon cancer, stage II    4. [place]-Shane syndrome    5. Shane-Kathy syndrome with benign sebaceous neoplasm         Follow-up:  I will plan on seeing Lyndon again in 2 years.       Approximately 30 minutes were spent face-to-face with the patient.  Approximately 115 minutes in total were spent on this encounter, which includes face-to-face time and non-face-to-face time preparing to see the patient (e.g., review of tests), obtaining and/or reviewing separately obtained history, documenting clinical information in the electronic or other health record, independently interpreting results (not separately reported) and communicating results to the patient/family/caregiver, or care coordination (not separately reported).     This assessment is based on the history and reports provided, as well as the current scientific knowledge regarding cancer genetics.         Elizabeth Larson MS, [facility] [place]  Genetic Counselor, Hereditary and High-Risk Clinic  Department of Hematology and Oncology  [facility]        CC:  Dr. Mitch Trujillo

## 2024-09-24 ENCOUNTER — OFFICE VISIT (OUTPATIENT)
Dept: HEMATOLOGY/ONCOLOGY | Facility: CLINIC | Age: 54
End: 2024-09-24
Payer: COMMERCIAL

## 2024-09-24 DIAGNOSIS — D23.9: ICD-10-CM

## 2024-09-24 DIAGNOSIS — Z15.09 MSH2-RELATED LYNCH SYNDROME (HNPCC1): ICD-10-CM

## 2024-09-24 DIAGNOSIS — D48.5 MUIR-TORRE SYNDROME: Primary | ICD-10-CM

## 2024-09-24 DIAGNOSIS — Z85.038 HISTORY OF COLON CANCER, STAGE II: ICD-10-CM

## 2024-09-24 DIAGNOSIS — Z15.09: ICD-10-CM

## 2024-09-24 DIAGNOSIS — Z15.09 LYNCH SYNDROME: ICD-10-CM

## 2024-09-24 PROCEDURE — 99999 PR PBB SHADOW E&M-EST. PATIENT-LVL II: CPT | Mod: PBBFAC,,,

## 2024-09-25 ENCOUNTER — PATIENT MESSAGE (OUTPATIENT)
Dept: HEMATOLOGY/ONCOLOGY | Facility: CLINIC | Age: 54
End: 2024-09-25
Payer: COMMERCIAL

## 2024-09-29 NOTE — PROGRESS NOTES
The patient location is: Home  The chief complaint leading to consultation is: Parada    Visit type: audiovisual    Face to Face time with patient: 15  20 minutes of total time spent on the encounter, which includes face to face time and non-face to face time preparing to see the patient (eg, review of tests), Obtaining and/or reviewing separately obtained history, Documenting clinical information in the electronic or other health record, Independently interpreting results (not separately reported) and communicating results to the patient/family/caregiver, or Care coordination (not separately reported).         Each patient to whom he or she provides medical services by telemedicine is:  (1) informed of the relationship between the physician and patient and the respective role of any other health care provider with respect to management of the patient; and (2) notified that he or she may decline to receive medical services by telemedicine and may withdraw from such care at any time.    Notes:       PATIENT: Lyndon Lion Jr.  MRN: [id number]  DATE: 9/30/2024      Diagnosis:   1. Parada syndrome    2. MSH2-related Parada syndrome (HNPCC1)    3. 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 Sebaceous adenoma    5. Polycythemia          Chief Complaint: Follow-up (Parada)      Oncologic History:      Oncologic History     Oncologic Treatment     Pathology           Subjective:    Interval History:  Mr. Lion is a 54 y.o. male with Arthritis, DMII, Vitamin D deficiency, Skin cancer who presents for Parada syndrome and colon cancer.  Since the last clinic visit the patient saw genetics on 9/24/24.   The patient denies CP, cough, SOB, abdominal pain, nausea, vomiting, constipation, diarrhea.  The patient denies fever, chills, night sweats, weight loss, new lumps or bumps, easy bruising or bleeding.    Prior History:   Pt previously underwent colonic resection for Stage II adenocarcinoma of the colon in 2008.  PT received  12 cycles of FOLFOX under the care of Dr Cárdenas.  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  Pt previously seen by genetecist whom felt the patient had Parada Syndrome.  Pt follows with Dr. Leonardo in GI for surveillance and had colonoscopy 8/08/24 with multiple polyps removed and recs for repeat in 6 months.  Pt sees dermatologist Dr Rubio with prior SCC and Sebaceous adenoma removed.  Pt follows with Dr Stovall in Urology for prostate cancer surveilllance.  Dr spivey with [facility] following the patient for SCC of the nose.    Past Medical History:   Past Medical History:   Diagnosis Date    Arthritis     Asthma     AS A CHILD    Colon cancer     Family hx of prostate cancer 11/22/2016    Hx of colon cancer, stage I 07/03/2014    Parada syndrome     Squamous cell carcinoma of skin     Tear of labium 10/2020    left shoulder Dr Molina    Uncontrolled type 2 diabetes mellitus with hyperglycemia 03/04/2021    Vitamin D deficiency     Wears glasses        Past Surgical HIstory:   Past Surgical History:   Procedure Laterality Date    APPENDECTOMY      APPLICATION OF CARTILAGE GRAFT N/A 3/21/2023    Procedure: APPLICATION, CARTILAGE GRAFT;  Surgeon: Alyx Spivey MD;  Location: [facility] 2ND FLR;  Service: ENT;  Laterality: N/A;    CAUDAL EPIDURAL STEROID INJECTION N/A 11/6/2018    Procedure: Injection-steroid-epidural-caudal;  Surgeon: Lyndon Brooke Jr., MD;  Location: [facility];  Service: Pain Management;  Laterality: N/A;    COLON SURGERY  2008    PARTIAL COLECTOMY    COLONOSCOPY Bilateral 2012    COLONOSCOPY N/A 8/1/2016    Procedure: COLONOSCOPY;  Surgeon: Blaze Marr MD;  Location: [facility];  Service: Endoscopy;  Laterality: N/A;    COLONOSCOPY N/A 9/20/2017    Procedure: COLONOSCOPY;  Surgeon: Dom Leonardo MD;  Location: [facility] (4TH FLR);  Service: Endoscopy;  Laterality: N/A;  not given PM prep    COLONOSCOPY N/A 10/9/2017    Procedure: COLONOSCOPY;  Surgeon: RAMON Callahan MD;  Location:  [facility] (4TH FLR);  Service: Endoscopy;  Laterality: N/A;  ok for Prepopik per Dr Callahan    COLONOSCOPY N/A 11/20/2017    Procedure: COLONOSCOPY/EMR;  Surgeon: Joseluis Choe MD;  Location: [facility] (2ND FLR);  Service: Endoscopy;  Laterality: N/A;  EMR    no pm prep    COLONOSCOPY N/A 5/30/2018    Procedure: COLONOSCOPY;  Surgeon: Dom Leonardo MD;  Location: [facility] (4TH FLR);  Service: Endoscopy;  Laterality: N/A;  follow up colonoscopy in 5/2018 for Parada Syndrome         COLONOSCOPY N/A 6/10/2019    Procedure: COLONOSCOPY;  Surgeon: Dom Leonardo MD;  Location: [facility] (4TH FLR);  Service: Endoscopy;  Laterality: N/A;  Prepopik ordered per Dr. Leonardo    COLONOSCOPY N/A 7/22/2020    Procedure: COLONOSCOPY;  Surgeon: Dom Leonardo MD;  Location: [facility] END[facility] (4TH FLR);  Service: Endoscopy;  Laterality: N/A;    COLONOSCOPY N/A 5/27/2021    Procedure: COLONOSCOPY;  Surgeon: Dom Leonardo MD;  Location: [facility] (4TH FLR);  Service: Endoscopy;  Laterality: N/A;  covid test 5/24-[place]  pt requests Prepopik    COLONOSCOPY N/A 6/17/2022    Procedure: COLONOSCOPY;  Surgeon: Dom Leonardo MD;  Location: [facility] (4TH FLR);  Service: Endoscopy;  Laterality: N/A;  He was discovered to have colon cancer and had right hemicolectomy        for stage II on 08/08/2008. MSH2 Parada syndrome.  sutab requested  instructions via the portal -sm  fully vaccinated - sm    COLONOSCOPY N/A 6/22/2023    Procedure: COLONOSCOPY;  Surgeon: Dom Leonardo MD;  Location: [facility] (4TH FLR);  Service: Endoscopy;  Laterality: N/A;  see telephone encounter dated 5/24/23/ Pt/ Pt wife requested sutab - prep ins. on portal / Ozempic for DM- ERW    COLONOSCOPY N/A 2/6/2024    Procedure: COLONOSCOPY;  Surgeon: Dom Leonardo MD;  Location: [facility] END[facility] (4TH FLR);  Service: Endoscopy;  Laterality: N/A;  sutab/pt diabetic/ozempic/referral dr feng    COLONOSCOPY N/A 8/8/2024    Procedure: COLONOSCOPY;  Surgeon:  Dom Leonardo MD;  Location: [facility] (4TH FLR);  Service: Endoscopy;  Laterality: N/A;  8/2-pre call complete-sutab-ozempic last dose 7/30/24-tb    CYSTOSCOPY      CYSTOSCOPY N/A 1/31/2024    Procedure: CYSTOSCOPY;  Surgeon: Eron Llanes MD;  Location: [facility] OR 1ST FLR;  Service: Urology;  Laterality: N/A;    Epidural Steroid Injection  10/23/15    Lumbar    EPIDURAL STEROID INJECTION INTO LUMBAR SPINE N/A 7/27/2018    Procedure: Injection-steroid-epidural-lumbar;  Surgeon: Lyndon Brooke Jr., MD;  Location: [facility] OR;  Service: Pain Management;  Laterality: N/A;    ESOPHAGOGASTRODUODENOSCOPY      ESOPHAGOGASTRODUODENOSCOPY N/A 7/22/2020    Procedure: EGD (ESOPHAGOGASTRODUODENOSCOPY);  Surgeon: Dom Leonardo MD;  Location: [facility] (4TH FLR);  Service: Endoscopy;  Laterality: N/A;  Please schedule patient for EGD and colonoscopy with me MSH2 Parada syndrome and anemia evaluation please make priority 1  covid test 7/20-[place]    ESOPHAGOGASTRODUODENOSCOPY N/A 6/22/2023    Procedure: EGD (ESOPHAGOGASTRODUODENOSCOPY);  Surgeon: Dom Leonardo MD;  Location: [facility] (4TH FLR);  Service: Endoscopy;  Laterality: N/A;  see telephone encounter dated 5/24/23 6/16/23-[place] completed appointment confirmed-    ESOPHAGOGASTRODUODENOSCOPY N/A 9/1/2023    Procedure: EGD (ESOPHAGOGASTRODUODENOSCOPY);  Surgeon: Dom Leonardo MD;  Location: [facility] (4TH FLR);  Service: Endoscopy;  Laterality: N/A;  Instructions sent via portal / Ozempic / Extended Clear Liquid prep    EXCISION OR SURGICAL PLANING, SKIN, NOSE, FOR RHINOPHYMA Bilateral 3/7/2023    Procedure: EXCISION OR SURGICAL resection nasal skin cancer;  Surgeon: Alyx Spivey MD;  Location: [facility] OR 2ND FLR;  Service: ENT;  Laterality: Bilateral;    EXTRACTION - STONE Left 1/31/2024    Procedure: EXTRACTION - STONE;  Surgeon: Eron Llanes MD;  Location: [facility] OR [address] [place] [address];  Service: Urology;  Laterality: Left;    FACETECTOMY OF  VERTEBRA  2/13/2019    Procedure: MEDIAL FACETECTOMY L5-S1;  Surgeon: Lyndon Brooke Jr., MD;  Location: [facility];  Service: Orthopedics;;    GASTRIC BYPASS  2010    SLEEVE    KNEE ARTHROSCOPY Right     LUMBAR DISCECTOMY  2/13/2019    Procedure: DISCECTOMY, SPINE, LUMBAR L5-S1;  Surgeon: Lyndon Brooke Jr., MD;  Location: [facility];  Service: Orthopedics;;    NASAL RECONSTRUCTION N/A 3/21/2023    Procedure: RECONSTRUCTION, NOSE;  Surgeon: Alyx Spivey MD;  Location: [facility] OR 2ND FLR;  Service: ENT;  Laterality: N/A;    NASAL RECONSTRUCTION N/A 6/11/2024    Procedure: RECONSTRUCTION, NOSE;  Surgeon: Alyx Spivey MD;  Location: [facility] OR;  Service: ENT;  Laterality: N/A;    NASAL RECONSTRUCTION N/A 9/3/2024    Procedure: RECONSTRUCTION, NOSE;  Surgeon: Alyx Spivey MD;  Location: [facility] OR;  Service: ENT;  Laterality: N/A;    PLACEMENT-STENT Left 1/31/2024    Procedure: PLACEMENT-STENT;  Surgeon: Eron Llanes MD;  Location: [facility] OR 1ST FLR;  Service: Urology;  Laterality: Left;    RETROGRADE PYELOGRAPHY Left 1/31/2024    Procedure: PYELOGRAM, RETROGRADE;  Surgeon: Eron Llanes MD;  Location: [facility] OR 1ST FLR;  Service: Urology;  Laterality: Left;    REVISION OF FLAP GRAFT Bilateral 4/18/2023    Procedure: REVISION, PROCEDURE INVOLVING FLAP GRAFT;  Surgeon: Alyx Spivey MD;  Location: [facility] OR;  Service: ENT;  Laterality: Bilateral;    REVISION OF FLAP GRAFT N/A 6/29/2023    Procedure: REVISION, PROCEDURE INVOLVING FLAP GRAFT;  Surgeon: Alyx Spivey MD;  Location: [facility] OR 2ND FLR;  Service: ENT;  Laterality: N/A;    REVISION OF FLAP GRAFT N/A 8/29/2023    Procedure: REVISION, PROCEDURE INVOLVING FLAP GRAFT;  Surgeon: Alyx Spivey MD;  Location: [facility] OR;  Service: ENT;  Laterality: N/A;    REVISION OF FLAP GRAFT Bilateral 2/20/2024    Procedure: REVISION, PROCEDURE INVOLVING FLAP GRAFT;  Surgeon: Alyx Spivey MD;  Location: [facility] OR;  Service: ENT;  Laterality: Bilateral;    REVISION OF FLAP GRAFT N/A 5/7/2024     Procedure: REVISION, PROCEDURE INVOLVING FLAP GRAFT;  Surgeon: Alyx Spivey MD;  Location: [facility];  Service: ENT;  Laterality: N/A;    ROTATION FLAP SURGERY N/A 3/21/2023    Procedure: CREATION, FLAP, ROTATION;  Surgeon: Alyx Spivey MD;  Location: [facility] OR 2ND FLR;  Service: ENT;  Laterality: N/A;    ROTATION FLAP SURGERY Right 4/9/2024    Procedure: CREATION, FLAP, ROTATION;  Surgeon: Alyx Spivey MD;  Location: [facility];  Service: ENT;  Laterality: Right;    URETEROSCOPY Left 1/31/2024    Procedure: URETEROSCOPY;  Surgeon: Eron Llanes MD;  Location: [facility] 1ST FLR;  Service: Urology;  Laterality: Left;       Family History:   Family History   Problem Relation Name Age of Onset    Melanoma Mother  45        on lip    Breast cancer Mother  65        original around 65, second around 68    Heart disease Father      Diabetes Father      Liver disease Father      Hearing loss Father      Basal cell carcinoma Father      No Known Problems Sister          MSH2+    Polycystic ovary syndrome Daughter      Parada Syndrome Son          MSH2+    Colon cancer Maternal Uncle          4X    Heart disease Maternal Uncle      Hypertension Maternal Uncle      No Known Problems Paternal Aunt      Alcohol abuse Paternal Uncle Mason     Prostate cancer Paternal Uncle Mason 60    Dementia Maternal Grandmother      Colon cancer Maternal Grandfather  37    Diabetes Paternal Grandmother      Hypertension Paternal Grandfather      Prostatitis Paternal Grandfather      Colon cancer Maternal Uncle      Colon cancer Other      Colon cancer Other      Esophageal cancer Paternal Cousin  57    Psoriasis Neg Hx      Lupus Neg Hx      Eczema Neg Hx         Social History:  reports that he has never smoked. He has never used smokeless tobacco. He reports that he does not currently use alcohol. He reports that he does not use drugs.    Allergies:  Review of patient's allergies indicates:   Allergen Reactions     "Hydrocod-cpm-pe-acetaminophen Other (See Comments)     Irritability        Medications:  Current Outpatient Medications   Medication Sig Dispense Refill    ACCU-CHEK GUIDE TEST STRIPS Strp USE TO TEST TWICE A  strip 3    ascorbic acid, vitamin C, (VITAMIN C) 100 MG tablet Take 100 mg by mouth once daily.      aspirin (ECOTRIN) 81 MG EC tablet Take by mouth once daily. Aspirin 300 mg bid coded aspirin      azelaic acid (AZELEX) 15 % gel Apply to face BID. 50 g 5    cyanocobalamin, vitamin B-12, 2,500 mcg Lozg Place 2 tablets under the tongue once daily. 180 lozenge 3    doxycycline (PERIOSTAT) 20 MG tablet Take 2 pill po qday 180 tablet 3    ferrous gluconate (FERATE) 240 (27 FE) MG tablet Take 1 tablet (240 mg total) by mouth Daily. 100 each 0    ketoconazole (NIZORAL) 2 % shampoo Apply topically every 7 days. 120 mL 1    lancets (ACCU-CHEK SOFTCLIX LANCETS) Misc 1 Units by Misc.(Non-Drug; Combo Route) route 2 (two) times a day. 200 each 0    metFORMIN (GLUCOPHAGE-XR) 500 MG ER 24hr tablet Take 1 tablet (500 mg total) by mouth daily with breakfast. 90 tablet 3    metroNIDAZOLE (NORITATE) 1 % cream Compound azelaic acid 15% + ivermectin 1% + metronidazole 1% cream. Apply to face once or twice daily. 30 g 5    pen needle, diabetic (PEN NEEDLE) 32 gauge x 5/32" Ndle 1 each by Misc.(Non-Drug; Combo Route) route once daily. 90 each 3    pravastatin (PRAVACHOL) 20 MG tablet Take 1 tablet (20 mg total) by mouth once daily. 90 tablet 3    RABEprazole (ACIPHEX) 20 mg tablet Take 1 tablet (20 mg total) by mouth before breakfast. 90 tablet 3    semaglutide (OZEMPIC) 2 mg/dose (8 mg/3 mL) PnIj Inject 2 mg into the skin every 7 days. 3 each 1    sod sulf-pot chloride-mag sulf (SUTAB) 1.479-0.188- 0.225 gram tablet Take 12 tablets by mouth once daily. Take as directed by provider office 24 tablet 0    sulfacetamide sodium-sulfur 10-5 % (w/w) Clsr Use to wash face daily 170 g 5    tadalafiL (CIALIS) 5 MG tablet Take 1 " tablet (5 mg total) by mouth daily as needed for Erectile Dysfunction. 90 tablet 1    testosterone cypionate (DEPOTESTOTERONE CYPIONATE) 200 mg/mL injection Inject 60 mg into the muscle twice a week.      vitamin A 8000 UNIT capsule Take 1 capsule (8,000 Units total) by mouth once daily. 100 capsule 3    vitamin D (VITAMIN D3) 1000 units Tab Take 5,000 Units by mouth 3 (three) times daily.       No current facility-administered medications for this visit.       Review of Systems   Constitutional:  Negative for appetite change, chills, diaphoresis, fatigue, fever and unexpected weight change.   HENT:  Negative for mouth sores.    Eyes:  Negative for visual disturbance.   Respiratory:  Negative for cough and shortness of breath.    Cardiovascular:  Negative for chest pain and palpitations.   Gastrointestinal:  Negative for abdominal pain, constipation, diarrhea, nausea and vomiting.   Genitourinary:  Negative for frequency.   Musculoskeletal:  Negative for back pain.   Skin:  Negative for color change and rash.   Neurological:  Negative for headaches.   Hematological:  Negative for adenopathy. Does not bruise/bleed easily.   Psychiatric/Behavioral:  The patient is not nervous/anxious.        ECOG Performance Status: 0   Objective:      Vitals:   There were no vitals filed for this visit.      Physical Exam  Constitutional:       General: He is not in acute distress.     Appearance: He is well-developed. He is not ill-appearing, toxic-appearing or diaphoretic.   Neurological:      Mental Status: He is alert and oriented to person, place, and time.         Laboratory Data:  No visits with results within 1 Week(s) from this visit.   Latest known visit with results is:   Lab Visit on 09/19/2024   Component Date Value Ref Range Status    Vitamin B-12 09/19/2024 479  210 - 950 pg/mL Final    Ferritin 09/19/2024 151  20.0 - 300.0 ng/mL Final    Testosterone, Total 09/19/2024 441  304 - 1227 ng/dL Final    Hemoglobin A1C  09/19/2024 5.4  4.0 - 5.6 % Final    Comment: ADA Screening Guidelines:  5.7-6.4%  Consistent with prediabetes  >or=6.5%  Consistent with diabetes    High levels of fetal hemoglobin interfere with the HbA1C  assay. Heterozygous hemoglobin variants (HbS, HgC, etc)do  not significantly interfere with this assay.   However, presence of multiple variants may affect accuracy.      Estimated Avg Glucose 09/19/2024 108  68 - 131 mg/dL Final         Imaging:     Reviewed       Assessment:       1. Parada syndrome    2. MSH2-related Parada syndrome (HNPCC1)    3. 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 Sebaceous adenoma    5. Polycythemia             Plan:     H/O Colon Cancer - pt with prior stage II colon cancer s/p resection and 12 cycles of FOLFOX  -Pt seeing Dr Leonardo for repeat colonoscopies every 6 months with last 8/08/24  -Next colonoscopy due 2/2025  -Will monitor    Parada Syndrome - Pt previously seen to have MSH2 mutation in lab work 6/26/24  -Pt saw Genetecist Elizabeth Larson on 9/24/24  -PT following with Dr Leonardo for colon and Gastric cancer screening.  Last colonoscopy 8/8/24 and EGD 9/01/23  -Dr Stovall in Urology following with PSA and urinalysis  -Dr Rubio in dermatology performing skin exams    Sebaceous Adenoma - seen on biopsy of skin left supraclavicular area 8/27/24  -Likely from Parada  -Dermatology following    SCC of the Skin - pt following with dermatology and ENT    Polycythemia - unclear etiology  -Will monitor    Route Chart for Scheduling    Med Onc Chart Routing      Follow up with physician 6 months. Pt needs a CBC and CMP in 6 months with a return appt.   Follow up with EUGENE    Infusion scheduling note    Injection scheduling note    Labs    Imaging    Pharmacy appointment    Other referrals                   Mitch Trujillo MD  [facility]  Hematology and Oncology  [facility]   [address] [facility] [place] [address]   O: [phone]  F:  [phone]

## 2024-09-30 ENCOUNTER — OFFICE VISIT (OUTPATIENT)
Dept: HEMATOLOGY/ONCOLOGY | Facility: CLINIC | Age: 54
End: 2024-09-30
Payer: COMMERCIAL

## 2024-09-30 DIAGNOSIS — C44.321 SQUAMOUS CELL CANCER OF SKIN OF NASAL TIP: ICD-10-CM

## 2024-09-30 DIAGNOSIS — D23.9 SEBACEOUS ADENOMA: ICD-10-CM

## 2024-09-30 DIAGNOSIS — Z15.09 LYNCH SYNDROME: Primary | ICD-10-CM

## 2024-09-30 DIAGNOSIS — D75.1 POLYCYTHEMIA: ICD-10-CM

## 2024-09-30 DIAGNOSIS — Z15.09 MSH2-RELATED LYNCH SYNDROME (HNPCC1): ICD-10-CM

## 2024-09-30 PROCEDURE — 99213 OFFICE O/P EST LOW 20 MIN: CPT | Mod: 95,,, | Performed by: INTERNAL MEDICINE

## 2024-11-18 ENCOUNTER — PATIENT MESSAGE (OUTPATIENT)
Dept: ADMINISTRATIVE | Facility: HOSPITAL | Age: 54
End: 2024-11-18
Payer: COMMERCIAL

## 2024-11-19 ENCOUNTER — TELEPHONE (OUTPATIENT)
Dept: ENDOSCOPY | Facility: HOSPITAL | Age: 54
End: 2024-11-19
Payer: COMMERCIAL

## 2024-11-19 DIAGNOSIS — Z85.038 HISTORY OF COLON CANCER: ICD-10-CM

## 2024-11-19 DIAGNOSIS — Z15.09 LYNCH SYNDROME: Primary | ICD-10-CM

## 2024-11-19 NOTE — TELEPHONE ENCOUNTER
Spoke to pts wife to schedule colonoscopy. Pt not due til Feb 2025. Pt will call back weekly to see if feb schedule available.

## 2024-11-21 ENCOUNTER — PATIENT OUTREACH (OUTPATIENT)
Dept: ADMINISTRATIVE | Facility: HOSPITAL | Age: 54
End: 2024-11-21
Payer: COMMERCIAL

## 2024-11-21 NOTE — PROGRESS NOTES
Population Health Chart Review & Patient Outreach Details      Additional [facility] Health Notes:    CAMPAIGN- Preventative Care Screening           Updates Requested / Reviewed:               Health Maintenance Topics Overdue:      VBHM Score: 0     Patient is not due for any topics at this time.                       Health Maintenance Topic(s) Outreach Outcomes & Actions Taken:    Colorectal Cancer Screening - Outreach Outcomes & Actions Taken  : Colonoscopy Case Request / Referral / Home Test Order Placed and informed patient to call gastro to schedule 6 month cscope due in Feb

## 2024-11-28 DIAGNOSIS — L73.8 PITYROSPORUM FOLLICULITIS: ICD-10-CM

## 2024-11-28 DIAGNOSIS — L71.9 ROSACEA: ICD-10-CM

## 2024-11-29 RX ORDER — QUINIDINE GLUCONATE 324 MG
240 TABLET, EXTENDED RELEASE ORAL DAILY
Qty: 100 EACH | Refills: 0 | Status: SHIPPED | OUTPATIENT
Start: 2024-11-29

## 2024-12-02 ENCOUNTER — OFFICE VISIT (OUTPATIENT)
Dept: OTOLARYNGOLOGY | Facility: CLINIC | Age: 54
End: 2024-12-02
Payer: COMMERCIAL

## 2024-12-02 VITALS
HEART RATE: 62 BPM | WEIGHT: 252.88 LBS | SYSTOLIC BLOOD PRESSURE: 139 MMHG | BODY MASS INDEX: 32.47 KG/M2 | DIASTOLIC BLOOD PRESSURE: 87 MMHG

## 2024-12-02 DIAGNOSIS — M95.0 ACQUIRED NASAL DEFORMITY: ICD-10-CM

## 2024-12-02 DIAGNOSIS — L90.5 SCAR OF NOSE: Primary | ICD-10-CM

## 2024-12-02 DIAGNOSIS — J34.89 NASAL VALVE STENOSIS: ICD-10-CM

## 2024-12-02 PROCEDURE — 99024 POSTOP FOLLOW-UP VISIT: CPT | Mod: S$GLB,,, | Performed by: OTOLARYNGOLOGY

## 2024-12-02 PROCEDURE — 99999 PR PBB SHADOW E&M-EST. PATIENT-LVL IV: CPT | Mod: PBBFAC,,, | Performed by: OTOLARYNGOLOGY

## 2024-12-02 RX ORDER — KETOCONAZOLE 20 MG/ML
SHAMPOO, SUSPENSION TOPICAL
Qty: 120 ML | Refills: 1 | Status: SHIPPED | OUTPATIENT
Start: 2024-12-02

## 2024-12-02 NOTE — PROGRESS NOTES
History of Present Illness:   Lyndon Lion is a 54 y.o. year old male evaluated in the Otolaryngology-Head and Neck Surgery Clinic at [facility]. The patient is well known to me status post 10 surgeries including revision.  Most recent included revision and debulking of melolabial flap and creation of ala crease 09/03/2024 with prior melolabial flap for coverage of columellar defect 04/09/2024 and division and inset of melolabial flap 05/09/2024 with subsequent septal cartilage harvest and columellar strut on 06/11/2024.  Patient here for follow-up.  He is very pleased with final results.  He does not wish any further revision surgeries.  His only concern as indentation at melolabial flap harvest on the right cheek.  He is concerned about this progressing in the future.  Still has a little nasal obstruction on the right with crusting and more difficulty cleaning the right nostril.     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   Surgeries:   Staged reconstruction with paramedian forehead flap auricular cartilage graft and septal extension grafts 03/21/2023   Division and inset of paramedian forehead flap 4/18/23   Debulking and opening of vestibular stenosis 06/29/2023   Flap debulking of tip and ala margin notching corrected 08/29/2023   interpolated melolabial flap for columella 4/9/24  Division and inset of melolabial flap 05/07/2024  Remainder in HPI above       Past Medical/Surgical History  Past Medical History:   Diagnosis Date    Arthritis     Asthma     AS A CHILD    Colon cancer     Family hx of prostate cancer 11/22/2016    Hx of colon cancer, stage I 07/03/2014    Parada syndrome     Squamous cell carcinoma of skin     Tear of labium 10/2020    left shoulder Dr Molina    Uncontrolled type 2 diabetes mellitus with hyperglycemia 03/04/2021    Vitamin D deficiency     Wears glasses      His  has a past surgical history that includes Gastric  bypass (2010); Colon surgery (2008); Knee arthroscopy (Right); Appendectomy; Colonoscopy (Bilateral, 2012); Epidural Steroid Injection (10/23/15); Colonoscopy (N/A, 8/1/2016); Colonoscopy (N/A, 9/20/2017); Colonoscopy (N/A, 10/9/2017); Esophagogastroduodenoscopy; Colonoscopy (N/A, 11/20/2017); Colonoscopy (N/A, 5/30/2018); Epidural steroid injection into lumbar spine (N/A, 7/27/2018); Caudal epidural steroid injection (N/A, 11/6/2018); Lumbar discectomy (2/13/2019); Facetectomy of vertebra (2/13/2019); Colonoscopy (N/A, 6/10/2019); Esophagogastroduodenoscopy (N/A, 7/22/2020); Colonoscopy (N/A, 7/22/2020); Colonoscopy (N/A, 5/27/2021); Cystoscopy; Colonoscopy (N/A, 6/17/2022); excision or surgical planing, skin, nose, for rhinophyma (Bilateral, 3/7/2023); Nasal reconstruction (N/A, 3/21/2023); Application of cartilage graft (N/A, 3/21/2023); Rotation flap surgery (N/A, 3/21/2023); Revision of flap graft (Bilateral, 4/18/2023); Esophagogastroduodenoscopy (N/A, 6/22/2023); Colonoscopy (N/A, 6/22/2023); Revision of flap graft (N/A, 6/29/2023); Revision of flap graft (N/A, 8/29/2023); Esophagogastroduodenoscopy (N/A, 9/1/2023); Colonoscopy (N/A, 2/6/2024); Cystoscopy (N/A, 1/31/2024); Ureteroscopy (Left, 1/31/2024); extraction - stone (Left, 1/31/2024); placement-stent (Left, 1/31/2024); Retrograde pyelography (Left, 1/31/2024); Revision of flap graft (Bilateral, 2/20/2024); Rotation flap surgery (Right, 4/9/2024); Revision of flap graft (N/A, 5/7/2024); Nasal reconstruction (N/A, 6/11/2024); Colonoscopy (N/A, 8/8/2024); and Nasal reconstruction (N/A, 9/3/2024).     Past Family/Social History  His family history includes Alcohol abuse in his paternal uncle; Basal cell carcinoma in his father; Breast cancer (age of onset: 65) in his mother; Colon cancer in his maternal uncle, maternal uncle and other family members; Colon cancer (age of onset: 37) in his maternal grandfather; Dementia in his maternal grandmother; Diabetes  "in his father and paternal grandmother; Esophageal cancer (age of onset: 57) in his paternal cousin; Hearing loss in his father; Heart disease in his father and maternal uncle; Hypertension in his maternal uncle and paternal grandfather; Liver disease in his father; Parada Syndrome in his son; Melanoma (age of onset: 45) in his mother; No Known Problems in his paternal aunt and sister; Polycystic ovary syndrome in his daughter; Prostate cancer (age of onset: 60) in his paternal uncle; Prostatitis in his paternal grandfather.  He  reports that he has never smoked. He has never used smokeless tobacco. He reports that he does not currently use alcohol. He reports that he does not use drugs.     Medications/Allergies/Immunizations  His current medication(s) include:   Current Outpatient Medications   Medication Sig Dispense Refill    ACCU-CHEK GUIDE TEST STRIPS Strp USE TO TEST TWICE A  strip 3    ascorbic acid, vitamin C, (VITAMIN C) 100 MG tablet Take 100 mg by mouth once daily.      aspirin (ECOTRIN) 81 MG EC tablet Take by mouth once daily. Aspirin 300 mg bid coded aspirin      azelaic acid (AZELEX) 15 % gel Apply to face BID. 50 g 5    cyanocobalamin, vitamin B-12, 2,500 mcg Lozg Place 2 tablets under the tongue once daily. 180 lozenge 3    doxycycline (PERIOSTAT) 20 MG tablet Take 2 pill po qday 180 tablet 3    ferrous gluconate (FERATE) 240 (27 FE) MG tablet Take 1 tablet (240 mg total) by mouth Daily. 100 each 0    lancets (ACCU-CHEK SOFTCLIX LANCETS) Misc 1 Units by Misc.(Non-Drug; Combo Route) route 2 (two) times a day. 200 each 0    metFORMIN (GLUCOPHAGE-XR) 500 MG ER 24hr tablet Take 1 tablet (500 mg total) by mouth daily with breakfast. 90 tablet 3    pen needle, diabetic (PEN NEEDLE) 32 gauge x 5/32" Ndle 1 each by Misc.(Non-Drug; Combo Route) route once daily. 90 each 3    pravastatin (PRAVACHOL) 20 MG tablet Take 1 tablet (20 mg total) by mouth once daily. 90 tablet 3    RABEprazole (ACIPHEX) 20 mg " tablet Take 1 tablet (20 mg total) by mouth before breakfast. 90 tablet 3    semaglutide (OZEMPIC) 2 mg/dose (8 mg/3 mL) PnIj Inject 2 mg into the skin every 7 days. 3 each 1    sod sulf-pot chloride-mag sulf (SUTAB) 1.479-0.188- 0.225 gram tablet Take 12 tablets by mouth once daily. Take as directed by provider office 24 tablet 0    sulfacetamide sodium-sulfur 10-5 % (w/w) Clsr Use to wash face daily 170 g 5    tadalafiL (CIALIS) 5 MG tablet Take 1 tablet (5 mg total) by mouth daily as needed for Erectile Dysfunction. 90 tablet 1    testosterone cypionate (DEPOTESTOTERONE CYPIONATE) 200 mg/mL injection Inject 60 mg into the muscle twice a week.      vitamin D (VITAMIN D3) 1000 units Tab Take 5,000 Units by mouth 3 (three) times daily.      ketoconazole (NIZORAL) 2 % shampoo APPLY TOPICALLY EVERY 7 DAYS 120 mL 1    metroNIDAZOLE (NORITATE) 1 % cream Compound azelaic acid 15% + ivermectin 1% + metronidazole 1% cream. Apply to face once or twice daily. 30 g 5    vitamin A 8000 UNIT capsule Take 1 capsule (8,000 Units total) by mouth once daily. 100 capsule 3     No current facility-administered medications for this visit.        Allergies: Hydrocod-cpm-pe-acetaminophen     Immunizations:   Immunization History   Administered Date(s) Administered    COVID-19, MRNA, LN-S, PF (Pfizer) (Gray Cap) 06/01/2022    COVID-19, MRNA, LN-S, PF (Pfizer) (Purple Cap) 04/08/2021, 04/29/2021, 01/03/2022    Hepatitis A / Hepatitis B 11/22/2016, 12/28/2016, 05/18/2017    Influenza 10/30/2008, 09/29/2009, 12/14/2011    Influenza - Quadrivalent - PF *Preferred* (6 months and older) 09/29/2009, 12/14/2011, 11/11/2016, 12/08/2017, 11/04/2022    Influenza - Trivalent - Afluria, Fluzone MDV 10/30/2008    Influenza - Trivalent - Fluarix, Flulaval, Fluzone, Afluria - PF 09/29/2009    Influenza Split 10/30/2008, 12/14/2011    Pneumococcal Conjugate - 20 Valent 09/19/2022    Td - PF (ADULT) 11/11/2016         Review of Systems   Constitutional:  Negative for fever, weight loss and weight gain.  Skin: Negative for rash, itchiness, dryness  HENT:  As per HPI  Cardiovascular: Negative for chest pain and dyspnea on exertion .   Respiratory: Is not experiencing shortness of breath.   Gastrointestinal: Negative for nausea and vomiting.   Neurological: Negative for headaches.   Lymph/Heme: Negative for lymphadenopathy or easy bruising  Musculoskeletal: Negative for joint or muscle pain  Psychiatric: The patient is not nervous/anxious.        All other systems are negative except for that listed in the HPI.      PHYSICAL EXAM:   Vital Signs:  /87 (BP Location: Right arm, Patient Position: Sitting)   Pulse 62   Wt 114.7 kg (252 lb 13.9 oz)   BMI 32.47 kg/m²      General:  Well-developed, well-nourished  Communication and Voice:  Clear pitch and clarity  Hearing: Hearing adequate for verbal communication bilaterally   Inspection:  Normocephalic and atraumatic without mass or lesion forehead scarring at paramedian forehead flap harvest  Palpation:  Facial skeleton intact without bony stepoffs  Parotid Glands:  No mass or tenderness  Facial Strength:  Facial motility symmetric and full bilaterally  Pinna:  External ear intact and fully developed  External canal:  Canal is patent with intact skin  Tympanic Membrane:  Clear and mobile  External nose:  Scarring with well incorporated forehead flap with viable cartilage graft with viable columellar strut and melolabial flap as well  Internal Nose:  Septum intact and midline.  There is vestibular stenosis and crusting on the right   TMJ:  No pain to palpation with full mobility  Oral cavity, Lips, Teeth, and Gums:  Mucosa and teeth intact and viable, No lesions, masses or ulcers  Oropharynx: No erythema or exudate, no masses or ulcerations, non-obstructive tonsils  Nasopharynx:  No mass or lesion with intact mucosa  Hypopharynx:  Not well visualized secondary to gagging  Larynx:  Not well visualized secondary to  gagging  Neck, Trachea, Lymphatics:  Midline trachea without mass or lesion, no lymphadenopathy  Thyroid:  No mass or nodularity  Eyes: No nystagmus with equal extraocular motion bilaterally  Neuro/Psych/Balance: Patient oriented and appropriate in interaction;  Appropriate mood and affect;  Gait is intact with no imbalance; Cranial nerves I-XII are intact  Respiratory effort:  Equal inspiration and expiration without stridor  Peripheral Vascular:  Warm extremities with equal pulses      PATHOLOGY REVIEW:    NASAL CAVITY AND PARANASAL SINUSES:    SPECIMEN      Procedure       Excision      Tumor Focality       Unifocal      Tumor Laterality       Midline      Tumor Size       Greatest Dimension (Centimeters) 4.1 cm          Squamous Cell Carcinoma and Variants           Verrucous squamous  cell carcinoma      Histologic Grade       G1: Well differentiated      Lymphovascular Invasion       Not Identified      Perineural Invasion       Not identified      Margins       Uninvolved by invasive tumor; the closest margin is deep  margin         Distance from Closest Margin (Millimeters) At least 0.5 mm      Regional Lymph Node Status:      All regional lymph nodes negative for  tumor      Number of Lymph Nodes Examined:      5      pT Category:           pT1      pN Category:           pN0      ASSESSMENT:   1. Scar of nose    2. Acquired nasal deformity    3. Nasal valve stenosis            PLAN:   Patient is status post multiple revisions for nasal reconstruction.  He is doing well and feels that we are at a good point and desires no further revisions.  I briefly discussed with him filler versus fat grafting for melolabial harvest site.  He will consider this in the future.  Photo documentation obtained is included in the note from original tumor to defect the final results.  Follow up p.r.n.    I believe that Mr. Lion has a good understanding of the issues involved and I answered all of his questions.      DISCLAIMER: This note was prepared with [employer] voice recognition transcription software. Garbled syntax, mangled pronouns, and other bizarre constructions may be attributed to that software system. While efforts were made to correct any mistakes made by this voice recognition program, some errors and/or omissions may remain in the note that were missed when the note was originally created.

## 2024-12-13 ENCOUNTER — TELEPHONE (OUTPATIENT)
Dept: ENDOSCOPY | Facility: HOSPITAL | Age: 54
End: 2024-12-13
Payer: COMMERCIAL

## 2024-12-13 VITALS — BODY MASS INDEX: 32.34 KG/M2 | WEIGHT: 252 LBS | HEIGHT: 74 IN

## 2024-12-13 DIAGNOSIS — Z12.11 SPECIAL SCREENING FOR MALIGNANT NEOPLASMS, COLON: Primary | ICD-10-CM

## 2024-12-13 RX ORDER — SOD SULF/POT CHLORIDE/MAG SULF 1.479 G
12 TABLET ORAL DAILY
Qty: 24 TABLET | Refills: 0 | Status: SHIPPED | OUTPATIENT
Start: 2024-12-13

## 2024-12-13 NOTE — TELEPHONE ENCOUNTER
rom: Cathie Noble MA   Sent: 11/26/2024  11:45 AM CST   To: [facility] Endoscopy Schedulers   Subject: FW: Calling to schedule colonoscopy              Patient would like to speak with someone in endoscopy scheduling.   Dianna   ----- Message -----   From: Anna Luevano   Sent: 11/26/2024  10:18 AM CST   To: [facility] Staff   Subject: Calling to schedule colonoscopy                  Type:  Needs Medical Advice     Who Called: Katheryn Campa calling to schedule Lyndno a colonoscopy   She states the referral is in   There is no referral in the Epic   Would the patient rather a call back or a response via [facility]? Call back   Best Call Back Number: [phone]   Additional Information:     Called and lvm with call back number informing pt February schedule was open

## 2024-12-13 NOTE — TELEPHONE ENCOUNTER
Referral for procedure from  last procedure report - Pt due for follow up procedure      Spoke to patient's wife to schedule procedure(s) Colonoscopy       Physician to perform procedure(s) Dr. ANALILIA Leonardo  Date of Procedure (s) 2/11/25  Arrival Time 7:50 AM  Time of Procedure(s) 8:50 AM   Location of Procedure(s) [facility] [address]  Type of Rx Prep sent to patient: Sutab  Instructions provided to patient via [facility]    Patient was informed on the following information and verbalized understanding. Screening questionnaire reviewed with patient and complete. If procedure requires anesthesia, a responsible adult needs to be present to accompany the patient home, patient cannot drive after receiving anesthesia. Appointment details are tentative, especially check-in time. Patient will receive a prep-op call 7 days prior to confirm check-in time for procedure. If applicable the patient should contact their pharmacy to verify Rx for procedure prep is ready for pick-up. Patient was advised to call the scheduling department at [phone] if pharmacy states no Rx is available. Patient was advised to call the endoscopy scheduling department if any questions or concerns arise.      [facility]oscopy Scheduling Department

## 2024-12-17 ENCOUNTER — TELEPHONE (OUTPATIENT)
Dept: FAMILY MEDICINE | Facility: CLINIC | Age: 54
End: 2024-12-17
Payer: COMMERCIAL

## 2024-12-17 NOTE — TELEPHONE ENCOUNTER
----- Message from Lavinia sent at 12/16/2024 12:37 PM CST -----  Contact: Daniella DALTON  Type: Needs Medical Advice  Who Called:  Daniella Johnson Call Back Number: [phone]   Additional Information: Pt is scheduled for surgery on Wednesday and they have requested instructions for pt to hold his Ozempic twice by fax and has not heard anything back. Can we please check on this and call back to advise. Thank you

## 2024-12-20 ENCOUNTER — TELEPHONE (OUTPATIENT)
Dept: ORTHOPEDICS | Facility: CLINIC | Age: 54
End: 2024-12-20
Payer: COMMERCIAL

## 2024-12-20 DIAGNOSIS — R52 PAIN: Primary | ICD-10-CM

## 2024-12-23 ENCOUNTER — HOSPITAL ENCOUNTER (OUTPATIENT)
Dept: RADIOLOGY | Facility: OTHER | Age: 54
Discharge: HOME | End: 2024-12-23
Attending: SPECIALIST/TECHNOLOGIST
Payer: COMMERCIAL

## 2024-12-23 ENCOUNTER — OFFICE VISIT (OUTPATIENT)
Dept: ORTHOPEDICS | Facility: CLINIC | Age: 54
End: 2024-12-23
Payer: COMMERCIAL

## 2024-12-23 DIAGNOSIS — S63.286A CLOSED TRAUMATIC DISLOCATION OF PROXIMAL INTERPHALANGEAL (PIP) JOINT OF RIGHT LITTLE FINGER: Primary | ICD-10-CM

## 2024-12-23 DIAGNOSIS — R52 PAIN: ICD-10-CM

## 2024-12-23 PROCEDURE — 73140 X-RAY EXAM OF FINGER(S): CPT | Mod: 26,RT,, | Performed by: RADIOLOGY

## 2024-12-23 PROCEDURE — 99204 OFFICE O/P NEW MOD 45 MIN: CPT | Mod: S$GLB,,, | Performed by: SPECIALIST/TECHNOLOGIST

## 2024-12-23 PROCEDURE — 73140 X-RAY EXAM OF FINGER(S): CPT | Mod: TC,FY,RT

## 2024-12-23 PROCEDURE — 99999 PR PBB SHADOW E&M-EST. PATIENT-LVL III: CPT | Mod: PBBFAC,,, | Performed by: SPECIALIST/TECHNOLOGIST

## 2024-12-23 NOTE — PROGRESS NOTES
Patient ID: Lyndon Lion Jr. is a 54 y.o. male.    Chief Complaint: Swelling and Pain of the Right Hand    History of Present Illness    CHIEF COMPLAINT:  - Lyndon presents today for new evaluation of right small finger injury status post dislocation that occurred on December 6th.    HPI:  Lyndon presents for evaluation of a right pinky finger injury that occurred on December 6th. He reports falling down some stairs and dislocating the pinky finger at the PIP joint, stating that the finger was turned sideways. He immediately relocated the finger. The injury occurred while he was out of the country in [place].    He describes ongoing symptoms, including some swelling, stiffness, and pain, but notes that these symptoms are not severe. He reports improvement since the injury. He experienced difficulty writing during the first week after the injury but reports this has improved.    Lyndon has a history of similar injuries from playing football and denies any previous fractures requiring surgery. He is right-handed, which is relevant to the current injury. He denies any numbness, tingling, or issues with sensation in the affected finger.      ROS:  ROS as indicated in HPI.          Hand/Wrist Musculoskeletal Exam    Inspection    Right      Erythema: none      Ecchymosis: none      Edema: mild (PIP joint of the right small finger)      Deformity: none    Left      Erythema: none      Ecchymosis: none      Edema: none      Deformity: none    Palpation    Right      Small tenderness to palpation: proximal interphalangeal joint    Range of Motion        Range of motion additional comments: Able to make a composite fist.    Neurovascular    Right       Radial pulse: normal      Capillary refill: brisk      Ulnar nerve sensory distribution: normal      Median nerve sensory distribution: normal      Superficial radial nerve sensory distribution: normal    Left       Radial pulse: normal      Capillary refill: brisk       Ulnar nerve sensory distribution: normal      Median nerve sensory distribution: normal      Superficial radial nerve sensory distribution: normal    Neurovascular additional comments:         Physical Exam    Musculoskeletal: Tenderness on sides of fingers. No tenderness proximally.  IMAGING:  - X-rays right hand (multiple views) : No fractures or dislocations seen           IMAGING  Right Little finger XR  Personal interpretation of the XR reveals no signs of fractures or dislocations      PLAN  Assessment & Plan    - Recommend wearing a compression sleeve to help with swelling and stiffness in the injured finger.  - Reviewed X-rays of the patient's right hand, which showed no fractures or dislocations.  - Performed physical exam of the right hand, including range of motion tests and strength assessments.        Patient we will follow up in clinic p.r.n.    Fer Virk PA-C, ATC  Hand and Upper Extremity   [facility]    This note was generated with the assistance of ambient listening technology. Verbal consent was obtained by the patient and accompanying visitor(s) for the recording of patient appointment to facilitate this note. I attest to having reviewed and edited the generated note for accuracy, though some syntax or spelling errors may persist. Please contact the author of this note for any clarification.

## 2025-01-09 DIAGNOSIS — Z12.11 COLON CANCER SCREENING: ICD-10-CM

## 2025-01-09 RX ORDER — SOD SULF/POT CHLORIDE/MAG SULF 1.479 G
12 TABLET ORAL DAILY
Qty: 24 TABLET | Refills: 0 | Status: SHIPPED | OUTPATIENT
Start: 2025-01-09

## 2025-01-26 DIAGNOSIS — L73.8 PITYROSPORUM FOLLICULITIS: ICD-10-CM

## 2025-01-27 RX ORDER — KETOCONAZOLE 20 MG/ML
SHAMPOO, SUSPENSION TOPICAL
Qty: 120 ML | Refills: 1 | Status: SHIPPED | OUTPATIENT
Start: 2025-01-27

## 2025-02-04 NOTE — PROGRESS NOTES
Spoke to patient for pre-call to confirm scheduled Colonoscopy. Patient verbalized understanding of all instructions, including:  Patient is taking Ozempic (Semaglutide), instructed to take last dose on/before 2/3/25. Patient states last dose was 1/28/25 and verbalized understanding not to take again prior to procedure.

## 2025-02-05 ENCOUNTER — PATIENT OUTREACH (OUTPATIENT)
Dept: ADMINISTRATIVE | Facility: HOSPITAL | Age: 55
End: 2025-02-05
Payer: COMMERCIAL

## 2025-02-05 RX ORDER — PRAVASTATIN SODIUM 20 MG/1
20 TABLET ORAL DAILY
Qty: 90 TABLET | Refills: 3 | Status: SHIPPED | OUTPATIENT
Start: 2025-02-05 | End: 2026-02-05

## 2025-02-05 NOTE — PROGRESS NOTES
Pts order for statin therapy has .     REFILL ENCOUNTER SENT TO PCP     Statin Use in Persons with Diabetes -Patients aged 40-75 years with a diagnosis of diabetes: Type 1 or Type 2.  Needs any Intensity Statin during the calendar year    Statin Therapy for Patients with ASCVD-Percentage of males 21-75 years and Females 40-75 years old who were identified as having clinical atherosclerotic cardiovascular disease (ASCVD).  Needs at least one dispensing event for a high- or moderate-intensity statin medication in the measurement year.    Statin therapy for patients with an LDL >190 or were previously diagnosed with Familial or Pure Hypercholesterolema. -Patients aged 21-75 years             Needs at least one dispensing event for a high- or moderate-intensity statin medication in the measurement year.      Moderate-Intensity Statin Therapy  Atorvastatin 10-20 mg  Pitavastatin 1-4 mg  Simvastatin 20-40 mg  Rosuvastatin 5-10 mg  Pravastatin 40-80 mg  Lovastatin 40 mg  Fluvastatin 40 mg  Fluvastatin XL 80 mg    High-intensity Statin Therapy  Atorvastatin 40-80 mg  Rosuvastatin 20-40 mg  Simvastatin 80 mg    Exclusions: Intolerance needs a  code (myalgia, myositis, myopathy, or rhabdomyolysis) during the current measurement year AND problem list updated and reviewed.   G72.0  Drug-induced myopathy or rhabdomyolysis   G72.9  Myopathy, unspecified  M60.9  Myositis, unspecified  M79.10 Myalgia, unspecified site

## 2025-02-11 ENCOUNTER — ANESTHESIA (OUTPATIENT)
Dept: ENDOSCOPY | Facility: HOSPITAL | Age: 55
End: 2025-02-11
Payer: COMMERCIAL

## 2025-02-11 ENCOUNTER — HOSPITAL ENCOUNTER (OUTPATIENT)
Facility: HOSPITAL | Age: 55
Discharge: HOME | End: 2025-02-11
Attending: INTERNAL MEDICINE | Admitting: INTERNAL MEDICINE
Payer: COMMERCIAL

## 2025-02-11 ENCOUNTER — ANESTHESIA EVENT (OUTPATIENT)
Dept: ENDOSCOPY | Facility: HOSPITAL | Age: 55
End: 2025-02-11
Payer: COMMERCIAL

## 2025-02-11 VITALS
BODY MASS INDEX: 31.44 KG/M2 | HEART RATE: 64 BPM | SYSTOLIC BLOOD PRESSURE: 123 MMHG | TEMPERATURE: 98 F | HEIGHT: 74 IN | OXYGEN SATURATION: 97 % | DIASTOLIC BLOOD PRESSURE: 81 MMHG | RESPIRATION RATE: 16 BRPM | WEIGHT: 245 LBS

## 2025-02-11 DIAGNOSIS — Z15.09 LYNCH SYNDROME: ICD-10-CM

## 2025-02-11 LAB
GLUCOSE SERPL-MCNC: 91 MG/DL (ref 70–110)
POCT GLUCOSE: 91 MG/DL (ref 70–110)

## 2025-02-11 PROCEDURE — 37000009 HC ANESTHESIA EA ADD 15 MINS: Performed by: INTERNAL MEDICINE

## 2025-02-11 PROCEDURE — 37000008 HC ANESTHESIA 1ST 15 MINUTES: Performed by: INTERNAL MEDICINE

## 2025-02-11 PROCEDURE — E9220 PRA ENDO ANESTHESIA: HCPCS | Mod: ,,, | Performed by: NURSE ANESTHETIST, CERTIFIED REGISTERED

## 2025-02-11 PROCEDURE — G0105 COLORECTAL SCRN; HI RISK IND: HCPCS | Mod: ,,, | Performed by: INTERNAL MEDICINE

## 2025-02-11 PROCEDURE — 63600175 PHARM REV CODE 636 W HCPCS: Performed by: NURSE ANESTHETIST, CERTIFIED REGISTERED

## 2025-02-11 PROCEDURE — G0105 COLORECTAL SCRN; HI RISK IND: HCPCS | Performed by: INTERNAL MEDICINE

## 2025-02-11 RX ORDER — LIDOCAINE HYDROCHLORIDE 20 MG/ML
INJECTION INTRAVENOUS
Status: DISCONTINUED | OUTPATIENT
Start: 2025-02-11 | End: 2025-02-11

## 2025-02-11 RX ORDER — PROPOFOL 10 MG/ML
VIAL (ML) INTRAVENOUS CONTINUOUS PRN
Status: DISCONTINUED | OUTPATIENT
Start: 2025-02-11 | End: 2025-02-11

## 2025-02-11 RX ORDER — PROPOFOL 10 MG/ML
VIAL (ML) INTRAVENOUS
Status: DISCONTINUED | OUTPATIENT
Start: 2025-02-11 | End: 2025-02-11

## 2025-02-11 RX ORDER — SODIUM CHLORIDE 9 MG/ML
INJECTION, SOLUTION INTRAVENOUS CONTINUOUS
Status: DISCONTINUED | OUTPATIENT
Start: 2025-02-11 | End: 2025-02-11 | Stop reason: HOSPADM

## 2025-02-11 RX ADMIN — LIDOCAINE HYDROCHLORIDE 30 MG: 20 INJECTION INTRAVENOUS at 10:02

## 2025-02-11 RX ADMIN — PROPOFOL 150 MCG/KG/MIN: 10 INJECTION, EMULSION INTRAVENOUS at 10:02

## 2025-02-11 RX ADMIN — PROPOFOL 70 MG: 10 INJECTION, EMULSION INTRAVENOUS at 10:02

## 2025-02-11 RX ADMIN — PROPOFOL 30 MG: 10 INJECTION, EMULSION INTRAVENOUS at 10:02

## 2025-02-11 NOTE — H&P
[facility] 4[facility]  History & Physical    Subjective:      Chief Complaint/Reason for Admission:     Colonoscopy for High risk colon cancer surveillance: Personal                          history of adenoma with villous component, High                          risk colon cancer surveillance: Personal history                          of sessile serrated colon polyp (less than 10 mm                          in size) with no dysplasia, High risk colon cancer                          surveillance: Personal history of colon cancer   Comorbidities       History of right hemicolecton for stage II on 08/08/2008. MSH2 Parada        syndrome.     Lyndon Lion Jr. is a 54 y.o. male.    Past Medical History:   Diagnosis Date    Arthritis     Asthma     AS A CHILD    Colon cancer     Family hx of prostate cancer 11/22/2016    Hx of colon cancer, stage I 07/03/2014    Parada syndrome     Squamous cell carcinoma of skin     Tear of labium 10/2020    left shoulder Dr Molina    Uncontrolled type 2 diabetes mellitus with hyperglycemia 03/04/2021    Vitamin D deficiency     Wears glasses      Past Surgical History:   Procedure Laterality Date    APPENDECTOMY      APPLICATION OF CARTILAGE GRAFT N/A 3/21/2023    Procedure: APPLICATION, CARTILAGE GRAFT;  Surgeon: Alyx Spivey MD;  Location: [facility] 2ND FLR;  Service: ENT;  Laterality: N/A;    CAUDAL EPIDURAL STEROID INJECTION N/A 11/6/2018    Procedure: Injection-steroid-epidural-caudal;  Surgeon: Lyndon Brooke Jr., MD;  Location: [facility];  Service: Pain Management;  Laterality: N/A;    COLON SURGERY  2008    PARTIAL COLECTOMY    COLONOSCOPY Bilateral 2012    COLONOSCOPY N/A 8/1/2016    Procedure: COLONOSCOPY;  Surgeon: Blaze Marr MD;  Location: [facility];  Service: Endoscopy;  Laterality: N/A;    COLONOSCOPY N/A 9/20/2017    Procedure: COLONOSCOPY;  Surgeon: Dom Leonardo MD;  Location: [facility] (4TH FLR);  Service: Endoscopy;  Laterality: N/A;  not  given PM prep    COLONOSCOPY N/A 10/9/2017    Procedure: COLONOSCOPY;  Surgeon: RAMON Callahan MD;  Location: [facility] ENDO (4TH FLR);  Service: Endoscopy;  Laterality: N/A;  ok for Prepopik per Dr Callahan    COLONOSCOPY N/A 11/20/2017    Procedure: COLONOSCOPY/EMR;  Surgeon: Joseluis Choe MD;  Location: [facility] ENDO (2ND FLR);  Service: Endoscopy;  Laterality: N/A;  EMR    no pm prep    COLONOSCOPY N/A 5/30/2018    Procedure: COLONOSCOPY;  Surgeon: Dom Leonardo MD;  Location: [facility] ENDO (4TH FLR);  Service: Endoscopy;  Laterality: N/A;  follow up colonoscopy in 5/2018 for Parada Syndrome         COLONOSCOPY N/A 6/10/2019    Procedure: COLONOSCOPY;  Surgeon: Dom Leonardo MD;  Location: [facility] ENDO (4TH FLR);  Service: Endoscopy;  Laterality: N/A;  Prepopik ordered per Dr. Leonardo    COLONOSCOPY N/A 7/22/2020    Procedure: COLONOSCOPY;  Surgeon: Dom Leonardo MD;  Location: [facility] ENDO (4TH FLR);  Service: Endoscopy;  Laterality: N/A;    COLONOSCOPY N/A 5/27/2021    Procedure: COLONOSCOPY;  Surgeon: Dom Leonardo MD;  Location: [facility] ENDO (4TH FLR);  Service: Endoscopy;  Laterality: N/A;  covid test 5/24-[place]  pt requests Prepopik    COLONOSCOPY N/A 6/17/2022    Procedure: COLONOSCOPY;  Surgeon: Dom Leonardo MD;  Location: [facility] (4TH FLR);  Service: Endoscopy;  Laterality: N/A;  He was discovered to have colon cancer and had right hemicolectomy        for stage II on 08/08/2008. MSH2 Parada syndrome.  sutab requested  instructions via the portal -sm  fully vaccinated - sm    COLONOSCOPY N/A 6/22/2023    Procedure: COLONOSCOPY;  Surgeon: Dom Leonardo MD;  Location: [facility] ENDO (4TH FLR);  Service: Endoscopy;  Laterality: N/A;  see telephone encounter dated 5/24/23/ Pt/ Pt wife requested sutab - prep ins. on portal / Ozempic for DM- ERW    COLONOSCOPY N/A 2/6/2024    Procedure: COLONOSCOPY;  Surgeon: Dom Leonardo MD;  Location: [facility] ([address] [place] [address]);  Service: Endoscopy;  Laterality: N/A;   sutab/pt diabetic/ozempic/referral dr feng    COLONOSCOPY N/A 8/8/2024    Procedure: COLONOSCOPY;  Surgeon: Dom Leonardo MD;  Location: [facility] (4TH FLR);  Service: Endoscopy;  Laterality: N/A;  8/2-pre call complete-sutab-ozempic last dose 7/30/24-tb    CYSTOSCOPY      CYSTOSCOPY N/A 1/31/2024    Procedure: CYSTOSCOPY;  Surgeon: Eron Llanes MD;  Location: [facility] 1ST FLR;  Service: Urology;  Laterality: N/A;    Epidural Steroid Injection  10/23/15    Lumbar    EPIDURAL STEROID INJECTION INTO LUMBAR SPINE N/A 7/27/2018    Procedure: Injection-steroid-epidural-lumbar;  Surgeon: Lyndon Brooke Jr., MD;  Location[facility];  Service: Pain Management;  Laterality: N/A;    ESOPHAGOGASTRODUODENOSCOPY      ESOPHAGOGASTRODUODENOSCOPY N/A 7/22/2020    Procedure: EGD (ESOPHAGOGASTRODUODENOSCOPY);  Surgeon: Dom Leonardo MD;  Location: [facility] (4TH FLR);  Service: Endoscopy;  Laterality: N/A;  Please schedule patient for EGD and colonoscopy with me MSH2 Parada syndrome and anemia evaluation please make priority 1  covid test 7/20-[place]    ESOPHAGOGASTRODUODENOSCOPY N/A 6/22/2023    Procedure: EGD (ESOPHAGOGASTRODUODENOSCOPY);  Surgeon: Dom Leonardo MD;  Location: [facility] (4TH FLR);  Service: Endoscopy;  Laterality: N/A;  see telephone encounter dated 5/24/23 6/16/23-[place] completed appointment confirmed-    ESOPHAGOGASTRODUODENOSCOPY N/A 9/1/2023    Procedure: EGD (ESOPHAGOGASTRODUODENOSCOPY);  Surgeon: Dom Leonardo MD;  Location: [facility] (4TH FLR);  Service: Endoscopy;  Laterality: N/A;  Instructions sent via portal / Ozempic / Extended Clear Liquid prep    EXCISION OR SURGICAL PLANING, SKIN, NOSE, FOR RHINOPHYMA Bilateral 3/7/2023    Procedure: EXCISION OR SURGICAL resection nasal skin cancer;  Surgeon: Alyx Spivey MD;  Location: [facility] OR 2ND FLR;  Service: ENT;  Laterality: Bilateral;    EXTRACTION - STONE Left 1/31/2024    Procedure: EXTRACTION - STONE;   Surgeon: Eron Llanes MD;  Location: [facility] OR 1ST FLR;  Service: Urology;  Laterality: Left;    FACETECTOMY OF VERTEBRA  2/13/2019    Procedure: MEDIAL FACETECTOMY L5-S1;  Surgeon: Lyndon Brooke Jr., MD;  Location: [facility] OR;  Service: Orthopedics;;    GASTRIC BYPASS  2010    SLEEVE    KNEE ARTHROSCOPY Right     LUMBAR DISCECTOMY  2/13/2019    Procedure: DISCECTOMY, SPINE, LUMBAR L5-S1;  Surgeon: Lyndon Brooke Jr., MD;  Location: [facility] OR;  Service: Orthopedics;;    NASAL RECONSTRUCTION N/A 3/21/2023    Procedure: RECONSTRUCTION, NOSE;  Surgeon: Alyx Spivey MD;  Location: [facility] OR 2ND FLR;  Service: ENT;  Laterality: N/A;    NASAL RECONSTRUCTION N/A 6/11/2024    Procedure: RECONSTRUCTION, NOSE;  Surgeon: Alyx Spivey MD;  Location: [facility] OR;  Service: ENT;  Laterality: N/A;    NASAL RECONSTRUCTION N/A 9/3/2024    Procedure: RECONSTRUCTION, NOSE;  Surgeon: Alyx Spivey MD;  Location: [facility] OR;  Service: ENT;  Laterality: N/A;    PLACEMENT-STENT Left 1/31/2024    Procedure: PLACEMENT-STENT;  Surgeon: Eron Llanes MD;  Location: [facility] OR 1ST FLR;  Service: Urology;  Laterality: Left;    RETROGRADE PYELOGRAPHY Left 1/31/2024    Procedure: PYELOGRAM, RETROGRADE;  Surgeon: Eron Llanes MD;  Location: [facility] OR 1ST FLR;  Service: Urology;  Laterality: Left;    REVISION OF FLAP GRAFT Bilateral 4/18/2023    Procedure: REVISION, PROCEDURE INVOLVING FLAP GRAFT;  Surgeon: Alyx Spivey MD;  Location: [facility] OR;  Service: ENT;  Laterality: Bilateral;    REVISION OF FLAP GRAFT N/A 6/29/2023    Procedure: REVISION, PROCEDURE INVOLVING FLAP GRAFT;  Surgeon: Alyx Spivey MD;  Location: [facility] OR 2ND FLR;  Service: ENT;  Laterality: N/A;    REVISION OF FLAP GRAFT N/A 8/29/2023    Procedure: REVISION, PROCEDURE INVOLVING FLAP GRAFT;  Surgeon: Alyx Spivey MD;  Location: [facility];  Service: ENT;  Laterality: N/A;    REVISION OF FLAP GRAFT Bilateral 2/20/2024    Procedure: REVISION, PROCEDURE INVOLVING FLAP GRAFT;  Surgeon: aPtric  Alyx JAIMES MD;  Location: [facility];  Service: ENT;  Laterality: Bilateral;    REVISION OF FLAP GRAFT N/A 5/7/2024    Procedure: REVISION, PROCEDURE INVOLVING FLAP GRAFT;  Surgeon: Alyx Spivey MD;  Location: [facility] OR;  Service: ENT;  Laterality: N/A;    ROTATION FLAP SURGERY N/A 3/21/2023    Procedure: CREATION, FLAP, ROTATION;  Surgeon: Alyx Spivey MD;  Location: [facility] OR 2ND FLR;  Service: ENT;  Laterality: N/A;    ROTATION FLAP SURGERY Right 4/9/2024    Procedure: CREATION, FLAP, ROTATION;  Surgeon: Alyx Spivey MD;  Location: [facility];  Service: ENT;  Laterality: Right;    URETEROSCOPY Left 1/31/2024    Procedure: URETEROSCOPY;  Surgeon: Eron Llanes MD;  Location: [facility] OR 1ST FLR;  Service: Urology;  Laterality: Left;     Social History     Tobacco Use    Smoking status: Never    Smokeless tobacco: Never   Substance Use Topics    Alcohol use: Not Currently     Comment: RARELY    Drug use: No       PTA Medications   Medication Sig    SUTAB 1.479-0.188- 0.225 gram tablet TAKE 12 TABLETS BY MOUTH ONCE DAILY. TAKE AS DIRECTED BY PROVIDER OFFICE    ACCU-CHEK GUIDE TEST STRIPS Strp USE TO TEST TWICE A DAY    ascorbic acid, vitamin C, (VITAMIN C) 100 MG tablet Take 100 mg by mouth once daily.    aspirin (ECOTRIN) 81 MG EC tablet Take by mouth once daily. Aspirin 300 mg bid coded aspirin    azelaic acid (AZELEX) 15 % gel Apply to face BID.    cyanocobalamin, vitamin B-12, 2,500 mcg Lozg Place 2 tablets under the tongue once daily.    doxycycline (PERIOSTAT) 20 MG tablet Take 2 pill po qday    ferrous gluconate (FERATE) 240 (27 FE) MG tablet Take 1 tablet (240 mg total) by mouth Daily.    ketoconazole (NIZORAL) 2 % shampoo APPLY TOPICALLY EVERY 7 DAYS    lancets (ACCU-CHEK SOFTCLIX LANCETS) Misc 1 Units by Misc.(Non-Drug; Combo Route) route 2 (two) times a day.    metFORMIN (GLUCOPHAGE-XR) 500 MG ER 24hr tablet Take 1 tablet (500 mg total) by mouth daily with breakfast.    metroNIDAZOLE (NORITATE) 1 % cream Compound  "azelaic acid 15% + ivermectin 1% + metronidazole 1% cream. Apply to face once or twice daily.    pen needle, diabetic (PEN NEEDLE) 32 gauge x 5/32" Ndle 1 each by Misc.(Non-Drug; Combo Route) route once daily.    pravastatin (PRAVACHOL) 20 MG tablet Take 1 tablet (20 mg total) by mouth once daily.    RABEprazole (ACIPHEX) 20 mg tablet Take 1 tablet (20 mg total) by mouth before breakfast.    semaglutide (OZEMPIC) 2 mg/dose (8 mg/3 mL) PnIj Inject 2 mg into the skin every 7 days.    sulfacetamide sodium-sulfur 10-5 % (w/w) Clsr Use to wash face daily    tadalafiL (CIALIS) 5 MG tablet Take 1 tablet (5 mg total) by mouth daily as needed for Erectile Dysfunction.    testosterone cypionate (DEPOTESTOTERONE CYPIONATE) 200 mg/mL injection Inject 60 mg into the muscle twice a week.    vitamin A 8000 UNIT capsule Take 1 capsule (8,000 Units total) by mouth once daily.    vitamin D (VITAMIN D3) 1000 units Tab Take 5,000 Units by mouth 3 (three) times daily.     Review of patient's allergies indicates:   Allergen Reactions    Hydrocod-cpm-pe-acetaminophen Other (See Comments)     Irritability         Review of Systems   Constitutional:  Positive for fever.       Objective:      Vital Signs (Most Recent)  Temp: 97.7 °F (36.5 °C) (02/11/25 0913)  Pulse: 67 (02/11/25 0913)  Resp: 16 (02/11/25 0913)  BP: 122/85 (02/11/25 0913)  SpO2: 99 % (02/11/25 0913)    Vital Signs Range (Last 24H):  Temp:  [97.7 °F (36.5 °C)]   Pulse:  [67]   Resp:  [16]   BP: (122)/(85)   SpO2:  [99 %]     Physical Exam  Cardiovascular:      Rate and Rhythm: Normal rate.   Pulmonary:      Effort: Pulmonary effort is normal.   Neurological:      Mental Status: He is alert and oriented to person, place, and time.           Assessment:        Colonoscopy for High risk colon cancer surveillance: Personal                          history of adenoma with villous component, High                          risk colon cancer surveillance: Personal history               "            of sessile serrated colon polyp (less than 10 mm                          in size) with no dysplasia, High risk colon cancer                          surveillance: Personal history of colon cancer   Comorbidities       History of right hemicolecton for stage II on 08/08/2008. MSH2 Parada        syndrome.     Plan:        Colonoscopy for High risk colon cancer surveillance: Personal                          history of adenoma with villous component, High                          risk colon cancer surveillance: Personal history                          of sessile serrated colon polyp (less than 10 mm                          in size) with no dysplasia, High risk colon cancer                          surveillance: Personal history of colon cancer   Comorbidities       History of right hemicolecton for stage II on 08/08/2008. MSH2 Parada        syndrome.

## 2025-02-11 NOTE — PROVATION PATIENT INSTRUCTIONS
Discharge Summary/Instructions after an Endoscopic Procedure  Patient Name: Lyndon Lion  Patient MRN: [id number]  Patient YOB: 1970 Tuesday, February 11, 2025  Dom Leonardo MD  Dear patient,  As a result of recent federal legislation (The Federal Cures Act), you may   receive lab or pathology results from your procedure in your [facility]   account before your physician is able to contact you. Your physician or   their representative will relay the results to you with their   recommendations at their soonest availability.  Thank you,  RESTRICTIONS:  During your procedure today, you received medications for sedation.  These   medications may affect your judgment, balance and coordination.  Therefore,   for 24 hours, you have the following restrictions:   - DO NOT drive a car, operate machinery, make legal/financial decisions,   sign important papers or drink alcohol.    ACTIVITY:  Today: no heavy lifting, straining or running due to procedural   sedation/anesthesia.  The following day: return to full activity including work.  DIET:  Eat and drink normally unless instructed otherwise.     TREATMENT FOR COMMON SIDE EFFECTS:  - Mild abdominal pain, nausea, belching, bloating or excessive gas:  rest,   eat lightly and use a heating pad.  - Sore Throat: treat with throat lozenges and/or gargle with warm salt   water.  - Because air was used during the procedure, expelling large amounts of air   from your rectum or belching is normal.  - If a bowel prep was taken, you may not have a bowel movement for 1-3 days.    This is normal.  SYMPTOMS TO WATCH FOR AND REPORT TO YOUR PHYSICIAN:  1. Abdominal pain or bloating, other than gas cramps.  2. Chest pain.  3. Back pain.  4. Signs of infection such as: chills or fever occurring within 24 hours   after the procedure.  5. Rectal bleeding, which would show as bright red, maroon, or black stools.   (A tablespoon of blood from the rectum is not serious, especially  if   hemorrhoids are present.)  6. Vomiting.  7. Weakness or dizziness.  GO DIRECTLY TO THE NEAREST EMERGENCY ROOM IF YOU HAVE ANY OF THE FOLLOWING:      Difficulty breathing              Chills and/or fever over 101 F   Persistent vomiting and/or vomiting blood   Severe abdominal pain   Severe chest pain   Black, tarry stools   Bleeding- more than one tablespoon   Any other symptom or condition that you feel may need urgent attention  Your doctor recommends these additional instructions:  If any biopsies were taken, your doctors clinic will contact you in 1 to 2   weeks with any results.  - Discharge patient to home.   - Repeat colonoscopy in 1 year for surveillance.   - Return to primary care physician.   - The findings and recommendations were discussed with the patient.  For questions, problems or results please call your physician - Dom Leonardo MD at Work:  [phone].  [facility], EMERGENCY ROOM PHONE NUMBER: [phone]  IF A COMPLICATION OR EMERGENCY SITUATION ARISES AND YOU ARE UNABLE TO REACH   YOUR PHYSICIAN - GO DIRECTLY TO THE EMERGENCY ROOM.  Dom Leonardo MD  2/11/2025 10:44:05 AM  This report has been verified and signed electronically.  Dear patient,  As a result of recent federal legislation (The Federal Cures Act), you may   receive lab or pathology results from your procedure in your [facility]   account before your physician is able to contact you. Your physician or   their representative will relay the results to you with their   recommendations at their soonest availability.  Thank you,  PROVATION

## 2025-02-11 NOTE — TRANSFER OF CARE
"Anesthesia Transfer of Care Note    Patient: Lyndon Lion Jr.    Procedure(s) Performed: Procedure(s) (LRB):  COLONOSCOPY, SCREENING, HIGH RISK PATIENT (N/A)    Patient location: PACU    Anesthesia Type: general    Transport from OR: Transported from OR on room air with adequate spontaneous ventilation    Post pain: adequate analgesia    Post assessment: no apparent anesthetic complications and tolerated procedure well    Post vital signs: stable    Level of consciousness: sedated    Nausea/Vomiting: no nausea/vomiting    Complications: none    Transfer of care protocol was followed      Last vitals: Visit Vitals  /85 (BP Location: Left arm, Patient Position: Lying)   Pulse 67   Temp 36.5 °C (97.7 °F) (Temporal)   Resp 16   Ht 6' 2" (1.88 m)   Wt 111.1 kg (245 lb)   SpO2 99%   BMI 31.46 kg/m²     "

## 2025-02-11 NOTE — ANESTHESIA POSTPROCEDURE EVALUATION
Anesthesia Post Evaluation    Patient: Lyndon Lion Jr.    Procedure(s) Performed: Procedure(s) (LRB):  COLONOSCOPY, SCREENING, HIGH RISK PATIENT (N/A)    Final Anesthesia Type: general      Patient location during evaluation: PACU  Patient participation: Yes- Able to Participate  Level of consciousness: awake and alert and oriented  Pain management: adequate  Airway patency: patent    PONV status at discharge: No PONV  Anesthetic complications: no      Cardiovascular status: blood pressure returned to baseline and hemodynamically stable  Respiratory status: unassisted  Hydration status: euvolemic  Follow-up not needed.              Vitals Value Taken Time   /62 02/11/25 1034   Temp 36.8 °C (98.2 °F) 02/11/25 1034   Pulse 72 02/11/25 1037   Resp 16 02/11/25 1034   SpO2 94 % 02/11/25 1034         No case tracking events are documented in the log.      Pain/Karthikeyan Score: Karthikeyan Score: 5 (2/11/2025 10:34 AM)

## 2025-02-11 NOTE — ANESTHESIA PREPROCEDURE EVALUATION
02/11/2025  Lyndon Lion Jr. is a 54 y.o., male.      Patient Name: Lyndon Lion Jr.  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 is a 54 y.o. male who  has a past medical history of Arthritis, Asthma, Colon cancer, Family hx of prostate cancer (11/22/2016), colon cancer, stage I (07/03/2014), Parada syndrome, Squamous cell carcinoma of skin, Tear of labium (10/2020), Uncontrolled type 2 diabetes mellitus with hyperglycemia (03/04/2021), Vitamin D deficiency, and Wears glasses. No notes on file    ALLERGIES:     Review of patient's allergies indicates:   Allergen Reactions    Hydrocod-cpm-pe-acetaminophen Other (See Comments)     Irritability      MEDICATIONS:     Current Facility-Administered Medications   Medication Dose Route Frequency Provider Last Rate Last Admin    0.9% NaCl infusion   Intravenous Continuous Dom Leonardo MD              History:     Patient Active Problem List   Diagnosis    Radiculopathy, lumbosacral region    Herniated lumbar intervertebral disc    Lumbar spondylosis    DDD (degenerative disc disease), lumbosacral    Acute medial meniscal tear    Obesity, unspecified    Parada syndrome    Thoracic or lumbosacral neuritis or radiculitis, unspecified    Neural foraminal stenosis of lumbar spine    MSH2-related Parada syndrome (HNPCC1)    Spondylosis of lumbar region without myelopathy or radiculopathy    Spondylolisthesis of lumbar region    Fatty liver    Splenomegaly    Mild vitamin D deficiency    Colon  dysplasia    Colon adenoma    Pneumoperitoneum    Postpolypectomy electrocoagulation syndrome    Lumbar radiculopathy    History of colon cancer, stage II    Uncontrolled type 2 diabetes mellitus with hyperglycemia    Type 2 diabetes mellitus with hyperglycemia    BMI 32.0-32.9,adult    Gastroesophageal reflux disease without esophagitis    Bariatric surgery status    S/P laparoscopic sleeve gastrectomy    Vitamin D deficiency    Hypophosphatemia    Vitamin B12 deficiency    Vitamin A deficiency    Erectile dysfunction    Hypogonadism male    Squamous cell cancer of skin of nasal tip    Mohs defect    Acquired nasal deformity    Obstruction of nasal valve    Aspirin long-term use    Type 2 diabetes mellitus without complication, without long-term current use of insulin    H/O right hemicolectomy    Hyperlipidemia    Nephrolithiasis    Scar of nose    Polyp of colon    Nasal defect    Shane-Kathy syndrome with benign sebaceous neoplasm     Past Medical History:   Diagnosis Date    Arthritis     Asthma     AS A CHILD    Colon cancer     Family hx of prostate cancer 11/22/2016    Hx of colon cancer, stage I 07/03/2014    Parada syndrome     Squamous cell carcinoma of skin     Tear of labium 10/2020    left shoulder Dr Molina    Uncontrolled type 2 diabetes mellitus with hyperglycemia 03/04/2021    Vitamin D deficiency     Wears glasses      Surgical History:    has a past surgical history that includes Gastric bypass (2010); Colon surgery (2008); Knee arthroscopy (Right); Appendectomy; Colonoscopy (Bilateral, 2012); Epidural Steroid Injection (10/23/15); Colonoscopy (N/A, 8/1/2016); Colonoscopy (N/A, 9/20/2017); Colonoscopy (N/A, 10/9/2017); Esophagogastroduodenoscopy; Colonoscopy (N/A, 11/20/2017); Colonoscopy (N/A, 5/30/2018); Epidural steroid injection into lumbar spine (N/A, 7/27/2018); Caudal epidural steroid injection (N/A, 11/6/2018); Lumbar discectomy (2/13/2019); Facetectomy of vertebra (2/13/2019); Colonoscopy  (N/A, 6/10/2019); Esophagogastroduodenoscopy (N/A, 7/22/2020); Colonoscopy (N/A, 7/22/2020); Colonoscopy (N/A, 5/27/2021); Cystoscopy; Colonoscopy (N/A, 6/17/2022); excision or surgical planing, skin, nose, for rhinophyma (Bilateral, 3/7/2023); Nasal reconstruction (N/A, 3/21/2023); Application of cartilage graft (N/A, 3/21/2023); Rotation flap surgery (N/A, 3/21/2023); Revision of flap graft (Bilateral, 4/18/2023); Esophagogastroduodenoscopy (N/A, 6/22/2023); Colonoscopy (N/A, 6/22/2023); Revision of flap graft (N/A, 6/29/2023); Revision of flap graft (N/A, 8/29/2023); Esophagogastroduodenoscopy (N/A, 9/1/2023); Colonoscopy (N/A, 2/6/2024); Cystoscopy (N/A, 1/31/2024); Ureteroscopy (Left, 1/31/2024); extraction - stone (Left, 1/31/2024); placement-stent (Left, 1/31/2024); Retrograde pyelography (Left, 1/31/2024); Revision of flap graft (Bilateral, 2/20/2024); Rotation flap surgery (Right, 4/9/2024); Revision of flap graft (N/A, 5/7/2024); Nasal reconstruction (N/A, 6/11/2024); Colonoscopy (N/A, 8/8/2024); and Nasal reconstruction (N/A, 9/3/2024).   Social History:    reports being sexually active and has had partner(s) who are female.  reports that he has never smoked. He has never used smokeless tobacco. He reports that he does not currently use alcohol. He reports that he does not use drugs.       Pre-op Assessment    I have reviewed the Patient Summary Reports.     I have reviewed the Nursing Notes. I have reviewed the NPO Status.   I have reviewed the Medications.     Review of Systems  Anesthesia Hx:  No problems with previous Anesthesia             Denies Family Hx of Anesthesia complications.    Denies Personal Hx of Anesthesia complications.                    Hematology/Oncology:  Hematology Normal                                     Cardiovascular:  Cardiovascular Normal                                              Pulmonary:    Asthma                    Hepatic/GI:     GERD                 Musculoskeletal:  Arthritis               Neurological:  Neurology Normal                                      Endocrine:  Diabetes           Dermatological:  Skin Normal        Physical Exam  General: Cooperative, Alert and Oriented    Airway:  Mallampati: II   Mouth Opening: Normal  TM Distance: Normal  Tongue: Normal  Neck ROM: Normal ROM    Dental:  Intact        Anesthesia Plan  Type of Anesthesia, risks & benefits discussed:    Anesthesia Type: Gen Natural Airway  Intra-op Monitoring Plan: Standard ASA Monitors  Induction:  IV  Informed Consent: Informed consent signed with the Patient and all parties understand the risks and agree with anesthesia plan.  All questions answered.   ASA Score: 3  Day of Surgery Review of History & Physical: H&P Update referred to the surgeon/provider.I have interviewed and examined the patient. I have reviewed the patient's H&P dated: There are no significant changes.     Ready For Surgery From Anesthesia Perspective.     .

## 2025-02-22 RX ORDER — METFORMIN HYDROCHLORIDE 500 MG/1
500 TABLET, EXTENDED RELEASE ORAL
Qty: 90 TABLET | Refills: 1 | Status: SHIPPED | OUTPATIENT
Start: 2025-02-22

## 2025-02-22 NOTE — TELEPHONE ENCOUNTER
Care Due:                  Date            Visit Type   Department     Provider  --------------------------------------------------------------------------------                                [facility]                              [facility]  Last Visit: 09[facility]      [facility])   [facility]   Luisa                              EP -                              PRIMARY      [facility]IN[facility]  Next Visit: 03-      [facility] ([facility])   [facility]  Luisa                                                            Last  Test          Frequency    Reason                     Performed    Due Date  --------------------------------------------------------------------------------    HBA1C.......  6 months...  metFORMIN, semaglutide...  09- 03-    [facility] Embedded Care Due Messages. Reference number: [id number].   2/22/2025 6:54:30 AM CST

## 2025-02-23 NOTE — TELEPHONE ENCOUNTER
Provider Staff:  Action required for this patient    Requires labs      Please see care gap opportunities below in Care Due Message.    Thanks!  [facility]     Appointments      Date Provider   Last Visit   9/12/2024 Stepan Moran III, MD   Next Visit   3/13/2025 Stepan Moran III, MD     Refill Decision Note   Lyndon Lion  is requesting a refill authorization.  Brief Assessment and Rationale for Refill:  Approve     Medication Therapy Plan:  FOVS      Alert overridden per protocol: Yes   Comments:     Note composed:9:25 PM 02/22/2025

## 2025-03-13 ENCOUNTER — OFFICE VISIT (OUTPATIENT)
Dept: FAMILY MEDICINE | Facility: CLINIC | Age: 55
End: 2025-03-13
Payer: COMMERCIAL

## 2025-03-13 VITALS
BODY MASS INDEX: 31.79 KG/M2 | SYSTOLIC BLOOD PRESSURE: 132 MMHG | TEMPERATURE: 99 F | HEART RATE: 82 BPM | DIASTOLIC BLOOD PRESSURE: 66 MMHG | OXYGEN SATURATION: 94 % | HEIGHT: 74 IN | WEIGHT: 247.69 LBS

## 2025-03-13 DIAGNOSIS — E11.42 TYPE 2 DIABETES MELLITUS WITH DIABETIC POLYNEUROPATHY, WITHOUT LONG-TERM CURRENT USE OF INSULIN: ICD-10-CM

## 2025-03-13 DIAGNOSIS — E29.1 HYPOGONADISM MALE: ICD-10-CM

## 2025-03-13 DIAGNOSIS — Z98.84 S/P LAPAROSCOPIC SLEEVE GASTRECTOMY: ICD-10-CM

## 2025-03-13 DIAGNOSIS — N52.9 ERECTILE DYSFUNCTION, UNSPECIFIED ERECTILE DYSFUNCTION TYPE: ICD-10-CM

## 2025-03-13 DIAGNOSIS — B35.1 ONYCHOMYCOSIS: ICD-10-CM

## 2025-03-13 DIAGNOSIS — C44.321 SQUAMOUS CELL CANCER OF SKIN OF NASAL TIP: ICD-10-CM

## 2025-03-13 DIAGNOSIS — Z85.038 HISTORY OF COLON CANCER, STAGE II: ICD-10-CM

## 2025-03-13 DIAGNOSIS — Z15.09 LYNCH SYNDROME: ICD-10-CM

## 2025-03-13 DIAGNOSIS — Z79.82 ASPIRIN LONG-TERM USE: ICD-10-CM

## 2025-03-13 DIAGNOSIS — E78.5 HYPERLIPIDEMIA, UNSPECIFIED HYPERLIPIDEMIA TYPE: Primary | ICD-10-CM

## 2025-03-13 PROCEDURE — 99999 PR PBB SHADOW E&M-EST. PATIENT-LVL V: CPT | Mod: PBBFAC,,, | Performed by: FAMILY MEDICINE

## 2025-03-13 PROCEDURE — 99214 OFFICE O/P EST MOD 30 MIN: CPT | Mod: S$GLB,,, | Performed by: FAMILY MEDICINE

## 2025-03-13 RX ORDER — TERBINAFINE HYDROCHLORIDE 250 MG/1
250 TABLET ORAL DAILY
COMMUNITY
End: 2025-03-13 | Stop reason: SDUPTHER

## 2025-03-13 RX ORDER — SEMAGLUTIDE 2.68 MG/ML
2 INJECTION, SOLUTION SUBCUTANEOUS
Qty: 3 EACH | Refills: 1 | Status: SHIPPED | OUTPATIENT
Start: 2025-03-13

## 2025-03-13 RX ORDER — TADALAFIL 5 MG/1
5 TABLET ORAL DAILY PRN
Qty: 90 TABLET | Refills: 1 | Status: SHIPPED | OUTPATIENT
Start: 2025-03-13

## 2025-03-13 RX ORDER — SEMAGLUTIDE 2.68 MG/ML
2 INJECTION, SOLUTION SUBCUTANEOUS
Qty: 3 EACH | Refills: 1 | OUTPATIENT
Start: 2025-03-13

## 2025-03-13 RX ORDER — TERBINAFINE HYDROCHLORIDE 250 MG/1
250 TABLET ORAL DAILY
Qty: 90 TABLET | Refills: 0 | Status: SHIPPED | OUTPATIENT
Start: 2025-03-13

## 2025-03-13 NOTE — TELEPHONE ENCOUNTER
Care Due:                  Date            Visit Type   Department     Provider  --------------------------------------------------------------------------------                                [facility]                              [facility]  Last Visit: 03-      [facility]   [facility]   [facility]                              EP -                              PRIMARY      [facility]  Next Visit: 09-      CARE ([facility])   [facility]  Luisa                                                            Last  Test          Frequency    Reason                     Performed    Due Date  --------------------------------------------------------------------------------    Lipid Panel.  12 months..  pravastatin..............  06- 06-    [facility] Embedded Care Due Messages. Reference number: [id number].   3/13/2025 2:09:08 PM CDT

## 2025-03-13 NOTE — PROGRESS NOTES
SCRIBE #1 NOTE: I, Austyn Apodaca, am scribing for, and in the presence of,  Stepan Moran III, MD. I have scribed the entire note.     Subjective:       Patient ID: Lyndon Lion Jr. is a 54 y.o. male.    Chief Complaint: Follow-up (6 month)    Mr. Lion is here for a follow up with his last appointment being here on September 12, 2024. Accompanied by wife. Onychomycosis. He has toenail fungus that he would like to get taken care of. This has been going on for years. History of stage II colon cancer in 2008. S/P laparoscopic sleeve gastrectomy. BMI of 31.8. His maximum weight was 318 pounds, and he is now 247 pounds. Cardiovascular good. No chest pain. No palpitations. Blood pressure is 132/66. Hyperlipidemia. Type 2 diabetes mellitus with polyneuropathy. His sugars run between . Using aspirin. Hypogonadism. Using 1/3 cc testosterone injection weekly. Squamous cell cancer of skin of nasal tip. Follow up is current. States he can be fit in the schedule if it is an emergency. Sees Dr. Rubio in August 2025. ED. Parada syndrome. Colonoscopy was done in February 2025 with Dr. Leonardo and is okay. Diabetic foot exam is being done today. Eye exam is current.     Review of Systems   Constitutional:  Negative for chills and fever.   HENT:  Negative for congestion and sore throat.    Eyes:  Negative for pain and visual disturbance.   Respiratory:  Negative for chest tightness and shortness of breath.    Cardiovascular:  Negative for chest pain.   Gastrointestinal:  Negative for nausea.   Endocrine: Negative for polydipsia and polyuria.   Genitourinary:  Negative for dysuria and flank pain.   Musculoskeletal:  Negative for back pain, neck pain and neck stiffness.   Skin:  Negative for rash.        Nail fungus   Allergic/Immunologic: Negative for immunocompromised state.   Neurological:  Negative for dizziness and weakness.   Hematological:  Does not bruise/bleed easily.   Psychiatric/Behavioral:  Negative  for agitation and behavioral problems.    All other systems reviewed and are negative.      Objective:      Physical examination: Vital signs noted. No acute distress. No carotid bruit. Regular heart rate and rhythm. Lungs clear to auscultation bilaterally. Abdomen bowel sounds positive soft and nontender. Extremities without edema. 2+ pedal pulses. No calluses, breaks in the skin, crackling, or dryness. 5/5 light touch to each foot. Vibratory senses intact. Posterior tibial 2+ bilaterally. Onychomycosis.  Several toenails.      Assessment:       1. Hyperlipidemia, unspecified hyperlipidemia type    2. S/P laparoscopic sleeve gastrectomy    3. Type 2 diabetes mellitus with diabetic polyneuropathy, without long-term current use of insulin    4. Erectile dysfunction, unspecified erectile dysfunction type    5. Aspirin long-term use    6. Parada syndrome    7. Squamous cell cancer of skin of nasal tip    8. Hypogonadism male    9. History of colon cancer, stage II    10. Onychomycosis        Plan:       Hyperlipidemia, unspecified hyperlipidemia type  -     Comprehensive Metabolic Panel; Future; Expected date: 03/13/2025  -     Lipid Panel; Future; Expected date: 03/13/2025    S/P laparoscopic sleeve gastrectomy    Type 2 diabetes mellitus with diabetic polyneuropathy, without long-term current use of insulin  -     semaglutide (OZEMPIC) 2 mg/dose (8 mg/3 mL) PnIj; Inject 2 mg into the skin every 7 days.  Dispense: 3 each; Refill: 1  -     Hemoglobin A1C; Future; Expected date: 03/13/2025    Erectile dysfunction, unspecified erectile dysfunction type  -     tadalafiL (CIALIS) 5 MG tablet; Take 1 tablet (5 mg total) by mouth daily as needed for Erectile Dysfunction.  Dispense: 90 tablet; Refill: 1    Aspirin long-term use  -     CBC Auto Differential; Future; Expected date: 03/13/2025    Parada syndrome    Squamous cell cancer of skin of nasal tip    Hypogonadism male  -     Testosterone; Future; Expected date:  03/13/2025    History of colon cancer, stage II    Onychomycosis    Other orders  -     terbinafine HCL (LAMISIL) 250 mg tablet; Take 1 tablet (250 mg total) by mouth once daily.  Dispense: 90 tablet; Refill: 0    Lamisil for 90 days.  PSA will be due in June.  Continue current testosterone dose.  Check A1c CBC CMP lipids testosterone level which I would like to be a trough.  Continue aspirin.  All care gaps addressed or discussed.     I, Stepan Moran III, MD, personally performed the services described in this documentation. All medical record entries made by the scribe were at my direction and in my presence. I have reviewed the chart and agree that the record reflects my personal performance and is accurate and complete.

## 2025-03-14 NOTE — TELEPHONE ENCOUNTER
Provider Staff:  Action required for this patient    Requires labs      Please see care gap opportunities below in Care Due Message.    Thanks!  [facility]     Appointments      Date Provider   Last Visit   3/13/2025 Stepan Moran III, MD   Next Visit   Visit date not found Stepan Moran III, MD     Refill Decision Note   Lyndon Lion  is requesting a refill authorization.  Brief Assessment and Rationale for Refill:  Quick Discontinue     Medication Therapy Plan:  FOVS; Receipt confirmed by pharmacy (3/13/2025  6:01 PM CDT)      Comments:     Note composed:10:28 PM 03/13/2025

## 2025-03-15 PROBLEM — E11.42 TYPE 2 DIABETES MELLITUS WITH DIABETIC POLYNEUROPATHY, WITHOUT LONG-TERM CURRENT USE OF INSULIN: Status: ACTIVE | Noted: 2025-03-15

## 2025-03-17 ENCOUNTER — RESULTS FOLLOW-UP (OUTPATIENT)
Dept: FAMILY MEDICINE | Facility: CLINIC | Age: 55
End: 2025-03-17

## 2025-03-17 ENCOUNTER — LAB VISIT (OUTPATIENT)
Dept: LAB | Facility: HOSPITAL | Age: 55
End: 2025-03-17
Attending: FAMILY MEDICINE
Payer: COMMERCIAL

## 2025-03-17 DIAGNOSIS — E78.5 HYPERLIPIDEMIA, UNSPECIFIED HYPERLIPIDEMIA TYPE: ICD-10-CM

## 2025-03-17 DIAGNOSIS — E11.42 TYPE 2 DIABETES MELLITUS WITH DIABETIC POLYNEUROPATHY, WITHOUT LONG-TERM CURRENT USE OF INSULIN: ICD-10-CM

## 2025-03-17 DIAGNOSIS — Z79.82 ASPIRIN LONG-TERM USE: ICD-10-CM

## 2025-03-17 LAB
ALBUMIN SERPL BCP-MCNC: 3.6 G/DL (ref 3.5–5.2)
ALP SERPL-CCNC: 45 U/L (ref 40–150)
ALT SERPL W/O P-5'-P-CCNC: 26 U/L (ref 10–44)
ANION GAP SERPL CALC-SCNC: 7 MMOL/L (ref 8–16)
AST SERPL-CCNC: 27 U/L (ref 10–40)
BASOPHILS # BLD AUTO: 0.03 K/UL (ref 0–0.2)
BASOPHILS NFR BLD: 0.7 % (ref 0–1.9)
BILIRUB SERPL-MCNC: 1.2 MG/DL (ref 0.1–1)
BUN SERPL-MCNC: 14 MG/DL (ref 6–20)
CALCIUM SERPL-MCNC: 8.3 MG/DL (ref 8.7–10.5)
CHLORIDE SERPL-SCNC: 109 MMOL/L (ref 95–110)
CHOLEST SERPL-MCNC: 135 MG/DL (ref 120–199)
CHOLEST/HDLC SERPL: 3.4 {RATIO} (ref 2–5)
CO2 SERPL-SCNC: 24 MMOL/L (ref 23–29)
CREAT SERPL-MCNC: 1 MG/DL (ref 0.5–1.4)
DIFFERENTIAL METHOD BLD: NORMAL
EOSINOPHIL # BLD AUTO: 0.1 K/UL (ref 0–0.5)
EOSINOPHIL NFR BLD: 2.7 % (ref 0–8)
ERYTHROCYTE [DISTWIDTH] IN BLOOD BY AUTOMATED COUNT: 11.9 % (ref 11.5–14.5)
EST. GFR  (NO RACE VARIABLE): >60 ML/MIN/1.73 M^2
ESTIMATED AVG GLUCOSE: 111 MG/DL (ref 68–131)
GLUCOSE SERPL-MCNC: 90 MG/DL (ref 70–110)
HBA1C MFR BLD: 5.5 % (ref 4–5.6)
HCT VFR BLD AUTO: 48.5 % (ref 40–54)
HDLC SERPL-MCNC: 40 MG/DL (ref 40–75)
HDLC SERPL: 29.6 % (ref 20–50)
HGB BLD-MCNC: 16.9 G/DL (ref 14–18)
IMM GRANULOCYTES # BLD AUTO: 0.01 K/UL (ref 0–0.04)
IMM GRANULOCYTES NFR BLD AUTO: 0.2 % (ref 0–0.5)
LDLC SERPL CALC-MCNC: 84.4 MG/DL (ref 63–159)
LYMPHOCYTES # BLD AUTO: 1.1 K/UL (ref 1–4.8)
LYMPHOCYTES NFR BLD: 26.6 % (ref 18–48)
MCH RBC QN AUTO: 30.8 PG (ref 27–31)
MCHC RBC AUTO-ENTMCNC: 34.8 G/DL (ref 32–36)
MCV RBC AUTO: 88 FL (ref 82–98)
MONOCYTES # BLD AUTO: 0.4 K/UL (ref 0.3–1)
MONOCYTES NFR BLD: 9.9 % (ref 4–15)
NEUTROPHILS # BLD AUTO: 2.4 K/UL (ref 1.8–7.7)
NEUTROPHILS NFR BLD: 59.9 % (ref 38–73)
NONHDLC SERPL-MCNC: 95 MG/DL
NRBC BLD-RTO: 0 /100 WBC
PLATELET # BLD AUTO: 218 K/UL (ref 150–450)
PMV BLD AUTO: 9.2 FL (ref 9.2–12.9)
POTASSIUM SERPL-SCNC: 4 MMOL/L (ref 3.5–5.1)
PROT SERPL-MCNC: 6.3 G/DL (ref 6–8.4)
RBC # BLD AUTO: 5.49 M/UL (ref 4.6–6.2)
SODIUM SERPL-SCNC: 140 MMOL/L (ref 136–145)
TRIGL SERPL-MCNC: 53 MG/DL (ref 30–150)
WBC # BLD AUTO: 4.03 K/UL (ref 3.9–12.7)

## 2025-03-17 PROCEDURE — 83036 HEMOGLOBIN GLYCOSYLATED A1C: CPT | Performed by: FAMILY MEDICINE

## 2025-03-17 PROCEDURE — 80061 LIPID PANEL: CPT | Performed by: FAMILY MEDICINE

## 2025-03-17 PROCEDURE — 36415 COLL VENOUS BLD VENIPUNCTURE: CPT | Mod: PO | Performed by: FAMILY MEDICINE

## 2025-03-17 PROCEDURE — 80053 COMPREHEN METABOLIC PANEL: CPT | Performed by: FAMILY MEDICINE

## 2025-03-17 PROCEDURE — 85025 COMPLETE CBC W/AUTO DIFF WBC: CPT | Performed by: FAMILY MEDICINE

## 2025-03-22 ENCOUNTER — PATIENT MESSAGE (OUTPATIENT)
Dept: HEMATOLOGY/ONCOLOGY | Facility: CLINIC | Age: 55
End: 2025-03-22
Payer: COMMERCIAL

## 2025-03-28 ENCOUNTER — OFFICE VISIT (OUTPATIENT)
Dept: HEMATOLOGY/ONCOLOGY | Facility: CLINIC | Age: 55
End: 2025-03-28
Payer: COMMERCIAL

## 2025-03-28 DIAGNOSIS — D75.1 POLYCYTHEMIA: ICD-10-CM

## 2025-03-28 DIAGNOSIS — Z85.038 HISTORY OF COLON CANCER, STAGE II: Primary | ICD-10-CM

## 2025-03-28 DIAGNOSIS — D23.9 SEBACEOUS ADENOMA: ICD-10-CM

## 2025-03-28 DIAGNOSIS — C44.321 SQUAMOUS CELL CANCER OF SKIN OF NASAL TIP: ICD-10-CM

## 2025-03-28 DIAGNOSIS — Z15.09 MSH2-RELATED LYNCH SYNDROME (HNPCC1): ICD-10-CM

## 2025-03-28 DIAGNOSIS — Z15.09 LYNCH SYNDROME: ICD-10-CM

## 2025-03-28 NOTE — PROGRESS NOTES
The patient location is: Home  The chief complaint leading to consultation is: Parada    Visit type: audiovisual    Face to Face time with patient: 5  10 minutes of total time spent on the encounter, which includes face to face time and non-face to face time preparing to see the patient (eg, review of tests), Obtaining and/or reviewing separately obtained history, Documenting clinical information in the electronic or other health record, Independently interpreting results (not separately reported) and communicating results to the patient/family/caregiver, or Care coordination (not separately reported).         Each patient to whom he or she provides medical services by telemedicine is:  (1) informed of the relationship between the physician and patient and the respective role of any other health care provider with respect to management of the patient; and (2) notified that he or she may decline to receive medical services by telemedicine and may withdraw from such care at any time.    Notes:       PATIENT: Lyndon Lion Jr.  MRN: [id number]  DATE: 3/29/2025      Diagnosis:   1. History of colon cancer, stage II    2. Polycythemia    3. Parada syndrome    4. MSH2-related Parada syndrome (HNPCC1)    5. 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 Sebaceous adenoma            Chief Complaint: No chief complaint on file.      Oncologic History:      Oncologic History     Oncologic Treatment     Pathology           Subjective:    Interval History:  Mr. Lion is a 54 y.o. male with Arthritis, DMII, Vitamin D deficiency, Skin cancer who presents for Parada syndrome and colon cancer.  Since the last clinic visit the patient underwent colonoscopy on 2/11/25 showing diverticulosis in the rectosigmoid colon, sigmoid colon and descending colon; nonbleeding internal hemorrhoids.  The patient denies CP, cough, SOB, abdominal pain, nausea, vomiting, constipation, diarrhea.  The patient denies fever, chills, night sweats, weight  loss, new lumps or bumps, easy bruising or bleeding.    Prior History:   Pt previously underwent colonic resection for Stage II adenocarcinoma of the colon in 2008.  PT received 12 cycles of FOLFOX under the care of Dr Cárdenas.  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  Pt previously seen by genetecist whom felt the patient had Parada Syndrome.  Pt follows with Dr. Leonardo in GI for surveillance and had colonoscopy 8/08/24 with multiple polyps removed and recs for repeat in 6 months.  Pt sees dermatologist Dr Rubio with prior SCC and Sebaceous adenoma removed.  Pt follows with Dr Stovall in Urology for prostate cancer surveilllance.  Dr spivey with [facility] following the patient for SCC of the nose.   The patient saw genetics on 9/24/24.    Past Medical History:   Past Medical History:   Diagnosis Date    Arthritis     Asthma     AS A CHILD    Colon cancer     Family hx of prostate cancer 11/22/2016    Gilbert disease     Hx of colon cancer, stage I 07/03/2014    Parada syndrome     Squamous cell carcinoma of skin     Tear of labium 10/2020    left shoulder Dr Molina    Uncontrolled type 2 diabetes mellitus with hyperglycemia 03/04/2021    Vitamin D deficiency     Wears glasses        Past Surgical HIstory:   Past Surgical History:   Procedure Laterality Date    APPENDECTOMY      APPLICATION OF CARTILAGE GRAFT N/A 3/21/2023    Procedure: APPLICATION, CARTILAGE GRAFT;  Surgeon: Alyx Spivey MD;  Location: [address];  Service: ENT;  Laterality: N/A;    CAUDAL EPIDURAL STEROID INJECTION N/A 11/6/2018    Procedure: Injection-steroid-epidural-caudal;  Surgeon: Lyndon Brooke Jr., MD;  Location: [facility] OR;  Service: Pain Management;  Laterality: N/A;    COLON SURGERY  2008    PARTIAL COLECTOMY    COLONOSCOPY Bilateral 2012    COLONOSCOPY N/A 8/1/2016    Procedure: COLONOSCOPY;  Surgeon: Blaze Marr MD;  Location: [facility];  Service: Endoscopy;  Laterality: N/A;    COLONOSCOPY N/A 9/20/2017     Procedure: COLONOSCOPY;  Surgeon: Dom Leonardo MD;  Location: [facility] ENDO (4TH FLR);  Service: Endoscopy;  Laterality: N/A;  not given PM prep    COLONOSCOPY N/A 10/9/2017    Procedure: COLONOSCOPY;  Surgeon: RAMON Callahan MD;  Location: [facility] ENDO (4TH FLR);  Service: Endoscopy;  Laterality: N/A;  ok for Prepopik per Dr Callahan    COLONOSCOPY N/A 11/20/2017    Procedure: COLONOSCOPY/EMR;  Surgeon: Joseluis Choe MD;  Location: [facility] ENDO (2ND FLR);  Service: Endoscopy;  Laterality: N/A;  EMR    no pm prep    COLONOSCOPY N/A 5/30/2018    Procedure: COLONOSCOPY;  Surgeon: Dom Leonardo MD;  Location: [facility] ENDO (4TH FLR);  Service: Endoscopy;  Laterality: N/A;  follow up colonoscopy in 5/2018 for Parada Syndrome         COLONOSCOPY N/A 6/10/2019    Procedure: COLONOSCOPY;  Surgeon: Dom Leonardo MD;  Location: [facility] ENDO (4TH FLR);  Service: Endoscopy;  Laterality: N/A;  Prepopik ordered per Dr. Leonardo    COLONOSCOPY N/A 7/22/2020    Procedure: COLONOSCOPY;  Surgeon: Dom Leonardo MD;  Location: [facility] ENDO (4TH FLR);  Service: Endoscopy;  Laterality: N/A;    COLONOSCOPY N/A 5/27/2021    Procedure: COLONOSCOPY;  Surgeon: Dom Leonardo MD;  Location: [facility] ENDO (4TH FLR);  Service: Endoscopy;  Laterality: N/A;  covid test 5/24-[place]  pt requests Prepopik    COLONOSCOPY N/A 6/17/2022    Procedure: COLONOSCOPY;  Surgeon: Dom Leonardo MD;  Location: [facility] ENDO (4TH FLR);  Service: Endoscopy;  Laterality: N/A;  He was discovered to have colon cancer and had right hemicolectomy        for stage II on 08/08/2008. MSH2 Parada syndrome.  sutab requested  instructions via the portal -sm  fully vaccinated - sm    COLONOSCOPY N/A 6/22/2023    Procedure: COLONOSCOPY;  Surgeon: Dom Leonardo MD;  Location: [facility] ENDO (4TH FLR);  Service: Endoscopy;  Laterality: N/A;  see telephone encounter dated 5/24/23/ Pt/ Pt wife requested sutab - prep ins. on portal / Ozempic for DM- ERW    COLONOSCOPY N/A 2/6/2024     Procedure: COLONOSCOPY;  Surgeon: Dom Leonardo MD;  Location: [facility] (4TH FLR);  Service: Endoscopy;  Laterality: N/A;  sutab/pt diabetic/ozempic/referral dr feng    COLONOSCOPY N/A 8/8/2024    Procedure: COLONOSCOPY;  Surgeon: Dom Leonardo MD;  Location: [facility] (4TH FLR);  Service: Endoscopy;  Laterality: N/A;  8/2-pre call complete-sutab-ozempic last dose 7/30/24-tb    COLONOSCOPY, SCREENING, HIGH RISK PATIENT N/A 2/11/2025    Procedure: COLONOSCOPY, SCREENING, HIGH RISK PATIENT;  Surgeon: Dom Leonardo MD;  Location: [facility] (4TH FLR);  Service: Endoscopy;  Laterality: N/A;  order created: colonoscopy 8/8/2024 repeat in 6 months for surveillance per Vicente Tamez, Sutab, portal -ml  1/17 Adjusted 15 mins - PC  2/4/25- pt holding GLP-1 as directed, pc complete. DBM    CYSTOSCOPY      CYSTOSCOPY N/A 1/31/2024    Procedure: CYSTOSCOPY;  Surgeon: Eron Llanes MD;  Location: [facility] OR 1ST FLR;  Service: Urology;  Laterality: N/A;    Epidural Steroid Injection  10/23/15    Lumbar    EPIDURAL STEROID INJECTION INTO LUMBAR SPINE N/A 7/27/2018    Procedure: Injection-steroid-epidural-lumbar;  Surgeon: Lyndon Brooke Jr., MD;  Location: [facility] OR;  Service: Pain Management;  Laterality: N/A;    ESOPHAGOGASTRODUODENOSCOPY      ESOPHAGOGASTRODUODENOSCOPY N/A 7/22/2020    Procedure: EGD (ESOPHAGOGASTRODUODENOSCOPY);  Surgeon: Dom Leonardo MD;  Location: [facility] (4TH FLR);  Service: Endoscopy;  Laterality: N/A;  Please schedule patient for EGD and colonoscopy with me MSH2 Parada syndrome and anemia evaluation please make priority 1  covid test 7/20-[place]    ESOPHAGOGASTRODUODENOSCOPY N/A 6/22/2023    Procedure: EGD (ESOPHAGOGASTRODUODENOSCOPY);  Surgeon: Dom Leonardo MD;  Location: [facility] ENDO (4TH FLR);  Service: Endoscopy;  Laterality: N/A;  see telephone encounter dated 5/24/23 6/16/23-[place] completed appointment confirmed-MH     ESOPHAGOGASTRODUODENOSCOPY N/A 9/1/2023    Procedure: EGD (ESOPHAGOGASTRODUODENOSCOPY);  Surgeon: Dom Leonardo MD;  Location: [facility] (4TH FLR);  Service: Endoscopy;  Laterality: N/A;  Instructions sent via portal / Ozempic / Extended Clear Liquid prep    EXCISION OR SURGICAL PLANING, SKIN, NOSE, FOR RHINOPHYMA Bilateral 3/7/2023    Procedure: EXCISION OR SURGICAL resection nasal skin cancer;  Surgeon: Alyx Spivey MD;  Location: [facility] OR 2ND FLR;  Service: ENT;  Laterality: Bilateral;    EXTRACTION - STONE Left 1/31/2024    Procedure: EXTRACTION - STONE;  Surgeon: Eron Llanes MD;  Location: [facility] OR 1ST FLR;  Service: Urology;  Laterality: Left;    FACETECTOMY OF VERTEBRA  2/13/2019    Procedure: MEDIAL FACETECTOMY L5-S1;  Surgeon: Lyndon Brooke Jr., MD;  Location: [facility] OR;  Service: Orthopedics;;    GASTRIC BYPASS  2010    SLEEVE    KNEE ARTHROSCOPY Right     LUMBAR DISCECTOMY  2/13/2019    Procedure: DISCECTOMY, SPINE, LUMBAR L5-S1;  Surgeon: Lyndon Brooke Jr., MD;  Location: [facility] OR;  Service: Orthopedics;;    NASAL RECONSTRUCTION N/A 3/21/2023    Procedure: RECONSTRUCTION, NOSE;  Surgeon: Alyx Spivey MD;  Location: [facility] OR 2ND FLR;  Service: ENT;  Laterality: N/A;    NASAL RECONSTRUCTION N/A 6/11/2024    Procedure: RECONSTRUCTION, NOSE;  Surgeon: Alyx Spivey MD;  Location: [facility] OR;  Service: ENT;  Laterality: N/A;    NASAL RECONSTRUCTION N/A 9/3/2024    Procedure: RECONSTRUCTION, NOSE;  Surgeon: Alyx Spivey MD;  Location: [facility] OR;  Service: ENT;  Laterality: N/A;    PLACEMENT-STENT Left 1/31/2024    Procedure: PLACEMENT-STENT;  Surgeon: Eron Llanes MD;  Location: [facility] OR 1ST FLR;  Service: Urology;  Laterality: Left;    RETROGRADE PYELOGRAPHY Left 1/31/2024    Procedure: PYELOGRAM, RETROGRADE;  Surgeon: Eron Llanes MD;  Location: [facility] OR 1ST FLR;  Service: Urology;  Laterality: Left;    REVISION OF FLAP GRAFT Bilateral 4/18/2023    Procedure: REVISION, PROCEDURE INVOLVING FLAP  GRAFT;  Surgeon: Alyx Spivey MD;  Location: [facility] OR;  Service: ENT;  Laterality: Bilateral;    REVISION OF FLAP GRAFT N/A 6/29/2023    Procedure: REVISION, PROCEDURE INVOLVING FLAP GRAFT;  Surgeon: Alyx Spivey MD;  Location: [facility] OR 2ND FLR;  Service: ENT;  Laterality: N/A;    REVISION OF FLAP GRAFT N/A 8/29/2023    Procedure: REVISION, PROCEDURE INVOLVING FLAP GRAFT;  Surgeon: Alyx Spivey MD;  Location: [facility] OR;  Service: ENT;  Laterality: N/A;    REVISION OF FLAP GRAFT Bilateral 2/20/2024    Procedure: REVISION, PROCEDURE INVOLVING FLAP GRAFT;  Surgeon: Alyx Spivey MD;  Location: [facility] OR;  Service: ENT;  Laterality: Bilateral;    REVISION OF FLAP GRAFT N/A 5/7/2024    Procedure: REVISION, PROCEDURE INVOLVING FLAP GRAFT;  Surgeon: Alyx Spivey MD;  Location: [facility] OR;  Service: ENT;  Laterality: N/A;    ROTATION FLAP SURGERY N/A 3/21/2023    Procedure: CREATION, FLAP, ROTATION;  Surgeon: Alyx Spivey MD;  Location: [facility] OR 2ND FLR;  Service: ENT;  Laterality: N/A;    ROTATION FLAP SURGERY Right 4/9/2024    Procedure: CREATION, FLAP, ROTATION;  Surgeon: Alyx Spivey MD;  Location: [facility] OR;  Service: ENT;  Laterality: Right;    URETEROSCOPY Left 1/31/2024    Procedure: URETEROSCOPY;  Surgeon: Eron Llanes MD;  Location: [facility] OR 1ST FLR;  Service: Urology;  Laterality: Left;       Family History:   Family History   Problem Relation Name Age of Onset    Melanoma Mother  45        on lip    Breast cancer Mother  65        original around 65, second around 68    Heart disease Father      Diabetes Father      Liver disease Father      Hearing loss Father      Basal cell carcinoma Father      No Known Problems Sister          MSH2+    Polycystic ovary syndrome Daughter      Parada Syndrome Son          MSH2+    Colon cancer Maternal Uncle          4X    Heart disease Maternal Uncle      Hypertension Maternal Uncle      No Known Problems Paternal Aunt      Alcohol abuse Paternal Uncle Mason     Prostate  "cancer Paternal Uncle Mason 60    Dementia Maternal Grandmother      Colon cancer Maternal Grandfather  37    Diabetes Paternal Grandmother      Hypertension Paternal Grandfather      Prostatitis Paternal Grandfather      Colon cancer Maternal Uncle      Colon cancer Other      Colon cancer Other      Esophageal cancer Paternal Cousin  57    Psoriasis Neg Hx      Lupus Neg Hx      Eczema Neg Hx         Social History:  reports that he has never smoked. He has never used smokeless tobacco. He reports that he does not currently use alcohol. He reports that he does not use drugs.    Allergies:  Review of patient's allergies indicates:   Allergen Reactions    Hydrocod-cpm-pe-acetaminophen Other (See Comments)     Irritability        Medications:  Current Outpatient Medications   Medication Sig Dispense Refill    ACCU-CHEK GUIDE TEST STRIPS Strp USE TO TEST TWICE A  strip 3    ascorbic acid, vitamin C, (VITAMIN C) 100 MG tablet Take 100 mg by mouth once daily.      aspirin (ECOTRIN) 81 MG EC tablet Take by mouth once daily. Aspirin 300 mg bid coded aspirin      azelaic acid (AZELEX) 15 % gel Apply to face BID. 50 g 5    cyanocobalamin, vitamin B-12, 2,500 mcg Lozg Place 2 tablets under the tongue once daily. 180 lozenge 3    doxycycline (PERIOSTAT) 20 MG tablet Take 2 pill po qday 180 tablet 3    ferrous gluconate (FERATE) 240 (27 FE) MG tablet Take 1 tablet (240 mg total) by mouth Daily. 100 each 0    ketoconazole (NIZORAL) 2 % shampoo APPLY TOPICALLY EVERY 7 DAYS 120 mL 1    lancets (ACCU-CHEK SOFTCLIX LANCETS) Misc 1 Units by Misc.(Non-Drug; Combo Route) route 2 (two) times a day. 200 each 0    metFORMIN (GLUCOPHAGE-XR) 500 MG ER 24hr tablet TAKE 1 TABLET BY MOUTH EVERY DAY WITH BREAKFAST 90 tablet 1    metroNIDAZOLE (NORITATE) 1 % cream Compound azelaic acid 15% + ivermectin 1% + metronidazole 1% cream. Apply to face once or twice daily. 30 g 5    pen needle, diabetic (PEN NEEDLE) 32 gauge x 5/32" Ndle 1 each " by Misc.(Non-Drug; Combo Route) route once daily. 90 each 3    pravastatin (PRAVACHOL) 20 MG tablet Take 1 tablet (20 mg total) by mouth once daily. 90 tablet 3    RABEprazole (ACIPHEX) 20 mg tablet Take 1 tablet (20 mg total) by mouth before breakfast. 90 tablet 3    semaglutide (OZEMPIC) 2 mg/dose (8 mg/3 mL) PnIj Inject 2 mg into the skin every 7 days. 3 each 1    sulfacetamide sodium-sulfur 10-5 % (w/w) Clsr Use to wash face daily 170 g 5    SUTAB 1.479-0.188- 0.225 gram tablet TAKE 12 TABLETS BY MOUTH ONCE DAILY. TAKE AS DIRECTED BY PROVIDER OFFICE 24 tablet 0    tadalafiL (CIALIS) 5 MG tablet Take 1 tablet (5 mg total) by mouth daily as needed for Erectile Dysfunction. 90 tablet 1    terbinafine HCL (LAMISIL) 250 mg tablet Take 1 tablet (250 mg total) by mouth once daily. 90 tablet 0    testosterone cypionate (DEPOTESTOTERONE CYPIONATE) 200 mg/mL injection Inject 60 mg into the muscle twice a week.      vitamin A 8000 UNIT capsule Take 1 capsule (8,000 Units total) by mouth once daily. 100 capsule 3    vitamin D (VITAMIN D3) 1000 units Tab Take 5,000 Units by mouth 3 (three) times daily.       No current facility-administered medications for this visit.       Review of Systems   Constitutional:  Negative for appetite change, chills, diaphoresis, fatigue, fever and unexpected weight change.   HENT:  Negative for mouth sores.    Eyes:  Negative for visual disturbance.   Respiratory:  Negative for cough and shortness of breath.    Cardiovascular:  Negative for chest pain and palpitations.   Gastrointestinal:  Negative for abdominal pain, constipation, diarrhea, nausea and vomiting.   Genitourinary:  Negative for frequency.   Musculoskeletal:  Negative for back pain.   Skin:  Negative for color change and rash.   Neurological:  Negative for headaches.   Hematological:  Negative for adenopathy. Does not bruise/bleed easily.   Psychiatric/Behavioral:  The patient is not nervous/anxious.        ECOG Performance  Status: 0   Objective:      Vitals:   There were no vitals filed for this visit.      Physical Exam  Constitutional:       General: He is not in acute distress.     Appearance: He is well-developed. He is not ill-appearing, toxic-appearing or diaphoretic.   Neurological:      Mental Status: He is alert and oriented to person, place, and time.         Laboratory Data:  No visits with results within 1 Week(s) from this visit.   Latest known visit with results is:   Lab Visit on 03/17/2025   Component Date Value Ref Range Status    Hemoglobin A1C 03/17/2025 5.5  4.0 - 5.6 % Final    Comment: ADA Screening Guidelines:  5.7-6.4%  Consistent with prediabetes  >or=6.5%  Consistent with diabetes    High levels of fetal hemoglobin interfere with the HbA1C  assay. Heterozygous hemoglobin variants (HbS, HgC, etc)do  not significantly interfere with this assay.   However, presence of multiple variants may affect accuracy.      Estimated Avg Glucose 03/17/2025 111  68 - 131 mg/dL Final    WBC 03/17/2025 4.03  3.90 - 12.70 K/uL Final    RBC 03/17/2025 5.49  4.60 - 6.20 M/uL Final    Hemoglobin 03/17/2025 16.9  14.0 - 18.0 g/dL Final    Hematocrit 03/17/2025 48.5  40.0 - 54.0 % Final    MCV 03/17/2025 88  82 - 98 fL Final    MCH 03/17/2025 30.8  27.0 - 31.0 pg Final    MCHC 03/17/2025 34.8  32.0 - 36.0 g/dL Final    RDW 03/17/2025 11.9  11.5 - 14.5 % Final    Platelets 03/17/2025 218  150 - 450 K/uL Final    MPV 03/17/2025 9.2  9.2 - 12.9 fL Final    Immature Granulocytes 03/17/2025 0.2  0.0 - 0.5 % Final    Gran # (ANC) 03/17/2025 2.4  1.8 - 7.7 K/uL Final    Immature Grans (Abs) 03/17/2025 0.01  0.00 - 0.04 K/uL Final    Comment: Mild elevation in immature granulocytes is non specific and   can be seen in a variety of conditions including stress response,   acute inflammation, trauma and pregnancy. Correlation with other   laboratory and clinical findings is essential.      Lymph # 03/17/2025 1.1  1.0 - 4.8 K/uL Final    Mono #  03/17/2025 0.4  0.3 - 1.0 K/uL Final    Eos # 03/17/2025 0.1  0.0 - 0.5 K/uL Final    Baso # 03/17/2025 0.03  0.00 - 0.20 K/uL Final    nRBC 03/17/2025 0  0 /100 WBC Final    Gran % 03/17/2025 59.9  38.0 - 73.0 % Final    Lymph % 03/17/2025 26.6  18.0 - 48.0 % Final    Mono % 03/17/2025 9.9  4.0 - 15.0 % Final    Eosinophil % 03/17/2025 2.7  0.0 - 8.0 % Final    Basophil % 03/17/2025 0.7  0.0 - 1.9 % Final    Differential Method 03/17/2025 Automated   Final    Sodium 03/17/2025 140  136 - 145 mmol/L Final    Potassium 03/17/2025 4.0  3.5 - 5.1 mmol/L Final    Chloride 03/17/2025 109  95 - 110 mmol/L Final    CO2 03/17/2025 24  23 - 29 mmol/L Final    Glucose 03/17/2025 90  70 - 110 mg/dL Final    BUN 03/17/2025 14  6 - 20 mg/dL Final    Creatinine 03/17/2025 1.0  0.5 - 1.4 mg/dL Final    Calcium 03/17/2025 8.3 (L)  8.7 - 10.5 mg/dL Final    Total Protein 03/17/2025 6.3  6.0 - 8.4 g/dL Final    Albumin 03/17/2025 3.6  3.5 - 5.2 g/dL Final    Total Bilirubin 03/17/2025 1.2 (H)  0.1 - 1.0 mg/dL Final    Comment: For infants and newborns, interpretation of results should be based  on gestational age, weight and in agreement with clinical  observations.    Premature Infant recommended reference ranges:  Up to 24 hours.............<8.0 mg/dL  Up to 48 hours............<12.0 mg/dL  3-5 days..................<15.0 mg/dL  6-29 days.................<15.0 mg/dL      Alkaline Phosphatase 03/17/2025 45  40 - 150 U/L Final    AST 03/17/2025 27  10 - 40 U/L Final    ALT 03/17/2025 26  10 - 44 U/L Final    eGFR 03/17/2025 >60.0  >60 mL/min/1.73 m^2 Final    Anion Gap 03/17/2025 7 (L)  8 - 16 mmol/L Final    Cholesterol 03/17/2025 135  120 - 199 mg/dL Final    Comment: The National Cholesterol Education Program (NCEP) has set the  following guidelines (reference ranges) for Cholesterol:  Optimal.....................<200 mg/dL  Borderline High.............200-239 mg/dL  High........................> or = 240 mg/dL      Triglycerides  03/17/2025 53  30 - 150 mg/dL Final    Comment: The National Cholesterol Education Program (NCEP) has set the  following guidelines (reference values) for triglycerides:  Normal......................<150 mg/dL  Borderline High.............150-199 mg/dL  High........................200-499 mg/dL      HDL 03/17/2025 40  40 - 75 mg/dL Final    Comment: The National Cholesterol Education Program (NCEP) has set the  following guidelines (reference values) for HDL Cholesterol:  Low...............<40 mg/dL  Optimal...........>60 mg/dL      LDL Cholesterol 03/17/2025 84.4  63.0 - 159.0 mg/dL Final    Comment: The National Cholesterol Education Program (NCEP) has set the  following guidelines (reference values) for LDL Cholesterol:  Optimal.......................<130 mg/dL  Borderline High...............130-159 mg/dL  High..........................160-189 mg/dL  Very High.....................>190 mg/dL      HDL/Cholesterol Ratio 03/17/2025 29.6  20.0 - 50.0 % Final    Total Cholesterol/HDL Ratio 03/17/2025 3.4  2.0 - 5.0 Final    Non-HDL Cholesterol 03/17/2025 95  mg/dL Final    Comment: Risk category and Non-HDL cholesterol goals:  Coronary heart disease (CHD)or equivalent (10-year risk of CHD >20%):  Non-HDL cholesterol goal     <130 mg/dL  Two or more CHD risk factors and 10-year risk of CHD <= 20%:  Non-HDL cholesterol goal     <160 mg/dL  0 to 1 CHD risk factor:  Non-HDL cholesterol goal     <190 mg/dL           Imaging:     Reviewed       Assessment:       1. History of colon cancer, stage II    2. Polycythemia    3. Parada syndrome    4. MSH2-related Parada syndrome (HNPCC1)    5. 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 Sebaceous adenoma               Plan:     H/O Colon Cancer - pt with prior stage II colon cancer s/p resection and 12 cycles of FOLFOX  -Pt seeing Dr Leonardo for repeat colonoscopies every 6 months with last 8/08/24  -Colonoscopy on 2/11/25 showed no polyps  -Colonoscopy due 2/2026  -Will  monitor    Parada Syndrome - Pt previously seen to have MSH2 mutation in lab work 6/26/24  -Pt saw Genetecist Elizabeth Larson on 9/24/24  -PT following with Dr Leonardo for colon and Gastric cancer screening.  Last colonoscopy 8/8/24 and EGD 9/01/23  -Dr Stovall in Urology following with PSA and urinalysis  -Dr Rubio in dermatology performing skin exams    Sebaceous Adenoma - seen on biopsy of skin left supraclavicular area 8/27/24  -Likely from Parada  -Dermatology following    SCC of the Skin - pt following with dermatology and ENT    Polycythemia - unclear etiology  -Hemoglobin stable at 16.9g/dL on 3/17/25  -Will monitor    Route Chart for Scheduling    Med Onc Chart Routing      Follow up with physician 6 months. PT needs a CBC, CMP, uric acid, and LDH in 6 months with a return appt.   Follow up with EUGENE    Infusion scheduling note    Injection scheduling note    Labs    Imaging    Pharmacy appointment    Other referrals                   Mitch Trujillo MD  [facility]  Hematology and Oncology  [facility] [address] [place] [address]   O: [phone]  F: [phone]

## 2025-03-30 ENCOUNTER — PATIENT MESSAGE (OUTPATIENT)
Dept: HEMATOLOGY/ONCOLOGY | Facility: CLINIC | Age: 55
End: 2025-03-30
Payer: COMMERCIAL

## 2025-04-04 DIAGNOSIS — L73.8 PITYROSPORUM FOLLICULITIS: ICD-10-CM

## 2025-04-07 RX ORDER — KETOCONAZOLE 20 MG/ML
SHAMPOO, SUSPENSION TOPICAL
Qty: 120 ML | Refills: 1 | Status: SHIPPED | OUTPATIENT
Start: 2025-04-07

## 2025-04-20 RX ORDER — QUINIDINE GLUCONATE 324 MG
240 TABLET, EXTENDED RELEASE ORAL DAILY
Qty: 100 EACH | Refills: 0 | Status: SHIPPED | OUTPATIENT
Start: 2025-04-20

## 2025-05-01 DIAGNOSIS — K44.9 HIATAL HERNIA: ICD-10-CM

## 2025-05-01 DIAGNOSIS — Z51.81 ENCOUNTER FOR MONITORING LONG-TERM PROTON PUMP INHIBITOR THERAPY: ICD-10-CM

## 2025-05-01 DIAGNOSIS — Z79.899 ENCOUNTER FOR MONITORING LONG-TERM PROTON PUMP INHIBITOR THERAPY: ICD-10-CM

## 2025-05-01 RX ORDER — RABEPRAZOLE SODIUM 20 MG/1
20 TABLET, DELAYED RELEASE ORAL
Qty: 90 TABLET | Refills: 3 | Status: SHIPPED | OUTPATIENT
Start: 2025-05-01 | End: 2026-05-01

## 2025-05-16 DIAGNOSIS — L71.9 ROSACEA: ICD-10-CM

## 2025-05-16 RX ORDER — DOXYCYCLINE HYCLATE 20 MG
40 TABLET ORAL
Qty: 180 TABLET | Refills: 3 | Status: SHIPPED | OUTPATIENT
Start: 2025-05-16

## 2025-05-29 ENCOUNTER — PATIENT MESSAGE (OUTPATIENT)
Dept: UROLOGY | Facility: CLINIC | Age: 55
End: 2025-05-29
Payer: COMMERCIAL

## 2025-05-29 DIAGNOSIS — Z12.5 PROSTATE CANCER SCREENING: Primary | ICD-10-CM

## 2025-06-04 DIAGNOSIS — E11.42 TYPE 2 DIABETES MELLITUS WITH DIABETIC POLYNEUROPATHY, WITHOUT LONG-TERM CURRENT USE OF INSULIN: ICD-10-CM

## 2025-06-04 DIAGNOSIS — E11.9 TYPE 2 DIABETES MELLITUS WITHOUT COMPLICATION: ICD-10-CM

## 2025-06-04 DIAGNOSIS — L73.8 PITYROSPORUM FOLLICULITIS: ICD-10-CM

## 2025-06-04 RX ORDER — KETOCONAZOLE 20 MG/ML
SHAMPOO, SUSPENSION TOPICAL
Qty: 120 ML | Refills: 1 | Status: SHIPPED | OUTPATIENT
Start: 2025-06-04

## 2025-06-04 RX ORDER — SEMAGLUTIDE 2.68 MG/ML
2 INJECTION, SOLUTION SUBCUTANEOUS
Qty: 9 ML | Refills: 1 | Status: SHIPPED | OUTPATIENT
Start: 2025-06-04

## 2025-06-10 ENCOUNTER — OFFICE VISIT (OUTPATIENT)
Dept: URGENT CARE | Facility: CLINIC | Age: 55
End: 2025-06-10
Payer: COMMERCIAL

## 2025-06-10 VITALS
SYSTOLIC BLOOD PRESSURE: 139 MMHG | DIASTOLIC BLOOD PRESSURE: 89 MMHG | HEIGHT: 74 IN | TEMPERATURE: 98 F | WEIGHT: 247 LBS | BODY MASS INDEX: 31.7 KG/M2 | OXYGEN SATURATION: 97 % | RESPIRATION RATE: 16 BRPM | HEART RATE: 75 BPM

## 2025-06-10 DIAGNOSIS — B34.9 VIRAL SYNDROME: ICD-10-CM

## 2025-06-10 DIAGNOSIS — Z86.39 HISTORY OF TYPE 2 DIABETES MELLITUS: ICD-10-CM

## 2025-06-10 DIAGNOSIS — Z15.09 LYNCH SYNDROME: ICD-10-CM

## 2025-06-10 DIAGNOSIS — R50.9 FEVER, UNSPECIFIED FEVER CAUSE: Primary | ICD-10-CM

## 2025-06-10 LAB
BILIRUB UR QL STRIP: POSITIVE
CTP QC/QA: YES
FLUAV AG NPH QL: NEGATIVE
FLUBV AG NPH QL: NEGATIVE
GLUCOSE UR QL STRIP: NEGATIVE
KETONES UR QL STRIP: NEGATIVE
LEUKOCYTE ESTERASE UR QL STRIP: NEGATIVE
PH, POC UA: 6
POC BLOOD, URINE: NEGATIVE
POC NITRATES, URINE: NEGATIVE
PROT UR QL STRIP: POSITIVE
S PYO RRNA THROAT QL PROBE: NEGATIVE
SARS-COV+SARS-COV-2 AG RESP QL IA.RAPID: NEGATIVE
SP GR UR STRIP: 1.02 (ref 1–1.03)
UROBILINOGEN UR STRIP-ACNC: ABNORMAL (ref 0.3–2.2)

## 2025-06-10 PROCEDURE — 87880 STREP A ASSAY W/OPTIC: CPT | Mod: QW,,, | Performed by: NURSE PRACTITIONER

## 2025-06-10 PROCEDURE — 99204 OFFICE O/P NEW MOD 45 MIN: CPT | Mod: 25,S$GLB,, | Performed by: NURSE PRACTITIONER

## 2025-06-10 PROCEDURE — 87811 SARS-COV-2 COVID19 W/OPTIC: CPT | Mod: QW,S$GLB,, | Performed by: NURSE PRACTITIONER

## 2025-06-10 PROCEDURE — 81003 URINALYSIS AUTO W/O SCOPE: CPT | Mod: QW,S$GLB,, | Performed by: NURSE PRACTITIONER

## 2025-06-10 PROCEDURE — 87804 INFLUENZA ASSAY W/OPTIC: CPT | Mod: QW,,, | Performed by: NURSE PRACTITIONER

## 2025-06-10 NOTE — PROGRESS NOTES
"Subjective:      Patient ID: Lyndon Lion Jr. is a 55 y.o. male.    Vitals:  height is 6' 2" (1.88 m) and weight is 112 kg (247 lb). His oral temperature is 98.2 °F (36.8 °C). His blood pressure is 139/89 and his pulse is 75. His respiration is 16 and oxygen saturation is 97%.     Chief Complaint: Other Misc (Chills and hot flashes alternating , Horrible headaches , fever on and off, body aches all over, all since Friday ,June 6th. Home tested for covid / flu A& B all negative. - Entered by patient)    Onset of symptoms 2 days, wife and daughter in-law with similar symptoms.  Wife as well as patient have tested themselves at home for COVID and flu with all negative results      Constitution: Positive for appetite change, chills, sweating, fatigue and fever.   HENT:  Negative for ear pain, ear discharge, congestion and sore throat.    Cardiovascular:  Negative for chest pain.   Respiratory:  Negative for chest tightness, cough and shortness of breath.    Gastrointestinal:  Negative for abdominal pain, nausea, vomiting and diarrhea.   Genitourinary:  Negative for dysuria, frequency, urgency and flank pain.   Musculoskeletal:  Positive for muscle ache.   Skin:  Negative for rash, wound and erythema.   Neurological:  Positive for headaches.      Objective:     Physical Exam   Constitutional: He is oriented to person, place, and time. He is cooperative.  Non-toxic appearance. He does not appear ill. No distress. awake  HENT:   Head: Normocephalic and atraumatic.   Ears:   Right Ear: External ear and ear canal normal. Tympanic membrane is bulging. Tympanic membrane is not erythematous.   Left Ear: External ear and ear canal normal. Tympanic membrane is bulging. Tympanic membrane is not erythematous.   Nose: No rhinorrhea or congestion.   Mouth/Throat: Mucous membranes are moist. No oropharyngeal exudate or posterior oropharyngeal erythema.      Comments: Postnasal drainage noted  Eyes: Conjunctivae are normal. Right " eye exhibits no discharge. Left eye exhibits no discharge.   Neck: Neck supple. No neck rigidity present.   Cardiovascular: Normal rate, regular rhythm and normal heart sounds.   Pulmonary/Chest: Effort normal and breath sounds normal. No accessory muscle usage. No tachypnea. No respiratory distress. He has no wheezes. He has no rhonchi. He has no rales. He exhibits no tenderness.   Abdominal: Normal appearance.   Musculoskeletal:      Cervical back: He exhibits no tenderness.   Lymphadenopathy:     He has no cervical adenopathy.   Neurological: no focal deficit. He is alert and oriented to person, place, and time. No sensory deficit.   Skin: Skin is warm, dry, not diaphoretic and no rash. Capillary refill takes 2 to 3 seconds. No erythema   Psychiatric: His behavior is normal. Mood normal.   Nursing note and vitals reviewed.chaperone present (wife)         Assessment:     1. Fever, unspecified fever cause    2. History of type 2 diabetes mellitus    3. Viral syndrome    4. Parada syndrome      COVID negative  Flu a/B neg  Strep A negative    UA:  1+ protein, trace bili otherwise negative, negative ketones, specific gravity 1.025.  Wife requesting urinalysis because of patient's history of Parada syndrome.  No urinary symptoms  Plan:       Fever, unspecified fever cause  -     SARS Coronavirus 2 Antigen, POCT Manual Read  -     POCT Influenza A/B Rapid Antigen  -     POCT rapid strep A  -     Cancel: POCT Urinalysis, Dipstick, Automated, W/O Scope  -     POCT Urinalysis, Dipstick, Manual, W/O Scope    History of type 2 diabetes mellitus    Viral syndrome    Parada syndrome                    The patient appears to have a viral  infection.  Based upon the history and physical exam the patient does not appear to have  pneumonia,  otitis media, bacterial sinusitis, strep pharyngitis, retropharyngeal or peritonsillar abscess, meningitis/encephalitis, sepsis,.  Patient appears well, non-toxic.  Patient does not appear to  need antibiotics at this time. Outpatient management of symptoms are appropriate at this time, recommended over the counter medications  with specific return precautions, close f/u with PCP or ER for emergent concerns.   Patient VU and is in agreement with plan of care

## 2025-06-11 ENCOUNTER — LAB VISIT (OUTPATIENT)
Dept: LAB | Facility: HOSPITAL | Age: 55
End: 2025-06-11
Attending: UROLOGY
Payer: COMMERCIAL

## 2025-06-11 DIAGNOSIS — Z12.5 PROSTATE CANCER SCREENING: ICD-10-CM

## 2025-06-11 LAB — PSA SERPL-MCNC: 18.43 NG/ML

## 2025-06-11 PROCEDURE — 36415 COLL VENOUS BLD VENIPUNCTURE: CPT | Mod: PO

## 2025-06-11 PROCEDURE — 84153 ASSAY OF PSA TOTAL: CPT

## 2025-06-12 ENCOUNTER — PATIENT MESSAGE (OUTPATIENT)
Dept: FAMILY MEDICINE | Facility: CLINIC | Age: 55
End: 2025-06-12

## 2025-06-12 ENCOUNTER — OFFICE VISIT (OUTPATIENT)
Dept: FAMILY MEDICINE | Facility: CLINIC | Age: 55
End: 2025-06-12
Payer: COMMERCIAL

## 2025-06-12 ENCOUNTER — PATIENT MESSAGE (OUTPATIENT)
Dept: UROLOGY | Facility: CLINIC | Age: 55
End: 2025-06-12
Payer: COMMERCIAL

## 2025-06-12 ENCOUNTER — RESULTS FOLLOW-UP (OUTPATIENT)
Dept: FAMILY MEDICINE | Facility: CLINIC | Age: 55
End: 2025-06-12

## 2025-06-12 ENCOUNTER — LAB VISIT (OUTPATIENT)
Dept: LAB | Facility: HOSPITAL | Age: 55
End: 2025-06-12
Payer: COMMERCIAL

## 2025-06-12 ENCOUNTER — HOSPITAL ENCOUNTER (EMERGENCY)
Facility: HOSPITAL | Age: 55
Discharge: HOME | End: 2025-06-12
Attending: EMERGENCY MEDICINE
Payer: COMMERCIAL

## 2025-06-12 VITALS
SYSTOLIC BLOOD PRESSURE: 162 MMHG | BODY MASS INDEX: 31.44 KG/M2 | HEART RATE: 72 BPM | HEIGHT: 74 IN | DIASTOLIC BLOOD PRESSURE: 94 MMHG | RESPIRATION RATE: 18 BRPM | TEMPERATURE: 99 F | WEIGHT: 245 LBS | OXYGEN SATURATION: 98 %

## 2025-06-12 VITALS
DIASTOLIC BLOOD PRESSURE: 78 MMHG | BODY MASS INDEX: 32.2 KG/M2 | TEMPERATURE: 99 F | RESPIRATION RATE: 18 BRPM | WEIGHT: 250.88 LBS | HEART RATE: 77 BPM | HEIGHT: 74 IN | SYSTOLIC BLOOD PRESSURE: 142 MMHG | OXYGEN SATURATION: 95 %

## 2025-06-12 DIAGNOSIS — N30.90 CYSTITIS: Primary | ICD-10-CM

## 2025-06-12 DIAGNOSIS — R39.198 DECREASED URINE STREAM: ICD-10-CM

## 2025-06-12 DIAGNOSIS — R50.9 FEVER, UNSPECIFIED FEVER CAUSE: Primary | ICD-10-CM

## 2025-06-12 DIAGNOSIS — N41.0 ACUTE PROSTATITIS: ICD-10-CM

## 2025-06-12 DIAGNOSIS — R91.8 PULMONARY NODULES: ICD-10-CM

## 2025-06-12 DIAGNOSIS — R50.9 FEVER, UNSPECIFIED FEVER CAUSE: ICD-10-CM

## 2025-06-12 DIAGNOSIS — Z13.6 SCREENING FOR CARDIOVASCULAR CONDITION: ICD-10-CM

## 2025-06-12 DIAGNOSIS — E11.42 TYPE 2 DIABETES MELLITUS WITH DIABETIC POLYNEUROPATHY, WITHOUT LONG-TERM CURRENT USE OF INSULIN: ICD-10-CM

## 2025-06-12 DIAGNOSIS — J06.9 UPPER RESPIRATORY TRACT INFECTION, UNSPECIFIED TYPE: ICD-10-CM

## 2025-06-12 LAB
ABSOLUTE EOSINOPHIL (SMH): 0.1 K/UL
ABSOLUTE EOSINOPHIL (SMH): 0.13 K/UL
ABSOLUTE MONOCYTE (SMH): 0.82 K/UL (ref 0.3–1)
ABSOLUTE MONOCYTE (SMH): 0.89 K/UL (ref 0.3–1)
ABSOLUTE NEUTROPHIL COUNT (SMH): 7.5 K/UL (ref 1.8–7.7)
ABSOLUTE NEUTROPHIL COUNT (SMH): 7.6 K/UL (ref 1.8–7.7)
ADENOVIRUS (SMH): NOT DETECTED
ALBUMIN SERPL-MCNC: 4.1 G/DL (ref 3.5–5.2)
ALBUMIN SERPL-MCNC: 4.3 G/DL (ref 3.5–5.2)
ALBUMIN/CREAT UR: 11.8 UG/MG
ALP SERPL-CCNC: 48 UNIT/L (ref 55–135)
ALP SERPL-CCNC: 51 UNIT/L (ref 55–135)
ALT SERPL-CCNC: 20 UNIT/L (ref 10–44)
ALT SERPL-CCNC: 21 UNIT/L (ref 10–44)
ANION GAP (SMH): 10 MMOL/L (ref 8–16)
ANION GAP (SMH): 7 MMOL/L (ref 8–16)
AST SERPL-CCNC: 15 UNIT/L (ref 10–40)
AST SERPL-CCNC: 15 UNIT/L (ref 10–40)
BASOPHILS # BLD AUTO: 0.03 K/UL
BASOPHILS # BLD AUTO: 0.03 K/UL
BASOPHILS NFR BLD AUTO: 0.3 %
BASOPHILS NFR BLD AUTO: 0.3 %
BILIRUB SERPL-MCNC: 1.1 MG/DL (ref 0.1–1)
BILIRUB SERPL-MCNC: 1.2 MG/DL (ref 0.1–1)
BILIRUB UR QL STRIP.AUTO: NEGATIVE
BILIRUBIN, UA POC OHS: NEGATIVE
BLOOD, UA POC OHS: ABNORMAL
BORDETELLA PARAPERTUSSIS (IS1001) (SMH): NOT DETECTED
BORDETELLA PERTUSSIS (PTXP) (SMH): NOT DETECTED
BUN SERPL-MCNC: 20 MG/DL (ref 6–20)
BUN SERPL-MCNC: 21 MG/DL (ref 6–20)
C-REACTIVE PROTEIN (SMH): 15.9 MG/DL
CALCIUM SERPL-MCNC: 9 MG/DL (ref 8.7–10.5)
CALCIUM SERPL-MCNC: 9.2 MG/DL (ref 8.7–10.5)
CHLAMYDIA PNEUMONIAE (SMH): NOT DETECTED
CHLORIDE SERPL-SCNC: 105 MMOL/L (ref 95–110)
CHLORIDE SERPL-SCNC: 106 MMOL/L (ref 95–110)
CLARITY UR: CLEAR
CLARITY, UA POC OHS: ABNORMAL
CO2 SERPL-SCNC: 26 MMOL/L (ref 23–29)
CO2 SERPL-SCNC: 26 MMOL/L (ref 23–29)
COLOR UR AUTO: YELLOW
COLOR, UA POC OHS: ABNORMAL
CORONAVIRUS 229E, COMMON COLD VIRUS (SMH): NOT DETECTED
CORONAVIRUS HKU1, COMMON COLD VIRUS (SMH): NOT DETECTED
CORONAVIRUS NL63, COMMON COLD VIRUS (SMH): NOT DETECTED
CORONAVIRUS OC43, COMMON COLD VIRUS (SMH): NOT DETECTED
CREAT SERPL-MCNC: 1 MG/DL (ref 0.5–1.4)
CREAT SERPL-MCNC: 1.1 MG/DL (ref 0.5–1.4)
CREAT UR-MCNC: 339 MG/DL (ref 23–375)
ERYTHROCYTE [DISTWIDTH] IN BLOOD BY AUTOMATED COUNT: 11.9 % (ref 11.5–14.5)
ERYTHROCYTE [DISTWIDTH] IN BLOOD BY AUTOMATED COUNT: 12.1 % (ref 11.5–14.5)
ERYTHROCYTE [SEDIMENTATION RATE] IN BLOOD: 4 MM/HR (ref 0–10)
FLUBV RNA NPH QL NAA+NON-PROBE: NOT DETECTED
GFR SERPLBLD CREATININE-BSD FMLA CKD-EPI: >60 ML/MIN/1.73/M2
GFR SERPLBLD CREATININE-BSD FMLA CKD-EPI: >60 ML/MIN/1.73/M2
GLUCOSE SERPL-MCNC: 103 MG/DL (ref 70–110)
GLUCOSE SERPL-MCNC: 104 MG/DL (ref 70–110)
GLUCOSE UR QL STRIP: NEGATIVE
GLUCOSE, UA POC OHS: NEGATIVE
HCT VFR BLD AUTO: 47 % (ref 40–54)
HCT VFR BLD AUTO: 50.4 % (ref 40–54)
HCV AB SERPL QL IA: NORMAL
HGB BLD-MCNC: 16.5 GM/DL (ref 14–18)
HGB BLD-MCNC: 17.2 GM/DL (ref 14–18)
HGB UR QL STRIP: NEGATIVE
HIV 1+2 AB+HIV1 P24 AG SERPL QL IA: NORMAL
HPIV1 RNA NPH QL NAA+NON-PROBE: NOT DETECTED
HPIV2 RNA NPH QL NAA+NON-PROBE: NOT DETECTED
HPIV3 RNA NPH QL NAA+NON-PROBE: NOT DETECTED
HPIV4 RNA NPH QL NAA+NON-PROBE: NOT DETECTED
HUMAN METAPNEUMOVIRUS (SMH): NOT DETECTED
IMM GRANULOCYTES # BLD AUTO: 0.03 K/UL (ref 0–0.04)
IMM GRANULOCYTES # BLD AUTO: 0.04 K/UL (ref 0–0.04)
IMM GRANULOCYTES NFR BLD AUTO: 0.3 % (ref 0–0.5)
IMM GRANULOCYTES NFR BLD AUTO: 0.4 % (ref 0–0.5)
INFLUENZA A (SUBTYPES H1,H1-2009,H3) (SMH): NOT DETECTED
KETONES UR QL STRIP: ABNORMAL
KETONES, UA POC OHS: NEGATIVE
LDH SERPL L TO P-CCNC: 0.41 MMOL/L (ref 0.5–2.2)
LEUKOCYTE ESTERASE UR QL STRIP: NEGATIVE
LEUKOCYTES, UA POC OHS: NEGATIVE
LYMPHOCYTES # BLD AUTO: 1.13 K/UL (ref 1–4.8)
LYMPHOCYTES # BLD AUTO: 1.14 K/UL (ref 1–4.8)
MCH RBC QN AUTO: 30.8 PG (ref 27–31)
MCH RBC QN AUTO: 31.2 PG (ref 27–31)
MCHC RBC AUTO-ENTMCNC: 34.1 G/DL (ref 32–36)
MCHC RBC AUTO-ENTMCNC: 35.1 G/DL (ref 32–36)
MCV RBC AUTO: 88 FL (ref 82–98)
MCV RBC AUTO: 92 FL (ref 82–98)
MICROALBUMIN UR-MCNC: 40 UG/ML (ref ?–5000)
MICROSCOPIC COMMENT: ABNORMAL
MYCOPLASMA PNEUMONIAE (SMH): NOT DETECTED
NITRITE UR QL STRIP: NEGATIVE
NITRITE, UA POC OHS: NEGATIVE
NUCLEATED RBC (/100WBC) (SMH): 0 /100 WBC
NUCLEATED RBC (/100WBC) (SMH): 0 /100 WBC
PH UR STRIP: 6 [PH]
PH, UA POC OHS: 6
PLATELET # BLD AUTO: 226 K/UL (ref 150–450)
PLATELET # BLD AUTO: 258 K/UL (ref 150–450)
PMV BLD AUTO: 8.6 FL (ref 9.2–12.9)
PMV BLD AUTO: 9 FL (ref 9.2–12.9)
POTASSIUM SERPL-SCNC: 3.6 MMOL/L (ref 3.5–5.1)
POTASSIUM SERPL-SCNC: 3.9 MMOL/L (ref 3.5–5.1)
PROT SERPL-MCNC: 7.2 GM/DL (ref 6–8.4)
PROT SERPL-MCNC: 7.6 GM/DL (ref 6–8.4)
PROT UR QL STRIP: ABNORMAL
PROTEIN, UA POC OHS: 100
RBC # BLD AUTO: 5.35 M/UL (ref 4.6–6.2)
RBC # BLD AUTO: 5.51 M/UL (ref 4.6–6.2)
RBC #/AREA URNS AUTO: 5 /HPF
RELATIVE EOSINOPHIL (SMH): 1 % (ref 0–8)
RELATIVE EOSINOPHIL (SMH): 1.3 % (ref 0–8)
RELATIVE LYMPHOCYTE (SMH): 11.6 % (ref 18–48)
RELATIVE LYMPHOCYTE (SMH): 11.8 % (ref 18–48)
RELATIVE MONOCYTE (SMH): 8.5 % (ref 4–15)
RELATIVE MONOCYTE (SMH): 9.2 % (ref 4–15)
RELATIVE NEUTROPHIL (SMH): 77.3 % (ref 38–73)
RELATIVE NEUTROPHIL (SMH): 78 % (ref 38–73)
RESPIRATORY INFECTION PANEL SOURCE (SMH): NORMAL
RSV RNA NPH QL NAA+NON-PROBE: NOT DETECTED
RV+EV RNA NPH QL NAA+NON-PROBE: NOT DETECTED
SAMPLE: ABNORMAL
SARS-COV-2 RNA RESP QL NAA+PROBE: NOT DETECTED
SODIUM SERPL-SCNC: 139 MMOL/L (ref 136–145)
SODIUM SERPL-SCNC: 141 MMOL/L (ref 136–145)
SP GR UR STRIP: >=1.03
SPECIFIC GRAVITY, UA POC OHS: >=1.03
UROBILINOGEN UR STRIP-ACNC: ABNORMAL EU/DL
UROBILINOGEN, UA POC OHS: 1
WBC # BLD AUTO: 9.7 K/UL (ref 3.9–12.7)
WBC # BLD AUTO: 9.7 K/UL (ref 3.9–12.7)
WBC #/AREA URNS AUTO: 5 /HPF

## 2025-06-12 PROCEDURE — 86140 C-REACTIVE PROTEIN: CPT

## 2025-06-12 PROCEDURE — 87040 BLOOD CULTURE FOR BACTERIA: CPT

## 2025-06-12 PROCEDURE — 85651 RBC SED RATE NONAUTOMATED: CPT

## 2025-06-12 PROCEDURE — 85025 COMPLETE CBC W/AUTO DIFF WBC: CPT

## 2025-06-12 PROCEDURE — 96361 HYDRATE IV INFUSION ADD-ON: CPT

## 2025-06-12 PROCEDURE — 93010 ELECTROCARDIOGRAM REPORT: CPT | Mod: ,,, | Performed by: INTERNAL MEDICINE

## 2025-06-12 PROCEDURE — 82040 ASSAY OF SERUM ALBUMIN: CPT

## 2025-06-12 PROCEDURE — 87186 SC STD MICRODIL/AGAR DIL: CPT

## 2025-06-12 PROCEDURE — 81003 URINALYSIS AUTO W/O SCOPE: CPT

## 2025-06-12 PROCEDURE — 25500020 PHARM REV CODE 255: Performed by: EMERGENCY MEDICINE

## 2025-06-12 PROCEDURE — 63600175 PHARM REV CODE 636 W HCPCS: Performed by: EMERGENCY MEDICINE

## 2025-06-12 PROCEDURE — 86803 HEPATITIS C AB TEST: CPT | Performed by: EMERGENCY MEDICINE

## 2025-06-12 PROCEDURE — 99285 EMERGENCY DEPT VISIT HI MDM: CPT | Mod: 25

## 2025-06-12 PROCEDURE — 36415 COLL VENOUS BLD VENIPUNCTURE: CPT

## 2025-06-12 PROCEDURE — 96365 THER/PROPH/DIAG IV INF INIT: CPT

## 2025-06-12 PROCEDURE — 82043 UR ALBUMIN QUANTITATIVE: CPT

## 2025-06-12 PROCEDURE — 87086 URINE CULTURE/COLONY COUNT: CPT | Performed by: EMERGENCY MEDICINE

## 2025-06-12 PROCEDURE — 93005 ELECTROCARDIOGRAM TRACING: CPT | Performed by: INTERNAL MEDICINE

## 2025-06-12 PROCEDURE — 87389 HIV-1 AG W/HIV-1&-2 AB AG IA: CPT | Performed by: EMERGENCY MEDICINE

## 2025-06-12 PROCEDURE — 99999 PR PBB SHADOW E&M-EST. PATIENT-LVL V: CPT | Mod: PBBFAC,,,

## 2025-06-12 PROCEDURE — 0202U NFCT DS 22 TRGT SARS-COV-2: CPT | Performed by: EMERGENCY MEDICINE

## 2025-06-12 PROCEDURE — 84075 ASSAY ALKALINE PHOSPHATASE: CPT

## 2025-06-12 RX ORDER — CIPROFLOXACIN 2 MG/ML
400 INJECTION, SOLUTION INTRAVENOUS
Status: COMPLETED | OUTPATIENT
Start: 2025-06-12 | End: 2025-06-12

## 2025-06-12 RX ORDER — CIPROFLOXACIN 500 MG/1
500 TABLET, FILM COATED ORAL EVERY 12 HOURS
Qty: 60 TABLET | Refills: 0 | Status: SHIPPED | OUTPATIENT
Start: 2025-06-12 | End: 2025-07-12

## 2025-06-12 RX ADMIN — SODIUM CHLORIDE, POTASSIUM CHLORIDE, SODIUM LACTATE AND CALCIUM CHLORIDE 1000 ML: 600; 310; 30; 20 INJECTION, SOLUTION INTRAVENOUS at 05:06

## 2025-06-12 RX ADMIN — IOHEXOL 100 ML: 350 INJECTION, SOLUTION INTRAVENOUS at 07:06

## 2025-06-12 RX ADMIN — CIPROFLOXACIN 400 MG: 2 INJECTION, SOLUTION INTRAVENOUS at 06:06

## 2025-06-12 NOTE — LETTER
June 12, 2025      Dr. Moran [facility]  [address]  [address]  [facility] [address]  Phone: [phone]  Fax: [phone]       Patient: Lyndon Lion   YOB: 1970  Date of Visit: 06/12/2025    To Whom It May Concern:    Ting Lion  was at [facility] on 06/12/2025. Please excuse from work on 06/12/2025. If you have any questions or concerns, or if I can be of further assistance, please do not hesitate to contact me.    Sincerely,       Fabiana Beavers NP

## 2025-06-12 NOTE — Clinical Note
"Lyndon Lion (Joseph) was seen and treated in our emergency department on 6/12/2025.  He may return to work on 06/16/2025.       If you have any questions or concerns, please don't hesitate to call.       RN    "

## 2025-06-12 NOTE — ED PROVIDER NOTES
Encounter Date: 6/12/2025       History     Chief Complaint   Patient presents with    Fever     Fever x 1 week. Pt has been having cold symptoms, negative for covid flu and strep. Pt c/o back pain. Elevated PSA yesterday. Primary doc is concerned he has a prostate infection.      55-year-old male with a past medical history of diabetes mellitus, colon cancer, and Parada syndrome presents for evaluation of a fever.  The patient's significant other reports that the patient has been having a fever for approximately 6 days.  She reports that it has been getting as high as 102° F.  He reports an associated nasal congestion, headache, body aches, chills, and rhinorrhea.  The patient was seen at urgent care 2 days ago, and tested negative for influenza, COVID, and had a normal urinalysis at that time.  The patient did have lab work drawn yesterday and had an elevated PSA, and was seen by primary care and referred to the emergency department.  He reports his PSA was elevated to 18 from a baseline of 0.4 normally.  The patient denies any associated dysuria, chest pain, shortness of breath, cough, abdominal pain, nausea, vomiting, or diarrhea.  The patient does reports some left lower back pain, that is consistent with his chronic lower back pain.  He also reports a history of kidney stones as well.  He does get improvement in his symptoms with ibuprofen.  There are no aggravating factors.      Review of patient's allergies indicates:   Allergen Reactions    Hydrocod-cpm-pe-acetaminophen Other (See Comments)     Irritability      Past Medical History:   Diagnosis Date    Arthritis     Asthma     AS A CHILD    Colon cancer     Family hx of prostate cancer 11/22/2016    Gilbert disease     Hx of colon cancer, stage I 07/03/2014    Parada syndrome     Squamous cell carcinoma of skin     Tear of labium 10/2020    left shoulder Dr Molina    Uncontrolled type 2 diabetes mellitus with hyperglycemia 03/04/2021    Vitamin D deficiency      Wears glasses      Past Surgical History:   Procedure Laterality Date    APPENDECTOMY      APPLICATION OF CARTILAGE GRAFT N/A 3/21/2023    Procedure: APPLICATION, CARTILAGE GRAFT;  Surgeon: Alyx Spivey MD;  Location: [facility] OR 2ND FLR;  Service: ENT;  Laterality: N/A;    CAUDAL EPIDURAL STEROID INJECTION N/A 11/6/2018    Procedure: Injection-steroid-epidural-caudal;  Surgeon: Lyndon Brooke Jr., MD;  Location: [facility] OR;  Service: Pain Management;  Laterality: N/A;    COLON SURGERY  2008    PARTIAL COLECTOMY    COLONOSCOPY Bilateral 2012    COLONOSCOPY N/A 8/1/2016    Procedure: COLONOSCOPY;  Surgeon: Blaze Marr MD;  Location: [facility] ENDO;  Service: Endoscopy;  Laterality: N/A;    COLONOSCOPY N/A 9/20/2017    Procedure: COLONOSCOPY;  Surgeon: Dom Leonardo MD;  Location: [facility] (4TH FLR);  Service: Endoscopy;  Laterality: N/A;  not given PM prep    COLONOSCOPY N/A 10/9/2017    Procedure: COLONOSCOPY;  Surgeon: RAMON Callahan MD;  Location: [facility] ENDO (4TH FLR);  Service: Endoscopy;  Laterality: N/A;  ok for Prepopik per Dr Callahan    COLONOSCOPY N/A 11/20/2017    Procedure: COLONOSCOPY/EMR;  Surgeon: Joseluis Choe MD;  Location: [facility] (2ND FLR);  Service: Endoscopy;  Laterality: N/A;  EMR    no pm prep    COLONOSCOPY N/A 5/30/2018    Procedure: COLONOSCOPY;  Surgeon: Dom Leonardo MD;  Location: [facility] ENDO (4TH FLR);  Service: Endoscopy;  Laterality: N/A;  follow up colonoscopy in 5/2018 for Parada Syndrome         COLONOSCOPY N/A 6/10/2019    Procedure: COLONOSCOPY;  Surgeon: Dom Leonardo MD;  Location: [facility] ENDO (4TH FLR);  Service: Endoscopy;  Laterality: N/A;  Prepopik ordered per Dr. Leonardo    COLONOSCOPY N/A 7/22/2020    Procedure: COLONOSCOPY;  Surgeon: Dom Leonardo MD;  Location: [facility] ENDO (4TH FLR);  Service: Endoscopy;  Laterality: N/A;    COLONOSCOPY N/A 5/27/2021    Procedure: COLONOSCOPY;  Surgeon: Dom Leonardo MD;  Location: [facility] (4TH [facility]);  Service:  Endoscopy;  Laterality: N/A;  covid test 5/24-[place]  pt requests Prepopik    COLONOSCOPY N/A 6/17/2022    Procedure: COLONOSCOPY;  Surgeon: Dom Leonardo MD;  Location: [facility] (4TH FLR);  Service: Endoscopy;  Laterality: N/A;  He was discovered to have colon cancer and had right hemicolectomy        for stage II on 08/08/2008. MSH2 Parada syndrome.  sutab requested  instructions via the portal -sm  fully vaccinated - sm    COLONOSCOPY N/A 6/22/2023    Procedure: COLONOSCOPY;  Surgeon: Dom Leonardo MD;  Location: [facility] (4TH FLR);  Service: Endoscopy;  Laterality: N/A;  see telephone encounter dated 5/24/23/ Pt/ Pt wife requested sutab - prep ins. on portal / Ozempic for DM- ERW    COLONOSCOPY N/A 2/6/2024    Procedure: COLONOSCOPY;  Surgeon: Dom Leonardo MD;  Location: [facility] (4TH FLR);  Service: Endoscopy;  Laterality: N/A;  sutab/pt diabetic/ozempic/referral dr feng    COLONOSCOPY N/A 8/8/2024    Procedure: COLONOSCOPY;  Surgeon: Dom Leonardo MD;  Location: [facility] (4TH FLR);  Service: Endoscopy;  Laterality: N/A;  8/2-pre call complete-sutab-ozempic last dose 7/30/24-tb    COLONOSCOPY, SCREENING, HIGH RISK PATIENT N/A 2/11/2025    Procedure: COLONOSCOPY, SCREENING, HIGH RISK PATIENT;  Surgeon: Dom Leonardo MD;  Location: [facility] (4TH FLR);  Service: Endoscopy;  Laterality: N/A;  order created: colonoscopy 8/8/2024 repeat in 6 months for surveillance per Vicente Tamez, Sutab, portal -ml  1/17 Adjusted 15 mins - PC  2/4/25- pt holding GLP-1 as directed, pc complete. DBM    CYSTOSCOPY      CYSTOSCOPY N/A 1/31/2024    Procedure: CYSTOSCOPY;  Surgeon: Eron Llanes MD;  Location: [facility] OR [facility] FLR;  Service: Urology;  Laterality: N/A;    Epidural Steroid Injection  10/23/15    Lumbar    EPIDURAL STEROID INJECTION INTO LUMBAR SPINE N/A 7/27/2018    Procedure: Injection-steroid-epidural-lumbar;  Surgeon: Lyndon Brooke Jr., MD;  Location: [facility];  Service: Pain  Management;  Laterality: N/A;    ESOPHAGOGASTRODUODENOSCOPY      ESOPHAGOGASTRODUODENOSCOPY N/A 7/22/2020    Procedure: EGD (ESOPHAGOGASTRODUODENOSCOPY);  Surgeon: Dom Leonardo MD;  Location: [facility] (4TH FLR);  Service: Endoscopy;  Laterality: N/A;  Please schedule patient for EGD and colonoscopy with me MSH2 Parada syndrome and anemia evaluation please make priority 1  covid test 7/20-[place]    ESOPHAGOGASTRODUODENOSCOPY N/A 6/22/2023    Procedure: EGD (ESOPHAGOGASTRODUODENOSCOPY);  Surgeon: Dom Leonardo MD;  Location: [facility] (4TH FLR);  Service: Endoscopy;  Laterality: N/A;  see telephone encounter dated 5/24/23 6/16/23-[place] completed appointment confirmed-    ESOPHAGOGASTRODUODENOSCOPY N/A 9/1/2023    Procedure: EGD (ESOPHAGOGASTRODUODENOSCOPY);  Surgeon: Dom Leonardo MD;  Location: [facility] (4TH FLR);  Service: Endoscopy;  Laterality: N/A;  Instructions sent via portal / Ozempic / Extended Clear Liquid prep    EXCISION OR SURGICAL PLANING, SKIN, NOSE, FOR RHINOPHYMA Bilateral 3/7/2023    Procedure: EXCISION OR SURGICAL resection nasal skin cancer;  Surgeon: Alyx Spivey MD;  Location: [facility] OR 2ND FLR;  Service: ENT;  Laterality: Bilateral;    EXTRACTION - STONE Left 1/31/2024    Procedure: EXTRACTION - STONE;  Surgeon: Eron Llanes MD;  Location: [facility] OR 1ST FLR;  Service: Urology;  Laterality: Left;    FACETECTOMY OF VERTEBRA  2/13/2019    Procedure: MEDIAL FACETECTOMY L5-S1;  Surgeon: Lyndon Brooke Jr., MD;  Location: [facility] OR;  Service: Orthopedics;;    GASTRIC BYPASS  2010    SLEEVE    KNEE ARTHROSCOPY Right     LUMBAR DISCECTOMY  2/13/2019    Procedure: DISCECTOMY, SPINE, LUMBAR L5-S1;  Surgeon: Lyndon Brooke Jr., MD;  Location: [facility] OR;  Service: Orthopedics;;    NASAL RECONSTRUCTION N/A 3/21/2023    Procedure: RECONSTRUCTION, NOSE;  Surgeon: Alyx Spivey MD;  Location: [facility] OR 2ND FLR;  Service: ENT;  Laterality: N/A;    NASAL RECONSTRUCTION  N/A 6/11/2024    Procedure: RECONSTRUCTION, NOSE;  Surgeon: Alyx Spivey MD;  Location: [facility] OR;  Service: ENT;  Laterality: N/A;    NASAL RECONSTRUCTION N/A 9/3/2024    Procedure: RECONSTRUCTION, NOSE;  Surgeon: Alyx Spivey MD;  Location: [facility] OR;  Service: ENT;  Laterality: N/A;    PLACEMENT-STENT Left 1/31/2024    Procedure: PLACEMENT-STENT;  Surgeon: rEon Llanes MD;  Location: [facility] OR 1ST FLR;  Service: Urology;  Laterality: Left;    RETROGRADE PYELOGRAPHY Left 1/31/2024    Procedure: PYELOGRAM, RETROGRADE;  Surgeon: Eron Llanes MD;  Location: [facility] OR 1ST FLR;  Service: Urology;  Laterality: Left;    REVISION OF FLAP GRAFT Bilateral 4/18/2023    Procedure: REVISION, PROCEDURE INVOLVING FLAP GRAFT;  Surgeon: Alyx Spivey MD;  Location: [facility] OR;  Service: ENT;  Laterality: Bilateral;    REVISION OF FLAP GRAFT N/A 6/29/2023    Procedure: REVISION, PROCEDURE INVOLVING FLAP GRAFT;  Surgeon: Alyx Spivey MD;  Location: [facility] OR 2ND FLR;  Service: ENT;  Laterality: N/A;    REVISION OF FLAP GRAFT N/A 8/29/2023    Procedure: REVISION, PROCEDURE INVOLVING FLAP GRAFT;  Surgeon: Alyx Spivey MD;  Location: [facility] OR;  Service: ENT;  Laterality: N/A;    REVISION OF FLAP GRAFT Bilateral 2/20/2024    Procedure: REVISION, PROCEDURE INVOLVING FLAP GRAFT;  Surgeon: Alyx Spivey MD;  Location: [facility] OR;  Service: ENT;  Laterality: Bilateral;    REVISION OF FLAP GRAFT N/A 5/7/2024    Procedure: REVISION, PROCEDURE INVOLVING FLAP GRAFT;  Surgeon: Alyx Spivey MD;  Location: [facility] OR;  Service: ENT;  Laterality: N/A;    ROTATION FLAP SURGERY N/A 3/21/2023    Procedure: CREATION, FLAP, ROTATION;  Surgeon: Alyx Spivey MD;  Location: [facility] OR 2ND FLR;  Service: ENT;  Laterality: N/A;    ROTATION FLAP SURGERY Right 4/9/2024    Procedure: CREATION, FLAP, ROTATION;  Surgeon: Alyx Spivey MD;  Location: [facility];  Service: ENT;  Laterality: Right;    URETEROSCOPY Left 1/31/2024    Procedure: URETEROSCOPY;  Surgeon: Shraddha  Eron GOVEA MD;  Location: [facility] OR [address] [place] [address];  Service: Urology;  Laterality: Left;     Family History   Problem Relation Name Age of Onset    Melanoma Mother  45        on lip    Breast cancer Mother  65        original around 65, second around 68    Heart disease Father      Diabetes Father      Liver disease Father      Hearing loss Father      Basal cell carcinoma Father      No Known Problems Sister          MSH2+    Polycystic ovary syndrome Daughter      Parada Syndrome Son          MSH2+    Colon cancer Maternal Uncle          4X    Heart disease Maternal Uncle      Hypertension Maternal Uncle      No Known Problems Paternal Aunt      Alcohol abuse Paternal Uncle Mason     Prostate cancer Paternal Uncle Mason 60    Dementia Maternal Grandmother      Colon cancer Maternal Grandfather  37    Diabetes Paternal Grandmother      Hypertension Paternal Grandfather      Prostatitis Paternal Grandfather      Colon cancer Maternal Uncle      Colon cancer Other      Colon cancer Other      Esophageal cancer Paternal Cousin  57    Psoriasis Neg Hx      Lupus Neg Hx      Eczema Neg Hx       Social History[1]  Review of Systems   Constitutional:  Positive for chills and fever. Negative for diaphoresis and fatigue.   HENT:  Positive for congestion. Negative for rhinorrhea.    Respiratory:  Negative for cough and shortness of breath.    Cardiovascular:  Negative for chest pain.   Gastrointestinal:  Negative for abdominal pain, diarrhea, nausea and vomiting.   Genitourinary:  Negative for dysuria, frequency and testicular pain.   Musculoskeletal:  Positive for myalgias. Negative for gait problem.   Skin:  Negative for color change.   Neurological:  Negative for dizziness and numbness.   Psychiatric/Behavioral:  Negative for agitation and confusion.        Physical Exam     Initial Vitals [06/12/25 1201]   BP Pulse Resp Temp SpO2   (!) 156/84 74 18 98.9 °F (37.2 °C) 96 %      MAP       --         Physical Exam    Nursing note and  vitals reviewed.  Constitutional: He appears well-developed and well-nourished.   HENT:   Head: Normocephalic and atraumatic.   Eyes: EOM are normal. Pupils are equal, round, and reactive to light.   Neck: Neck supple.   Cardiovascular:  Normal rate and regular rhythm.           Pulmonary/Chest: Breath sounds normal.   Abdominal: Abdomen is soft. Bowel sounds are normal. He exhibits no distension. There is no abdominal tenderness. There is no rebound and no guarding.   Musculoskeletal:         General: No tenderness. Normal range of motion.      Cervical back: Neck supple.      Comments: No CVA tenderness noted.  No back tenderness noted.     Neurological: He is alert and oriented to person, place, and time.   Skin: Skin is warm and dry. No rash noted.   Psychiatric: He has a normal mood and affect.         ED Course   Procedures  Labs Reviewed   COMPREHENSIVE METABOLIC PANEL - Abnormal       Result Value    Sodium 139      Potassium 3.9      Chloride 106      CO2 26      Glucose 104      BUN 21 (*)     Creatinine 1.0      Calcium 9.0      Protein Total 7.2      Albumin 4.1      Bilirubin Total 1.1 (*)     ALP 48 (*)     AST 15      ALT 20      Anion Gap 7 (*)     eGFR >60     URINALYSIS, REFLEX TO URINE CULTURE - Abnormal    Color, UA Yellow      Appearance, UA Clear      Spec Grav UA >=1.030 (*)     pH, UA 6.0      Protein, UA 1+ (*)     Glucose, UA Negative      Ketones, UA 1+ (*)     Blood, UA Negative      Bilirubin, UA Negative      Urobilinogen, UA 2.0-3.0 (*)     Nitrites, UA Negative      Leukocyte Esterase, UA Negative     CBC WITH DIFFERENTIAL - Abnormal    WBC 9.70      RBC 5.35      Hgb 16.5      Hct 47.0      MCV 88      MCH 30.8      MCHC 35.1      RDW 11.9      Platelet Count 226      MPV 8.6 (*)     Nucleated RBC 0      Neut % 78.0 (*)     Lymph % 11.8 (*)     Mono % 8.5      Eos % 1.0      Basophil % 0.3      Imm Grans % 0.4      Neut # 7.6      Lymph # 1.14      Mono # 0.82      Eos # 0.10       Baso # 0.03      Imm Grans # 0.04     URINALYSIS MICROSCOPIC - Abnormal    RBC, UA 5 (*)     WBC, UA 5      Microscopic Comment       ISTAT LACTATE - Abnormal    POC Lactate 0.41 (*)     Sample VENOUS     CULTURE, BLOOD - Normal    CULTURE, BLOOD ([facility]) No Growth After 6 Hours     CULTURE, BLOOD - Normal    CULTURE, BLOOD ([facility]) No Growth After 6 Hours     HEPATITIS C ANTIBODY - Normal    Hep C Ab Interp Non-Reactive     HIV 1 / 2 ANTIBODY - Normal    HIV 1/2 Ag/Ab Non-Reactive     RESPIRATORY INFECTION PANEL (PCR), NASOPHARYNGEAL   CULTURE, URINE   CBC W/ AUTO DIFFERENTIAL    Narrative:     The following orders were created for panel order CBC auto differential.  Procedure                               Abnormality         Status                     ---------                               -----------         ------                     CBC with Differential[[id number]]       Abnormal            Final result                 Please view results for these tests on the individual orders.   HEP C VIRUS HOLD SPECIMEN   HIV VIRUS CONFIRMATION HOLD SPECIMEN   HETEROPHILE AB SCREEN   POCT LACTATE        ECG Results              EKG 12-lead (In process)        Collection Time Result Time QRS Duration [facility] QTC Calculation    06/12/25 12:34:47 06/12/25 12:38:12 98 420                     In process by Interface, Lab In [facility] (06/12/25 12:38:19)                   Narrative:    Test Reason : Z13.6,    Vent. Rate :  69 BPM     Atrial Rate :  69 BPM     P-R Int : 184 ms          QRS Dur :  98 ms      QT Int : 392 ms       P-R-T Axes :  28   4  14 degrees    QTcB Int : 420 ms    Normal sinus rhythm  Normal ECG  When compared with ECG of 06-Feb-2019 13:20,  Questionable change in The axis    Referred By: AAAREFERRAL SELF           Confirmed By:                                   Imaging Results              CT Abdomen Pelvis With IV Contrast NO Oral Contrast (Final result)  Result time 06/12/25 21:23:24      Final result by Edna  Saul SWANN MD (06/12/25 21:23:24)                   Impression:      1. There has been interval development of innumerable sub 6 mm pulmonary nodules involving the lung bases bilaterally.  Recommend CT of the chest for complete evaluation of the pulmonary parenchyma.  2. There is mural thickening involving the urinary bladder.  While this may be secondary to under distension, clinical correlation with urinalysis is recommended.      Electronically signed by: Saul Bishop  Date:    06/12/2025  Time:    21:23               Narrative:    EXAMINATION:  CT ABDOMEN PELVIS WITH IV CONTRAST    CLINICAL HISTORY:  Abdominal abscess/infection suspected;    TECHNIQUE:  Low dose axial images, sagittal and coronal reformations were obtained from the lung bases to the pubic symphysis following the IV administration of 100 mL of Omnipaque 350.    COMPARISON:  CT abdomen pelvis from 01/26/2024.    FINDINGS:  Lung bases: There is been interval development of innumerable sub 6 mm pulmonary nodules involving the lungs bilaterally.    Liver: No focal mass.    Gallbladder: No calcified gallstones.    Bile Ducts: No evidence of intra or extra hepatic biliary ductal dilation.    Spleen: Unremarkable.    Kidneys: No renal mass, calcification, or hydronephrosis.    Adrenals: Unremarkable.    Pancreas: No mass or peripancreatic fat stranding.    Bowel: No evidence of bowel obstruction or inflammation. Postoperative changes of gastric sleeve are present.  Postoperative changes involving the colon at the hepatic flexure are also noted.    Lymph nodes: No evidence of lymphadenopathy involving the abdomen or pelvis.    Vascular: The abdominal aorta is normal in diameter.    Pelvic organs: No evidence of pelvic mass.    Urinary Bladder: Mural thickening is present involving the urinary bladder.    Bones:  No evidence of acute osseous process.    Abdominal wall:  Unremarkable.    Other: None.                                       X-Ray Chest  AP Portable (Final result)  Result time 06/12/25 12:35:33      Final result by Aaron Day DO (06/12/25 12:35:33)                   Impression:      No acute cardiopulmonary abnormality.      Electronically signed by: Aaron Day  Date:    06/12/2025  Time:    12:35               Narrative:    EXAMINATION:  XR CHEST AP PORTABLE    CLINICAL HISTORY:  Sepsis;    FINDINGS:  Portable chest with comparison chest x-ray 09/11/2023.  Normal cardiomediastinal silhouette.Lungs are clear. Pulmonary vasculature is normal. No acute osseous abnormality.                                       Medications   lactated ringers bolus 1,000 mL (0 mLs Intravenous Stopped 6/12/25 1858)   ciprofloxacin (CIPRO)400mg/200ml D5W IVPB 400 mg (0 mg Intravenous Stopped 6/12/25 1952)   iohexoL (OMNIPAQUE 350) injection 100 mL (100 mLs Intravenous Given 6/12/25 1943)     Medical Decision Making  65-year-old male presents for a fever.    Initial differential diagnosis included but not limited to prostatitis, viral illness, and dehydration.    Amount and/or Complexity of Data Reviewed  Labs: ordered.  Radiology: ordered.    Risk  Prescription drug management.  Risk Details: The patient was emergently evaluated in the emergency department, his evaluation was significant for a middle-age male with a benign abdominal exam noted.  The patient's labs were significant for a normal white blood cell count, normal lactic acid level, and 5 white blood cells noted in his urine.  The patient was treated here with IV fluids as well as IV Cipro for possible prostatitis.  The patient's CT scan of his abdomen pelvis does show multiple pulmonary nodules as well as mural thickening involving the urinary bladder per Radiology.  I believe the patient likely has a UTI causing his symptoms as well as an upper respiratory infection.  The patient's respiratory viral panel is pending at the time of discharge.  He is stable for discharge to home at this time.  He is  instructed to continue the p.o. Cipro that he was already prescribed.  He has follow up up with Urology next week.  Additionally, I have placed a referral for pulmonology to follow him up as an outpatient for his pulmonary nodules.  I have also sent a urine culture from the emergency department, which is pending at time of discharge as well.                                      Clinical Impression:  Final diagnoses:  [Z13.6] Screening for cardiovascular condition  [J06.9] Upper respiratory tract infection, unspecified type  [N30.90] Cystitis (Primary)  [R91.8] Pulmonary nodules          ED Disposition Condition    Discharge Stable          ED Prescriptions    None       Follow-up Information       Follow up With Specialties Details Why Contact Info    Stepan Moran III, MD Family Medicine   [address] [place] [address]  [address] [place], [place] [address] [phone]                     [1]   Social History  Tobacco Use    Smoking status: Never    Smokeless tobacco: Never   Substance Use Topics    Alcohol use: Not Currently     Comment: RARELY    Drug use: No        Gen Dennis MD  06/12/25 1731

## 2025-06-12 NOTE — PROGRESS NOTES
Subjective:       Patient ID: Lyndon Lion Jr. is a 55 y.o. male.    Chief Complaint: Fever, Chills, Headache, and Generalized Body Aches    History of Present Illness    CHIEF COMPLAINT: Lyndon Lion Jr is a 55 y.o.male patient that presents today with fever, chills, headache, hot flashes, and back aches.    HISTORY OF PRESENT ILLNESS:  He reports symptoms began last Friday with fever up to 102°F, managed with Advil dual action. He notes postnasal drainage in throat and ears. Since Tuesday, he has experienced a weak urine stream. He reports back pain attributed to known herniated disc history. He also notes changes in bowel movements (constipation) attributed to inadequate hydration.      RECENT TESTING:  PSA test showed 18, notably elevated from his baseline of 0.4. Recent visit to [facility] included COVID, strep, flu A and B testing, and basic urinalysis, which were all negative.      MEDICAL HISTORY:  He has Parada syndrome and history of colon cancer diagnosed in 2008 at age 39. He also had squamous cell carcinoma and underwent Mohs procedure approximately 2 years ago. He has history of kidney stones with significant associated pain.     FAMILY HISTORY: Paternal family history significant for prostate cancer, distinct from Parada syndrome inheritance.      Review of patient's allergies indicates:   Allergen Reactions    Hydrocod-cpm-pe-acetaminophen Other (See Comments)     Irritability      Social Drivers of Health     Tobacco Use: Low Risk  (6/12/2025)    Patient History     Smoking Tobacco Use: Never     Smokeless Tobacco Use: Never     Passive Exposure: Not on file   Alcohol Use: Not At Risk (3/24/2025)    AUDIT-C     Frequency of Alcohol Consumption: Monthly or less     Average Number of Drinks: 1 or 2     Frequency of Binge Drinking: Never   Financial Resource Strain: Low Risk  (3/24/2025)    Overall Financial Resource Strain (CARDIA)     Difficulty of Paying Living Expenses: Not  hard at all   Food Insecurity: No Food Insecurity (3/24/2025)    Hunger Vital Sign     Worried About Running Out of Food in the Last Year: Never true     Ran Out of Food in the Last Year: Never true   Transportation Needs: No Transportation Needs (3/24/2025)    PRAPARE - Transportation     Lack of Transportation (Medical): No     Lack of Transportation (Non-Medical): No   Physical Activity: Insufficiently Active (3/24/2025)    Exercise Vital Sign     Days of Exercise per Week: 3 days     Minutes of Exercise per Session: 30 min   Stress: No Stress Concern Present (3/24/2025)    [employer] [place] [employer] - Occupational Stress Questionnaire     Feeling of Stress : Not at all   Housing Stability: Low Risk  (3/24/2025)    Housing Stability Vital Sign     Unable to Pay for Housing in the Last Year: No     Number of Times Moved in the Last Year: 0     Homeless in the Last Year: No   Depression: Low Risk  (3/13/2025)    Depression     Last PHQ-4: Flowsheet Data: 0   Utilities: Not At Risk (3/24/2025)    [facility] Utilities     Threatened with loss of utilities: No   Health Literacy: Adequate Health Literacy (3/24/2025)     Health Literacy     Frequency of need for help with medical instructions: Never   Social Isolation: Not on file      Past Medical History:   Diagnosis Date    Arthritis     Asthma     AS A CHILD    Colon cancer     Family hx of prostate cancer 11/22/2016    Gilbert disease     Hx of colon cancer, stage I 07/03/2014    Parada syndrome     Squamous cell carcinoma of skin     Tear of labium 10/2020    left shoulder Dr Molina    Uncontrolled type 2 diabetes mellitus with hyperglycemia 03/04/2021    Vitamin D deficiency     Wears glasses       Past Surgical History:   Procedure Laterality Date    APPENDECTOMY      APPLICATION OF CARTILAGE GRAFT N/A 3/21/2023    Procedure: APPLICATION, CARTILAGE GRAFT;  Surgeon: Alyx Spivey MD;  Location: [facility] OR [address] [place] [address];  Service: ENT;   Laterality: N/A;    CAUDAL EPIDURAL STEROID INJECTION N/A 11/6/2018    Procedure: Injection-steroid-epidural-caudal;  Surgeon: Lyndon Brooke Jr., MD;  Location: [facility];  Service: Pain Management;  Laterality: N/A;    COLON SURGERY  2008    PARTIAL COLECTOMY    COLONOSCOPY Bilateral 2012    COLONOSCOPY N/A 8/1/2016    Procedure: COLONOSCOPY;  Surgeon: Blaze Marr MD;  Location: [facility] ENDO;  Service: Endoscopy;  Laterality: N/A;    COLONOSCOPY N/A 9/20/2017    Procedure: COLONOSCOPY;  Surgeon: Dom Leonardo MD;  Location: [facility] (4TH FLR);  Service: Endoscopy;  Laterality: N/A;  not given PM prep    COLONOSCOPY N/A 10/9/2017    Procedure: COLONOSCOPY;  Surgeon: RAMON Callahan MD;  Location: [facility] (4TH FLR);  Service: Endoscopy;  Laterality: N/A;  ok for Prepopik per Dr Callahan    COLONOSCOPY N/A 11/20/2017    Procedure: COLONOSCOPY/EMR;  Surgeon: Joseluis Choe MD;  Location: [facility] (2ND FLR);  Service: Endoscopy;  Laterality: N/A;  EMR    no pm prep    COLONOSCOPY N/A 5/30/2018    Procedure: COLONOSCOPY;  Surgeon: Dom Leonardo MD;  Location: [facility] (4TH FLR);  Service: Endoscopy;  Laterality: N/A;  follow up colonoscopy in 5/2018 for Parada Syndrome         COLONOSCOPY N/A 6/10/2019    Procedure: COLONOSCOPY;  Surgeon: Dom Leonardo MD;  Location: [facility] (4TH FLR);  Service: Endoscopy;  Laterality: N/A;  Prepopik ordered per Dr. Leonardo    COLONOSCOPY N/A 7/22/2020    Procedure: COLONOSCOPY;  Surgeon: Dom Leonardo MD;  Location: [facility] (4TH FLR);  Service: Endoscopy;  Laterality: N/A;    COLONOSCOPY N/A 5/27/2021    Procedure: COLONOSCOPY;  Surgeon: Dom Leonardo MD;  Location: [facility] (4TH FLR);  Service: Endoscopy;  Laterality: N/A;  covid test 5/24-[place]  pt requests Prepopik    COLONOSCOPY N/A 6/17/2022    Procedure: COLONOSCOPY;  Surgeon: Dom Leonardo MD;  Location: [facility] ([address] [place] [address]);  Service: Endoscopy;  Laterality: N/A;  He was  discovered to have colon cancer and had right hemicolectomy        for stage II on 08/08/2008. MSH2 Parada syndrome.  sutab requested  instructions via the portal -sm  fully vaccinated - sm    COLONOSCOPY N/A 6/22/2023    Procedure: COLONOSCOPY;  Surgeon: Dom Leonardo MD;  Location: [facility] (4TH FLR);  Service: Endoscopy;  Laterality: N/A;  see telephone encounter dated 5/24/23/ Pt/ Pt wife requested sutab - prep ins. on portal / Ozempic for DM- ERW    COLONOSCOPY N/A 2/6/2024    Procedure: COLONOSCOPY;  Surgeon: Dom Leonardo MD;  Location: [facility] ENDO (4TH FLR);  Service: Endoscopy;  Laterality: N/A;  sutab/pt diabetic/ozempic/referral dr feng    COLONOSCOPY N/A 8/8/2024    Procedure: COLONOSCOPY;  Surgeon: Dom Leonardo MD;  Location: [facility] (4TH FLR);  Service: Endoscopy;  Laterality: N/A;  8/2-pre call complete-sutab-ozempic last dose 7/30/24-tb    COLONOSCOPY, SCREENING, HIGH RISK PATIENT N/A 2/11/2025    Procedure: COLONOSCOPY, SCREENING, HIGH RISK PATIENT;  Surgeon: Dom Leonardo MD;  Location: [facility] (4TH FLR);  Service: Endoscopy;  Laterality: N/A;  order created: colonoscopy 8/8/2024 repeat in 6 months for surveillance per Vicente Tamez, Sutab, portal -ml  1/17 Adjusted 15 mins - PC  2/4/25- pt holding GLP-1 as directed, pc complete. DBM    CYSTOSCOPY      CYSTOSCOPY N/A 1/31/2024    Procedure: CYSTOSCOPY;  Surgeon: Eron Llanes MD;  Location: [facility] OR 1ST FLR;  Service: Urology;  Laterality: N/A;    Epidural Steroid Injection  10/23/15    Lumbar    EPIDURAL STEROID INJECTION INTO LUMBAR SPINE N/A 7/27/2018    Procedure: Injection-steroid-epidural-lumbar;  Surgeon: Lyndon Brooke Jr., MD;  Location: [facility];  Service: Pain Management;  Laterality: N/A;    ESOPHAGOGASTRODUODENOSCOPY      ESOPHAGOGASTRODUODENOSCOPY N/A 7/22/2020    Procedure: EGD (ESOPHAGOGASTRODUODENOSCOPY);  Surgeon: Dom Leonardo MD;  Location: [facility] ([address] [place] [address]);  Service:  Endoscopy;  Laterality: N/A;  Please schedule patient for EGD and colonoscopy with me MSH2 Parada syndrome and anemia evaluation please make priority 1  covid test 7/20-[place]    ESOPHAGOGASTRODUODENOSCOPY N/A 6/22/2023    Procedure: EGD (ESOPHAGOGASTRODUODENOSCOPY);  Surgeon: Dom Leonardo MD;  Location: [facility] (4TH FLR);  Service: Endoscopy;  Laterality: N/A;  see telephone encounter dated 5/24/23 6/16/23-[place] completed appointment confirmed-    ESOPHAGOGASTRODUODENOSCOPY N/A 9/1/2023    Procedure: EGD (ESOPHAGOGASTRODUODENOSCOPY);  Surgeon: Dom Leonardo MD;  Location: [facility] (4TH FLR);  Service: Endoscopy;  Laterality: N/A;  Instructions sent via portal / Ozempic / Extended Clear Liquid prep    EXCISION OR SURGICAL PLANING, SKIN, NOSE, FOR RHINOPHYMA Bilateral 3/7/2023    Procedure: EXCISION OR SURGICAL resection nasal skin cancer;  Surgeon: Alyx Spivey MD;  Location: [facility] OR 2ND FLR;  Service: ENT;  Laterality: Bilateral;    EXTRACTION - STONE Left 1/31/2024    Procedure: EXTRACTION - STONE;  Surgeon: Eron Llanes MD;  Location: [facility] OR 1ST FLR;  Service: Urology;  Laterality: Left;    FACETECTOMY OF VERTEBRA  2/13/2019    Procedure: MEDIAL FACETECTOMY L5-S1;  Surgeon: Lyndon Brooke Jr., MD;  Location: [facility] OR;  Service: Orthopedics;;    GASTRIC BYPASS  2010    SLEEVE    KNEE ARTHROSCOPY Right     LUMBAR DISCECTOMY  2/13/2019    Procedure: DISCECTOMY, SPINE, LUMBAR L5-S1;  Surgeon: Lyndon Brooke Jr., MD;  Location: [facility] OR;  Service: Orthopedics;;    NASAL RECONSTRUCTION N/A 3/21/2023    Procedure: RECONSTRUCTION, NOSE;  Surgeon: Alyx Spivey MD;  Location: [facility] OR 2ND FLR;  Service: ENT;  Laterality: N/A;    NASAL RECONSTRUCTION N/A 6/11/2024    Procedure: RECONSTRUCTION, NOSE;  Surgeon: Alyx Spivey MD;  Location: [facility] OR;  Service: ENT;  Laterality: N/A;    NASAL RECONSTRUCTION N/A 9/3/2024    Procedure: RECONSTRUCTION, NOSE;  Surgeon:  Alyx Spivey MD;  Location: [facility] OR;  Service: ENT;  Laterality: N/A;    PLACEMENT-STENT Left 1/31/2024    Procedure: PLACEMENT-STENT;  Surgeon: Eron Llanes MD;  Location: [facility] OR 1ST FLR;  Service: Urology;  Laterality: Left;    RETROGRADE PYELOGRAPHY Left 1/31/2024    Procedure: PYELOGRAM, RETROGRADE;  Surgeon: Eron Llanes MD;  Location: [facility] OR 1ST FLR;  Service: Urology;  Laterality: Left;    REVISION OF FLAP GRAFT Bilateral 4/18/2023    Procedure: REVISION, PROCEDURE INVOLVING FLAP GRAFT;  Surgeon: Alyx Spivey MD;  Location: [facility] OR;  Service: ENT;  Laterality: Bilateral;    REVISION OF FLAP GRAFT N/A 6/29/2023    Procedure: REVISION, PROCEDURE INVOLVING FLAP GRAFT;  Surgeon: Alyx Spivey MD;  Location: [facility] OR 2ND FLR;  Service: ENT;  Laterality: N/A;    REVISION OF FLAP GRAFT N/A 8/29/2023    Procedure: REVISION, PROCEDURE INVOLVING FLAP GRAFT;  Surgeon: Alyx Spivey MD;  Location: [facility] OR;  Service: ENT;  Laterality: N/A;    REVISION OF FLAP GRAFT Bilateral 2/20/2024    Procedure: REVISION, PROCEDURE INVOLVING FLAP GRAFT;  Surgeon: Alyx Spivey MD;  Location: [facility] OR;  Service: ENT;  Laterality: Bilateral;    REVISION OF FLAP GRAFT N/A 5/7/2024    Procedure: REVISION, PROCEDURE INVOLVING FLAP GRAFT;  Surgeon: Alyx Spivey MD;  Location: [facility] OR;  Service: ENT;  Laterality: N/A;    ROTATION FLAP SURGERY N/A 3/21/2023    Procedure: CREATION, FLAP, ROTATION;  Surgeon: Alyx Spivey MD;  Location: [facility] OR 2ND FLR;  Service: ENT;  Laterality: N/A;    ROTATION FLAP SURGERY Right 4/9/2024    Procedure: CREATION, FLAP, ROTATION;  Surgeon: Alyx Spivey MD;  Location: [facility] OR;  Service: ENT;  Laterality: Right;    URETEROSCOPY Left 1/31/2024    Procedure: URETEROSCOPY;  Surgeon: Eron Llanes MD;  Location: [facility] OR 1ST FLR;  Service: Urology;  Laterality: Left;      Social History[1]    Current Medications[2]    Lab Results   Component Value Date    WBC 4.03 03/17/2025    HGB 16.9  03/17/2025    HCT 48.5 03/17/2025     03/17/2025    CHOL 135 03/17/2025    TRIG 53 03/17/2025    HDL 40 03/17/2025    ALT 26 03/17/2025    AST 27 03/17/2025     03/17/2025    K 4.0 03/17/2025     03/17/2025    CREATININE 1.0 03/17/2025    BUN 14 03/17/2025    CO2 24 03/17/2025    TSH 1.525 12/19/2023    PSA 18.43 (H) 06/11/2025    INR 1.0 05/01/2024    HGBA1C 5.5 03/17/2025       Review of Systems   Constitutional:  Positive for chills and fever.   HENT:  Positive for postnasal drip.    Respiratory: Negative.     Cardiovascular: Negative.    Gastrointestinal:  Positive for abdominal pain and constipation.   Genitourinary:  Positive for decreased urine volume. Negative for difficulty urinating, discharge, dysuria, flank pain, frequency, hematuria, penile pain and urgency.       Objective:      Physical Exam  Vitals reviewed.   Constitutional:       Appearance: He is ill-appearing.   HENT:      Right Ear: Tympanic membrane normal.      Left Ear: Tympanic membrane normal.      Nose: Nose normal.      Mouth/Throat:      Mouth: Mucous membranes are moist.      Pharynx: Oropharynx is clear. Posterior oropharyngeal erythema present.   Eyes:      Conjunctiva/sclera: Conjunctivae normal.   Cardiovascular:      Rate and Rhythm: Normal rate and regular rhythm.      Pulses: Normal pulses.      Heart sounds: Normal heart sounds.   Pulmonary:      Effort: Pulmonary effort is normal.      Breath sounds: Normal breath sounds.   Abdominal:      General: Bowel sounds are normal.      Palpations: Abdomen is soft.      Tenderness: There is no abdominal tenderness. There is no right CVA tenderness or left CVA tenderness.   Skin:     General: Skin is warm and dry.      Capillary Refill: Capillary refill takes less than 2 seconds.   Neurological:      General: No focal deficit present.      Mental Status: He is alert.   Psychiatric:         Mood and Affect: Mood normal.         Thought Content: Thought content normal.          Judgment: Judgment normal.       Assessment:       1. Fever, unspecified fever cause    2. Type 2 diabetes mellitus with diabetic polyneuropathy, without long-term current use of insulin    3. Acute prostatitis    4. Decreased urine stream        Plan:       Lyndon was seen today for fever, chills, headache and generalized body aches.    Diagnoses and all orders for this visit:    Fever, unspecified fever cause  -     CBC Auto Differential; Future  -     Comprehensive Metabolic Panel; Future  -     Sedimentation rate; Future  -     C-Reactive Protein (In-House); Future  -     Urine Culture High Risk ($$)  -     Blood culture; Future  -     POCT Urinalysis(Instrument)    Type 2 diabetes mellitus with diabetic polyneuropathy, without long-term current use of insulin  -     Microalbumin/Creatinine Ratio, Urine; Future  -     Microalbumin/Creatinine Ratio, Urine    Acute prostatitis  -     ciprofloxacin HCl (CIPRO) 500 MG tablet; Take 1 tablet (500 mg total) by mouth every 12 (twelve) hours.    Decreased urine stream  -     POCT Urinalysis(Instrument)    Urinalysis in clinic positive for trace hematuria.  Urine sent for culture.  Have labs done today. Due to patient history history his wife requests inflammatory markers be done. PSA could be elevated due to prostatitis so will start on Cipro 500 BID for a month.  He should follow up with his Urologist.  Will need repeat PSA after treatment. Follow up if symptoms worsen or do not improve.         This note was generated with the assistance of ambient listening technology. Verbal consent was obtained by the patient and accompanying visitor(s) for the recording of patient appointment to facilitate this note. I attest to having reviewed and edited the generated note for accuracy, though some syntax or spelling errors may persist. Please contact the author of this note for any clarification.           [1]  Social History  Socioeconomic History    Marital status:  "   [2]    Current Outpatient Medications:     ACCU-CHEK GUIDE TEST STRIPS Strp, USE TO TEST TWICE A DAY, Disp: 200 strip, Rfl: 3    ascorbic acid, vitamin C, (VITAMIN C) 100 MG tablet, Take 100 mg by mouth once daily., Disp: , Rfl:     aspirin (ECOTRIN) 81 MG EC tablet, Take by mouth once daily. Aspirin 300 mg bid coded aspirin, Disp: , Rfl:     azelaic acid (AZELEX) 15 % gel, Apply to face BID., Disp: 50 g, Rfl: 5    cyanocobalamin, vitamin B-12, 2,500 mcg Lozg, Place 2 tablets under the tongue once daily., Disp: 180 lozenge, Rfl: 3    doxycycline (PERIOSTAT) 20 MG tablet, TAKE 2 TABLETS BY MOUTH ONCE DAILY, Disp: 180 tablet, Rfl: 3    ferrous gluconate (FERATE) 240 (27 FE) MG tablet, Take 1 tablet (240 mg total) by mouth Daily., Disp: 100 each, Rfl: 0    ketoconazole (NIZORAL) 2 % shampoo, APPLY TOPICALLY EVERY 7 DAYS, Disp: 120 mL, Rfl: 1    lancets (ACCU-CHEK SOFTCLIX LANCETS) Misc, 1 Units by Misc.(Non-Drug; Combo Route) route 2 (two) times a day., Disp: 200 each, Rfl: 0    metFORMIN (GLUCOPHAGE-XR) 500 MG ER 24hr tablet, TAKE 1 TABLET BY MOUTH EVERY DAY WITH BREAKFAST, Disp: 90 tablet, Rfl: 1    metroNIDAZOLE (NORITATE) 1 % cream, Compound azelaic acid 15% + ivermectin 1% + metronidazole 1% cream. Apply to face once or twice daily., Disp: 30 g, Rfl: 5    pen needle, diabetic (PEN NEEDLE) 32 gauge x 5/32" Ndle, 1 each by Misc.(Non-Drug; Combo Route) route once daily., Disp: 90 each, Rfl: 3    pravastatin (PRAVACHOL) 20 MG tablet, Take 1 tablet (20 mg total) by mouth once daily., Disp: 90 tablet, Rfl: 3    RABEprazole (ACIPHEX) 20 mg tablet, TAKE 1 TABLET (20 MG TOTAL) BY MOUTH BEFORE BREAKFAST., Disp: 90 tablet, Rfl: 3    semaglutide (OZEMPIC) 2 mg/dose (8 mg/3 mL) PnIj, Inject 2 mg into the skin every 7 days., Disp: 9 mL, Rfl: 1    sulfacetamide sodium-sulfur 10-5 % (w/w) Clsr, Use to wash face daily, Disp: 170 g, Rfl: 5    tadalafiL (CIALIS) 5 MG tablet, Take 1 tablet (5 mg total) by " mouth daily as needed for Erectile Dysfunction., Disp: 90 tablet, Rfl: 1    terbinafine HCL (LAMISIL) 250 mg tablet, Take 1 tablet (250 mg total) by mouth once daily., Disp: 90 tablet, Rfl: 0    testosterone cypionate (DEPOTESTOTERONE CYPIONATE) 200 mg/mL injection, Inject 60 mg into the muscle twice a week., Disp: , Rfl:     vitamin A 8000 UNIT capsule, Take 1 capsule (8,000 Units total) by mouth once daily., Disp: 100 capsule, Rfl: 3    vitamin D (VITAMIN D3) 1000 units Tab, Take 5,000 Units by mouth 3 (three) times daily., Disp: , Rfl:     ciprofloxacin HCl (CIPRO) 500 MG tablet, Take 1 tablet (500 mg total) by mouth every 12 (twelve) hours., Disp: 60 tablet, Rfl: 0    SUTAB 1.479-0.188- 0.225 gram tablet, TAKE 12 TABLETS BY MOUTH ONCE DAILY. TAKE AS DIRECTED BY PROVIDER OFFICE, Disp: 24 tablet, Rfl: 0

## 2025-06-12 NOTE — Clinical Note
"Lyndon Potter" Ayad was seen and treated in our emergency department on 6/12/2025.  He may return to work on 06/23/2025.       If you have any questions or concerns, please don't hesitate to call.      Gen Dennis MD"

## 2025-06-13 ENCOUNTER — TELEPHONE (OUTPATIENT)
Dept: URGENT CARE | Facility: CLINIC | Age: 55
End: 2025-06-13
Payer: COMMERCIAL

## 2025-06-13 ENCOUNTER — PATIENT MESSAGE (OUTPATIENT)
Dept: HEMATOLOGY/ONCOLOGY | Facility: CLINIC | Age: 55
End: 2025-06-13
Payer: COMMERCIAL

## 2025-06-13 LAB
BACTERIA UR CULT: ABNORMAL
OHS QRS DURATION: 98 MS
OHS QTC CALCULATION: 420 MS

## 2025-06-14 NOTE — TELEPHONE ENCOUNTER
Called patient for wellness call since OV on 6/10/25. Spoke with wife. She states he is not doing well, He went to ED yesterday and received IV abx. Dx with prostate infection. Was told if fever does not break without fever reducers by Sunday he will be admitted to hospital for further treatment.

## 2025-06-15 ENCOUNTER — HOSPITAL ENCOUNTER (EMERGENCY)
Facility: HOSPITAL | Age: 55
Discharge: HOME | End: 2025-06-15
Attending: EMERGENCY MEDICINE
Payer: COMMERCIAL

## 2025-06-15 VITALS
HEART RATE: 68 BPM | HEIGHT: 74 IN | TEMPERATURE: 99 F | BODY MASS INDEX: 32.08 KG/M2 | SYSTOLIC BLOOD PRESSURE: 141 MMHG | RESPIRATION RATE: 17 BRPM | DIASTOLIC BLOOD PRESSURE: 76 MMHG | WEIGHT: 250 LBS | OXYGEN SATURATION: 97 %

## 2025-06-15 DIAGNOSIS — N41.9 PROSTATITIS, UNSPECIFIED PROSTATITIS TYPE: Primary | ICD-10-CM

## 2025-06-15 LAB
ABSOLUTE EOSINOPHIL (SMH): 0.12 K/UL
ABSOLUTE MONOCYTE (SMH): 0.81 K/UL (ref 0.3–1)
ABSOLUTE NEUTROPHIL COUNT (SMH): 7.7 K/UL (ref 1.8–7.7)
ALBUMIN SERPL-MCNC: 4.2 G/DL (ref 3.5–5.2)
ALP SERPL-CCNC: 51 UNIT/L (ref 55–135)
ALT SERPL-CCNC: 20 UNIT/L (ref 10–44)
ANION GAP (SMH): 10 MMOL/L (ref 8–16)
AST SERPL-CCNC: 17 UNIT/L (ref 10–40)
BACTERIA #/AREA URNS AUTO: ABNORMAL /HPF
BACTERIA UR CULT: NO GROWTH
BASOPHILS # BLD AUTO: 0.03 K/UL
BASOPHILS NFR BLD AUTO: 0.3 %
BILIRUB SERPL-MCNC: 1 MG/DL (ref 0.1–1)
BILIRUB UR QL STRIP.AUTO: NEGATIVE
BUN SERPL-MCNC: 19 MG/DL (ref 6–20)
CALCIUM SERPL-MCNC: 9.2 MG/DL (ref 8.7–10.5)
CHLORIDE SERPL-SCNC: 108 MMOL/L (ref 95–110)
CLARITY UR: CLEAR
CO2 SERPL-SCNC: 22 MMOL/L (ref 23–29)
COLOR UR AUTO: ABNORMAL
CREAT SERPL-MCNC: 1 MG/DL (ref 0.5–1.4)
ERYTHROCYTE [DISTWIDTH] IN BLOOD BY AUTOMATED COUNT: 11.6 % (ref 11.5–14.5)
GFR SERPLBLD CREATININE-BSD FMLA CKD-EPI: >60 ML/MIN/1.73/M2
GLUCOSE SERPL-MCNC: 106 MG/DL (ref 70–110)
GLUCOSE UR QL STRIP: NEGATIVE
HCT VFR BLD AUTO: 49.1 % (ref 40–54)
HGB BLD-MCNC: 17.1 GM/DL (ref 14–18)
HGB UR QL STRIP: NEGATIVE
IMM GRANULOCYTES # BLD AUTO: 0.06 K/UL (ref 0–0.04)
IMM GRANULOCYTES NFR BLD AUTO: 0.6 % (ref 0–0.5)
KETONES UR QL STRIP: ABNORMAL
LDH SERPL L TO P-CCNC: 0.77 MMOL/L (ref 0.5–2.2)
LEUKOCYTE ESTERASE UR QL STRIP: ABNORMAL
LYMPHOCYTES # BLD AUTO: 0.99 K/UL (ref 1–4.8)
MAGNESIUM SERPL-MCNC: 2 MG/DL (ref 1.6–2.6)
MCH RBC QN AUTO: 31.1 PG (ref 27–31)
MCHC RBC AUTO-ENTMCNC: 34.8 G/DL (ref 32–36)
MCV RBC AUTO: 89 FL (ref 82–98)
MICROSCOPIC COMMENT: ABNORMAL
NITRITE UR QL STRIP: NEGATIVE
NUCLEATED RBC (/100WBC) (SMH): 0 /100 WBC
PH UR STRIP: 6 [PH]
PHOSPHATE SERPL-MCNC: 2.2 MG/DL (ref 2.7–4.5)
PLATELET # BLD AUTO: 288 K/UL (ref 150–450)
PMV BLD AUTO: 8.8 FL (ref 9.2–12.9)
POTASSIUM SERPL-SCNC: 3.8 MMOL/L (ref 3.5–5.1)
PROT SERPL-MCNC: 7.5 GM/DL (ref 6–8.4)
PROT UR QL STRIP: ABNORMAL
RBC # BLD AUTO: 5.5 M/UL (ref 4.6–6.2)
RBC #/AREA URNS AUTO: 3 /HPF
RELATIVE EOSINOPHIL (SMH): 1.2 % (ref 0–8)
RELATIVE LYMPHOCYTE (SMH): 10.2 % (ref 18–48)
RELATIVE MONOCYTE (SMH): 8.4 % (ref 4–15)
RELATIVE NEUTROPHIL (SMH): 79.3 % (ref 38–73)
SAMPLE: NORMAL
SODIUM SERPL-SCNC: 140 MMOL/L (ref 136–145)
SP GR UR STRIP: >=1.03
SQUAMOUS #/AREA URNS AUTO: <1 /HPF
UROBILINOGEN UR STRIP-ACNC: NEGATIVE EU/DL
WBC # BLD AUTO: 9.7 K/UL (ref 3.9–12.7)
WBC #/AREA URNS AUTO: 46 /HPF

## 2025-06-15 PROCEDURE — 63600175 PHARM REV CODE 636 W HCPCS: Performed by: EMERGENCY MEDICINE

## 2025-06-15 PROCEDURE — 83735 ASSAY OF MAGNESIUM: CPT | Performed by: EMERGENCY MEDICINE

## 2025-06-15 PROCEDURE — 84100 ASSAY OF PHOSPHORUS: CPT | Performed by: EMERGENCY MEDICINE

## 2025-06-15 PROCEDURE — 84155 ASSAY OF PROTEIN SERUM: CPT | Performed by: EMERGENCY MEDICINE

## 2025-06-15 PROCEDURE — 25000003 PHARM REV CODE 250: Performed by: EMERGENCY MEDICINE

## 2025-06-15 PROCEDURE — 87086 URINE CULTURE/COLONY COUNT: CPT | Performed by: EMERGENCY MEDICINE

## 2025-06-15 PROCEDURE — 36415 COLL VENOUS BLD VENIPUNCTURE: CPT | Performed by: EMERGENCY MEDICINE

## 2025-06-15 PROCEDURE — 99284 EMERGENCY DEPT VISIT MOD MDM: CPT | Mod: 25

## 2025-06-15 PROCEDURE — 87040 BLOOD CULTURE FOR BACTERIA: CPT | Performed by: EMERGENCY MEDICINE

## 2025-06-15 PROCEDURE — 96365 THER/PROPH/DIAG IV INF INIT: CPT

## 2025-06-15 PROCEDURE — 81003 URINALYSIS AUTO W/O SCOPE: CPT | Performed by: EMERGENCY MEDICINE

## 2025-06-15 PROCEDURE — 85025 COMPLETE CBC W/AUTO DIFF WBC: CPT | Performed by: EMERGENCY MEDICINE

## 2025-06-15 RX ORDER — AMOXICILLIN 500 MG/1
1000 CAPSULE ORAL EVERY 8 HOURS
Qty: 84 CAPSULE | Refills: 0 | Status: SHIPPED | OUTPATIENT
Start: 2025-06-15 | End: 2025-06-29

## 2025-06-15 RX ADMIN — PIPERACILLIN SODIUM AND TAZOBACTAM SODIUM 4.5 G: 4; .5 INJECTION, POWDER, LYOPHILIZED, FOR SOLUTION INTRAVENOUS at 10:06

## 2025-06-15 NOTE — ED PROVIDER NOTES
Encounter Date: 6/15/2025       History     Chief Complaint   Patient presents with    Abnormal Labs     Seen Thursday. Was told by urgent care today that urine culture came back positive for bacteria not covered by antibiotics.     55-year-old male presents to the emergency room for positive urine culture.  Seen in the ER 3 days ago and treated for presumptive prostatitis.  Urine culture grew out Enterococcus, called urgent care today and they were directed to the emergency room because of the urine culture results.  He is on ciprofloxacin.  His last fever was late Wednesday night or early Thursday.  He saw primary care and in the emergency room last Thursday.  He reports at that time a weak stream and a fever of 102° F.  Since then he has been rotating Motrin and Tylenol with no recurrence of the fever.  He states if he is late taking either medication he gets a frontal headache.  He denies sore throat or neck pain.  Denies vomiting or diarrhea.  Weak stream has improved.  No sneezing no runny nose no cough no congestion.  Only complaint at this time is fatigue.  He has been compliant with ciprofloxacin.  Has an appointment with Urology on Tuesday.    The history is provided by the patient and the spouse.     Review of patient's allergies indicates:   Allergen Reactions    Hydrocod-cpm-pe-acetaminophen Other (See Comments)     Irritability      Past Medical History:   Diagnosis Date    Arthritis     Asthma     AS A CHILD    Colon cancer     Family hx of prostate cancer 11/22/2016    Gilbert disease     Hx of colon cancer, stage I 07/03/2014    Parada syndrome     Squamous cell carcinoma of skin     Tear of labium 10/2020    left shoulder Dr Molina    Uncontrolled type 2 diabetes mellitus with hyperglycemia 03/04/2021    Vitamin D deficiency     Wears glasses      Past Surgical History:   Procedure Laterality Date    APPENDECTOMY      APPLICATION OF CARTILAGE GRAFT N/A 3/21/2023    Procedure: APPLICATION, CARTILAGE  GRAFT;  Surgeon: Alyx Spivey MD;  Location: [facility] OR 2ND FLR;  Service: ENT;  Laterality: N/A;    CAUDAL EPIDURAL STEROID INJECTION N/A 11/6/2018    Procedure: Injection-steroid-epidural-caudal;  Surgeon: Lyndon Brooke Jr., MD;  Location: [facility] OR;  Service: Pain Management;  Laterality: N/A;    COLON SURGERY  2008    PARTIAL COLECTOMY    COLONOSCOPY Bilateral 2012    COLONOSCOPY N/A 8/1/2016    Procedure: COLONOSCOPY;  Surgeon: Blaze Marr MD;  Location: [facility] ENDO;  Service: Endoscopy;  Laterality: N/A;    COLONOSCOPY N/A 9/20/2017    Procedure: COLONOSCOPY;  Surgeon: Dom Leonardo MD;  Location: [facility] (4TH FLR);  Service: Endoscopy;  Laterality: N/A;  not given PM prep    COLONOSCOPY N/A 10/9/2017    Procedure: COLONOSCOPY;  Surgeon: RAMON Callahan MD;  Location: [facility] (4TH FLR);  Service: Endoscopy;  Laterality: N/A;  ok for Prepopik per Dr Callahan    COLONOSCOPY N/A 11/20/2017    Procedure: COLONOSCOPY/EMR;  Surgeon: Joselusi Choe MD;  Location: [facility] (2ND FLR);  Service: Endoscopy;  Laterality: N/A;  EMR    no pm prep    COLONOSCOPY N/A 5/30/2018    Procedure: COLONOSCOPY;  Surgeon: Dom Leonardo MD;  Location: [facility] (4TH FLR);  Service: Endoscopy;  Laterality: N/A;  follow up colonoscopy in 5/2018 for Parada Syndrome         COLONOSCOPY N/A 6/10/2019    Procedure: COLONOSCOPY;  Surgeon: Dom Leonardo MD;  Location: [facility] (4TH FLR);  Service: Endoscopy;  Laterality: N/A;  Prepopik ordered per Dr. Leonardo    COLONOSCOPY N/A 7/22/2020    Procedure: COLONOSCOPY;  Surgeon: Dom Leonardo MD;  Location: [facility] (4TH FLR);  Service: Endoscopy;  Laterality: N/A;    COLONOSCOPY N/A 5/27/2021    Procedure: COLONOSCOPY;  Surgeon: Dom Leonardo MD;  Location: [facility] (4TH FLR);  Service: Endoscopy;  Laterality: N/A;  covid test 5/24-[place]  pt requests Prepopik    COLONOSCOPY N/A 6/17/2022    Procedure: COLONOSCOPY;  Surgeon: Dom Leonardo MD;  Location: [facility]  ENDO (4TH FLR);  Service: Endoscopy;  Laterality: N/A;  He was discovered to have colon cancer and had right hemicolectomy        for stage II on 08/08/2008. MSH2 Parada syndrome.  sutab requested  instructions via the portal -sm  fully vaccinated - sm    COLONOSCOPY N/A 6/22/2023    Procedure: COLONOSCOPY;  Surgeon: Dom Leonardo MD;  Location: [facility] (4TH FLR);  Service: Endoscopy;  Laterality: N/A;  see telephone encounter dated 5/24/23/ Pt/ Pt wife requested sutab - prep ins. on portal / Ozempic for DM- ERW    COLONOSCOPY N/A 2/6/2024    Procedure: COLONOSCOPY;  Surgeon: Dom Leonardo MD;  Location: [facility] (4TH FLR);  Service: Endoscopy;  Laterality: N/A;  sutab/pt diabetic/ozempic/referral dr feng    COLONOSCOPY N/A 8/8/2024    Procedure: COLONOSCOPY;  Surgeon: Dom Leonardo MD;  Location: [facility] (4TH FLR);  Service: Endoscopy;  Laterality: N/A;  8/2-pre call complete-sutab-ozempic last dose 7/30/24-tb    COLONOSCOPY, SCREENING, HIGH RISK PATIENT N/A 2/11/2025    Procedure: COLONOSCOPY, SCREENING, HIGH RISK PATIENT;  Surgeon: Dom Leonardo MD;  Location: [facility] (4TH FLR);  Service: Endoscopy;  Laterality: N/A;  order created: colonoscopy 8/8/2024 repeat in 6 months for surveillance per Vicente Tamez, Sutab, portal -ml  1/17 Adjusted 15 mins - PC  2/4/25- pt holding GLP-1 as directed, pc complete. DBM    CYSTOSCOPY      CYSTOSCOPY N/A 1/31/2024    Procedure: CYSTOSCOPY;  Surgeon: Eron Llanes MD;  Location: [facility] 1ST FLR;  Service: Urology;  Laterality: N/A;    Epidural Steroid Injection  10/23/15    Lumbar    EPIDURAL STEROID INJECTION INTO LUMBAR SPINE N/A 7/27/2018    Procedure: Injection-steroid-epidural-lumbar;  Surgeon: Lyndon Brooke Jr., MD;  Location: [facility];  Service: Pain Management;  Laterality: N/A;    ESOPHAGOGASTRODUODENOSCOPY      ESOPHAGOGASTRODUODENOSCOPY N/A 7/22/2020    Procedure: EGD (ESOPHAGOGASTRODUODENOSCOPY);  Surgeon: Dom Leonardo,  MD;  Location: [facility] (4TH FLR);  Service: Endoscopy;  Laterality: N/A;  Please schedule patient for EGD and colonoscopy with me MSH2 Parada syndrome and anemia evaluation please make priority 1  covid test 7/20-[place]    ESOPHAGOGASTRODUODENOSCOPY N/A 6/22/2023    Procedure: EGD (ESOPHAGOGASTRODUODENOSCOPY);  Surgeon: Dom Leonardo MD;  Location: [facility] (4TH FLR);  Service: Endoscopy;  Laterality: N/A;  see telephone encounter dated 5/24/23 6/16/23-[place] completed appointment confirmed-    ESOPHAGOGASTRODUODENOSCOPY N/A 9/1/2023    Procedure: EGD (ESOPHAGOGASTRODUODENOSCOPY);  Surgeon: Dom Leonardo MD;  Location: [facility] (4TH FLR);  Service: Endoscopy;  Laterality: N/A;  Instructions sent via portal / Ozempic / Extended Clear Liquid prep    EXCISION OR SURGICAL PLANING, SKIN, NOSE, FOR RHINOPHYMA Bilateral 3/7/2023    Procedure: EXCISION OR SURGICAL resection nasal skin cancer;  Surgeon: Alyx Spivey MD;  Location: [facility] OR 2ND FLR;  Service: ENT;  Laterality: Bilateral;    EXTRACTION - STONE Left 1/31/2024    Procedure: EXTRACTION - STONE;  Surgeon: Eron Llanes MD;  Location: [facility] OR 1ST FLR;  Service: Urology;  Laterality: Left;    FACETECTOMY OF VERTEBRA  2/13/2019    Procedure: MEDIAL FACETECTOMY L5-S1;  Surgeon: Lyndon Brooke Jr., MD;  Location: [facility] OR;  Service: Orthopedics;;    GASTRIC BYPASS  2010    SLEEVE    KNEE ARTHROSCOPY Right     LUMBAR DISCECTOMY  2/13/2019    Procedure: DISCECTOMY, SPINE, LUMBAR L5-S1;  Surgeon: Lyndon Brooke Jr., MD;  Location: [facility] OR;  Service: Orthopedics;;    NASAL RECONSTRUCTION N/A 3/21/2023    Procedure: RECONSTRUCTION, NOSE;  Surgeon: Alyx Spivey MD;  Location: [facility] OR 2ND FLR;  Service: ENT;  Laterality: N/A;    NASAL RECONSTRUCTION N/A 6/11/2024    Procedure: RECONSTRUCTION, NOSE;  Surgeon: Alyx Spivey MD;  Location: [facility] OR;  Service: ENT;  Laterality: N/A;    NASAL RECONSTRUCTION N/A 9/3/2024    Procedure:  RECONSTRUCTION, NOSE;  Surgeon: Alyx Spivey MD;  Location: [facility] OR;  Service: ENT;  Laterality: N/A;    PLACEMENT-STENT Left 1/31/2024    Procedure: PLACEMENT-STENT;  Surgeon: Eron Llanes MD;  Location: [facility] OR 1ST FLR;  Service: Urology;  Laterality: Left;    RETROGRADE PYELOGRAPHY Left 1/31/2024    Procedure: PYELOGRAM, RETROGRADE;  Surgeon: Eron Llanes MD;  Location: [facility] OR 1ST FLR;  Service: Urology;  Laterality: Left;    REVISION OF FLAP GRAFT Bilateral 4/18/2023    Procedure: REVISION, PROCEDURE INVOLVING FLAP GRAFT;  Surgeon: Alyx Spivey MD;  Location: [facility] OR;  Service: ENT;  Laterality: Bilateral;    REVISION OF FLAP GRAFT N/A 6/29/2023    Procedure: REVISION, PROCEDURE INVOLVING FLAP GRAFT;  Surgeon: Alyx Spivey MD;  Location: [facility] OR 2ND FLR;  Service: ENT;  Laterality: N/A;    REVISION OF FLAP GRAFT N/A 8/29/2023    Procedure: REVISION, PROCEDURE INVOLVING FLAP GRAFT;  Surgeon: Alyx Spivey MD;  Location: [facility] OR;  Service: ENT;  Laterality: N/A;    REVISION OF FLAP GRAFT Bilateral 2/20/2024    Procedure: REVISION, PROCEDURE INVOLVING FLAP GRAFT;  Surgeon: Alyx Spivey MD;  Location: [facility] OR;  Service: ENT;  Laterality: Bilateral;    REVISION OF FLAP GRAFT N/A 5/7/2024    Procedure: REVISION, PROCEDURE INVOLVING FLAP GRAFT;  Surgeon: Alyx Spivey MD;  Location: [facility] OR;  Service: ENT;  Laterality: N/A;    ROTATION FLAP SURGERY N/A 3/21/2023    Procedure: CREATION, FLAP, ROTATION;  Surgeon: Alyx Spivey MD;  Location: [facility] OR 2ND FLR;  Service: ENT;  Laterality: N/A;    ROTATION FLAP SURGERY Right 4/9/2024    Procedure: CREATION, FLAP, ROTATION;  Surgeon: Alyx Spivey MD;  Location: [facility] OR;  Service: ENT;  Laterality: Right;    URETEROSCOPY Left 1/31/2024    Procedure: URETEROSCOPY;  Surgeon: Eron Llanes MD;  Location: [facility] OR 1ST FLR;  Service: Urology;  Laterality: Left;     Family History   Problem Relation Name Age of Onset    Melanoma Mother  45        on lip    Breast  cancer Mother  65        original around 65, second around 68    Heart disease Father      Diabetes Father      Liver disease Father      Hearing loss Father      Basal cell carcinoma Father      No Known Problems Sister          MSH2+    Polycystic ovary syndrome Daughter      Parada Syndrome Son          MSH2+    Colon cancer Maternal Uncle          4X    Heart disease Maternal Uncle      Hypertension Maternal Uncle      No Known Problems Paternal Aunt      Alcohol abuse Paternal Uncle Mason     Prostate cancer Paternal Uncle Mason 60    Dementia Maternal Grandmother      Colon cancer Maternal Grandfather  37    Diabetes Paternal Grandmother      Hypertension Paternal Grandfather      Prostatitis Paternal Grandfather      Colon cancer Maternal Uncle      Colon cancer Other      Colon cancer Other      Esophageal cancer Paternal Cousin  57    Psoriasis Neg Hx      Lupus Neg Hx      Eczema Neg Hx       Social History[1]  Review of Systems   Constitutional:  Positive for activity change, chills (better) and fever (better).   HENT: Negative.     Respiratory:  Negative for shortness of breath.    Cardiovascular:  Negative for chest pain.   Gastrointestinal:  Negative for diarrhea, nausea and vomiting.   Genitourinary: Negative.        Physical Exam     Initial Vitals [06/15/25 0931]   BP Pulse Resp Temp SpO2   (!) 150/91 75 17 98.5 °F (36.9 °C) 96 %      MAP       --         Physical Exam    Nursing note and vitals reviewed.  Constitutional: He appears well-developed and well-nourished. He is not diaphoretic.  Non-toxic appearance. He does not have a sickly appearance. He does not appear ill. No distress.   No distress well-appearing smiling pleasant   HENT:   Head: Normocephalic and atraumatic.   Eyes: EOM are normal.   Neck: Neck supple.   Normal range of motion.  Cardiovascular:  Normal rate, regular rhythm and normal heart sounds.     Exam reveals no gallop and no friction rub.       No murmur  heard.  Pulmonary/Chest: Breath sounds normal. No respiratory distress. He has no wheezes. He has no rhonchi. He has no rales.   Abdominal: Abdomen is soft. He exhibits no distension. There is no abdominal tenderness. There is no rebound and no guarding.   Genitourinary:    Prostate normal.      Genitourinary Comments: No prostate tenderness     Musculoskeletal:         General: Normal range of motion.      Cervical back: Normal range of motion and neck supple. No rigidity. Normal range of motion.     Neurological: He is alert and oriented to person, place, and time.   Skin: Skin is warm and dry. No rash noted.   Psychiatric: He has a normal mood and affect. His behavior is normal. Judgment and thought content normal.         ED Course   Procedures  Labs Reviewed   COMPREHENSIVE METABOLIC PANEL - Abnormal       Result Value    Sodium 140      Potassium 3.8      Chloride 108      CO2 22 (*)     Glucose 106      BUN 19      Creatinine 1.0      Calcium 9.2      Protein Total 7.5      Albumin 4.2      Bilirubin Total 1.0      ALP 51 (*)     AST 17      ALT 20      Anion Gap 10      eGFR >60     PHOSPHORUS - Abnormal    Phosphorus Level 2.2 (*)    URINALYSIS, REFLEX TO URINE CULTURE - Abnormal    Color, UA Jessica      Appearance, UA Clear      Spec Grav UA >=1.030 (*)     pH, UA 6.0      Protein, UA 1+ (*)     Glucose, UA Negative      Ketones, UA 2+ (*)     Blood, UA Negative      Bilirubin, UA Negative      Urobilinogen, UA Negative      Nitrites, UA Negative      Leukocyte Esterase, UA 2+ (*)    CBC WITH DIFFERENTIAL - Abnormal    WBC 9.70      RBC 5.50      Hgb 17.1      Hct 49.1      MCV 89      MCH 31.1 (*)     MCHC 34.8      RDW 11.6      Platelet Count 288      MPV 8.8 (*)     Nucleated RBC 0      Neut % 79.3 (*)     Lymph % 10.2 (*)     Mono % 8.4      Eos % 1.2      Basophil % 0.3      Imm Grans % 0.6 (*)     Neut # 7.7      Lymph # 0.99 (*)     Mono # 0.81      Eos # 0.12      Baso # 0.03      Imm Grans # 0.06  (*)    URINALYSIS MICROSCOPIC - Abnormal    RBC, UA 3      WBC, UA 46 (*)     Bacteria, UA Occasional      Squamous Epithelial Cells, UA <1      Microscopic Comment       MAGNESIUM - Normal    Magnesium 2.0     CULTURE, BLOOD   CULTURE, BLOOD   CULTURE, URINE   CBC W/ AUTO DIFFERENTIAL    Narrative:     The following orders were created for panel order CBC auto differential.  Procedure                               Abnormality         Status                     ---------                               -----------         ------                     CBC with Differential[[id number]]       Abnormal            Final result                 Please view results for these tests on the individual orders.   EXTRA TUBES    Narrative:     The following orders were created for panel order EXTRA TUBES.  Procedure                               Abnormality         Status                     ---------                               -----------         ------                     Light Blue Top Hold[[id number]]                             In process                 Lavender Top Hold[[id number]]                               In process                 Gold Top Hold[[id number]]                                   In process                   Please view results for these tests on the individual orders.   LIGHT BLUE TOP HOLD   LAVENDER TOP HOLD   GOLD TOP HOLD   ISTAT LACTATE    POC Lactate 0.77      Sample VENOUS     POCT LACTATE          Imaging Results              X-Ray Chest 1 View (Final result)  Result time 06/15/25 10:24:03      Final result by Sourav Bailey MD (06/15/25 10:24:03)                   Impression:      No acute pulmonary process.      Electronically signed by: Sourav Bailey  Date:    06/15/2025  Time:    10:24               Narrative:    EXAMINATION:  XR CHEST 1 VIEW    CLINICAL HISTORY:  Sepsis;    COMPARISON:  06/12/2025    FINDINGS:  Cardiomediastinal silhouette is within normal limits.  No airspace consolidation  or pulmonary edema.  No pleural effusion or pneumothorax.  No acute osseous abnormality.                                       Medications   piperacillin-tazobactam (ZOSYN) 4.5 g in D5W 100 mL IVPB (MB+) (0 g Intravenous Stopped 6/15/25 1058)     Medical Decision Making  Amount and/or Complexity of Data Reviewed  Independent Historian: spouse  External Data Reviewed: labs, radiology and notes.  Labs:  Decision-making details documented in ED Course.  Radiology:  Decision-making details documented in ED Course.    Risk  Prescription drug management.               ED Course as of 06/15/25 1324   Sun Alberto 15, 2025   0939 BP(!): 150/91 [EF]   0939 Temp: 98.5 °F (36.9 °C) [EF]   0939 Temp Source: Oral [EF]   0939 Pulse: 75 [EF]   0939 Resp: 17 [EF]   0939 SpO2: 96 % [EF]   1019 WBC: 9.70 [EF]   1020 Hemoglobin: 17.1 [EF]   1020 Platelet Count: 288 [EF]   1034 X-Ray Chest 1 View [EF]   1157 WBC, UA(!): 46 [EF]   1157 Leukocyte Esterase, UA(!): 2+ [EF]   1157 NITRITE UA: Negative [EF]   1157 Spec Grav UA(!): >=1.030 [EF]   1157 Squam Epithel, UA: <1 [EF]   1159 Ketones, UA(!): 2+ [EF]   1235 55-year-old male presents to the ER for possible prostatitis.  Seen recently for same and started on Cipro.  Urine culture results came back recently, Enterococcus so was sent to the ER because he is on ciprofloxacin.  Complaining of fatigue, no fever for several days but he has been on round-the-clock Motrin and Tylenol.  Had a weak stream with urine several days ago but this has improved.  No prostate tenderness on exam.  Urinalysis does appear to show signs of infection.  Has an appointment with Urology on Tuesday.  I will add on Amoxil and he can continue the ciprofloxacin for presumed prostatitis.  Given IV Zosyn in the ER.  I see no reason for admission, he is well-appearing. [EF]      ED Course User Index  [EF] Jose Pretty MD                           Clinical Impression:  Final diagnoses:  [N41.9] Prostatitis, unspecified  prostatitis type (Primary)          ED Disposition Condition    Discharge Stable          ED Prescriptions       Medication Sig Dispense Start Date End Date Auth. Provider    amoxicillin (AMOXIL) 500 MG capsule Take 2 capsules (1,000 mg total) by mouth every 8 (eight) hours. for 14 days 84 capsule 6/15/2025 6/29/2025 Jose Pretty MD          Follow-up Information       Follow up With Specialties Details Why Contact Info Additional Information    [facility] - Emergency Dept Emergency Medicine  As needed, If symptoms worsen [address] [place] [address]  [phone] 1st floor    See urology Tuesday as scheduled                        [1]   Social History  Tobacco Use    Smoking status: Never    Smokeless tobacco: Never   Substance Use Topics    Alcohol use: Not Currently     Comment: RARELY    Drug use: No        Jose Pretty MD  06/15/25 [id number]

## 2025-06-17 ENCOUNTER — OFFICE VISIT (OUTPATIENT)
Dept: UROLOGY | Facility: CLINIC | Age: 55
End: 2025-06-17
Payer: COMMERCIAL

## 2025-06-17 ENCOUNTER — TELEPHONE (OUTPATIENT)
Dept: FAMILY MEDICINE | Facility: CLINIC | Age: 55
End: 2025-06-17
Payer: COMMERCIAL

## 2025-06-17 VITALS
HEIGHT: 74 IN | WEIGHT: 251.56 LBS | SYSTOLIC BLOOD PRESSURE: 140 MMHG | HEART RATE: 70 BPM | DIASTOLIC BLOOD PRESSURE: 84 MMHG | BODY MASS INDEX: 32.29 KG/M2

## 2025-06-17 DIAGNOSIS — R97.20 ELEVATED PSA: ICD-10-CM

## 2025-06-17 DIAGNOSIS — N39.0 URINARY TRACT INFECTION WITHOUT HEMATURIA, SITE UNSPECIFIED: Primary | ICD-10-CM

## 2025-06-17 LAB
BACTERIA BLD CULT: NORMAL

## 2025-06-17 PROCEDURE — 99214 OFFICE O/P EST MOD 30 MIN: CPT | Mod: S$GLB,,, | Performed by: UROLOGY

## 2025-06-17 PROCEDURE — 99999 PR PBB SHADOW E&M-EST. PATIENT-LVL IV: CPT | Mod: PBBFAC,,, | Performed by: UROLOGY

## 2025-06-17 RX ORDER — DOXYCYCLINE 100 MG/1
100 CAPSULE ORAL 2 TIMES DAILY
Qty: 60 CAPSULE | Refills: 0 | Status: SHIPPED | OUTPATIENT
Start: 2025-06-17 | End: 2025-07-17

## 2025-06-17 RX ORDER — TAMSULOSIN HYDROCHLORIDE 0.4 MG/1
0.4 CAPSULE ORAL
Qty: 30 CAPSULE | Refills: 1 | Status: SHIPPED | OUTPATIENT
Start: 2025-06-17

## 2025-06-17 NOTE — TELEPHONE ENCOUNTER
----- Message from Fabiana Beavers NP sent at 6/17/2025 10:46 AM CDT -----  Blood culture remains negative.   ----- Message -----  From: Lab, Background User  Sent: 6/12/2025   9:47 AM CDT  To: Fabiana Beavers NP

## 2025-06-17 NOTE — PROGRESS NOTES
"Urology - [facility]  Clinic Note    SUBJECTIVE:     Chief Complaint: follow up    History of Present Illness:  Lyndon Lion Jr. is a 55 y.o. male who presents to clinic for follow up. He is established to our clinic.   S/p ureteroscopy left (by Dr. Llanes as he had to move it up urgently) - stent removed - for distal ureteral stone  Here for follow up ultrasound.     100% Calcium oxalate monohydrate       He has h/o Parada Syndrome.   H/o skin cancer.   H/o colon cancer. 8/2008. Follows up with Dr. Leonardo.    Recently diagnosed with diabetes.   Family hx of prostate cancer - grandfather  father had BPH    H/o back surgery.   He does snore. Doesn't stop breathing.     No ED.    Mother had breast cancer and skin cancer. Mom is 61.     He was recently admitted to the hospital with fever and weak stream. Urine culture resulted E Faecalis. A PSA was checked at that time that was 18. He was discharged with cipro and augmentin. His repeat urine culture was no growth. He reports improvement in urinary symptoms. He denies fevers, chills    Anticoagulation:  No    OBJECTIVE:     Estimated body mass index is 32.3 kg/m² as calculated from the following:    Height as of this encounter: 6' 2" (1.88 m).    Weight as of this encounter: 114.1 kg (251 lb 8.7 oz).    Vital Signs (Most Recent)  Pulse: 70 (06/17/25 1420)  BP: (!) 140/84 (06/17/25 1420)    Physical Exam  Vitals reviewed.   Pulmonary:      Effort: Pulmonary effort is normal.   Genitourinary:     Penis: Normal.      : no skin lesions. Testicles normal.     Lab Results   Component Value Date    BUN 19 06/15/2025    CREATININE 1.0 06/15/2025    WBC 9.70 06/15/2025    HGB 17.1 06/15/2025    HCT 49.1 06/15/2025     06/15/2025    AST 17 06/15/2025    ALT 20 06/15/2025    ALKPHOS 51 (L) 06/15/2025    ALBUMIN 4.2 06/15/2025    HGBA1C 5.5 03/17/2025        Lab Results   Component Value Date    PSA 18.43 (H) 06/11/2025    PSA 0.42 06/13/2024    PSA 0.77 " 2023    PSA 0.45 2019    PSA 0.49 2016    PSADIAG 0.42 2020    PSAFREE 0.20 2023    PSAFREE 0.20 2022    PSAFREE 0.19 2021    PSAFREEPCT 44.44 2023    PSAFREEPCT 50.00 2022    PSAFREEPCT 52.78 2021   PSA  - 0.9 22  PSA  - 0.4 22  Psa 2024 - 0.48     Imagin2024  Right kidney: The right kidney measures 11.2 cm. No cortical thinning. No loss of corticomedullary distinction. Resistive index measures 0.6.  No mass. No renal stone. No hydronephrosis.     Left kidney: The left kidney measures 11.8 cm. No cortical thinning. No loss of corticomedullary distinction. Resistive index measures 0.64.  No mass. No renal stone. No hydronephrosis.     The bladder is partially distended at the time of scanning and has an unremarkable appearance.     Impression:     No obstructive uropathy.    Urine 1+ protein, trace livan  ASSESSMENT     1. Urinary tract infection without hematuria, site unspecified    2. Elevated PSA        PLAN:   Lyndon was seen today for follow-up.    Diagnoses and all orders for this visit:    Urinary tract infection without hematuria, site unspecified    Elevated PSA  -     Prostate Specific Antigen, Diagnostic; Future    Other orders  -     doxycycline (MONODOX) 100 MG capsule; Take 1 capsule (100 mg total) by mouth 2 (two) times daily.  -     tamsulosin (FLOMAX) 0.4 mg Cap; Take 1 capsule (0.4 mg total) by mouth after dinner.      Recommend finishing course of amoxicillin for E Facaelis UTI.   Can discontinue ciprofloxacin as it has poor E Faecalis coverage  Will add an additional 4 weeks of doxycycline for presumed prostatitis  Will plan to recheck a PSA in 2025  Also add flomax    MD Dr. Wilman LATHAM saw and evaluated patient and agrees with treatment, evaluation and plan.

## 2025-06-18 LAB — BACTERIA UR CULT: NO GROWTH

## 2025-06-20 LAB
BACTERIA BLD CULT: NORMAL
BACTERIA BLD CULT: NORMAL

## 2025-06-23 ENCOUNTER — TELEPHONE (OUTPATIENT)
Dept: PULMONOLOGY | Facility: CLINIC | Age: 55
End: 2025-06-23
Payer: COMMERCIAL

## 2025-06-23 ENCOUNTER — OFFICE VISIT (OUTPATIENT)
Dept: PULMONOLOGY | Facility: CLINIC | Age: 55
End: 2025-06-23
Payer: COMMERCIAL

## 2025-06-23 VITALS
WEIGHT: 251.31 LBS | DIASTOLIC BLOOD PRESSURE: 77 MMHG | HEART RATE: 73 BPM | OXYGEN SATURATION: 98 % | SYSTOLIC BLOOD PRESSURE: 124 MMHG | BODY MASS INDEX: 32.27 KG/M2

## 2025-06-23 DIAGNOSIS — R97.20 ELEVATED PSA: ICD-10-CM

## 2025-06-23 DIAGNOSIS — Z15.09 LYNCH SYNDROME: ICD-10-CM

## 2025-06-23 DIAGNOSIS — Z85.038 HISTORY OF COLON CANCER, STAGE II: ICD-10-CM

## 2025-06-23 DIAGNOSIS — R91.8 PULMONARY NODULES: Primary | ICD-10-CM

## 2025-06-23 PROCEDURE — 99999 PR PBB SHADOW E&M-EST. PATIENT-LVL V: CPT | Mod: PBBFAC,,, | Performed by: INTERNAL MEDICINE

## 2025-06-23 PROCEDURE — 99204 OFFICE O/P NEW MOD 45 MIN: CPT | Mod: S$GLB,,, | Performed by: INTERNAL MEDICINE

## 2025-06-23 NOTE — TELEPHONE ENCOUNTER
Copied from CRM #[id number]. Topic: Appointments - Appointment Access  >> Jun 23, 2025 11:01 CHOCO Sams wrote:  Caller:wife//Katheryn    Provider:Kam    Calling to ask will I still be seen? Please call stuck at [place]    ETA:no idea    APPT time:11am

## 2025-07-01 LAB
LEFT EYE DM RETINOPATHY: NEGATIVE
RIGHT EYE DM RETINOPATHY: NEGATIVE

## 2025-07-14 RX ORDER — TAMSULOSIN HYDROCHLORIDE 0.4 MG/1
0.4 CAPSULE ORAL
Qty: 90 CAPSULE | Refills: 1 | Status: SHIPPED | OUTPATIENT
Start: 2025-07-14

## 2025-07-18 ENCOUNTER — PATIENT MESSAGE (OUTPATIENT)
Dept: HEMATOLOGY/ONCOLOGY | Facility: CLINIC | Age: 55
End: 2025-07-18
Payer: COMMERCIAL

## 2025-07-30 ENCOUNTER — PATIENT OUTREACH (OUTPATIENT)
Dept: ADMINISTRATIVE | Facility: HOSPITAL | Age: 55
End: 2025-07-30
Payer: COMMERCIAL

## 2025-08-04 PROBLEM — N39.0 UTI (URINARY TRACT INFECTION): Status: RESOLVED | Noted: 2025-06-17 | Resolved: 2025-08-04

## 2025-08-07 ENCOUNTER — OFFICE VISIT (OUTPATIENT)
Dept: DERMATOLOGY | Facility: CLINIC | Age: 55
End: 2025-08-07
Payer: COMMERCIAL

## 2025-08-07 VITALS — HEIGHT: 74 IN | BODY MASS INDEX: 32.08 KG/M2 | WEIGHT: 250 LBS

## 2025-08-07 DIAGNOSIS — L82.1 SEBORRHEIC KERATOSES: ICD-10-CM

## 2025-08-07 DIAGNOSIS — Z15.09 MSH2-RELATED LYNCH SYNDROME (HNPCC1): ICD-10-CM

## 2025-08-07 DIAGNOSIS — D18.01 CHERRY ANGIOMA: ICD-10-CM

## 2025-08-07 DIAGNOSIS — Z85.828 ENCOUNTER FOR FOLLOW-UP SURVEILLANCE OF SKIN CANCER: Primary | ICD-10-CM

## 2025-08-07 DIAGNOSIS — L73.8 SEBACEOUS HYPERPLASIA OF FACE: ICD-10-CM

## 2025-08-07 DIAGNOSIS — L57.8 ACTINIC SKIN DAMAGE: ICD-10-CM

## 2025-08-07 DIAGNOSIS — Z08 ENCOUNTER FOR FOLLOW-UP SURVEILLANCE OF SKIN CANCER: Primary | ICD-10-CM

## 2025-08-07 PROCEDURE — G2211 COMPLEX E/M VISIT ADD ON: HCPCS | Mod: S$GLB,,, | Performed by: DERMATOLOGY

## 2025-08-07 PROCEDURE — 99213 OFFICE O/P EST LOW 20 MIN: CPT | Mod: S$GLB,,, | Performed by: DERMATOLOGY

## 2025-08-07 NOTE — PROGRESS NOTES
Subjective:      Patient ID:  Lyndon Lion Jr. is a 55 y.o. male who presents for   Chief Complaint   Patient presents with    Skin Check     tbsc     LOV- 8/27/24- NUB  1. Skin, left lateral forehead, shave biopsy:   -GRANULOMATOUS INFLAMMATION, see comment   COMMENT:  The histological findings are non-specific but suggestive of a previously ruptured cyst or hair follicle (which I favor).    There are pityrosporum yeasts within a hair follicle and therefore pityrosporum folliculitis could be considered in the   correct clinical context.  Clinical correlation is recommended.   2. Skin, left supraclavicular area, shave biopsy:   -SEBACEOUS ADENOMA, see comment     Pt here today for a TBSC    Derm Hx:  SCC right nasal tip- 2/2023 , s/p PFF Dr Spivey, still revising scar  Parada syndrome  Fhx of MM- mom    Current Outpatient Medications:   ·  ACCU-CHEK GUIDE TEST STRIPS Strp, USE TO TEST TWICE A DAY, Disp: 200 strip, Rfl: 3  ·  ascorbic acid, vitamin C, (VITAMIN C) 100 MG tablet, Take 100 mg by mouth once daily., Disp: , Rfl:   ·  aspirin (ECOTRIN) 81 MG EC tablet, Take by mouth once daily. Aspirin 300 mg bid coded aspirin, Disp: , Rfl:   ·  azelaic acid (AZELEX) 15 % gel, Apply to face BID., Disp: 50 g, Rfl: 5  ·  cyanocobalamin, vitamin B-12, 2,500 mcg Lozg, Place 2 tablets under the tongue once daily., Disp: 180 lozenge, Rfl: 3  ·  doxycycline (PERIOSTAT) 20 MG tablet, TAKE 2 TABLETS BY MOUTH ONCE DAILY, Disp: 180 tablet, Rfl: 3  ·  ferrous gluconate (FERATE) 240 (27 FE) MG tablet, Take 1 tablet (240 mg total) by mouth Daily., Disp: 100 each, Rfl: 0  ·  ketoconazole (NIZORAL) 2 % shampoo, APPLY TOPICALLY EVERY 7 DAYS, Disp: 120 mL, Rfl: 1  ·  lancets (ACCU-CHEK SOFTCLIX LANCETS) Misc, 1 Units by Misc.(Non-Drug; Combo Route) route 2 (two) times a day., Disp: 200 each, Rfl: 0  ·  metFORMIN (GLUCOPHAGE-XR) 500 MG ER 24hr tablet, TAKE 1 TABLET BY MOUTH EVERY DAY WITH BREAKFAST, Disp: 90 tablet, Rfl: 1  ·   "metroNIDAZOLE (NORITATE) 1 % cream, Compound azelaic acid 15% + ivermectin 1% + metronidazole 1% cream. Apply to face once or twice daily., Disp: 30 g, Rfl: 5  ·  pen needle, diabetic (PEN NEEDLE) 32 gauge x 5/32" Ndle, 1 each by Misc.(Non-Drug; Combo Route) route once daily., Disp: 90 each, Rfl: 3  ·  pravastatin (PRAVACHOL) 20 MG tablet, Take 1 tablet (20 mg total) by mouth once daily., Disp: 90 tablet, Rfl: 3  ·  RABEprazole (ACIPHEX) 20 mg tablet, TAKE 1 TABLET (20 MG TOTAL) BY MOUTH BEFORE BREAKFAST., Disp: 90 tablet, Rfl: 3  ·  semaglutide (OZEMPIC) 2 mg/dose (8 mg/3 mL) PnIj, Inject 2 mg into the skin every 7 days., Disp: 9 mL, Rfl: 1  ·  sulfacetamide sodium-sulfur 10-5 % (w/w) Clsr, Use to wash face daily, Disp: 170 g, Rfl: 5  ·  tadalafiL (CIALIS) 5 MG tablet, Take 1 tablet (5 mg total) by mouth daily as needed for Erectile Dysfunction., Disp: 90 tablet, Rfl: 1  ·  tamsulosin (FLOMAX) 0.4 mg Cap, TAKE 1 CAPSULE (0.4 MG TOTAL) BY MOUTH AFTER DINNER, Disp: 90 capsule, Rfl: 1  ·  testosterone cypionate (DEPOTESTOTERONE CYPIONATE) 200 mg/mL injection, Inject 60 mg into the muscle twice a week., Disp: , Rfl:   ·  vitamin D (VITAMIN D3) 1000 units Tab, Take 5,000 Units by mouth 3 (three) times daily., Disp: , Rfl:   ·  terbinafine HCL (LAMISIL) 250 mg tablet, Take 1 tablet (250 mg total) by mouth once daily., Disp: 90 tablet, Rfl: 0  ·  vitamin A 8000 UNIT capsule, Take 1 capsule (8,000 Units total) by mouth once daily., Disp: 100 capsule, Rfl: 3            Review of Systems   Constitutional:  Negative for fever, chills and fatigue.   HENT:  Negative for nosebleeds and headaches.    Respiratory:  Negative for cough and shortness of breath.    Gastrointestinal:  Negative for nausea, vomiting, abdominal pain and diarrhea.   Musculoskeletal:  Negative for myalgias and arthralgias.   Skin:  Positive for daily sunscreen use, activity-related sunscreen use and wears hat. Negative for itching, rash, dry skin, sun " sensitivity, recent sunburn and dry lips.   Neurological:  Negative for headaches.   Psychiatric/Behavioral:  Negative for depressed mood.    Hematologic/Lymphatic: Does not bruise/bleed easily.       Objective:   Physical Exam   Constitutional: He appears well-developed and well-nourished. No distress.   Neurological: He is alert and oriented to person, place, and time. He is not disoriented.   Psychiatric: He has a normal mood and affect.   Skin:   Areas Examined (abnormalities noted in diagram):   Scalp / Hair Palpated and Inspected  Head / Face Inspection Performed  Neck Inspection Performed  Chest / Axilla Inspection Performed  Abdomen Inspection Performed  Genitals / Buttocks / Groin Inspection Performed  Back Inspection Performed  RUE Inspected  LUE Inspection Performed  RLE Inspected  LLE Inspection Performed  Nails and Digits Inspection Performed                         Diagram Legend     Erythematous scaling macule/papule c/w actinic keratosis       Vascular papule c/w angioma      Pigmented verrucoid papule/plaque c/w seborrheic keratosis      Yellow umbilicated papule c/w sebaceous hyperplasia      Irregularly shaped tan macule c/w lentigo     1-2 mm smooth white papules consistent with Milia      Movable subcutaneous cyst with punctum c/w epidermal inclusion cyst      Subcutaneous movable cyst c/w pilar cyst      Firm pink to brown papule c/w dermatofibroma      Pedunculated fleshy papule(s) c/w skin tag(s)      Evenly pigmented macule c/w junctional nevus     Mildly variegated pigmented, slightly irregular-bordered macule c/w mildly atypical nevus      Flesh colored to evenly pigmented papule c/w intradermal nevus       Pink pearly papule/plaque c/w basal cell carcinoma      Erythematous hyperkeratotic cursted plaque c/w SCC      Surgical scar with no sign of skin cancer recurrence      Open and closed comedones      Inflammatory papules and pustules      Verrucoid papule consistent consistent with  wart     Erythematous eczematous patches and plaques     Dystrophic onycholytic nail with subungual debris c/w onychomycosis     Umbilicated papule    Erythematous-base heme-crusted tan verrucoid plaque consistent with inflamed seborrheic keratosis     Erythematous Silvery Scaling Plaque c/w Psoriasis     See annotation      Assessment / Plan:        Encounter for follow-up surveillance of skin cancer  Area of previous SCC (nose) examined. Site well healed with no signs of recurrence.    Total body skin examination performed today including at least 12 points as noted in physical examination. No lesions suspicious for malignancy noted.    MSH2-related Parada syndrome (HNPCC1)  Annual colonoscopy (from q6mo)  Intense surveillance    Seborrheic keratoses  These are benign inherited growths without a malignant potential. Reassurance given to patient. No treatment is necessary.     Cherry angioma  This is a benign vascular lesion. Reassurance given. No treatment required.     Sebaceous hyperplasia of face  This is a common condition representing benign enlargement of the sebaceous lobule. It typically occurs in adulthood. Reassurance given to patient.     Actinic skin damage  Patient instructed in importance in daily broad spectrum sun protection of at least spf 30. Mineral sunscreen ingredients preferred (Zinc +/- Titanium) and can be found OTC.   Patient encouraged to wear hat for all outdoor exposure.   Also discussed sun avoidance and use of protective clothing.             No follow-ups on file.

## 2025-08-10 DIAGNOSIS — L73.8 PITYROSPORUM FOLLICULITIS: ICD-10-CM

## 2025-08-11 RX ORDER — KETOCONAZOLE 20 MG/ML
SHAMPOO, SUSPENSION TOPICAL
Qty: 120 ML | Refills: 1 | Status: SHIPPED | OUTPATIENT
Start: 2025-08-11

## 2025-08-12 DIAGNOSIS — N52.9 ERECTILE DYSFUNCTION, UNSPECIFIED ERECTILE DYSFUNCTION TYPE: ICD-10-CM

## 2025-08-12 RX ORDER — TADALAFIL 5 MG/1
5 TABLET ORAL DAILY PRN
Qty: 90 TABLET | Refills: 1 | Status: SHIPPED | OUTPATIENT
Start: 2025-08-12

## 2025-08-19 RX ORDER — METFORMIN HYDROCHLORIDE 500 MG/1
500 TABLET, EXTENDED RELEASE ORAL
Qty: 90 TABLET | Refills: 0 | Status: SHIPPED | OUTPATIENT
Start: 2025-08-19

## (undated) DEVICE — SUT MONOCRYL 4-0 UND RB-1

## (undated) DEVICE — BLADE ELECTRO EXTENDED.

## (undated) DEVICE — SEE MEDLINE ITEM 157116

## (undated) DEVICE — NDL SPINAL SPINOCAN 22GX3.5

## (undated) DEVICE — UNDERGLOVES BIOGEL PI SIZE 7.5

## (undated) DEVICE — SUT PROLENE 5-0 PS-3 18 IN

## (undated) DEVICE — STRIP MEDI WND CLSR 1/2X4IN

## (undated) DEVICE — PAD CURAD NONADH 3X4IN

## (undated) DEVICE — SKINMARKER & RULER REGULAR X-F

## (undated) DEVICE — DRESSING SURGICAL 3/4X3/4

## (undated) DEVICE — DRESSING EYE OVAL LF

## (undated) DEVICE — SUT 4-0 VICRYL / SH

## (undated) DEVICE — DRAPE INCISE IOBAN 2 23X17IN

## (undated) DEVICE — NDL HYPO REG 25G X 1 1/2

## (undated) DEVICE — DRESSING N ADH OIL EMUL 3X8

## (undated) DEVICE — SUT 5/0 18IN PLAIN FAST AB

## (undated) DEVICE — EXTRACTOR TIPLESS 2.4FRX1115CM

## (undated) DEVICE — BACITRACIN ZINC OINT 0.9GM

## (undated) DEVICE — DRAPE HALF SURGICAL 40X58IN

## (undated) DEVICE — SPONGE DERMACEA GAUZE 4X4

## (undated) DEVICE — ELECTRODE REM PLYHSV RETURN 9

## (undated) DEVICE — SPONGE IV DRAIN 4X4 STERILE

## (undated) DEVICE — GLOVE BIOGEL SKINSENSE PI 7.5

## (undated) DEVICE — JELLY PETROLEUM WHT ST

## (undated) DEVICE — CAUTERY BOVIE PENCIL

## (undated) DEVICE — TRAY MINOR GEN SURG OMC

## (undated) DEVICE — GOWN SURGICAL X-LARGE

## (undated) DEVICE — GAUZE XEROFORM NONADH 5X9IN

## (undated) DEVICE — SUT ETHILON 4-0 PS2 18 BLK

## (undated) DEVICE — DRAPE EENT SPLIT STERILE

## (undated) DEVICE — DRESSING XEROFORM FOIL PK 1X8

## (undated) DEVICE — SKINMARKER W/RULER DEVON

## (undated) DEVICE — ELECTRODE BLADE INSULATED 1 IN

## (undated) DEVICE — TUBE FEEDING PURPLE 5FRX40CM

## (undated) DEVICE — Device

## (undated) DEVICE — SEE MEDLINE ITEM 157194

## (undated) DEVICE — STAPLER SKIN ROTATING HEAD

## (undated) DEVICE — SUT CHROMIC GUT 5/0 18IN

## (undated) DEVICE — SYR 10CC LUER LOCK

## (undated) DEVICE — SUT 4/0 18IN PDS II CLR MO

## (undated) DEVICE — SUT 5/0 18IN PROLENE BL MO

## (undated) DEVICE — GLOVE RADIATION RESISTENC SZ8

## (undated) DEVICE — SYS LABEL CORRECT MED

## (undated) DEVICE — GLOVE PROTEXIS PI CLASSIC 6.5

## (undated) DEVICE — SUT 5/0 18IN PDS II CLR MO

## (undated) DEVICE — PENCIL ROCKER SWITCH 10FT CORD

## (undated) DEVICE — DRAPE C ARM 42 X 120 10/BX

## (undated) DEVICE — COVER TABLE 77 X 96

## (undated) DEVICE — GAUZE SPONGE PEANUT STRL

## (undated) DEVICE — SPONGE DERMACEA 4X4IN 12PLY

## (undated) DEVICE — EVACUATOR WOUND BULB 100CC

## (undated) DEVICE — GLOVE RADIATION SIZE 8

## (undated) DEVICE — NDL HYPODERMIC BLUNT 18G 1.5IN

## (undated) DEVICE — GLOVE PROTEXIS PI CLASSIC 7.5

## (undated) DEVICE — DRESSING SURGICAL 1/2X1/2

## (undated) DEVICE — DRAPE STERI-DRAPE 1000 17X11IN

## (undated) DEVICE — DRESSING TELFA N ADH 3X8

## (undated) DEVICE — DRAPE ABDOMINAL TIBURON 14X11

## (undated) DEVICE — JELLY KY LUBRICATING 5G PACKET

## (undated) DEVICE — SEE MEDLINE ITEM 146313

## (undated) DEVICE — SEE MEDLINE ITEM 146292

## (undated) DEVICE — PACK CYSTOSCOPY III SIRUS

## (undated) DEVICE — SUT 0 VICRYL / CT-1

## (undated) DEVICE — SUT MCRYL PLUS 4-0 PS2 27IN

## (undated) DEVICE — SUT CTD VICRYL 5-0 P-2 UNDB

## (undated) DEVICE — MATRIX HEMOSTATIC FLOSEAL 5ML

## (undated) DEVICE — GAUZE FLUFF XXLG 36X36 2 PLY

## (undated) DEVICE — SUT VICRYL PLUS 3-0 SH 18IN

## (undated) DEVICE — CORD BIPOLAR 12 FOOT

## (undated) DEVICE — PACK CUSTOM LUMBAR LAM SLI

## (undated) DEVICE — SEE MEDLINE ITEM 152622

## (undated) DEVICE — SCRUB 10% POVIDONE IODINE 4OZ

## (undated) DEVICE — SUT CTD VICRYL CT-1 27

## (undated) DEVICE — APPLICATOR NS COTTON-TIP 6IN

## (undated) DEVICE — NDL SAFETY 25G X 1.5 ECLIPSE

## (undated) DEVICE — SEE MEDLINE ITEM 157117

## (undated) DEVICE — LUBRICANT SURGILUBE 2 OZ

## (undated) DEVICE — SYR DISP LL 5CC

## (undated) DEVICE — NDL SPINAL 18GX3.5 SPINOCAN

## (undated) DEVICE — SUT 4-0 CHROMIC GUT / SH

## (undated) DEVICE — ALCOHOL 70% ISOP RUBBING 4OZ

## (undated) DEVICE — SUT 3-0 12-18IN SILK

## (undated) DEVICE — SUT PLAIN 4-0 SC-1 18IN

## (undated) DEVICE — GUIDE WIRE MOTION .035 X 150CM

## (undated) DEVICE — CATH POLLACK OPEN-END FLEXI-TI

## (undated) DEVICE — SUT MONOCRYL 4-0 PS-2

## (undated) DEVICE — TOWEL OR DISP STRL BLUE 4/PK

## (undated) DEVICE — SYR SLIP TIP 10ML SHIELD

## (undated) DEVICE — KIT PT CARE FRME POS JACK

## (undated) DEVICE — UNDERGLOVES BIOGEL PI SZ 7 LF

## (undated) DEVICE — GLOVE 7.0 PROTEXIS PI BLUE

## (undated) DEVICE — ADAPTER HOSE 10FT 8MM

## (undated) DEVICE — SUT 5-0 CHROMIC GUT / P-3

## (undated) DEVICE — TRAY SKIN SCRUB WET PREMIUM

## (undated) DEVICE — APPLICATOR COTTON TIP 3IN STRL

## (undated) DEVICE — GAUZE SPONGE 4X4 12PLY

## (undated) DEVICE — NDL SPINAL 20G X6"

## (undated) DEVICE — HEMOSTAT SURGICEL 4X8IN

## (undated) DEVICE — HEMOSTAT SURGICEL 2X3IN

## (undated) DEVICE — SEE MEDLINE ITEM 153151

## (undated) DEVICE — SEE MEDLINE ITEM 107746

## (undated) DEVICE — SUT PDS PLUS 4-0 P-3 18IN

## (undated) DEVICE — SUT 2-0 ETHILON 18 FS

## (undated) DEVICE — NDL STRAIGHT 4CM LEIBINGER

## (undated) DEVICE — BLADE SURGICAL 15C

## (undated) DEVICE — STRAP OR TABLE 5IN X 72IN

## (undated) DEVICE — SUT 0 36IN COATED VICRYL VI

## (undated) DEVICE — CATH RED RUBBER 8FR

## (undated) DEVICE — SEE MEDLINE ITEM 147518

## (undated) DEVICE — FORCEP BAYO INSU SGL USE STRGT

## (undated) DEVICE — INSTRUMENT SURG SUCT FRZR W/C

## (undated) DEVICE — SUT PDSII 4-0 PS-2 CLEAR MO

## (undated) DEVICE — SUT 4-0 CHROMIC GUT / RB1

## (undated) DEVICE — CONTAINER SPECIMEN STRL 4OZ

## (undated) DEVICE — SPRAY MASTISOL

## (undated) DEVICE — GLOVE SURG BIOGEL LATEX SZ 7.5

## (undated) DEVICE — SEE MEDLINE ITEM 152678

## (undated) DEVICE — CONTAINER SPECIMEN OR STER 4OZ

## (undated) DEVICE — ELECTRODE NEEDLE 1IN

## (undated) DEVICE — GLOVE PROTEXIS PI CLASSIC 7.0

## (undated) DEVICE — TRAY ENT 4/CS

## (undated) DEVICE — BLADE SURG CARBON STEEL #10

## (undated) DEVICE — PAD UNDERPAD 30X30

## (undated) DEVICE — NDL 22GA X1 1/2 REG BEVEL

## (undated) DEVICE — TRAY CYSTO BASIN OMC

## (undated) DEVICE — BOWL STERILE LARGE 32OZ

## (undated) DEVICE — SUT 6/0 18IN PROLENE BL MO

## (undated) DEVICE — SUT PLN 4-0 P3 18IN GUT YEL

## (undated) DEVICE — DRAPE C-ARMOR EQUIPMENT COVER

## (undated) DEVICE — SOL NACL IRR 3000ML

## (undated) DEVICE — SUT BONE WAX 2.5 GRMS 12/BX

## (undated) DEVICE — GLOVE 6.5 PROTEXIS PI BLUE

## (undated) DEVICE — CLIPPER BLADE MOD 4406 (CAREF)

## (undated) DEVICE — SPONGE GAUZE 16PLY 4X4

## (undated) DEVICE — DRAPE STERI INSTRUMENT 1018

## (undated) DEVICE — SEE MEDLINE ITEM 157131

## (undated) DEVICE — TOWELS STERILE 18 X 25.5

## (undated) DEVICE — GOWN POLY REINF BRTH SLV XL

## (undated) DEVICE — MARKER FN REG DUAL UTIL RULER

## (undated) DEVICE — SPONGE PATTY SURGICAL .5X3IN

## (undated) DEVICE — SYR 30CC LUER LOCK

## (undated) DEVICE — DRESSING TELFA STRL 4X3 LF

## (undated) DEVICE — DRAIN WOUND 19FR TROCAR